# Patient Record
Sex: MALE | Race: WHITE | NOT HISPANIC OR LATINO | Employment: OTHER | URBAN - METROPOLITAN AREA
[De-identification: names, ages, dates, MRNs, and addresses within clinical notes are randomized per-mention and may not be internally consistent; named-entity substitution may affect disease eponyms.]

---

## 2019-09-12 ENCOUNTER — TRANSCRIBE ORDERS (OUTPATIENT)
Dept: ADMINISTRATIVE | Facility: HOSPITAL | Age: 49
End: 2019-09-12

## 2019-09-12 DIAGNOSIS — K57.20 PERFORATED DIVERTICULUM OF LARGE INTESTINE: Primary | ICD-10-CM

## 2019-09-17 ENCOUNTER — LAB REQUISITION (OUTPATIENT)
Dept: LAB | Facility: HOSPITAL | Age: 49
End: 2019-09-17

## 2019-09-17 DIAGNOSIS — Z00.00 ROUTINE GENERAL MEDICAL EXAMINATION AT A HEALTH CARE FACILITY: ICD-10-CM

## 2019-09-17 LAB
ALBUMIN SERPL-MCNC: 3.6 G/DL (ref 3.5–5.2)
ALBUMIN/GLOB SERPL: 0.9 G/DL
ALP SERPL-CCNC: 94 U/L (ref 39–117)
ALT SERPL W P-5'-P-CCNC: 33 U/L (ref 1–41)
ANION GAP SERPL CALCULATED.3IONS-SCNC: 14 MMOL/L (ref 5–15)
AST SERPL-CCNC: 29 U/L (ref 1–40)
BASOPHILS # BLD AUTO: 0.07 10*3/MM3 (ref 0–0.2)
BASOPHILS NFR BLD AUTO: 0.7 % (ref 0–1.5)
BILIRUB SERPL-MCNC: 0.2 MG/DL (ref 0.2–1.2)
BUN BLD-MCNC: 11 MG/DL (ref 6–20)
BUN/CREAT SERPL: 12.6 (ref 7–25)
CALCIUM SPEC-SCNC: 8.8 MG/DL (ref 8.6–10.5)
CHLORIDE SERPL-SCNC: 103 MMOL/L (ref 98–107)
CO2 SERPL-SCNC: 23 MMOL/L (ref 22–29)
CREAT BLD-MCNC: 0.87 MG/DL (ref 0.76–1.27)
CRP SERPL-MCNC: 1.32 MG/DL (ref 0–0.5)
DEPRECATED RDW RBC AUTO: 48.4 FL (ref 37–54)
EOSINOPHIL # BLD AUTO: 0.26 10*3/MM3 (ref 0–0.4)
EOSINOPHIL NFR BLD AUTO: 2.5 % (ref 0.3–6.2)
ERYTHROCYTE [DISTWIDTH] IN BLOOD BY AUTOMATED COUNT: 17.6 % (ref 12.3–15.4)
ERYTHROCYTE [SEDIMENTATION RATE] IN BLOOD: 95 MM/HR (ref 0–20)
GFR SERPL CREATININE-BSD FRML MDRD: 113 ML/MIN/1.73
GFR SERPL CREATININE-BSD FRML MDRD: 93 ML/MIN/1.73
GLOBULIN UR ELPH-MCNC: 3.9 GM/DL
GLUCOSE BLD-MCNC: 89 MG/DL (ref 65–99)
HCT VFR BLD AUTO: 38.9 % (ref 37.5–51)
HGB BLD-MCNC: 11.9 G/DL (ref 13–17.7)
IMM GRANULOCYTES # BLD AUTO: 0.07 10*3/MM3 (ref 0–0.05)
IMM GRANULOCYTES NFR BLD AUTO: 0.7 % (ref 0–0.5)
LYMPHOCYTES # BLD AUTO: 2.04 10*3/MM3 (ref 0.7–3.1)
LYMPHOCYTES NFR BLD AUTO: 19.7 % (ref 19.6–45.3)
MCH RBC QN AUTO: 25.3 PG (ref 26.6–33)
MCHC RBC AUTO-ENTMCNC: 30.6 G/DL (ref 31.5–35.7)
MCV RBC AUTO: 82.6 FL (ref 79–97)
MONOCYTES # BLD AUTO: 0.77 10*3/MM3 (ref 0.1–0.9)
MONOCYTES NFR BLD AUTO: 7.4 % (ref 5–12)
NEUTROPHILS # BLD AUTO: 7.13 10*3/MM3 (ref 1.7–7)
NEUTROPHILS NFR BLD AUTO: 69 % (ref 42.7–76)
NRBC BLD AUTO-RTO: 0 /100 WBC (ref 0–0.2)
PLATELET # BLD AUTO: 424 10*3/MM3 (ref 140–450)
PMV BLD AUTO: 10.8 FL (ref 6–12)
POTASSIUM BLD-SCNC: 4.6 MMOL/L (ref 3.5–5.2)
PROT SERPL-MCNC: 7.5 G/DL (ref 6–8.5)
RBC # BLD AUTO: 4.71 10*6/MM3 (ref 4.14–5.8)
SODIUM BLD-SCNC: 140 MMOL/L (ref 136–145)
WBC NRBC COR # BLD: 10.34 10*3/MM3 (ref 3.4–10.8)

## 2019-09-17 PROCEDURE — 85652 RBC SED RATE AUTOMATED: CPT

## 2019-09-17 PROCEDURE — 86140 C-REACTIVE PROTEIN: CPT

## 2019-09-17 PROCEDURE — 85025 COMPLETE CBC W/AUTO DIFF WBC: CPT

## 2019-09-17 PROCEDURE — 80053 COMPREHEN METABOLIC PANEL: CPT

## 2019-09-18 ENCOUNTER — APPOINTMENT (OUTPATIENT)
Dept: CT IMAGING | Facility: HOSPITAL | Age: 49
End: 2019-09-18

## 2019-09-23 ENCOUNTER — HOSPITAL ENCOUNTER (OUTPATIENT)
Dept: CT IMAGING | Facility: HOSPITAL | Age: 49
Discharge: HOME | End: 2019-09-23
Admitting: INTERNAL MEDICINE

## 2019-09-23 DIAGNOSIS — K57.20 PERFORATED DIVERTICULUM OF LARGE INTESTINE: ICD-10-CM

## 2019-09-23 PROCEDURE — 74177 CT ABD & PELVIS W/CONTRAST: CPT

## 2019-09-23 PROCEDURE — 25010000002 IOPAMIDOL 61 % SOLUTION: Performed by: INTERNAL MEDICINE

## 2019-09-23 RX ADMIN — IOPAMIDOL 95 ML: 612 INJECTION, SOLUTION INTRAVENOUS at 15:05

## 2019-09-24 ENCOUNTER — LAB REQUISITION (OUTPATIENT)
Dept: LAB | Facility: HOSPITAL | Age: 49
End: 2019-09-24

## 2019-09-24 DIAGNOSIS — Z00.00 ROUTINE GENERAL MEDICAL EXAMINATION AT A HEALTH CARE FACILITY: ICD-10-CM

## 2019-09-24 LAB
ALBUMIN SERPL-MCNC: 3.9 G/DL (ref 3.5–5.2)
ALBUMIN/GLOB SERPL: 1.2 G/DL
ALP SERPL-CCNC: 74 U/L (ref 39–117)
ALT SERPL W P-5'-P-CCNC: 14 U/L (ref 1–41)
ANION GAP SERPL CALCULATED.3IONS-SCNC: 12 MMOL/L (ref 5–15)
AST SERPL-CCNC: 17 U/L (ref 1–40)
BASOPHILS # BLD AUTO: 0.08 10*3/MM3 (ref 0–0.2)
BASOPHILS NFR BLD AUTO: 1 % (ref 0–1.5)
BILIRUB SERPL-MCNC: 0.2 MG/DL (ref 0.2–1.2)
BUN BLD-MCNC: 11 MG/DL (ref 6–20)
BUN/CREAT SERPL: 14.5 (ref 7–25)
CALCIUM SPEC-SCNC: 9.2 MG/DL (ref 8.6–10.5)
CHLORIDE SERPL-SCNC: 102 MMOL/L (ref 98–107)
CO2 SERPL-SCNC: 25 MMOL/L (ref 22–29)
CREAT BLD-MCNC: 0.76 MG/DL (ref 0.76–1.27)
CRP SERPL-MCNC: 0.25 MG/DL (ref 0–0.5)
DEPRECATED RDW RBC AUTO: 59.8 FL (ref 37–54)
EOSINOPHIL # BLD AUTO: 0.21 10*3/MM3 (ref 0–0.4)
EOSINOPHIL NFR BLD AUTO: 2.5 % (ref 0.3–6.2)
ERYTHROCYTE [DISTWIDTH] IN BLOOD BY AUTOMATED COUNT: 20 % (ref 12.3–15.4)
ERYTHROCYTE [SEDIMENTATION RATE] IN BLOOD: 91 MM/HR (ref 0–20)
GFR SERPL CREATININE-BSD FRML MDRD: 109 ML/MIN/1.73
GLOBULIN UR ELPH-MCNC: 3.3 GM/DL
GLUCOSE BLD-MCNC: 85 MG/DL (ref 65–99)
HCT VFR BLD AUTO: 41.3 % (ref 37.5–51)
HGB BLD-MCNC: 12.7 G/DL (ref 13–17.7)
IMM GRANULOCYTES # BLD AUTO: 0.03 10*3/MM3 (ref 0–0.05)
IMM GRANULOCYTES NFR BLD AUTO: 0.4 % (ref 0–0.5)
LYMPHOCYTES # BLD AUTO: 1.89 10*3/MM3 (ref 0.7–3.1)
LYMPHOCYTES NFR BLD AUTO: 22.7 % (ref 19.6–45.3)
MCH RBC QN AUTO: 26 PG (ref 26.6–33)
MCHC RBC AUTO-ENTMCNC: 30.8 G/DL (ref 31.5–35.7)
MCV RBC AUTO: 84.6 FL (ref 79–97)
MONOCYTES # BLD AUTO: 0.62 10*3/MM3 (ref 0.1–0.9)
MONOCYTES NFR BLD AUTO: 7.4 % (ref 5–12)
NEUTROPHILS # BLD AUTO: 5.51 10*3/MM3 (ref 1.7–7)
NEUTROPHILS NFR BLD AUTO: 66 % (ref 42.7–76)
NRBC BLD AUTO-RTO: 0 /100 WBC (ref 0–0.2)
PLATELET # BLD AUTO: 246 10*3/MM3 (ref 140–450)
PMV BLD AUTO: 11.5 FL (ref 6–12)
POTASSIUM BLD-SCNC: 4.1 MMOL/L (ref 3.5–5.2)
PROT SERPL-MCNC: 7.2 G/DL (ref 6–8.5)
RBC # BLD AUTO: 4.88 10*6/MM3 (ref 4.14–5.8)
SODIUM BLD-SCNC: 139 MMOL/L (ref 136–145)
WBC NRBC COR # BLD: 8.34 10*3/MM3 (ref 3.4–10.8)

## 2019-09-24 PROCEDURE — 85025 COMPLETE CBC W/AUTO DIFF WBC: CPT

## 2019-09-24 PROCEDURE — 86140 C-REACTIVE PROTEIN: CPT

## 2019-09-24 PROCEDURE — 80053 COMPREHEN METABOLIC PANEL: CPT

## 2019-09-24 PROCEDURE — 85652 RBC SED RATE AUTOMATED: CPT

## 2019-09-25 ENCOUNTER — TRANSCRIBE ORDERS (OUTPATIENT)
Dept: ADMINISTRATIVE | Facility: HOSPITAL | Age: 49
End: 2019-09-25

## 2019-09-25 DIAGNOSIS — K75.0 PYOGENIC HEPATIC ABSCESS: Primary | ICD-10-CM

## 2019-10-07 ENCOUNTER — HOSPITAL ENCOUNTER (OUTPATIENT)
Dept: CT IMAGING | Facility: HOSPITAL | Age: 49
Discharge: HOME | End: 2019-10-07
Admitting: INTERNAL MEDICINE

## 2019-10-07 DIAGNOSIS — K75.0 PYOGENIC HEPATIC ABSCESS: ICD-10-CM

## 2019-10-07 PROCEDURE — 25010000002 IOPAMIDOL 61 % SOLUTION: Performed by: INTERNAL MEDICINE

## 2019-10-07 PROCEDURE — 74177 CT ABD & PELVIS W/CONTRAST: CPT

## 2019-10-07 RX ADMIN — IOPAMIDOL 95 ML: 612 INJECTION, SOLUTION INTRAVENOUS at 13:28

## 2023-11-25 ENCOUNTER — HOSPITAL ENCOUNTER (EMERGENCY)
Facility: HOSPITAL | Age: 53
Discharge: HOME | End: 2023-11-25
Attending: STUDENT IN AN ORGANIZED HEALTH CARE EDUCATION/TRAINING PROGRAM
Payer: COMMERCIAL

## 2023-11-25 ENCOUNTER — APPOINTMENT (OUTPATIENT)
Dept: CT IMAGING | Facility: HOSPITAL | Age: 53
End: 2023-11-25
Payer: COMMERCIAL

## 2023-11-25 VITALS
BODY MASS INDEX: 32.51 KG/M2 | WEIGHT: 240 LBS | HEIGHT: 72 IN | HEART RATE: 80 BPM | DIASTOLIC BLOOD PRESSURE: 92 MMHG | TEMPERATURE: 97.1 F | OXYGEN SATURATION: 97 % | RESPIRATION RATE: 16 BRPM | SYSTOLIC BLOOD PRESSURE: 136 MMHG

## 2023-11-25 DIAGNOSIS — A04.5 CAMPYLOBACTER INTESTINAL INFECTION: Primary | ICD-10-CM

## 2023-11-25 LAB
ADV 40+41 DNA STL QL NAA+NON-PROBE: NOT DETECTED
ALBUMIN SERPL-MCNC: 3.9 G/DL (ref 3.5–5.2)
ALBUMIN/GLOB SERPL: 1.1 G/DL
ALP SERPL-CCNC: 115 U/L (ref 39–117)
ALT SERPL W P-5'-P-CCNC: 8 U/L (ref 1–41)
ANION GAP SERPL CALCULATED.3IONS-SCNC: 10.8 MMOL/L (ref 5–15)
AST SERPL-CCNC: 14 U/L (ref 1–40)
ASTRO TYP 1-8 RNA STL QL NAA+NON-PROBE: NOT DETECTED
BACTERIA UR QL AUTO: ABNORMAL /HPF
BASOPHILS # BLD AUTO: 0.04 10*3/MM3 (ref 0–0.2)
BASOPHILS NFR BLD AUTO: 0.4 % (ref 0–1.5)
BILIRUB SERPL-MCNC: 0.2 MG/DL (ref 0–1.2)
BILIRUB UR QL STRIP: NEGATIVE
BUN SERPL-MCNC: 16 MG/DL (ref 6–20)
BUN/CREAT SERPL: 14 (ref 7–25)
C CAYETANENSIS DNA STL QL NAA+NON-PROBE: NOT DETECTED
C COLI+JEJ+UPSA DNA STL QL NAA+NON-PROBE: DETECTED
C DIFF GDH + TOXINS A+B STL QL IA.RAPID: NEGATIVE
C DIFF GDH + TOXINS A+B STL QL IA.RAPID: NEGATIVE
CALCIUM SPEC-SCNC: 9.4 MG/DL (ref 8.6–10.5)
CHLORIDE SERPL-SCNC: 102 MMOL/L (ref 98–107)
CLARITY UR: CLEAR
CO2 SERPL-SCNC: 24.2 MMOL/L (ref 22–29)
COLOR UR: YELLOW
CREAT SERPL-MCNC: 1.14 MG/DL (ref 0.76–1.27)
CRYPTOSP DNA STL QL NAA+NON-PROBE: NOT DETECTED
D-LACTATE SERPL-SCNC: 1.1 MMOL/L (ref 0.5–2)
DEPRECATED RDW RBC AUTO: 46.6 FL (ref 37–54)
E HISTOLYT DNA STL QL NAA+NON-PROBE: NOT DETECTED
EAEC PAA PLAS AGGR+AATA ST NAA+NON-PRB: NOT DETECTED
EC STX1+STX2 GENES STL QL NAA+NON-PROBE: NOT DETECTED
EGFRCR SERPLBLD CKD-EPI 2021: 76.4 ML/MIN/1.73
EOSINOPHIL # BLD AUTO: 0.2 10*3/MM3 (ref 0–0.4)
EOSINOPHIL NFR BLD AUTO: 2 % (ref 0.3–6.2)
EPEC EAE GENE STL QL NAA+NON-PROBE: NOT DETECTED
ERYTHROCYTE [DISTWIDTH] IN BLOOD BY AUTOMATED COUNT: 17.6 % (ref 12.3–15.4)
ETEC LTA+ST1A+ST1B TOX ST NAA+NON-PROBE: NOT DETECTED
G LAMBLIA DNA STL QL NAA+NON-PROBE: NOT DETECTED
GLOBULIN UR ELPH-MCNC: 3.7 GM/DL
GLUCOSE SERPL-MCNC: 95 MG/DL (ref 65–99)
GLUCOSE UR STRIP-MCNC: NEGATIVE MG/DL
HCT VFR BLD AUTO: 37.7 % (ref 37.5–51)
HGB BLD-MCNC: 11.6 G/DL (ref 13–17.7)
HGB UR QL STRIP.AUTO: NEGATIVE
HOLD SPECIMEN: NORMAL
HOLD SPECIMEN: NORMAL
HYALINE CASTS UR QL AUTO: ABNORMAL /LPF
IMM GRANULOCYTES # BLD AUTO: 0.03 10*3/MM3 (ref 0–0.05)
IMM GRANULOCYTES NFR BLD AUTO: 0.3 % (ref 0–0.5)
KETONES UR QL STRIP: ABNORMAL
LEUKOCYTE ESTERASE UR QL STRIP.AUTO: NEGATIVE
LIPASE SERPL-CCNC: 63 U/L (ref 13–60)
LYMPHOCYTES # BLD AUTO: 2.02 10*3/MM3 (ref 0.7–3.1)
LYMPHOCYTES NFR BLD AUTO: 20.5 % (ref 19.6–45.3)
MCH RBC QN AUTO: 22.7 PG (ref 26.6–33)
MCHC RBC AUTO-ENTMCNC: 30.8 G/DL (ref 31.5–35.7)
MCV RBC AUTO: 73.6 FL (ref 79–97)
MICROCYTES BLD QL: NORMAL
MONOCYTES # BLD AUTO: 0.79 10*3/MM3 (ref 0.1–0.9)
MONOCYTES NFR BLD AUTO: 8 % (ref 5–12)
MUCOUS THREADS URNS QL MICRO: ABNORMAL /HPF
NEUTROPHILS NFR BLD AUTO: 6.75 10*3/MM3 (ref 1.7–7)
NEUTROPHILS NFR BLD AUTO: 68.8 % (ref 42.7–76)
NITRITE UR QL STRIP: NEGATIVE
NOROVIRUS GI+II RNA STL QL NAA+NON-PROBE: NOT DETECTED
NRBC BLD AUTO-RTO: 0 /100 WBC (ref 0–0.2)
P SHIGELLOIDES DNA STL QL NAA+NON-PROBE: NOT DETECTED
PH UR STRIP.AUTO: 5.5 [PH] (ref 5–8)
PLATELET # BLD AUTO: 286 10*3/MM3 (ref 140–450)
PMV BLD AUTO: 10 FL (ref 6–12)
POTASSIUM SERPL-SCNC: 4 MMOL/L (ref 3.5–5.2)
PROT SERPL-MCNC: 7.6 G/DL (ref 6–8.5)
PROT UR QL STRIP: ABNORMAL
RBC # BLD AUTO: 5.12 10*6/MM3 (ref 4.14–5.8)
RBC # UR STRIP: ABNORMAL /HPF
REF LAB TEST METHOD: ABNORMAL
RVA RNA STL QL NAA+NON-PROBE: NOT DETECTED
S ENT+BONG DNA STL QL NAA+NON-PROBE: NOT DETECTED
SAPO I+II+IV+V RNA STL QL NAA+NON-PROBE: NOT DETECTED
SHIGELLA SP+EIEC IPAH ST NAA+NON-PROBE: NOT DETECTED
SMALL PLATELETS BLD QL SMEAR: ADEQUATE
SODIUM SERPL-SCNC: 137 MMOL/L (ref 136–145)
SP GR UR STRIP: 1.02 (ref 1–1.03)
SQUAMOUS #/AREA URNS HPF: ABNORMAL /HPF
UROBILINOGEN UR QL STRIP: ABNORMAL
V CHOL+PARA+VUL DNA STL QL NAA+NON-PROBE: NOT DETECTED
V CHOLERAE DNA STL QL NAA+NON-PROBE: NOT DETECTED
WBC # UR STRIP: ABNORMAL /HPF
WBC MORPH BLD: NORMAL
WBC NRBC COR # BLD AUTO: 9.83 10*3/MM3 (ref 3.4–10.8)
WHOLE BLOOD HOLD COAG: NORMAL
WHOLE BLOOD HOLD SPECIMEN: NORMAL
Y ENTEROCOL DNA STL QL NAA+NON-PROBE: NOT DETECTED

## 2023-11-25 PROCEDURE — 87324 CLOSTRIDIUM AG IA: CPT | Performed by: PHYSICIAN ASSISTANT

## 2023-11-25 PROCEDURE — 99285 EMERGENCY DEPT VISIT HI MDM: CPT

## 2023-11-25 PROCEDURE — 80053 COMPREHEN METABOLIC PANEL: CPT

## 2023-11-25 PROCEDURE — 83690 ASSAY OF LIPASE: CPT

## 2023-11-25 PROCEDURE — 85007 BL SMEAR W/DIFF WBC COUNT: CPT

## 2023-11-25 PROCEDURE — 87449 NOS EACH ORGANISM AG IA: CPT | Performed by: PHYSICIAN ASSISTANT

## 2023-11-25 PROCEDURE — 85025 COMPLETE CBC W/AUTO DIFF WBC: CPT

## 2023-11-25 PROCEDURE — 74177 CT ABD & PELVIS W/CONTRAST: CPT

## 2023-11-25 PROCEDURE — 81001 URINALYSIS AUTO W/SCOPE: CPT

## 2023-11-25 PROCEDURE — 25510000001 IOPAMIDOL 61 % SOLUTION: Performed by: STUDENT IN AN ORGANIZED HEALTH CARE EDUCATION/TRAINING PROGRAM

## 2023-11-25 PROCEDURE — 83605 ASSAY OF LACTIC ACID: CPT

## 2023-11-25 PROCEDURE — 87507 IADNA-DNA/RNA PROBE TQ 12-25: CPT | Performed by: PHYSICIAN ASSISTANT

## 2023-11-25 RX ORDER — AZITHROMYCIN 250 MG/1
500 TABLET, FILM COATED ORAL ONCE
Status: COMPLETED | OUTPATIENT
Start: 2023-11-25 | End: 2023-11-25

## 2023-11-25 RX ORDER — SODIUM CHLORIDE 0.9 % (FLUSH) 0.9 %
10 SYRINGE (ML) INJECTION AS NEEDED
Status: DISCONTINUED | OUTPATIENT
Start: 2023-11-25 | End: 2023-11-25 | Stop reason: HOSPADM

## 2023-11-25 RX ORDER — AZITHROMYCIN 250 MG/1
500 TABLET, FILM COATED ORAL DAILY
Qty: 4 TABLET | Refills: 0 | Status: SHIPPED | OUTPATIENT
Start: 2023-11-25 | End: 2023-11-27

## 2023-11-25 RX ADMIN — AZITHROMYCIN MONOHYDRATE 500 MG: 250 TABLET ORAL at 21:25

## 2023-11-25 RX ADMIN — IOPAMIDOL 100 ML: 612 INJECTION, SOLUTION INTRAVENOUS at 19:10

## 2023-11-25 NOTE — ED PROVIDER NOTES
"Subjective  History of Present Illness:    Chief Complaint:   Chief Complaint   Patient presents with    Abdominal Pain    Diarrhea      History of Present Illness: Edward Powell is a 53 y.o. male who presents to the emergency department complaining of gastric bypass 2011, perforated gastric ulcer 2019 had repair and cholecystectomy has had abnormal stools since, had covid one year ago some diarrhea since. For one month daily frequent wattery stools/diarrhea. States dark and light intermittent stools. Today started with lower central abd pain prompting visit here. Passing bernardo okay. No blood in stool.  Onset: one month ago.  Duration: intermittently  Exacerbating / Alleviating factors: none  Associated symptoms: no n/v/fever      Nurses Notes reviewed and agree, including vitals, allergies, social history and prior medical history.     Review of Systems   Constitutional: Negative.    HENT: Negative.     Eyes: Negative.    Respiratory: Negative.     Cardiovascular: Negative.    Gastrointestinal:  Positive for abdominal pain and diarrhea.   Genitourinary: Negative.    Musculoskeletal: Negative.    Skin: Negative.    Allergic/Immunologic: Negative.    Neurological: Negative.    Psychiatric/Behavioral: Negative.     All other systems reviewed and are negative.      History reviewed. No pertinent past medical history.    Allergies:    Bactrim [sulfamethoxazole-trimethoprim]      Past Surgical History:   Procedure Laterality Date    BLADDER TUMOR/ULCER BLEEDER CAUTERIZATION      JENNIFER-EN-Y           Social History     Socioeconomic History    Marital status:    Tobacco Use    Smoking status: Every Day     Packs/day: 1.5     Types: Cigarettes    Smokeless tobacco: Never   Substance and Sexual Activity    Alcohol use: Never         History reviewed. No pertinent family history.    Objective  Physical Exam:  /95   Pulse 81   Temp 97.1 °F (36.2 °C)   Resp 18   Ht 182.9 cm (72\")   Wt 109 kg (240 lb)   " SpO2 99%   BMI 32.55 kg/m²      Physical Exam  Vitals and nursing note reviewed.   Constitutional:       General: He is not in acute distress.     Appearance: He is well-developed. He is not ill-appearing, toxic-appearing or diaphoretic.   HENT:      Head: Normocephalic and atraumatic.   Eyes:      Extraocular Movements: Extraocular movements intact.   Cardiovascular:      Rate and Rhythm: Normal rate and regular rhythm.   Pulmonary:      Effort: Pulmonary effort is normal.   Abdominal:      General: Abdomen is flat. Bowel sounds are normal.      Palpations: Abdomen is soft.      Tenderness:  in the periumbilical area and suprapubic area There is no guarding or rebound.   Skin:     General: Skin is warm and dry.   Neurological:      General: No focal deficit present.      Mental Status: He is alert.   Psychiatric:         Mood and Affect: Mood normal.         Behavior: Behavior normal.           Procedures    ED Course:    ED Course as of 11/25/23 2116   Sat Nov 25, 2023   2017 Campylobacter(!): Detected [TM]      ED Course User Index  [TM] Abrahan Stinson PA-C       Lab Results (last 24 hours)       Procedure Component Value Units Date/Time    CBC & Differential [[id number]]  (Abnormal) Collected: 11/25/23 1821    Specimen: Blood Updated: 11/25/23 1854    Narrative:      The following orders were created for panel order CBC & Differential.  Procedure                               Abnormality         Status                     ---------                               -----------         ------                     CBC Auto Differential[[id number]]        Abnormal            Final result               Scan Slide[[id number]]                                       Final result                 Please view results for these tests on the individual orders.    Comprehensive Metabolic Panel [[id number]] Collected: 11/25/23 1821    Specimen: Blood Updated: 11/25/23 1848     Glucose 95 mg/dL      BUN 16 mg/dL       Creatinine 1.14 mg/dL      Sodium 137 mmol/L      Potassium 4.0 mmol/L      Chloride 102 mmol/L      CO2 24.2 mmol/L      Calcium 9.4 mg/dL      Total Protein 7.6 g/dL      Albumin 3.9 g/dL      ALT (SGPT) 8 U/L      AST (SGOT) 14 U/L      Alkaline Phosphatase 115 U/L      Total Bilirubin 0.2 mg/dL      Globulin 3.7 gm/dL      A/G Ratio 1.1 g/dL      BUN/Creatinine Ratio 14.0     Anion Gap 10.8 mmol/L      eGFR 76.4 mL/min/1.73     Narrative:      GFR Normal >60  Chronic Kidney Disease <60  Kidney Failure <15      Lipase [[id number]]  (Abnormal) Collected: 11/25/23 1821    Specimen: Blood Updated: 11/25/23 1848     Lipase 63 U/L     Lactic Acid, Plasma [[id number]]  (Normal) Collected: 11/25/23 1821    Specimen: Blood Updated: 11/25/23 1846     Lactate 1.1 mmol/L     CBC Auto Differential [[id number]]  (Abnormal) Collected: 11/25/23 1821    Specimen: Blood Updated: 11/25/23 1839     WBC 9.83 10*3/mm3      RBC 5.12 10*6/mm3      Hemoglobin 11.6 g/dL      Hematocrit 37.7 %      MCV 73.6 fL      MCH 22.7 pg      MCHC 30.8 g/dL      RDW 17.6 %      RDW-SD 46.6 fl      MPV 10.0 fL      Platelets 286 10*3/mm3      Neutrophil % 68.8 %      Lymphocyte % 20.5 %      Monocyte % 8.0 %      Eosinophil % 2.0 %      Basophil % 0.4 %      Immature Grans % 0.3 %      Neutrophils, Absolute 6.75 10*3/mm3      Lymphocytes, Absolute 2.02 10*3/mm3      Monocytes, Absolute 0.79 10*3/mm3      Eosinophils, Absolute 0.20 10*3/mm3      Basophils, Absolute 0.04 10*3/mm3      Immature Grans, Absolute 0.03 10*3/mm3      nRBC 0.0 /100 WBC     Scan Slide [[id number]] Collected: 11/25/23 1821    Specimen: Blood Updated: 11/25/23 1854     Microcytes Slight/1+     WBC Morphology Normal     Platelet Estimate Adequate    Urinalysis With Microscopic If Indicated (No Culture) - Urine, Clean Catch [[id number]]  (Abnormal) Collected: 11/25/23 1836    Specimen: Urine, Clean Catch Updated: 11/25/23 1850     Color, UA Yellow     Appearance, UA Clear     pH, UA  5.5     Specific Gravity, UA 1.023     Glucose, UA Negative     Ketones, UA Trace     Bilirubin, UA Negative     Blood, UA Negative     Protein, UA 30 mg/dL (1+)     Leuk Esterase, UA Negative     Nitrite, UA Negative     Urobilinogen, UA 0.2 E.U./dL    Gastrointestinal Panel, PCR - Stool, Per Rectum [[id number]]  (Abnormal) Collected: 11/25/23 1836    Specimen: Stool from Per Rectum Updated: 11/25/23 1959     Campylobacter Detected     Plesiomonas shigelloides Not Detected     Salmonella Not Detected     Vibrio Not Detected     Vibrio cholerae Not Detected     Yersinia enterocolitica Not Detected     Enteroaggregative E. coli (EAEC) Not Detected     Enteropathogenic E. coli (EPEC) Not Detected     Enterotoxigenic E. coli (ETEC) lt/st Not Detected     Shiga-like toxin-producing E. coli (STEC) stx1/stx2 Not Detected     Shigella/Enteroinvasive E. coli (EIEC) Not Detected     Cryptosporidium Not Detected     Cyclospora cayetanensis Not Detected     Entamoeba histolytica Not Detected     Giardia lamblia Not Detected     Adenovirus F40/41 Not Detected     Astrovirus Not Detected     Norovirus GI/GII Not Detected     Rotavirus A Not Detected     Sapovirus (I, II, IV or V) Not Detected    Clostridioides difficile Toxin - Stool, Per Rectum [[id number]]  (Normal) Collected: 11/25/23 1836    Specimen: Stool from Per Rectum Updated: 11/25/23 1924    Narrative:      The following orders were created for panel order Clostridioides difficile Toxin - Stool, Per Rectum.  Procedure                               Abnormality         Status                     ---------                               -----------         ------                     Clostridioides difficile...[[id number]]  Normal              Final result                 Please view results for these tests on the individual orders.    Clostridioides difficile EIA - Stool, Per Rectum [[id number]]  (Normal) Collected: 11/25/23 1836    Specimen: Stool from Per Rectum Updated:  11/25/23 1924     C Diff GDH Ag Negative     C.diff Toxin Ag Negative    Narrative:      The result indicates the absence of toxigenic C.difficile from stool specimen.    Urinalysis, Microscopic Only - Urine, Clean Catch [[id number]]  (Abnormal) Collected: 11/25/23 1836    Specimen: Urine, Clean Catch Updated: 11/25/23 1858     RBC, UA None Seen /HPF      WBC, UA None Seen /HPF      Bacteria, UA Trace /HPF      Squamous Epithelial Cells, UA None Seen /HPF      Hyaline Casts, UA None Seen /LPF      Mucus, UA Small/1+ /HPF      Methodology Manual Light Microscopy             CT Abdomen Pelvis With Contrast    Result Date: 11/25/2023  FINAL REPORT TECHNIQUE: Axial CT images were performed from the lung bases through the symphysis pubis after the administration of intravenous contrast.  This study was performed with techniques to keep radiation doses as low as reasonably achievable (ALARA). Individualized dose reduction techniques using automated exposure control or adjustment of mA and/or kV according to the patient's size were employed. CLINICAL HISTORY: diarrhea, lower abd pain FINDINGS: LOWER CHEST: The heart is normal size.  There is a partially imaged masslike consolidation in the left lower lobe.  The imaged portion measures at least 4.3 cm x 2.9 cm. ABDOMEN/PELVIS:  Liver, gallbladder and bile ducts: The liver enhances homogeneously without suspicious focal hepatic lesion. There has been a prior cholecystectomy.  There is no definite biliary duct dilatation.  Adrenal glands: The adrenal glands are morphologically unremarkable without suspicious lesion. Kidneys, ureter and urinary bladder: No suspicious renal lesion. No hydronephrosis.  Urinary bladder is unremarkable.  Spleen: The spleen is normal size.  Pancreas: The pancreas is grossly unremarkable.  GI systems and mesentery: There has been prior gastric bypass.  No evidence of bowel obstruction.  The appendix is not visualized but there are no secondary signs  of appendicitis.  There is irregular circumferential wall thickening involving the rectum.    Lymph nodes:There are several mesorectal fat lymph nodes.  Vessels: The aorta and abdominal arteries are grossly patent.  The IVC and portal vein are patent and grossly unremarkable.  Peritoneum: No free intraperitoneal fluid or pneumoperitoneum.  Pelvic viscera: No acute findings.  Body wall: No body wall contusion. No significant body wall hernias.  Bones: No acute fracture.     Impression: Irregular circumferential wall thickening involving the rectum is concerning for an underlying rectal malignancy.  Colonoscopy is recommended.    Mesorectal fat lymph nodes could be reactive or represent metastatic disease.  Nonspecific masslike consolidation in the left lower lobe.  Further characterize with contrast-enhanced CT chest, malignancy is possible. Authenticated and Electronically Signed by Juve Cancino MD on 11/25/2023 08:41:18 PM        Medical Decision Making  Amount and/or Complexity of Data Reviewed  Labs:  Decision-making details documented in ED Course.  Radiology: ordered.    Risk  Prescription drug management.        Edward Powell is a 53 y.o. male who presents to the emergency department for evaluation of abdominal pain, diarrhea    Differential diagnosis includes colitis, diverticulitis among other etiologies.    CBC, CMP, GI panel, C. difficile, CT scan abdomen pelvis ordered for further evaluation of the patient's presentation.    Chart review if available included outside testing, previous visits, prior labs, prior imaging, available notes from prior evaluations or visits with specialists, medication list, allergies, past medical history, past surgical history when applicable.    Patient was treated with azithromycin    Extensive discussion with patient regarding CT findings concerning for rectal malignancy with possible lymph node involvement and lung metastasis.  Will have him follow-up  outpatient.    Plan for disposition is discharged home.  Patient/family comfortable with and understanding of the plan.      Final diagnoses:   Campylobacter intestinal infection          Abrahan Stinson PA-C  11/25/23 2110

## 2023-11-27 ENCOUNTER — TELEPHONE (OUTPATIENT)
Dept: GASTROENTEROLOGY | Facility: CLINIC | Age: 53
End: 2023-11-27
Payer: COMMERCIAL

## 2023-11-27 NOTE — TELEPHONE ENCOUNTER
Patient returned call; appointment offered for 11/27/23.  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  An appointment has been scheduled for 11/30/23.        Called patient to schedule an appointment; LVM.  If patient returns call, HUB to transfer call to office for scheduling.

## 2023-11-30 ENCOUNTER — OFFICE VISIT (OUTPATIENT)
Dept: GASTROENTEROLOGY | Facility: CLINIC | Age: 53
End: 2023-11-30
Payer: COMMERCIAL

## 2023-11-30 VITALS
WEIGHT: 247 LBS | SYSTOLIC BLOOD PRESSURE: 128 MMHG | DIASTOLIC BLOOD PRESSURE: 70 MMHG | BODY MASS INDEX: 33.5 KG/M2 | OXYGEN SATURATION: 98 % | HEART RATE: 67 BPM

## 2023-11-30 DIAGNOSIS — Z87.11 HISTORY OF PEPTIC ULCER DISEASE: ICD-10-CM

## 2023-11-30 DIAGNOSIS — K52.9 CHRONIC DIARRHEA: ICD-10-CM

## 2023-11-30 DIAGNOSIS — Z98.84 HISTORY OF GASTRIC BYPASS: ICD-10-CM

## 2023-11-30 DIAGNOSIS — R91.8 LUNG MASS: ICD-10-CM

## 2023-11-30 DIAGNOSIS — R93.5 ABNORMAL CT OF THE ABDOMEN: Primary | ICD-10-CM

## 2023-11-30 DIAGNOSIS — R19.4 CHANGE IN BOWEL HABITS: ICD-10-CM

## 2023-11-30 DIAGNOSIS — Z72.0 TOBACCO ABUSE: ICD-10-CM

## 2023-11-30 DIAGNOSIS — Z12.11 ENCOUNTER FOR SCREENING FOR MALIGNANT NEOPLASM OF COLON: ICD-10-CM

## 2023-11-30 DIAGNOSIS — D64.9 NORMOCYTIC ANEMIA: ICD-10-CM

## 2023-11-30 DIAGNOSIS — R10.30 LOWER ABDOMINAL PAIN: ICD-10-CM

## 2023-11-30 DIAGNOSIS — A04.5 CAMPYLOBACTER GASTROENTERITIS: ICD-10-CM

## 2023-11-30 RX ORDER — BISACODYL 5 MG/1
20 TABLET, DELAYED RELEASE ORAL ONCE
Qty: 4 TABLET | Refills: 0 | Status: SHIPPED | OUTPATIENT
Start: 2023-11-30 | End: 2023-11-30

## 2023-11-30 RX ORDER — DICYCLOMINE HCL 20 MG
20 TABLET ORAL 3 TIMES DAILY PRN
Qty: 60 TABLET | Refills: 0 | Status: SHIPPED | OUTPATIENT
Start: 2023-11-30

## 2023-11-30 RX ORDER — HYDROCORTISONE 25 MG/G
CREAM TOPICAL 2 TIMES DAILY
Qty: 30 G | Refills: 1 | Status: SHIPPED | OUTPATIENT
Start: 2023-11-30

## 2023-11-30 RX ORDER — SODIUM CHLORIDE 9 MG/ML
70 INJECTION, SOLUTION INTRAVENOUS CONTINUOUS PRN
OUTPATIENT
Start: 2023-11-30

## 2023-11-30 NOTE — PROGRESS NOTES
New Patient Consult      Date: 2023   Patient Name: Edward Powell  MRN: [id number]  : 1970     Referring Physician: Maribell Monroy,*    Chief Complaint   Patient presents with    Abdominal Pain    Diarrhea    Abnormal Imaging       History of Present Illness: Edward Powell is a 53 y.o. male who is here today to establish care with Gastroenterology for further evaluation of his ongoing diarrhea and abnormal CT abdomen.    Patient states that he had a bariatric surgery done in  and he lost over 20 pounds.  He had a peptic ulcer disease in 2019 which was perforated and had to undergo surgery.  During surgery his gallbladder was removed as well.  Since then he has been having loose stools which she was managing without any issues until recently.  For the last 2 months or so his diarrhea got worse with the loose stools sometimes watery stools 6-10 times per day without any blood.  5 days ago on 2023 he developed severe abdominal pain with the diarrhea and had to come to emergency room.     He had a CT abdomen pelvis done with contrast which revealed a irregular circumferential wall thickening involving the rectum concerning for underlying rectal malignancy.  Colonoscopy recommended.  Also noted mesorectal lodged lymph nodes could be reactive or represent metastatic disease.  Nonspecific masslike consolidation in the left lower lobe recommended further dedicated CT chest.  His stool studies came out positive for Campylobacter and was treated with the Cipro.  He never had any colonoscopy in the past.  No family history of colon cancer.  His lab work done on 2023 revealed a normal CMP.  CBC revealed anemia with a hemoglobin of 11.6 MCV 73 and platelet count of 286,000.    He still has ongoing diarrhea with the loose stools at least 5-6 times per day.  Intermittent lower abdominal cramps and has significant fecal urgency.  He has a significant abdominal bloating.  He also  has significant perianal discomfort now and he thinks it is hemorrhoidal flare.     He does get intermittent reflux symptoms for which he takes Tums.  Denies any nausea vomiting.  No dysphagia.  No famished of any colon cancer.  His weight is stable.  He is a chronic smoker nonalcoholic.    Subjective      Past Medical History:   Past Medical History:   Diagnosis Date    Cholelithiasis 2019    Removed    Coronary artery disease 2009    Stent    Hernia 2003    Lower hernia       Past Surgical History:   Past Surgical History:   Procedure Laterality Date    APPENDECTOMY  1983    Removed    BARIATRIC SURGERY  2011    Gastric bypass    BLADDER TUMOR/ULCER BLEEDER CAUTERIZATION      CHOLECYSTECTOMY  2019    Removed    JENNIFER-EN-Y         Family History: History reviewed. No pertinent family history.    Social History:   Social History     Socioeconomic History    Marital status:    Tobacco Use    Smoking status: Every Day     Packs/day: 1.50     Years: 15.00     Additional pack years: 0.00     Total pack years: 22.50     Types: Cigarettes    Smokeless tobacco: Never    Tobacco comments:     35 years   Vaping Use    Vaping Use: Never used   Substance and Sexual Activity    Alcohol use: Never    Drug use: Never    Sexual activity: Not Currently     Partners: Female     Birth control/protection: None         Current Outpatient Medications:     bisacodyl (DULCOLAX) 5 MG EC tablet, Take 4 tablets by mouth 1 (One) Time for 1 dose. Please see prep instructions from office., Disp: 4 tablet, Rfl: 0    dicyclomine (BENTYL) 20 MG tablet, Take 1 tablet by mouth 3 (Three) Times a Day As Needed for Abdominal Cramping., Disp: 60 tablet, Rfl: 0    Hydrocortisone, Perianal, (ANUSOL-HC) 2.5 % rectal cream, Insert  into the rectum 2 (Two) Times a Day. Indications: Inflamed Hemorrhoids, Disp: 30 g, Rfl: 1    polyethylene glycol (GoLYTELY) 236 g solution, Take 4,000 mL by mouth 1 (One) Time for 1 dose. Please see prep instructions from  office., Disp: 4000 mL, Rfl: 0    Allergies   Allergen Reactions    Bactrim [Sulfamethoxazole-Trimethoprim] Hives       Review of Systems:   Review of Systems   Constitutional:  Negative for fever.   HENT:  Negative for sore throat and trouble swallowing.    Eyes:  Negative for visual disturbance.   Respiratory:  Negative for cough, chest tightness and shortness of breath.    Cardiovascular:  Negative for chest pain, palpitations and leg swelling.   Gastrointestinal:  Positive for abdominal pain, anal bleeding and diarrhea. Negative for blood in stool, constipation, nausea, vomiting and indigestion.   Endocrine: Negative for polyphagia.   Genitourinary:  Negative for dysuria and hematuria.   Musculoskeletal:  Negative for back pain, joint swelling and neck pain.   Skin:  Negative for rash, skin lesions and wound.   Neurological:  Negative for dizziness, seizures, speech difficulty, weakness, numbness and confusion.   Hematological:  Negative for adenopathy. Does not bruise/bleed easily.   Psychiatric/Behavioral:  Negative for hallucinations and depressed mood.        The following portions of the patient's history were reviewed and updated as appropriate: allergies, current medications, past family history, past medical history, past social history, past surgical history and problem list.    Objective     Physical Exam:  Vital Signs:   Vitals:    11/30/23 1612   BP: 128/70   Pulse: 67   SpO2: 98%   Weight: 112 kg (247 lb)       Physical Exam  Constitutional:       Appearance: He is obese.   HENT:      Head: Normocephalic and atraumatic.   Eyes:      Conjunctiva/sclera: Conjunctivae normal.   Abdominal:      General: Abdomen is flat. There is no distension.      Palpations: There is no mass.      Tenderness: There is no abdominal tenderness. There is no guarding or rebound.      Hernia: No hernia is present.      Comments: Small thrombosed internal hemorrhoids  Unable to do a full rectal examination due to  significant pain   Musculoskeletal:      Cervical back: Normal range of motion and neck supple.   Neurological:      Mental Status: He is alert.           Results Review:   I have reviewed the patient's new clinical and imaging results and agree with the interpretation.     Admission on 11/25/2023, Discharged on 11/25/2023   Component Date Value Ref Range Status    Glucose 11/25/2023 95  65 - 99 mg/dL Final    BUN 11/25/2023 16  6 - 20 mg/dL Final    Creatinine 11/25/2023 1.14  0.76 - 1.27 mg/dL Final    Sodium 11/25/2023 137  136 - 145 mmol/L Final    Potassium 11/25/2023 4.0  3.5 - 5.2 mmol/L Final    Chloride 11/25/2023 102  98 - 107 mmol/L Final    CO2 11/25/2023 24.2  22.0 - 29.0 mmol/L Final    Calcium 11/25/2023 9.4  8.6 - 10.5 mg/dL Final    Total Protein 11/25/2023 7.6  6.0 - 8.5 g/dL Final    Albumin 11/25/2023 3.9  3.5 - 5.2 g/dL Final    ALT (SGPT) 11/25/2023 8  1 - 41 U/L Final    AST (SGOT) 11/25/2023 14  1 - 40 U/L Final    Alkaline Phosphatase 11/25/2023 115  39 - 117 U/L Final    Total Bilirubin 11/25/2023 0.2  0.0 - 1.2 mg/dL Final    Globulin 11/25/2023 3.7  gm/dL Final    A/G Ratio 11/25/2023 1.1  g/dL Final    BUN/Creatinine Ratio 11/25/2023 14.0  7.0 - 25.0 Final    Anion Gap 11/25/2023 10.8  5.0 - 15.0 mmol/L Final    eGFR 11/25/2023 76.4  >60.0 mL/min/1.73 Final    Lipase 11/25/2023 63 (H)  13 - 60 U/L Final    Color, UA 11/25/2023 Yellow  Yellow, Straw Final    Appearance, UA 11/25/2023 Clear  Clear Final    pH, UA 11/25/2023 5.5  5.0 - 8.0 Final    Specific [place], UA 11/25/2023 1.023  1.005 - 1.030 Final    Glucose, UA 11/25/2023 Negative  Negative Final    Ketones, UA 11/25/2023 Trace (A)  Negative Final    Bilirubin, UA 11/25/2023 Negative  Negative Final    Blood, UA 11/25/2023 Negative  Negative Final    Protein, UA 11/25/2023 30 mg/dL (1+) (A)  Negative Final    Leuk Esterase, UA 11/25/2023 Negative  Negative Final    Nitrite, UA 11/25/2023 Negative  Negative Final    Urobilinogen,  UA 11/25/2023 0.2 E.U./dL  0.2 - 1.0 E.U./dL Final    Lactate 11/25/2023 1.1  0.5 - 2.0 mmol/L Final    Extra Tube 11/25/2023 Hold for add-ons.   Final    Auto resulted.    Extra Tube 11/25/2023 hold for add-on   Final    Auto resulted    Extra Tube 11/25/2023 Hold for add-ons.   Final    Auto resulted.    Extra Tube 11/25/2023 Hold for add-ons.   Final    Auto resulted    WBC 11/25/2023 9.83  3.40 - 10.80 10*3/mm3 Final    RBC 11/25/2023 5.12  4.14 - 5.80 10*6/mm3 Final    Hemoglobin 11/25/2023 11.6 (L)  13.0 - 17.7 g/dL Final    Hematocrit 11/25/2023 37.7  37.5 - 51.0 % Final    MCV 11/25/2023 73.6 (L)  79.0 - 97.0 fL Final    MCH 11/25/2023 22.7 (L)  26.6 - 33.0 pg Final    MCHC 11/25/2023 30.8 (L)  31.5 - 35.7 g/dL Final    RDW 11/25/2023 17.6 (H)  12.3 - 15.4 % Final    RDW-SD 11/25/2023 46.6  37.0 - 54.0 fl Final    MPV 11/25/2023 10.0  6.0 - 12.0 fL Final    Platelets 11/25/2023 286  140 - 450 10*3/mm3 Final    Neutrophil % 11/25/2023 68.8  42.7 - 76.0 % Final    Lymphocyte % 11/25/2023 20.5  19.6 - 45.3 % Final    Monocyte % 11/25/2023 8.0  5.0 - 12.0 % Final    Eosinophil % 11/25/2023 2.0  0.3 - 6.2 % Final    Basophil % 11/25/2023 0.4  0.0 - 1.5 % Final    Immature Grans % 11/25/2023 0.3  0.0 - 0.5 % Final    Neutrophils, Absolute 11/25/2023 6.75  1.70 - 7.00 10*3/mm3 Final    Lymphocytes, Absolute 11/25/2023 2.02  0.70 - 3.10 10*3/mm3 Final    Monocytes, Absolute 11/25/2023 0.79  0.10 - 0.90 10*3/mm3 Final    Eosinophils, Absolute 11/25/2023 0.20  0.00 - 0.40 10*3/mm3 Final    Basophils, Absolute 11/25/2023 0.04  0.00 - 0.20 10*3/mm3 Final    Immature Grans, Absolute 11/25/2023 0.03  0.00 - 0.05 10*3/mm3 Final    nRBC 11/25/2023 0.0  0.0 - 0.2 /100 WBC Final    Campylobacter 11/25/2023 Detected (A)  Not Detected Final    Plesiomonas shigelloides 11/25/2023 Not Detected  Not Detected Final    Salmonella 11/25/2023 Not Detected  Not Detected Final    Vibrio 11/25/2023 Not Detected  Not Detected Final     Vibrio cholerae 11/25/2023 Not Detected  Not Detected Final    Yersinia enterocolitica 11/25/2023 Not Detected  Not Detected Final    Enteroaggregative E. coli (EAEC) 11/25/2023 Not Detected  Not Detected Final    Enteropathogenic E. coli (EPEC) 11/25/2023 Not Detected  Not Detected Final    Enterotoxigenic E. coli (ETEC) lt/* 11/25/2023 Not Detected  Not Detected Final    Shiga-like toxin-producing E. coli* 11/25/2023 Not Detected  Not Detected Final    Shigella/Enteroinvasive E. coli (E* 11/25/2023 Not Detected  Not Detected Final    Cryptosporidium 11/25/2023 Not Detected  Not Detected Final    Cyclospora cayetanensis 11/25/2023 Not Detected  Not Detected Final    Entamoeba histolytica 11/25/2023 Not Detected  Not Detected Final    Giardia lamblia 11/25/2023 Not Detected  Not Detected Final    Adenovirus F40/41 11/25/2023 Not Detected  Not Detected Final    Astrovirus 11/25/2023 Not Detected  Not Detected Final    Norovirus GI/GII 11/25/2023 Not Detected  Not Detected Final    Rotavirus A 11/25/2023 Not Detected  Not Detected Final    Sapovirus (I, II, IV or V) 11/25/2023 Not Detected  Not Detected Final    C Diff GDH Ag 11/25/2023 Negative  Negative Final    C.diff Toxin Ag 11/25/2023 Negative  Negative Final    Microcytes 11/25/2023 Slight/1+  None Seen Final    WBC Morphology 11/25/2023 Normal  Normal Final    Platelet Estimate 11/25/2023 Adequate  Normal Final    RBC, UA 11/25/2023 None Seen  None Seen, 0-2 /HPF Final    WBC, UA 11/25/2023 None Seen  None Seen, 0-2 /HPF Final    Bacteria, UA 11/25/2023 Trace (A)  None Seen /HPF Final    Squamous Epithelial Cells, UA 11/25/2023 None Seen  None Seen, 0-2 /HPF Final    Hyaline Casts, UA 11/25/2023 None Seen  None Seen /LPF Final    Mucus, UA 11/25/2023 Small/1+ (A)  None Seen, Trace /HPF Final    Methodology 11/25/2023 Manual Light Microscopy   Final      CT Abdomen Pelvis With Contrast    Result Date: 11/25/2023  Irregular circumferential wall thickening  involving the rectum is concerning for an underlying rectal malignancy.  Colonoscopy is recommended.    Mesorectal fat lymph nodes could be reactive or represent metastatic disease.  Nonspecific masslike consolidation in the left lower lobe.  Further characterize with contrast-enhanced CT chest, malignancy is possible. Authenticated and Electronically Signed by Juve Cancino MD on 11/25/2023 08:41:18 PM     Assessment / Plan      Assessment & Plan:  1. Abnormal CT of the abdomen  2. Change in bowel habits  3. Chronic diarrhea  4. Normocytic anemia  5. Lower abdominal pain  6. Campylobacter gastroenteritis  7.  Thrombosed internal hemorrhoids  His history, prior records, prior history and recent lab work and imaging studies reviewed.  Patient at baseline appears to have a underlying functional GI disorder complicated recently by Campylobacter gastroenteritis.  Patient is already being treated with the Cipro for 3 days.    History CT findings concerning for rectal neoplasm.  However other etiologies including infective and inflammatory proctitis are also considerations although unlikely.  CT abdomen pelvis done also revealed nonspecific masslike consolidated area in the left lower lobe which needs a further evaluation.  He does have a borderline anemia which is also concerning.  No prior colonoscopy.    History and rectal examination also suggestive of acute inflamed and thrombosed internal hemorrhoids.     Anusol cream twice daily for 1 week  Lidocaine or Xylocaine 5% cream to apply topical every 4-6 hours as needed for perianal pain  Sitz bath twice daily  Imodium 1 to 2 tablets p.o. twice daily to keep bowel movement less than 3/day  Dicyclomine 20 mg p.o. 3 times daily as needed (advised to avoid driving at least for now)  Needs an urgent colonoscopy and has been scheduled for next week.    Patient may also need further stool workup for chronic diarrhea if colonoscopy negative for rectal mass.    The indications,  technique, alternatives and potential risk and complications were discussed with the patient including but not limited to bleeding, bowel perforations, missing lesions and anesthetic complications. The patient understands and wishes to proceed with the procedure and has given their verbal consent. Written patient education information was given to the patient.   The patient will call if they have further questions before procedure.      - Case Request; Standing  - Implement Anesthesia Orders Day of Procedure; Standing  - Obtain Informed Consent; Standing  - Verify NPO; Standing  - Verify Bowel Prep Was Successful; Standing  - Oxygen Therapy- Nasal Cannula; 2 LPM; Standing  - sodium chloride 0.9 % infusion  - Case Request    - dicyclomine (BENTYL) 20 MG tablet; Take 1 tablet by mouth 3 (Three) Times a Day As Needed for Abdominal Cramping.  Dispense: 60 tablet; Refill: 0    - Hydrocortisone, Perianal, (ANUSOL-HC) 2.5 % rectal cream; Insert  into the rectum 2 (Two) Times a Day. Indications: Inflamed Hemorrhoids  Dispense: 30 g; Refill: 1  - bisacodyl (DULCOLAX) 5 MG EC tablet; Take 4 tablets by mouth 1 (One) Time for 1 dose. Please see prep instructions from office.  Dispense: 4 tablet; Refill: 0  - polyethylene glycol (GoLYTELY) 236 g solution; Take 4,000 mL by mouth 1 (One) Time for 1 dose. Please see prep instructions from office.  Dispense: 4000 mL; Refill: 0    8. Encounter for screening for malignant neoplasm of colon  No prior colonoscopy.  No family history of colon cancer.  Suspicion of rectal mass.  Has been scheduled for urgent colonoscopy    9. Tobacco abuse  10. Lung mass  CT abdomen pelvis with contrast done on 11/24/2023 revealed irregular circumferential wall thickening in the rectum and nonspecific masslike consolidation in the left lower lobe.   Given his chronic tobacco abuse findings concerning for neoplastic process either metastatic or primary    Will get urgent CT chest with contrast for further  evaluation  - CT Chest With Contrast; Future    11. History of gastric bypass  12. History of peptic ulcer disease  He had a gastric bypass in 2011 and lost over 200 pounds.  He also what appears to be a anastomotic ulcer perforation in 2019 and had to undergo surgery along long with a cholecystectomy.        Follow Up:   Return for Follow Up after procedure.    Anusol   Lidocaine    colon  Imodium prn     CT     Stool work up later          Noman Gautam MD  [facility]  11/30/2023  17:25 EST    Please note that portions of this note may have been completed with a voice recognition program.

## 2023-11-30 NOTE — H&P (VIEW-ONLY)
New Patient Consult      Date: 2023   Patient Name: Edward Powell  MRN: [id number]  : 1970     Referring Physician: Maribell Monroy,*    Chief Complaint   Patient presents with    Abdominal Pain    Diarrhea    Abnormal Imaging       History of Present Illness: Edward Powell is a 53 y.o. male who is here today to establish care with Gastroenterology for further evaluation of his ongoing diarrhea and abnormal CT abdomen.    Patient states that he had a bariatric surgery done in  and he lost over 20 pounds.  He had a peptic ulcer disease in 2019 which was perforated and had to undergo surgery.  During surgery his gallbladder was removed as well.  Since then he has been having loose stools which she was managing without any issues until recently.  For the last 2 months or so his diarrhea got worse with the loose stools sometimes watery stools 6-10 times per day without any blood.  5 days ago on 2023 he developed severe abdominal pain with the diarrhea and had to come to emergency room.     He had a CT abdomen pelvis done with contrast which revealed a irregular circumferential wall thickening involving the rectum concerning for underlying rectal malignancy.  Colonoscopy recommended.  Also noted mesorectal lodged lymph nodes could be reactive or represent metastatic disease.  Nonspecific masslike consolidation in the left lower lobe recommended further dedicated CT chest.  His stool studies came out positive for Campylobacter and was treated with the Cipro.  He never had any colonoscopy in the past.  No family history of colon cancer.  His lab work done on 2023 revealed a normal CMP.  CBC revealed anemia with a hemoglobin of 11.6 MCV 73 and platelet count of 286,000.    He still has ongoing diarrhea with the loose stools at least 5-6 times per day.  Intermittent lower abdominal cramps and has significant fecal urgency.  He has a significant abdominal bloating.  He also  has significant perianal discomfort now and he thinks it is hemorrhoidal flare.     He does get intermittent reflux symptoms for which he takes Tums.  Denies any nausea vomiting.  No dysphagia.  No famished of any colon cancer.  His weight is stable.  He is a chronic smoker nonalcoholic.    Subjective      Past Medical History:   Past Medical History:   Diagnosis Date    Cholelithiasis 2019    Removed    Coronary artery disease 2009    Stent    Hernia 2003    Lower hernia       Past Surgical History:   Past Surgical History:   Procedure Laterality Date    APPENDECTOMY  1983    Removed    BARIATRIC SURGERY  2011    Gastric bypass    BLADDER TUMOR/ULCER BLEEDER CAUTERIZATION      CHOLECYSTECTOMY  2019    Removed    JENNIFER-EN-Y         Family History: History reviewed. No pertinent family history.    Social History:   Social History     Socioeconomic History    Marital status:    Tobacco Use    Smoking status: Every Day     Packs/day: 1.50     Years: 15.00     Additional pack years: 0.00     Total pack years: 22.50     Types: Cigarettes    Smokeless tobacco: Never    Tobacco comments:     35 years   Vaping Use    Vaping Use: Never used   Substance and Sexual Activity    Alcohol use: Never    Drug use: Never    Sexual activity: Not Currently     Partners: Female     Birth control/protection: None         Current Outpatient Medications:     bisacodyl (DULCOLAX) 5 MG EC tablet, Take 4 tablets by mouth 1 (One) Time for 1 dose. Please see prep instructions from office., Disp: 4 tablet, Rfl: 0    dicyclomine (BENTYL) 20 MG tablet, Take 1 tablet by mouth 3 (Three) Times a Day As Needed for Abdominal Cramping., Disp: 60 tablet, Rfl: 0    Hydrocortisone, Perianal, (ANUSOL-HC) 2.5 % rectal cream, Insert  into the rectum 2 (Two) Times a Day. Indications: Inflamed Hemorrhoids, Disp: 30 g, Rfl: 1    polyethylene glycol (GoLYTELY) 236 g solution, Take 4,000 mL by mouth 1 (One) Time for 1 dose. Please see prep instructions from  office., Disp: 4000 mL, Rfl: 0    Allergies   Allergen Reactions    Bactrim [Sulfamethoxazole-Trimethoprim] Hives       Review of Systems:   Review of Systems   Constitutional:  Negative for fever.   HENT:  Negative for sore throat and trouble swallowing.    Eyes:  Negative for visual disturbance.   Respiratory:  Negative for cough, chest tightness and shortness of breath.    Cardiovascular:  Negative for chest pain, palpitations and leg swelling.   Gastrointestinal:  Positive for abdominal pain, anal bleeding and diarrhea. Negative for blood in stool, constipation, nausea, vomiting and indigestion.   Endocrine: Negative for polyphagia.   Genitourinary:  Negative for dysuria and hematuria.   Musculoskeletal:  Negative for back pain, joint swelling and neck pain.   Skin:  Negative for rash, skin lesions and wound.   Neurological:  Negative for dizziness, seizures, speech difficulty, weakness, numbness and confusion.   Hematological:  Negative for adenopathy. Does not bruise/bleed easily.   Psychiatric/Behavioral:  Negative for hallucinations and depressed mood.        The following portions of the patient's history were reviewed and updated as appropriate: allergies, current medications, past family history, past medical history, past social history, past surgical history and problem list.    Objective     Physical Exam:  Vital Signs:   Vitals:    11/30/23 1612   BP: 128/70   Pulse: 67   SpO2: 98%   Weight: 112 kg (247 lb)       Physical Exam  Constitutional:       Appearance: He is obese.   HENT:      Head: Normocephalic and atraumatic.   Eyes:      Conjunctiva/sclera: Conjunctivae normal.   Abdominal:      General: Abdomen is flat. There is no distension.      Palpations: There is no mass.      Tenderness: There is no abdominal tenderness. There is no guarding or rebound.      Hernia: No hernia is present.      Comments: Small thrombosed internal hemorrhoids  Unable to do a full rectal examination due to  significant pain   Musculoskeletal:      Cervical back: Normal range of motion and neck supple.   Neurological:      Mental Status: He is alert.           Results Review:   I have reviewed the patient's new clinical and imaging results and agree with the interpretation.     Admission on 11/25/2023, Discharged on 11/25/2023   Component Date Value Ref Range Status    Glucose 11/25/2023 95  65 - 99 mg/dL Final    BUN 11/25/2023 16  6 - 20 mg/dL Final    Creatinine 11/25/2023 1.14  0.76 - 1.27 mg/dL Final    Sodium 11/25/2023 137  136 - 145 mmol/L Final    Potassium 11/25/2023 4.0  3.5 - 5.2 mmol/L Final    Chloride 11/25/2023 102  98 - 107 mmol/L Final    CO2 11/25/2023 24.2  22.0 - 29.0 mmol/L Final    Calcium 11/25/2023 9.4  8.6 - 10.5 mg/dL Final    Total Protein 11/25/2023 7.6  6.0 - 8.5 g/dL Final    Albumin 11/25/2023 3.9  3.5 - 5.2 g/dL Final    ALT (SGPT) 11/25/2023 8  1 - 41 U/L Final    AST (SGOT) 11/25/2023 14  1 - 40 U/L Final    Alkaline Phosphatase 11/25/2023 115  39 - 117 U/L Final    Total Bilirubin 11/25/2023 0.2  0.0 - 1.2 mg/dL Final    Globulin 11/25/2023 3.7  gm/dL Final    A/G Ratio 11/25/2023 1.1  g/dL Final    BUN/Creatinine Ratio 11/25/2023 14.0  7.0 - 25.0 Final    Anion Gap 11/25/2023 10.8  5.0 - 15.0 mmol/L Final    eGFR 11/25/2023 76.4  >60.0 mL/min/1.73 Final    Lipase 11/25/2023 63 (H)  13 - 60 U/L Final    Color, UA 11/25/2023 Yellow  Yellow, Straw Final    Appearance, UA 11/25/2023 Clear  Clear Final    pH, UA 11/25/2023 5.5  5.0 - 8.0 Final    Specific [place], UA 11/25/2023 1.023  1.005 - 1.030 Final    Glucose, UA 11/25/2023 Negative  Negative Final    Ketones, UA 11/25/2023 Trace (A)  Negative Final    Bilirubin, UA 11/25/2023 Negative  Negative Final    Blood, UA 11/25/2023 Negative  Negative Final    Protein, UA 11/25/2023 30 mg/dL (1+) (A)  Negative Final    Leuk Esterase, UA 11/25/2023 Negative  Negative Final    Nitrite, UA 11/25/2023 Negative  Negative Final    Urobilinogen,  UA 11/25/2023 0.2 E.U./dL  0.2 - 1.0 E.U./dL Final    Lactate 11/25/2023 1.1  0.5 - 2.0 mmol/L Final    Extra Tube 11/25/2023 Hold for add-ons.   Final    Auto resulted.    Extra Tube 11/25/2023 hold for add-on   Final    Auto resulted    Extra Tube 11/25/2023 Hold for add-ons.   Final    Auto resulted.    Extra Tube 11/25/2023 Hold for add-ons.   Final    Auto resulted    WBC 11/25/2023 9.83  3.40 - 10.80 10*3/mm3 Final    RBC 11/25/2023 5.12  4.14 - 5.80 10*6/mm3 Final    Hemoglobin 11/25/2023 11.6 (L)  13.0 - 17.7 g/dL Final    Hematocrit 11/25/2023 37.7  37.5 - 51.0 % Final    MCV 11/25/2023 73.6 (L)  79.0 - 97.0 fL Final    MCH 11/25/2023 22.7 (L)  26.6 - 33.0 pg Final    MCHC 11/25/2023 30.8 (L)  31.5 - 35.7 g/dL Final    RDW 11/25/2023 17.6 (H)  12.3 - 15.4 % Final    RDW-SD 11/25/2023 46.6  37.0 - 54.0 fl Final    MPV 11/25/2023 10.0  6.0 - 12.0 fL Final    Platelets 11/25/2023 286  140 - 450 10*3/mm3 Final    Neutrophil % 11/25/2023 68.8  42.7 - 76.0 % Final    Lymphocyte % 11/25/2023 20.5  19.6 - 45.3 % Final    Monocyte % 11/25/2023 8.0  5.0 - 12.0 % Final    Eosinophil % 11/25/2023 2.0  0.3 - 6.2 % Final    Basophil % 11/25/2023 0.4  0.0 - 1.5 % Final    Immature Grans % 11/25/2023 0.3  0.0 - 0.5 % Final    Neutrophils, Absolute 11/25/2023 6.75  1.70 - 7.00 10*3/mm3 Final    Lymphocytes, Absolute 11/25/2023 2.02  0.70 - 3.10 10*3/mm3 Final    Monocytes, Absolute 11/25/2023 0.79  0.10 - 0.90 10*3/mm3 Final    Eosinophils, Absolute 11/25/2023 0.20  0.00 - 0.40 10*3/mm3 Final    Basophils, Absolute 11/25/2023 0.04  0.00 - 0.20 10*3/mm3 Final    Immature Grans, Absolute 11/25/2023 0.03  0.00 - 0.05 10*3/mm3 Final    nRBC 11/25/2023 0.0  0.0 - 0.2 /100 WBC Final    Campylobacter 11/25/2023 Detected (A)  Not Detected Final    Plesiomonas shigelloides 11/25/2023 Not Detected  Not Detected Final    Salmonella 11/25/2023 Not Detected  Not Detected Final    Vibrio 11/25/2023 Not Detected  Not Detected Final     Vibrio cholerae 11/25/2023 Not Detected  Not Detected Final    Yersinia enterocolitica 11/25/2023 Not Detected  Not Detected Final    Enteroaggregative E. coli (EAEC) 11/25/2023 Not Detected  Not Detected Final    Enteropathogenic E. coli (EPEC) 11/25/2023 Not Detected  Not Detected Final    Enterotoxigenic E. coli (ETEC) lt/* 11/25/2023 Not Detected  Not Detected Final    Shiga-like toxin-producing E. coli* 11/25/2023 Not Detected  Not Detected Final    Shigella/Enteroinvasive E. coli (E* 11/25/2023 Not Detected  Not Detected Final    Cryptosporidium 11/25/2023 Not Detected  Not Detected Final    Cyclospora cayetanensis 11/25/2023 Not Detected  Not Detected Final    Entamoeba histolytica 11/25/2023 Not Detected  Not Detected Final    Giardia lamblia 11/25/2023 Not Detected  Not Detected Final    Adenovirus F40/41 11/25/2023 Not Detected  Not Detected Final    Astrovirus 11/25/2023 Not Detected  Not Detected Final    Norovirus GI/GII 11/25/2023 Not Detected  Not Detected Final    Rotavirus A 11/25/2023 Not Detected  Not Detected Final    Sapovirus (I, II, IV or V) 11/25/2023 Not Detected  Not Detected Final    C Diff GDH Ag 11/25/2023 Negative  Negative Final    C.diff Toxin Ag 11/25/2023 Negative  Negative Final    Microcytes 11/25/2023 Slight/1+  None Seen Final    WBC Morphology 11/25/2023 Normal  Normal Final    Platelet Estimate 11/25/2023 Adequate  Normal Final    RBC, UA 11/25/2023 None Seen  None Seen, 0-2 /HPF Final    WBC, UA 11/25/2023 None Seen  None Seen, 0-2 /HPF Final    Bacteria, UA 11/25/2023 Trace (A)  None Seen /HPF Final    Squamous Epithelial Cells, UA 11/25/2023 None Seen  None Seen, 0-2 /HPF Final    Hyaline Casts, UA 11/25/2023 None Seen  None Seen /LPF Final    Mucus, UA 11/25/2023 Small/1+ (A)  None Seen, Trace /HPF Final    Methodology 11/25/2023 Manual Light Microscopy   Final      CT Abdomen Pelvis With Contrast    Result Date: 11/25/2023  Irregular circumferential wall thickening  involving the rectum is concerning for an underlying rectal malignancy.  Colonoscopy is recommended.    Mesorectal fat lymph nodes could be reactive or represent metastatic disease.  Nonspecific masslike consolidation in the left lower lobe.  Further characterize with contrast-enhanced CT chest, malignancy is possible. Authenticated and Electronically Signed by Juve Cancino MD on 11/25/2023 08:41:18 PM     Assessment / Plan      Assessment & Plan:  1. Abnormal CT of the abdomen  2. Change in bowel habits  3. Chronic diarrhea  4. Normocytic anemia  5. Lower abdominal pain  6. Campylobacter gastroenteritis  7.  Thrombosed internal hemorrhoids  His history, prior records, prior history and recent lab work and imaging studies reviewed.  Patient at baseline appears to have a underlying functional GI disorder complicated recently by Campylobacter gastroenteritis.  Patient is already being treated with the Cipro for 3 days.    History CT findings concerning for rectal neoplasm.  However other etiologies including infective and inflammatory proctitis are also considerations although unlikely.  CT abdomen pelvis done also revealed nonspecific masslike consolidated area in the left lower lobe which needs a further evaluation.  He does have a borderline anemia which is also concerning.  No prior colonoscopy.    History and rectal examination also suggestive of acute inflamed and thrombosed internal hemorrhoids.     Anusol cream twice daily for 1 week  Lidocaine or Xylocaine 5% cream to apply topical every 4-6 hours as needed for perianal pain  Sitz bath twice daily  Imodium 1 to 2 tablets p.o. twice daily to keep bowel movement less than 3/day  Dicyclomine 20 mg p.o. 3 times daily as needed (advised to avoid driving at least for now)  Needs an urgent colonoscopy and has been scheduled for next week.    Patient may also need further stool workup for chronic diarrhea if colonoscopy negative for rectal mass.    The indications,  technique, alternatives and potential risk and complications were discussed with the patient including but not limited to bleeding, bowel perforations, missing lesions and anesthetic complications. The patient understands and wishes to proceed with the procedure and has given their verbal consent. Written patient education information was given to the patient.   The patient will call if they have further questions before procedure.      - Case Request; Standing  - Implement Anesthesia Orders Day of Procedure; Standing  - Obtain Informed Consent; Standing  - Verify NPO; Standing  - Verify Bowel Prep Was Successful; Standing  - Oxygen Therapy- Nasal Cannula; 2 LPM; Standing  - sodium chloride 0.9 % infusion  - Case Request    - dicyclomine (BENTYL) 20 MG tablet; Take 1 tablet by mouth 3 (Three) Times a Day As Needed for Abdominal Cramping.  Dispense: 60 tablet; Refill: 0    - Hydrocortisone, Perianal, (ANUSOL-HC) 2.5 % rectal cream; Insert  into the rectum 2 (Two) Times a Day. Indications: Inflamed Hemorrhoids  Dispense: 30 g; Refill: 1  - bisacodyl (DULCOLAX) 5 MG EC tablet; Take 4 tablets by mouth 1 (One) Time for 1 dose. Please see prep instructions from office.  Dispense: 4 tablet; Refill: 0  - polyethylene glycol (GoLYTELY) 236 g solution; Take 4,000 mL by mouth 1 (One) Time for 1 dose. Please see prep instructions from office.  Dispense: 4000 mL; Refill: 0    8. Encounter for screening for malignant neoplasm of colon  No prior colonoscopy.  No family history of colon cancer.  Suspicion of rectal mass.  Has been scheduled for urgent colonoscopy    9. Tobacco abuse  10. Lung mass  CT abdomen pelvis with contrast done on 11/24/2023 revealed irregular circumferential wall thickening in the rectum and nonspecific masslike consolidation in the left lower lobe.   Given his chronic tobacco abuse findings concerning for neoplastic process either metastatic or primary    Will get urgent CT chest with contrast for further  evaluation  - CT Chest With Contrast; Future    11. History of gastric bypass  12. History of peptic ulcer disease  He had a gastric bypass in 2011 and lost over 200 pounds.  He also what appears to be a anastomotic ulcer perforation in 2019 and had to undergo surgery along long with a cholecystectomy.        Follow Up:   Return for Follow Up after procedure.    Anusol   Lidocaine    colon  Imodium prn     CT     Stool work up later          Noman Gautam MD  [facility]  11/30/2023  17:25 EST    Please note that portions of this note may have been completed with a voice recognition program.

## 2023-12-01 ENCOUNTER — HOSPITAL ENCOUNTER (OUTPATIENT)
Dept: CT IMAGING | Facility: HOSPITAL | Age: 53
Discharge: HOME | End: 2023-12-01
Admitting: INTERNAL MEDICINE
Payer: COMMERCIAL

## 2023-12-01 DIAGNOSIS — R91.8 LUNG MASS: ICD-10-CM

## 2023-12-01 DIAGNOSIS — R93.5 ABNORMAL CT OF THE ABDOMEN: ICD-10-CM

## 2023-12-01 PROBLEM — K52.9 CHRONIC DIARRHEA: Status: ACTIVE | Noted: 2023-11-30

## 2023-12-01 PROBLEM — R19.4 CHANGE IN BOWEL HABITS: Status: ACTIVE | Noted: 2023-11-30

## 2023-12-01 PROBLEM — D64.9 NORMOCYTIC ANEMIA: Status: ACTIVE | Noted: 2023-11-30

## 2023-12-01 PROCEDURE — 25510000001 IOPAMIDOL 61 % SOLUTION: Performed by: INTERNAL MEDICINE

## 2023-12-01 PROCEDURE — 71260 CT THORAX DX C+: CPT

## 2023-12-01 RX ADMIN — IOPAMIDOL 100 ML: 612 INJECTION, SOLUTION INTRAVENOUS at 18:07

## 2023-12-04 ENCOUNTER — TELEPHONE (OUTPATIENT)
Dept: GASTROENTEROLOGY | Facility: CLINIC | Age: 53
End: 2023-12-04
Payer: COMMERCIAL

## 2023-12-04 DIAGNOSIS — R91.8 LUNG MASS: Primary | ICD-10-CM

## 2023-12-04 NOTE — TELEPHONE ENCOUNTER
I called patient regarding his CT chest findings.  Findings briefly discussed with the patient.  Urgent referral made for pulmonary

## 2023-12-06 NOTE — PRE-PROCEDURE INSTRUCTIONS
PAT phone history completed with pt for upcoming procedure on  12/7/23, with Dr. Gautam.    PAT PASS GIVEN/REVIEWED WITH PT.  VERBALIZED UNDERSTANDING OF THE FOLLOWING:  DO NOT EAT, DRINK, SMOKE, USE SMOKELESS TOBACCO OR CHEW GUM AFTER MIDNIGHT THE NIGHT BEFORE SURGERY.  THIS ALSO INCLUDES HARD CANDIES AND MINTS.    DO NOT SHAVE THE AREA TO BE OPERATED ON AT LEAST 48 HOURS PRIOR TO THE PROCEDURE.  DO NOT WEAR MAKE UP OR NAIL POLISH.  DO NOT LEAVE IN ANY PIERCING OR WEAR JEWELRY THE DAY OF SURGERY.      DO NOT USE ADHESIVES IF YOU WEAR DENTURES.    DO NOT WEAR EYE CONTACTS; BRING IN YOUR GLASSES.    ONLY TAKE MEDICATION THE MORNING OF YOUR PROCEDURE IF INSTRUCTED BY YOUR SURGEON WITH ENOUGH WATER TO SWALLOW THE MEDICATION.  IF YOUR SURGEON DID NOT SPECIFY WHICH MEDICATIONS TO TAKE, YOU WILL NEED TO CALL THEIR OFFICE FOR FURTHER INSTRUCTIONS AND DO AS THEY INSTRUCT.    LEAVE ANYTHING YOU CONSIDER VALUABLE AT HOME.    YOU WILL NEED TO ARRANGE FOR SOMEONE TO DRIVE YOU HOME AFTER SURGERY.  IT IS RECOMMENDED THAT YOU DO NOT DRIVE, WORK, DRINK ALCOHOL OR MAKE MAJOR DECISIONS FOR AT LEAST 24 HOURS AFTER YOUR PROCEDURE IS COMPLETE.      THE DAY OF YOUR PROCEDURE, BRING IN THE FOLLOWING IF APPLICABLE:   PICTURE ID AND INSURANCE/MEDICARE OR MEDICAID CARDS/ANY CO-PAY THAT MAY BE DUE   COPY OF ADVANCED DIRECTIVE/LIVING WILL/POWER OR    CPAP/BIPAP/INHALERS   SKIN PREP SHEET   YOUR PREADMISSION TESTING PASS (IF NOT A PHONE HISTORY)    Medication instructions given to pt by RN per anesthesia protocol.  Pt referred back to surgeon for further instructions if he/she is on any blood thinners.

## 2023-12-07 ENCOUNTER — HOSPITAL ENCOUNTER (OUTPATIENT)
Facility: HOSPITAL | Age: 53
Setting detail: HOSPITAL OUTPATIENT SURGERY
Discharge: HOME | End: 2023-12-07
Attending: INTERNAL MEDICINE | Admitting: INTERNAL MEDICINE
Payer: COMMERCIAL

## 2023-12-07 ENCOUNTER — ANESTHESIA (OUTPATIENT)
Dept: GASTROENTEROLOGY | Facility: HOSPITAL | Age: 53
End: 2023-12-07
Payer: COMMERCIAL

## 2023-12-07 ENCOUNTER — ANESTHESIA EVENT (OUTPATIENT)
Dept: GASTROENTEROLOGY | Facility: HOSPITAL | Age: 53
End: 2023-12-07
Payer: COMMERCIAL

## 2023-12-07 VITALS
SYSTOLIC BLOOD PRESSURE: 127 MMHG | RESPIRATION RATE: 17 BRPM | HEIGHT: 72 IN | WEIGHT: 247 LBS | TEMPERATURE: 98.4 F | DIASTOLIC BLOOD PRESSURE: 84 MMHG | OXYGEN SATURATION: 98 % | HEART RATE: 79 BPM | BODY MASS INDEX: 33.46 KG/M2

## 2023-12-07 DIAGNOSIS — R19.4 CHANGE IN BOWEL HABITS: ICD-10-CM

## 2023-12-07 DIAGNOSIS — C20 RECTAL CANCER: Primary | ICD-10-CM

## 2023-12-07 DIAGNOSIS — R91.8 LUNG MASS: ICD-10-CM

## 2023-12-07 DIAGNOSIS — D49.0 RECTAL TUMOR: Primary | ICD-10-CM

## 2023-12-07 DIAGNOSIS — R93.5 ABNORMAL CT OF THE ABDOMEN: ICD-10-CM

## 2023-12-07 DIAGNOSIS — D64.9 NORMOCYTIC ANEMIA: ICD-10-CM

## 2023-12-07 DIAGNOSIS — K52.9 CHRONIC DIARRHEA: ICD-10-CM

## 2023-12-07 PROCEDURE — 25810000003 SODIUM CHLORIDE 0.9 % SOLUTION: Performed by: INTERNAL MEDICINE

## 2023-12-07 PROCEDURE — 45380 COLONOSCOPY AND BIOPSY: CPT | Performed by: INTERNAL MEDICINE

## 2023-12-07 PROCEDURE — 45381 COLONOSCOPY SUBMUCOUS NJX: CPT | Performed by: INTERNAL MEDICINE

## 2023-12-07 PROCEDURE — 45385 COLONOSCOPY W/LESION REMOVAL: CPT | Performed by: INTERNAL MEDICINE

## 2023-12-07 PROCEDURE — 25010000002 PROPOFOL 200 MG/20ML EMULSION: Performed by: NURSE ANESTHETIST, CERTIFIED REGISTERED

## 2023-12-07 RX ORDER — OXYCODONE HYDROCHLORIDE AND ACETAMINOPHEN 5; 325 MG/1; MG/1
1 TABLET ORAL EVERY 6 HOURS PRN
Qty: 30 TABLET | Refills: 0 | Status: SHIPPED | OUTPATIENT
Start: 2023-12-07

## 2023-12-07 RX ORDER — SIMETHICONE 20 MG/.3ML
EMULSION ORAL AS NEEDED
Status: DISCONTINUED | OUTPATIENT
Start: 2023-12-07 | End: 2023-12-07 | Stop reason: HOSPADM

## 2023-12-07 RX ORDER — PROPOFOL 10 MG/ML
INJECTION, EMULSION INTRAVENOUS AS NEEDED
Status: DISCONTINUED | OUTPATIENT
Start: 2023-12-07 | End: 2023-12-07 | Stop reason: SURG

## 2023-12-07 RX ORDER — LIDOCAINE HCL/PF 100 MG/5ML
SYRINGE (ML) INJECTION AS NEEDED
Status: DISCONTINUED | OUTPATIENT
Start: 2023-12-07 | End: 2023-12-07 | Stop reason: SURG

## 2023-12-07 RX ORDER — SODIUM CHLORIDE 9 MG/ML
70 INJECTION, SOLUTION INTRAVENOUS CONTINUOUS PRN
Status: DISCONTINUED | OUTPATIENT
Start: 2023-12-07 | End: 2023-12-07 | Stop reason: HOSPADM

## 2023-12-07 RX ADMIN — PROPOFOL 100 MG: 10 INJECTION, EMULSION INTRAVENOUS at 10:42

## 2023-12-07 RX ADMIN — PROPOFOL 100 MG: 10 INJECTION, EMULSION INTRAVENOUS at 10:59

## 2023-12-07 RX ADMIN — Medication 40 MG: at 10:42

## 2023-12-07 RX ADMIN — SODIUM CHLORIDE 70 ML/HR: 9 INJECTION, SOLUTION INTRAVENOUS at 09:46

## 2023-12-07 RX ADMIN — PROPOFOL 100 MG: 10 INJECTION, EMULSION INTRAVENOUS at 10:53

## 2023-12-07 RX ADMIN — PROPOFOL 100 MG: 10 INJECTION, EMULSION INTRAVENOUS at 11:03

## 2023-12-07 RX ADMIN — PROPOFOL 100 MG: 10 INJECTION, EMULSION INTRAVENOUS at 10:48

## 2023-12-07 NOTE — DISCHARGE INSTRUCTIONS
No pushing, pulling, tugging,  heavy lifting, or strenuous activity.  No major decision making, driving, or drinking alcoholic beverages for 24 hours. ( due to the medications you have  received)  Always use good hand hygiene/washing techniques.  NO driving while taking pain medications.    * if you have an incision:  Check your incision area every day for signs of infection.   Check for:  * more redness, swelling, or pain  *more fluid or blood  *warmth  *pus or bad smell.    To assist you in voiding:  Drink plenty of fluids  Listen to running water while attempting to void.    If you are unable to urinate and you have an uncomfortable urge to void or it has been   6 hours since you were discharged, return to the Emergency Room.    - Discharge patient to home (ambulatory).   - Resume previous diet.   - Urgent oncology referral   - Await pathology results.   - Return to my office in 4 weeks

## 2023-12-07 NOTE — ANESTHESIA POSTPROCEDURE EVALUATION
Patient: Edward Powell    Procedure Summary       Date: 12/07/23 Room / Location: [facility] ENDOSCOPY 2 / [facility] ENDOSCOPY    Anesthesia Start: 1040 Anesthesia Stop: 1124    Procedure: COLONOSCOPY WITH HOT SNARE POLYPECTOMY AND TATTOO (Anus) Diagnosis:       Abnormal CT of the abdomen      Normocytic anemia      Change in bowel habits      Chronic diarrhea      (Abnormal CT of the abdomen [R93.5])      (Normocytic anemia [D64.9])      (Change in bowel habits [R19.4])      (Chronic diarrhea [K52.9])    Surgeons: Noman Gautam MD Provider: German Chen CRNA    Anesthesia Type: MAC ASA Status: 3            Anesthesia Type: MAC    Vitals  No vitals data found for the desired time range.          Post Anesthesia Care and Evaluation    Patient location during evaluation: bedside  Patient participation: complete - patient participated  Level of consciousness: awake and alert  Pain score: 0  Pain management: adequate    Airway patency: patent  Anesthetic complications: No anesthetic complications  PONV Status: none  Cardiovascular status: acceptable  Respiratory status: acceptable  Hydration status: acceptable

## 2023-12-07 NOTE — ANESTHESIA PREPROCEDURE EVALUATION
Anesthesia Evaluation     Patient summary reviewed and Nursing notes reviewed   NPO Solid Status: > 8 hours  NPO Liquid Status: > 8 hours           Airway   Mallampati: II  TM distance: >3 FB  Neck ROM: full  Possible difficult intubation  Dental      Pulmonary    Cardiovascular     (+) CAD, hyperlipidemia      Neuro/Psych  GI/Hepatic/Renal/Endo    (+) obesity    Musculoskeletal     Abdominal    Substance History      OB/GYN          Other   arthritis,                 Anesthesia Plan    ASA 3     MAC     intravenous induction     Anesthetic plan, risks, benefits, and alternatives have been provided, discussed and informed consent has been obtained with: patient.  Pre-procedure education provided  Plan discussed with CRNA.    CODE STATUS:

## 2023-12-10 RX ORDER — PANTOPRAZOLE SODIUM 40 MG/1
40 TABLET, DELAYED RELEASE ORAL DAILY
Qty: 90 TABLET | Refills: 1 | Status: SHIPPED | OUTPATIENT
Start: 2023-12-10

## 2023-12-11 ENCOUNTER — TELEPHONE (OUTPATIENT)
Dept: GASTROENTEROLOGY | Facility: CLINIC | Age: 53
End: 2023-12-11
Payer: COMMERCIAL

## 2023-12-11 NOTE — TELEPHONE ENCOUNTER
----- Message from Noman Gautam MD sent at 12/10/2023 11:22 AM EST -----  Regarding: RE: RX REQUEST    Prescription sent for Protonix 40 mg p.o. daily    ----- Message -----  From: Yenny Lees  Sent: 12/8/2023   8:32 AM EST  To: Noman Gautam MD  Subject: RX REQUEST                                       Patient is requesting a prescription for omeprazole.    He states last night stomach was cramping - he took Bentyl - didn't help - he took one of his wife medication (omeprazole 40 mg) and got relief.    Please advise.    Thank you

## 2023-12-12 ENCOUNTER — OFFICE VISIT (OUTPATIENT)
Dept: PULMONOLOGY | Facility: CLINIC | Age: 53
End: 2023-12-12
Payer: COMMERCIAL

## 2023-12-12 ENCOUNTER — TELEPHONE (OUTPATIENT)
Dept: GASTROENTEROLOGY | Facility: CLINIC | Age: 53
End: 2023-12-12
Payer: COMMERCIAL

## 2023-12-12 VITALS
BODY MASS INDEX: 32.91 KG/M2 | HEART RATE: 71 BPM | RESPIRATION RATE: 18 BRPM | DIASTOLIC BLOOD PRESSURE: 60 MMHG | SYSTOLIC BLOOD PRESSURE: 134 MMHG | OXYGEN SATURATION: 96 % | WEIGHT: 243 LBS | HEIGHT: 72 IN

## 2023-12-12 DIAGNOSIS — C20 RECTAL ADENOCARCINOMA: ICD-10-CM

## 2023-12-12 DIAGNOSIS — J43.9 PULMONARY EMPHYSEMA, UNSPECIFIED EMPHYSEMA TYPE: ICD-10-CM

## 2023-12-12 DIAGNOSIS — R91.8 LUNG MASS: ICD-10-CM

## 2023-12-12 DIAGNOSIS — R93.89 ABNORMAL CT OF THE CHEST: Primary | ICD-10-CM

## 2023-12-12 DIAGNOSIS — F17.210 NICOTINE DEPENDENCE, CIGARETTES, UNCOMPLICATED: ICD-10-CM

## 2023-12-12 LAB — REF LAB TEST METHOD: NORMAL

## 2023-12-12 NOTE — TELEPHONE ENCOUNTER
Called the patient and discussed the pathology report.  Patient already was seen by pulmonary.  He has a PET scan arranged soon.  He also has oncology appointment soon.  Will see him in once he is seen by oncology

## 2023-12-12 NOTE — PROGRESS NOTES
CONSULT NOTE    Requested by:   No ref. provider found   Provider, No Known      Chief Complaint   Patient presents with    Consult    Breathing Problem       Subjective:  Edward Powell is a 53 y.o. male.   Patient comes in today for consultation because of abnormal CT of the chest.    The patient says that he actually started having symptoms of diarrhea and significant abdominal pain that started Saturday after Thanksgiving.    The patient came to the ER and had a CT of the abdomen pelvis and was told that he likely had rectal cancer.  He subsequently followed with Dr. Gautam and underwent colonoscopy.    During review of CT images by Dr. Gautam, it was noticed that he also had a left lung lesion for which a full CT of the chest was performed.    The patient's CT of the chest confirmed left lung mass and pulmonary consultation was requested.    The patient smokes at least a pack a day or so has been doing so for more than 30 years.    The patient underwent Viji-en-Y surgery in 2011 and although he denies any significant weight loss over the past 2-3 months, he has noticed increasing difficulty with bowel movements    The patient denies any significant shortness of breath although does mention some fatigue on occasion.  He is extremely concerned about the biopsy results.      The following portions of the patient's history were reviewed and updated as appropriate: allergies, current medications, past family history, past medical history, past social history, and past surgical history.    Review of Systems   Constitutional:  Positive for fatigue. Negative for chills and fever.   HENT:  Negative for sinus pressure, sneezing and sore throat.    Respiratory:  Negative for cough, chest tightness, shortness of breath and wheezing.    Cardiovascular:  Negative for palpitations and leg swelling.   All other systems reviewed and are negative.      Past Medical History:   Diagnosis Date    Arthritis      "Cholelithiasis 2019    Removed    Coronary artery disease 2009    Stent    Elevated cholesterol     Hernia 2003    Lower hernia       Social History     Tobacco Use    Smoking status: Every Day     Packs/day: 1.50     Years: 15.00     Additional pack years: 0.00     Total pack years: 22.50     Types: Cigarettes    Smokeless tobacco: Never    Tobacco comments:     35 years   Substance Use Topics    Alcohol use: Never         Objective:  Visit Vitals  /60   Pulse 71   Resp 18   Ht 182.9 cm (72\") Comment: pt reported   Wt 110 kg (243 lb)   SpO2 96%   BMI 32.96 kg/m²       Physical Exam  Vitals reviewed.   Constitutional:       Appearance: He is well-developed.   HENT:      Head:      Comments: No acute lesions noted.     Mouth/Throat:      Mouth: Mucous membranes are moist.   Neck:      Vascular: No JVD.   Cardiovascular:      Rate and Rhythm: Normal rate and regular rhythm.   Pulmonary:      Effort: Pulmonary effort is normal.      Breath sounds: No wheezing or rales.      Comments: Somewhat hyperresonant to percussion.  Somewhat decreased air entry.  Mild scattered wheezing noted.   Musculoskeletal:      Cervical back: Neck supple.      Comments: Gait was normal.   Skin:     General: Skin is warm and dry.   Neurological:      Mental Status: He is alert and oriented to person, place, and time.   Psychiatric:         Mood and Affect: Mood normal.         Behavior: Behavior normal.           Assessment/Plan:  Diagnoses and all orders for this visit:    1. Abnormal CT of the chest (Primary)    2. Lung mass    3. Rectal adenocarcinoma    4. Nicotine dependence, cigarettes, uncomplicated    5. Pulmonary emphysema, unspecified emphysema type    Other orders  -     tiotropium bromide-olodaterol (STIOLTO RESPIMAT) 2.5-2.5 MCG/ACT aerosol solution inhaler; Inhale 2 puffs Daily.  Dispense: 1 each; Refill: 5        Return if symptoms worsen or fail to improve.    DISCUSSION(if any):  Last CT scan results was reviewed in " great detail with the patient.  Results for orders placed during the hospital encounter of 12/01/23    CT Chest With Contrast Diagnostic    Narrative  CT CHEST WITH CONTRAST DIAGNOSTIC    HISTORY: Lung nodule. suspected lung mets; R93.5-Abnormal findings on  diagnostic imaging of other abdominal regions, including  retroperitoneum; R91.8-Other nonspecific abnormal finding of lung field.      PROCEDURE:  Thin section axial images were obtained from the lung apices  to below the diaphragm following IV contrast administration. Coronal and  sagittal reconstruction images were obtained from the axial data.    COMPARISON: None.    FINDINGS: There is no axillary lymphadenopathy. Small mediastinal lymph  nodes are present. No hilar lymphadenopathy. There is no pleural or  pericardial effusion.    There are changes of mild emphysema. There is a cavitary mass in the  left lower lobe measuring 4.8 cm. An adjacent cavitary nodule measures  2.0 cm. There is a 5 mm nodule along the right minor fissure which could  represent an intrafissural lymph node.    Limited evaluation of the upper abdomen reveals changes from  cholecystectomy and gastric bypass. A hypodense right renal lesion is  likely a cyst.    No acute osseous abnormality.    Impression  1. Cavitary right lower lobe mass concerning for malignancy.  Differential considerations include primary bronchogenic carcinoma or  metastatic disease. Infectious process felt to be less likely.  2. Left lower lobe cavitary nodule, adjacent to the primary mass.                  CTDI: 5.68 mGy  DLP:244.44 mGy.cm        This study was performed with techniques to keep radiation doses as low as reasonably achievable (ALARA). Individualized dose reduction techniques using automated exposure control or adjustment of mA and/or kV according to the patient size were employed.      This report was signed and finalized on 12/2/2023 11:35 AM by Vesta Souza MD.      At the request of the  patient, I was able to review the pathology results from the recent colonoscopy.  I told the patient and his family member that the results were consistent with adenocarcinoma.    ===========================  ===========================    PFTs maybe considered upon follow up visit.     Laboratory workup also showed   Lab Results   Component Value Date    HGB 11.6 (L) 11/25/2023    HGB 12.7 (L) 09/24/2019    HGB 11.9 (L) 09/17/2019   ,   Lab Results   Component Value Date    HCT 37.7 11/25/2023    HCT 41.3 09/24/2019    HCT 38.9 09/17/2019       Lab Results   Component Value Date    EOSABS 0.20 11/25/2023    EOSABS 0.21 09/24/2019    EOSABS 0.26 09/17/2019    & Laboratory workup also showed   Lab Results   Component Value Date    CO2 24.2 11/25/2023     ===========================  ===========================    I told the patient that since her PET scan is already planned, and the fact colonoscopy has yielded a diagnosis of adenocarcinoma, left lung lesion is more than likely consistent with metastatic involvement.    Based on the PET scan results, further intervention may be required.    If the patient does not need a biopsy of the left lung lesion, the best option would be CT-guided needle biopsy given the approximation of the lung mass to the parietal pleura and posterior location of the lesion.    In all likelihood however, this lesion is metastatic from adenocarcinoma of the colon/rectum.    The patient already has an appointment with oncology few days after the PET scan.    Given the findings of emphysema on the CT scan, I provided him with a prescription for Stiolto and give him samples.    Patient was given education and demonstration on how to use the medicine.     Side effects, of prescribed medicines, discussed.    Patient was also instructed on compliance and adherence with instructions.     I have discussed the need to quit smoking as soon as possible.    Patient was offered modalities such as  Chantix/nicotine patches/Wellbutrin to aid in smoking cessation.    The patient will get back to us regarding the choice, once a decision has been taken.     Patient was given reading material, as appropriate.     Patient was asked to call with any concerns.     Patient will be followed clinically to assess for response to treatment and further recommendations will be made, based on response.    I told the patient that either his oncologist or his family members can contact this office, if further workup of the left lung mass is indicated for management purposes.          Dictated utilizing Dragon dictation.    This document was electronically signed by Ana Irizarry MD on 12/12/23 at 10:01 EST

## 2023-12-19 ENCOUNTER — PATIENT ROUNDING (BHMG ONLY) (OUTPATIENT)
Dept: PULMONOLOGY | Facility: CLINIC | Age: 53
End: 2023-12-19
Payer: COMMERCIAL

## 2023-12-22 ENCOUNTER — HOSPITAL ENCOUNTER (OUTPATIENT)
Dept: PET IMAGING | Facility: HOSPITAL | Age: 53
Discharge: HOME | End: 2023-12-22
Payer: COMMERCIAL

## 2023-12-22 DIAGNOSIS — C20 RECTAL CANCER: ICD-10-CM

## 2023-12-22 DIAGNOSIS — R91.8 LUNG MASS: ICD-10-CM

## 2023-12-22 LAB — GLUCOSE BLDC GLUCOMTR-MCNC: 101 MG/DL (ref 70–130)

## 2023-12-22 PROCEDURE — 78815 PET IMAGE W/CT SKULL-THIGH: CPT

## 2023-12-22 PROCEDURE — A9552 F18 FDG: HCPCS | Performed by: INTERNAL MEDICINE

## 2023-12-22 PROCEDURE — 0 FLUDEOXYGLUCOSE F18 SOLUTION: Performed by: INTERNAL MEDICINE

## 2023-12-22 PROCEDURE — 82948 REAGENT STRIP/BLOOD GLUCOSE: CPT

## 2023-12-22 RX ADMIN — FLUDEOXYGLUCOSE F 18 1 DOSE: 200 INJECTION, SOLUTION INTRAVENOUS at 08:24

## 2023-12-28 ENCOUNTER — CONSULT (OUTPATIENT)
Dept: ONCOLOGY | Facility: CLINIC | Age: 53
End: 2023-12-28
Payer: COMMERCIAL

## 2023-12-28 ENCOUNTER — PREP FOR SURGERY (OUTPATIENT)
Dept: OTHER | Facility: HOSPITAL | Age: 53
End: 2023-12-28
Payer: COMMERCIAL

## 2023-12-28 VITALS
DIASTOLIC BLOOD PRESSURE: 74 MMHG | BODY MASS INDEX: 33.32 KG/M2 | RESPIRATION RATE: 18 BRPM | HEART RATE: 71 BPM | SYSTOLIC BLOOD PRESSURE: 146 MMHG | OXYGEN SATURATION: 98 % | WEIGHT: 246 LBS | TEMPERATURE: 97.4 F | HEIGHT: 72 IN

## 2023-12-28 DIAGNOSIS — R91.8 RIGHT LOWER LOBE LUNG MASS: Primary | ICD-10-CM

## 2023-12-28 DIAGNOSIS — C20 RECTAL CANCER METASTASIZED TO LIVER: Primary | ICD-10-CM

## 2023-12-28 DIAGNOSIS — C78.7 RECTAL CANCER METASTASIZED TO LIVER: Primary | ICD-10-CM

## 2023-12-28 DIAGNOSIS — C20 RECTAL CANCER: ICD-10-CM

## 2023-12-28 RX ORDER — OXYCODONE HYDROCHLORIDE 5 MG/1
5 TABLET ORAL EVERY 6 HOURS PRN
Qty: 120 TABLET | Refills: 0 | Status: SHIPPED | OUTPATIENT
Start: 2023-12-28 | End: 2023-12-28 | Stop reason: SDUPTHER

## 2023-12-28 RX ORDER — NALOXONE HYDROCHLORIDE 4 MG/.1ML
1 SPRAY NASAL AS NEEDED
Qty: 1 EACH | Refills: 0 | Status: SHIPPED | OUTPATIENT
Start: 2023-12-28 | End: 2023-12-29 | Stop reason: SDUPTHER

## 2023-12-28 RX ORDER — OXYCODONE HYDROCHLORIDE 5 MG/1
5 TABLET ORAL EVERY 6 HOURS PRN
Qty: 120 TABLET | Refills: 0 | Status: SHIPPED | OUTPATIENT
Start: 2023-12-28 | End: 2023-12-29 | Stop reason: SDUPTHER

## 2023-12-28 NOTE — LETTER
2023       No Recipients    Patient: Edward Powell   YOB: 1970   Date of Visit: 2023     Dear Noman Gautam MD:       Thank you for referring Edward Powell to me for evaluation. Below are the relevant portions of my assessment and plan of care.    If you have questions, please do not hesitate to call me. I look forward to following  along with you.         Sincerely,        Brenda Cronin MD        CC:   No Recipients    Brenda Cronin MD  23 1449  Sign when Signing Visit    [facility]ology [facility] [place]  Office number [phone]    Fax number [phone]     New Patient Office Visit      Date: 2023     Patient Name: Edward Powell  MRN: [id number]  : 1970    Referring Physician: Dr. Gautam    Chief Complaint: Rectal cancer, lung mass, liver lesions    Cancer Staging: Presumptive stage IV    History of Present Illness: Edward Powell is a pleasant 53 y.o. male who presents today for evaluation of rectal cancer in the company of his supportive significant other.    Patient has a longstanding history of intermittent diarrhea since his cholecystectomy in 2019.  However he developed progressive diarrhea with associated loss of bowel control and urgency as well as weight loss and fatigue prompting additional workup.    CT of the abdomen pelvis 2023 showed an irregular circumferential wall thickening involving the rectum concerning for mass lesion.  There was associated mesorectal lymphadenopathy.  Masslike consolidation of left lower lobe.  CT of the chest showed a cavitary lesion in the left lower lobe up to 4.8 cm with an adjacent cavitary nodule up to 2 cm and a 5 mm nodule on the right minor fissure.      He underwent colonoscopy 2023 with findings of an infiltrative and ulcerated partially obstructing mass in the proximal rectum spanning 10 cm.  Rectal polyps.  Biopsy of the rectal mass showed invasive  moderately differentiated adenocarcinoma.  Additional biopsies showed tubular adenomas.  MSI testing was intact/low probability of MSI high.    PET/CT 12/22/2023 showed hypermetabolic rectal wall thickening compatible with known rectal malignancy.  Multiple small ill-defined hypoechoic hyper metabolic liver lesions compatible with metastatic disease.  Mildly enlarged and mildly hypermetabolic pelvic sidewall and internal iliac lymph node chain adenopathy.  Hypermetabolic lung mass with adjacent nodule.     The patient has been experiencing substantial rectal pain.  He is taking oxycodone every 6 hours with partial relief.  He reports ongoing bowel movements.  No abdominal pain.  No vomiting.  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     Past Medical History:   Past Medical History:   Diagnosis Date   • Arthritis    • Cholelithiasis 2019    Removed   • Coronary artery disease 2009    Stent   • Elevated cholesterol    • Hernia 2003    Lower hernia       Past Surgical History:   Past Surgical History:   Procedure Laterality Date   • APPENDECTOMY  1983    Removed   • BARIATRIC SURGERY  2011    Gastric bypass   • BLADDER TUMOR/ULCER BLEEDER CAUTERIZATION     • CHOLECYSTECTOMY  2019    Removed   • COLONOSCOPY N/A 12/7/2023    Procedure: COLONOSCOPY WITH HOT SNARE POLYPECTOMY AND TATTOO;  Surgeon: Noman Gautam MD;  Location: [facility] ENDOSCOPY;  Service: Gastroenterology;  Laterality: N/A;   • JENNIFER-EN-Y         Family History: No family history on file.    Social History:   Social History     Socioeconomic History   • Marital status:    Tobacco Use   • Smoking status: Every Day     Packs/day: 1.50     Years: 15.00     Additional pack years: 0.00     Total pack years: 22.50     Types: Cigarettes   • Smokeless tobacco: Never   • Tobacco comments:     35 years   Vaping Use   • Vaping Use: Never used  "  Substance and Sexual Activity   • Alcohol use: Never   • Drug use: Never   • Sexual activity: Defer       Medications:     Current Outpatient Medications:   •  dicyclomine (BENTYL) 20 MG tablet, Take 1 tablet by mouth 3 (Three) Times a Day As Needed for Abdominal Cramping., Disp: 60 tablet, Rfl: 0  •  Ferrous Gluconate-C-Folic Acid (IRON-C PO), Take  by mouth., Disp: , Rfl:   •  Hydrocortisone, Perianal, (ANUSOL-HC) 2.5 % rectal cream, Insert  into the rectum 2 (Two) Times a Day. Indications: Inflamed Hemorrhoids, Disp: 30 g, Rfl: 1  •  pantoprazole (Protonix) 40 MG EC tablet, Take 1 tablet by mouth Daily. Indications: Gastroesophageal Reflux Disease, Disp: 90 tablet, Rfl: 1  •  tiotropium bromide-olodaterol (STIOLTO RESPIMAT) 2.5-2.5 MCG/ACT aerosol solution inhaler, Inhale 2 puffs Daily., Disp: 1 each, Rfl: 5    Allergies:   Allergies   Allergen Reactions   • Bactrim [Sulfamethoxazole-Trimethoprim] Hives       Objective     Vital Signs:   Vitals:    12/28/23 0926   BP: 146/74   Pulse: 71   Resp: 18   Temp: 97.4 °F (36.3 °C)   TempSrc: Temporal   SpO2: 98%   Weight: 112 kg (246 lb)   Height: 182.9 cm (72.01\")   PainSc:   3    Body mass index is 33.36 kg/m².   Pain Score    12/28/23 0926   PainSc:   3       ECOG Performance Status: 1 - Symptomatic but completely ambulatory    Physical Exam:   General: No acute distress. Well appearing   HEENT: Normocephalic, atraumatic. Sclera anicteric.   Neck: supple, no adenopathy.   Cardiovascular: regular rate and rhythm. No murmurs.   Respiratory: Normal rate. Clear to auscultation bilaterally  Abdomen: Soft, nontender, non distended with normoactive bowel sounds  Lymph: no cervical, supraclavicular or axillary adenopathy  Neuro: Alert and oriented x 3. No focal deficits.   Ext: Symmetric, no swelling.   Psych: Euthymic      Laboratory/Imaging Reviewed:   Hospital Outpatient Visit on 12/22/2023   Component Date Value Ref Range Status   • Glucose 12/22/2023 101  " 70 - 130 mg/dL Final       NM PET/CT Skull Base to Mid Thigh    Result Date: 12/22/2023  Narrative: NM PET/CT SKULL BASE TO MID THIGH Date of Exam: 12/22/2023 8:36 AM EST Indication: Rectal cancer for staging . lung mass for evaluation. Comparison: CT chest 12/1/2023, CT abdomen and pelvis 11/25/2023 Technique: 11.5 mCi of F-18 FDG was administered intravenously. PET imaging was obtained from skull base to mid-thigh approximately 60 minutes after radiotracer injection. A low dose non contrast CT was obtained for attenuation correction and anatomic localization. Fused PET-CT and 3D MIP reconstructions were utilized for image interpretation.  Fasting blood glucose level: 101 mg/dl. Reference uptake values: Mediastinum: 2.3 SUVmax Liver: 2.8 SUVmax Normalization method: Body Weight Findings: Head and neck: Normal symmetric physiologic FDG uptake in the brain. There is small linear hypermetabolism along the right mandible adjacent to dental implant which could reflect periodontal disease. There is physiologic uptake in the oropharynx and muscles of phonation. No neck mass or hypermetabolic cervical lymph node. Chest: Redemonstration of a mostly solid mass in the posterior left lower lobe with some central cavitation or internal bronchiectatic change; this is hypermetabolic with SUV max 9.3. Redemonstration of an adjacent smaller subpleural nodule in the posterior left lower lobe which appears partially solid with internal cavitary/bronchiectatic change; this is also hypermetabolic with SUV max 4.3. No additional hypermetabolic pulmonary parenchymal process or discrete nodule noting limited assessment owing to nonbreath-hold technique and low-dose CT technique. There is physiologic uptake in the left ventricular myocardium. No enlarged or hypermetabolic thoracic lymph nodes. There are mild scattered coronary artery calcifications. Abdomen and pelvis: There are multiple (at least 7) small vague hypodense liver lesions  which appear hypermetabolic compatible with liver metastases, for example a 1.8 cm hypodense lesion in the inferior right hepatic lobe (segment 6) with SUV max 7.2. There is circumferential and eccentric wall thickening of the rectum with corresponding hypermetabolism, compatible with known rectal malignancy; SUV max 16.7. The gallbladder surgically absent. There are surgical changes of the stomach. There are a couple prominent bilateral inguinal lymph nodes measuring under 1.5 cm in short axis, demonstrating mild FDG avidity with SUV max 3.5. There are couple mildly enlarged mildly hypermetabolic bilateral pelvic sidewall/internal iliac chain lymph  nodes, for example a right internal iliac node measuring 1.0 cm in short axis with SUV max 4.0. There are couple mildly prominent perirectal lymph nodes with low-level near background FDG uptake. Bones and body wall soft tissues: No focal/suspicious uptake within the osseous structures. No discrete bony lesion on the CT images. There are multiple sites of dermal-based soft tissue thickening in the superficial subcutaneous tissues of the bilateral gluteal regions with some areas of hypermetabolism, for example in the skin of the inferior left gluteal area measuring SUV max 8.4 (fusion image 313).     Impression: Impression: Hypermetabolic rectal wall thickening compatible with known rectal malignancy. Multiple small ill-defined hypodense hypermetabolic liver lesions compatible with liver metastases. Mildly enlarged and mildly hypermetabolic pelvic sidewall and internal iliac chain lymph nodes likely reflecting long metastases, with some less specific though nonetheless suspicious perirectal lymph nodes. Hypermetabolic lung mass and adjacent nodule in the left lower lobe appearing solid with internal cavitary or bronchiectatic change; this could reflect pulmonary metastatic disease although the possibility of separate primary lung malignancy cannot be excluded  particularly in the setting of background emphysema. Prominent mildly hypermetabolic inguinal lymph nodes. These are somewhat nonspecific possibly metastatic or reactive. Scattered small dermal-based soft tissue nodularity in the superficial subcutaneous tissues of the posterior gluteal regions with some corresponding hypermetabolism. This is nonspecific and may reflect cutaneous/dermal-based process. Metastatic body wall  deposits are considered less likely. Electronically Signed: Kameron Christian MD  12/22/2023 11:17 AM EST  Workstation ID: [id number]    CT Chest With Contrast Diagnostic    Result Date: 12/2/2023  Narrative: CT CHEST WITH CONTRAST DIAGNOSTIC  HISTORY: Lung nodule. suspected lung mets; R93.5-Abnormal findings on diagnostic imaging of other abdominal regions, including retroperitoneum; R91.8-Other nonspecific abnormal finding of lung field.   PROCEDURE:  Thin section axial images were obtained from the lung apices to below the diaphragm following IV contrast administration. Coronal and sagittal reconstruction images were obtained from the axial data.  COMPARISON: None.  FINDINGS: There is no axillary lymphadenopathy. Small mediastinal lymph nodes are present. No hilar lymphadenopathy. There is no pleural or pericardial effusion.  There are changes of mild emphysema. There is a cavitary mass in the left lower lobe measuring 4.8 cm. An adjacent cavitary nodule measures 2.0 cm. There is a 5 mm nodule along the right minor fissure which could represent an intrafissural lymph node.  Limited evaluation of the upper abdomen reveals changes from cholecystectomy and gastric bypass. A hypodense right renal lesion is likely a cyst.  No acute osseous abnormality.      Impression: 1. Cavitary right lower lobe mass concerning for malignancy. Differential considerations include primary bronchogenic carcinoma or metastatic disease. Infectious process felt to be less likely. 2. Left lower lobe cavitary nodule, adjacent  to the primary mass.         CTDI: 5.68 mGy DLP:244.44 mGy.cm     This study was performed with techniques to keep radiation doses as low as reasonably achievable (ALARA). Individualized dose reduction techniques using automated exposure control or adjustment of mA and/or kV according to the patient size were employed.   This report was signed and finalized on 12/2/2023 11:35 AM by Vesta Souza MD.       Procedures    Assessment / Plan      Assessment/Plan:     1. Rectal cancer   2.  Multiple hypermetabolic small liver lesions  3.  Hypermetabolic cavitary lung lesion  -I reviewed the patient's imaging reports, biopsy results, and endoscopy findings.  -We discussed the diagnosis, prognosis, and treatment of rectal cancer.  -The patient presents with a partially obstructing rectal cancer with adenopathy.  There are concerning hypermetabolic liver lesions which are suspicious for metastatic disease.  The patient does have a cavitary lung lesion as well as pulmonary nodules which is suspicious for metastatic disease or a second primary malignancy in this patient who is an active smoker.  -I recommended that we proceed with a lung biopsy to exclude a second primary malignancy as this would impact our treatment regimen.  If this confirms metastatic disease, he may not require liver biopsy, but if a second primary malignancy were identified, then he would require a biopsy to determine whether the lung malignancy or rectal malignancy is metastatic to the liver.  In the interest of avoiding further treatment delays, the patient prefers to proceed with biopsies of both the lung and the liver.  -We are going to proceed with initial systemic therapy with FOLFOX pending NGS with consideration of possible addition of Avastin or other targeted agents pending those results.  -Referral to surgery for port placement  -Chemotherapy education  -Liver and lung biopsies ordered given that these appointments are booked out several  weeks, and his current symptoms, we will initiate his first cycle and plan biopsies between cycle 1 and 2    -     CBC & Differential; Future  -     Comprehensive Metabolic Panel; Future  -     CEA; Future    4.  Cancer related pain  -I refilled his oxycodone and have referred him to palliative care  -     naloxone (NARCAN) 4 MG/0.1ML nasal spray; 1 spray into the nostril(s) as directed by provider As Needed for Opioid Reversal.  Dispense: 1 each; Refill: 0    5.  Financial concerns  -We will refer him to social work.     Follow Up:   With treatment start     Brenda Cronin MD  Hematology and Oncology     Time spent on the day of service was 60 minutes inclusive of time before, during, and after office visit on record review, medically appropriate history and physical, counseling patient, ordering tests, documenting in the medical record, and communicating with referring provider.

## 2023-12-28 NOTE — PROGRESS NOTES
Hematology and Oncology [place]  Office number [phone]    Fax number [phone]     New Patient Office Visit      Date: 2023     Patient Name: Edward Powell  MRN: [id number]  : 1970    Referring Physician: Dr. Gautam    Chief Complaint: Rectal cancer, lung mass, liver lesions    Cancer Staging: Presumptive stage IV    History of Present Illness: Edward Powell is a pleasant 53 y.o. male who presents today for evaluation of rectal cancer in the company of his supportive significant other.    Patient has a longstanding history of intermittent diarrhea since his cholecystectomy in 2019.  However he developed progressive diarrhea with associated loss of bowel control and urgency as well as weight loss and fatigue prompting additional workup.    CT of the abdomen pelvis 2023 showed an irregular circumferential wall thickening involving the rectum concerning for mass lesion.  There was associated mesorectal lymphadenopathy.  Masslike consolidation of left lower lobe.  CT of the chest showed a cavitary lesion in the left lower lobe up to 4.8 cm with an adjacent cavitary nodule up to 2 cm and a 5 mm nodule on the right minor fissure.      He underwent colonoscopy 2023 with findings of an infiltrative and ulcerated partially obstructing mass in the proximal rectum spanning 10 cm.  Rectal polyps.  Biopsy of the rectal mass showed invasive moderately differentiated adenocarcinoma.  Additional biopsies showed tubular adenomas.  MSI testing was intact/low probability of MSI high.    PET/CT 2023 showed hypermetabolic rectal wall thickening compatible with known rectal malignancy.  Multiple small ill-defined hypoechoic hyper metabolic liver lesions compatible with metastatic disease.  Mildly enlarged and mildly hypermetabolic pelvic sidewall and internal iliac lymph node chain adenopathy.  Hypermetabolic lung mass with adjacent nodule.     The patient has been experiencing  substantial rectal pain.  He is taking oxycodone every 6 hours with partial relief.  He reports ongoing bowel movements.  No abdominal pain.  No vomiting.  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     Past Medical History:   Past Medical History:   Diagnosis Date    Arthritis     Cholelithiasis 2019    Removed    Coronary artery disease 2009    Stent    Elevated cholesterol     Hernia 2003    Lower hernia       Past Surgical History:   Past Surgical History:   Procedure Laterality Date    APPENDECTOMY  1983    Removed    BARIATRIC SURGERY  2011    Gastric bypass    BLADDER TUMOR/ULCER BLEEDER CAUTERIZATION      CHOLECYSTECTOMY  2019    Removed    COLONOSCOPY N/A 12/7/2023    Procedure: COLONOSCOPY WITH HOT SNARE POLYPECTOMY AND TATTOO;  Surgeon: Noman Gautam MD;  Location[facility] ENDOSCOPY;  Service: Gastroenterology;  Laterality: N/A;    JENNIFER-EN-Y         Family History: No family history on file.    Social History:   Social History     Socioeconomic History    Marital status:    Tobacco Use    Smoking status: Every Day     Packs/day: 1.50     Years: 15.00     Additional pack years: 0.00     Total pack years: 22.50     Types: Cigarettes    Smokeless tobacco: Never    Tobacco comments:     35 years   Vaping Use    Vaping Use: Never used   Substance and Sexual Activity    Alcohol use: Never    Drug use: Never    Sexual activity: Defer       Medications:     Current Outpatient Medications:     dicyclomine (BENTYL) 20 MG tablet, Take 1 tablet by mouth 3 (Three) Times a Day As Needed for Abdominal Cramping., Disp: 60 tablet, Rfl: 0    Ferrous Gluconate-C-Folic Acid (IRON-C PO), Take  by mouth., Disp: , Rfl:     Hydrocortisone, Perianal, (ANUSOL-HC) 2.5 % rectal cream, Insert  into the rectum 2 (Two) Times a Day. Indications: Inflamed Hemorrhoids, Disp: 30 g, Rfl: 1    pantoprazole  "(Protonix) 40 MG EC tablet, Take 1 tablet by mouth Daily. Indications: Gastroesophageal Reflux Disease, Disp: 90 tablet, Rfl: 1    tiotropium bromide-olodaterol (STIOLTO RESPIMAT) 2.5-2.5 MCG/ACT aerosol solution inhaler, Inhale 2 puffs Daily., Disp: 1 each, Rfl: 5    Allergies:   Allergies   Allergen Reactions    Bactrim [Sulfamethoxazole-Trimethoprim] Hives       Objective     Vital Signs:   Vitals:    12/28/23 0926   BP: 146/74   Pulse: 71   Resp: 18   Temp: 97.4 °F (36.3 °C)   TempSrc: Temporal   SpO2: 98%   Weight: 112 kg (246 lb)   Height: 182.9 cm (72.01\")   PainSc:   3    Body mass index is 33.36 kg/m².   Pain Score    12/28/23 0926   PainSc:   3       ECOG Performance Status: 1 - Symptomatic but completely ambulatory    Physical Exam:   General: No acute distress. Well appearing   HEENT: Normocephalic, atraumatic. Sclera anicteric.   Neck: supple, no adenopathy.   Cardiovascular: regular rate and rhythm. No murmurs.   Respiratory: Normal rate. Clear to auscultation bilaterally  Abdomen: Soft, nontender, non distended with normoactive bowel sounds  Lymph: no cervical, supraclavicular or axillary adenopathy  Neuro: Alert and oriented x 3. No focal deficits.   Ext: Symmetric, no swelling.   Psych: Euthymic      Laboratory/Imaging Reviewed:   Hospital Outpatient Visit on 12/22/2023   Component Date Value Ref Range Status    Glucose 12/22/2023 101  70 - 130 mg/dL Final       NM PET/CT Skull Base to Mid Thigh    Result Date: 12/22/2023  Narrative: NM PET/CT SKULL BASE TO MID THIGH Date of Exam: 12/22/2023 8:36 AM EST Indication: Rectal cancer for staging . lung mass for evaluation. Comparison: CT chest 12/1/2023, CT abdomen and pelvis 11/25/2023 Technique: 11.5 mCi of F-18 FDG was administered intravenously. PET imaging was obtained from skull base to mid-thigh approximately 60 minutes after radiotracer injection. A low dose non contrast CT was obtained for attenuation correction and anatomic localization. Fused " PET-CT and 3D MIP reconstructions were utilized for image interpretation.  Fasting blood glucose level: 101 mg/dl. Reference uptake values: Mediastinum: 2.3 SUVmax Liver: 2.8 SUVmax Normalization method: Body Weight Findings: Head and neck: Normal symmetric physiologic FDG uptake in the brain. There is small linear hypermetabolism along the right mandible adjacent to dental implant which could reflect periodontal disease. There is physiologic uptake in the oropharynx and muscles of phonation. No neck mass or hypermetabolic cervical lymph node. Chest: Redemonstration of a mostly solid mass in the posterior left lower lobe with some central cavitation or internal bronchiectatic change; this is hypermetabolic with SUV max 9.3. Redemonstration of an adjacent smaller subpleural nodule in the posterior left lower lobe which appears partially solid with internal cavitary/bronchiectatic change; this is also hypermetabolic with SUV max 4.3. No additional hypermetabolic pulmonary parenchymal process or discrete nodule noting limited assessment owing to nonbreath-hold technique and low-dose CT technique. There is physiologic uptake in the left ventricular myocardium. No enlarged or hypermetabolic thoracic lymph nodes. There are mild scattered coronary artery calcifications. Abdomen and pelvis: There are multiple (at least 7) small vague hypodense liver lesions which appear hypermetabolic compatible with liver metastases, for example a 1.8 cm hypodense lesion in the inferior right hepatic lobe (segment 6) with SUV max 7.2. There is circumferential and eccentric wall thickening of the rectum with corresponding hypermetabolism, compatible with known rectal malignancy; SUV max 16.7. The gallbladder surgically absent. There are surgical changes of the stomach. There are a couple prominent bilateral inguinal lymph nodes measuring under 1.5 cm in short axis, demonstrating mild FDG avidity with SUV max 3.5. There are couple mildly  enlarged mildly hypermetabolic bilateral pelvic sidewall/internal iliac chain lymph  nodes, for example a right internal iliac node measuring 1.0 cm in short axis with SUV max 4.0. There are couple mildly prominent perirectal lymph nodes with low-level near background FDG uptake. Bones and body wall soft tissues: No focal/suspicious uptake within the osseous structures. No discrete bony lesion on the CT images. There are multiple sites of dermal-based soft tissue thickening in the superficial subcutaneous tissues of the bilateral gluteal regions with some areas of hypermetabolism, for example in the skin of the inferior left gluteal area measuring SUV max 8.4 (fusion image 313).     Impression: Impression: Hypermetabolic rectal wall thickening compatible with known rectal malignancy. Multiple small ill-defined hypodense hypermetabolic liver lesions compatible with liver metastases. Mildly enlarged and mildly hypermetabolic pelvic sidewall and internal iliac chain lymph nodes likely reflecting long metastases, with some less specific though nonetheless suspicious perirectal lymph nodes. Hypermetabolic lung mass and adjacent nodule in the left lower lobe appearing solid with internal cavitary or bronchiectatic change; this could reflect pulmonary metastatic disease although the possibility of separate primary lung malignancy cannot be excluded particularly in the setting of background emphysema. Prominent mildly hypermetabolic inguinal lymph nodes. These are somewhat nonspecific possibly metastatic or reactive. Scattered small dermal-based soft tissue nodularity in the superficial subcutaneous tissues of the posterior gluteal regions with some corresponding hypermetabolism. This is nonspecific and may reflect cutaneous/dermal-based process. Metastatic body wall  deposits are considered less likely. Electronically Signed: Kameron Christian MD  12/22/2023 11:17 AM EST  Workstation ID: [id number]    CT Chest With Contrast  Diagnostic    Result Date: 12/2/2023  Narrative: CT CHEST WITH CONTRAST DIAGNOSTIC  HISTORY: Lung nodule. suspected lung mets; R93.5-Abnormal findings on diagnostic imaging of other abdominal regions, including retroperitoneum; R91.8-Other nonspecific abnormal finding of lung field.   PROCEDURE:  Thin section axial images were obtained from the lung apices to below the diaphragm following IV contrast administration. Coronal and sagittal reconstruction images were obtained from the axial data.  COMPARISON: None.  FINDINGS: There is no axillary lymphadenopathy. Small mediastinal lymph nodes are present. No hilar lymphadenopathy. There is no pleural or pericardial effusion.  There are changes of mild emphysema. There is a cavitary mass in the left lower lobe measuring 4.8 cm. An adjacent cavitary nodule measures 2.0 cm. There is a 5 mm nodule along the right minor fissure which could represent an intrafissural lymph node.  Limited evaluation of the upper abdomen reveals changes from cholecystectomy and gastric bypass. A hypodense right renal lesion is likely a cyst.  No acute osseous abnormality.      Impression: 1. Cavitary right lower lobe mass concerning for malignancy. Differential considerations include primary bronchogenic carcinoma or metastatic disease. Infectious process felt to be less likely. 2. Left lower lobe cavitary nodule, adjacent to the primary mass.         CTDI: 5.68 mGy DLP:244.44 mGy.cm     This study was performed with techniques to keep radiation doses as low as reasonably achievable (ALARA). Individualized dose reduction techniques using automated exposure control or adjustment of mA and/or kV according to the patient size were employed.   This report was signed and finalized on 12/2/2023 11:35 AM by Vesta Souza MD.       Procedures    Assessment / Plan      Assessment/Plan:     1. Rectal cancer   2.  Multiple hypermetabolic small liver lesions  3.  Hypermetabolic cavitary lung lesion  -I  reviewed the patient's imaging reports, biopsy results, and endoscopy findings.  -We discussed the diagnosis, prognosis, and treatment of rectal cancer.  -The patient presents with a partially obstructing rectal cancer with adenopathy.  There are concerning hypermetabolic liver lesions which are suspicious for metastatic disease.  The patient does have a cavitary lung lesion as well as pulmonary nodules which is suspicious for metastatic disease or a second primary malignancy in this patient who is an active smoker.  -I recommended that we proceed with a lung biopsy to exclude a second primary malignancy as this would impact our treatment regimen.  If this confirms metastatic disease, he may not require liver biopsy, but if a second primary malignancy were identified, then he would require a biopsy to determine whether the lung malignancy or rectal malignancy is metastatic to the liver.  In the interest of avoiding further treatment delays, the patient prefers to proceed with biopsies of both the lung and the liver.  -We are going to proceed with initial systemic therapy with FOLFOX pending NGS with consideration of possible addition of Avastin or other targeted agents pending those results.  -Referral to surgery for port placement  -Chemotherapy education  -Liver and lung biopsies ordered given that these appointments are booked out several weeks, and his current symptoms, we will initiate his first cycle and plan biopsies between cycle 1 and 2    -     CBC & Differential; Future  -     Comprehensive Metabolic Panel; Future  -     CEA; Future    4.  Cancer related pain  -I refilled his oxycodone and have referred him to palliative care  -     naloxone (NARCAN) 4 MG/0.1ML nasal spray; 1 spray into the nostril(s) as directed by provider As Needed for Opioid Reversal.  Dispense: 1 each; Refill: 0    5.  Financial concerns  -We will refer him to social work.     Follow Up:   With treatment start     Brenda Cronin,  MD  Hematology and Oncology     Time spent on the day of service was 60 minutes inclusive of time before, during, and after office visit on record review, medically appropriate history and physical, counseling patient, ordering tests, documenting in the medical record, and communicating with referring provider.

## 2023-12-29 DIAGNOSIS — C78.7 RECTAL CANCER METASTASIZED TO LIVER: Primary | ICD-10-CM

## 2023-12-29 DIAGNOSIS — C20 RECTAL CANCER METASTASIZED TO LIVER: ICD-10-CM

## 2023-12-29 DIAGNOSIS — C78.7 RECTAL CANCER METASTASIZED TO LIVER: ICD-10-CM

## 2023-12-29 DIAGNOSIS — C20 RECTAL CANCER METASTASIZED TO LIVER: Primary | ICD-10-CM

## 2023-12-29 RX ORDER — OXYCODONE HYDROCHLORIDE 5 MG/1
5-10 TABLET ORAL EVERY 6 HOURS PRN
Qty: 120 TABLET | Refills: 0 | Status: SHIPPED | OUTPATIENT
Start: 2023-12-29

## 2023-12-29 RX ORDER — NALOXONE HYDROCHLORIDE 4 MG/.1ML
1 SPRAY NASAL AS NEEDED
Qty: 1 EACH | Refills: 0 | Status: SHIPPED | OUTPATIENT
Start: 2023-12-29

## 2023-12-29 NOTE — TELEPHONE ENCOUNTER
Caller: Edward Powell    Relationship: Self    Best call back number: [phone]    Requested Prescriptions:   Requested Prescriptions     Pending Prescriptions Disp Refills    oxyCODONE (ROXICODONE) 5 MG immediate release tablet 120 tablet 0     Sig: Take 1 tablet by mouth Every 6 (Six) Hours As Needed for Moderate Pain.    naloxone (NARCAN) 4 MG/0.1ML nasal spray 1 each 0     Si spray into the nostril(s) as directed by provider As Needed for Opioid Reversal.        Pharmacy where request should be sent:  [facility] [address] [place] [id number], PHONE NUMBER [phone]    Last office visit with prescribing clinician: Visit date not found   Last telemedicine visit with prescribing clinician: Visit date not found   Next office visit with prescribing clinician: Visit date not found     Additional details provided by patient: PLEASE CANCEL SCRIPTS SENT TO [facility], AND RESEND TO [facility]     Does the patient have less than a 3 day supply:  [x] Yes  [] No    Would you like a call back once the refill request has been completed: [x] Yes [] No    If the office needs to give you a call back, can they leave a voicemail: [x] Yes [] No

## 2024-01-02 ENCOUNTER — TELEPHONE (OUTPATIENT)
Dept: GASTROENTEROLOGY | Facility: CLINIC | Age: 54
End: 2024-01-02

## 2024-01-02 ENCOUNTER — DOCUMENTATION (OUTPATIENT)
Dept: ONCOLOGY | Facility: CLINIC | Age: 54
End: 2024-01-02
Payer: COMMERCIAL

## 2024-01-02 NOTE — PROGRESS NOTES
SW contacted pt to provide support and assist with psychosocial needs. SW spoke with pt for 35 minutes in regards to current financial situation. Pt has been in the  industry, but because of prognosis and treatment, he has not been able to work. Pt's wife works for [employer] in [place], and with her income, they do not qualify for medicaid. Pt further reported he applied for SSDI, but because he stopped paying into it in 2014, he was told he does not qualify.   Pt expressed frustration with Social security system and had questions in regards to eligibility that SW could not answer. SW encouraged pt to still apply and explained he can appeal a denial. Pt asked if SW could assist with application, to which SW confirmed but stated a meeting will need to be set up as SW will not log in into any personal profiles. Pt expressed understanding.  Pt talked to SW about how hard it is for him to ask for help as he has worked to provide for his family his whole life, and does not like the idea of not having to work. 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 SW provided support and normalized his emotions.   SW provided education on resources available, and explained with pt getting tx in [place], they would need to ask for SW consult at needs assessment as they would be the ones to help with transportation assistance via gas cards. RENAY explained to pt there are grants available that SW can apply to on behalf of pt, and further reported they are one time assistance so once used you can not apply again. Pt verbalized understanding. Pt reported his wife addresses finances and asked if he can provide phone number to her to discuss needs further. SW was agreeable and provided.   SW and pt will talk about plan later this week and grants that are available to help alleviate their financial distress.  Pt expressed appreciation for the support and assistance.

## 2024-01-02 NOTE — TELEPHONE ENCOUNTER
"Provider: MICHEL VILLA    Caller: MIKA ROWLAND    Relationship to Patient: SELF    Phone Number: [phone]    Reason for Call: PATIENT CALLED IN AND STATED THAT HE WOULD LIKE TO CANCEL HIS APPOINTMENT. PATIENT STATED THAT IF THE APPOINTMENT IS JUST FOR A FOLLOW UP, HE WOULD LIKE IT TO BE NOTED THAT HE IS \"DOING FINE WITH NO PAIN AT THE MOMENT\". 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 PATIENT STATED OKAY TO CALL ANYTIME IF NEED TO.    "

## 2024-01-03 ENCOUNTER — TELEPHONE (OUTPATIENT)
Dept: ONCOLOGY | Facility: CLINIC | Age: 54
End: 2024-01-03

## 2024-01-03 NOTE — TELEPHONE ENCOUNTER
Caller: Edward Powell    Relationship: Self    Best call back number: [phone]     What is the best time to reach you: ASAP    Who are you requesting to speak with (clinical staff, provider,  specific staff member): EDMUND SCHEDULING    What was the call regarding: PT NEEDS TO FIND OUT WHEN HIS PORT PLACEMENT WILL BE SCHEDULED.  FIRST CHEMO APPT IS 1/15, HE HAS NOT HEARD ABOUT PORT SCHEDULING YET.

## 2024-01-04 ENCOUNTER — TELEPHONE (OUTPATIENT)
Dept: ONCOLOGY | Facility: CLINIC | Age: 54
End: 2024-01-04

## 2024-01-04 ENCOUNTER — PREP FOR SURGERY (OUTPATIENT)
Dept: OTHER | Facility: HOSPITAL | Age: 54
End: 2024-01-04
Payer: COMMERCIAL

## 2024-01-04 ENCOUNTER — TELEPHONE (OUTPATIENT)
Dept: ONCOLOGY | Facility: CLINIC | Age: 54
End: 2024-01-04
Payer: COMMERCIAL

## 2024-01-04 ENCOUNTER — PATIENT OUTREACH (OUTPATIENT)
Dept: ONCOLOGY | Facility: HOSPITAL | Age: 54
End: 2024-01-04
Payer: COMMERCIAL

## 2024-01-04 ENCOUNTER — OFFICE VISIT (OUTPATIENT)
Dept: ONCOLOGY | Facility: CLINIC | Age: 54
End: 2024-01-04
Payer: COMMERCIAL

## 2024-01-04 VITALS
SYSTOLIC BLOOD PRESSURE: 154 MMHG | TEMPERATURE: 98 F | RESPIRATION RATE: 16 BRPM | WEIGHT: 248 LBS | BODY MASS INDEX: 33.59 KG/M2 | HEIGHT: 72 IN | OXYGEN SATURATION: 99 % | DIASTOLIC BLOOD PRESSURE: 74 MMHG | HEART RATE: 82 BPM

## 2024-01-04 DIAGNOSIS — C20 RECTAL CANCER METASTASIZED TO LIVER: Primary | ICD-10-CM

## 2024-01-04 DIAGNOSIS — C20 RECTAL ADENOCARCINOMA: ICD-10-CM

## 2024-01-04 DIAGNOSIS — I87.8 VENOFIBROSIS: ICD-10-CM

## 2024-01-04 DIAGNOSIS — C78.7 RECTAL CANCER METASTASIZED TO LIVER: Primary | ICD-10-CM

## 2024-01-04 RX ORDER — CEFAZOLIN SODIUM 2 G/50ML
2000 SOLUTION INTRAVENOUS ONCE
Status: CANCELLED | OUTPATIENT
Start: 2024-01-04 | End: 2024-01-04

## 2024-01-04 RX ORDER — ACETAMINOPHEN 325 MG/1
650 TABLET ORAL EVERY 6 HOURS PRN
COMMUNITY

## 2024-01-04 RX ORDER — LIDOCAINE AND PRILOCAINE 25; 25 MG/G; MG/G
1 CREAM TOPICAL AS NEEDED
Qty: 30 G | Refills: 3 | Status: SHIPPED | OUTPATIENT
Start: 2024-01-04

## 2024-01-04 RX ORDER — DOCUSATE SODIUM 100 MG/1
100 CAPSULE, LIQUID FILLED ORAL 2 TIMES DAILY
COMMUNITY

## 2024-01-04 RX ORDER — HEPARIN SODIUM 5000 [USP'U]/ML
5000 INJECTION, SOLUTION INTRAVENOUS; SUBCUTANEOUS ONCE
Status: CANCELLED | OUTPATIENT
Start: 2024-01-04 | End: 2024-01-04

## 2024-01-04 RX ORDER — ONDANSETRON HYDROCHLORIDE 8 MG/1
8 TABLET, FILM COATED ORAL 3 TIMES DAILY PRN
Qty: 30 TABLET | Refills: 5 | Status: SHIPPED | OUTPATIENT
Start: 2024-01-04

## 2024-01-04 RX ORDER — AMOXICILLIN 500 MG/1
TABLET, FILM COATED ORAL
COMMUNITY
Start: 2024-01-02

## 2024-01-04 RX ORDER — SODIUM CHLORIDE, SODIUM LACTATE, POTASSIUM CHLORIDE, CALCIUM CHLORIDE 600; 310; 30; 20 MG/100ML; MG/100ML; MG/100ML; MG/100ML
50 INJECTION, SOLUTION INTRAVENOUS CONTINUOUS
Status: CANCELLED | OUTPATIENT
Start: 2024-01-04

## 2024-01-04 NOTE — SIGNIFICANT NOTE
Spoke to patient at the request of CORNEL HO. Patient is a  and unable to work at this time. He shared he had spoke with  in [place] to assist with financial concerns. I provided patient with gas cards for appointment. Patient was provided my card and instructed to call with any questions. Patient's wife was present at visit.

## 2024-01-04 NOTE — TELEPHONE ENCOUNTER
Spoke with patient regarding scheduling.  Patient stated he is fine with receiving his first treatment in [place] (due to scheduling) with the rest in [facility].  Patient advised that Dr. Black will see him in the office when he see's Jacy Razo today and once this office gets port placement date, the rest of the chemotherapy start dates/appointments can be adjusted.  Patient verbalized understanding.

## 2024-01-04 NOTE — PRE-PROCEDURE INSTRUCTIONS
PAT phone history completed with patient for upcoming procedure on 1/5/23.    PAT PASS reviewed with patient and he/she verbalized understanding of the following:     Do not eat or drink anything after midnight the night before procedure unless otherwise instructed by physician/surgeon's office, this includes no gum, candy, mints, tobacco products or e-cigarettes.  Do not shave the area to be operated on at least 48 hours prior to procedure.  Do not wear makeup, lotion, hair products, or nail polish.  Do not wear any jewelry and remove all piercings.  Do not wear any adhesive if you wear dentures.  Do not wear contacts; bring in glasses if needed.  Only take medications on the morning of procedure as instructed by PAT nurse per anesthesia guidelines or as instructed by physician's office.   If you are on any blood thinners reach out to the physician/surgeon's office for instructions on when/if they will need to be stopped prior to procedure.   Bring in picture ID and insurance card, advanced directive copies if applicable, CPAP/BIPAP/Inhalers if indicated morning of procedure, leave any other valuables at home.  Ensure you have arranged for someone to drive you home the day of your procedure and someone to care for you at home afterwards. It is recommended that you do not drive, drink alcohol, or make any major legal decisions for at least 24 hours after your procedure is complete.    Instructions given on hospital entrance and registration location.

## 2024-01-04 NOTE — PROGRESS NOTES
"CHEMOTHERAPY PREPARATION    Edward Powell  [id number]  1970    Subjective   Chief Complaint: Treatment Preparation and Needs Assessment    History of present illness:  Edward Powell is a 53 y.o. year old male who is here today for chemotherapy preparation and needs assessment. The patient has been diagnosed with rectal cancer presumptive stage IV and is scheduled to begin  IV treatment with FOLFOX.     Oncology History:    Oncology/Hematology History   Rectal cancer metastasized to liver   12/28/2023 Initial Diagnosis    Rectal cancer metastasized to liver     12/28/2023 -  Chemotherapy    OP COLON [id number] OXALIplatin / Leucovorin / Fluorouracil         The current medication list and allergy list were reviewed and reconciled.     Past Medical History, Past Surgical History, Social History, Family History have been reviewed and are without significant changes except as mentioned.      Review of Systems   Constitutional:  Positive for fatigue.   Eyes: Negative.    Respiratory: Negative.     Cardiovascular: Negative.    Gastrointestinal:  Positive for abdominal pain and nausea.   Genitourinary: Negative.    Musculoskeletal:  Positive for arthralgias.   Allergic/Immunologic: Negative.    Neurological:  Positive for weakness.   Psychiatric/Behavioral:  The patient is nervous/anxious.        Objective   Physical Exam  Vital Signs: /74   Pulse 82   Temp 98 °F (36.7 °C)   Resp 16   Ht 182.9 cm (72\")   Wt 112 kg (248 lb)   SpO2 99%   BMI 33.63 kg/m²    General Appearance:  alert, cooperative, no apparent distress and appears stated age   Neurologic/Psychiatric: A&O x 3, gait steady, appropriate affect   HEENT:  Normocephalic, without obvious abnormality, mucous membranes moist   Lungs:   Clear to auscultation bilaterally; respirations regular, even, and unlabored bilaterally   Heart:  Regular rate and rhythm, no murmurs appreciated   Extremities: Normal, atraumatic; no clubbing, cyanosis, or " edema    Skin: No rashes, lesions, or abnormal coloration noted     ECOG Performance Status: 1 - Symptomatic but completely ambulatory            NEEDS ASSESSMENTS    Genetics  The patient's new diagnosis and family history have been reviewed for genetic counseling needs. A genetic referral is not recommended.     Psychosocial  The patient has completed a PHQ-9 Depression Screening and the Distress Thermometer (DT) today.   PHQ-9 results show 5-9 (Mild Depression). The patient scored their distress today as 8 on a scale of 0-10 with 0 being no distress and 10 being extreme distress.   Problems marked as being an issue for him within the last week include practical problems, emotional problems, and physical problems.   Results were reviewed along with psychosocial resources offered by our cancer center. Our oncology social worker will be flagged for a DT score of 4 or above, and a same day call will be made for a score of 9 or 10. A mental health referral is recommended at this time. The patient is not accepting of a referral to [facility].   Copies of patient's questionnaires will be scanned into EMR for details and further reference.    Barriers to care  A barriers form was also completed by the patient today. We discussed services offered by our facility to help him have adequate access to care. The patient was given the name and card for our Oncology Social Worker. Based upon barriers assessment today, the patient will not require a follow-up call from the  to further discuss needs.   A copy of the barriers form will also be scanned into EMR for details and further reference.     VAD Assessment  The patient and I discussed planned intervenous chemotherapy as well as other IV treatments that are often needed throughout the course of treatment. These may include, but are not limited to blood transfusions, antibiotics, and IV hydration. The patient's vasculature does not appear to be adequate for  "multiple peripheral IVs throughout their treatment course. Discussed risks and benefits of VADs. The patient would like to pursue Port-A-Cath insertion prior to initiation of treatment.       Advance Care Planning   The patient and I discussed advanced care planning, \"Conversations that Matter\".   This service was offered, free of charge, for development of advance directives with a certified ACP facilitator.  The patient does not have an up-to-date advanced directive. This document is not on file with our office. The patient is not interested in an appointment with one of our facilitators to create or update their advanced directives.         Palliative Care  The patient and I discussed palliative care services. Palliative care is not the same as Hospice care. This is specialized medical care for people living with serious illness with the goal of improving quality of life for the patient and their family. [facility] offers our patients outpatient palliative care early along with their treatment to assist in coordination of care, symptom management, pain management, and medical decision making.  Oncology criteria for palliative care referral is met at this time. The patient is interested in a palliative care consultation.     Additional Referral needs  Nutrition      IV CHEMOTHERAPY EDUCATION    Booklets Given: Chemotherapy and You [x]  Eating Hints [x]    Sexuality/Fertility Books []      Chemotherapy/Biotherapy Education Sheets: (list all that apply)  nausea management, acid reflux management, diarrhea management, Cancer resourse contacts information, skin and mouth care, and vaccination information                                                                                                                                                                 Chemotherapy Regimen:   Treatment Plans       Name Type Plan Dates Plan Provider         Active    OP COLON [id number] OXALIplatin / Leucovorin / Fluorouracil " ONCOLOGY TREATMENT  12/27/2023 - Present Brenda Cronin MD                  Chemotherapy education comprehension reviewed. Questions answered and additional information discussed on topics including:  Anemia, Thrombocytopenia, Neutropenia, Nutrition and appetite changes, Constipation, Diarrhea, Nausea & vomiting, Mouth sores, Alopecia, Infertility & sexuality, Nervous system changes, Pain, Skin & nail changes, Organ toxicities, Survivorship, Home care, and Vaccinations        TOPICS EDUCATION PROVIDED   ANEMIA:  role of RBC, cause, s/s, ways to manage, role of transfusion [x]   THROMBOCYTOPENIA:  role of platelet, cause, s/s, ways to prevent bleeding, things to avoid, when to seek help [x]   NEUTROPENIA:  role of WBC, cause, infection precautions, s/s of infection, when to call MD [x]   NUTRITION & APPETITE CHANGES:  importance of maintaining healthy diet & weight, ways to manage to improve intake, dietary consult, exercise regimen [x]   DIARRHEA:  causes, s/s of dehydration, ways to manage, dietary changes, when to call MD [x]   CONSTIPATION:  causes, ways to manage, dietary changes, when to call MD [x]   NAUSEA & VOMITING:  cause, use of antiemetics, dietary changes, when to call MD [x]   MOUTH SORES:  causes, oral care, ways to manage [x]   ALOPECIA:  cause, ways to manage, resources [x]   INFERTILITY & SEXUALITY:  causes, fertility preservation options, sexuality changes, ways to manage, importance of birth control [x]   NERVOUS SYSTEM CHANGES:  causes, s/s, neuropathies, cognitive changes, ways to manage [x]   PAIN:  causes, ways to manage [x]   SKIN & NAIL CHANGES:  cause, s/s, ways to manage [x]   ORGAN TOXICITIES:  cause, s/s, need for diagnostic tests, labs, when to notify MD [x]   SURVIVORSHIP:  distress, distress assessment, secondary malignancies, early/late effects, follow-up, social issues, social support [x]   HOME CARE:  use of spill kits, storing of PO chemo, how to manage bodily fluids [x]    MISCELLANEOUS:  drug interactions, administration, vesicant, et [x]         Assessment and Plan:    Diagnoses and all orders for this visit:    1. Rectal cancer metastasized to liver (Primary)  -     Provider Communication  -     lidocaine-prilocaine (EMLA) 2.5-2.5 % cream; Apply 1 application  topically to the appropriate area as directed As Needed (45-60 minutes prior to port access.  Cover with saran/plastic wrap.).  Dispense: 30 g; Refill: 3  -     ondansetron (ZOFRAN) 8 MG tablet; Take 1 tablet by mouth 3 (Three) Times a Day As Needed for Nausea or Vomiting.  Dispense: 30 tablet; Refill: 5  -     Ferritin; Future  -     Iron Profile; Future        This was a  90  minute face-to-face visit spent in  counseling and coordination of care as documented above.   The patient and I have reviewed their new cancer diagnosis and scheduled treatment plan. Needs assessment was completed including genetics, psychosocial needs, barriers to care, VAD evaluation, advanced care planning, and palliative care services. Referrals have been ordered as appropriate based upon our evaluation and patient desires.     Nutrition will see patient on first day of treatment.  He is currently working with the social work in [place].  He will continue to work with her for questions and concerns about SSI disability.  I will refer him to financial counseling today due to to his inability to work related to his cancer he has no income coming in and is concerned about his bills.    I did consult with the nurse navigator who provided the patient with gas cards.    I sent in Zofran to his pharmacy.  We discussed he will take 1 tablet by mouth 3 times daily as needed for nausea and vomiting.  Also discussed we will send in Emla cream.  We discussed how to use.    Patient had a gastric bypass a few years ago and has not been able to afford his vitamin packs for the past 2 months.  In those vitamin packs they do include an iron pill.  He does feel  like his iron levels are low.  I will add those on to his labs to be drawn on Monday.    Dr. Black will see patient tomorrow in preop with anticipation of port to follow.  Office currently pending.  We will tentatively plan for him to follow-up Monday in [place] for treatment.    He has scheduled palliative care appointment on 1/12/2013.  He will continue with Roxicodone 1-2 every 6 hours as needed.  I did advise the patient that if this is not helping to notify our office and we will adjust as needed.    IV chemotherapy teaching was also completed today as documented above. Adequate time was given to answer all questions to his satisfaction. Patient and family are aware of their care team members and contact information if they have questions or problems throughout the treatment course. Needs assessments and education has been completed. The patient is adequately prepared to begin treatment as scheduled.     Electronically signed by GEORGIA Grimes on 01/04/24 at 13:45 EST.

## 2024-01-05 ENCOUNTER — APPOINTMENT (OUTPATIENT)
Dept: GENERAL RADIOLOGY | Facility: HOSPITAL | Age: 54
End: 2024-01-05
Payer: COMMERCIAL

## 2024-01-05 ENCOUNTER — ANESTHESIA (OUTPATIENT)
Dept: PERIOP | Facility: HOSPITAL | Age: 54
End: 2024-01-05
Payer: COMMERCIAL

## 2024-01-05 ENCOUNTER — TELEPHONE (OUTPATIENT)
Dept: ONCOLOGY | Facility: CLINIC | Age: 54
End: 2024-01-05
Payer: COMMERCIAL

## 2024-01-05 ENCOUNTER — HOSPITAL ENCOUNTER (OUTPATIENT)
Facility: HOSPITAL | Age: 54
Setting detail: HOSPITAL OUTPATIENT SURGERY
Discharge: HOME | End: 2024-01-05
Attending: SURGERY | Admitting: SURGERY
Payer: COMMERCIAL

## 2024-01-05 ENCOUNTER — ANESTHESIA EVENT (OUTPATIENT)
Dept: PERIOP | Facility: HOSPITAL | Age: 54
End: 2024-01-05
Payer: COMMERCIAL

## 2024-01-05 VITALS
SYSTOLIC BLOOD PRESSURE: 134 MMHG | HEART RATE: 60 BPM | TEMPERATURE: 97.8 F | DIASTOLIC BLOOD PRESSURE: 79 MMHG | RESPIRATION RATE: 18 BRPM | OXYGEN SATURATION: 94 %

## 2024-01-05 DIAGNOSIS — C20 RECTAL CANCER METASTASIZED TO LIVER: ICD-10-CM

## 2024-01-05 DIAGNOSIS — I87.8 VENOFIBROSIS: ICD-10-CM

## 2024-01-05 DIAGNOSIS — C78.7 RECTAL CANCER METASTASIZED TO LIVER: ICD-10-CM

## 2024-01-05 DIAGNOSIS — C20 RECTAL ADENOCARCINOMA: ICD-10-CM

## 2024-01-05 PROCEDURE — 25810000003 LACTATED RINGERS PER 1000 ML: Performed by: SURGERY

## 2024-01-05 PROCEDURE — C1788 PORT, INDWELLING, IMP: HCPCS | Performed by: SURGERY

## 2024-01-05 PROCEDURE — 25010000002 CEFAZOLIN SODIUM-DEXTROSE 2-3 GM-%(50ML) RECONSTITUTED SOLUTION: Performed by: SURGERY

## 2024-01-05 PROCEDURE — 76000 FLUOROSCOPY <1 HR PHYS/QHP: CPT

## 2024-01-05 PROCEDURE — 25010000002 FENTANYL CITRATE PF 50 MCG/ML SOLUTION PREFILLED SYRINGE: Performed by: NURSE ANESTHETIST, CERTIFIED REGISTERED

## 2024-01-05 PROCEDURE — 25010000002 PROPOFOL 10 MG/ML EMULSION: Performed by: NURSE ANESTHETIST, CERTIFIED REGISTERED

## 2024-01-05 PROCEDURE — 77001 FLUOROGUIDE FOR VEIN DEVICE: CPT | Performed by: SURGERY

## 2024-01-05 PROCEDURE — 25010000002 MIDAZOLAM PER 1MG: Performed by: NURSE ANESTHETIST, CERTIFIED REGISTERED

## 2024-01-05 PROCEDURE — S0260 H&P FOR SURGERY: HCPCS | Performed by: SURGERY

## 2024-01-05 PROCEDURE — 36561 INSERT TUNNELED CV CATH: CPT | Performed by: SURGERY

## 2024-01-05 PROCEDURE — 25010000002 HEPARIN (PORCINE) PER 1000 UNITS: Performed by: SURGERY

## 2024-01-05 PROCEDURE — 25010000002 LIDOCAINE 1 % SOLUTION: Performed by: SURGERY

## 2024-01-05 PROCEDURE — 25010000002 PROPOFOL 200 MG/20ML EMULSION: Performed by: NURSE ANESTHETIST, CERTIFIED REGISTERED

## 2024-01-05 PROCEDURE — 71045 X-RAY EXAM CHEST 1 VIEW: CPT

## 2024-01-05 DEVICE — PRT INTRO VASC/INTERV VORTEX FILL/HL DETACH/POLYURET/CATH 8F: Type: IMPLANTABLE DEVICE | Site: INTERNAL JUGULAR | Status: FUNCTIONAL

## 2024-01-05 RX ORDER — HEPARIN SODIUM 5000 [USP'U]/ML
5000 INJECTION, SOLUTION INTRAVENOUS; SUBCUTANEOUS ONCE
Status: COMPLETED | OUTPATIENT
Start: 2024-01-05 | End: 2024-01-05

## 2024-01-05 RX ORDER — MAGNESIUM HYDROXIDE 1200 MG/15ML
LIQUID ORAL AS NEEDED
Status: DISCONTINUED | OUTPATIENT
Start: 2024-01-05 | End: 2024-01-05 | Stop reason: HOSPADM

## 2024-01-05 RX ORDER — KETAMINE HCL IN NACL, ISO-OSM 100MG/10ML
SYRINGE (ML) INJECTION AS NEEDED
Status: DISCONTINUED | OUTPATIENT
Start: 2024-01-05 | End: 2024-01-05 | Stop reason: SURG

## 2024-01-05 RX ORDER — CEFAZOLIN SODIUM 2 G/50ML
2000 SOLUTION INTRAVENOUS ONCE
Status: COMPLETED | OUTPATIENT
Start: 2024-01-05 | End: 2024-01-05

## 2024-01-05 RX ORDER — HYDROCODONE BITARTRATE AND ACETAMINOPHEN 7.5; 325 MG/1; MG/1
1 TABLET ORAL ONCE AS NEEDED
Status: DISCONTINUED | OUTPATIENT
Start: 2024-01-05 | End: 2024-01-05 | Stop reason: HOSPADM

## 2024-01-05 RX ORDER — FENTANYL CITRATE 50 UG/ML
INJECTION, SOLUTION INTRAMUSCULAR; INTRAVENOUS AS NEEDED
Status: DISCONTINUED | OUTPATIENT
Start: 2024-01-05 | End: 2024-01-05 | Stop reason: SURG

## 2024-01-05 RX ORDER — SODIUM CHLORIDE, SODIUM LACTATE, POTASSIUM CHLORIDE, CALCIUM CHLORIDE 600; 310; 30; 20 MG/100ML; MG/100ML; MG/100ML; MG/100ML
50 INJECTION, SOLUTION INTRAVENOUS CONTINUOUS
Status: DISCONTINUED | OUTPATIENT
Start: 2024-01-05 | End: 2024-01-05 | Stop reason: HOSPADM

## 2024-01-05 RX ORDER — HEPARIN SODIUM 1000 [USP'U]/ML
INJECTION, SOLUTION INTRAVENOUS; SUBCUTANEOUS AS NEEDED
Status: DISCONTINUED | OUTPATIENT
Start: 2024-01-05 | End: 2024-01-05 | Stop reason: HOSPADM

## 2024-01-05 RX ORDER — MIDAZOLAM HYDROCHLORIDE 2 MG/2ML
INJECTION, SOLUTION INTRAMUSCULAR; INTRAVENOUS AS NEEDED
Status: DISCONTINUED | OUTPATIENT
Start: 2024-01-05 | End: 2024-01-05 | Stop reason: SURG

## 2024-01-05 RX ORDER — ONDANSETRON 2 MG/ML
4 INJECTION INTRAMUSCULAR; INTRAVENOUS ONCE AS NEEDED
Status: DISCONTINUED | OUTPATIENT
Start: 2024-01-05 | End: 2024-01-05 | Stop reason: HOSPADM

## 2024-01-05 RX ORDER — PROPOFOL 10 MG/ML
INJECTION, EMULSION INTRAVENOUS AS NEEDED
Status: DISCONTINUED | OUTPATIENT
Start: 2024-01-05 | End: 2024-01-05 | Stop reason: SURG

## 2024-01-05 RX ORDER — LIDOCAINE HYDROCHLORIDE 10 MG/ML
INJECTION, SOLUTION INFILTRATION; PERINEURAL AS NEEDED
Status: DISCONTINUED | OUTPATIENT
Start: 2024-01-05 | End: 2024-01-05 | Stop reason: HOSPADM

## 2024-01-05 RX ADMIN — MIDAZOLAM HYDROCHLORIDE 2 MG: 1 INJECTION, SOLUTION INTRAMUSCULAR; INTRAVENOUS at 09:51

## 2024-01-05 RX ADMIN — CEFAZOLIN SODIUM 2000 MG: 2 SOLUTION INTRAVENOUS at 09:51

## 2024-01-05 RX ADMIN — HEPARIN SODIUM 5000 UNITS: 5000 INJECTION, SOLUTION INTRAVENOUS; SUBCUTANEOUS at 09:46

## 2024-01-05 RX ADMIN — PROPOFOL 50 MG: 10 INJECTION, EMULSION INTRAVENOUS at 10:01

## 2024-01-05 RX ADMIN — PROPOFOL 140 MCG/KG/MIN: 10 INJECTION, EMULSION INTRAVENOUS at 10:01

## 2024-01-05 RX ADMIN — SODIUM CHLORIDE, POTASSIUM CHLORIDE, SODIUM LACTATE AND CALCIUM CHLORIDE 50 ML/HR: 600; 310; 30; 20 INJECTION, SOLUTION INTRAVENOUS at 08:15

## 2024-01-05 RX ADMIN — Medication 20 MG: at 10:01

## 2024-01-05 RX ADMIN — FENTANYL CITRATE 50 MCG: 50 INJECTION, SOLUTION INTRAMUSCULAR; INTRAVENOUS at 09:51

## 2024-01-05 NOTE — H&P
General Surgery H&P    Name:Edward Powell  Age: 53 y.o.  Gender: male  : 1970  MRN: [id number]  Visit Number: [id number]  Admit Date: 2024  Date of Service: 24    Patient Care Team:  Provider, No Known as PCP - General      Chief complaint : Presumptive stage IV rectal cancer, need for long-term IV access for chemotherapy      History of Present Illness:     Edward Powell is a 53 y.o. male patient who presents for urgent placement of a Port-A-Cath for initiation of chemotherapy early next week.  Mr. Wan has presumptive stage IV rectal cancer with recent imaging demonstrating an irregular, circumferential, wall thickening involving the rectum concerning for a mass with associated mesorectal lymphadenopathy as well as masslike consolidation of the left lower lobe identified on CT scan of the abdomen pelvis.  CT of the chest showed a cavitary lesion in the left lower lobe measuring up to 4.8 cm with an adjacent cavitary nodule measuring up to 2 cm, and a 5 mm nodule along the right minor fissure.  A colonoscopy was performed on 2023 with findings of an infiltrative and ulcerated partially obstructing mass in the proximal rectum spanning 10 cm as well as rectal polyps.  Biopsies of the mass showed invasive, moderately differentiated adenocarcinoma with additional biopsy showing tubular adenomas.  MSI testing was intact.  PET/CT demonstrated hypermetabolic rectal wall thickening compatible with known rectal malignancy.  Multiple small ill-defined hypoechoic hyper metabolic liver lesions compatible with metastatic disease.  Mildly enlarged and mildly hypermetabolic pelvic sidewall and internal iliac lymph node chain adenopathy.  Hypermetabolic lung mass with adjacent nodule.  The patient has been under the care of Dr. Brenda Cronin from the oncology service, and there is a plan to initiate systemic FOLFOX with possible added Avastin.  For this reason, a Port-A-Cath has been  requested.  He has no history of previous central venous access.  He reports having a PICC line in the right upper arm a number of years ago.    Patient Active Problem List   Diagnosis    Abnormal CT of the abdomen    Normocytic anemia    Change in bowel habits    Chronic diarrhea    Rectal cancer metastasized to liver    Rectal adenocarcinoma    Venofibrosis           Past Medical History:   Diagnosis Date    Arthritis     Cancer     rectal cancer - diagnosed 2023    Cholelithiasis 2019    Removed    COPD (chronic obstructive pulmonary disease)     Coronary artery disease 2009    Stent - no cardiologist currently    Elevated cholesterol     GERD (gastroesophageal reflux disease)     Hernia 2003    Lower hernia    Perforated ulcer 2019    Sleep apnea     history of; when weighed over 400lbs - no issues following bariatric surgery       Past Surgical History:   Procedure Laterality Date    APPENDECTOMY  1983    Removed    BARIATRIC SURGERY  2011    Gastric bypass    BLADDER TUMOR/ULCER BLEEDER CAUTERIZATION      CARDIAC CATHETERIZATION  2009    stent placed    CHOLECYSTECTOMY  2019    Removed    COLONOSCOPY N/A 12/07/2023    Procedure: COLONOSCOPY WITH HOT SNARE POLYPECTOMY AND TATTOO;  Surgeon: Noman Gautam MD;  Location: [facility];  Service: Gastroenterology;  Laterality: N/A;    JENNIFER-EN-Y         History reviewed. No pertinent family history.    Social History     Socioeconomic History    Marital status:    Tobacco Use    Smoking status: Every Day     Packs/day: 1.50     Years: 15.00     Additional pack years: 0.00     Total pack years: 22.50     Types: Cigarettes    Smokeless tobacco: Never    Tobacco comments:     35 years      pt reports closer to 2 packs per day since cancer diagnosis   Vaping Use    Vaping Use: Never used   Substance and Sexual Activity    Alcohol use: Never    Drug use: Never    Sexual activity: Defer         Current Facility-Administered Medications:     ceFAZolin  Sodium-Dextrose (ANCEF) IVPB (duplex) 2,000 mg, 2,000 mg, Intravenous, Once, Ida Black MD    lactated ringers infusion, 50 mL/hr, Intravenous, Continuous, Ida Black MD, Last Rate: 50 mL/hr at 01/05/24 0815, 50 mL/hr at 01/05/24 0815    Medications Prior to Admission   Medication Sig Dispense Refill Last Dose    acetaminophen (TYLENOL) 325 MG tablet Take 2 tablets by mouth Every 6 (Six) Hours As Needed for Mild Pain.   Past Week    amoxicillin (AMOXIL) 500 MG tablet TAKE 1 TABLET BY MOUTH THREE TIMES DAILY UNTIL GONE   1/5/2024 at 0615    dicyclomine (BENTYL) 20 MG tablet Take 1 tablet by mouth 3 (Three) Times a Day As Needed for Abdominal Cramping. 60 tablet 0 1/4/2024 at 2000    docusate sodium (COLACE) 100 MG capsule Take 1 capsule by mouth 2 (Two) Times a Day.   1/4/2024 at 2000    Hydrocortisone, Perianal, (ANUSOL-HC) 2.5 % rectal cream Insert  into the rectum 2 (Two) Times a Day. Indications: Inflamed Hemorrhoids 30 g 1 Past Week at 2000    oxyCODONE (ROXICODONE) 5 MG immediate release tablet Take 1-2 tablets by mouth Every 6 (Six) Hours As Needed for Moderate Pain (pain). 120 tablet 0 1/4/2024 at 2000    pantoprazole (Protonix) 40 MG EC tablet Take 1 tablet by mouth Daily. Indications: Gastroesophageal Reflux Disease 90 tablet 1 1/4/2024 at 0800    tiotropium bromide-olodaterol (STIOLTO RESPIMAT) 2.5-2.5 MCG/ACT aerosol solution inhaler Inhale 2 puffs Daily. 1 each 5 1/4/2024 at 0800    Ferrous Gluconate-C-Folic Acid (IRON-C PO) Take  by mouth. (Patient not taking: Reported on 1/4/2024)   Not Taking    lidocaine-prilocaine (EMLA) 2.5-2.5 % cream Apply 1 application  topically to the appropriate area as directed As Needed (45-60 minutes prior to port access.  Cover with saran/plastic wrap.). (Patient not taking: Reported on 1/4/2024) 30 g 3 Not Taking    naloxone (NARCAN) 4 MG/0.1ML nasal spray 1 spray into the nostril(s) as directed by provider As Needed for Opioid Reversal. 1 each 0 Unknown     ondansetron (ZOFRAN) 8 MG tablet Take 1 tablet by mouth 3 (Three) Times a Day As Needed for Nausea or Vomiting. (Patient not taking: Reported on 1/4/2024) 30 tablet 5 Not Taking       Allergies   Allergen Reactions    Bactrim [Sulfamethoxazole-Trimethoprim] Hives     and blisters    Sulfa Antibiotics Hives     and blisters       Review of Systems   Constitutional: Negative.    HENT: Negative.     Eyes: Negative.    Respiratory: Negative.     Cardiovascular: Negative.    Gastrointestinal: Negative.    Endocrine: Negative.    Genitourinary: Negative.    Musculoskeletal: Negative.    Skin: Negative.    Allergic/Immunologic: Negative.    Neurological: Negative.    Hematological: Negative.    Psychiatric/Behavioral: Negative.         OBJECTIVE:     Vital Signs  Temp:  [97.4 °F (36.3 °C)-98 °F (36.7 °C)] 97.4 °F (36.3 °C)  Heart Rate:  [67-82] 67  Resp:  [16-18] 18  BP: (138-154)/(74-79) 138/79    No intake/output data recorded.  No intake/output data recorded.      Physical Exam:      General Appearance:    Alert, cooperative, in no acute distress   Head:    Normocephalic, without obvious abnormality, atraumatic   Eyes:            Lids and lashes normal, conjunctivae and sclerae normal, no icterus   Ears:    Ears appear intact with no abnormalities noted   Lungs:     Respirations regular, even and unlabored    Heart:    Regular rhythm and normal rate   Abdomen:     Soft, non-tender, non-distended, no guarding, no rebound   tenderness   Genitalia:    Deferred   Extremities:   Moves all extremities well, no edema, no cyanosis, no  redness   Pulses:   Pulses palpable and equal bilaterally   Skin:   No bleeding, bruising or rash   Neurologic:   AAOx3, no gross deficits         Results Review:   I have reviewed the entirety of the patient's clinical lab results.  I have also personally reviewed the patient's imaging      Lab Results (last 72 hours)       ** No results found for the last 72 hours. **                             ASSESSMENT/PLAN:      Rectal cancer metastasized to liver    Rectal adenocarcinoma    Venofibrosis    I had a detailed discussion with Mr. Powell at the bedside today regarding the placement of a Port-A-Cath with both ultrasound, and fluoroscopic guidance.  We discussed the Port-A-Cath procedure in detail along with the risks, benefits, and alternatives.  We specifically discussed the risk of bleeding, infection, Port-A-Cath malfunction, catheter fracture, VTE/DVT, hemothorax, pneumothorax, and the risks related to anesthesia.  He understood these, and was willing to proceed.  A Port-A-Cath will be placed today with ultrasound and fluoroscopic guidance, with a plan that it may be used for chemotherapy next week.  At the conclusion of our discussion, the patient felt that his questions have been answered to his satisfaction, and he provided informed consent.    Ida Black MD  01/05/24  10:00 EST

## 2024-01-05 NOTE — OP NOTE
PROCEDURE DATE: 1/5/2023     SURGEON: Ida Black MD, FACS     PREOPERATIVE DIAGNOSIS:  Rectal cancer metastasized to liver [C20, C78.7]; Rectal adenocarcinoma [C20]; Venofibrosis [I87.8]     POSTOPERATIVE DIAGNOSIS: Same     PROCEDURE: INSERTION OF RIGHT INTERNAL JUGULAR VEIN PORTACATH WITH ULTRASOUND AND FLUOROSCOPIC GUIDANCE     ANESTHESIA: MAC     EBL: 5mL     IMPLANTS:      Implant Name Type Inv. Item Serial No.  Lot No. LRB No. Used Action   PRT INTRO VASC/INTERV VORTEX FILL/HL DETACH/POLYURET/CATH 8F - [id number] Implant PRT INTRO VASC/INTERV VORTEX FILL/HL DETACH/POLYURET/CATH 8F  [employer] [id number] N/A 1 Implanted        INDICATIONS FOR THE PROCEDURE:   Mr. Powell is a 53-year-old gentleman with presumptive stage IV adenocarcinoma of the rectum, with scheduled chemotherapy initiation early next week.  For this reason he requires long-term venous access via Port-A-Cath.  Urgent placement was requested by the oncology service.  We discussed the Port-A-Cath procedure in detail along with the risk, benefits, and alternatives.  He understood these, and agreed to proceed as discussed.  He is now being brought to the operating room for the same.     DESCRIPTION OF THE PROCEDURE: The patient was seen and examined in the preoperative holding area on the day of the surgical procedure.  His history and physical examination was updated as appropriate.  He received preoperative antibiotics, and subcutaneous heparin for DVT prophylaxis.  He was then taken to the operating room and placed supine on the operating table, where a timeout was performed using the WHO checklist.  Following the satisfactory induction of anesthesia, the patient's neck and chest were prepped and draped in the usual sterile fashion.  The patient was placed into a slight Trendelenburg position, and the right and left internal jugular vein were assessed with ultrasound for adequacy for Port-A-Cath placement.  I elected to  proceed with a right internal jugular approach.  The skin overlying the right internal jugular vein was anesthetized with lidocaine, and the jugular vein was then punctured under live ultrasound visualization without difficulty.  There was return of dark red, nonpulsatile venous blood.  A guidewire was threaded into the vein, and its position was checked with ultrasound, and further confirmed with fluoroscopy.  Once this was done, attention was turned to the right chest wall where a site was selected for creation of a subcutaneous chest wall pocket.  The skin was again anesthetized with lidocaine, and a transverse incision was made with a 15 blade knife.  A combination of Bovie electrocautery, and blunt dissection was used to create a subcutaneous chest wall pocket to accommodate the port.  The tunneling device was then passed from the chest wall pocket up to the jugular vein puncture site and the catheter was threaded through the tunnel.  Under live fluoroscopy, attention was returned to the left internal jugular vein, and the dilator and peel-away sheath were passed over the guidewire into the vein.  The guidewire and inner dilator were then removed leaving the peel-away sheath in place.  The catheter was threaded through the peel-away sheath which was then removed.  The catheter was then manipulated under fluoroscopy and positioned such that the tip was left in the expected location of the distal SVC/atriocaval junction.  The catheter tubing was accessed and flushed with heparinized saline.  The catheter was noted to flushed easily, and excellent blood return was noted.  On the chest wall side, the catheter was cut to the appropriate length and connected to the port in the usual manner.  The port was placed into the subcutaneous chest wall pocket, and secured with interrupted 3-0 Prolene sutures.  The port was then accessed with a 20-gauge straight Brady needle and again flushed with heparinized saline.  Once  again the port flushed easily and had excellent blood return.  A final flush of full-strength heparin was instilled into the port which was then deaccessed.  The subcutaneous tissue was closed over the port with an interrupted 3-0 Vicryl suture.  The skin was closed with a running, subcuticular 5-0 PDS suture.  Puncture site in the left neck was closed with a subcuticular 4-0 Vicryl as well.  Mastisol and Steri-Strips were applied to the wounds followed by dry sterile dressings.  The patient was awakened from anesthesia, taken the recovery room in good condition.  There were no apparent complications and the procedure was well-tolerated by the patient.  At the conclusion of the procedure, all sponge, needle, and instrument counts were correct.  A postprocedural chest x-ray demonstrated the Port-A-Cath to be in appropriate position without evidence of pneumothorax.  The Port-A-Cath was deemed appropriate for immediate use.     RECOMMENDATIONS: Port-A-Cath may be used for blood draws and infusions beginning immediately.           Ida Black MD

## 2024-01-05 NOTE — NURSING NOTE
1205-Chest xray dictated by Dr. Serjio Wright MD, not signed at this time, but transcribed by Krysta ORDONEZ. S [facility] Manager states ok to D/C patient home based on PA transcription.

## 2024-01-05 NOTE — TELEPHONE ENCOUNTER
"Goal Outcome Evaluation:     Bed mobility - Min-A supine to sit.  Pt sat at edge of the bed for some lower extremity exercises  Transfers - Min-A and with rolling walker  noted some posterior lean with initial standing.   Ambulation - 40 feet Min-A and with rolling walker    Therapeutic Exercise - 10 Reps B LE AROM supported sitting / EOB    If medically appropriate, Moderate Intensity Therapy recommended post-acute care. This is recommended as therapy feels the patient would require 3-4 days per week and wouldn't tolerate \"3 hour daily\" rehab intensity. SNF would be the preferred choice.  Pt requires no DME at discharge.     Pt desires Skilled Rehab placement at discharge. Pt cooperative; agreeable to therapeutic recommendations and plan of care.                    " Patients wife dropped off FMLA forms for herself.  Forms completed and awaiting signature from Dr. Cronin on Monday 1/9/24.

## 2024-01-05 NOTE — ANESTHESIA PREPROCEDURE EVALUATION
Anesthesia Evaluation     Patient summary reviewed and Nursing notes reviewed   no history of anesthetic complications:   NPO Solid Status: > 8 hours  NPO Liquid Status: > 8 hours           Airway   Mallampati: II  TM distance: >3 FB  Neck ROM: full  Possible difficult intubation  Dental    (+) lower dentures and upper dentures    Pulmonary    (+) a smoker Current, cigarettes, COPD,sleep apnea  Cardiovascular - normal exam  Exercise tolerance: good (4-7 METS)    (+) CAD, hyperlipidemia      Neuro/Psych- negative ROS  GI/Hepatic/Renal/Endo    (+) obesity, GERD, PUD, liver disease (liver metastasis from rectal cancer)    Musculoskeletal     Abdominal    Substance History - negative use     OB/GYN negative ob/gyn ROS         Other   arthritis,   history of cancer (rectal cancer metastisized to liver)    ROS/Med Hx Other: Pt has glued dentures this AM and reports they cannot be removed for several hours. Risks of wearing dentures during surgery explained to patient.               Anesthesia Plan    ASA 3     MAC     (Risks and benefits discussed including risk of aspiration, recall and dental damage. All patient questions answered.    Will continue with plan of care.)  intravenous induction     Anesthetic plan, risks, benefits, and alternatives have been provided, discussed and informed consent has been obtained with: patient.  Pre-procedure education provided    CODE STATUS:

## 2024-01-05 NOTE — ANESTHESIA POSTPROCEDURE EVALUATION
Patient: Edward Powell    Procedure Summary       Date: 01/05/24 Room / Location: [facility] OR  /  [facility]    Anesthesia Start: 0951 Anesthesia Stop: 1042    Procedure: INSERTION OF PORTACATH WITH ULTRSOUND AND FLUOROSCOPIC GUIDANCE Diagnosis:       Rectal cancer metastasized to liver      Rectal adenocarcinoma      Venofibrosis      (Rectal cancer metastasized to liver [C20, C78.7])      (Rectal adenocarcinoma [C20])      (Venofibrosis [I87.8])    Surgeons: Ida Black MD Provider: Hossein Medrano CRNA    Anesthesia Type: MAC ASA Status: 3            Anesthesia Type: MAC    Vitals  HR 73  Sat 96  /57  Resp 12  Temp 98        Post Anesthesia Care and Evaluation    Patient location during evaluation: bedside  Patient participation: complete - patient participated  Level of consciousness: awake and alert  Pain score: 0  Pain management: adequate    Airway patency: patent  Anesthetic complications: No anesthetic complications  PONV Status: none  Cardiovascular status: acceptable  Respiratory status: acceptable  Hydration status: acceptable

## 2024-01-08 ENCOUNTER — TELEPHONE (OUTPATIENT)
Dept: ONCOLOGY | Facility: CLINIC | Age: 54
End: 2024-01-08
Payer: COMMERCIAL

## 2024-01-08 NOTE — TELEPHONE ENCOUNTER
Caller: Edward Powell    Relationship: Self    Best call back number: [phone]    What is the best time to reach you: ANY    Who are you requesting to speak with (clinical staff, provider,  specific staff member): DR ODOM'S NURSE        What was the call regarding: PATIENT CALLED TO DISCUSS INFUSION APPT TOMORROW AND HAD QUESTIONS REGARDING INFUSION.    Is it okay if the provider responds through MyChart: NO

## 2024-01-09 ENCOUNTER — HOSPITAL ENCOUNTER (OUTPATIENT)
Dept: ONCOLOGY | Facility: HOSPITAL | Age: 54
Discharge: HOME | End: 2024-01-09
Admitting: INTERNAL MEDICINE
Payer: COMMERCIAL

## 2024-01-09 ENCOUNTER — OFFICE VISIT (OUTPATIENT)
Dept: ONCOLOGY | Facility: CLINIC | Age: 54
End: 2024-01-09
Payer: COMMERCIAL

## 2024-01-09 ENCOUNTER — DOCUMENTATION (OUTPATIENT)
Dept: ONCOLOGY | Facility: CLINIC | Age: 54
End: 2024-01-09
Payer: COMMERCIAL

## 2024-01-09 ENCOUNTER — DOCUMENTATION (OUTPATIENT)
Dept: NUTRITION | Facility: HOSPITAL | Age: 54
End: 2024-01-09
Payer: COMMERCIAL

## 2024-01-09 VITALS
TEMPERATURE: 97.3 F | OXYGEN SATURATION: 98 % | RESPIRATION RATE: 18 BRPM | DIASTOLIC BLOOD PRESSURE: 86 MMHG | WEIGHT: 239 LBS | HEART RATE: 68 BPM | BODY MASS INDEX: 32.37 KG/M2 | HEIGHT: 72 IN | SYSTOLIC BLOOD PRESSURE: 129 MMHG

## 2024-01-09 DIAGNOSIS — C78.7 RECTAL CANCER METASTASIZED TO LIVER: Primary | ICD-10-CM

## 2024-01-09 DIAGNOSIS — C20 RECTAL CANCER METASTASIZED TO LIVER: Primary | ICD-10-CM

## 2024-01-09 LAB
ALBUMIN SERPL-MCNC: 3.7 G/DL (ref 3.5–5.2)
ALBUMIN/GLOB SERPL: 1 G/DL
ALP SERPL-CCNC: 98 U/L (ref 39–117)
ALT SERPL W P-5'-P-CCNC: 5 U/L (ref 1–41)
ANION GAP SERPL CALCULATED.3IONS-SCNC: 12 MMOL/L (ref 5–15)
AST SERPL-CCNC: 15 U/L (ref 1–40)
BASOPHILS # BLD AUTO: 0.02 10*3/MM3 (ref 0–0.2)
BASOPHILS NFR BLD AUTO: 0.2 % (ref 0–1.5)
BILIRUB SERPL-MCNC: 0.2 MG/DL (ref 0–1.2)
BUN SERPL-MCNC: 12 MG/DL (ref 6–20)
BUN/CREAT SERPL: 11.8 (ref 7–25)
CALCIUM SPEC-SCNC: 8.6 MG/DL (ref 8.6–10.5)
CEA SERPL-MCNC: 34 NG/ML
CHLORIDE SERPL-SCNC: 103 MMOL/L (ref 98–107)
CO2 SERPL-SCNC: 24 MMOL/L (ref 22–29)
CREAT SERPL-MCNC: 1.02 MG/DL (ref 0.76–1.27)
DEPRECATED RDW RBC AUTO: 48.4 FL (ref 37–54)
EGFRCR SERPLBLD CKD-EPI 2021: 87.3 ML/MIN/1.73
EOSINOPHIL # BLD AUTO: 0.18 10*3/MM3 (ref 0–0.4)
EOSINOPHIL NFR BLD AUTO: 1.9 % (ref 0.3–6.2)
ERYTHROCYTE [DISTWIDTH] IN BLOOD BY AUTOMATED COUNT: 17.3 % (ref 12.3–15.4)
FERRITIN SERPL-MCNC: 8.69 NG/ML (ref 30–400)
GLOBULIN UR ELPH-MCNC: 3.6 GM/DL
GLUCOSE SERPL-MCNC: 132 MG/DL (ref 65–99)
HCT VFR BLD AUTO: 37.3 % (ref 37.5–51)
HGB BLD-MCNC: 11.5 G/DL (ref 13–17.7)
IMM GRANULOCYTES # BLD AUTO: 0 10*3/MM3 (ref 0–0.05)
IMM GRANULOCYTES NFR BLD AUTO: 0 % (ref 0–0.5)
IRON 24H UR-MRATE: 29 MCG/DL (ref 59–158)
IRON SATN MFR SERPL: 8 % (ref 20–50)
LYMPHOCYTES # BLD AUTO: 1.88 10*3/MM3 (ref 0.7–3.1)
LYMPHOCYTES NFR BLD AUTO: 19.5 % (ref 19.6–45.3)
MCH RBC QN AUTO: 23.6 PG (ref 26.6–33)
MCHC RBC AUTO-ENTMCNC: 30.8 G/DL (ref 31.5–35.7)
MCV RBC AUTO: 76.4 FL (ref 79–97)
MONOCYTES # BLD AUTO: 0.74 10*3/MM3 (ref 0.1–0.9)
MONOCYTES NFR BLD AUTO: 7.7 % (ref 5–12)
NEUTROPHILS NFR BLD AUTO: 6.8 10*3/MM3 (ref 1.7–7)
NEUTROPHILS NFR BLD AUTO: 70.7 % (ref 42.7–76)
PLATELET # BLD AUTO: 234 10*3/MM3 (ref 140–450)
PMV BLD AUTO: 10 FL (ref 6–12)
POTASSIUM SERPL-SCNC: 3.6 MMOL/L (ref 3.5–5.2)
PROT SERPL-MCNC: 7.3 G/DL (ref 6–8.5)
RBC # BLD AUTO: 4.88 10*6/MM3 (ref 4.14–5.8)
SODIUM SERPL-SCNC: 139 MMOL/L (ref 136–145)
TIBC SERPL-MCNC: 361 MCG/DL (ref 298–536)
TRANSFERRIN SERPL-MCNC: 242 MG/DL (ref 200–360)
WBC NRBC COR # BLD AUTO: 9.62 10*3/MM3 (ref 3.4–10.8)

## 2024-01-09 PROCEDURE — 83540 ASSAY OF IRON: CPT | Performed by: NURSE PRACTITIONER

## 2024-01-09 PROCEDURE — 25810000003 SODIUM CHLORIDE 0.9 % SOLUTION 250 ML FLEX CONT: Performed by: INTERNAL MEDICINE

## 2024-01-09 PROCEDURE — 96416 CHEMO PROLONG INFUSE W/PUMP: CPT

## 2024-01-09 PROCEDURE — 96413 CHEMO IV INFUSION 1 HR: CPT

## 2024-01-09 PROCEDURE — 82728 ASSAY OF FERRITIN: CPT | Performed by: NURSE PRACTITIONER

## 2024-01-09 PROCEDURE — 80053 COMPREHEN METABOLIC PANEL: CPT | Performed by: INTERNAL MEDICINE

## 2024-01-09 PROCEDURE — 96375 TX/PRO/DX INJ NEW DRUG ADDON: CPT

## 2024-01-09 PROCEDURE — 0 DEXTROSE 5 % SOLUTION: Performed by: INTERNAL MEDICINE

## 2024-01-09 PROCEDURE — 25010000002 FLUOROURACIL PER 500 MG: Performed by: INTERNAL MEDICINE

## 2024-01-09 PROCEDURE — 82378 CARCINOEMBRYONIC ANTIGEN: CPT | Performed by: INTERNAL MEDICINE

## 2024-01-09 PROCEDURE — 25010000002 DEXAMETHASONE SODIUM PHOSPHATE 100 MG/10ML SOLUTION: Performed by: INTERNAL MEDICINE

## 2024-01-09 PROCEDURE — 25010000002 LEUCOVORIN 200 MG RECONSTITUTED SOLUTION 200 MG VIAL: Performed by: INTERNAL MEDICINE

## 2024-01-09 PROCEDURE — 96376 TX/PRO/DX INJ SAME DRUG ADON: CPT

## 2024-01-09 PROCEDURE — 84466 ASSAY OF TRANSFERRIN: CPT | Performed by: NURSE PRACTITIONER

## 2024-01-09 PROCEDURE — 96368 THER/DIAG CONCURRENT INF: CPT

## 2024-01-09 PROCEDURE — 85025 COMPLETE CBC W/AUTO DIFF WBC: CPT | Performed by: INTERNAL MEDICINE

## 2024-01-09 PROCEDURE — 25010000002 LEUCOVORIN 500 MG RECONSTITUTED SOLUTION 1 EACH VIAL: Performed by: INTERNAL MEDICINE

## 2024-01-09 PROCEDURE — 0 DEXTROSE 5 % SOLUTION 250 ML FLEX CONT: Performed by: INTERNAL MEDICINE

## 2024-01-09 PROCEDURE — 25010000002 PALONOSETRON PER 25 MCG: Performed by: INTERNAL MEDICINE

## 2024-01-09 PROCEDURE — 96415 CHEMO IV INFUSION ADDL HR: CPT

## 2024-01-09 PROCEDURE — 25010000002 OXALIPLATIN PER 0.5 MG: Performed by: INTERNAL MEDICINE

## 2024-01-09 RX ORDER — PALONOSETRON 0.05 MG/ML
0.25 INJECTION, SOLUTION INTRAVENOUS ONCE
Status: COMPLETED | OUTPATIENT
Start: 2024-01-09 | End: 2024-01-09

## 2024-01-09 RX ORDER — FAMOTIDINE 10 MG/ML
20 INJECTION, SOLUTION INTRAVENOUS AS NEEDED
Status: CANCELLED | OUTPATIENT
Start: 2024-01-09

## 2024-01-09 RX ORDER — PALONOSETRON 0.05 MG/ML
0.25 INJECTION, SOLUTION INTRAVENOUS ONCE
Status: CANCELLED | OUTPATIENT
Start: 2024-01-09

## 2024-01-09 RX ORDER — OXYCODONE HYDROCHLORIDE AND ACETAMINOPHEN 5; 325 MG/1; MG/1
2 TABLET ORAL ONCE AS NEEDED
Status: COMPLETED | OUTPATIENT
Start: 2024-01-09 | End: 2024-01-09

## 2024-01-09 RX ORDER — DEXTROSE MONOHYDRATE 50 MG/ML
20 INJECTION, SOLUTION INTRAVENOUS ONCE
Status: CANCELLED | OUTPATIENT
Start: 2024-01-09

## 2024-01-09 RX ORDER — DIPHENHYDRAMINE HYDROCHLORIDE 50 MG/ML
50 INJECTION INTRAMUSCULAR; INTRAVENOUS AS NEEDED
Status: CANCELLED | OUTPATIENT
Start: 2024-01-09

## 2024-01-09 RX ORDER — DEXTROSE MONOHYDRATE 50 MG/ML
20 INJECTION, SOLUTION INTRAVENOUS ONCE
Status: COMPLETED | OUTPATIENT
Start: 2024-01-09 | End: 2024-01-09

## 2024-01-09 RX ORDER — FLUOROURACIL 50 MG/ML
400 INJECTION, SOLUTION INTRAVENOUS ONCE
Status: CANCELLED | OUTPATIENT
Start: 2024-01-09

## 2024-01-09 RX ADMIN — FLUOROURACIL 5500 MG: 50 INJECTION, SOLUTION INTRAVENOUS at 13:45

## 2024-01-09 RX ADMIN — OXYCODONE HYDROCHLORIDE AND ACETAMINOPHEN 2 TABLET: 5; 325 TABLET ORAL at 12:11

## 2024-01-09 RX ADMIN — LEUCOVORIN CALCIUM 900 MG: 500 INJECTION, POWDER, LYOPHILIZED, FOR SOLUTION INTRAMUSCULAR; INTRAVENOUS at 11:16

## 2024-01-09 RX ADMIN — OXALIPLATIN 200 MG: 5 INJECTION, SOLUTION, CONCENTRATE INTRAVENOUS at 11:16

## 2024-01-09 RX ADMIN — DEXAMETHASONE SODIUM PHOSPHATE 12 MG: 10 INJECTION, SOLUTION INTRAMUSCULAR; INTRAVENOUS at 10:30

## 2024-01-09 RX ADMIN — DEXTROSE MONOHYDRATE 20 ML/HR: 50 INJECTION, SOLUTION INTRAVENOUS at 10:23

## 2024-01-09 RX ADMIN — PALONOSETRON HYDROCHLORIDE 0.25 MG: 0.25 INJECTION, SOLUTION INTRAVENOUS at 10:27

## 2024-01-09 NOTE — PROGRESS NOTES
"Outpatient Oncology Nutrition     Reason for Visit:  Oncology Nutrition Screening and Patient Education / Met with patient and his wife during his initial chemotherapy infusion appointment.    Patient Name:  Edward Powell    :  1970    MRN:  [id number]    Date of Encounter: 2024    Nutrition Assessment     Diagnosis: Presumptive stage IV Rectal cancer (Multiple hypermetabolic small liver lesions & Hypermetabolic cavitary lung lesion - biopsies pending)    Chemotherapy: [id number] OXALIplatin / Leucovorin / Fluorouracil - every 14 days     Patient Active Problem List:    Patient Active Problem List   Diagnosis    Abnormal CT of the abdomen    Normocytic anemia    Change in bowel habits    Chronic diarrhea    Rectal cancer metastasized to liver    Rectal adenocarcinoma    Venofibrosis       Food / Nutrition Related History   Discussed his history of gastric bypass () and he denies nutritional complications.    Patient reports having small caliber stools for the past few months.  He also reports being constipated after his port placement but that his bowels are now moving.  He states he is taking docusate/senna and will take Miralax as needed.    Hydration Status   Discussed the importance of hydration, reviewed hydrating fluids, and recommended he increase his intake.     Goal: ~96 ounces     How many 8 ounces glasses of water do you consume per day? Patient reports drinking mostly coffee, sweet tea, and occasional Pepsi.    Enteral Feeding       Anthropometric Measurements     Height:    Ht Readings from Last 1 Encounters:   24 182.9 cm (72.01\")       Weight:    Wt Readings from Last 1 Encounters:   24 108 kg (239 lb)       BMI: 32.4 - Obese  Usual Body Weight: ~245#   Weight Change:  ~9# (3.6%) weight loss x 5 days per chart review    Review of Lab Data (Time Frame - 1 month / 2 month)   Labs reviewed - 24     Medication Review   MAR reviewed     Nutrition Focused Physical " "Findings       Nutrition Impact Symptoms   Small caliber stools     Physical Activity   Not my normal self, but able to be up and about with fairly normal activities    Current Nutritional Intake     Oral diet:  regular     Intake: oral intake has been normal     Malnutrition Risk Assessment     Recent weight loss over the past 6 months:  Yes    How much weight loss:  1 = 2-13 lbs    Eating poorly because of a decreased appetite:  0 = No    Malnutrition Screening Score:     MST = 0 or 1 Patient not at risk for malnutrition    Nutrition Diagnosis     Problem    Etiology    Signs / Symptoms      Nutrition Intervention   Discussed the importance of good nutrition during his treatment course focusing on adequate calorie, protein, nutrient and fluid intake.  Advised him to be consuming smaller more frequent meals/snacks throughout the day to aid with potential nausea and / or diarrhea symptom management.  Emphasized the importance of protein and its role in the diet; reviewed high protein foods; and recommended he have a protein source at each meal/snack.  Offered several high protein snack ideas he may find more appealing at this time.  Discussed different types of ONS and their roles in the diet.  Provided samples of Ensure Complete and Ensure Max as well as coupons.  Offered tips to aid with tolerance and flavor / nutrient enhancement of ONS.  Advised him to continue with his bowel regimen to aid with ease of bowel movements.  Provided and reviewed written diet material \"Bowel Obstruction and Ileus Prevention\" and discussed diet guidelines for a low fiber / residue diet.  Also discussed tips to avoid cold sensitivities associated with Oxaliplatin and provided written diet material.      Goal   To achieve adequate nutritional and hydration intake to aid with weight maintenance.  To aid with nutrition impact symptom management as needed.     Monitoring / Evaluation   Answered their questions and both voiced " understanding of information discussed.  RD's contact information provided and encouraged to call with questions.  Will follow up as indicated.     Kalee Lainez MS, RD, LD

## 2024-01-09 NOTE — PATIENT INSTRUCTIONS
Constipation prevention:   -Continue docusate/senna 2 twice daily.   -OK to add miralax 1-2 x daily

## 2024-01-09 NOTE — SIGNIFICANT NOTE
RENAY met with pt and his wife in infusion to provide support and assist with psychosocial needs per last conversation and request. SW spent over an hour with pt and his wife providing care. 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 Pt got tearful and expressed that paula is what patients life has been and he is coping with the diagnosis and change of events. SW provided support and normalized pt's emotions.   SW spoke with pt and his spouse about financial assistance available. SW has applied to the Colon Cancer prevention project, but explained because it is a national linnette he may be denied a few times before he receives linnette. SW asked that he inform SW once receives notice to be able to apply again if needed. SW then educated on Colon Cancer Prevention project fighters fund, and will apply on behalf of pt for first quarter (application due April 1). PT expressed appreciation.   SW provided education on grants/ assistance to help with daily living expenses, and asked that pt and his wife sit down and discuss needs. Pt and his wife were agreeable. SW offered to make local food pantry referral to assist with food insecurity, and provided [employer] card. Pt and his wife confirmed they would like local referral. SW will make referral on this date and contact with information. Lastly, SW provided $20 in [facility] gas cards. SW reiterated this is to assist with transportation costs to [facility], and gas cards cannot be mailed. PT and his wife confirmed understanding. SW provided contact information and encouraged them to reach out for ongoing support and assistance. PT and his wife thanked RENAY for the visit and assistance and were agreeable. SW will be available ongoing.        01/09/24 1200   Oncology Interventions   Practical Needs  Food  (local food pantry referral, [employer] card)   Financial Needs non-medical grants  ([facility] [place], Colon Cancer Prevention Project Fighters fund)   Transportation Needs gas cards  (provided $20 in [facility] gas cards)   Emotional Needs emotional suppport/coping strategies

## 2024-01-09 NOTE — PROGRESS NOTES
[facility] [place]  Office number [phone]    Fax number [phone]     New Patient Office Visit      Date: 2023     Patient Name: Edward Powell  MRN: [id number]  : 1970    Referring Physician: Dr. Gautam    Chief Complaint: Rectal cancer, lung mass, liver lesions    Cancer Staging: Presumptive stage IV    History of Present Illness: Edward Powell is a pleasant 53 y.o. male who presents today for evaluation of rectal cancer in the company of his supportive significant other.    Patient has a longstanding history of intermittent diarrhea since his cholecystectomy in 2019.  However he developed progressive diarrhea with associated loss of bowel control and urgency as well as weight loss and fatigue prompting additional workup.    CT of the abdomen pelvis 2023 showed an irregular circumferential wall thickening involving the rectum concerning for mass lesion.  There was associated mesorectal lymphadenopathy.  Masslike consolidation of left lower lobe.  CT of the chest showed a cavitary lesion in the left lower lobe up to 4.8 cm with an adjacent cavitary nodule up to 2 cm and a 5 mm nodule on the right minor fissure.      He underwent colonoscopy 2023 with findings of an infiltrative and ulcerated partially obstructing mass in the proximal rectum spanning 10 cm.  Rectal polyps.  Biopsy of the rectal mass showed invasive moderately differentiated adenocarcinoma.  Additional biopsies showed tubular adenomas.  MSI testing was intact/low probability of MSI high.    PET/CT 2023 showed hypermetabolic rectal wall thickening compatible with known rectal malignancy.  Multiple small ill-defined hypoechoic hyper metabolic liver lesions compatible with metastatic disease.  Mildly enlarged and mildly hypermetabolic pelvic sidewall and internal iliac lymph node chain adenopathy.  Hypermetabolic lung mass with adjacent nodule.     The patient has been experiencing  substantial rectal pain.  He is taking oxycodone every 6 hours with partial relief.  He reports ongoing bowel movements.  No abdominal pain.  No vomiting.  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 Rectal pain yesterday after prolonged sitting riding his truck to [facility]. Increased constipation following anesthesia for port. Prior to port 4-5 small caliber stools daily with BID stool softener and laxative. Added dulcolax and this improved. After port placement got constipated despite stool softener. Took bisacodyl Sunday and now passing BM, large BM yesterday.   Hemorrhoid improving with lidocaine cream.   He has questions regarding his upcoming biopsy procedure, staging, prognosis, treatment and side effect management.  Past Medical History:   Past Medical History:   Diagnosis Date    Arthritis     Cancer     rectal cancer - diagnosed 2023    Cholelithiasis 2019    Removed    COPD (chronic obstructive pulmonary disease)     Coronary artery disease 2009    Stent - no cardiologist currently    Elevated cholesterol     GERD (gastroesophageal reflux disease)     Hernia 2003    Lower hernia    Perforated ulcer 2019    Sleep apnea     history of; when weighed over 400lbs - no issues following bariatric surgery       Past Surgical History:   Past Surgical History:   Procedure Laterality Date    APPENDECTOMY  1983    Removed    BARIATRIC SURGERY  2011    Gastric bypass    BLADDER TUMOR/ULCER BLEEDER CAUTERIZATION      CARDIAC CATHETERIZATION  2009    stent placed    CHOLECYSTECTOMY  2019    Removed    COLONOSCOPY N/A 12/07/2023    Procedure: COLONOSCOPY WITH HOT SNARE POLYPECTOMY AND TATTOO;  Surgeon: Noman Gautam MD;  Location: [facility] ENDOSCOPY;  Service: Gastroenterology;   Laterality: N/A;    PORTACATH PLACEMENT N/A 1/5/2024    Procedure: INSERTION OF PORTACATH WITH ULTRSOUND AND FLUOROSCOPIC GUIDANCE;  Surgeon: Ida Black MD;  Location: [facility];  Service: General;  Laterality: N/A;    JENNIFER-EN-Y         Family History: No family history on file.    Social History:   Social History     Socioeconomic History    Marital status:    Tobacco Use    Smoking status: Every Day     Packs/day: 1.50     Years: 15.00     Additional pack years: 0.00     Total pack years: 22.50     Types: Cigarettes    Smokeless tobacco: Never    Tobacco comments:     35 years      pt reports closer to 2 packs per day since cancer diagnosis   Vaping Use    Vaping Use: Never used   Substance and Sexual Activity    Alcohol use: Never    Drug use: Never    Sexual activity: Defer       Medications:     Current Outpatient Medications:     acetaminophen (TYLENOL) 325 MG tablet, Take 2 tablets by mouth Every 6 (Six) Hours As Needed for Mild Pain., Disp: , Rfl:     amoxicillin (AMOXIL) 500 MG tablet, TAKE 1 TABLET BY MOUTH THREE TIMES DAILY UNTIL GONE, Disp: , Rfl:     dicyclomine (BENTYL) 20 MG tablet, Take 1 tablet by mouth 3 (Three) Times a Day As Needed for Abdominal Cramping., Disp: 60 tablet, Rfl: 0    docusate sodium (COLACE) 100 MG capsule, Take 1 capsule by mouth 2 (Two) Times a Day., Disp: , Rfl:     Ferrous Gluconate-C-Folic Acid (IRON-C PO), Take  by mouth. (Patient not taking: Reported on 1/4/2024), Disp: , Rfl:     Hydrocortisone, Perianal, (ANUSOL-HC) 2.5 % rectal cream, Insert  into the rectum 2 (Two) Times a Day. Indications: Inflamed Hemorrhoids, Disp: 30 g, Rfl: 1    lidocaine-prilocaine (EMLA) 2.5-2.5 % cream, Apply 1 application  topically to the appropriate area as directed As Needed (45-60 minutes prior to port access.  Cover with saran/plastic wrap.)., Disp: 30 g, Rfl: 3    naloxone (NARCAN) 4 MG/0.1ML nasal spray, 1 spray into the nostril(s) as directed by provider As  "Needed for Opioid Reversal., Disp: 1 each, Rfl: 0    ondansetron (ZOFRAN) 8 MG tablet, Take 1 tablet by mouth 3 (Three) Times a Day As Needed for Nausea or Vomiting., Disp: 30 tablet, Rfl: 5    oxyCODONE (ROXICODONE) 5 MG immediate release tablet, Take 1-2 tablets by mouth Every 6 (Six) Hours As Needed for Moderate Pain (pain)., Disp: 120 tablet, Rfl: 0    pantoprazole (Protonix) 40 MG EC tablet, Take 1 tablet by mouth Daily. Indications: Gastroesophageal Reflux Disease, Disp: 90 tablet, Rfl: 1    tiotropium bromide-olodaterol (STIOLTO RESPIMAT) 2.5-2.5 MCG/ACT aerosol solution inhaler, Inhale 2 puffs Daily., Disp: 1 each, Rfl: 5    Allergies:   Allergies   Allergen Reactions    Bactrim [Sulfamethoxazole-Trimethoprim] Hives     and blisters    Sulfa Antibiotics Hives     and blisters       Objective     Vital Signs:   Vitals:    01/09/24 0845   BP: 129/86   Pulse: 68   Resp: 18   Temp: 97.3 °F (36.3 °C)   TempSrc: Temporal   SpO2: 98%   Weight: 108 kg (239 lb)   Height: 182.9 cm (72.01\")   PainSc: 0-No pain    Body mass index is 32.41 kg/m².   Pain Score    01/09/24 0845   PainSc: 0-No pain       ECOG Performance Status: 1 - Symptomatic but completely ambulatory    Physical Exam:   General: No acute distress. Well appearing   HEENT: Normocephalic, atraumatic. Sclera anicteric.   Neck: supple, no adenopathy.   Cardiovascular: regular rate and rhythm. No murmurs.   Respiratory: Normal rate. Clear to auscultation bilaterally  Abdomen: Soft, nontender, non distended with normoactive bowel sounds  Lymph: no cervical, supraclavicular or axillary adenopathy  Neuro: Alert and oriented x 3. No focal deficits.   Ext: Symmetric, no swelling.   Psych: Euthymic      Laboratory/Imaging Reviewed:   Hospital Outpatient Visit on 01/09/2024   Component Date Value Ref Range Status    CEA 01/09/2024 34.00  ng/mL Final    Glucose 01/09/2024 132 (H)  65 - 99 mg/dL Final    BUN 01/09/2024 12  6 - 20 mg/dL Final    Creatinine 01/09/2024 " 1.02  0.76 - 1.27 mg/dL Final    Sodium 01/09/2024 139  136 - 145 mmol/L Final    Potassium 01/09/2024 3.6  3.5 - 5.2 mmol/L Final    Chloride 01/09/2024 103  98 - 107 mmol/L Final    CO2 01/09/2024 24.0  22.0 - 29.0 mmol/L Final    Calcium 01/09/2024 8.6  8.6 - 10.5 mg/dL Final    Total Protein 01/09/2024 7.3  6.0 - 8.5 g/dL Final    Albumin 01/09/2024 3.7  3.5 - 5.2 g/dL Final    ALT (SGPT) 01/09/2024 5  1 - 41 U/L Final    AST (SGOT) 01/09/2024 15  1 - 40 U/L Final    Alkaline Phosphatase 01/09/2024 98  39 - 117 U/L Final    Total Bilirubin 01/09/2024 0.2  0.0 - 1.2 mg/dL Final    Globulin 01/09/2024 3.6  gm/dL Final    Calculated Result    A/G Ratio 01/09/2024 1.0  g/dL Final    BUN/Creatinine Ratio 01/09/2024 11.8  7.0 - 25.0 Final    Anion Gap 01/09/2024 12.0  5.0 - 15.0 mmol/L Final    eGFR 01/09/2024 87.3  >60.0 mL/min/1.73 Final    Ferritin 01/09/2024 8.69 (L)  30.00 - 400.00 ng/mL Final    Iron 01/09/2024 29 (L)  59 - 158 mcg/dL Final    Iron Saturation (TSAT) 01/09/2024 8 (L)  20 - 50 % Final    Transferrin 01/09/2024 242  200 - 360 mg/dL Final    TIBC 01/09/2024 361  298 - 536 mcg/dL Final    WBC 01/09/2024 9.62  3.40 - 10.80 10*3/mm3 Final    RBC 01/09/2024 4.88  4.14 - 5.80 10*6/mm3 Final    Hemoglobin 01/09/2024 11.5 (L)  13.0 - 17.7 g/dL Final    Hematocrit 01/09/2024 37.3 (L)  37.5 - 51.0 % Final    MCV 01/09/2024 76.4 (L)  79.0 - 97.0 fL Final    MCH 01/09/2024 23.6 (L)  26.6 - 33.0 pg Final    MCHC 01/09/2024 30.8 (L)  31.5 - 35.7 g/dL Final    RDW 01/09/2024 17.3 (H)  12.3 - 15.4 % Final    RDW-SD 01/09/2024 48.4  37.0 - 54.0 fl Final    MPV 01/09/2024 10.0  6.0 - 12.0 fL Final    Platelets 01/09/2024 234  140 - 450 10*3/mm3 Final    Neutrophil % 01/09/2024 70.7  42.7 - 76.0 % Final    Lymphocyte % 01/09/2024 19.5 (L)  19.6 - 45.3 % Final    Monocyte % 01/09/2024 7.7  5.0 - 12.0 % Final    Eosinophil % 01/09/2024 1.9  0.3 - 6.2 % Final    Basophil % 01/09/2024 0.2  0.0 - 1.5 % Final    Immature  Grans % 01/09/2024 0.0  0.0 - 0.5 % Final    Neutrophils, Absolute 01/09/2024 6.80  1.70 - 7.00 10*3/mm3 Final    Lymphocytes, Absolute 01/09/2024 1.88  0.70 - 3.10 10*3/mm3 Final    Monocytes, Absolute 01/09/2024 0.74  0.10 - 0.90 10*3/mm3 Final    Eosinophils, Absolute 01/09/2024 0.18  0.00 - 0.40 10*3/mm3 Final    Basophils, Absolute 01/09/2024 0.02  0.00 - 0.20 10*3/mm3 Final    Immature Grans, Absolute 01/09/2024 0.00  0.00 - 0.05 10*3/mm3 Final       XR Chest 1 View    Result Date: 1/5/2024  Narrative: PROCEDURE: XR CHEST 1 VW-    HISTORY: port placement; G25-Licruiyuc neoplasm of rectum; C78.7-Secondary malignant neoplasm of liver and intrahepatic bile duct; Z77-Kyvqffmzl neoplasm of rectum; I87.8-Other specified disorders of veins  COMPARISON: None.  FINDINGS: The heart is normal in size. The mediastinum is unremarkable. The lungs are clear. There is no pneumothorax. There are no acute osseous abnormalities. A right jugular Port-A-Cath tip terminates in the SVC.      Impression: Right port in good position without pneumothorax.        Images were reviewed, interpreted, and dictated by Dr. Serjio Wright MD Transcribed by Krysta Aldana PA-C.  This report was signed and finalized on 1/5/2024 12:39 PM by Serjio Wright MD.      FL C Arm During Surgery    Result Date: 1/5/2024  Narrative: This procedure was auto-finalized with no dictation required.    NM PET/CT Skull Base to Mid Thigh    Result Date: 12/22/2023  Narrative: NM PET/CT SKULL BASE TO MID THIGH Date of Exam: 12/22/2023 8:36 AM EST Indication: Rectal cancer for staging . lung mass for evaluation. Comparison: CT chest 12/1/2023, CT abdomen and pelvis 11/25/2023 Technique: 11.5 mCi of F-18 FDG was administered intravenously. PET imaging was obtained from skull base to mid-thigh approximately 60 minutes after radiotracer injection. A low dose non contrast CT was obtained for attenuation correction and anatomic localization. Fused PET-CT and 3D MIP  reconstructions were utilized for image interpretation.  Fasting blood glucose level: 101 mg/dl. Reference uptake values: Mediastinum: 2.3 SUVmax Liver: 2.8 SUVmax Normalization method: Body Weight Findings: Head and neck: Normal symmetric physiologic FDG uptake in the brain. There is small linear hypermetabolism along the right mandible adjacent to dental implant which could reflect periodontal disease. There is physiologic uptake in the oropharynx and muscles of phonation. No neck mass or hypermetabolic cervical lymph node. Chest: Redemonstration of a mostly solid mass in the posterior left lower lobe with some central cavitation or internal bronchiectatic change; this is hypermetabolic with SUV max 9.3. Redemonstration of an adjacent smaller subpleural nodule in the posterior left lower lobe which appears partially solid with internal cavitary/bronchiectatic change; this is also hypermetabolic with SUV max 4.3. No additional hypermetabolic pulmonary parenchymal process or discrete nodule noting limited assessment owing to nonbreath-hold technique and low-dose CT technique. There is physiologic uptake in the left ventricular myocardium. No enlarged or hypermetabolic thoracic lymph nodes. There are mild scattered coronary artery calcifications. Abdomen and pelvis: There are multiple (at least 7) small vague hypodense liver lesions which appear hypermetabolic compatible with liver metastases, for example a 1.8 cm hypodense lesion in the inferior right hepatic lobe (segment 6) with SUV max 7.2. There is circumferential and eccentric wall thickening of the rectum with corresponding hypermetabolism, compatible with known rectal malignancy; SUV max 16.7. The gallbladder surgically absent. There are surgical changes of the stomach. There are a couple prominent bilateral inguinal lymph nodes measuring under 1.5 cm in short axis, demonstrating mild FDG avidity with SUV max 3.5. There are couple mildly enlarged mildly  hypermetabolic bilateral pelvic sidewall/internal iliac chain lymph  nodes, for example a right internal iliac node measuring 1.0 cm in short axis with SUV max 4.0. There are couple mildly prominent perirectal lymph nodes with low-level near background FDG uptake. Bones and body wall soft tissues: No focal/suspicious uptake within the osseous structures. No discrete bony lesion on the CT images. There are multiple sites of dermal-based soft tissue thickening in the superficial subcutaneous tissues of the bilateral gluteal regions with some areas of hypermetabolism, for example in the skin of the inferior left gluteal area measuring SUV max 8.4 (fusion image 313).     Impression: Impression: Hypermetabolic rectal wall thickening compatible with known rectal malignancy. Multiple small ill-defined hypodense hypermetabolic liver lesions compatible with liver metastases. Mildly enlarged and mildly hypermetabolic pelvic sidewall and internal iliac chain lymph nodes likely reflecting long metastases, with some less specific though nonetheless suspicious perirectal lymph nodes. Hypermetabolic lung mass and adjacent nodule in the left lower lobe appearing solid with internal cavitary or bronchiectatic change; this could reflect pulmonary metastatic disease although the possibility of separate primary lung malignancy cannot be excluded particularly in the setting of background emphysema. Prominent mildly hypermetabolic inguinal lymph nodes. These are somewhat nonspecific possibly metastatic or reactive. Scattered small dermal-based soft tissue nodularity in the superficial subcutaneous tissues of the posterior gluteal regions with some corresponding hypermetabolism. This is nonspecific and may reflect cutaneous/dermal-based process. Metastatic body wall  deposits are considered less likely. Electronically Signed: Kameron Christian MD  12/22/2023 11:17 AM EST  Workstation ID: [id number]     Procedures    Assessment / Plan       Assessment/Plan:     1.  Rectal cancer   2.  Multiple hypermetabolic small liver lesions  3.  Hypermetabolic cavitary lung lesion  -I reviewed the patient's imaging reports, biopsy results, and endoscopy findings.  -We discussed the diagnosis, prognosis, and treatment of rectal cancer.  -The patient presents with a partially obstructing rectal cancer with adenopathy.  There are concerning hypermetabolic liver lesions which are suspicious for metastatic disease.  The patient does have a cavitary lung lesion as well as pulmonary nodules which is suspicious for metastatic disease or a second primary malignancy in this patient who is an active smoker.  -I recommended that we proceed with a lung biopsy to exclude a second primary malignancy as this would impact our treatment regimen.  If this confirms metastatic disease, he may not require liver biopsy, but if a second primary malignancy were identified, then he would require a biopsy to determine whether the lung malignancy or rectal malignancy is metastatic to the liver.  In the interest of avoiding further treatment delays, the patient prefers to proceed with biopsies of both the lung and the liver.  -He is having progressive obstructive symptoms and rectal pain. Because of scheduling constraints, we were not able to coordinate biopsies until mid Jan, but I do not want to delay treatment in light of his progressive symptoms. We are going to proceed with initial systemic therapy with FOLFOX and he has biopsies scheduled between cycle 1 and 2. Pending biopsy results and NGS with consideration of possible addition of Avastin or other targeted agents pending those results. Rectal MRI pending  -CBC/CMP/CEA reviewed. Labs acceptable for treatment  We discussed the goals of chemotherapy being control--pending further staging assessment.  We reviewed the chemotherapy schedule and its side effects including but not limited to alopecia, myelosuppression, infection, fevers,  nausea, vomiting, diarrhea, mucositis, dehydration, fatigue, heart disease, neuropathy, and late cancers.  Informed consent was obtained, and the patient elected to proceed.    4.  Cancer related pain  -PRN oxycodone.   -Referral to palliative care    5. Access   -Port    5.  Financial concerns  -We will refer him to social work.     Follow Up:   2 weeks     Brenda Cronin MD  Hematology and Oncology     I have spent a total of 45 min on reviewing test results/preparing to see patient, counseling patient, performing medically appropriate exam and documenting clinical information in the electronic or other health record

## 2024-01-10 ENCOUNTER — DOCUMENTATION (OUTPATIENT)
Dept: SOCIAL WORK | Facility: HOSPITAL | Age: 54
End: 2024-01-10
Payer: COMMERCIAL

## 2024-01-10 ENCOUNTER — PATIENT OUTREACH (OUTPATIENT)
Dept: ONCOLOGY | Facility: CLINIC | Age: 54
End: 2024-01-10
Payer: COMMERCIAL

## 2024-01-10 NOTE — PROGRESS NOTES
Case Management/Social Work    Patient Name:  Edward Powell  YOB: 1970  MRN: [id number]  Admit Date:  (Not on file)        SW received oncology consult regarding distress level. Per chart review, pt and family met with [facility] on 1/9/24 regarding resources and support. [facility] EVENS helped assist with arranging services for pt. This SW will close out consult at this time unless further needed.       Electronically signed by:  EVENS Han  01/10/24 11:51 EST

## 2024-01-11 ENCOUNTER — HOSPITAL ENCOUNTER (OUTPATIENT)
Dept: ONCOLOGY | Facility: HOSPITAL | Age: 54
Discharge: HOME | End: 2024-01-11
Admitting: INTERNAL MEDICINE
Payer: COMMERCIAL

## 2024-01-11 VITALS
HEIGHT: 72 IN | SYSTOLIC BLOOD PRESSURE: 138 MMHG | TEMPERATURE: 97.6 F | RESPIRATION RATE: 18 BRPM | HEART RATE: 69 BPM | BODY MASS INDEX: 33.32 KG/M2 | WEIGHT: 246 LBS | DIASTOLIC BLOOD PRESSURE: 83 MMHG

## 2024-01-11 DIAGNOSIS — C20 RECTAL ADENOCARCINOMA: Primary | ICD-10-CM

## 2024-01-11 DIAGNOSIS — C20 RECTAL CANCER METASTASIZED TO LIVER: ICD-10-CM

## 2024-01-11 DIAGNOSIS — C78.7 RECTAL CANCER METASTASIZED TO LIVER: ICD-10-CM

## 2024-01-11 PROCEDURE — 25010000002 HEPARIN LOCK FLUSH PER 10 UNITS: Performed by: INTERNAL MEDICINE

## 2024-01-11 PROCEDURE — 96523 IRRIG DRUG DELIVERY DEVICE: CPT

## 2024-01-11 RX ORDER — HEPARIN SODIUM (PORCINE) LOCK FLUSH IV SOLN 100 UNIT/ML 100 UNIT/ML
500 SOLUTION INTRAVENOUS AS NEEDED
OUTPATIENT
Start: 2024-01-11

## 2024-01-11 RX ORDER — HEPARIN SODIUM (PORCINE) LOCK FLUSH IV SOLN 100 UNIT/ML 100 UNIT/ML
500 SOLUTION INTRAVENOUS AS NEEDED
Status: DISCONTINUED | OUTPATIENT
Start: 2024-01-11 | End: 2024-01-12 | Stop reason: HOSPADM

## 2024-01-11 RX ORDER — SODIUM CHLORIDE 0.9 % (FLUSH) 0.9 %
10 SYRINGE (ML) INJECTION AS NEEDED
Status: DISCONTINUED | OUTPATIENT
Start: 2024-01-11 | End: 2024-01-12 | Stop reason: HOSPADM

## 2024-01-11 RX ORDER — SODIUM CHLORIDE 0.9 % (FLUSH) 0.9 %
10 SYRINGE (ML) INJECTION AS NEEDED
OUTPATIENT
Start: 2024-01-11

## 2024-01-11 RX ADMIN — HEPARIN 500 UNITS: 100 SYRINGE at 11:52

## 2024-01-11 RX ADMIN — Medication 10 ML: at 11:52

## 2024-01-11 NOTE — PROGRESS NOTES
"     Palliative Clinic Note      Name: Edward Powell  Age: 53 y.o.  Sex: male  : 1970  MRN: [id number]  Date of Service: 2024   Medical Oncologist: Dr. Cronin    Subjective:    Chief Complaint: Rectal pain, constipation, sore throat    History of Present Illness: Edward Powell is a 53 y.o. male with past medical history significant for metastatic rectal cancer who presents to the palliative clinic today to establish care.     Treatment summary: Patient presented with complaints of worsening diarrhea with associated loss of bowel control, weight loss and fatigue. Imaging in 2023 revealed an irregular circumferential wall thickening involving the rectum, mesorectal lymphadenopathy, mass-like consolidation in the left lower lobe and nodules in the liver concerning for malignancy. Biopsy from colonoscopy on 23 consistent with adenocarcinoma. Plan to proceed with biopsies of both the lung and the liver. Patient started systemic therapy with FOLFOX on 23 and will obtain biopsies between cycle 1 and 2.    Pain: Patient complains of rectal pain that is worse with riding in the car and bowel movements.  The pain lasts anywhere from minutes to hours.  Patient prescribed oxycodone 5 mg tablets q6h PRN per oncologist.  Patient reports taking 2 tablets at a time as needed for moderate to severe rectal pain.  Patient admits to an episode of severe pain where he tried taking 3 tablets and states he experienced lightheadedness and \"head spinning\". Patient uses acetaminophen and ibuprofen for other pains.     Other symptoms: The patient complains of constipation and obstructive symptoms.  Patient is currently taking a stool softener, 2 tablets twice daily along with a laxative.  Patient took 3 doses of the laxative yesterday and was able to pass 2 small, soft bowel movements this morning.  Patient reports at times only being able to pass gas or cloudy liquid.  He also reports associated " abdominal cramping.  Patient shares that his oncologist discussed potential for ostomy in the future if obstruction worsens.  Patient complains of a sore throat x 1 day.  He denies fever or chills.  No cough, runny nose, lymphadenopathy or sinus pressure.  No sick contacts.  Patient admits to sneezing.  Patient believes he may be developing a sinus infection and requests oral antibiotics.    Pyschosocial: The patient presents to the clinic with his significant other, Alysia.  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  No personal history of mental illness.  He is not on any mood stabilizers currently.    Goals: Maximize comfort, optimize function & psychosocial wellbeing, and promote advanced care planning.    The following portions of the patient's history were reviewed and updated as appropriate: allergies, current medications, past family history, past medical history, past social history, past surgical history and problem list.    Decisional capacity:Full  ORT-R: Low risk  PHQ-9: 5-9 (Mild Depression)  ENMANUEL: 13  ECOG: (1) Restricted in physically strenuous activity, ambulatory and able to do work of light nature   Palliative Performance Scale Score: 70%     Objective:    /44   Pulse 62   Temp 98 °F (36.7 °C) (Temporal)   Resp 18   Wt 109 kg (240 lb)   SpO2 99%   BMI 32.55 kg/m²     Constitutional: Awake, alert, normal gait, sitting up in exam chair, in no acute distress  Eyes: PERRLA, EOMS intact  HENT: NCAT, face symmetric  Neck: Supple, trachea midline  Respiratory: Nonlabored respirations  Cardiovascular: RRR, no edema observed  Gastrointestinal: Soft, no guarding  Musculoskeletal: Moves all extremities   Psychiatric: Appropriate affect, cooperative  Neurologic: Oriented x 3, Cranial Nerves grossly intact to confrontation, speech clear  Skin: Cool dry, no rashes or wounds appreciated     Medication Counts: Instructed to bring controlled medications to all  appointments.   I have reviewed the patient's [place] PDMP. RACIEL Req # [id number].   UDS: Collected today. Results pending.    Assessment & Plan:    1. Rectal adenocarcinoma  - We explained what palliative care is and what it can offer the patient. Reinforced that palliative care is provided in collaboration with primary care provider and any specialty care providers.  Encouraged patient to continue to seek emergency medical treatment as needed for acute illness or injury. We discussed short-term goals which include improving quality of life and daily functioning.     2. Cancer related pain  - We discussed goals for pain relief, risks associated with opioid therapy, proper use, safe storage and disposal of opioids. We recommended the patient be supervised for the first few days when starting a new medication or dose and always start the new medication or dose in the morning. We reviewed our universal surveillance strategies including urine drug screens, RACIEL reports, pill counts and risk assessment screenings. The Consent for Treatment with Controlled Substances and Controlled Substance Prescribing Agreement were both reviewed, signed, and scanned into the chart. The patient was given a copy of the Controlled Substance Education handout. Provided the patient signs to identify an opioid overdose. Educated the patient on the steps to respond to an overdose and administration of naloxone. Prescription for naloxone nasal spray sent by oncologist.     - Plan to increase oxycodone to 10 mg tablets every 6 hours as needed for rectal pain. Encouraged the patient to utilize OTC acetaminophen and/or ibuprofen in addition to the opioid therapy.     3. Therapeutic drug monitoring  - Urine Drug Screen collected today. Results are pending.     4. Constipation due to outlet obstruction  - Long discussion about the importance of keeping his BMs soft. Continue scheduled stool softeners and oral laxatives as needed. Patient reports  having two, soft bowel movements today. Patient understands that there is potential for an ostomy in the future if obstruction worsens. Patient requests prescription for bowel prep with GoLYTELY, however due to risk of obstruction and dehydration, recommend trying lactulose or Miralax.    5. Sore throat  - Discussed with medical oncology staff. Will prescribe course of antibiotics pending oncologist's recommendations.     Code status: FULL   Advanced directives: No.    Return in about 3 months (around 4/12/2024) for Video visit.    I spent 60 minutes caring for Edward Powell on this date of service. This time includes time spent by me in the following activities: preparing for the visit, reviewing tests, obtaining and/or reviewing a separately obtained history, performing a medically appropriate examination and/or evaluation , counseling and educating the patient/family/caregiver, ordering medications, tests, or procedures, documenting information in the medical record, independently interpreting results and communicating that information with the patient/family/caregiver, and care coordination    Linda Leslie PA-C  01/12/2024    Medication Date Filled # Filled Count Used # Days  MANUEL   Tramadol 50  1/2/24 8 -- -- -- --   Oxycodone 5 (Mehrdad) 12/29/23 120 -- -- -- --   Percocet 5/325  12/7/23 30 -- -- -- --

## 2024-01-11 NOTE — PATIENT INSTRUCTIONS
Check-out instructions:  Increase oxycodone to 10 mg every 6 hours as needed.   Scheduled to follow up in April.    Medication Policy: We ask that you bring all of the medications prescribed by this clinic to every appointment. For telemedicine appointments, be prepared to give medication counts. This will assist us with managing your refill needs.      Refill Policy: You must notify us at least 3-5 business days in advance for routine refill requests. Call [phone] or send [employer] message to the Palliative Pool. Some prescriptions will need to be signed by the physician and will take longer to be sent to the pharmacy. Please also be aware of additional insurance prior authorization processing time required for many medications. Try to communicate with your pharmacy first to look for scripts signed in advance.     Communication: The [facility] days are Monday-Friday 8:30-4:30 PM. Call [phone] or send [employer] message to the Palliative Pool. You will not be routed to speak directly to the palliative provider during clinic hours, so that we may provide the best care and attention to our patients in the office. If you require immediate communication, please also consider contacting your primary care office or appropriate specialist office.

## 2024-01-12 ENCOUNTER — OFFICE VISIT (OUTPATIENT)
Dept: PALLIATIVE CARE | Facility: CLINIC | Age: 54
End: 2024-01-12
Payer: COMMERCIAL

## 2024-01-12 ENCOUNTER — LAB (OUTPATIENT)
Dept: LAB | Facility: HOSPITAL | Age: 54
End: 2024-01-12
Payer: COMMERCIAL

## 2024-01-12 ENCOUNTER — TELEPHONE (OUTPATIENT)
Dept: ONCOLOGY | Facility: CLINIC | Age: 54
End: 2024-01-12
Payer: COMMERCIAL

## 2024-01-12 VITALS
BODY MASS INDEX: 32.55 KG/M2 | RESPIRATION RATE: 18 BRPM | OXYGEN SATURATION: 99 % | DIASTOLIC BLOOD PRESSURE: 44 MMHG | SYSTOLIC BLOOD PRESSURE: 136 MMHG | TEMPERATURE: 98 F | WEIGHT: 240 LBS | HEART RATE: 62 BPM

## 2024-01-12 DIAGNOSIS — C20 RECTAL ADENOCARCINOMA: ICD-10-CM

## 2024-01-12 DIAGNOSIS — C20 RECTAL CANCER METASTASIZED TO LIVER: ICD-10-CM

## 2024-01-12 DIAGNOSIS — K59.02 CONSTIPATION DUE TO OUTLET OBSTRUCTION: ICD-10-CM

## 2024-01-12 DIAGNOSIS — C78.7 RECTAL CANCER METASTASIZED TO LIVER: ICD-10-CM

## 2024-01-12 DIAGNOSIS — G89.3 CANCER RELATED PAIN: Primary | ICD-10-CM

## 2024-01-12 DIAGNOSIS — Z51.81 THERAPEUTIC DRUG MONITORING: Primary | ICD-10-CM

## 2024-01-12 DIAGNOSIS — G89.3 CANCER RELATED PAIN: ICD-10-CM

## 2024-01-12 DIAGNOSIS — J02.9 SORE THROAT: ICD-10-CM

## 2024-01-12 LAB
ALBUMIN SERPL-MCNC: 3.9 G/DL (ref 3.5–5.2)
ALBUMIN/GLOB SERPL: 1.1 G/DL
ALP SERPL-CCNC: 94 U/L (ref 39–117)
ALT SERPL W P-5'-P-CCNC: 8 U/L (ref 1–41)
ANION GAP SERPL CALCULATED.3IONS-SCNC: 12.1 MMOL/L (ref 5–15)
AST SERPL-CCNC: 14 U/L (ref 1–40)
BASOPHILS # BLD AUTO: 0.05 10*3/MM3 (ref 0–0.2)
BASOPHILS NFR BLD AUTO: 0.6 % (ref 0–1.5)
BILIRUB SERPL-MCNC: 0.3 MG/DL (ref 0–1.2)
BUN SERPL-MCNC: 15 MG/DL (ref 6–20)
BUN/CREAT SERPL: 16.1 (ref 7–25)
CALCIUM SPEC-SCNC: 8.7 MG/DL (ref 8.6–10.5)
CEA SERPL-MCNC: 39.2 NG/ML
CHLORIDE SERPL-SCNC: 100 MMOL/L (ref 98–107)
CO2 SERPL-SCNC: 25.9 MMOL/L (ref 22–29)
CREAT SERPL-MCNC: 0.93 MG/DL (ref 0.76–1.27)
DEPRECATED RDW RBC AUTO: 48.6 FL (ref 37–54)
EGFRCR SERPLBLD CKD-EPI 2021: 97.6 ML/MIN/1.73
EOSINOPHIL # BLD AUTO: 0.1 10*3/MM3 (ref 0–0.4)
EOSINOPHIL NFR BLD AUTO: 1.1 % (ref 0.3–6.2)
ERYTHROCYTE [DISTWIDTH] IN BLOOD BY AUTOMATED COUNT: 17.5 % (ref 12.3–15.4)
GLOBULIN UR ELPH-MCNC: 3.7 GM/DL
GLUCOSE SERPL-MCNC: 93 MG/DL (ref 65–99)
HCT VFR BLD AUTO: 37.3 % (ref 37.5–51)
HGB BLD-MCNC: 11.4 G/DL (ref 13–17.7)
IMM GRANULOCYTES # BLD AUTO: 0.02 10*3/MM3 (ref 0–0.05)
IMM GRANULOCYTES NFR BLD AUTO: 0.2 % (ref 0–0.5)
LYMPHOCYTES # BLD AUTO: 1.46 10*3/MM3 (ref 0.7–3.1)
LYMPHOCYTES NFR BLD AUTO: 16.6 % (ref 19.6–45.3)
MCH RBC QN AUTO: 23.3 PG (ref 26.6–33)
MCHC RBC AUTO-ENTMCNC: 30.6 G/DL (ref 31.5–35.7)
MCV RBC AUTO: 76.3 FL (ref 79–97)
MONOCYTES # BLD AUTO: 0.24 10*3/MM3 (ref 0.1–0.9)
MONOCYTES NFR BLD AUTO: 2.7 % (ref 5–12)
NEUTROPHILS NFR BLD AUTO: 6.93 10*3/MM3 (ref 1.7–7)
NEUTROPHILS NFR BLD AUTO: 78.8 % (ref 42.7–76)
NRBC BLD AUTO-RTO: 0 /100 WBC (ref 0–0.2)
PLATELET # BLD AUTO: 227 10*3/MM3 (ref 140–450)
PMV BLD AUTO: 11.1 FL (ref 6–12)
POTASSIUM SERPL-SCNC: 4.4 MMOL/L (ref 3.5–5.2)
PROT SERPL-MCNC: 7.6 G/DL (ref 6–8.5)
RBC # BLD AUTO: 4.89 10*6/MM3 (ref 4.14–5.8)
REF LAB TEST METHOD: NORMAL
SODIUM SERPL-SCNC: 138 MMOL/L (ref 136–145)
WBC NRBC COR # BLD AUTO: 8.8 10*3/MM3 (ref 3.4–10.8)

## 2024-01-12 PROCEDURE — 82378 CARCINOEMBRYONIC ANTIGEN: CPT

## 2024-01-12 PROCEDURE — 36415 COLL VENOUS BLD VENIPUNCTURE: CPT

## 2024-01-12 PROCEDURE — 80053 COMPREHEN METABOLIC PANEL: CPT

## 2024-01-12 PROCEDURE — 85025 COMPLETE CBC W/AUTO DIFF WBC: CPT

## 2024-01-12 RX ORDER — OXYCODONE HYDROCHLORIDE 10 MG/1
10 TABLET ORAL EVERY 6 HOURS PRN
Qty: 120 TABLET | Refills: 0 | Status: SHIPPED | OUTPATIENT
Start: 2024-01-12

## 2024-01-12 NOTE — TELEPHONE ENCOUNTER
"----- Message from Brenda Cronin MD sent at 1/12/2024  2:52 PM EST -----  Regarding: FW: Sore throat   Tb please call him.  If His symptoms are new   [facility] testing for COVID and strep etc.  Please review need to be seen if he develops a fever.  ----- Message -----  From: Linda Leslie PA-C  Sent: 1/12/2024  12:29 PM EST  To: Brenda Cronin MD; Maribell Moy RN  Subject: Sore throat                                      Hey Dr. Cronin and Maribell,    I just got done seeing  in the [place] office. He was complaining of a sore throat x 1 day. I did not see any lesions or pus in his mouth or throat. No tender lymphadenopathy. No fever. The only other associated complaint he had was sneezing.     The patient is requesting an oral antibiotic for a \"sinus infection.\" I told him I would discuss with his oncology team first to see what you recommend.     Thanks!    Linda"

## 2024-01-12 NOTE — TELEPHONE ENCOUNTER
I have reviewed patient's RACIEL report prior to prescribing Schedule II, III, and IV medications. Request # [id number]. Next refill for oxycodone 10 mg tablets every 6 hours as needed #120 was sent to the pharmacy. The patient is scheduled to follow-up in 3-4 months.

## 2024-01-12 NOTE — PROGRESS NOTES
SW contacted pt and provided education on [facility] in [place]. SW provided  instructions and documentation needed for . Pt thanked RENAY for the assistance.

## 2024-01-12 NOTE — TELEPHONE ENCOUNTER
Clarified message from Dr. Cronin and called patient.  Advised him that if these symptoms are new, he needs to go to [facility] to be tested for COVID, strep, etc.  Advised him per MD to go to the nearest ER if he develops a fever as he needs to be seen.  Patient verbalized understanding.

## 2024-01-15 ENCOUNTER — OFFICE VISIT (OUTPATIENT)
Dept: ONCOLOGY | Facility: CLINIC | Age: 54
End: 2024-01-15
Payer: COMMERCIAL

## 2024-01-15 ENCOUNTER — HOSPITAL ENCOUNTER (OUTPATIENT)
Dept: MRI IMAGING | Facility: HOSPITAL | Age: 54
Discharge: HOME | End: 2024-01-15
Admitting: INTERNAL MEDICINE
Payer: COMMERCIAL

## 2024-01-15 ENCOUNTER — TELEPHONE (OUTPATIENT)
Dept: ONCOLOGY | Facility: CLINIC | Age: 54
End: 2024-01-15

## 2024-01-15 ENCOUNTER — PATIENT OUTREACH (OUTPATIENT)
Dept: ONCOLOGY | Facility: CLINIC | Age: 54
End: 2024-01-15
Payer: COMMERCIAL

## 2024-01-15 VITALS
HEIGHT: 72 IN | HEART RATE: 70 BPM | RESPIRATION RATE: 16 BRPM | BODY MASS INDEX: 31.69 KG/M2 | SYSTOLIC BLOOD PRESSURE: 134 MMHG | OXYGEN SATURATION: 99 % | TEMPERATURE: 97.1 F | DIASTOLIC BLOOD PRESSURE: 68 MMHG | WEIGHT: 234 LBS

## 2024-01-15 DIAGNOSIS — C78.7 RECTAL CANCER METASTASIZED TO LIVER: ICD-10-CM

## 2024-01-15 DIAGNOSIS — C20 RECTAL CANCER METASTASIZED TO LIVER: ICD-10-CM

## 2024-01-15 DIAGNOSIS — C20 RECTAL ADENOCARCINOMA: Primary | ICD-10-CM

## 2024-01-15 PROCEDURE — A9577 INJ MULTIHANCE: HCPCS | Performed by: INTERNAL MEDICINE

## 2024-01-15 PROCEDURE — 72197 MRI PELVIS W/O & W/DYE: CPT

## 2024-01-15 PROCEDURE — 0 GADOBENATE DIMEGLUMINE 529 MG/ML SOLUTION: Performed by: INTERNAL MEDICINE

## 2024-01-15 RX ADMIN — GADOBENATE DIMEGLUMINE 20 ML: 529 INJECTION, SOLUTION INTRAVENOUS at 14:13

## 2024-01-15 NOTE — PROGRESS NOTES
Hematology and Oncology [place]  Office number [phone]    Fax number [phone]     New Patient Office Visit      Date: 2023     Patient Name: Edward Powell  MRN: [id number]  : 1970    Referring Physician: Dr. Gautam    Chief Complaint: Rectal cancer, lung mass, liver lesions    Cancer Staging: Presumptive stage IV    History of Present Illness: Edward Powell is a pleasant 53 y.o. male who presents today for evaluation of rectal cancer in the company of his supportive significant other.    Patient has a longstanding history of intermittent diarrhea since his cholecystectomy in 2019.  However he developed progressive diarrhea with associated loss of bowel control and urgency as well as weight loss and fatigue prompting additional workup.    CT of the abdomen pelvis 2023 showed an irregular circumferential wall thickening involving the rectum concerning for mass lesion.  There was associated mesorectal lymphadenopathy.  Masslike consolidation of left lower lobe.  CT of the chest showed a cavitary lesion in the left lower lobe up to 4.8 cm with an adjacent cavitary nodule up to 2 cm and a 5 mm nodule on the right minor fissure.      He underwent colonoscopy 2023 with findings of an infiltrative and ulcerated partially obstructing mass in the proximal rectum spanning 10 cm.  Rectal polyps.  Biopsy of the rectal mass showed invasive moderately differentiated adenocarcinoma.  Additional biopsies showed tubular adenomas.  MSI testing was intact/low probability of MSI high.    PET/CT 2023 showed hypermetabolic rectal wall thickening compatible with known rectal malignancy.  Multiple small ill-defined hypoechoic hyper metabolic liver lesions compatible with metastatic disease.  Mildly enlarged and mildly hypermetabolic pelvic sidewall and internal iliac lymph node chain adenopathy.  Hypermetabolic lung mass with adjacent nodule.     The patient has been experiencing  substantial rectal pain.  He is taking oxycodone every 6 hours with partial relief.  He reports ongoing bowel movements.  No abdominal pain.  No vomiting.  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  He is taking oxycodone 10 mg every 6 hours to control rectal pain.  He notes that it is worse with prolonged sitting.  Constipation is stable.  He is using stool softener and laxatives.  He has MRI scheduled for today with biopsy on Thursday.   Overall, he tolerated cycle 1 fairly well.  He had 1 day of nausea but was controlled with Zofran.  He also had 1 day of significant fatigue that also improved.    Did not note any cold sensitivities.  Feeling fairly well today.      Past Medical History:   Past Medical History:   Diagnosis Date    Arthritis     Cancer     rectal cancer - diagnosed 2023    Cholelithiasis 2019    Removed    COPD (chronic obstructive pulmonary disease)     Coronary artery disease 2009    Stent - no cardiologist currently    Elevated cholesterol     GERD (gastroesophageal reflux disease)     Hernia 2003    Lower hernia    Perforated ulcer 2019    Sleep apnea     history of; when weighed over 400lbs - no issues following bariatric surgery       Past Surgical History:   Past Surgical History:   Procedure Laterality Date    APPENDECTOMY  1983    Removed    BARIATRIC SURGERY  2011    Gastric bypass    BLADDER TUMOR/ULCER BLEEDER CAUTERIZATION      CARDIAC CATHETERIZATION  2009    stent placed    CHOLECYSTECTOMY  2019    Removed    COLONOSCOPY N/A 12/07/2023    Procedure: COLONOSCOPY WITH HOT SNARE POLYPECTOMY AND TATTOO;  Surgeon: Noman Gautam MD;  Location: [facility] ENDOSCOPY;  Service: Gastroenterology;  Laterality: N/A;    PORTACATH PLACEMENT N/A 1/5/2024    Procedure: INSERTION OF PORTACATH WITH  ULTRSOUND AND FLUOROSCOPIC GUIDANCE;  Surgeon: Ida Black MD;  Location: [facility];  Service: General;  Laterality: N/A;    JENNIFER-EN-Y         Family History: No family history on file.    Social History:   Social History     Socioeconomic History    Marital status:    Tobacco Use    Smoking status: Every Day     Packs/day: 1.50     Years: 15.00     Additional pack years: 0.00     Total pack years: 22.50     Types: Cigarettes    Smokeless tobacco: Never    Tobacco comments:     35 years      pt reports closer to 2 packs per day since cancer diagnosis   Vaping Use    Vaping Use: Never used   Substance and Sexual Activity    Alcohol use: Never    Drug use: Never    Sexual activity: Defer       Medications:     Current Outpatient Medications:     acetaminophen (TYLENOL) 325 MG tablet, Take 2 tablets by mouth Every 6 (Six) Hours As Needed for Mild Pain., Disp: , Rfl:     dicyclomine (BENTYL) 20 MG tablet, Take 1 tablet by mouth 3 (Three) Times a Day As Needed for Abdominal Cramping., Disp: 60 tablet, Rfl: 0    docusate sodium (COLACE) 100 MG capsule, Take 1 capsule by mouth 2 (Two) Times a Day., Disp: , Rfl:     Hydrocortisone, Perianal, (ANUSOL-HC) 2.5 % rectal cream, Insert  into the rectum 2 (Two) Times a Day. Indications: Inflamed Hemorrhoids, Disp: 30 g, Rfl: 1    lidocaine-prilocaine (EMLA) 2.5-2.5 % cream, Apply 1 application  topically to the appropriate area as directed As Needed (45-60 minutes prior to port access.  Cover with saran/plastic wrap.)., Disp: 30 g, Rfl: 3    naloxone (NARCAN) 4 MG/0.1ML nasal spray, 1 spray into the nostril(s) as directed by provider As Needed for Opioid Reversal., Disp: 1 each, Rfl: 0    ondansetron (ZOFRAN) 8 MG tablet, Take 1 tablet by mouth 3 (Three) Times a Day As Needed for Nausea or Vomiting., Disp: 30 tablet, Rfl: 5    oxyCODONE (ROXICODONE) 10 MG tablet, Take 1 tablet by mouth Every 6 (Six) Hours As Needed for Severe Pain or Moderate Pain., Disp:  "120 tablet, Rfl: 0    pantoprazole (Protonix) 40 MG EC tablet, Take 1 tablet by mouth Daily. Indications: Gastroesophageal Reflux Disease, Disp: 90 tablet, Rfl: 1    tiotropium bromide-olodaterol (STIOLTO RESPIMAT) 2.5-2.5 MCG/ACT aerosol solution inhaler, Inhale 2 puffs Daily., Disp: 1 each, Rfl: 5    Allergies:   Allergies   Allergen Reactions    Bactrim [Sulfamethoxazole-Trimethoprim] Hives     and blisters    Sulfa Antibiotics Hives     and blisters       Objective     Vital Signs:   Vitals:    01/15/24 1040   BP: 134/68   Pulse: 70   Resp: 16   Temp: 97.1 °F (36.2 °C)   SpO2: 99%   Weight: 106 kg (234 lb)   Height: 182.9 cm (72\")   PainSc: 0-No pain    Body mass index is 31.74 kg/m².   Pain Score    01/15/24 1040   PainSc: 0-No pain       ECOG Performance Status: 1 - Symptomatic but completely ambulatory    Physical Exam:   General: No acute distress. Well appearing   HEENT: Normocephalic, atraumatic. Sclera anicteric.   Neck: supple, no adenopathy.   Cardiovascular: regular rate and rhythm. No murmurs.   Respiratory: Normal rate. Clear to auscultation bilaterally  Abdomen: Soft, nontender, non distended with normoactive bowel sounds  Lymph: no cervical, supraclavicular or axillary adenopathy  Neuro: Alert and oriented x 3. No focal deficits.   Ext: Symmetric, no swelling.   Psych: Euthymic      Laboratory/Imaging Reviewed:   Lab on 01/12/2024   Component Date Value Ref Range Status    Glucose 01/12/2024 93  65 - 99 mg/dL Final    BUN 01/12/2024 15  6 - 20 mg/dL Final    Creatinine 01/12/2024 0.93  0.76 - 1.27 mg/dL Final    Sodium 01/12/2024 138  136 - 145 mmol/L Final    Potassium 01/12/2024 4.4  3.5 - 5.2 mmol/L Final    Chloride 01/12/2024 100  98 - 107 mmol/L Final    CO2 01/12/2024 25.9  22.0 - 29.0 mmol/L Final    Calcium 01/12/2024 8.7  8.6 - 10.5 mg/dL Final    Total Protein 01/12/2024 7.6  6.0 - 8.5 g/dL Final    Albumin 01/12/2024 3.9  3.5 - 5.2 g/dL Final    ALT (SGPT) 01/12/2024 8  1 - 41 U/L Final "    AST (SGOT) 01/12/2024 14  1 - 40 U/L Final    Alkaline Phosphatase 01/12/2024 94  39 - 117 U/L Final    Total Bilirubin 01/12/2024 0.3  0.0 - 1.2 mg/dL Final    Globulin 01/12/2024 3.7  gm/dL Final    A/G Ratio 01/12/2024 1.1  g/dL Final    BUN/Creatinine Ratio 01/12/2024 16.1  7.0 - 25.0 Final    Anion Gap 01/12/2024 12.1  5.0 - 15.0 mmol/L Final    eGFR 01/12/2024 97.6  >60.0 mL/min/1.73 Final    CEA 01/12/2024 39.20  ng/mL Final    WBC 01/12/2024 8.80  3.40 - 10.80 10*3/mm3 Final    RBC 01/12/2024 4.89  4.14 - 5.80 10*6/mm3 Final    Hemoglobin 01/12/2024 11.4 (L)  13.0 - 17.7 g/dL Final    Hematocrit 01/12/2024 37.3 (L)  37.5 - 51.0 % Final    MCV 01/12/2024 76.3 (L)  79.0 - 97.0 fL Final    MCH 01/12/2024 23.3 (L)  26.6 - 33.0 pg Final    MCHC 01/12/2024 30.6 (L)  31.5 - 35.7 g/dL Final    RDW 01/12/2024 17.5 (H)  12.3 - 15.4 % Final    RDW-SD 01/12/2024 48.6  37.0 - 54.0 fl Final    MPV 01/12/2024 11.1  6.0 - 12.0 fL Final    Platelets 01/12/2024 227  140 - 450 10*3/mm3 Final    Neutrophil % 01/12/2024 78.8 (H)  42.7 - 76.0 % Final    Lymphocyte % 01/12/2024 16.6 (L)  19.6 - 45.3 % Final    Monocyte % 01/12/2024 2.7 (L)  5.0 - 12.0 % Final    Eosinophil % 01/12/2024 1.1  0.3 - 6.2 % Final    Basophil % 01/12/2024 0.6  0.0 - 1.5 % Final    Immature Grans % 01/12/2024 0.2  0.0 - 0.5 % Final    Neutrophils, Absolute 01/12/2024 6.93  1.70 - 7.00 10*3/mm3 Final    Lymphocytes, Absolute 01/12/2024 1.46  0.70 - 3.10 10*3/mm3 Final    Monocytes, Absolute 01/12/2024 0.24  0.10 - 0.90 10*3/mm3 Final    Eosinophils, Absolute 01/12/2024 0.10  0.00 - 0.40 10*3/mm3 Final    Basophils, Absolute 01/12/2024 0.05  0.00 - 0.20 10*3/mm3 Final    Immature Grans, Absolute 01/12/2024 0.02  0.00 - 0.05 10*3/mm3 Final    nRBC 01/12/2024 0.0  0.0 - 0.2 /100 WBC Final   Hospital Outpatient Visit on 01/09/2024   Component Date Value Ref Range Status    CEA 01/09/2024 34.00  ng/mL Final    Glucose 01/09/2024 132 (H)  65 - 99 mg/dL Final     BUN 01/09/2024 12  6 - 20 mg/dL Final    Creatinine 01/09/2024 1.02  0.76 - 1.27 mg/dL Final    Sodium 01/09/2024 139  136 - 145 mmol/L Final    Potassium 01/09/2024 3.6  3.5 - 5.2 mmol/L Final    Chloride 01/09/2024 103  98 - 107 mmol/L Final    CO2 01/09/2024 24.0  22.0 - 29.0 mmol/L Final    Calcium 01/09/2024 8.6  8.6 - 10.5 mg/dL Final    Total Protein 01/09/2024 7.3  6.0 - 8.5 g/dL Final    Albumin 01/09/2024 3.7  3.5 - 5.2 g/dL Final    ALT (SGPT) 01/09/2024 5  1 - 41 U/L Final    AST (SGOT) 01/09/2024 15  1 - 40 U/L Final    Alkaline Phosphatase 01/09/2024 98  39 - 117 U/L Final    Total Bilirubin 01/09/2024 0.2  0.0 - 1.2 mg/dL Final    Globulin 01/09/2024 3.6  gm/dL Final    Calculated Result    A/G Ratio 01/09/2024 1.0  g/dL Final    BUN/Creatinine Ratio 01/09/2024 11.8  7.0 - 25.0 Final    Anion Gap 01/09/2024 12.0  5.0 - 15.0 mmol/L Final    eGFR 01/09/2024 87.3  >60.0 mL/min/1.73 Final    Ferritin 01/09/2024 8.69 (L)  30.00 - 400.00 ng/mL Final    Iron 01/09/2024 29 (L)  59 - 158 mcg/dL Final    Iron Saturation (TSAT) 01/09/2024 8 (L)  20 - 50 % Final    Transferrin 01/09/2024 242  200 - 360 mg/dL Final    TIBC 01/09/2024 361  298 - 536 mcg/dL Final    WBC 01/09/2024 9.62  3.40 - 10.80 10*3/mm3 Final    RBC 01/09/2024 4.88  4.14 - 5.80 10*6/mm3 Final    Hemoglobin 01/09/2024 11.5 (L)  13.0 - 17.7 g/dL Final    Hematocrit 01/09/2024 37.3 (L)  37.5 - 51.0 % Final    MCV 01/09/2024 76.4 (L)  79.0 - 97.0 fL Final    MCH 01/09/2024 23.6 (L)  26.6 - 33.0 pg Final    MCHC 01/09/2024 30.8 (L)  31.5 - 35.7 g/dL Final    RDW 01/09/2024 17.3 (H)  12.3 - 15.4 % Final    RDW-SD 01/09/2024 48.4  37.0 - 54.0 fl Final    MPV 01/09/2024 10.0  6.0 - 12.0 fL Final    Platelets 01/09/2024 234  140 - 450 10*3/mm3 Final    Neutrophil % 01/09/2024 70.7  42.7 - 76.0 % Final    Lymphocyte % 01/09/2024 19.5 (L)  19.6 - 45.3 % Final    Monocyte % 01/09/2024 7.7  5.0 - 12.0 % Final    Eosinophil % 01/09/2024 1.9  0.3 - 6.2 %  Final    Basophil % 01/09/2024 0.2  0.0 - 1.5 % Final    Immature Grans % 01/09/2024 0.0  0.0 - 0.5 % Final    Neutrophils, Absolute 01/09/2024 6.80  1.70 - 7.00 10*3/mm3 Final    Lymphocytes, Absolute 01/09/2024 1.88  0.70 - 3.10 10*3/mm3 Final    Monocytes, Absolute 01/09/2024 0.74  0.10 - 0.90 10*3/mm3 Final    Eosinophils, Absolute 01/09/2024 0.18  0.00 - 0.40 10*3/mm3 Final    Basophils, Absolute 01/09/2024 0.02  0.00 - 0.20 10*3/mm3 Final    Immature Grans, Absolute 01/09/2024 0.00  0.00 - 0.05 10*3/mm3 Final       XR Chest 1 View    Result Date: 1/5/2024  Narrative: PROCEDURE: XR CHEST 1 VW-    HISTORY: port placement; D38-Lffjbuykh neoplasm of rectum; C78.7-Secondary malignant neoplasm of liver and intrahepatic bile duct; U39-Mkrrltwgm neoplasm of rectum; I87.8-Other specified disorders of veins  COMPARISON: None.  FINDINGS: The heart is normal in size. The mediastinum is unremarkable. The lungs are clear. There is no pneumothorax. There are no acute osseous abnormalities. A right jugular Port-A-Cath tip terminates in the SVC.      Impression: Right port in good position without pneumothorax.        Images were reviewed, interpreted, and dictated by Dr. Serjio rWight MD Transcribed by Krysta Aldana PA-C.  This report was signed and finalized on 1/5/2024 12:39 PM by Serjio Wright MD.      FL C Arm During Surgery    Result Date: 1/5/2024  Narrative: This procedure was auto-finalized with no dictation required.    NM PET/CT Skull Base to Mid Thigh    Result Date: 12/22/2023  Narrative: NM PET/CT SKULL BASE TO MID THIGH Date of Exam: 12/22/2023 8:36 AM EST Indication: Rectal cancer for staging . lung mass for evaluation. Comparison: CT chest 12/1/2023, CT abdomen and pelvis 11/25/2023 Technique: 11.5 mCi of F-18 FDG was administered intravenously. PET imaging was obtained from skull base to mid-thigh approximately 60 minutes after radiotracer injection. A low dose non contrast CT was obtained for attenuation  correction and anatomic localization. Fused PET-CT and 3D MIP reconstructions were utilized for image interpretation.  Fasting blood glucose level: 101 mg/dl. Reference uptake values: Mediastinum: 2.3 SUVmax Liver: 2.8 SUVmax Normalization method: Body Weight Findings: Head and neck: Normal symmetric physiologic FDG uptake in the brain. There is small linear hypermetabolism along the right mandible adjacent to dental implant which could reflect periodontal disease. There is physiologic uptake in the oropharynx and muscles of phonation. No neck mass or hypermetabolic cervical lymph node. Chest: Redemonstration of a mostly solid mass in the posterior left lower lobe with some central cavitation or internal bronchiectatic change; this is hypermetabolic with SUV max 9.3. Redemonstration of an adjacent smaller subpleural nodule in the posterior left lower lobe which appears partially solid with internal cavitary/bronchiectatic change; this is also hypermetabolic with SUV max 4.3. No additional hypermetabolic pulmonary parenchymal process or discrete nodule noting limited assessment owing to nonbreath-hold technique and low-dose CT technique. There is physiologic uptake in the left ventricular myocardium. No enlarged or hypermetabolic thoracic lymph nodes. There are mild scattered coronary artery calcifications. Abdomen and pelvis: There are multiple (at least 7) small vague hypodense liver lesions which appear hypermetabolic compatible with liver metastases, for example a 1.8 cm hypodense lesion in the inferior right hepatic lobe (segment 6) with SUV max 7.2. There is circumferential and eccentric wall thickening of the rectum with corresponding hypermetabolism, compatible with known rectal malignancy; SUV max 16.7. The gallbladder surgically absent. There are surgical changes of the stomach. There are a couple prominent bilateral inguinal lymph nodes measuring under 1.5 cm in short axis, demonstrating mild FDG avidity  with SUV max 3.5. There are couple mildly enlarged mildly hypermetabolic bilateral pelvic sidewall/internal iliac chain lymph  nodes, for example a right internal iliac node measuring 1.0 cm in short axis with SUV max 4.0. There are couple mildly prominent perirectal lymph nodes with low-level near background FDG uptake. Bones and body wall soft tissues: No focal/suspicious uptake within the osseous structures. No discrete bony lesion on the CT images. There are multiple sites of dermal-based soft tissue thickening in the superficial subcutaneous tissues of the bilateral gluteal regions with some areas of hypermetabolism, for example in the skin of the inferior left gluteal area measuring SUV max 8.4 (fusion image 313).     Impression: Impression: Hypermetabolic rectal wall thickening compatible with known rectal malignancy. Multiple small ill-defined hypodense hypermetabolic liver lesions compatible with liver metastases. Mildly enlarged and mildly hypermetabolic pelvic sidewall and internal iliac chain lymph nodes likely reflecting long metastases, with some less specific though nonetheless suspicious perirectal lymph nodes. Hypermetabolic lung mass and adjacent nodule in the left lower lobe appearing solid with internal cavitary or bronchiectatic change; this could reflect pulmonary metastatic disease although the possibility of separate primary lung malignancy cannot be excluded particularly in the setting of background emphysema. Prominent mildly hypermetabolic inguinal lymph nodes. These are somewhat nonspecific possibly metastatic or reactive. Scattered small dermal-based soft tissue nodularity in the superficial subcutaneous tissues of the posterior gluteal regions with some corresponding hypermetabolism. This is nonspecific and may reflect cutaneous/dermal-based process. Metastatic body wall  deposits are considered less likely. Electronically Signed: Kameron Christian MD  12/22/2023 11:17 AM EST   Workstation ID: [id number]     Procedures    Assessment / Plan      Assessment/Plan:     1.  Rectal cancer   2.  Multiple hypermetabolic small liver lesions  3.  Hypermetabolic cavitary lung lesion  -I reviewed the patient's imaging reports, biopsy results, and endoscopy findings.  -We discussed the diagnosis, prognosis, and treatment of rectal cancer.  -The patient presents with a partially obstructing rectal cancer with adenopathy.  There are concerning hypermetabolic liver lesions which are suspicious for metastatic disease.  The patient does have a cavitary lung lesion as well as pulmonary nodules which is suspicious for metastatic disease or a second primary malignancy in this patient who is an active smoker.  -I recommended that we proceed with a lung biopsy to exclude a second primary malignancy as this would impact our treatment regimen.  This is scheduled for Thursday. If this confirms metastatic disease, he may not require liver biopsy, but if a second primary malignancy were identified, then he would require a biopsy to determine whether the lung malignancy or rectal malignancy is metastatic to the liver.  In the interest of avoiding further treatment delays, the patient prefers to proceed with biopsies of both the lung and the liver.  -He is having progressive obstructive symptoms and rectal pain. Because of scheduling constraints, we were not able to coordinate biopsies until mid Jan, but we did not want to delay treatment in light of his progressive symptoms.   -He completed initial systemic therapy with FOLFOX last week.  Overall tolerated fairly well.   -He has biopsies scheduled between cycle 1 and 2. Pending biopsy results and NGS with consideration of possible addition of Avastin or other targeted agents pending those results.   -Rectal MRI scheduled for today.  -We will repeat labs prior to next treatment.  CBC/CMP/CEA reviewed.   -We reviewed the goals of chemotherapy being control--pending  further staging assessment.  We reviewed the chemotherapy schedule and its side effects including but not limited to alopecia, myelosuppression, infection, fevers, nausea, vomiting, diarrhea, mucositis, dehydration, fatigue, heart disease, neuropathy, and late cancers.      4.  Cancer related pain  -PRN oxycodone.   -Continue to follow-up with palliative care    5. Access   -Port    5.  Financial concerns  -Continue to work with social work.     Follow Up:   1 weeks     GEORGIA Grimes    Hematology and Oncology     Total time of patient care including time prior to, face to face with patient, and following visit spent in reviewing records, lab results, imaging studies, discussion with patient, and documentation/charting was > 42 minutes

## 2024-01-15 NOTE — SIGNIFICANT NOTE
Patient notified of his MRI at 1 PM today at [facility]. Patient knows to not eat/drink between now and then. Pt will call for navigational needs.

## 2024-01-16 ENCOUNTER — TELEPHONE (OUTPATIENT)
Dept: INFUSION THERAPY | Facility: HOSPITAL | Age: 54
End: 2024-01-16
Payer: COMMERCIAL

## 2024-01-16 RX ORDER — BROMPHENIRAMINE MALEATE, PSEUDOEPHEDRINE HYDROCHLORIDE, AND DEXTROMETHORPHAN HYDROBROMIDE 2; 30; 10 MG/5ML; MG/5ML; MG/5ML
10 SYRUP ORAL EVERY 6 HOURS PRN
COMMUNITY

## 2024-01-16 NOTE — TELEPHONE ENCOUNTER
Pt contacted as pre-procedure phone call prior to planned biopsy of lung and liver lesion for 1/18/24. Reviewed with patient arrival time, location, nothing to eat or drink by mouth, okay to take medications morning of procedure with a small sip of water,  needed, reviewed procedure instructions and allowed time for questions, and reviewed home medications, allergies, and medical history. Mr. Powell reports that he had notified Dr. Cronin's office last week that he had a sore throat, tongue soreness, and some sinus drainage. He states that the office requested that he go to a [facility] to be tested for COVID/FLU/Strep, which he reports all came back negative.

## 2024-01-18 ENCOUNTER — HOSPITAL ENCOUNTER (OUTPATIENT)
Dept: GENERAL RADIOLOGY | Facility: HOSPITAL | Age: 54
Discharge: HOME | End: 2024-01-18
Payer: COMMERCIAL

## 2024-01-18 ENCOUNTER — HOSPITAL ENCOUNTER (OUTPATIENT)
Dept: CT IMAGING | Facility: HOSPITAL | Age: 54
Discharge: HOME | End: 2024-01-18
Payer: COMMERCIAL

## 2024-01-18 VITALS
RESPIRATION RATE: 16 BRPM | SYSTOLIC BLOOD PRESSURE: 122 MMHG | OXYGEN SATURATION: 95 % | TEMPERATURE: 97.6 F | DIASTOLIC BLOOD PRESSURE: 64 MMHG | WEIGHT: 245 LBS | HEART RATE: 57 BPM | BODY MASS INDEX: 33.18 KG/M2 | HEIGHT: 72 IN

## 2024-01-18 VITALS — OXYGEN SATURATION: 96 % | HEART RATE: 50 BPM | DIASTOLIC BLOOD PRESSURE: 54 MMHG | SYSTOLIC BLOOD PRESSURE: 117 MMHG

## 2024-01-18 DIAGNOSIS — C20 RECTAL CANCER: ICD-10-CM

## 2024-01-18 DIAGNOSIS — R91.8 RIGHT LOWER LOBE LUNG MASS: ICD-10-CM

## 2024-01-18 DIAGNOSIS — R91.8 MASS OF LOWER LOBE OF LEFT LUNG: ICD-10-CM

## 2024-01-18 LAB
BASOPHILS # BLD AUTO: 0.03 10*3/MM3 (ref 0–0.2)
BASOPHILS NFR BLD AUTO: 0.4 % (ref 0–1.5)
DEPRECATED RDW RBC AUTO: 49.1 FL (ref 37–54)
EOSINOPHIL # BLD AUTO: 0.14 10*3/MM3 (ref 0–0.4)
EOSINOPHIL NFR BLD AUTO: 1.7 % (ref 0.3–6.2)
ERYTHROCYTE [DISTWIDTH] IN BLOOD BY AUTOMATED COUNT: 18.1 % (ref 12.3–15.4)
HCT VFR BLD AUTO: 35.9 % (ref 37.5–51)
HGB BLD-MCNC: 11.1 G/DL (ref 13–17.7)
IMM GRANULOCYTES # BLD AUTO: 0.03 10*3/MM3 (ref 0–0.05)
IMM GRANULOCYTES NFR BLD AUTO: 0.4 % (ref 0–0.5)
INR PPP: 1.08 (ref 0.89–1.12)
LYMPHOCYTES # BLD AUTO: 1.57 10*3/MM3 (ref 0.7–3.1)
LYMPHOCYTES NFR BLD AUTO: 18.6 % (ref 19.6–45.3)
MCH RBC QN AUTO: 23.9 PG (ref 26.6–33)
MCHC RBC AUTO-ENTMCNC: 30.9 G/DL (ref 31.5–35.7)
MCV RBC AUTO: 77.4 FL (ref 79–97)
MONOCYTES # BLD AUTO: 0.78 10*3/MM3 (ref 0.1–0.9)
MONOCYTES NFR BLD AUTO: 9.2 % (ref 5–12)
NEUTROPHILS NFR BLD AUTO: 5.89 10*3/MM3 (ref 1.7–7)
NEUTROPHILS NFR BLD AUTO: 69.7 % (ref 42.7–76)
NRBC BLD AUTO-RTO: 0 /100 WBC (ref 0–0.2)
PLATELET # BLD AUTO: 212 10*3/MM3 (ref 140–450)
PMV BLD AUTO: 11.5 FL (ref 6–12)
PROTHROMBIN TIME: 14.1 SECONDS (ref 12.2–14.5)
RBC # BLD AUTO: 4.64 10*6/MM3 (ref 4.14–5.8)
WBC NRBC COR # BLD AUTO: 8.44 10*3/MM3 (ref 3.4–10.8)

## 2024-01-18 PROCEDURE — 25010000002 FENTANYL CITRATE (PF) 50 MCG/ML SOLUTION: Performed by: RADIOLOGY

## 2024-01-18 PROCEDURE — 88307 TISSUE EXAM BY PATHOLOGIST: CPT | Performed by: INTERNAL MEDICINE

## 2024-01-18 PROCEDURE — 88341 IMHCHEM/IMCYTCHM EA ADD ANTB: CPT | Performed by: INTERNAL MEDICINE

## 2024-01-18 PROCEDURE — 25010000002 MIDAZOLAM PER 1 MG: Performed by: RADIOLOGY

## 2024-01-18 PROCEDURE — 99152 MOD SED SAME PHYS/QHP 5/>YRS: CPT

## 2024-01-18 PROCEDURE — 85610 PROTHROMBIN TIME: CPT | Performed by: RADIOLOGY

## 2024-01-18 PROCEDURE — 99153 MOD SED SAME PHYS/QHP EA: CPT

## 2024-01-18 PROCEDURE — 71045 X-RAY EXAM CHEST 1 VIEW: CPT

## 2024-01-18 PROCEDURE — 85025 COMPLETE CBC W/AUTO DIFF WBC: CPT | Performed by: RADIOLOGY

## 2024-01-18 PROCEDURE — 25010000002 LIDOCAINE 1 % SOLUTION: Performed by: RADIOLOGY

## 2024-01-18 PROCEDURE — 77012 CT SCAN FOR NEEDLE BIOPSY: CPT

## 2024-01-18 PROCEDURE — 25510000001 IOPAMIDOL 61 % SOLUTION: Performed by: INTERNAL MEDICINE

## 2024-01-18 PROCEDURE — 88342 IMHCHEM/IMCYTCHM 1ST ANTB: CPT | Performed by: INTERNAL MEDICINE

## 2024-01-18 RX ORDER — MIDAZOLAM HYDROCHLORIDE 1 MG/ML
INJECTION INTRAMUSCULAR; INTRAVENOUS
Status: DISPENSED
Start: 2024-01-18 | End: 2024-01-18

## 2024-01-18 RX ORDER — FENTANYL CITRATE 50 UG/ML
INJECTION, SOLUTION INTRAMUSCULAR; INTRAVENOUS AS NEEDED
Status: COMPLETED | OUTPATIENT
Start: 2024-01-18 | End: 2024-01-18

## 2024-01-18 RX ORDER — LIDOCAINE HYDROCHLORIDE 10 MG/ML
20 INJECTION, SOLUTION INFILTRATION; PERINEURAL ONCE
Status: COMPLETED | OUTPATIENT
Start: 2024-01-18 | End: 2024-01-18

## 2024-01-18 RX ORDER — SODIUM CHLORIDE 9 MG/ML
40 INJECTION, SOLUTION INTRAVENOUS AS NEEDED
Status: DISCONTINUED | OUTPATIENT
Start: 2024-01-18 | End: 2024-01-19 | Stop reason: HOSPADM

## 2024-01-18 RX ORDER — SODIUM CHLORIDE 0.9 % (FLUSH) 0.9 %
10 SYRINGE (ML) INJECTION EVERY 12 HOURS SCHEDULED
Status: DISCONTINUED | OUTPATIENT
Start: 2024-01-18 | End: 2024-01-19 | Stop reason: HOSPADM

## 2024-01-18 RX ORDER — HYDROCODONE BITARTRATE AND ACETAMINOPHEN 7.5; 325 MG/1; MG/1
1 TABLET ORAL EVERY 4 HOURS PRN
Status: DISCONTINUED | OUTPATIENT
Start: 2024-01-18 | End: 2024-01-19 | Stop reason: HOSPADM

## 2024-01-18 RX ORDER — HYDROCODONE BITARTRATE AND ACETAMINOPHEN 7.5; 325 MG/1; MG/1
1 TABLET ORAL EVERY 4 HOURS PRN
Status: CANCELLED | OUTPATIENT
Start: 2024-01-18 | End: 2024-01-23

## 2024-01-18 RX ORDER — HEPARIN SODIUM (PORCINE) LOCK FLUSH IV SOLN 100 UNIT/ML 100 UNIT/ML
500 SOLUTION INTRAVENOUS ONCE
Status: DISCONTINUED | OUTPATIENT
Start: 2024-01-18 | End: 2024-01-18 | Stop reason: SDUPTHER

## 2024-01-18 RX ORDER — HEPARIN SODIUM (PORCINE) LOCK FLUSH IV SOLN 100 UNIT/ML 100 UNIT/ML
500 SOLUTION INTRAVENOUS ONCE
Status: DISCONTINUED | OUTPATIENT
Start: 2024-01-18 | End: 2024-01-19 | Stop reason: HOSPADM

## 2024-01-18 RX ORDER — FENTANYL CITRATE 50 UG/ML
INJECTION, SOLUTION INTRAMUSCULAR; INTRAVENOUS
Status: DISPENSED
Start: 2024-01-18 | End: 2024-01-18

## 2024-01-18 RX ORDER — MIDAZOLAM HYDROCHLORIDE 1 MG/ML
INJECTION INTRAMUSCULAR; INTRAVENOUS AS NEEDED
Status: COMPLETED | OUTPATIENT
Start: 2024-01-18 | End: 2024-01-18

## 2024-01-18 RX ORDER — SODIUM CHLORIDE 0.9 % (FLUSH) 0.9 %
10 SYRINGE (ML) INJECTION AS NEEDED
Status: DISCONTINUED | OUTPATIENT
Start: 2024-01-18 | End: 2024-01-19 | Stop reason: HOSPADM

## 2024-01-18 RX ADMIN — IOPAMIDOL 75 ML: 612 INJECTION, SOLUTION INTRAVENOUS at 10:35

## 2024-01-18 RX ADMIN — MIDAZOLAM HYDROCHLORIDE 1 MG: 1 INJECTION, SOLUTION INTRAMUSCULAR; INTRAVENOUS at 10:58

## 2024-01-18 RX ADMIN — FENTANYL CITRATE 25 MCG: 50 INJECTION, SOLUTION INTRAMUSCULAR; INTRAVENOUS at 11:00

## 2024-01-18 RX ADMIN — MIDAZOLAM HYDROCHLORIDE 0.5 MG: 1 INJECTION, SOLUTION INTRAMUSCULAR; INTRAVENOUS at 11:23

## 2024-01-18 RX ADMIN — FENTANYL CITRATE 50 MCG: 50 INJECTION, SOLUTION INTRAMUSCULAR; INTRAVENOUS at 11:29

## 2024-01-18 RX ADMIN — LIDOCAINE HYDROCHLORIDE 10 ML: 10 INJECTION, SOLUTION INFILTRATION; PERINEURAL at 11:21

## 2024-01-18 RX ADMIN — FENTANYL CITRATE 25 MCG: 50 INJECTION, SOLUTION INTRAMUSCULAR; INTRAVENOUS at 11:23

## 2024-01-18 RX ADMIN — HYDROCODONE BITARTRATE AND ACETAMINOPHEN 1 TABLET: 7.5; 325 TABLET ORAL at 12:56

## 2024-01-18 RX ADMIN — FENTANYL CITRATE 50 MCG: 50 INJECTION, SOLUTION INTRAMUSCULAR; INTRAVENOUS at 10:58

## 2024-01-18 RX ADMIN — MIDAZOLAM HYDROCHLORIDE 1 MG: 1 INJECTION, SOLUTION INTRAMUSCULAR; INTRAVENOUS at 11:29

## 2024-01-18 RX ADMIN — LIDOCAINE HYDROCHLORIDE 10 ML: 10 INJECTION, SOLUTION INFILTRATION; PERINEURAL at 10:42

## 2024-01-18 NOTE — NURSING NOTE
Image guided liver biopsy and left lung biopsy performed per Dr Thomson. Patient tolerated procedure well. VSS. 2.5mg of versed and 150mcg of fentanyl given for sedation time of 35min. Dressings applied to left upper back and rlq. Report given to anum rn

## 2024-01-19 ENCOUNTER — TELEPHONE (OUTPATIENT)
Dept: INFUSION THERAPY | Facility: HOSPITAL | Age: 54
End: 2024-01-19
Payer: COMMERCIAL

## 2024-01-22 ENCOUNTER — NURSE NAVIGATOR (OUTPATIENT)
Dept: ONCOLOGY | Facility: CLINIC | Age: 54
End: 2024-01-22
Payer: COMMERCIAL

## 2024-01-22 ENCOUNTER — OFFICE VISIT (OUTPATIENT)
Dept: ONCOLOGY | Facility: CLINIC | Age: 54
End: 2024-01-22
Payer: COMMERCIAL

## 2024-01-22 ENCOUNTER — PATIENT OUTREACH (OUTPATIENT)
Dept: ONCOLOGY | Facility: HOSPITAL | Age: 54
End: 2024-01-22
Payer: COMMERCIAL

## 2024-01-22 ENCOUNTER — INFUSION (OUTPATIENT)
Dept: ONCOLOGY | Facility: HOSPITAL | Age: 54
End: 2024-01-22
Payer: COMMERCIAL

## 2024-01-22 VITALS
WEIGHT: 235 LBS | DIASTOLIC BLOOD PRESSURE: 70 MMHG | HEIGHT: 72 IN | HEART RATE: 86 BPM | TEMPERATURE: 97.3 F | OXYGEN SATURATION: 99 % | BODY MASS INDEX: 31.83 KG/M2 | RESPIRATION RATE: 16 BRPM | SYSTOLIC BLOOD PRESSURE: 139 MMHG

## 2024-01-22 VITALS
WEIGHT: 233.9 LBS | BODY MASS INDEX: 31.72 KG/M2 | TEMPERATURE: 98.4 F | HEART RATE: 92 BPM | SYSTOLIC BLOOD PRESSURE: 125 MMHG | DIASTOLIC BLOOD PRESSURE: 71 MMHG

## 2024-01-22 DIAGNOSIS — C20 RECTAL CANCER METASTASIZED TO LIVER: Primary | ICD-10-CM

## 2024-01-22 DIAGNOSIS — C78.7 RECTAL CANCER METASTASIZED TO LIVER: Primary | ICD-10-CM

## 2024-01-22 LAB
ALBUMIN SERPL-MCNC: 4 G/DL (ref 3.5–5.2)
ALBUMIN/GLOB SERPL: 1.1 G/DL
ALP SERPL-CCNC: 111 U/L (ref 39–117)
ALT SERPL W P-5'-P-CCNC: 8 U/L (ref 1–41)
ANION GAP SERPL CALCULATED.3IONS-SCNC: 10.9 MMOL/L (ref 5–15)
ANISOCYTOSIS BLD QL: NORMAL
AST SERPL-CCNC: 13 U/L (ref 1–40)
BASOPHILS # BLD AUTO: 0.02 10*3/MM3 (ref 0–0.2)
BASOPHILS NFR BLD AUTO: 0.2 % (ref 0–1.5)
BILIRUB SERPL-MCNC: 0.3 MG/DL (ref 0–1.2)
BUN SERPL-MCNC: 11 MG/DL (ref 6–20)
BUN/CREAT SERPL: 11.3 (ref 7–25)
CALCIUM SPEC-SCNC: 8.9 MG/DL (ref 8.6–10.5)
CHLORIDE SERPL-SCNC: 101 MMOL/L (ref 98–107)
CO2 SERPL-SCNC: 23.1 MMOL/L (ref 22–29)
CREAT SERPL-MCNC: 0.97 MG/DL (ref 0.76–1.27)
DEPRECATED RDW RBC AUTO: 45.9 FL (ref 37–54)
EGFRCR SERPLBLD CKD-EPI 2021: 92.8 ML/MIN/1.73
EOSINOPHIL # BLD AUTO: 0.12 10*3/MM3 (ref 0–0.4)
EOSINOPHIL NFR BLD AUTO: 1.3 % (ref 0.3–6.2)
ERYTHROCYTE [DISTWIDTH] IN BLOOD BY AUTOMATED COUNT: 17.4 % (ref 12.3–15.4)
GLOBULIN UR ELPH-MCNC: 3.7 GM/DL
GLUCOSE SERPL-MCNC: 110 MG/DL (ref 65–99)
HCT VFR BLD AUTO: 37.2 % (ref 37.5–51)
HGB BLD-MCNC: 11.6 G/DL (ref 13–17.7)
IMM GRANULOCYTES # BLD AUTO: 0.03 10*3/MM3 (ref 0–0.05)
IMM GRANULOCYTES NFR BLD AUTO: 0.3 % (ref 0–0.5)
LYMPHOCYTES # BLD AUTO: 1.32 10*3/MM3 (ref 0.7–3.1)
LYMPHOCYTES NFR BLD AUTO: 14.7 % (ref 19.6–45.3)
MCH RBC QN AUTO: 23.1 PG (ref 26.6–33)
MCHC RBC AUTO-ENTMCNC: 31.2 G/DL (ref 31.5–35.7)
MCV RBC AUTO: 74.1 FL (ref 79–97)
MICROCYTES BLD QL: NORMAL
MONOCYTES # BLD AUTO: 0.58 10*3/MM3 (ref 0.1–0.9)
MONOCYTES NFR BLD AUTO: 6.4 % (ref 5–12)
NEUTROPHILS NFR BLD AUTO: 6.94 10*3/MM3 (ref 1.7–7)
NEUTROPHILS NFR BLD AUTO: 77.1 % (ref 42.7–76)
NRBC BLD AUTO-RTO: 0 /100 WBC (ref 0–0.2)
PLATELET # BLD AUTO: 211 10*3/MM3 (ref 140–450)
PMV BLD AUTO: 10.3 FL (ref 6–12)
POTASSIUM SERPL-SCNC: 3.8 MMOL/L (ref 3.5–5.2)
PROT SERPL-MCNC: 7.7 G/DL (ref 6–8.5)
RBC # BLD AUTO: 5.02 10*6/MM3 (ref 4.14–5.8)
SMALL PLATELETS BLD QL SMEAR: ADEQUATE
SODIUM SERPL-SCNC: 135 MMOL/L (ref 136–145)
WBC MORPH BLD: NORMAL
WBC NRBC COR # BLD AUTO: 9.01 10*3/MM3 (ref 3.4–10.8)

## 2024-01-22 PROCEDURE — 96416 CHEMO PROLONG INFUSE W/PUMP: CPT

## 2024-01-22 PROCEDURE — 96413 CHEMO IV INFUSION 1 HR: CPT

## 2024-01-22 PROCEDURE — 0 DEXTROSE 5 % SOLUTION 250 ML FLEX CONT: Performed by: INTERNAL MEDICINE

## 2024-01-22 PROCEDURE — 85007 BL SMEAR W/DIFF WBC COUNT: CPT | Performed by: INTERNAL MEDICINE

## 2024-01-22 PROCEDURE — 25010000002 LEUCOVORIN CALCIUM PER 50MG: Performed by: INTERNAL MEDICINE

## 2024-01-22 PROCEDURE — 0 DEXTROSE 5 % SOLUTION: Performed by: INTERNAL MEDICINE

## 2024-01-22 PROCEDURE — 25810000003 SODIUM CHLORIDE 0.9 % SOLUTION 250 ML FLEX CONT: Performed by: INTERNAL MEDICINE

## 2024-01-22 PROCEDURE — 85025 COMPLETE CBC W/AUTO DIFF WBC: CPT | Performed by: INTERNAL MEDICINE

## 2024-01-22 PROCEDURE — 96366 THER/PROPH/DIAG IV INF ADDON: CPT

## 2024-01-22 PROCEDURE — G0498 CHEMO EXTEND IV INFUS W/PUMP: HCPCS

## 2024-01-22 PROCEDURE — 80053 COMPREHEN METABOLIC PANEL: CPT | Performed by: INTERNAL MEDICINE

## 2024-01-22 PROCEDURE — 25010000002 OXALIPLATIN PER 0.5 MG: Performed by: INTERNAL MEDICINE

## 2024-01-22 PROCEDURE — 25010000002 DEXAMETHASONE SODIUM PHOSPHATE 20 MG/5ML SOLUTION: Performed by: INTERNAL MEDICINE

## 2024-01-22 PROCEDURE — 96375 TX/PRO/DX INJ NEW DRUG ADDON: CPT

## 2024-01-22 PROCEDURE — 96368 THER/DIAG CONCURRENT INF: CPT

## 2024-01-22 PROCEDURE — 96415 CHEMO IV INFUSION ADDL HR: CPT

## 2024-01-22 PROCEDURE — 25010000002 FLUOROURACIL PER 500 MG: Performed by: INTERNAL MEDICINE

## 2024-01-22 PROCEDURE — 25010000002 PALONOSETRON 0.25 MG/5ML SOLUTION PREFILLED SYRINGE: Performed by: INTERNAL MEDICINE

## 2024-01-22 RX ORDER — PALONOSETRON 0.05 MG/ML
0.25 INJECTION, SOLUTION INTRAVENOUS ONCE
Status: COMPLETED | OUTPATIENT
Start: 2024-01-22 | End: 2024-01-22

## 2024-01-22 RX ORDER — DEXTROSE MONOHYDRATE 50 MG/ML
20 INJECTION, SOLUTION INTRAVENOUS ONCE
Status: CANCELLED | OUTPATIENT
Start: 2024-01-22

## 2024-01-22 RX ORDER — DEXTROSE MONOHYDRATE 50 MG/ML
20 INJECTION, SOLUTION INTRAVENOUS ONCE
Status: COMPLETED | OUTPATIENT
Start: 2024-01-22 | End: 2024-01-22

## 2024-01-22 RX ORDER — FAMOTIDINE 10 MG/ML
20 INJECTION, SOLUTION INTRAVENOUS AS NEEDED
Status: DISCONTINUED | OUTPATIENT
Start: 2024-01-22 | End: 2024-01-22 | Stop reason: HOSPADM

## 2024-01-22 RX ORDER — OXYCODONE HYDROCHLORIDE 5 MG/1
10 TABLET ORAL ONCE
Status: COMPLETED | OUTPATIENT
Start: 2024-01-22 | End: 2024-01-22

## 2024-01-22 RX ORDER — PALONOSETRON 0.05 MG/ML
0.25 INJECTION, SOLUTION INTRAVENOUS ONCE
Status: CANCELLED | OUTPATIENT
Start: 2024-01-22

## 2024-01-22 RX ORDER — FAMOTIDINE 10 MG/ML
20 INJECTION, SOLUTION INTRAVENOUS AS NEEDED
Status: CANCELLED | OUTPATIENT
Start: 2024-01-22

## 2024-01-22 RX ORDER — CETIRIZINE HYDROCHLORIDE 10 MG/1
10 TABLET ORAL DAILY
Qty: 30 TABLET | Refills: 2 | Status: SHIPPED | OUTPATIENT
Start: 2024-01-22

## 2024-01-22 RX ORDER — OXYCODONE HYDROCHLORIDE 5 MG/1
10 TABLET ORAL ONCE
Status: CANCELLED
Start: 2024-01-22 | End: 2024-01-22

## 2024-01-22 RX ORDER — DIPHENHYDRAMINE HYDROCHLORIDE 50 MG/ML
50 INJECTION INTRAMUSCULAR; INTRAVENOUS AS NEEDED
Status: CANCELLED | OUTPATIENT
Start: 2024-01-22

## 2024-01-22 RX ORDER — DIPHENHYDRAMINE HYDROCHLORIDE 50 MG/ML
50 INJECTION INTRAMUSCULAR; INTRAVENOUS AS NEEDED
Status: DISCONTINUED | OUTPATIENT
Start: 2024-01-22 | End: 2024-01-22 | Stop reason: HOSPADM

## 2024-01-22 RX ADMIN — DEXAMETHASONE SODIUM PHOSPHATE 12 MG: 4 INJECTION, SOLUTION INTRAMUSCULAR; INTRAVENOUS at 10:30

## 2024-01-22 RX ADMIN — OXYCODONE HYDROCHLORIDE 10 MG: 5 TABLET ORAL at 11:18

## 2024-01-22 RX ADMIN — OXALIPLATIN 200 MG: 5 INJECTION, SOLUTION INTRAVENOUS at 10:56

## 2024-01-22 RX ADMIN — LEUCOVORIN CALCIUM 930 MG: 10 INJECTION INTRAMUSCULAR; INTRAVENOUS at 10:56

## 2024-01-22 RX ADMIN — FLUOROURACIL 5590 MG: 50 INJECTION, SOLUTION INTRAVENOUS at 13:04

## 2024-01-22 RX ADMIN — PALONOSETRON 0.25 MG: 0.25 INJECTION, SOLUTION INTRAVENOUS at 10:28

## 2024-01-22 RX ADMIN — DEXTROSE MONOHYDRATE 20 ML/HR: 50 INJECTION, SOLUTION INTRAVENOUS at 10:56

## 2024-01-22 NOTE — PROGRESS NOTES
Started with nausea 24 hours into infusion. Developed low energy at the same time for remainder of the day, unsure if side effect of antinausea medication or timing. Slept most of day. Energy was fine the next day. Got nauseated again on Saturday and took another zofran with resolution of nausea but profound fatigue again. No emesis. Diarrhea after preemptively adding laxative around the time of biopsy. Stools have been more regular length, still narrow caliber on twice daily stool softener.   No mouth sores.   Was seen at urgent care for sore throat, sinus drainage. COVID/FLU/STREP negative and started on cough syrup for sinusitis.   Tongue is mildly sore.   Pain is controlled on oxycodone 10 mg-15 mg after he saw palliative care, pain was increased yesterday,

## 2024-01-22 NOTE — PROGRESS NOTES
[facility] Multidisciplinary Conference: Initial Treatment Plan       Treating Physician: Dr. Brenda Cronin with Dr. Keny Aguilar Presenting  Pathologist at Tumor Board: Dr. Toni Bagley  Radiologist at Tumor Board: Dr. Roman Barillas  Radiologist who Read MRI Pelvis with Rectal Protocol: Dr. Min Nguyen  Initial Presentation at Tumor Board: 24  Encounter Provider: Rosaline Campos RN  Place of Service: [facility]  Patient Care Team:  Provider, No Known as PCP - General    Patient Name: Edward Powell  Age/Sex: 53 y.o. male  : 1970  MRN: [id number]  Address: [address] [place] [address]  Home Phone: [phone]     ENDOSCOPIC / IMAGING EVALUATION     Procedure Date: 2023         Performing Facility: [facility]    Tumor Distance from anal verge (cm): 10.6 CM    Tumor Description:        Obstruction (Partial)       Infiltrative and ulcerated partially obstructing large mass in the proximal rectum and in the mid rectum at about 10 CM. Mass was circumferential and measured 10 CM in length.    Tumor Location:   At 10.6 CM from tumor to Anal Verge    Indication of Sphincter involvement: No    CEA Level:   CEA   Date Value Ref Range Status   2024 39.20 ng/mL Final   2024 34.00 ng/mL Final       Pretreatment circumferential resection margin status:  Shortest distance of the definitive tumor border to the MRF: 1 MM. At 12 o'clock, just below the APR.    IMAGING   CT (CAP) date: CT A/P-23; and CT Chest-23   PET/CT date: 2023 MRI Pelvis w/ Rectal Protocol date: 1/15/2024       BIOPSY RESULTS   Histologic Type: Adenocarcinoma, Invasive  Differentiation: Moderately Differentiated  MSI: Intact  Specimen Site and Character: 10.6 CM from tumor to Anal Verge  Lymphovascular Invasion: No  Lymph Nodes: Mesorectal lymph nodes suspicious, yes, around 8 of measurable size. There is a 0.8 x 0.6 CM lymph node within the posterior mesorectum at 12 o'clock position that abuts  the MRF. Extramesorectal lymph nodes, yes. Internal iliac, left greater than right, external iliac, right greater than left, obturator is left greater than right an inguinal is borderline enlarged bilaterally.    CLINICAL STAGING        Stage IV: T 3C, N 2, and M 1    GUIDELINE CONCORDANCE   Recommended treatment is in accordance with National Comprehensive Cancer Network (NCCN): Yes    REFERRAL SUPPORT   Referral Support options reviewed and outcomes:       Medical Oncology: Initiated  Sees Dr. Brenda Cronin       Palliative Care Consultation: PRN          Research: No       Pharmacy/Chemo Teach with APRN: Yes       Nutrition: Yes      Rosaline Campos, RN - 01/22/24, 3:48 PM EST

## 2024-01-22 NOTE — PROGRESS NOTES
Patient's Treatment Discussion summary from [facility] on 1/22/24 was sent to the presenting physicians, Dr. Brenda Cronin and Dr. Aguilar, who presented pt at TB for Dr. Cronin (per secured email) on 1/22/24.

## 2024-01-22 NOTE — SIGNIFICANT NOTE
Met with patient today to follow up from his first treatment received in [place]. Gas card provided. Patient is working with  in [place] for resources.

## 2024-01-22 NOTE — PROGRESS NOTES
Hematology and Oncology [place]  Office number [phone]    Fax number [phone]     Follow up     Date: 24      Patient Name: Edward Powell  MRN: [id number]  : 1970    Referring Physician: Dr. Gautam    Chief Complaint: Rectal cancer, lung mass, liver lesions    Cancer Staging: Presumptive stage IV    History of Present Illness: Edward Powell is a pleasant 53 y.o. male who presents today for evaluation of rectal cancer in the company of his supportive significant other.    Patient has a longstanding history of intermittent diarrhea since his cholecystectomy in 2019.  However he developed progressive diarrhea with associated loss of bowel control and urgency as well as weight loss and fatigue prompting additional workup.    CT of the abdomen pelvis 2023 showed an irregular circumferential wall thickening involving the rectum concerning for mass lesion.  There was associated mesorectal lymphadenopathy.  Masslike consolidation of left lower lobe.  CT of the chest showed a cavitary lesion in the left lower lobe up to 4.8 cm with an adjacent cavitary nodule up to 2 cm and a 5 mm nodule on the right minor fissure.      He underwent colonoscopy 2023 with findings of an infiltrative and ulcerated partially obstructing mass in the proximal rectum spanning 10 cm.  Rectal polyps.  Biopsy of the rectal mass showed invasive moderately differentiated adenocarcinoma.  Additional biopsies showed tubular adenomas.  MSI testing was intact/low probability of MSI high.    PET/CT 2023 showed hypermetabolic rectal wall thickening compatible with known rectal malignancy.  Multiple small ill-defined hypoechoic hyper metabolic liver lesions compatible with metastatic disease.  Mildly enlarged and mildly hypermetabolic pelvic sidewall and internal iliac lymph node chain adenopathy.  Hypermetabolic lung mass with adjacent nodule.     The patient has been experiencing substantial rectal  pain.  He is taking oxycodone every 6 hours with partial relief.  He reports ongoing bowel movements.  No abdominal pain.  No vomiting.  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  He underwent liver biopsy and lung biopsy January 2024.  Results of both confirmed metastatic colorectal cancer.    Started with nausea 24 hours into cycle 1 of infusion. Developed low energy at the same time for remainder of the day, unsure if side effect of antinausea medication or timing. Slept most of day. Energy was fine the next day. Got nauseated again on Saturday and took another zofran with resolution of nausea but profound fatigue again. No emesis.  He developed rebound diarrhea after preemptively adding laxative around the time of biopsy. Stools have been more regular length, still narrow caliber on twice daily stool softener.   No mouth sores.   Was seen at urgent care for sore throat, sinus drainage. COVID/FLU/STREP negative and started on cough syrup for sinusitis.  Symptoms are improved.  Tongue is mildly sore.   Pain is controlled on oxycodone 10 mg-15 mg every 6 hours as needed after he saw palliative care.  He has questions regarding prognosis, resectability and treatment goals.    Past Medical History:   Past Medical History:   Diagnosis Date    Arthritis     Cancer     rectal cancer - diagnosed 2023    Cholelithiasis 2019    Removed    COPD (chronic obstructive pulmonary disease)     Coronary artery disease 2009    Stent - no cardiologist currently    Elevated cholesterol     GERD (gastroesophageal reflux disease)     Hernia 2003    Lower hernia    Perforated ulcer 2019    Sleep apnea     history of; when weighed over 400lbs - no issues following bariatric surgery       Past Surgical History:   Past Surgical History:   Procedure Laterality  Date    APPENDECTOMY  1983    Removed    BARIATRIC SURGERY  2011    Gastric bypass    BLADDER TUMOR/ULCER BLEEDER CAUTERIZATION      CARDIAC CATHETERIZATION  2009    stent placed    CHOLECYSTECTOMY  2019    Removed    COLONOSCOPY N/A 12/07/2023    Procedure: COLONOSCOPY WITH HOT SNARE POLYPECTOMY AND TATTOO;  Surgeon: Noman Gautam MD;  Location: [facility] ENDOSCOPY;  Service: Gastroenterology;  Laterality: N/A;    PORTACATH PLACEMENT N/A 1/5/2024    Procedure: INSERTION OF PORTACATH WITH ULTRSOUND AND FLUOROSCOPIC GUIDANCE;  Surgeon: Ida Black MD;  Location: [facility] OR;  Service: General;  Laterality: N/A;    JENNIFER-EN-Y         Family History: No family history on file.    Social History:   Social History     Socioeconomic History    Marital status:    Tobacco Use    Smoking status: Every Day     Packs/day: 1.50     Years: 15.00     Additional pack years: 0.00     Total pack years: 22.50     Types: Cigarettes    Smokeless tobacco: Never    Tobacco comments:     35 years      pt reports closer to 2 packs per day since cancer diagnosis   Vaping Use    Vaping Use: Never used   Substance and Sexual Activity    Alcohol use: Never    Drug use: Never    Sexual activity: Defer       Medications:     Current Outpatient Medications:     LORazepam (ATIVAN) 0.5 MG tablet, Take 1 tablet by mouth Take As Directed. 1 tabelt by mouth 30 minutes prior to MRI, take a second tablet at the time of the MRI if needed., Disp: 2 tablet, Rfl: 0    acetaminophen (TYLENOL) 325 MG tablet, Take 2 tablets by mouth Every 6 (Six) Hours As Needed for Mild Pain., Disp: , Rfl:     brompheniramine-pseudoephedrine-DM 30-2-10 MG/5ML syrup, Take 10 mL by mouth Every 6 (Six) Hours As Needed for Congestion or Cough., Disp: , Rfl:     cetirizine (zyrTEC) 10 MG tablet, Take 1 tablet by mouth Daily., Disp: 30 tablet, Rfl: 2    dicyclomine (BENTYL) 20 MG tablet, Take 1 tablet by mouth 3 (Three) Times a Day As Needed for  "Abdominal Cramping., Disp: 60 tablet, Rfl: 0    docusate sodium (COLACE) 100 MG capsule, Take 1 capsule by mouth 2 (Two) Times a Day., Disp: , Rfl:     Hydrocortisone, Perianal, (ANUSOL-HC) 2.5 % rectal cream, Insert  into the rectum 2 (Two) Times a Day. Indications: Inflamed Hemorrhoids, Disp: 30 g, Rfl: 1    lidocaine-prilocaine (EMLA) 2.5-2.5 % cream, Apply 1 application  topically to the appropriate area as directed As Needed (45-60 minutes prior to port access.  Cover with saran/plastic wrap.)., Disp: 30 g, Rfl: 3    naloxone (NARCAN) 4 MG/0.1ML nasal spray, 1 spray into the nostril(s) as directed by provider As Needed for Opioid Reversal., Disp: 1 each, Rfl: 0    ondansetron (ZOFRAN) 8 MG tablet, Take 1 tablet by mouth 3 (Three) Times a Day As Needed for Nausea or Vomiting., Disp: 30 tablet, Rfl: 5    oxyCODONE (ROXICODONE) 10 MG tablet, Take 1 tablet by mouth Every 6 (Six) Hours As Needed for Severe Pain or Moderate Pain., Disp: 120 tablet, Rfl: 0    pantoprazole (Protonix) 40 MG EC tablet, Take 1 tablet by mouth Daily. Indications: Gastroesophageal Reflux Disease, Disp: 90 tablet, Rfl: 1    tiotropium bromide-olodaterol (STIOLTO RESPIMAT) 2.5-2.5 MCG/ACT aerosol solution inhaler, Inhale 2 puffs Daily., Disp: 1 each, Rfl: 5  No current facility-administered medications for this visit.    Allergies:   Allergies   Allergen Reactions    Bactrim [Sulfamethoxazole-Trimethoprim] Hives     and blisters    Sulfa Antibiotics Hives     and blisters       Objective     Vital Signs:   Vitals:    01/22/24 0905   BP: 139/70   Pulse: 86   Resp: 16   Temp: 97.3 °F (36.3 °C)   SpO2: 99%   Weight: 107 kg (235 lb)   Height: 182.9 cm (72\")   PainSc: 0-No pain      Body mass index is 31.87 kg/m².   Pain Score    01/22/24 0905   PainSc: 0-No pain         ECOG Performance Status: 1 - Symptomatic but completely ambulatory    Physical Exam:   General: No acute distress. Well appearing   HEENT: Normocephalic, atraumatic. Sclera " anicteric.  OP clear  Neck: supple, no adenopathy.   Cardiovascular: regular rate and rhythm. No murmurs.   Respiratory: Normal rate. Clear to auscultation bilaterally  Abdomen: Soft, nontender, non distended with normoactive bowel sounds  Lymph: no cervical, supraclavicular or axillary adenopathy  Neuro: Alert and oriented x 3. No focal deficits.   Ext: Symmetric, no swelling.   Psych: Euthymic      Laboratory/Imaging Reviewed:   Infusion on 01/22/2024   Component Date Value Ref Range Status    Glucose 01/22/2024 110 (H)  65 - 99 mg/dL Final    BUN 01/22/2024 11  6 - 20 mg/dL Final    Creatinine 01/22/2024 0.97  0.76 - 1.27 mg/dL Final    Sodium 01/22/2024 135 (L)  136 - 145 mmol/L Final    Potassium 01/22/2024 3.8  3.5 - 5.2 mmol/L Final    Chloride 01/22/2024 101  98 - 107 mmol/L Final    CO2 01/22/2024 23.1  22.0 - 29.0 mmol/L Final    Calcium 01/22/2024 8.9  8.6 - 10.5 mg/dL Final    Total Protein 01/22/2024 7.7  6.0 - 8.5 g/dL Final    Albumin 01/22/2024 4.0  3.5 - 5.2 g/dL Final    ALT (SGPT) 01/22/2024 8  1 - 41 U/L Final    AST (SGOT) 01/22/2024 13  1 - 40 U/L Final    Alkaline Phosphatase 01/22/2024 111  39 - 117 U/L Final    Total Bilirubin 01/22/2024 0.3  0.0 - 1.2 mg/dL Final    Globulin 01/22/2024 3.7  gm/dL Final    A/G Ratio 01/22/2024 1.1  g/dL Final    BUN/Creatinine Ratio 01/22/2024 11.3  7.0 - 25.0 Final    Anion Gap 01/22/2024 10.9  5.0 - 15.0 mmol/L Final    eGFR 01/22/2024 92.8  >60.0 mL/min/1.73 Final    WBC 01/22/2024 9.01  3.40 - 10.80 10*3/mm3 Final    RBC 01/22/2024 5.02  4.14 - 5.80 10*6/mm3 Final    Hemoglobin 01/22/2024 11.6 (L)  13.0 - 17.7 g/dL Final    Hematocrit 01/22/2024 37.2 (L)  37.5 - 51.0 % Final    MCV 01/22/2024 74.1 (L)  79.0 - 97.0 fL Final    MCH 01/22/2024 23.1 (L)  26.6 - 33.0 pg Final    MCHC 01/22/2024 31.2 (L)  31.5 - 35.7 g/dL Final    RDW 01/22/2024 17.4 (H)  12.3 - 15.4 % Final    RDW-SD 01/22/2024 45.9  37.0 - 54.0 fl Final    MPV 01/22/2024 10.3  6.0 - 12.0 fL  Final    Platelets 01/22/2024 211  140 - 450 10*3/mm3 Final    Neutrophil % 01/22/2024 77.1 (H)  42.7 - 76.0 % Final    Lymphocyte % 01/22/2024 14.7 (L)  19.6 - 45.3 % Final    Monocyte % 01/22/2024 6.4  5.0 - 12.0 % Final    Eosinophil % 01/22/2024 1.3  0.3 - 6.2 % Final    Basophil % 01/22/2024 0.2  0.0 - 1.5 % Final    Immature Grans % 01/22/2024 0.3  0.0 - 0.5 % Final    Neutrophils, Absolute 01/22/2024 6.94  1.70 - 7.00 10*3/mm3 Final    Lymphocytes, Absolute 01/22/2024 1.32  0.70 - 3.10 10*3/mm3 Final    Monocytes, Absolute 01/22/2024 0.58  0.10 - 0.90 10*3/mm3 Final    Eosinophils, Absolute 01/22/2024 0.12  0.00 - 0.40 10*3/mm3 Final    Basophils, Absolute 01/22/2024 0.02  0.00 - 0.20 10*3/mm3 Final    Immature Grans, Absolute 01/22/2024 0.03  0.00 - 0.05 10*3/mm3 Final    nRBC 01/22/2024 0.0  0.0 - 0.2 /100 WBC Final    Anisocytosis 01/22/2024 Slight/1+  None Seen Final    Microcytes 01/22/2024 Slight/1+  None Seen Final    WBC Morphology 01/22/2024 Normal  Normal Final    Platelet Estimate 01/22/2024 Adequate  Normal Final   Hospital Outpatient Visit on 01/18/2024   Component Date Value Ref Range Status    Addendum 01/18/2024    Incomplete                    Value:This result contains rich text formatting which cannot be displayed here.    Case Report 01/18/2024    Incomplete                    Value:Surgical Pathology Report                         Case: [id number]                                  Authorizing Provider:  Brenda Cronin MD        Collected:           01/18/2024 11:30 AM          Ordering Location:     [facility]   Received:            01/18/2024 12:17 PM                                 CT                                                                           Pathologist:           Felix Parkinson MD                                                          Specimens:   1) - Lung, Left Lower Lobe, LEFT LUNG MASS                                                          2) -  Liver, Liver Mass                                                                     Clinical Information 01/18/2024    Incomplete                    Value:This result contains rich text formatting which cannot be displayed here.    Final Diagnosis 01/18/2024    Incomplete                    Value:This result contains rich text formatting which cannot be displayed here.    Comment 01/18/2024    Incomplete                    Value:This result contains rich text formatting which cannot be displayed here.    Gross Description 01/18/2024    Incomplete                    Value:This result contains rich text formatting which cannot be displayed here.    Special Stains 01/18/2024    Incomplete                    Value:This result contains rich text formatting which cannot be displayed here.    Microscopic Description 01/18/2024    Incomplete                    Value:This result contains rich text formatting which cannot be displayed here.   Hospital Outpatient Visit on 01/18/2024   Component Date Value Ref Range Status    Protime 01/18/2024 14.1  12.2 - 14.5 Seconds Final    INR 01/18/2024 1.08  0.89 - 1.12 Final    WBC 01/18/2024 8.44  3.40 - 10.80 10*3/mm3 Final    RBC 01/18/2024 4.64  4.14 - 5.80 10*6/mm3 Final    Hemoglobin 01/18/2024 11.1 (L)  13.0 - 17.7 g/dL Final    Hematocrit 01/18/2024 35.9 (L)  37.5 - 51.0 % Final    MCV 01/18/2024 77.4 (L)  79.0 - 97.0 fL Final    MCH 01/18/2024 23.9 (L)  26.6 - 33.0 pg Final    MCHC 01/18/2024 30.9 (L)  31.5 - 35.7 g/dL Final    RDW 01/18/2024 18.1 (H)  12.3 - 15.4 % Final    RDW-SD 01/18/2024 49.1  37.0 - 54.0 fl Final    MPV 01/18/2024 11.5  6.0 - 12.0 fL Final    Platelets 01/18/2024 212  140 - 450 10*3/mm3 Final    Neutrophil % 01/18/2024 69.7  42.7 - 76.0 % Final    Lymphocyte % 01/18/2024 18.6 (L)  19.6 - 45.3 % Final    Monocyte % 01/18/2024 9.2  5.0 - 12.0 % Final    Eosinophil % 01/18/2024 1.7  0.3 - 6.2 % Final    Basophil % 01/18/2024 0.4  0.0 - 1.5 % Final     Immature Grans % 01/18/2024 0.4  0.0 - 0.5 % Final    Neutrophils, Absolute 01/18/2024 5.89  1.70 - 7.00 10*3/mm3 Final    Lymphocytes, Absolute 01/18/2024 1.57  0.70 - 3.10 10*3/mm3 Final    Monocytes, Absolute 01/18/2024 0.78  0.10 - 0.90 10*3/mm3 Final    Eosinophils, Absolute 01/18/2024 0.14  0.00 - 0.40 10*3/mm3 Final    Basophils, Absolute 01/18/2024 0.03  0.00 - 0.20 10*3/mm3 Final    Immature Grans, Absolute 01/18/2024 0.03  0.00 - 0.05 10*3/mm3 Final    nRBC 01/18/2024 0.0  0.0 - 0.2 /100 WBC Final   Lab on 01/12/2024   Component Date Value Ref Range Status    Glucose 01/12/2024 93  65 - 99 mg/dL Final    BUN 01/12/2024 15  6 - 20 mg/dL Final    Creatinine 01/12/2024 0.93  0.76 - 1.27 mg/dL Final    Sodium 01/12/2024 138  136 - 145 mmol/L Final    Potassium 01/12/2024 4.4  3.5 - 5.2 mmol/L Final    Chloride 01/12/2024 100  98 - 107 mmol/L Final    CO2 01/12/2024 25.9  22.0 - 29.0 mmol/L Final    Calcium 01/12/2024 8.7  8.6 - 10.5 mg/dL Final    Total Protein 01/12/2024 7.6  6.0 - 8.5 g/dL Final    Albumin 01/12/2024 3.9  3.5 - 5.2 g/dL Final    ALT (SGPT) 01/12/2024 8  1 - 41 U/L Final    AST (SGOT) 01/12/2024 14  1 - 40 U/L Final    Alkaline Phosphatase 01/12/2024 94  39 - 117 U/L Final    Total Bilirubin 01/12/2024 0.3  0.0 - 1.2 mg/dL Final    Globulin 01/12/2024 3.7  gm/dL Final    A/G Ratio 01/12/2024 1.1  g/dL Final    BUN/Creatinine Ratio 01/12/2024 16.1  7.0 - 25.0 Final    Anion Gap 01/12/2024 12.1  5.0 - 15.0 mmol/L Final    eGFR 01/12/2024 97.6  >60.0 mL/min/1.73 Final    CEA 01/12/2024 39.20  ng/mL Final    WBC 01/12/2024 8.80  3.40 - 10.80 10*3/mm3 Final    RBC 01/12/2024 4.89  4.14 - 5.80 10*6/mm3 Final    Hemoglobin 01/12/2024 11.4 (L)  13.0 - 17.7 g/dL Final    Hematocrit 01/12/2024 37.3 (L)  37.5 - 51.0 % Final    MCV 01/12/2024 76.3 (L)  79.0 - 97.0 fL Final    MCH 01/12/2024 23.3 (L)  26.6 - 33.0 pg Final    MCHC 01/12/2024 30.6 (L)  31.5 - 35.7 g/dL Final    RDW 01/12/2024 17.5 (H)  12.3  - 15.4 % Final    RDW-SD 01/12/2024 48.6  37.0 - 54.0 fl Final    MPV 01/12/2024 11.1  6.0 - 12.0 fL Final    Platelets 01/12/2024 227  140 - 450 10*3/mm3 Final    Neutrophil % 01/12/2024 78.8 (H)  42.7 - 76.0 % Final    Lymphocyte % 01/12/2024 16.6 (L)  19.6 - 45.3 % Final    Monocyte % 01/12/2024 2.7 (L)  5.0 - 12.0 % Final    Eosinophil % 01/12/2024 1.1  0.3 - 6.2 % Final    Basophil % 01/12/2024 0.6  0.0 - 1.5 % Final    Immature Grans % 01/12/2024 0.2  0.0 - 0.5 % Final    Neutrophils, Absolute 01/12/2024 6.93  1.70 - 7.00 10*3/mm3 Final    Lymphocytes, Absolute 01/12/2024 1.46  0.70 - 3.10 10*3/mm3 Final    Monocytes, Absolute 01/12/2024 0.24  0.10 - 0.90 10*3/mm3 Final    Eosinophils, Absolute 01/12/2024 0.10  0.00 - 0.40 10*3/mm3 Final    Basophils, Absolute 01/12/2024 0.05  0.00 - 0.20 10*3/mm3 Final    Immature Grans, Absolute 01/12/2024 0.02  0.00 - 0.05 10*3/mm3 Final    nRBC 01/12/2024 0.0  0.0 - 0.2 /100 WBC Final   Hospital Outpatient Visit on 01/09/2024   Component Date Value Ref Range Status    CEA 01/09/2024 34.00  ng/mL Final    Glucose 01/09/2024 132 (H)  65 - 99 mg/dL Final    BUN 01/09/2024 12  6 - 20 mg/dL Final    Creatinine 01/09/2024 1.02  0.76 - 1.27 mg/dL Final    Sodium 01/09/2024 139  136 - 145 mmol/L Final    Potassium 01/09/2024 3.6  3.5 - 5.2 mmol/L Final    Chloride 01/09/2024 103  98 - 107 mmol/L Final    CO2 01/09/2024 24.0  22.0 - 29.0 mmol/L Final    Calcium 01/09/2024 8.6  8.6 - 10.5 mg/dL Final    Total Protein 01/09/2024 7.3  6.0 - 8.5 g/dL Final    Albumin 01/09/2024 3.7  3.5 - 5.2 g/dL Final    ALT (SGPT) 01/09/2024 5  1 - 41 U/L Final    AST (SGOT) 01/09/2024 15  1 - 40 U/L Final    Alkaline Phosphatase 01/09/2024 98  39 - 117 U/L Final    Total Bilirubin 01/09/2024 0.2  0.0 - 1.2 mg/dL Final    Globulin 01/09/2024 3.6  gm/dL Final    Calculated Result    A/G Ratio 01/09/2024 1.0  g/dL Final    BUN/Creatinine Ratio 01/09/2024 11.8  7.0 - 25.0 Final    Anion Gap 01/09/2024  12.0  5.0 - 15.0 mmol/L Final    eGFR 01/09/2024 87.3  >60.0 mL/min/1.73 Final    Ferritin 01/09/2024 8.69 (L)  30.00 - 400.00 ng/mL Final    Iron 01/09/2024 29 (L)  59 - 158 mcg/dL Final    Iron Saturation (TSAT) 01/09/2024 8 (L)  20 - 50 % Final    Transferrin 01/09/2024 242  200 - 360 mg/dL Final    TIBC 01/09/2024 361  298 - 536 mcg/dL Final    WBC 01/09/2024 9.62  3.40 - 10.80 10*3/mm3 Final    RBC 01/09/2024 4.88  4.14 - 5.80 10*6/mm3 Final    Hemoglobin 01/09/2024 11.5 (L)  13.0 - 17.7 g/dL Final    Hematocrit 01/09/2024 37.3 (L)  37.5 - 51.0 % Final    MCV 01/09/2024 76.4 (L)  79.0 - 97.0 fL Final    MCH 01/09/2024 23.6 (L)  26.6 - 33.0 pg Final    MCHC 01/09/2024 30.8 (L)  31.5 - 35.7 g/dL Final    RDW 01/09/2024 17.3 (H)  12.3 - 15.4 % Final    RDW-SD 01/09/2024 48.4  37.0 - 54.0 fl Final    MPV 01/09/2024 10.0  6.0 - 12.0 fL Final    Platelets 01/09/2024 234  140 - 450 10*3/mm3 Final    Neutrophil % 01/09/2024 70.7  42.7 - 76.0 % Final    Lymphocyte % 01/09/2024 19.5 (L)  19.6 - 45.3 % Final    Monocyte % 01/09/2024 7.7  5.0 - 12.0 % Final    Eosinophil % 01/09/2024 1.9  0.3 - 6.2 % Final    Basophil % 01/09/2024 0.2  0.0 - 1.5 % Final    Immature Grans % 01/09/2024 0.0  0.0 - 0.5 % Final    Neutrophils, Absolute 01/09/2024 6.80  1.70 - 7.00 10*3/mm3 Final    Lymphocytes, Absolute 01/09/2024 1.88  0.70 - 3.10 10*3/mm3 Final    Monocytes, Absolute 01/09/2024 0.74  0.10 - 0.90 10*3/mm3 Final    Eosinophils, Absolute 01/09/2024 0.18  0.00 - 0.40 10*3/mm3 Final    Basophils, Absolute 01/09/2024 0.02  0.00 - 0.20 10*3/mm3 Final    Immature Grans, Absolute 01/09/2024 0.00  0.00 - 0.05 10*3/mm3 Final       XR Chest 1 View    Result Date: 1/18/2024  Narrative: XR CHEST 1 VW Date of Exam: 1/18/2024 1:56 PM EST Indication: LLL lung biopsy Post lung biopsy Comparison: None available. Findings: There is no identifiable pneumothorax after left lung biopsy. Right chest port is unchanged in position. Patchy left lower  lobe density is again noted.     Impression: Impression: No pneumothorax identified. Electronically Signed: Tiara Lopez MD  1/18/2024 3:28 PM EST  Workstation ID: [id number]    CT Needle Biopsy Lung    Result Date: 1/18/2024  Narrative: CT NEEDLE BIOPSY LIVER, CT NEEDLE BIOPSY LUNG Date of Exam: 1/18/2024 10:28 AM EST Indication: rectal cancer/lung cancer eval for mets. Left lower lobe lung masses and multiple focal liver lesions identified on recent PET/CT Comparison: 12/22/2023 Technique: The procedure, risks and options were discussed with the patient at length. The patient's PET/CT was reviewed prior to the biopsy. The lesions within the liver although visible on the PET images were not clearly identified on the CT images. This was discussed with the patient. The liver lesion was chosen for biopsy first. The patient was placed in the supine position in the CT scanner. Preliminary images were obtained. The liver lesions were not clearly visible. The patient was then administered IV contrast media and the images were repeated. The largest lesion in the inferomedial aspect of the right lobe is not clearly identified on the CT. It was elected to proceed with biopsies based on the landmarks on the patient's PET/CT. A site was chosen and the skin was marked prepped and draped. Using maximal sterile barrier  technique and under local anesthesia a 17-gauge guide needle was advanced incrementally to the area of highest PET positivity in the inferomedial aspect of the right lobe. Multiple 18-gauge core specimens were obtained and placed in formalin. Gelfoam pledgets were injected through the guide needle and the needle removed. A sterile dressing was applied. Attention was then turned to the patient's left lower lobe lung masses. The patient was placed in the left lateral decubitus position. Preliminary images were obtained and a site was chosen. The skin was marked prepped and draped. Using maximal sterile barrier  technique and under local anesthesia a 17-gauge guide needle was advanced incrementally to the posterior and medial aspect of the lesion in the area of viable tissue. Multiple 18-gauge core specimens were obtained and subsequently placed in formalin. The needle was removed and hemostasis was achieved. Post procedure CT imaging shows no pneumothorax. The patient was administered conscious sedation during the procedure and was monitored by the IR nurse during the entire procedure. Total physician monitored sedation time was 35 minutes. Fluoroscopic Time: CT fluoroscopy time 2.31 seconds     Impression: Impression: 1. Successful biopsy of the right lobe of the liver. Please note that the lesions identified on PET/CT are not well seen on the patient's CT of the abdomen with or without contrast media. I would recommend MRI for further characterization of the liver lesions. 2. Successful left lower lobe lung biopsy. Electronically Signed: Scotty Thomson MD  1/18/2024 2:00 PM EST  Workstation ID: [id number]    CT Needle Biopsy Liver    Result Date: 1/18/2024  Narrative: CT NEEDLE BIOPSY LIVER, CT NEEDLE BIOPSY LUNG Date of Exam: 1/18/2024 10:28 AM EST Indication: rectal cancer/lung cancer eval for mets. Left lower lobe lung masses and multiple focal liver lesions identified on recent PET/CT Comparison: 12/22/2023 Technique: The procedure, risks and options were discussed with the patient at length. The patient's PET/CT was reviewed prior to the biopsy. The lesions within the liver although visible on the PET images were not clearly identified on the CT images. This was discussed with the patient. The liver lesion was chosen for biopsy first. The patient was placed in the supine position in the CT scanner. Preliminary images were obtained. The liver lesions were not clearly visible. The patient was then administered IV contrast media and the images were repeated. The largest lesion in the inferomedial aspect of the right lobe  is not clearly identified on the CT. It was elected to proceed with biopsies based on the landmarks on the patient's PET/CT. A site was chosen and the skin was marked prepped and draped. Using maximal sterile barrier  technique and under local anesthesia a 17-gauge guide needle was advanced incrementally to the area of highest PET positivity in the inferomedial aspect of the right lobe. Multiple 18-gauge core specimens were obtained and placed in formalin. Gelfoam pledgets were injected through the guide needle and the needle removed. A sterile dressing was applied. Attention was then turned to the patient's left lower lobe lung masses. The patient was placed in the left lateral decubitus position. Preliminary images were obtained and a site was chosen. The skin was marked prepped and draped. Using maximal sterile barrier technique and under local anesthesia a 17-gauge guide needle was advanced incrementally to the posterior and medial aspect of the lesion in the area of viable tissue. Multiple 18-gauge core specimens were obtained and subsequently placed in formalin. The needle was removed and hemostasis was achieved. Post procedure CT imaging shows no pneumothorax. The patient was administered conscious sedation during the procedure and was monitored by the IR nurse during the entire procedure. Total physician monitored sedation time was 35 minutes. Fluoroscopic Time: CT fluoroscopy time 2.31 seconds     Impression: Impression: 1. Successful biopsy of the right lobe of the liver. Please note that the lesions identified on PET/CT are not well seen on the patient's CT of the abdomen with or without contrast media. I would recommend MRI for further characterization of the liver lesions. 2. Successful left lower lobe lung biopsy. Electronically Signed: Scotty Thomson MD  1/18/2024 2:00 PM EST  Workstation ID: [id number]    XR Chest 1 View    Result Date: 1/18/2024  Narrative: XR CHEST 1 VW Date of Exam: 1/18/2024 11:34  AM EST Indication: s/p left lung biospy Comparison CT of earlier today. Findings: There is no evidence of a pneumothorax, status post left lung biopsy. Patchy density in the left lower lobe is again noted as identified on the CT exam. The right lung is clear. The heart size is normal. There is a right chest port in place with its tip in the lower SVC.     Impression: Impression: No pneumothorax identified after left lung biopsy. Electronically Signed: Tiara Lopez MD  1/18/2024 11:56 AM EST  Workstation ID: [id number]    MRI Pelvis With & Without Contrast    Result Date: 1/17/2024  Narrative: MRI PELVIS W WO CONTRAST Date of Exam: 1/15/2024 1:21 PM EST Indication: rectal protocol.  Comparison: PET/CT 12/22/2023. Technique:  Routine multiplanar/multisequence images of the pelvis were obtained before and after the uneventful intravenous administration of 20 mL Multihance.  CLINICAL INFORMATION: Baseline rectal MRI for newly diagnosed adenocarcinoma. IMAGING PROCEDURE DESCRIPTION:     Image Quality: Adequate     Magnet: 1.5T     Sequences: T2-weighted large field-of-view axial images. T2-weighted small field of view axial, sagittal, and coronal images. DWI and ADC images. Axial T1-weighted precontrast images. Axial and coronal T1-weighted postcontrast images. FINDINGS: 1.) TUMOR LOCATION AND CHARACTERISTICS -Craniocaudal length of tumor: 9.3 cm -Distance from inferior margin of tumor to anal verge: 10.6 cm -Distance from inferior margin of tumor to top of sphincter complex/anorectal junction: 4.9 cm -Tumor at or below the puborectalis sling: No -Relationship to the anterior peritoneal reflection: Straddles (the tumor abuts but does not frankly invade the APR with tumor present at this level, though without high-grade EMD at this location (series 8 image 21-28) -Morphology: Annular If not fully circumferential, clock face of tumor is: N/A (circumferential) -Mucinous: No 2.) EXTRAMURAL DEPTH OF INVASION AND MR  T-CATEGORY Extramural depth of invasion: 11 mm, for example at the 9 o'clock position (series 8 image 26). There are also desmoplastic spiculations present more generally around the tumor. T category: T3c (5-15 mm extension beyond muscularis propria) FOR LOW RECTAL TUMORS (maximum tumor depth at or below the puborectalis sling): Invasion of anal sphincter complex: N/A (not a low rectal tumor) 3. RELATIONSHIP OF THE TUMOR TO MESORECTAL FASCIA (MRF) Shortest distance of the definitive tumor border to the MRF: 1 mm. At 12 o'clock, just below the APR (series 8 image 29) Are there any tumor spiculations closer to the MRF? No 4. EXTREMURAL VENOUS INVASION  Extermural Venous Invasion (EMVI): Absent   5. MESORECTAL LYMPH NODES AND TUMOUR DEPOSITS  Any suspicious mesorectal lymph nodes/tumor deposits: Yes, around 8 of measurable size     *If yes, the most suspicious node/tumor deposit is: There is a 0.8 x 0.6 cm lymph node within the posterior mesorectum at the 12 o'clock position that abuts the MRF (series 8 image 23) 6. EXTRAMESORECTAL LYMPH NODESr   Any suspicious extra-mesorectal lymph nodes: Yes     *If yes, location and laterality of suspicious nodes:           Common iliac: Not included within the field-of-view           Internal iliac: Left greater than right (series 7 image 28)           External iliac: Right greater than left (series 6 image 10)           Obturator: Left greater than right (series 7 image 13)           Inguinal: Borderline enlarged bilaterally (series 6 image 17-20)  Is the MATTHIAS node station in the field of view: Yes       *If Yes, are these nodes suspicious: Yes (series 7 image 26) 7. OTHER FINDINGS (COMPLICATIONS, METASTASES, LIMITATIONS) Urinary bladder within normal limits. Pelvic reproductive organs within normal limits on this nontailored examination. Similar skin thickening within the gluteal regions bilaterally, as on PET/CT. Trace bilateral hydroceles.     Impression: MRI rectal cancer T  category is: T3c Maximum EMD of invasion is: 11 mm Minimum tumor to MRF distance is: 1 mm Low rectal tumor component: No Mesorectal nodes/tumor deposits: Yes EMVI: No Extramesorectal nodes: Yes Description: 9.3 cm high rectal tumor has 11 mm of EMD at a nonperitonealized location, consistent with T3c status, and nearly abuts the MRF at the 12 o'clock position. Of note, the tumor straddles but does not frankly invade the APR. There is bilateral regional and nonregional lymphadenopathy. Electronically Signed: Min Nguyen MD  1/17/2024 11:45 AM EST  Workstation ID: [id number]    XR Chest 1 View    Result Date: 1/5/2024  Narrative: PROCEDURE: XR CHEST 1 VW-    HISTORY: port placement; S27-Mpjhpgypk neoplasm of rectum; C78.7-Secondary malignant neoplasm of liver and intrahepatic bile duct; T36-Mkiwlmtql neoplasm of rectum; I87.8-Other specified disorders of veins  COMPARISON: None.  FINDINGS: The heart is normal in size. The mediastinum is unremarkable. The lungs are clear. There is no pneumothorax. There are no acute osseous abnormalities. A right jugular Port-A-Cath tip terminates in the SVC.      Impression: Right port in good position without pneumothorax.        Images were reviewed, interpreted, and dictated by Dr. Serjio Wright MD Transcribed by Krysta Aldana PA-C.  This report was signed and finalized on 1/5/2024 12:39 PM by Serjio Wright MD.      FL C Arm During Surgery    Result Date: 1/5/2024  Narrative: This procedure was auto-finalized with no dictation required.     Procedures    Assessment / Plan      Assessment/Plan:     1.  Rectal cancer   2.  Liver metastases  3.  Lung metastasis  -The patient presents with a partially obstructing rectal cancer with adenopathy.  -He was found to have biopsy-proven metastasis in the liver and lung.  We reviewed these pathology reports today.  I also reviewed his MRI findings.  His case was presented at multidisciplinary tumor board.  -We will proceed with a liver MRI  to better evaluate the extent of his hepatic metastasis as was recommended by radiology.  However, because of metastatic disease to both the lung and liver I do not think he will be downstage to resectable disease.  -Reviewed tumor board recommendation is to proceed with palliative chemotherapy as is currently underway.  Labs are adequate to proceed with cycle #2.  We are awaiting genomic testing to guide further therapy directions.  -Reviewed goals of care being cancer control and not cure   -Reviewed potential role for colorectal surgery consultation regarding diversion particularly if his obstructive symptoms do not improve, but they are currently stable to improved.    4.  Cancer related pain  -PRN oxycodone.   -Reviewed palliative care recommendations.    5. Access   -Port    6.  Chemotherapy-induced nausea  -He will try de-escalating to Zofran 4 mg PRN and we can try Compazine if he does not tolerate this    7.  Seasonal allergies  -Add daily Zyrtec     Follow Up:   2 weeks     Brenda Cronin MD  Hematology and Oncology     I have spent a total of 40 min on reviewing test results/preparing to see patient, counseling patient, performing medically appropriate exam, placing orders, coordinating care and documenting clinical information in the electronic or other health record

## 2024-01-23 LAB
CYTO UR: NORMAL
LAB AP CASE REPORT: NORMAL
LAB AP CLINICAL INFORMATION: NORMAL
LAB AP DIAGNOSIS COMMENT: NORMAL
LAB AP SPECIAL STAINS: NORMAL
Lab: NORMAL
PATH REPORT.ADDENDUM SPEC: NORMAL
PATH REPORT.FINAL DX SPEC: NORMAL
PATH REPORT.GROSS SPEC: NORMAL

## 2024-01-23 RX ORDER — LORAZEPAM 0.5 MG/1
0.5 TABLET ORAL TAKE AS DIRECTED
Qty: 2 TABLET | Refills: 0 | Status: SHIPPED | OUTPATIENT
Start: 2024-01-23

## 2024-01-23 RX ORDER — LORAZEPAM 0.5 MG/1
0.5 TABLET ORAL TAKE AS DIRECTED
Qty: 2 TABLET | Refills: 0 | Status: SHIPPED | OUTPATIENT
Start: 2024-01-23 | End: 2024-01-23 | Stop reason: SDUPTHER

## 2024-01-24 ENCOUNTER — INFUSION (OUTPATIENT)
Dept: ONCOLOGY | Facility: HOSPITAL | Age: 54
End: 2024-01-24
Payer: COMMERCIAL

## 2024-01-24 VITALS
DIASTOLIC BLOOD PRESSURE: 69 MMHG | SYSTOLIC BLOOD PRESSURE: 129 MMHG | RESPIRATION RATE: 18 BRPM | BODY MASS INDEX: 31.87 KG/M2 | OXYGEN SATURATION: 99 % | HEART RATE: 67 BPM | WEIGHT: 235 LBS | TEMPERATURE: 98 F

## 2024-01-24 DIAGNOSIS — C20 RECTAL ADENOCARCINOMA: ICD-10-CM

## 2024-01-24 DIAGNOSIS — C20 RECTAL CANCER METASTASIZED TO LIVER: Primary | ICD-10-CM

## 2024-01-24 DIAGNOSIS — C78.7 RECTAL CANCER METASTASIZED TO LIVER: Primary | ICD-10-CM

## 2024-01-24 PROCEDURE — 25010000002 HEPARIN LOCK FLUSH PER 10 UNITS

## 2024-01-24 PROCEDURE — 96523 IRRIG DRUG DELIVERY DEVICE: CPT

## 2024-01-24 RX ORDER — SODIUM CHLORIDE 0.9 % (FLUSH) 0.9 %
10 SYRINGE (ML) INJECTION AS NEEDED
Status: DISCONTINUED | OUTPATIENT
Start: 2024-01-24 | End: 2024-01-24 | Stop reason: HOSPADM

## 2024-01-24 RX ORDER — HEPARIN SODIUM (PORCINE) LOCK FLUSH IV SOLN 100 UNIT/ML 100 UNIT/ML
500 SOLUTION INTRAVENOUS AS NEEDED
Status: DISCONTINUED | OUTPATIENT
Start: 2024-01-24 | End: 2024-01-24 | Stop reason: HOSPADM

## 2024-01-24 RX ORDER — HEPARIN SODIUM (PORCINE) LOCK FLUSH IV SOLN 100 UNIT/ML 100 UNIT/ML
500 SOLUTION INTRAVENOUS AS NEEDED
OUTPATIENT
Start: 2024-01-24

## 2024-01-24 RX ORDER — SODIUM CHLORIDE 0.9 % (FLUSH) 0.9 %
10 SYRINGE (ML) INJECTION AS NEEDED
OUTPATIENT
Start: 2024-01-24

## 2024-01-24 RX ORDER — HEPARIN SODIUM (PORCINE) LOCK FLUSH IV SOLN 100 UNIT/ML 100 UNIT/ML
SOLUTION INTRAVENOUS
Status: COMPLETED
Start: 2024-01-24 | End: 2024-01-24

## 2024-01-24 RX ADMIN — HEPARIN SODIUM (PORCINE) LOCK FLUSH IV SOLN 100 UNIT/ML 500 UNITS: 100 SOLUTION at 14:18

## 2024-01-24 RX ADMIN — HEPARIN 500 UNITS: 100 SYRINGE at 14:18

## 2024-01-24 RX ADMIN — Medication 10 ML: at 14:18

## 2024-02-05 ENCOUNTER — INFUSION (OUTPATIENT)
Dept: ONCOLOGY | Facility: HOSPITAL | Age: 54
End: 2024-02-05
Payer: COMMERCIAL

## 2024-02-05 ENCOUNTER — OFFICE VISIT (OUTPATIENT)
Dept: ONCOLOGY | Facility: CLINIC | Age: 54
End: 2024-02-05
Payer: COMMERCIAL

## 2024-02-05 VITALS
HEART RATE: 100 BPM | HEIGHT: 72 IN | WEIGHT: 232 LBS | RESPIRATION RATE: 16 BRPM | SYSTOLIC BLOOD PRESSURE: 129 MMHG | DIASTOLIC BLOOD PRESSURE: 69 MMHG | OXYGEN SATURATION: 98 % | TEMPERATURE: 98 F | BODY MASS INDEX: 31.42 KG/M2

## 2024-02-05 DIAGNOSIS — C20 RECTAL CANCER METASTASIZED TO LIVER: Primary | ICD-10-CM

## 2024-02-05 DIAGNOSIS — Z51.11 CHEMOTHERAPY MANAGEMENT, ENCOUNTER FOR: ICD-10-CM

## 2024-02-05 DIAGNOSIS — C78.7 RECTAL CANCER METASTASIZED TO LIVER: Primary | ICD-10-CM

## 2024-02-05 LAB
ALBUMIN SERPL-MCNC: 4 G/DL (ref 3.5–5.2)
ALBUMIN/GLOB SERPL: 1.3 G/DL
ALP SERPL-CCNC: 122 U/L (ref 39–117)
ALT SERPL W P-5'-P-CCNC: 12 U/L (ref 1–41)
ANION GAP SERPL CALCULATED.3IONS-SCNC: 10.4 MMOL/L (ref 5–15)
AST SERPL-CCNC: 17 U/L (ref 1–40)
BASOPHILS # BLD AUTO: 0.05 10*3/MM3 (ref 0–0.2)
BASOPHILS NFR BLD AUTO: 0.5 % (ref 0–1.5)
BILIRUB SERPL-MCNC: 0.2 MG/DL (ref 0–1.2)
BUN SERPL-MCNC: 18 MG/DL (ref 6–20)
BUN/CREAT SERPL: 17.6 (ref 7–25)
CALCIUM SPEC-SCNC: 8.6 MG/DL (ref 8.6–10.5)
CHLORIDE SERPL-SCNC: 101 MMOL/L (ref 98–107)
CO2 SERPL-SCNC: 22.6 MMOL/L (ref 22–29)
CREAT SERPL-MCNC: 1.02 MG/DL (ref 0.76–1.27)
DEPRECATED RDW RBC AUTO: 47 FL (ref 37–54)
EGFRCR SERPLBLD CKD-EPI 2021: 87.3 ML/MIN/1.73
EOSINOPHIL # BLD AUTO: 0.11 10*3/MM3 (ref 0–0.4)
EOSINOPHIL NFR BLD AUTO: 1.1 % (ref 0.3–6.2)
ERYTHROCYTE [DISTWIDTH] IN BLOOD BY AUTOMATED COUNT: 18.2 % (ref 12.3–15.4)
GLOBULIN UR ELPH-MCNC: 3.2 GM/DL
GLUCOSE SERPL-MCNC: 88 MG/DL (ref 65–99)
HCT VFR BLD AUTO: 39 % (ref 37.5–51)
HGB BLD-MCNC: 12.2 G/DL (ref 13–17.7)
IMM GRANULOCYTES # BLD AUTO: 0.05 10*3/MM3 (ref 0–0.05)
IMM GRANULOCYTES NFR BLD AUTO: 0.5 % (ref 0–0.5)
LYMPHOCYTES # BLD AUTO: 1.3 10*3/MM3 (ref 0.7–3.1)
LYMPHOCYTES NFR BLD AUTO: 12.9 % (ref 19.6–45.3)
MCH RBC QN AUTO: 23.6 PG (ref 26.6–33)
MCHC RBC AUTO-ENTMCNC: 31.3 G/DL (ref 31.5–35.7)
MCV RBC AUTO: 75.6 FL (ref 79–97)
MONOCYTES # BLD AUTO: 1.15 10*3/MM3 (ref 0.1–0.9)
MONOCYTES NFR BLD AUTO: 11.4 % (ref 5–12)
NEUTROPHILS NFR BLD AUTO: 7.44 10*3/MM3 (ref 1.7–7)
NEUTROPHILS NFR BLD AUTO: 73.6 % (ref 42.7–76)
NRBC BLD AUTO-RTO: 0 /100 WBC (ref 0–0.2)
PLATELET # BLD AUTO: 186 10*3/MM3 (ref 140–450)
PMV BLD AUTO: 10.7 FL (ref 6–12)
POTASSIUM SERPL-SCNC: 4.1 MMOL/L (ref 3.5–5.2)
PROT SERPL-MCNC: 7.2 G/DL (ref 6–8.5)
RBC # BLD AUTO: 5.16 10*6/MM3 (ref 4.14–5.8)
SODIUM SERPL-SCNC: 134 MMOL/L (ref 136–145)
WBC NRBC COR # BLD AUTO: 10.1 10*3/MM3 (ref 3.4–10.8)

## 2024-02-05 PROCEDURE — 96375 TX/PRO/DX INJ NEW DRUG ADDON: CPT

## 2024-02-05 PROCEDURE — 25010000002 FLUOROURACIL PER 500 MG: Performed by: INTERNAL MEDICINE

## 2024-02-05 PROCEDURE — 96366 THER/PROPH/DIAG IV INF ADDON: CPT

## 2024-02-05 PROCEDURE — 25810000003 SODIUM CHLORIDE 0.9 % SOLUTION 250 ML FLEX CONT: Performed by: INTERNAL MEDICINE

## 2024-02-05 PROCEDURE — 25010000002 PALONOSETRON 0.25 MG/5ML SOLUTION PREFILLED SYRINGE: Performed by: INTERNAL MEDICINE

## 2024-02-05 PROCEDURE — 25010000002 DEXAMETHASONE SODIUM PHOSPHATE 20 MG/5ML SOLUTION: Performed by: INTERNAL MEDICINE

## 2024-02-05 PROCEDURE — 0 DEXTROSE 5 % SOLUTION 250 ML FLEX CONT: Performed by: INTERNAL MEDICINE

## 2024-02-05 PROCEDURE — 96415 CHEMO IV INFUSION ADDL HR: CPT

## 2024-02-05 PROCEDURE — 80053 COMPREHEN METABOLIC PANEL: CPT

## 2024-02-05 PROCEDURE — 96413 CHEMO IV INFUSION 1 HR: CPT

## 2024-02-05 PROCEDURE — 25010000002 OXALIPLATIN PER 0.5 MG: Performed by: INTERNAL MEDICINE

## 2024-02-05 PROCEDURE — 96368 THER/DIAG CONCURRENT INF: CPT

## 2024-02-05 PROCEDURE — 85025 COMPLETE CBC W/AUTO DIFF WBC: CPT

## 2024-02-05 PROCEDURE — 25010000002 LEUCOVORIN 500 MG RECONSTITUTED SOLUTION 1 EACH VIAL: Performed by: INTERNAL MEDICINE

## 2024-02-05 PROCEDURE — G0498 CHEMO EXTEND IV INFUS W/PUMP: HCPCS

## 2024-02-05 PROCEDURE — 96416 CHEMO PROLONG INFUSE W/PUMP: CPT

## 2024-02-05 RX ORDER — DIPHENHYDRAMINE HYDROCHLORIDE 50 MG/ML
50 INJECTION INTRAMUSCULAR; INTRAVENOUS AS NEEDED
Status: CANCELLED | OUTPATIENT
Start: 2024-02-05

## 2024-02-05 RX ORDER — DEXTROSE MONOHYDRATE 50 MG/ML
20 INJECTION, SOLUTION INTRAVENOUS ONCE
Status: DISCONTINUED | OUTPATIENT
Start: 2024-02-05 | End: 2024-02-05 | Stop reason: HOSPADM

## 2024-02-05 RX ORDER — PALONOSETRON 0.05 MG/ML
0.25 INJECTION, SOLUTION INTRAVENOUS ONCE
Status: CANCELLED | OUTPATIENT
Start: 2024-02-05

## 2024-02-05 RX ORDER — PALONOSETRON 0.05 MG/ML
0.25 INJECTION, SOLUTION INTRAVENOUS ONCE
Status: COMPLETED | OUTPATIENT
Start: 2024-02-05 | End: 2024-02-05

## 2024-02-05 RX ORDER — TRAZODONE HYDROCHLORIDE 100 MG/1
100 TABLET ORAL NIGHTLY
Qty: 30 TABLET | Refills: 1 | Status: SHIPPED | OUTPATIENT
Start: 2024-02-05

## 2024-02-05 RX ORDER — FAMOTIDINE 10 MG/ML
20 INJECTION, SOLUTION INTRAVENOUS AS NEEDED
Status: CANCELLED | OUTPATIENT
Start: 2024-02-05

## 2024-02-05 RX ORDER — DEXTROSE MONOHYDRATE 50 MG/ML
20 INJECTION, SOLUTION INTRAVENOUS ONCE
Status: CANCELLED | OUTPATIENT
Start: 2024-02-05

## 2024-02-05 RX ORDER — SERTRALINE HYDROCHLORIDE 25 MG/1
25 TABLET, FILM COATED ORAL NIGHTLY
Qty: 30 TABLET | Refills: 5 | Status: SHIPPED | OUTPATIENT
Start: 2024-02-05

## 2024-02-05 RX ORDER — DIPHENHYDRAMINE HYDROCHLORIDE 50 MG/ML
50 INJECTION INTRAMUSCULAR; INTRAVENOUS AS NEEDED
Status: DISCONTINUED | OUTPATIENT
Start: 2024-02-05 | End: 2024-02-05 | Stop reason: HOSPADM

## 2024-02-05 RX ORDER — FAMOTIDINE 10 MG/ML
20 INJECTION, SOLUTION INTRAVENOUS AS NEEDED
Status: DISCONTINUED | OUTPATIENT
Start: 2024-02-05 | End: 2024-02-05 | Stop reason: HOSPADM

## 2024-02-05 RX ADMIN — PALONOSETRON 0.25 MG: 0.25 INJECTION, SOLUTION INTRAVENOUS at 10:37

## 2024-02-05 RX ADMIN — FLUOROURACIL 5590 MG: 50 INJECTION, SOLUTION INTRAVENOUS at 12:57

## 2024-02-05 RX ADMIN — DEXAMETHASONE SODIUM PHOSPHATE 12 MG: 4 INJECTION, SOLUTION INTRAMUSCULAR; INTRAVENOUS at 10:37

## 2024-02-05 RX ADMIN — LEUCOVORIN CALCIUM 930 MG: 500 INJECTION, POWDER, LYOPHILIZED, FOR SOLUTION INTRAMUSCULAR; INTRAVENOUS at 10:54

## 2024-02-05 RX ADMIN — OXALIPLATIN 200 MG: 5 INJECTION, SOLUTION INTRAVENOUS at 10:54

## 2024-02-07 ENCOUNTER — INFUSION (OUTPATIENT)
Dept: ONCOLOGY | Facility: HOSPITAL | Age: 54
End: 2024-02-07
Payer: COMMERCIAL

## 2024-02-07 VITALS
BODY MASS INDEX: 31.95 KG/M2 | SYSTOLIC BLOOD PRESSURE: 119 MMHG | DIASTOLIC BLOOD PRESSURE: 67 MMHG | HEART RATE: 79 BPM | TEMPERATURE: 98.7 F | WEIGHT: 235.6 LBS

## 2024-02-07 DIAGNOSIS — C78.7 RECTAL CANCER METASTASIZED TO LIVER: ICD-10-CM

## 2024-02-07 DIAGNOSIS — C20 RECTAL CANCER METASTASIZED TO LIVER: ICD-10-CM

## 2024-02-07 DIAGNOSIS — C20 RECTAL ADENOCARCINOMA: Primary | ICD-10-CM

## 2024-02-07 PROCEDURE — 96523 IRRIG DRUG DELIVERY DEVICE: CPT

## 2024-02-07 PROCEDURE — 25010000002 HEPARIN LOCK FLUSH PER 10 UNITS: Performed by: INTERNAL MEDICINE

## 2024-02-07 RX ORDER — HEPARIN SODIUM (PORCINE) LOCK FLUSH IV SOLN 100 UNIT/ML 100 UNIT/ML
500 SOLUTION INTRAVENOUS AS NEEDED
Status: DISCONTINUED | OUTPATIENT
Start: 2024-02-07 | End: 2024-02-07 | Stop reason: HOSPADM

## 2024-02-07 RX ORDER — SODIUM CHLORIDE 0.9 % (FLUSH) 0.9 %
10 SYRINGE (ML) INJECTION AS NEEDED
Status: DISCONTINUED | OUTPATIENT
Start: 2024-02-07 | End: 2024-02-07 | Stop reason: HOSPADM

## 2024-02-07 RX ORDER — HEPARIN SODIUM (PORCINE) LOCK FLUSH IV SOLN 100 UNIT/ML 100 UNIT/ML
500 SOLUTION INTRAVENOUS AS NEEDED
OUTPATIENT
Start: 2024-02-07

## 2024-02-07 RX ORDER — SODIUM CHLORIDE 0.9 % (FLUSH) 0.9 %
10 SYRINGE (ML) INJECTION AS NEEDED
OUTPATIENT
Start: 2024-02-07

## 2024-02-07 RX ADMIN — HEPARIN 500 UNITS: 100 SYRINGE at 13:14

## 2024-02-07 RX ADMIN — Medication 10 ML: at 13:14

## 2024-02-19 ENCOUNTER — INFUSION (OUTPATIENT)
Dept: ONCOLOGY | Facility: HOSPITAL | Age: 54
End: 2024-02-19
Payer: COMMERCIAL

## 2024-02-19 ENCOUNTER — OFFICE VISIT (OUTPATIENT)
Dept: ONCOLOGY | Facility: CLINIC | Age: 54
End: 2024-02-19
Payer: COMMERCIAL

## 2024-02-19 VITALS
WEIGHT: 231 LBS | HEIGHT: 72 IN | SYSTOLIC BLOOD PRESSURE: 112 MMHG | BODY MASS INDEX: 31.29 KG/M2 | TEMPERATURE: 98.2 F | OXYGEN SATURATION: 98 % | HEART RATE: 78 BPM | DIASTOLIC BLOOD PRESSURE: 62 MMHG | RESPIRATION RATE: 16 BRPM

## 2024-02-19 VITALS — WEIGHT: 231.1 LBS | BODY MASS INDEX: 31.34 KG/M2

## 2024-02-19 DIAGNOSIS — C20 RECTAL CANCER METASTASIZED TO LIVER: Primary | ICD-10-CM

## 2024-02-19 DIAGNOSIS — C78.7 RECTAL CANCER METASTASIZED TO LIVER: Primary | ICD-10-CM

## 2024-02-19 DIAGNOSIS — C20 RECTAL ADENOCARCINOMA: ICD-10-CM

## 2024-02-19 LAB
ALBUMIN SERPL-MCNC: 3.9 G/DL (ref 3.5–5.2)
ALBUMIN/GLOB SERPL: 1.1 G/DL
ALP SERPL-CCNC: 113 U/L (ref 39–117)
ALT SERPL W P-5'-P-CCNC: 10 U/L (ref 1–41)
ANION GAP SERPL CALCULATED.3IONS-SCNC: 11.7 MMOL/L (ref 5–15)
ANISOCYTOSIS BLD QL: NORMAL
AST SERPL-CCNC: 16 U/L (ref 1–40)
BASOPHILS # BLD AUTO: 0.04 10*3/MM3 (ref 0–0.2)
BASOPHILS NFR BLD AUTO: 0.5 % (ref 0–1.5)
BILIRUB SERPL-MCNC: 0.3 MG/DL (ref 0–1.2)
BUN SERPL-MCNC: 14 MG/DL (ref 6–20)
BUN/CREAT SERPL: 12.3 (ref 7–25)
CALCIUM SPEC-SCNC: 9.1 MG/DL (ref 8.6–10.5)
CEA SERPL-MCNC: 50 NG/ML
CHLORIDE SERPL-SCNC: 100 MMOL/L (ref 98–107)
CO2 SERPL-SCNC: 23.3 MMOL/L (ref 22–29)
CREAT SERPL-MCNC: 1.14 MG/DL (ref 0.76–1.27)
DEPRECATED RDW RBC AUTO: 46.9 FL (ref 37–54)
EGFRCR SERPLBLD CKD-EPI 2021: 76.4 ML/MIN/1.73
EOSINOPHIL # BLD AUTO: 0.11 10*3/MM3 (ref 0–0.4)
EOSINOPHIL NFR BLD AUTO: 1.3 % (ref 0.3–6.2)
ERYTHROCYTE [DISTWIDTH] IN BLOOD BY AUTOMATED COUNT: 18.6 % (ref 12.3–15.4)
GLOBULIN UR ELPH-MCNC: 3.5 GM/DL
GLUCOSE SERPL-MCNC: 98 MG/DL (ref 65–99)
HCT VFR BLD AUTO: 38 % (ref 37.5–51)
HGB BLD-MCNC: 12 G/DL (ref 13–17.7)
IMM GRANULOCYTES # BLD AUTO: 0.02 10*3/MM3 (ref 0–0.05)
IMM GRANULOCYTES NFR BLD AUTO: 0.2 % (ref 0–0.5)
LYMPHOCYTES # BLD AUTO: 1.6 10*3/MM3 (ref 0.7–3.1)
LYMPHOCYTES NFR BLD AUTO: 19.5 % (ref 19.6–45.3)
MCH RBC QN AUTO: 23.7 PG (ref 26.6–33)
MCHC RBC AUTO-ENTMCNC: 31.6 G/DL (ref 31.5–35.7)
MCV RBC AUTO: 75.1 FL (ref 79–97)
MONOCYTES # BLD AUTO: 0.8 10*3/MM3 (ref 0.1–0.9)
MONOCYTES NFR BLD AUTO: 9.8 % (ref 5–12)
NEUTROPHILS NFR BLD AUTO: 5.63 10*3/MM3 (ref 1.7–7)
NEUTROPHILS NFR BLD AUTO: 68.7 % (ref 42.7–76)
NRBC BLD AUTO-RTO: 0 /100 WBC (ref 0–0.2)
PLATELET # BLD AUTO: 128 10*3/MM3 (ref 140–450)
PMV BLD AUTO: 11 FL (ref 6–12)
POTASSIUM SERPL-SCNC: 3.8 MMOL/L (ref 3.5–5.2)
PROT SERPL-MCNC: 7.4 G/DL (ref 6–8.5)
RBC # BLD AUTO: 5.06 10*6/MM3 (ref 4.14–5.8)
SMALL PLATELETS BLD QL SMEAR: ADEQUATE
SODIUM SERPL-SCNC: 135 MMOL/L (ref 136–145)
WBC MORPH BLD: NORMAL
WBC NRBC COR # BLD AUTO: 8.2 10*3/MM3 (ref 3.4–10.8)

## 2024-02-19 PROCEDURE — 25010000002 OXALIPLATIN PER 0.5 MG: Performed by: INTERNAL MEDICINE

## 2024-02-19 PROCEDURE — 25010000002 DEXAMETHASONE SODIUM PHOSPHATE 20 MG/5ML SOLUTION: Performed by: INTERNAL MEDICINE

## 2024-02-19 PROCEDURE — 80053 COMPREHEN METABOLIC PANEL: CPT

## 2024-02-19 PROCEDURE — 96413 CHEMO IV INFUSION 1 HR: CPT

## 2024-02-19 PROCEDURE — 96368 THER/DIAG CONCURRENT INF: CPT

## 2024-02-19 PROCEDURE — 96366 THER/PROPH/DIAG IV INF ADDON: CPT

## 2024-02-19 PROCEDURE — 0 DEXTROSE 5 % SOLUTION 250 ML FLEX CONT: Performed by: INTERNAL MEDICINE

## 2024-02-19 PROCEDURE — 82378 CARCINOEMBRYONIC ANTIGEN: CPT

## 2024-02-19 PROCEDURE — 99215 OFFICE O/P EST HI 40 MIN: CPT | Performed by: INTERNAL MEDICINE

## 2024-02-19 PROCEDURE — 85025 COMPLETE CBC W/AUTO DIFF WBC: CPT

## 2024-02-19 PROCEDURE — 96375 TX/PRO/DX INJ NEW DRUG ADDON: CPT

## 2024-02-19 PROCEDURE — G0498 CHEMO EXTEND IV INFUS W/PUMP: HCPCS

## 2024-02-19 PROCEDURE — 96415 CHEMO IV INFUSION ADDL HR: CPT

## 2024-02-19 PROCEDURE — 25810000003 SODIUM CHLORIDE 0.9 % SOLUTION 250 ML FLEX CONT: Performed by: INTERNAL MEDICINE

## 2024-02-19 PROCEDURE — 25010000002 LEUCOVORIN 500 MG RECONSTITUTED SOLUTION 1 EACH VIAL: Performed by: INTERNAL MEDICINE

## 2024-02-19 PROCEDURE — 25010000002 FLUOROURACIL PER 500 MG: Performed by: INTERNAL MEDICINE

## 2024-02-19 PROCEDURE — 85007 BL SMEAR W/DIFF WBC COUNT: CPT

## 2024-02-19 PROCEDURE — 96416 CHEMO PROLONG INFUSE W/PUMP: CPT

## 2024-02-19 PROCEDURE — 25010000002 PALONOSETRON 0.25 MG/5ML SOLUTION PREFILLED SYRINGE: Performed by: INTERNAL MEDICINE

## 2024-02-19 RX ORDER — SODIUM CHLORIDE 0.9 % (FLUSH) 0.9 %
10 SYRINGE (ML) INJECTION AS NEEDED
Status: CANCELLED | OUTPATIENT
Start: 2024-02-19

## 2024-02-19 RX ORDER — DEXTROSE MONOHYDRATE 50 MG/ML
20 INJECTION, SOLUTION INTRAVENOUS ONCE
Status: CANCELLED | OUTPATIENT
Start: 2024-02-19

## 2024-02-19 RX ORDER — SODIUM CHLORIDE 0.9 % (FLUSH) 0.9 %
10 SYRINGE (ML) INJECTION AS NEEDED
Status: DISCONTINUED | OUTPATIENT
Start: 2024-02-19 | End: 2024-02-19 | Stop reason: HOSPADM

## 2024-02-19 RX ORDER — HEPARIN SODIUM (PORCINE) LOCK FLUSH IV SOLN 100 UNIT/ML 100 UNIT/ML
500 SOLUTION INTRAVENOUS AS NEEDED
Status: CANCELLED | OUTPATIENT
Start: 2024-02-19

## 2024-02-19 RX ORDER — HEPARIN SODIUM (PORCINE) LOCK FLUSH IV SOLN 100 UNIT/ML 100 UNIT/ML
500 SOLUTION INTRAVENOUS AS NEEDED
Status: DISCONTINUED | OUTPATIENT
Start: 2024-02-19 | End: 2024-02-19 | Stop reason: HOSPADM

## 2024-02-19 RX ORDER — BUPROPION HYDROCHLORIDE 150 MG/1
150 TABLET ORAL DAILY
Qty: 30 TABLET | Refills: 2 | Status: SHIPPED | OUTPATIENT
Start: 2024-02-19

## 2024-02-19 RX ORDER — DIPHENHYDRAMINE HYDROCHLORIDE 50 MG/ML
50 INJECTION INTRAMUSCULAR; INTRAVENOUS AS NEEDED
Status: CANCELLED | OUTPATIENT
Start: 2024-02-19

## 2024-02-19 RX ORDER — FAMOTIDINE 10 MG/ML
20 INJECTION, SOLUTION INTRAVENOUS AS NEEDED
Status: DISCONTINUED | OUTPATIENT
Start: 2024-02-19 | End: 2024-02-19 | Stop reason: HOSPADM

## 2024-02-19 RX ORDER — DEXTROSE MONOHYDRATE 50 MG/ML
20 INJECTION, SOLUTION INTRAVENOUS ONCE
Status: DISCONTINUED | OUTPATIENT
Start: 2024-02-19 | End: 2024-02-19 | Stop reason: HOSPADM

## 2024-02-19 RX ORDER — DIPHENHYDRAMINE HYDROCHLORIDE 50 MG/ML
50 INJECTION INTRAMUSCULAR; INTRAVENOUS AS NEEDED
Status: DISCONTINUED | OUTPATIENT
Start: 2024-02-19 | End: 2024-02-19 | Stop reason: HOSPADM

## 2024-02-19 RX ORDER — PALONOSETRON 0.05 MG/ML
0.25 INJECTION, SOLUTION INTRAVENOUS ONCE
Status: COMPLETED | OUTPATIENT
Start: 2024-02-19 | End: 2024-02-19

## 2024-02-19 RX ORDER — FAMOTIDINE 10 MG/ML
20 INJECTION, SOLUTION INTRAVENOUS AS NEEDED
Status: CANCELLED | OUTPATIENT
Start: 2024-02-19

## 2024-02-19 RX ORDER — PALONOSETRON 0.05 MG/ML
0.25 INJECTION, SOLUTION INTRAVENOUS ONCE
Status: CANCELLED | OUTPATIENT
Start: 2024-02-19

## 2024-02-19 RX ADMIN — DEXAMETHASONE SODIUM PHOSPHATE 12 MG: 4 INJECTION, SOLUTION INTRAMUSCULAR; INTRAVENOUS at 09:58

## 2024-02-19 RX ADMIN — FLUOROURACIL 5590 MG: 50 INJECTION, SOLUTION INTRAVENOUS at 12:30

## 2024-02-19 RX ADMIN — LEUCOVORIN CALCIUM 930 MG: 500 INJECTION, POWDER, LYOPHILIZED, FOR SOLUTION INTRAMUSCULAR; INTRAVENOUS at 10:23

## 2024-02-19 RX ADMIN — PALONOSETRON 0.25 MG: 0.25 INJECTION, SOLUTION INTRAVENOUS at 09:56

## 2024-02-19 RX ADMIN — OXALIPLATIN 200 MG: 5 INJECTION, SOLUTION INTRAVENOUS at 10:24

## 2024-02-19 NOTE — PROGRESS NOTES
Hematology and Oncology [facility]  Office number [phone]    Fax number [phone]     Follow up     Date: 24    Patient Name: Edawrd Powell  MRN: [id number]  : 1970    Referring Physician: Dr. Gautam    Chief Complaint: Rectal cancer, lung mass, liver lesions    Cancer Staging: Presumptive stage IV    History of Present Illness: Edward Powell is a pleasant 53 y.o. male who presents today for evaluation of rectal cancer in the company of his supportive significant other.    Patient has a longstanding history of intermittent diarrhea since his cholecystectomy in 2019.  However he developed progressive diarrhea with associated loss of bowel control and urgency as well as weight loss and fatigue prompting additional workup.    CT of the abdomen pelvis 2023 showed an irregular circumferential wall thickening involving the rectum concerning for mass lesion.  There was associated mesorectal lymphadenopathy.  Masslike consolidation of left lower lobe.  CT of the chest showed a cavitary lesion in the left lower lobe up to 4.8 cm with an adjacent cavitary nodule up to 2 cm and a 5 mm nodule on the right minor fissure.      He underwent colonoscopy 2023 with findings of an infiltrative and ulcerated partially obstructing mass in the proximal rectum spanning 10 cm.  Rectal polyps.  Biopsy of the rectal mass showed invasive moderately differentiated adenocarcinoma.  Additional biopsies showed tubular adenomas.  MSI testing was intact/low probability of MSI high.    PDL1 negative    PET/CT 2023 showed hypermetabolic rectal wall thickening compatible with known rectal malignancy.  Multiple small ill-defined hypoechoic hyper metabolic liver lesions compatible with metastatic disease.  Mildly enlarged and mildly hypermetabolic pelvic sidewall and internal iliac lymph node chain adenopathy.  Hypermetabolic lung mass with adjacent nodule.     He underwent liver biopsy and lung  biopsy January 2024.  Results of both confirmed metastatic colorectal cancer.    The patient has been experiencing substantial rectal pain.  He is taking oxycodone every 6 hours with partial relief.  He reports ongoing bowel movements.  No abdominal pain.  No vomiting.  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  Takes oxycodone twice daily on most days with overall good control of pain. Still with breakthrough pain with defecation but no longer with prolonged sitting in car. Did have pain when attended a wrestling match at [place] and sat in stadium seats for 3-4 hours.   Still taking stool softeners, but scaled back to 1 tab BID  with less cramping than when taking 4 per day.   Insomnia controlled with 100 mg trazodone at night. So far no noticeable change with adding zoloft.   Next month needs to have oral surgery to complete implants, willing to put on hold. Eating and drinking well.   Had h/o recurrent boils under skin  Interested in smoking cessation.    Past Medical History:   Past Medical History:   Diagnosis Date    Arthritis     Cancer     rectal cancer - diagnosed 2023    Cholelithiasis 2019    Removed    COPD (chronic obstructive pulmonary disease)     Coronary artery disease 2009    Stent - no cardiologist currently    Elevated cholesterol     GERD (gastroesophageal reflux disease)     Hernia 2003    Lower hernia    Perforated ulcer 2019    Sleep apnea     history of; when weighed over 400lbs - no issues following bariatric surgery       Past Surgical History:   Past Surgical History:   Procedure Laterality Date    APPENDECTOMY  1983    Removed    BARIATRIC SURGERY  2011    Gastric bypass    BLADDER TUMOR/ULCER BLEEDER CAUTERIZATION      CARDIAC CATHETERIZATION  2009    stent placed    CHOLECYSTECTOMY  2019    Removed     COLONOSCOPY N/A 12/07/2023    Procedure: COLONOSCOPY WITH HOT SNARE POLYPECTOMY AND TATTOO;  Surgeon: Noman Gautam MD;  Location: [facility] ENDOSCOPY;  Service: Gastroenterology;  Laterality: N/A;    PORTACATH PLACEMENT N/A 1/5/2024    Procedure: INSERTION OF PORTACATH WITH ULTRSOUND AND FLUOROSCOPIC GUIDANCE;  Surgeon: Ida Black MD;  Location: [facility] OR;  Service: General;  Laterality: N/A;    JENNIFER-EN-Y         Family History: No family history on file.  Uncle had colon cancer    Social History:   Social History     Socioeconomic History    Marital status:    Tobacco Use    Smoking status: Every Day     Packs/day: 1.50     Years: 15.00     Additional pack years: 0.00     Total pack years: 22.50     Types: Cigarettes    Smokeless tobacco: Never    Tobacco comments:     35 years      pt reports closer to 2 packs per day since cancer diagnosis   Vaping Use    Vaping Use: Never used   Substance and Sexual Activity    Alcohol use: Never    Drug use: Never    Sexual activity: Defer       Medications:     Current Outpatient Medications:     brompheniramine-pseudoephedrine-DM 30-2-10 MG/5ML syrup, Take 10 mL by mouth Every 6 (Six) Hours As Needed for Congestion or Cough., Disp: , Rfl:     cetirizine (zyrTEC) 10 MG tablet, Take 1 tablet by mouth Daily., Disp: 30 tablet, Rfl: 2    dicyclomine (BENTYL) 20 MG tablet, Take 1 tablet by mouth 3 (Three) Times a Day As Needed for Abdominal Cramping., Disp: 60 tablet, Rfl: 0    docusate sodium (COLACE) 100 MG capsule, Take 1 capsule by mouth 2 (Two) Times a Day., Disp: , Rfl:     Hydrocortisone, Perianal, (ANUSOL-HC) 2.5 % rectal cream, Insert  into the rectum 2 (Two) Times a Day. Indications: Inflamed Hemorrhoids, Disp: 30 g, Rfl: 1    lidocaine-prilocaine (EMLA) 2.5-2.5 % cream, Apply 1 application  topically to the appropriate area as directed As Needed (45-60 minutes prior to port access.  Cover with saran/plastic wrap.)., Disp: 30 g, Rfl: 3     "LORazepam (ATIVAN) 0.5 MG tablet, Take 1 tablet by mouth Take As Directed. 1 tabelt by mouth 30 minutes prior to MRI, take a second tablet at the time of the MRI if needed., Disp: 2 tablet, Rfl: 0    naloxone (NARCAN) 4 MG/0.1ML nasal spray, 1 spray into the nostril(s) as directed by provider As Needed for Opioid Reversal., Disp: 1 each, Rfl: 0    ondansetron (ZOFRAN) 8 MG tablet, Take 1 tablet by mouth 3 (Three) Times a Day As Needed for Nausea or Vomiting., Disp: 30 tablet, Rfl: 5    oxyCODONE (ROXICODONE) 10 MG tablet, Take 1 tablet by mouth Every 6 (Six) Hours As Needed for Severe Pain or Moderate Pain., Disp: 120 tablet, Rfl: 0    pantoprazole (Protonix) 40 MG EC tablet, Take 1 tablet by mouth Daily. Indications: Gastroesophageal Reflux Disease, Disp: 90 tablet, Rfl: 1    sertraline (ZOLOFT) 25 MG tablet, Take 1 tablet by mouth Every Night., Disp: 30 tablet, Rfl: 5    tiotropium bromide-olodaterol (STIOLTO RESPIMAT) 2.5-2.5 MCG/ACT aerosol solution inhaler, Inhale 2 puffs Daily., Disp: 1 each, Rfl: 5    traZODone (DESYREL) 100 MG tablet, Take 1 tablet by mouth Every Night. Take one tablet one hour prior to bedtime, Disp: 30 tablet, Rfl: 1    Allergies:   Allergies   Allergen Reactions    Bactrim [Sulfamethoxazole-Trimethoprim] Hives     and blisters    Sulfa Antibiotics Hives     and blisters       Objective     Vital Signs:   Vitals:    02/19/24 0821   BP: 112/62   Pulse: 78   Resp: 16   Temp: 98.2 °F (36.8 °C)   SpO2: 98%   Weight: 105 kg (231 lb)   Height: 182.9 cm (72\")   PainSc: 0-No pain      Body mass index is 31.33 kg/m².   Pain Score    02/19/24 0821   PainSc: 0-No pain         ECOG Performance Status: 1 - Symptomatic but completely ambulatory    Physical Exam:   General: No acute distress. Well appearing   HEENT: Normocephalic, atraumatic. Sclera anicteric.   Neck: supple, no adenopathy.   Cardiovascular: regular rate and rhythm. No murmurs.   Respiratory: Normal rate. Clear to auscultation " bilaterally  Abdomen: Soft, nontender, non distended with normoactive bowel sounds  Lymph: no cervical, supraclavicular or axillary adenopathy  Neuro: Alert and oriented x 3. No focal deficits.   Ext: Symmetric, no swelling.   Psych: Euthymic      Laboratory/Imaging Reviewed:   Infusion on 02/19/2024   Component Date Value Ref Range Status    Glucose 02/19/2024 98  65 - 99 mg/dL Final    BUN 02/19/2024 14  6 - 20 mg/dL Final    Creatinine 02/19/2024 1.14  0.76 - 1.27 mg/dL Final    Sodium 02/19/2024 135 (L)  136 - 145 mmol/L Final    Potassium 02/19/2024 3.8  3.5 - 5.2 mmol/L Final    Chloride 02/19/2024 100  98 - 107 mmol/L Final    CO2 02/19/2024 23.3  22.0 - 29.0 mmol/L Final    Calcium 02/19/2024 9.1  8.6 - 10.5 mg/dL Final    Total Protein 02/19/2024 7.4  6.0 - 8.5 g/dL Final    Albumin 02/19/2024 3.9  3.5 - 5.2 g/dL Final    ALT (SGPT) 02/19/2024 10  1 - 41 U/L Final    AST (SGOT) 02/19/2024 16  1 - 40 U/L Final    Alkaline Phosphatase 02/19/2024 113  39 - 117 U/L Final    Total Bilirubin 02/19/2024 0.3  0.0 - 1.2 mg/dL Final    Globulin 02/19/2024 3.5  gm/dL Final    A/G Ratio 02/19/2024 1.1  g/dL Final    BUN/Creatinine Ratio 02/19/2024 12.3  7.0 - 25.0 Final    Anion Gap 02/19/2024 11.7  5.0 - 15.0 mmol/L Final    eGFR 02/19/2024 76.4  >60.0 mL/min/1.73 Final    WBC 02/19/2024 8.20  3.40 - 10.80 10*3/mm3 Final    RBC 02/19/2024 5.06  4.14 - 5.80 10*6/mm3 Final    Hemoglobin 02/19/2024 12.0 (L)  13.0 - 17.7 g/dL Final    Hematocrit 02/19/2024 38.0  37.5 - 51.0 % Final    MCV 02/19/2024 75.1 (L)  79.0 - 97.0 fL Final    MCH 02/19/2024 23.7 (L)  26.6 - 33.0 pg Final    MCHC 02/19/2024 31.6  31.5 - 35.7 g/dL Final    RDW 02/19/2024 18.6 (H)  12.3 - 15.4 % Final    RDW-SD 02/19/2024 46.9  37.0 - 54.0 fl Final    MPV 02/19/2024 11.0  6.0 - 12.0 fL Final    Platelets 02/19/2024 128 (L)  140 - 450 10*3/mm3 Final    Neutrophil % 02/19/2024 68.7  42.7 - 76.0 % Final    Lymphocyte % 02/19/2024 19.5 (L)  19.6 - 45.3 %  Final    Monocyte % 02/19/2024 9.8  5.0 - 12.0 % Final    Eosinophil % 02/19/2024 1.3  0.3 - 6.2 % Final    Basophil % 02/19/2024 0.5  0.0 - 1.5 % Final    Immature Grans % 02/19/2024 0.2  0.0 - 0.5 % Final    Neutrophils, Absolute 02/19/2024 5.63  1.70 - 7.00 10*3/mm3 Final    Lymphocytes, Absolute 02/19/2024 1.60  0.70 - 3.10 10*3/mm3 Final    Monocytes, Absolute 02/19/2024 0.80  0.10 - 0.90 10*3/mm3 Final    Eosinophils, Absolute 02/19/2024 0.11  0.00 - 0.40 10*3/mm3 Final    Basophils, Absolute 02/19/2024 0.04  0.00 - 0.20 10*3/mm3 Final    Immature Grans, Absolute 02/19/2024 0.02  0.00 - 0.05 10*3/mm3 Final    nRBC 02/19/2024 0.0  0.0 - 0.2 /100 WBC Final    Anisocytosis 02/19/2024 Slight/1+  None Seen Final    WBC Morphology 02/19/2024 Normal  Normal Final    Platelet Estimate 02/19/2024 Adequate  Normal Final     [facility]us xt: APC gene TP53; Negative ADRIANNA, BRAF, NRAS, TMB stable. PDL1 negative  No results found.    Procedures    Assessment / Plan      Assessment/Plan:     1.  Rectal cancer   2.  Liver metastases  3.  Lung metastasis  4.  Chemo monitoring  -The patient presents with a partially obstructing rectal cancer with adenopathy.  -He was found to have biopsy-proven metastasis in the liver and lung.  His case was presented at multidisciplinary tumor board.  -We will proceed with a liver MRI to better evaluate the extent of his hepatic metastasis as was recommended by radiology.  However, because of metastatic disease to both the lung and liver I do not think he will be downstage to resectable disease.  -Labs are adequate to proceed with cycle #4.  I reviewed his [facility] results which are notable for an APC mutation, T p53 mutation, negative for KRAS, BRAF, NRAS, tumor mutation burden stable.  Notch 1 VUS.  -Reviewed potential role for colorectal surgery consultation regarding diversion particularly if his obstructive symptoms do not improve, but they are currently improving  -Based on results of his NGS  testing I am going to add panitumumab to his regimen on follow-up.  New authorization sent.  Also in light of APC mutation tissue would recommend normal manage control blood sample to assess for germline mutation.    4.  Cancer related pain  -PRN oxycodone.   -Well controlled    5. Access   -Port    6.  Chemotherapy-induced nausea  -He will try de-escalating to Zofran 4 mg PRN and we can try Compazine if he does not tolerate this    7.  Seasonal allergies  -Add daily Zyrtec    8. Anxiety  9. Tobacco dependence  -Change zoloft to Welbutrin to help with smoking cessation  -Trazodone PRN for sleep  -Cancer psychiatry referral      Follow Up:   2 weeks     Brenda Cronin MD  Hematology and Oncology     I have spent a total of 40 min on reviewing test results/preparing to see patient, counseling patient, performing medically appropriate exam, placing orders, coordinating care and documenting clinical information in the electronic or other health record

## 2024-02-21 ENCOUNTER — INFUSION (OUTPATIENT)
Dept: ONCOLOGY | Facility: HOSPITAL | Age: 54
End: 2024-02-21
Payer: COMMERCIAL

## 2024-02-21 VITALS — DIASTOLIC BLOOD PRESSURE: 64 MMHG | HEART RATE: 64 BPM | SYSTOLIC BLOOD PRESSURE: 131 MMHG | TEMPERATURE: 98.7 F

## 2024-02-21 DIAGNOSIS — C20 RECTAL ADENOCARCINOMA: Primary | ICD-10-CM

## 2024-02-21 PROCEDURE — 96523 IRRIG DRUG DELIVERY DEVICE: CPT

## 2024-02-21 PROCEDURE — 25010000002 HEPARIN LOCK FLUSH PER 10 UNITS: Performed by: INTERNAL MEDICINE

## 2024-02-21 RX ORDER — SODIUM CHLORIDE 0.9 % (FLUSH) 0.9 %
10 SYRINGE (ML) INJECTION AS NEEDED
Status: DISCONTINUED | OUTPATIENT
Start: 2024-02-21 | End: 2024-02-21 | Stop reason: HOSPADM

## 2024-02-21 RX ORDER — SODIUM CHLORIDE 0.9 % (FLUSH) 0.9 %
10 SYRINGE (ML) INJECTION AS NEEDED
OUTPATIENT
Start: 2024-02-21

## 2024-02-21 RX ORDER — HEPARIN SODIUM (PORCINE) LOCK FLUSH IV SOLN 100 UNIT/ML 100 UNIT/ML
500 SOLUTION INTRAVENOUS AS NEEDED
Status: DISCONTINUED | OUTPATIENT
Start: 2024-02-21 | End: 2024-02-21 | Stop reason: HOSPADM

## 2024-02-21 RX ORDER — HEPARIN SODIUM (PORCINE) LOCK FLUSH IV SOLN 100 UNIT/ML 100 UNIT/ML
500 SOLUTION INTRAVENOUS AS NEEDED
OUTPATIENT
Start: 2024-02-21

## 2024-02-21 RX ADMIN — HEPARIN 500 UNITS: 100 SYRINGE at 14:32

## 2024-02-21 RX ADMIN — Medication 10 ML: at 14:32

## 2024-02-23 ENCOUNTER — HOSPITAL ENCOUNTER (OUTPATIENT)
Dept: MRI IMAGING | Facility: HOSPITAL | Age: 54
Discharge: HOME | End: 2024-02-23
Admitting: INTERNAL MEDICINE
Payer: COMMERCIAL

## 2024-02-23 DIAGNOSIS — C20 RECTAL CANCER METASTASIZED TO LIVER: ICD-10-CM

## 2024-02-23 DIAGNOSIS — C78.7 RECTAL CANCER METASTASIZED TO LIVER: ICD-10-CM

## 2024-02-23 PROCEDURE — 0 GADOBENATE DIMEGLUMINE 529 MG/ML SOLUTION: Performed by: INTERNAL MEDICINE

## 2024-02-23 PROCEDURE — A9577 INJ MULTIHANCE: HCPCS | Performed by: INTERNAL MEDICINE

## 2024-02-23 PROCEDURE — 74183 MRI ABD W/O CNTR FLWD CNTR: CPT

## 2024-02-23 RX ADMIN — GADOBENATE DIMEGLUMINE 20 ML: 529 INJECTION, SOLUTION INTRAVENOUS at 08:27

## 2024-02-26 ENCOUNTER — OFFICE VISIT (OUTPATIENT)
Dept: ONCOLOGY | Facility: CLINIC | Age: 54
End: 2024-02-26
Payer: COMMERCIAL

## 2024-02-26 VITALS
TEMPERATURE: 98 F | WEIGHT: 231 LBS | RESPIRATION RATE: 16 BRPM | HEART RATE: 73 BPM | HEIGHT: 72 IN | OXYGEN SATURATION: 99 % | BODY MASS INDEX: 31.29 KG/M2 | DIASTOLIC BLOOD PRESSURE: 71 MMHG | SYSTOLIC BLOOD PRESSURE: 131 MMHG

## 2024-02-26 DIAGNOSIS — G89.3 CANCER RELATED PAIN: ICD-10-CM

## 2024-02-26 DIAGNOSIS — C20 RECTAL CANCER METASTASIZED TO LIVER: Primary | ICD-10-CM

## 2024-02-26 DIAGNOSIS — C78.7 RECTAL CANCER METASTASIZED TO LIVER: Primary | ICD-10-CM

## 2024-02-26 DIAGNOSIS — C20 RECTAL ADENOCARCINOMA: ICD-10-CM

## 2024-02-26 RX ORDER — OXYCODONE HYDROCHLORIDE 10 MG/1
10 TABLET ORAL EVERY 6 HOURS PRN
Qty: 120 TABLET | Refills: 0 | Status: SHIPPED | OUTPATIENT
Start: 2024-02-26 | End: 2024-02-27 | Stop reason: SDUPTHER

## 2024-02-26 NOTE — PROGRESS NOTES
[facility] [place]  Office number [phone]    Fax number [phone]     Follow up     Date: 24    Patient Name: Edward Powell  MRN: [id number]  : 1970    Referring Physician: Dr. Gautam    Chief Complaint: Rectal cancer, lung mass, liver lesions    Cancer Staging: Presumptive stage IV    History of Present Illness: Edward Powell is a pleasant 53 y.o. male who presents today for evaluation of rectal cancer in the company of his supportive significant other.    Patient has a longstanding history of intermittent diarrhea since his cholecystectomy in 2019.  However he developed progressive diarrhea with associated loss of bowel control and urgency as well as weight loss and fatigue prompting additional workup.    CT of the abdomen pelvis 2023 showed an irregular circumferential wall thickening involving the rectum concerning for mass lesion.  There was associated mesorectal lymphadenopathy.  Masslike consolidation of left lower lobe.  CT of the chest showed a cavitary lesion in the left lower lobe up to 4.8 cm with an adjacent cavitary nodule up to 2 cm and a 5 mm nodule on the right minor fissure.      He underwent colonoscopy 2023 with findings of an infiltrative and ulcerated partially obstructing mass in the proximal rectum spanning 10 cm.  Rectal polyps.  Biopsy of the rectal mass showed invasive moderately differentiated adenocarcinoma.  Additional biopsies showed tubular adenomas.  MSI testing was intact/low probability of MSI high.    PDL1 negative    PET/CT 2023 showed hypermetabolic rectal wall thickening compatible with known rectal malignancy.  Multiple small ill-defined hypoechoic hyper metabolic liver lesions compatible with metastatic disease.  Mildly enlarged and mildly hypermetabolic pelvic sidewall and internal iliac lymph node chain adenopathy.  Hypermetabolic lung mass with adjacent nodule.     He underwent liver biopsy and lung  biopsy January 2024.  Results of both confirmed metastatic colorectal cancer.    The patient has been experiencing substantial rectal pain.  He is taking oxycodone every 6 hours with partial relief.  He reports ongoing bowel movements.  No abdominal pain.  No vomiting.  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    He continues to take oxycodone twice daily on most days with overall good pain control.  Continues to have some pain with defecation.  He is requesting refill today.  Continues on stool softeners.  He takes 1 tablet twice daily.  Insomnia controlled.  So far no noticeable change with his smoking with adding Wellbutrin.  He continues to smoke 2 to 3 packs/day.  He continues to be interested in smoking cessation.      Past Medical History:   Past Medical History:   Diagnosis Date    Arthritis     Cancer     rectal cancer - diagnosed 2023    Cholelithiasis 2019    Removed    COPD (chronic obstructive pulmonary disease)     Coronary artery disease 2009    Stent - no cardiologist currently    Elevated cholesterol     GERD (gastroesophageal reflux disease)     Hernia 2003    Lower hernia    Perforated ulcer 2019    Sleep apnea     history of; when weighed over 400lbs - no issues following bariatric surgery       Past Surgical History:   Past Surgical History:   Procedure Laterality Date    APPENDECTOMY  1983    Removed    BARIATRIC SURGERY  2011    Gastric bypass    BLADDER TUMOR/ULCER BLEEDER CAUTERIZATION      CARDIAC CATHETERIZATION  2009    stent placed    CHOLECYSTECTOMY  2019    Removed    COLONOSCOPY N/A 12/07/2023    Procedure: COLONOSCOPY WITH HOT SNARE POLYPECTOMY AND TATTOO;  Surgeon: Noman Gautam MD;  Location: [facility] ENDOSCOPY;  Service: Gastroenterology;  Laterality: N/A;    PORTACATH PLACEMENT N/A 1/5/2024    Procedure: INSERTION  OF PORTACATH WITH ULTRSOUND AND FLUOROSCOPIC GUIDANCE;  Surgeon: Ida Black MD;  Location: [facility];  Service: General;  Laterality: N/A;    JENNIFER-EN-Y         Family History: No family history on file.  Uncle had colon cancer    Social History:   Social History     Socioeconomic History    Marital status:    Tobacco Use    Smoking status: Every Day     Packs/day: 1.50     Years: 15.00     Additional pack years: 0.00     Total pack years: 22.50     Types: Cigarettes    Smokeless tobacco: Never    Tobacco comments:     35 years      pt reports closer to 2 packs per day since cancer diagnosis   Vaping Use    Vaping Use: Never used   Substance and Sexual Activity    Alcohol use: Never    Drug use: Never    Sexual activity: Defer       Medications:     Current Outpatient Medications:     buPROPion XL (Wellbutrin XL) 150 MG 24 hr tablet, Take 1 tablet by mouth Daily. Take in place of zoloft, Disp: 30 tablet, Rfl: 2    cetirizine (zyrTEC) 10 MG tablet, Take 1 tablet by mouth Daily., Disp: 30 tablet, Rfl: 2    dicyclomine (BENTYL) 20 MG tablet, Take 1 tablet by mouth 3 (Three) Times a Day As Needed for Abdominal Cramping., Disp: 60 tablet, Rfl: 0    docusate sodium (COLACE) 100 MG capsule, Take 1 capsule by mouth 2 (Two) Times a Day., Disp: , Rfl:     Hydrocortisone, Perianal, (ANUSOL-HC) 2.5 % rectal cream, Insert  into the rectum 2 (Two) Times a Day. Indications: Inflamed Hemorrhoids, Disp: 30 g, Rfl: 1    lidocaine-prilocaine (EMLA) 2.5-2.5 % cream, Apply 1 application  topically to the appropriate area as directed As Needed (45-60 minutes prior to port access.  Cover with saran/plastic wrap.)., Disp: 30 g, Rfl: 3    LORazepam (ATIVAN) 0.5 MG tablet, Take 1 tablet by mouth Take As Directed. 1 tabelt by mouth 30 minutes prior to MRI, take a second tablet at the time of the MRI if needed., Disp: 2 tablet, Rfl: 0    naloxone (NARCAN) 4 MG/0.1ML nasal spray, 1 spray into the nostril(s) as directed  "by provider As Needed for Opioid Reversal., Disp: 1 each, Rfl: 0    ondansetron (ZOFRAN) 8 MG tablet, Take 1 tablet by mouth 3 (Three) Times a Day As Needed for Nausea or Vomiting., Disp: 30 tablet, Rfl: 5    oxyCODONE (ROXICODONE) 10 MG tablet, Take 1 tablet by mouth Every 6 (Six) Hours As Needed for Severe Pain or Moderate Pain., Disp: 120 tablet, Rfl: 0    pantoprazole (Protonix) 40 MG EC tablet, Take 1 tablet by mouth Daily. Indications: Gastroesophageal Reflux Disease, Disp: 90 tablet, Rfl: 1    tiotropium bromide-olodaterol (STIOLTO RESPIMAT) 2.5-2.5 MCG/ACT aerosol solution inhaler, Inhale 2 puffs Daily., Disp: 1 each, Rfl: 5    traZODone (DESYREL) 100 MG tablet, Take 1 tablet by mouth Every Night. Take one tablet one hour prior to bedtime, Disp: 30 tablet, Rfl: 1    Allergies:   Allergies   Allergen Reactions    Bactrim [Sulfamethoxazole-Trimethoprim] Hives     and blisters    Sulfa Antibiotics Hives     and blisters       Objective     Vital Signs:   Vitals:    02/26/24 1313   BP: 131/71   Pulse: 73   Resp: 16   Temp: 98 °F (36.7 °C)   SpO2: 99%   Weight: 105 kg (231 lb)   Height: 182.9 cm (72\")   PainSc: 0-No pain      Body mass index is 31.33 kg/m².   Pain Score    02/26/24 1313   PainSc: 0-No pain         ECOG Performance Status: 1 - Symptomatic but completely ambulatory    Physical Exam:   General: No acute distress. Well appearing   HEENT: Normocephalic, atraumatic. Sclera anicteric.   Neck: supple, no adenopathy.   Cardiovascular: regular rate and rhythm. No murmurs.   Respiratory: Normal rate. Clear to auscultation bilaterally  Abdomen: Soft, nontender, non distended with normoactive bowel sounds  Lymph: no cervical, supraclavicular or axillary adenopathy  Neuro: Alert and oriented x 3. No focal deficits.   Ext: Symmetric, no swelling.   Psych: Euthymic      Laboratory/Imaging Reviewed:   Infusion on 02/19/2024   Component Date Value Ref Range Status    CEA 02/19/2024 50.00  ng/mL Final    Glucose " 02/19/2024 98  65 - 99 mg/dL Final    BUN 02/19/2024 14  6 - 20 mg/dL Final    Creatinine 02/19/2024 1.14  0.76 - 1.27 mg/dL Final    Sodium 02/19/2024 135 (L)  136 - 145 mmol/L Final    Potassium 02/19/2024 3.8  3.5 - 5.2 mmol/L Final    Chloride 02/19/2024 100  98 - 107 mmol/L Final    CO2 02/19/2024 23.3  22.0 - 29.0 mmol/L Final    Calcium 02/19/2024 9.1  8.6 - 10.5 mg/dL Final    Total Protein 02/19/2024 7.4  6.0 - 8.5 g/dL Final    Albumin 02/19/2024 3.9  3.5 - 5.2 g/dL Final    ALT (SGPT) 02/19/2024 10  1 - 41 U/L Final    AST (SGOT) 02/19/2024 16  1 - 40 U/L Final    Alkaline Phosphatase 02/19/2024 113  39 - 117 U/L Final    Total Bilirubin 02/19/2024 0.3  0.0 - 1.2 mg/dL Final    Globulin 02/19/2024 3.5  gm/dL Final    A/G Ratio 02/19/2024 1.1  g/dL Final    BUN/Creatinine Ratio 02/19/2024 12.3  7.0 - 25.0 Final    Anion Gap 02/19/2024 11.7  5.0 - 15.0 mmol/L Final    eGFR 02/19/2024 76.4  >60.0 mL/min/1.73 Final    WBC 02/19/2024 8.20  3.40 - 10.80 10*3/mm3 Final    RBC 02/19/2024 5.06  4.14 - 5.80 10*6/mm3 Final    Hemoglobin 02/19/2024 12.0 (L)  13.0 - 17.7 g/dL Final    Hematocrit 02/19/2024 38.0  37.5 - 51.0 % Final    MCV 02/19/2024 75.1 (L)  79.0 - 97.0 fL Final    MCH 02/19/2024 23.7 (L)  26.6 - 33.0 pg Final    MCHC 02/19/2024 31.6  31.5 - 35.7 g/dL Final    RDW 02/19/2024 18.6 (H)  12.3 - 15.4 % Final    RDW-SD 02/19/2024 46.9  37.0 - 54.0 fl Final    MPV 02/19/2024 11.0  6.0 - 12.0 fL Final    Platelets 02/19/2024 128 (L)  140 - 450 10*3/mm3 Final    Neutrophil % 02/19/2024 68.7  42.7 - 76.0 % Final    Lymphocyte % 02/19/2024 19.5 (L)  19.6 - 45.3 % Final    Monocyte % 02/19/2024 9.8  5.0 - 12.0 % Final    Eosinophil % 02/19/2024 1.3  0.3 - 6.2 % Final    Basophil % 02/19/2024 0.5  0.0 - 1.5 % Final    Immature Grans % 02/19/2024 0.2  0.0 - 0.5 % Final    Neutrophils, Absolute 02/19/2024 5.63  1.70 - 7.00 10*3/mm3 Final    Lymphocytes, Absolute 02/19/2024 1.60  0.70 - 3.10 10*3/mm3 Final     Monocytes, Absolute 02/19/2024 0.80  0.10 - 0.90 10*3/mm3 Final    Eosinophils, Absolute 02/19/2024 0.11  0.00 - 0.40 10*3/mm3 Final    Basophils, Absolute 02/19/2024 0.04  0.00 - 0.20 10*3/mm3 Final    Immature Grans, Absolute 02/19/2024 0.02  0.00 - 0.05 10*3/mm3 Final    nRBC 02/19/2024 0.0  0.0 - 0.2 /100 WBC Final    Anisocytosis 02/19/2024 Slight/1+  None Seen Final    WBC Morphology 02/19/2024 Normal  Normal Final    Platelet Estimate 02/19/2024 Adequate  Normal Final     Tempus xt: APC gene TP53; Negative ADRIANNA, BRAF, NRAS, TMB stable. PDL1 negative  MRI Abdomen With & Without Contrast    Result Date: 2/26/2024  Narrative: MRI ABDOMEN W WO CONTRAST Date of Exam: 2/23/2024 7:01 AM EST Indication: rectal cancer, liver metastasis.  Comparison: PET/CT 12/22/2023, CT abdomen pelvis 11/25/2023, CT guided liver biopsy images 1/18/2024 Technique:  Routine multiplanar/multisequence images of the abdomen were obtained before and after the uneventful administration of 20 mL Multihance. Findings: Lower Thorax: Known left lower lobe pulmonary mass/metastasis is partially visualized on some sequences. Liver: Decreased size and number of hepatic metastases compared to prior exams. No evidence of new liver lesions. No evidence of hepatic steatosis. No morphologic changes of chronic liver disease. For reference: - The biopsy-proven metastasis in the inferior right hepatic lobe measures 0.9 cm, previously 1.9 cm on CT 11/25/2023 (series 16 image 58). - The metastasis in the superior right hepatic lobe measures 0.4 cm, previously 0.8 cm on CT 11/25/2023 (series 16 image 13). - The additional small metastases seen on prior CT and PET/CT are no longer discretely measurable (there were probably around 7 measurable metastases on prior CT and PET/CT, many which were less than 1 cm). Gallbladder and bile ducts: Cholecystectomy. No biliary ductal dilatation or filling defect. Pancreas: No pancreatic duct dilation. No surrounding  inflammation. Spleen: Splenomegaly measuring 16 cm, unchanged. Calcified granulomata. Adrenal glands: No adrenal nodule. Kidneys: No hydronephrosis. No suspicious renal lesions. Benign right renal cyst. Stomach and bowel: Postsurgical changes of the stomach. No evidence of bowel obstruction. Lymph nodes: No pathologically enlarged lymph nodes. Vasculature: No aneurysmal dilation of the abdominal aorta. Major vasculature is patent. Peritoneum and retroperitoneum: No free air or free fluid. Soft tissues: Unremarkable. Osseous structures: No focal suspicious osseous lesions.     Impression: Impression: Decreased size and number of hepatic metastases. No evidence of new or progressive metastatic disease in the abdomen. Electronically Signed: Min Nguyen MD  2/26/2024 9:38 AM EST  Workstation ID: [id number]     Procedures        TOPICS EDUCATION PROVIDED   ANEMIA:  role of RBC, cause, s/s, ways to manage, role of transfusion [x]   THROMBOCYTOPENIA:  role of platelet, cause, s/s, ways to prevent bleeding, things to avoid, when to seek help [x]   NEUTROPENIA:  role of WBC, cause, infection precautions, s/s of infection, when to call MD [x]   NUTRITION & APPETITE CHANGES:  importance of maintaining healthy diet & weight, ways to manage to improve intake, dietary consult, exercise regimen [x]   DIARRHEA:  causes, s/s of dehydration, ways to manage, dietary changes, when to call MD [x]   CONSTIPATION:  causes, ways to manage, dietary changes, when to call MD [x]   NAUSEA & VOMITING:  cause, use of antiemetics, dietary changes, when to call MD [x]   MOUTH SORES:  causes, oral care, ways to manage [x]   ALOPECIA:  cause, ways to manage, resources [x]   INFERTILITY & SEXUALITY:  causes, fertility preservation options, sexuality changes, ways to manage, importance of birth control [x]   NERVOUS SYSTEM CHANGES:  causes, s/s, neuropathies, cognitive changes, ways to manage [x]   PAIN:  causes, ways to manage [x]   SKIN & NAIL  CHANGES:  cause, s/s, ways to manage [x]   ORGAN TOXICITIES:  cause, s/s, need for diagnostic tests, labs, when to notify MD [x]   SURVIVORSHIP:  distress, distress assessment, secondary malignancies, early/late effects, follow-up, social issues, social support [x]   HOME CARE:  use of spill kits, storing of PO chemo, how to manage bodily fluids [x]   MISCELLANEOUS:  drug interactions, administration, vesicant, et [x]         Assessment / Plan      Assessment/Plan:     1.  Rectal cancer   2.  Liver metastases  3.  Lung metastasis  4.  Chemo monitoring  -The patient presents with a partially obstructing rectal cancer with adenopathy.  -He was found to have biopsy-proven metastasis in the liver and lung.  His case was presented at multidisciplinary tumor board.  -We will proceed with a liver MRI to better evaluate the extent of his hepatic metastasis as was recommended by radiology.  However, because of metastatic disease to both the lung and liver I do not think he will be downstage to resectable disease.  -We will tentatively plan to proceed with cycle #5 on Monday.  -Tempus results which are notable for an APC mutation, T p53 mutation, negative for KRAS, BRAF, NRAS, tumor mutation burden stable.  Notch 1 VUS.  -Reviewed potential role for colorectal surgery consultation regarding diversion particularly if his obstructive symptoms do not improve, but they are currently improving  -Based on results of his NGS testing will plan to proceed with panitumumab on Monday.  APC mutation tissue would recommend normal manage control blood sample to assess for germline mutation.  -As above, we discussed potential side effects including fatigue, skin rash, paronychia, hypomagnesemia, nausea, diarrhea, and fever.  -The patient and I have reviewed their cancer diagnosis and scheduled treatment plan. Chemotherapy teaching was also completed today as documented above. Adequate time was given to answer all questions to her  satisfaction. Patient and family are aware of their care team members and contact information if they have questions or problems throughout the treatment course  -Handicap placard paperwork was provided to the patient today.      4.  Cancer related pain  -PRN oxycodone.   -Well controlled    5. Access   -Port    6.  Chemotherapy-induced nausea  -He will try de-escalating to Zofran 4 mg PRN and we can try Compazine if he does not tolerate this    7.  Seasonal allergies  -Continue daily Zyrtec    8. Anxiety  9. Tobacco dependence  -Continue Welbutrin to help with smoking cessation  -Trazodone PRN for sleep  -Cancer psychiatry referral      Follow Up:   One week     GEORGIA Grimes    Hematology and Oncology     Total time of patient care including time prior to, face to face with patient, and following visit spent in reviewing records, lab results, imaging studies, discussion with patient, and documentation/charting was > 50 minutes

## 2024-02-27 RX ORDER — OXYCODONE HYDROCHLORIDE 10 MG/1
10 TABLET ORAL EVERY 6 HOURS PRN
Qty: 120 TABLET | Refills: 0 | Status: SHIPPED | OUTPATIENT
Start: 2024-02-27 | End: 2024-03-28

## 2024-02-27 NOTE — TELEPHONE ENCOUNTER
RACIEL #: [id number]    Medication requested: oxyCODONE (ROXICODONE) 10 MG tablet     Last fill date: 1/12/24    Last appointment: 1/12/24    Next appointment: 4/26/24

## 2024-03-04 ENCOUNTER — OFFICE VISIT (OUTPATIENT)
Dept: ONCOLOGY | Facility: CLINIC | Age: 54
End: 2024-03-04
Payer: COMMERCIAL

## 2024-03-04 ENCOUNTER — TELEPHONE (OUTPATIENT)
Dept: ONCOLOGY | Facility: CLINIC | Age: 54
End: 2024-03-04

## 2024-03-04 ENCOUNTER — INFUSION (OUTPATIENT)
Dept: ONCOLOGY | Facility: HOSPITAL | Age: 54
End: 2024-03-04
Payer: COMMERCIAL

## 2024-03-04 ENCOUNTER — PATIENT OUTREACH (OUTPATIENT)
Dept: ONCOLOGY | Facility: HOSPITAL | Age: 54
End: 2024-03-04
Payer: COMMERCIAL

## 2024-03-04 VITALS
BODY MASS INDEX: 31.69 KG/M2 | WEIGHT: 234 LBS | HEART RATE: 71 BPM | HEIGHT: 72 IN | RESPIRATION RATE: 16 BRPM | DIASTOLIC BLOOD PRESSURE: 75 MMHG | TEMPERATURE: 97.3 F | SYSTOLIC BLOOD PRESSURE: 131 MMHG | OXYGEN SATURATION: 99 %

## 2024-03-04 DIAGNOSIS — R10.30 LOWER ABDOMINAL PAIN: ICD-10-CM

## 2024-03-04 DIAGNOSIS — C20 RECTAL CANCER METASTASIZED TO LIVER: Primary | ICD-10-CM

## 2024-03-04 DIAGNOSIS — C78.7 RECTAL CANCER METASTASIZED TO LIVER: Primary | ICD-10-CM

## 2024-03-04 LAB
ALBUMIN SERPL-MCNC: 4 G/DL (ref 3.5–5.2)
ALBUMIN/GLOB SERPL: 1.2 G/DL
ALP SERPL-CCNC: 112 U/L (ref 39–117)
ALT SERPL W P-5'-P-CCNC: 11 U/L (ref 1–41)
ANION GAP SERPL CALCULATED.3IONS-SCNC: 7.8 MMOL/L (ref 5–15)
AST SERPL-CCNC: 17 U/L (ref 1–40)
BASOPHILS # BLD AUTO: 0.03 10*3/MM3 (ref 0–0.2)
BASOPHILS NFR BLD AUTO: 0.4 % (ref 0–1.5)
BILIRUB SERPL-MCNC: 0.3 MG/DL (ref 0–1.2)
BUN SERPL-MCNC: 14 MG/DL (ref 6–20)
BUN/CREAT SERPL: 12.2 (ref 7–25)
CALCIUM SPEC-SCNC: 8.8 MG/DL (ref 8.6–10.5)
CHLORIDE SERPL-SCNC: 103 MMOL/L (ref 98–107)
CO2 SERPL-SCNC: 25.2 MMOL/L (ref 22–29)
CREAT SERPL-MCNC: 1.15 MG/DL (ref 0.76–1.27)
DEPRECATED RDW RBC AUTO: 48.7 FL (ref 37–54)
EGFRCR SERPLBLD CKD-EPI 2021: 75.6 ML/MIN/1.73
EOSINOPHIL # BLD AUTO: 0.09 10*3/MM3 (ref 0–0.4)
EOSINOPHIL NFR BLD AUTO: 1.2 % (ref 0.3–6.2)
ERYTHROCYTE [DISTWIDTH] IN BLOOD BY AUTOMATED COUNT: 19 % (ref 12.3–15.4)
GLOBULIN UR ELPH-MCNC: 3.4 GM/DL
GLUCOSE SERPL-MCNC: 88 MG/DL (ref 65–99)
HCT VFR BLD AUTO: 35.1 % (ref 37.5–51)
HGB BLD-MCNC: 11.1 G/DL (ref 13–17.7)
IMM GRANULOCYTES # BLD AUTO: 0.03 10*3/MM3 (ref 0–0.05)
IMM GRANULOCYTES NFR BLD AUTO: 0.4 % (ref 0–0.5)
LYMPHOCYTES # BLD AUTO: 1.45 10*3/MM3 (ref 0.7–3.1)
LYMPHOCYTES NFR BLD AUTO: 19 % (ref 19.6–45.3)
MAGNESIUM SERPL-MCNC: 2.2 MG/DL (ref 1.6–2.6)
MCH RBC QN AUTO: 23.7 PG (ref 26.6–33)
MCHC RBC AUTO-ENTMCNC: 31.6 G/DL (ref 31.5–35.7)
MCV RBC AUTO: 75 FL (ref 79–97)
MONOCYTES # BLD AUTO: 0.83 10*3/MM3 (ref 0.1–0.9)
MONOCYTES NFR BLD AUTO: 10.8 % (ref 5–12)
NEUTROPHILS NFR BLD AUTO: 5.22 10*3/MM3 (ref 1.7–7)
NEUTROPHILS NFR BLD AUTO: 68.2 % (ref 42.7–76)
NRBC BLD AUTO-RTO: 0 /100 WBC (ref 0–0.2)
PLATELET # BLD AUTO: 138 10*3/MM3 (ref 140–450)
PMV BLD AUTO: 10.4 FL (ref 6–12)
POTASSIUM SERPL-SCNC: 4 MMOL/L (ref 3.5–5.2)
PROT SERPL-MCNC: 7.4 G/DL (ref 6–8.5)
RBC # BLD AUTO: 4.68 10*6/MM3 (ref 4.14–5.8)
SODIUM SERPL-SCNC: 136 MMOL/L (ref 136–145)
WBC NRBC COR # BLD AUTO: 7.65 10*3/MM3 (ref 3.4–10.8)

## 2024-03-04 PROCEDURE — 25810000003 SODIUM CHLORIDE 0.9 % SOLUTION 250 ML FLEX CONT: Performed by: INTERNAL MEDICINE

## 2024-03-04 PROCEDURE — 99215 OFFICE O/P EST HI 40 MIN: CPT | Performed by: INTERNAL MEDICINE

## 2024-03-04 PROCEDURE — 25010000002 DEXAMETHASONE SODIUM PHOSPHATE 20 MG/5ML SOLUTION: Performed by: INTERNAL MEDICINE

## 2024-03-04 PROCEDURE — 83735 ASSAY OF MAGNESIUM: CPT

## 2024-03-04 PROCEDURE — 25010000002 PANITUMUMAB 400 MG/20ML SOLUTION 20 ML VIAL: Performed by: INTERNAL MEDICINE

## 2024-03-04 PROCEDURE — 25010000002 OXALIPLATIN PER 0.5 MG: Performed by: INTERNAL MEDICINE

## 2024-03-04 PROCEDURE — 96413 CHEMO IV INFUSION 1 HR: CPT

## 2024-03-04 PROCEDURE — 96416 CHEMO PROLONG INFUSE W/PUMP: CPT

## 2024-03-04 PROCEDURE — 0 DEXTROSE 5 % SOLUTION 250 ML FLEX CONT: Performed by: INTERNAL MEDICINE

## 2024-03-04 PROCEDURE — G0498 CHEMO EXTEND IV INFUS W/PUMP: HCPCS

## 2024-03-04 PROCEDURE — 96375 TX/PRO/DX INJ NEW DRUG ADDON: CPT

## 2024-03-04 PROCEDURE — 96417 CHEMO IV INFUS EACH ADDL SEQ: CPT

## 2024-03-04 PROCEDURE — 25010000002 LEUCOVORIN 500 MG RECONSTITUTED SOLUTION 1 EACH VIAL: Performed by: INTERNAL MEDICINE

## 2024-03-04 PROCEDURE — 96415 CHEMO IV INFUSION ADDL HR: CPT

## 2024-03-04 PROCEDURE — 25010000002 PALONOSETRON 0.25 MG/5ML SOLUTION PREFILLED SYRINGE: Performed by: INTERNAL MEDICINE

## 2024-03-04 PROCEDURE — 25010000002 FLUOROURACIL PER 500 MG: Performed by: INTERNAL MEDICINE

## 2024-03-04 PROCEDURE — 85025 COMPLETE CBC W/AUTO DIFF WBC: CPT

## 2024-03-04 PROCEDURE — 96368 THER/DIAG CONCURRENT INF: CPT

## 2024-03-04 PROCEDURE — 80053 COMPREHEN METABOLIC PANEL: CPT

## 2024-03-04 RX ORDER — FAMOTIDINE 10 MG/ML
20 INJECTION, SOLUTION INTRAVENOUS AS NEEDED
Status: CANCELLED | OUTPATIENT
Start: 2024-03-04

## 2024-03-04 RX ORDER — PALONOSETRON 0.05 MG/ML
0.25 INJECTION, SOLUTION INTRAVENOUS ONCE
Status: CANCELLED | OUTPATIENT
Start: 2024-03-04

## 2024-03-04 RX ORDER — DEXTROSE MONOHYDRATE 50 MG/ML
20 INJECTION, SOLUTION INTRAVENOUS ONCE
Status: CANCELLED | OUTPATIENT
Start: 2024-03-04

## 2024-03-04 RX ORDER — PANTOPRAZOLE SODIUM 40 MG/1
40 TABLET, DELAYED RELEASE ORAL DAILY
Qty: 90 TABLET | Refills: 2 | Status: SHIPPED | OUTPATIENT
Start: 2024-03-04

## 2024-03-04 RX ORDER — DICYCLOMINE HCL 20 MG
20 TABLET ORAL 3 TIMES DAILY PRN
Qty: 30 TABLET | Refills: 3 | Status: SHIPPED | OUTPATIENT
Start: 2024-03-04

## 2024-03-04 RX ORDER — DEXTROSE MONOHYDRATE 50 MG/ML
20 INJECTION, SOLUTION INTRAVENOUS ONCE
Status: DISCONTINUED | OUTPATIENT
Start: 2024-03-04 | End: 2024-03-04 | Stop reason: HOSPADM

## 2024-03-04 RX ORDER — DIPHENHYDRAMINE HYDROCHLORIDE 50 MG/ML
50 INJECTION INTRAMUSCULAR; INTRAVENOUS AS NEEDED
Status: CANCELLED | OUTPATIENT
Start: 2024-03-04

## 2024-03-04 RX ORDER — SODIUM CHLORIDE 9 MG/ML
20 INJECTION, SOLUTION INTRAVENOUS ONCE
Status: DISCONTINUED | OUTPATIENT
Start: 2024-03-04 | End: 2024-03-04 | Stop reason: HOSPADM

## 2024-03-04 RX ORDER — SODIUM CHLORIDE 9 MG/ML
20 INJECTION, SOLUTION INTRAVENOUS ONCE
Status: CANCELLED | OUTPATIENT
Start: 2024-03-04

## 2024-03-04 RX ORDER — PALONOSETRON 0.05 MG/ML
0.25 INJECTION, SOLUTION INTRAVENOUS ONCE
Status: COMPLETED | OUTPATIENT
Start: 2024-03-04 | End: 2024-03-04

## 2024-03-04 RX ORDER — ONDANSETRON HYDROCHLORIDE 8 MG/1
8 TABLET, FILM COATED ORAL 3 TIMES DAILY PRN
Qty: 30 TABLET | Refills: 5 | Status: SHIPPED | OUTPATIENT
Start: 2024-03-04

## 2024-03-04 RX ADMIN — FLUOROURACIL 5450 MG: 50 INJECTION, SOLUTION INTRAVENOUS at 14:40

## 2024-03-04 RX ADMIN — PANITUMUMAB 630 MG: 400 SOLUTION INTRAVENOUS at 10:41

## 2024-03-04 RX ADMIN — DEXAMETHASONE SODIUM PHOSPHATE 12 MG: 4 INJECTION INTRA-ARTICULAR; INTRALESIONAL; INTRAMUSCULAR; INTRAVENOUS; SOFT TISSUE at 11:53

## 2024-03-04 RX ADMIN — PALONOSETRON 0.25 MG: 0.25 INJECTION, SOLUTION INTRAVENOUS at 11:53

## 2024-03-04 RX ADMIN — LEUCOVORIN CALCIUM 910 MG: 500 INJECTION, POWDER, LYOPHILIZED, FOR SOLUTION INTRAMUSCULAR; INTRAVENOUS at 12:26

## 2024-03-04 RX ADMIN — OXALIPLATIN 195 MG: 5 INJECTION, SOLUTION INTRAVENOUS at 12:26

## 2024-03-04 NOTE — TELEPHONE ENCOUNTER
Faxed med records per patient request to:  [facility] ([facility])   Phone: [phone]  Fax: [phone]  Copy given to patient.

## 2024-03-04 NOTE — TELEPHONE ENCOUNTER
----- Message from Arnie Marcelo sent at 3/1/2024  2:28 PM EST -----  Regarding: RECORDS TO BE FAXED

## 2024-03-04 NOTE — PROGRESS NOTES
Hematology and Oncology [facility]  Office number [phone]    Fax number [phone]     Follow up     Date: 24    Patient Name: Edward Powell  MRN: [id number]  : 1970    Referring Physician: Dr. Gautam    Chief Complaint: Rectal cancer, lung mass, liver lesions follow up on treatment    Cancer Staging: Presumptive stage IV    History of Present Illness: Edward Powell is a pleasant 53 y.o. male who presents today for evaluation of rectal cancer in the company of his supportive significant other.    Patient has a longstanding history of intermittent diarrhea since his cholecystectomy in 2019.  However he developed progressive diarrhea with associated loss of bowel control and urgency as well as weight loss and fatigue prompting additional workup.    CT of the abdomen pelvis 2023 showed an irregular circumferential wall thickening involving the rectum concerning for mass lesion.  There was associated mesorectal lymphadenopathy.  Masslike consolidation of left lower lobe.  CT of the chest showed a cavitary lesion in the left lower lobe up to 4.8 cm with an adjacent cavitary nodule up to 2 cm and a 5 mm nodule on the right minor fissure.      He underwent colonoscopy 2023 with findings of an infiltrative and ulcerated partially obstructing mass in the proximal rectum spanning 10 cm.  Rectal polyps.  Biopsy of the rectal mass showed invasive moderately differentiated adenocarcinoma.  Additional biopsies showed tubular adenomas.  MSI testing was intact/low probability of MSI high.    PDL1 negative    PET/CT 2023 showed hypermetabolic rectal wall thickening compatible with known rectal malignancy.  Multiple small ill-defined hypoechoic hyper metabolic liver lesions compatible with metastatic disease.  Mildly enlarged and mildly hypermetabolic pelvic sidewall and internal iliac lymph node chain adenopathy.  Hypermetabolic lung mass with adjacent nodule.     He underwent  liver biopsy and lung biopsy January 2024.  Results of both confirmed metastatic colorectal cancer.    The patient has been experiencing substantial rectal pain.  He is taking oxycodone every 6 hours with partial relief.  He reports ongoing bowel movements.  No abdominal pain.  No vomiting.  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  Taking stool softener 1-2 tabs BID and adds laxative only occasionally. Has been increasing fiber in his diet. Uses laxative only occasionally. Too loose and crampy with daily use. Taste is off. Appetite is still low.   Concerned because despite stool bulk increasing he is still having pain with bowel movements. Will have pain with prolonged sitting but much improved, initially not able to drive without pain, now short trips locally has no pain, unsure about long trips as he has not attempted in recent weeks. Planning a camping trip in April.   Taking oxycodone twice daily with overall good control of pain. Takes tylenol and ibuprofen in between with good control.  Still with breakthrough pain with defecation occasionally requiring a third dose.   Daily prilosec and PRN bentyl help, running low on both   Insomnia controlled with 100 mg trazodone at night. So far no noticeable change with adding zoloft.   Weight is stable.  He is having trouble paying for groceries and travel. He has not been in contact with SW in several weeks.     Past Medical History:   Past Medical History:   Diagnosis Date    Arthritis     Cancer     rectal cancer - diagnosed 2023    Cholelithiasis 2019    Removed    COPD (chronic obstructive pulmonary disease)     Coronary artery disease 2009    Stent - no cardiologist currently    Elevated cholesterol      GERD (gastroesophageal reflux disease)     Hernia 2003    Lower hernia    Perforated ulcer 2019    Sleep apnea     history of; when weighed over 400lbs - no issues following bariatric surgery       Past Surgical History:   Past Surgical History:   Procedure Laterality Date    APPENDECTOMY  1983    Removed    BARIATRIC SURGERY  2011    Gastric bypass    BLADDER TUMOR/ULCER BLEEDER CAUTERIZATION      CARDIAC CATHETERIZATION  2009    stent placed    CHOLECYSTECTOMY  2019    Removed    COLONOSCOPY N/A 12/07/2023    Procedure: COLONOSCOPY WITH HOT SNARE POLYPECTOMY AND TATTOO;  Surgeon: Noman Gautam MD;  Location: [facility] ENDOSCOPY;  Service: Gastroenterology;  Laterality: N/A;    PORTACATH PLACEMENT N/A 1/5/2024    Procedure: INSERTION OF PORTACATH WITH ULTRSOUND AND FLUOROSCOPIC GUIDANCE;  Surgeon: Ida Black MD;  Location: [facility] OR;  Service: General;  Laterality: N/A;    JENNIFER-EN-Y         Family History: No family history on file.  Uncle had colon cancer    Social History:   Social History     Socioeconomic History    Marital status:    Tobacco Use    Smoking status: Every Day     Current packs/day: 1.50     Average packs/day: 1.5 packs/day for 15.0 years (22.5 ttl pk-yrs)     Types: Cigarettes    Smokeless tobacco: Never    Tobacco comments:     35 years      pt reports closer to 2 packs per day since cancer diagnosis   Vaping Use    Vaping status: Never Used   Substance and Sexual Activity    Alcohol use: Never    Drug use: Never    Sexual activity: Defer       Medications:     Current Outpatient Medications:     buPROPion XL (Wellbutrin XL) 150 MG 24 hr tablet, Take 1 tablet by mouth Daily. Take in place of zoloft, Disp: 30 tablet, Rfl: 2    cetirizine (zyrTEC) 10 MG tablet, Take 1 tablet by mouth Daily., Disp: 30 tablet, Rfl: 2    dicyclomine (BENTYL) 20 MG tablet, Take 1 tablet by mouth 3 (Three) Times a Day As Needed for Abdominal Cramping., Disp: 60 tablet, Rfl: 0     "docusate sodium (COLACE) 100 MG capsule, Take 1 capsule by mouth 2 (Two) Times a Day., Disp: , Rfl:     Hydrocortisone, Perianal, (ANUSOL-HC) 2.5 % rectal cream, Insert  into the rectum 2 (Two) Times a Day. Indications: Inflamed Hemorrhoids, Disp: 30 g, Rfl: 1    lidocaine-prilocaine (EMLA) 2.5-2.5 % cream, Apply 1 application  topically to the appropriate area as directed As Needed (45-60 minutes prior to port access.  Cover with saran/plastic wrap.)., Disp: 30 g, Rfl: 3    LORazepam (ATIVAN) 0.5 MG tablet, Take 1 tablet by mouth Take As Directed. 1 tabelt by mouth 30 minutes prior to MRI, take a second tablet at the time of the MRI if needed., Disp: 2 tablet, Rfl: 0    naloxone (NARCAN) 4 MG/0.1ML nasal spray, 1 spray into the nostril(s) as directed by provider As Needed for Opioid Reversal., Disp: 1 each, Rfl: 0    ondansetron (ZOFRAN) 8 MG tablet, Take 1 tablet by mouth 3 (Three) Times a Day As Needed for Nausea or Vomiting., Disp: 30 tablet, Rfl: 5    oxyCODONE (ROXICODONE) 10 MG tablet, Take 1 tablet by mouth Every 6 (Six) Hours As Needed for Severe Pain or Moderate Pain for up to 30 days., Disp: 120 tablet, Rfl: 0    pantoprazole (Protonix) 40 MG EC tablet, Take 1 tablet by mouth Daily. Indications: Gastroesophageal Reflux Disease, Disp: 90 tablet, Rfl: 1    tiotropium bromide-olodaterol (STIOLTO RESPIMAT) 2.5-2.5 MCG/ACT aerosol solution inhaler, Inhale 2 puffs Daily., Disp: 1 each, Rfl: 5    traZODone (DESYREL) 100 MG tablet, Take 1 tablet by mouth Every Night. Take one tablet one hour prior to bedtime, Disp: 30 tablet, Rfl: 1    Allergies:   Allergies   Allergen Reactions    Bactrim [Sulfamethoxazole-Trimethoprim] Hives     and blisters    Sulfa Antibiotics Hives     and blisters       Objective     Vital Signs:   Vitals:    03/04/24 0811   BP: 131/75   Pulse: 71   Resp: 16   Temp: 97.3 °F (36.3 °C)   SpO2: 99%   Weight: 106 kg (234 lb)   Height: 182.9 cm (72\")   PainSc: 0-No pain      Body mass index is " 31.74 kg/m².   Pain Score    03/04/24 0811   PainSc: 0-No pain         ECOG Performance Status: 1 - Symptomatic but completely ambulatory    Physical Exam:   General: No acute distress. Well appearing   HEENT: Normocephalic, atraumatic. Sclera anicteric.   Neck: supple, no adenopathy.   Cardiovascular: regular rate and rhythm. No murmurs.   Respiratory: Normal rate. Clear to auscultation bilaterally  Abdomen: Soft, nontender, non distended with normoactive bowel sounds  Lymph: no cervical, supraclavicular or axillary adenopathy  Neuro: Alert and oriented x 3. No focal deficits.   Ext: Symmetric, no swelling.     Laboratory/Imaging Reviewed:   Infusion on 03/04/2024   Component Date Value Ref Range Status    Magnesium 03/04/2024 2.2  1.6 - 2.6 mg/dL Final    Glucose 03/04/2024 88  65 - 99 mg/dL Final    BUN 03/04/2024 14  6 - 20 mg/dL Final    Creatinine 03/04/2024 1.15  0.76 - 1.27 mg/dL Final    Sodium 03/04/2024 136  136 - 145 mmol/L Final    Potassium 03/04/2024 4.0  3.5 - 5.2 mmol/L Final    Chloride 03/04/2024 103  98 - 107 mmol/L Final    CO2 03/04/2024 25.2  22.0 - 29.0 mmol/L Final    Calcium 03/04/2024 8.8  8.6 - 10.5 mg/dL Final    Total Protein 03/04/2024 7.4  6.0 - 8.5 g/dL Final    Albumin 03/04/2024 4.0  3.5 - 5.2 g/dL Final    ALT (SGPT) 03/04/2024 11  1 - 41 U/L Final    AST (SGOT) 03/04/2024 17  1 - 40 U/L Final    Alkaline Phosphatase 03/04/2024 112  39 - 117 U/L Final    Total Bilirubin 03/04/2024 0.3  0.0 - 1.2 mg/dL Final    Globulin 03/04/2024 3.4  gm/dL Final    A/G Ratio 03/04/2024 1.2  g/dL Final    BUN/Creatinine Ratio 03/04/2024 12.2  7.0 - 25.0 Final    Anion Gap 03/04/2024 7.8  5.0 - 15.0 mmol/L Final    eGFR 03/04/2024 75.6  >60.0 mL/min/1.73 Final    WBC 03/04/2024 7.65  3.40 - 10.80 10*3/mm3 Final    RBC 03/04/2024 4.68  4.14 - 5.80 10*6/mm3 Final    Hemoglobin 03/04/2024 11.1 (L)  13.0 - 17.7 g/dL Final    Hematocrit 03/04/2024 35.1 (L)  37.5 - 51.0 % Final    MCV 03/04/2024 75.0 (L)   79.0 - 97.0 fL Final    MCH 03/04/2024 23.7 (L)  26.6 - 33.0 pg Final    MCHC 03/04/2024 31.6  31.5 - 35.7 g/dL Final    RDW 03/04/2024 19.0 (H)  12.3 - 15.4 % Final    RDW-SD 03/04/2024 48.7  37.0 - 54.0 fl Final    MPV 03/04/2024 10.4  6.0 - 12.0 fL Final    Platelets 03/04/2024 138 (L)  140 - 450 10*3/mm3 Final    Neutrophil % 03/04/2024 68.2  42.7 - 76.0 % Final    Lymphocyte % 03/04/2024 19.0 (L)  19.6 - 45.3 % Final    Monocyte % 03/04/2024 10.8  5.0 - 12.0 % Final    Eosinophil % 03/04/2024 1.2  0.3 - 6.2 % Final    Basophil % 03/04/2024 0.4  0.0 - 1.5 % Final    Immature Grans % 03/04/2024 0.4  0.0 - 0.5 % Final    Neutrophils, Absolute 03/04/2024 5.22  1.70 - 7.00 10*3/mm3 Final    Lymphocytes, Absolute 03/04/2024 1.45  0.70 - 3.10 10*3/mm3 Final    Monocytes, Absolute 03/04/2024 0.83  0.10 - 0.90 10*3/mm3 Final    Eosinophils, Absolute 03/04/2024 0.09  0.00 - 0.40 10*3/mm3 Final    Basophils, Absolute 03/04/2024 0.03  0.00 - 0.20 10*3/mm3 Final    Immature Grans, Absolute 03/04/2024 0.03  0.00 - 0.05 10*3/mm3 Final    nRBC 03/04/2024 0.0  0.0 - 0.2 /100 WBC Final     Tempus xt: APC gene TP53; Negative ADRIANNA, BRAF, NRAS, TMB stable. PDL1 negative  MRI Abdomen With & Without Contrast    Result Date: 2/26/2024  Narrative: MRI ABDOMEN W WO CONTRAST Date of Exam: 2/23/2024 7:01 AM EST Indication: rectal cancer, liver metastasis.  Comparison: PET/CT 12/22/2023, CT abdomen pelvis 11/25/2023, CT guided liver biopsy images 1/18/2024 Technique:  Routine multiplanar/multisequence images of the abdomen were obtained before and after the uneventful administration of 20 mL Multihance. Findings: Lower Thorax: Known left lower lobe pulmonary mass/metastasis is partially visualized on some sequences. Liver: Decreased size and number of hepatic metastases compared to prior exams. No evidence of new liver lesions. No evidence of hepatic steatosis. No morphologic changes of chronic liver disease. For reference: - The  biopsy-proven metastasis in the inferior right hepatic lobe measures 0.9 cm, previously 1.9 cm on CT 11/25/2023 (series 16 image 58). - The metastasis in the superior right hepatic lobe measures 0.4 cm, previously 0.8 cm on CT 11/25/2023 (series 16 image 13). - The additional small metastases seen on prior CT and PET/CT are no longer discretely measurable (there were probably around 7 measurable metastases on prior CT and PET/CT, many which were less than 1 cm). Gallbladder and bile ducts: Cholecystectomy. No biliary ductal dilatation or filling defect. Pancreas: No pancreatic duct dilation. No surrounding inflammation. Spleen: Splenomegaly measuring 16 cm, unchanged. Calcified granulomata. Adrenal glands: No adrenal nodule. Kidneys: No hydronephrosis. No suspicious renal lesions. Benign right renal cyst. Stomach and bowel: Postsurgical changes of the stomach. No evidence of bowel obstruction. Lymph nodes: No pathologically enlarged lymph nodes. Vasculature: No aneurysmal dilation of the abdominal aorta. Major vasculature is patent. Peritoneum and retroperitoneum: No free air or free fluid. Soft tissues: Unremarkable. Osseous structures: No focal suspicious osseous lesions.     Impression: Impression: Decreased size and number of hepatic metastases. No evidence of new or progressive metastatic disease in the abdomen. Electronically Signed: Min Nguyen MD  2/26/2024 9:38 AM EST  Workstation ID: [id number]     Procedures    Assessment / Plan      Assessment/Plan:     1.  Rectal cancer   2.  Liver metastases  3.  Lung metastasis  4.  Chemo monitoring  -The patient presents with a partially obstructing rectal cancer with adenopathy.  -He was found to have biopsy-proven metastasis in the liver and lung.  His case was presented at multidisciplinary tumor board.  -We will proceed with a liver MRI to better evaluate the extent of his hepatic metastasis as was recommended by radiology.  However, because of metastatic disease  to both the lung and liver I do not think he will be downstage to resectable disease.  -Labs are adequate to proceed with cycle #5.  I reviewed his [facility] results which are notable for an APC mutation, T p53 mutation, negative for KRAS, BRAF, NRAS, tumor mutation burden stable.  Notch 1 VUS.  -Reviewed potential role for colorectal surgery consultation regarding diversion particularly if his obstructive symptoms do not improve, but they are currently improving.   -Planning addition of panitumumab to his regimen.   -CBC/CMP reviewed  -Ordered restaging scans    4.  Cancer related pain  -PRN oxycodone.   -Well controlled.     5. Access   -Port    6.  Chemotherapy-induced nausea  -Continue zofran and compazine.    7.  Seasonal allergies  -Add daily Zyrtec    8. GERD  PPI    9. Social  -SW referral     Follow Up:   2 weeks     Brenda Cronin MD  Hematology and Oncology     I have spent a total of 45 min on reviewing test results/preparing to see patient, counseling patient, performing medically appropriate exam and documenting clinical information in the electronic or other health record

## 2024-03-05 ENCOUNTER — DOCUMENTATION (OUTPATIENT)
Dept: SOCIAL WORK | Facility: HOSPITAL | Age: 54
End: 2024-03-05
Payer: COMMERCIAL

## 2024-03-05 NOTE — PROGRESS NOTES
Case Management/Social Work    Patient Name:  Edward Powell  YOB: 1970  MRN: [id number]  Admit Date:  (Not on file)          SW received consult from oncology regarding finances, transportation and food. SW spoke with pt via telephone. He expressed financial concerns. SW offered resource book but pt reports already receiving one from RN. Pt reports in the process of getting disability, currently pending. Pt lives in [place] and has received gas cards from RN navigator but reports he would like more if able. SW offered food bag, pt agreeable. Pt has appointment tomorrow and SW plans to meet with pt tomorrow at 2pm. Pt appreciative of assistance.       Electronically signed by:  EVENS Han  03/05/24 15:33 EST

## 2024-03-05 NOTE — PROGRESS NOTES
Outpatient Oncology Nutrition Assessment      Patient Name:  Edward Powell  YOB: 1970  MRN: [id number]      Assessment Date:  3/5/2024    Comments:     Met w/ patient for initial nutrition assessment regarding oncology treatment. Pt reports he has recently lost 13-18lbs d/t appetite and foods having altered taste. Reviewed patient's chart review and pt w/ 4% (9lb) wt loss since December of 2023. Pt does not like Boost or Ensure supplements and reports he has limited income and would not be able to regularly afford nutrition supplements. Provided nutrition handouts on ways to increase calories to meals. Encouraged patient to read over handouts and increase caloric value of foods he is already consuming on a daily basis. Pt agreeable and understandable. Explained that further intentional weight loss is not recommended during this time. Pt reports history of bariatric surgery. RD to follow-up with patient in one month and available PRN.     Electronically signed by:  Ninfa Baker RD  03/05/24 08:46 EST

## 2024-03-06 ENCOUNTER — INFUSION (OUTPATIENT)
Dept: ONCOLOGY | Facility: HOSPITAL | Age: 54
End: 2024-03-06
Payer: COMMERCIAL

## 2024-03-06 DIAGNOSIS — C20 RECTAL CANCER METASTASIZED TO LIVER: Primary | ICD-10-CM

## 2024-03-06 DIAGNOSIS — C20 RECTAL ADENOCARCINOMA: ICD-10-CM

## 2024-03-06 DIAGNOSIS — C78.7 RECTAL CANCER METASTASIZED TO LIVER: Primary | ICD-10-CM

## 2024-03-06 PROCEDURE — 96523 IRRIG DRUG DELIVERY DEVICE: CPT

## 2024-03-06 PROCEDURE — 25010000002 HEPARIN LOCK FLUSH PER 10 UNITS: Performed by: INTERNAL MEDICINE

## 2024-03-06 RX ORDER — SODIUM CHLORIDE 0.9 % (FLUSH) 0.9 %
10 SYRINGE (ML) INJECTION AS NEEDED
Status: DISCONTINUED | OUTPATIENT
Start: 2024-03-06 | End: 2024-03-06 | Stop reason: HOSPADM

## 2024-03-06 RX ORDER — HEPARIN SODIUM (PORCINE) LOCK FLUSH IV SOLN 100 UNIT/ML 100 UNIT/ML
500 SOLUTION INTRAVENOUS AS NEEDED
Status: DISCONTINUED | OUTPATIENT
Start: 2024-03-06 | End: 2024-03-06 | Stop reason: HOSPADM

## 2024-03-06 RX ORDER — HEPARIN SODIUM (PORCINE) LOCK FLUSH IV SOLN 100 UNIT/ML 100 UNIT/ML
500 SOLUTION INTRAVENOUS AS NEEDED
OUTPATIENT
Start: 2024-03-06

## 2024-03-06 RX ORDER — SODIUM CHLORIDE 0.9 % (FLUSH) 0.9 %
10 SYRINGE (ML) INJECTION AS NEEDED
OUTPATIENT
Start: 2024-03-06

## 2024-03-06 RX ADMIN — Medication 10 ML: at 14:14

## 2024-03-06 RX ADMIN — HEPARIN 500 UNITS: 100 SYRINGE at 14:14

## 2024-03-08 LAB — REF LAB TEST METHOD: NORMAL

## 2024-03-15 DIAGNOSIS — C20 RECTAL CANCER METASTASIZED TO LIVER: Primary | ICD-10-CM

## 2024-03-15 DIAGNOSIS — C78.7 RECTAL CANCER METASTASIZED TO LIVER: Primary | ICD-10-CM

## 2024-03-18 ENCOUNTER — DOCUMENTATION (OUTPATIENT)
Dept: SOCIAL WORK | Facility: HOSPITAL | Age: 54
End: 2024-03-18
Payer: COMMERCIAL

## 2024-03-18 ENCOUNTER — INFUSION (OUTPATIENT)
Dept: ONCOLOGY | Facility: HOSPITAL | Age: 54
End: 2024-03-18
Payer: COMMERCIAL

## 2024-03-18 ENCOUNTER — PATIENT OUTREACH (OUTPATIENT)
Dept: ONCOLOGY | Facility: HOSPITAL | Age: 54
End: 2024-03-18
Payer: COMMERCIAL

## 2024-03-18 ENCOUNTER — OFFICE VISIT (OUTPATIENT)
Dept: ONCOLOGY | Facility: CLINIC | Age: 54
End: 2024-03-18

## 2024-03-18 VITALS
SYSTOLIC BLOOD PRESSURE: 127 MMHG | HEIGHT: 72 IN | OXYGEN SATURATION: 100 % | DIASTOLIC BLOOD PRESSURE: 57 MMHG | WEIGHT: 229 LBS | TEMPERATURE: 97.8 F | RESPIRATION RATE: 12 BRPM | HEART RATE: 66 BPM | BODY MASS INDEX: 31.02 KG/M2

## 2024-03-18 DIAGNOSIS — C78.7 RECTAL CANCER METASTASIZED TO LIVER: Primary | ICD-10-CM

## 2024-03-18 DIAGNOSIS — C20 RECTAL CANCER METASTASIZED TO LIVER: Primary | ICD-10-CM

## 2024-03-18 LAB
ALBUMIN SERPL-MCNC: 3.6 G/DL (ref 3.5–5.2)
ALBUMIN/GLOB SERPL: 1.1 G/DL
ALP SERPL-CCNC: 122 U/L (ref 39–117)
ALT SERPL W P-5'-P-CCNC: 11 U/L (ref 1–41)
ANION GAP SERPL CALCULATED.3IONS-SCNC: 7.1 MMOL/L (ref 5–15)
AST SERPL-CCNC: 17 U/L (ref 1–40)
BASOPHILS # BLD AUTO: 0.02 10*3/MM3 (ref 0–0.2)
BASOPHILS NFR BLD AUTO: 0.3 % (ref 0–1.5)
BILIRUB SERPL-MCNC: 0.3 MG/DL (ref 0–1.2)
BUN SERPL-MCNC: 11 MG/DL (ref 6–20)
BUN/CREAT SERPL: 12.5 (ref 7–25)
CALCIUM SPEC-SCNC: 8.6 MG/DL (ref 8.6–10.5)
CHLORIDE SERPL-SCNC: 106 MMOL/L (ref 98–107)
CO2 SERPL-SCNC: 23.9 MMOL/L (ref 22–29)
CREAT SERPL-MCNC: 0.88 MG/DL (ref 0.76–1.27)
DEPRECATED RDW RBC AUTO: 51.3 FL (ref 37–54)
EGFRCR SERPLBLD CKD-EPI 2021: 102.2 ML/MIN/1.73
EOSINOPHIL # BLD AUTO: 0.1 10*3/MM3 (ref 0–0.4)
EOSINOPHIL NFR BLD AUTO: 1.5 % (ref 0.3–6.2)
ERYTHROCYTE [DISTWIDTH] IN BLOOD BY AUTOMATED COUNT: 20 % (ref 12.3–15.4)
GLOBULIN UR ELPH-MCNC: 3.3 GM/DL
GLUCOSE SERPL-MCNC: 89 MG/DL (ref 65–99)
HCT VFR BLD AUTO: 35.6 % (ref 37.5–51)
HGB BLD-MCNC: 11 G/DL (ref 13–17.7)
IMM GRANULOCYTES # BLD AUTO: 0.01 10*3/MM3 (ref 0–0.05)
IMM GRANULOCYTES NFR BLD AUTO: 0.2 % (ref 0–0.5)
LYMPHOCYTES # BLD AUTO: 1.43 10*3/MM3 (ref 0.7–3.1)
LYMPHOCYTES NFR BLD AUTO: 21.9 % (ref 19.6–45.3)
MAGNESIUM SERPL-MCNC: 2.1 MG/DL (ref 1.6–2.6)
MCH RBC QN AUTO: 23.5 PG (ref 26.6–33)
MCHC RBC AUTO-ENTMCNC: 30.9 G/DL (ref 31.5–35.7)
MCV RBC AUTO: 75.9 FL (ref 79–97)
MONOCYTES # BLD AUTO: 0.86 10*3/MM3 (ref 0.1–0.9)
MONOCYTES NFR BLD AUTO: 13.1 % (ref 5–12)
NEUTROPHILS NFR BLD AUTO: 4.12 10*3/MM3 (ref 1.7–7)
NEUTROPHILS NFR BLD AUTO: 63 % (ref 42.7–76)
NRBC BLD AUTO-RTO: 0 /100 WBC (ref 0–0.2)
PLATELET # BLD AUTO: 108 10*3/MM3 (ref 140–450)
PMV BLD AUTO: 10.9 FL (ref 6–12)
POTASSIUM SERPL-SCNC: 3.4 MMOL/L (ref 3.5–5.2)
PROT SERPL-MCNC: 6.9 G/DL (ref 6–8.5)
RBC # BLD AUTO: 4.69 10*6/MM3 (ref 4.14–5.8)
SODIUM SERPL-SCNC: 137 MMOL/L (ref 136–145)
WBC NRBC COR # BLD AUTO: 6.54 10*3/MM3 (ref 3.4–10.8)

## 2024-03-18 PROCEDURE — 85025 COMPLETE CBC W/AUTO DIFF WBC: CPT

## 2024-03-18 PROCEDURE — 25010000002 PANITUMUMAB PER 10 MG: Performed by: INTERNAL MEDICINE

## 2024-03-18 PROCEDURE — 96416 CHEMO PROLONG INFUSE W/PUMP: CPT

## 2024-03-18 PROCEDURE — 25010000002 OXALIPLATIN PER 0.5 MG: Performed by: INTERNAL MEDICINE

## 2024-03-18 PROCEDURE — 96375 TX/PRO/DX INJ NEW DRUG ADDON: CPT

## 2024-03-18 PROCEDURE — 80053 COMPREHEN METABOLIC PANEL: CPT

## 2024-03-18 PROCEDURE — 0 DEXTROSE 5 % SOLUTION 250 ML FLEX CONT: Performed by: INTERNAL MEDICINE

## 2024-03-18 PROCEDURE — 25810000003 SODIUM CHLORIDE 0.9 % SOLUTION 250 ML FLEX CONT: Performed by: INTERNAL MEDICINE

## 2024-03-18 PROCEDURE — 83735 ASSAY OF MAGNESIUM: CPT

## 2024-03-18 PROCEDURE — 96417 CHEMO IV INFUS EACH ADDL SEQ: CPT

## 2024-03-18 PROCEDURE — 96368 THER/DIAG CONCURRENT INF: CPT

## 2024-03-18 PROCEDURE — 25010000002 PANITUMUMAB 400 MG/20ML SOLUTION 20 ML VIAL: Performed by: INTERNAL MEDICINE

## 2024-03-18 PROCEDURE — 25010000002 FLUOROURACIL PER 500 MG: Performed by: INTERNAL MEDICINE

## 2024-03-18 PROCEDURE — G0498 CHEMO EXTEND IV INFUS W/PUMP: HCPCS

## 2024-03-18 PROCEDURE — 25010000002 PALONOSETRON 0.25 MG/5ML SOLUTION PREFILLED SYRINGE: Performed by: INTERNAL MEDICINE

## 2024-03-18 PROCEDURE — 25010000002 DEXAMETHASONE SODIUM PHOSPHATE 20 MG/5ML SOLUTION: Performed by: INTERNAL MEDICINE

## 2024-03-18 PROCEDURE — 96413 CHEMO IV INFUSION 1 HR: CPT

## 2024-03-18 PROCEDURE — 96415 CHEMO IV INFUSION ADDL HR: CPT

## 2024-03-18 PROCEDURE — 25010000002 LEUCOVORIN CALCIUM PER 50MG: Performed by: INTERNAL MEDICINE

## 2024-03-18 PROCEDURE — 96549 UNLISTED CHEMOTHERAPY PX: CPT

## 2024-03-18 RX ORDER — MUPIROCIN CALCIUM 20 MG/G
1 CREAM TOPICAL 3 TIMES DAILY
Qty: 30 G | Refills: 4 | Status: SHIPPED | OUTPATIENT
Start: 2024-03-18 | End: 2024-03-18

## 2024-03-18 RX ORDER — SODIUM CHLORIDE 9 MG/ML
20 INJECTION, SOLUTION INTRAVENOUS ONCE
Status: CANCELLED | OUTPATIENT
Start: 2024-03-18

## 2024-03-18 RX ORDER — FAMOTIDINE 10 MG/ML
20 INJECTION, SOLUTION INTRAVENOUS AS NEEDED
Status: CANCELLED | OUTPATIENT
Start: 2024-03-18

## 2024-03-18 RX ORDER — TRIAMCINOLONE ACETONIDE 0.25 MG/G
1 OINTMENT TOPICAL 2 TIMES DAILY
Qty: 80 G | Refills: 0 | Status: SHIPPED | OUTPATIENT
Start: 2024-03-18

## 2024-03-18 RX ORDER — DIPHENHYDRAMINE HYDROCHLORIDE 50 MG/ML
50 INJECTION INTRAMUSCULAR; INTRAVENOUS AS NEEDED
Status: CANCELLED | OUTPATIENT
Start: 2024-03-18

## 2024-03-18 RX ORDER — PALONOSETRON 0.05 MG/ML
0.25 INJECTION, SOLUTION INTRAVENOUS ONCE
Status: COMPLETED | OUTPATIENT
Start: 2024-03-18 | End: 2024-03-18

## 2024-03-18 RX ORDER — CLINDAMYCIN PHOSPHATE 10 MG/G
1 GEL TOPICAL 2 TIMES DAILY
Qty: 30 G | Refills: 1 | Status: SHIPPED | OUTPATIENT
Start: 2024-03-18

## 2024-03-18 RX ORDER — SODIUM CHLORIDE 9 MG/ML
20 INJECTION, SOLUTION INTRAVENOUS ONCE
Status: DISCONTINUED | OUTPATIENT
Start: 2024-03-18 | End: 2024-03-18 | Stop reason: HOSPADM

## 2024-03-18 RX ORDER — DEXTROSE MONOHYDRATE 50 MG/ML
20 INJECTION, SOLUTION INTRAVENOUS ONCE
Status: CANCELLED | OUTPATIENT
Start: 2024-03-18

## 2024-03-18 RX ORDER — CARBOXYMETHYLCELLULOSE SODIUM AND GLYCERIN 5; 9 MG/ML; MG/ML
1 SOLUTION/ DROPS OPHTHALMIC
Qty: 15 ML | Refills: 3 | Status: SHIPPED | OUTPATIENT
Start: 2024-03-18

## 2024-03-18 RX ORDER — DEXTROSE MONOHYDRATE 50 MG/ML
20 INJECTION, SOLUTION INTRAVENOUS ONCE
Status: DISCONTINUED | OUTPATIENT
Start: 2024-03-18 | End: 2024-03-18 | Stop reason: HOSPADM

## 2024-03-18 RX ORDER — PALONOSETRON 0.05 MG/ML
0.25 INJECTION, SOLUTION INTRAVENOUS ONCE
Status: CANCELLED | OUTPATIENT
Start: 2024-03-18

## 2024-03-18 RX ADMIN — DEXAMETHASONE SODIUM PHOSPHATE 12 MG: 4 INJECTION, SOLUTION INTRAMUSCULAR; INTRAVENOUS at 10:19

## 2024-03-18 RX ADMIN — LEUCOVORIN CALCIUM 910 MG: 10 INJECTION INTRAMUSCULAR; INTRAVENOUS at 10:56

## 2024-03-18 RX ADMIN — PALONOSETRON 0.25 MG: 0.25 INJECTION, SOLUTION INTRAVENOUS at 10:20

## 2024-03-18 RX ADMIN — OXALIPLATIN 195 MG: 5 INJECTION, SOLUTION INTRAVENOUS at 10:56

## 2024-03-18 RX ADMIN — FLUOROURACIL 5450 MG: 50 INJECTION, SOLUTION INTRAVENOUS at 13:09

## 2024-03-18 RX ADMIN — PANITUMUMAB 630 MG: 400 SOLUTION INTRAVENOUS at 09:43

## 2024-03-18 NOTE — PROGRESS NOTES
Hematology and Oncology [place]  Office number [phone]    Fax number [phone]     Follow up     Date: 24    Patient Name: Edward Powell  MRN: [id number]  : 1970    Referring Physician: Dr. Gautam    Chief Complaint: Rectal cancer, lung mass, liver lesions follow up on treatment    Cancer Staging:  IV    History of Present Illness: Edward Powell is a pleasant 53 y.o. male who presents today for evaluation of rectal cancer in the company of his supportive significant other.    Patient has a longstanding history of intermittent diarrhea since his cholecystectomy in 2019.  However he developed progressive diarrhea with associated loss of bowel control and urgency as well as weight loss and fatigue prompting additional workup.    CT of the abdomen pelvis 2023 showed an irregular circumferential wall thickening involving the rectum concerning for mass lesion.  There was associated mesorectal lymphadenopathy.  Masslike consolidation of left lower lobe.  CT of the chest showed a cavitary lesion in the left lower lobe up to 4.8 cm with an adjacent cavitary nodule up to 2 cm and a 5 mm nodule on the right minor fissure.      He underwent colonoscopy 2023 with findings of an infiltrative and ulcerated partially obstructing mass in the proximal rectum spanning 10 cm.  Rectal polyps.  Biopsy of the rectal mass showed invasive moderately differentiated adenocarcinoma.  Additional biopsies showed tubular adenomas.  MSI testing was intact/low probability of MSI high.    PDL1 negative    PET/CT 2023 showed hypermetabolic rectal wall thickening compatible with known rectal malignancy.  Multiple small ill-defined hypoechoic hyper metabolic liver lesions compatible with metastatic disease.  Mildly enlarged and mildly hypermetabolic pelvic sidewall and internal iliac lymph node chain adenopathy.  Hypermetabolic lung mass with adjacent nodule.     He underwent liver biopsy and lung  biopsy January 2024.  Results of both confirmed metastatic colorectal cancer.    The patient has been experiencing substantial rectal pain.  He is taking oxycodone every 6 hours with partial relief.  He reports ongoing bowel movements.  No abdominal pain.  No vomiting.  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  After gastric bypass in 2011 lost a a lot of weight.   Has lost 11 lbs since cancer diagnosis. Body mass index is 31.06 kg/m². Has spoken with dietitan bbut can't afford supplements.   Right eye irritated, watery.   Rash on anterior chest, itching. Using free and clear body wash.  BM continue to be more regular.   Pain well controlled.     Past Medical History:   Past Medical History:   Diagnosis Date    Arthritis     Cancer     rectal cancer - diagnosed 2023    Cholelithiasis 2019    Removed    COPD (chronic obstructive pulmonary disease)     Coronary artery disease 2009    Stent - no cardiologist currently    Elevated cholesterol     GERD (gastroesophageal reflux disease)     Hernia 2003    Lower hernia    Perforated ulcer 2019    Sleep apnea     history of; when weighed over 400lbs - no issues following bariatric surgery       Past Surgical History:   Past Surgical History:   Procedure Laterality Date    APPENDECTOMY  1983    Removed    BARIATRIC SURGERY  2011    Gastric bypass    BLADDER TUMOR/ULCER BLEEDER CAUTERIZATION      CARDIAC CATHETERIZATION  2009    stent placed    CHOLECYSTECTOMY  2019    Removed    COLONOSCOPY N/A 12/07/2023    Procedure: COLONOSCOPY WITH HOT SNARE POLYPECTOMY AND TATTOO;  Surgeon: Noman Gautam MD;  Location: [facility] ENDOSCOPY;  Service: Gastroenterology;  Laterality: N/A;    PORTACATH PLACEMENT N/A 1/5/2024     Procedure: INSERTION OF PORTACATH WITH ULTRSOUND AND FLUOROSCOPIC GUIDANCE;  Surgeon: Ida Black MD;  Location: [facility];  Service: General;  Laterality: N/A;    JENNIFER-EN-Y         Family History: No family history on file.  Uncle had colon cancer    Social History:   Social History     Socioeconomic History    Marital status:    Tobacco Use    Smoking status: Every Day     Current packs/day: 1.50     Average packs/day: 1.5 packs/day for 15.0 years (22.5 ttl pk-yrs)     Types: Cigarettes    Smokeless tobacco: Never    Tobacco comments:     35 years      pt reports closer to 2 packs per day since cancer diagnosis   Vaping Use    Vaping status: Never Used   Substance and Sexual Activity    Alcohol use: Never    Drug use: Never    Sexual activity: Defer       Medications:     Current Outpatient Medications:     buPROPion XL (Wellbutrin XL) 150 MG 24 hr tablet, Take 1 tablet by mouth Daily. Take in place of zoloft, Disp: 30 tablet, Rfl: 2    cetirizine (zyrTEC) 10 MG tablet, Take 1 tablet by mouth Daily., Disp: 30 tablet, Rfl: 2    dicyclomine (BENTYL) 20 MG tablet, Take 1 tablet by mouth 3 (Three) Times a Day As Needed for Abdominal Cramping., Disp: 30 tablet, Rfl: 3    docusate sodium (COLACE) 100 MG capsule, Take 1 capsule by mouth 2 (Two) Times a Day., Disp: , Rfl:     Hydrocortisone, Perianal, (ANUSOL-HC) 2.5 % rectal cream, Insert  into the rectum 2 (Two) Times a Day. Indications: Inflamed Hemorrhoids, Disp: 30 g, Rfl: 1    lidocaine-prilocaine (EMLA) 2.5-2.5 % cream, Apply 1 application  topically to the appropriate area as directed As Needed (45-60 minutes prior to port access.  Cover with saran/plastic wrap.)., Disp: 30 g, Rfl: 3    LORazepam (ATIVAN) 0.5 MG tablet, Take 1 tablet by mouth Take As Directed. 1 tabelt by mouth 30 minutes prior to MRI, take a second tablet at the time of the MRI if needed., Disp: 2 tablet, Rfl: 0    naloxone (NARCAN) 4 MG/0.1ML nasal spray, 1 spray into  "the nostril(s) as directed by provider As Needed for Opioid Reversal., Disp: 1 each, Rfl: 0    ondansetron (ZOFRAN) 8 MG tablet, Take 1 tablet by mouth 3 (Three) Times a Day As Needed for Nausea or Vomiting., Disp: 30 tablet, Rfl: 5    oxyCODONE (ROXICODONE) 10 MG tablet, Take 1 tablet by mouth Every 6 (Six) Hours As Needed for Severe Pain or Moderate Pain for up to 30 days., Disp: 120 tablet, Rfl: 0    pantoprazole (Protonix) 40 MG EC tablet, Take 1 tablet by mouth Daily. Indications: Gastroesophageal Reflux Disease, Disp: 90 tablet, Rfl: 2    tiotropium bromide-olodaterol (STIOLTO RESPIMAT) 2.5-2.5 MCG/ACT aerosol solution inhaler, Inhale 2 puffs Daily., Disp: 1 each, Rfl: 5    traZODone (DESYREL) 100 MG tablet, Take 1 tablet by mouth Every Night. Take one tablet one hour prior to bedtime, Disp: 30 tablet, Rfl: 1    Allergies:   Allergies   Allergen Reactions    Bactrim [Sulfamethoxazole-Trimethoprim] Hives     and blisters    Sulfa Antibiotics Hives     and blisters       Objective     Vital Signs:   Vitals:    03/18/24 0818   BP: 127/57   Pulse: 66   Resp: 12   Temp: 97.8 °F (36.6 °C)   SpO2: 100%   Weight: 104 kg (229 lb)   Height: 182.9 cm (72\")   PainSc: 0-No pain      Body mass index is 31.06 kg/m².   Pain Score    03/18/24 0818   PainSc: 0-No pain         ECOG Performance Status: 1 - Symptomatic but completely ambulatory    Physical Exam:   General: No acute distress. Well appearing   HEENT: Normocephalic, atraumatic. Sclera anicteric.   Neck: supple, no adenopathy.   Cardiovascular: regular rate and rhythm. No murmurs.   Respiratory: Normal rate. Clear to auscultation bilaterally  Abdomen: Soft, nontender, non distended with normoactive bowel sounds  Lymph: no cervical, supraclavicular or axillary adenopathy  Neuro: Alert and oriented x 3. No focal deficits.   Ext: Symmetric, no swelling.   Accurate as of 3/18/24    Laboratory/Imaging Reviewed:   Infusion on 03/18/2024   Component Date Value Ref Range " Status    Glucose 03/18/2024 89  65 - 99 mg/dL Final    BUN 03/18/2024 11  6 - 20 mg/dL Final    Creatinine 03/18/2024 0.88  0.76 - 1.27 mg/dL Final    Sodium 03/18/2024 137  136 - 145 mmol/L Final    Potassium 03/18/2024 3.4 (L)  3.5 - 5.2 mmol/L Final    Chloride 03/18/2024 106  98 - 107 mmol/L Final    CO2 03/18/2024 23.9  22.0 - 29.0 mmol/L Final    Calcium 03/18/2024 8.6  8.6 - 10.5 mg/dL Final    Total Protein 03/18/2024 6.9  6.0 - 8.5 g/dL Final    Albumin 03/18/2024 3.6  3.5 - 5.2 g/dL Final    ALT (SGPT) 03/18/2024 11  1 - 41 U/L Final    AST (SGOT) 03/18/2024 17  1 - 40 U/L Final    Alkaline Phosphatase 03/18/2024 122 (H)  39 - 117 U/L Final    Total Bilirubin 03/18/2024 0.3  0.0 - 1.2 mg/dL Final    Globulin 03/18/2024 3.3  gm/dL Final    A/G Ratio 03/18/2024 1.1  g/dL Final    BUN/Creatinine Ratio 03/18/2024 12.5  7.0 - 25.0 Final    Anion Gap 03/18/2024 7.1  5.0 - 15.0 mmol/L Final    eGFR 03/18/2024 102.2  >60.0 mL/min/1.73 Final    Magnesium 03/18/2024 2.1  1.6 - 2.6 mg/dL Final    WBC 03/18/2024 6.54  3.40 - 10.80 10*3/mm3 Final    RBC 03/18/2024 4.69  4.14 - 5.80 10*6/mm3 Final    Hemoglobin 03/18/2024 11.0 (L)  13.0 - 17.7 g/dL Final    Hematocrit 03/18/2024 35.6 (L)  37.5 - 51.0 % Final    MCV 03/18/2024 75.9 (L)  79.0 - 97.0 fL Final    MCH 03/18/2024 23.5 (L)  26.6 - 33.0 pg Final    MCHC 03/18/2024 30.9 (L)  31.5 - 35.7 g/dL Final    RDW 03/18/2024 20.0 (H)  12.3 - 15.4 % Final    RDW-SD 03/18/2024 51.3  37.0 - 54.0 fl Final    MPV 03/18/2024 10.9  6.0 - 12.0 fL Final    Platelets 03/18/2024 108 (L)  140 - 450 10*3/mm3 Final    Neutrophil % 03/18/2024 63.0  42.7 - 76.0 % Final    Lymphocyte % 03/18/2024 21.9  19.6 - 45.3 % Final    Monocyte % 03/18/2024 13.1 (H)  5.0 - 12.0 % Final    Eosinophil % 03/18/2024 1.5  0.3 - 6.2 % Final    Basophil % 03/18/2024 0.3  0.0 - 1.5 % Final    Immature Grans % 03/18/2024 0.2  0.0 - 0.5 % Final    Neutrophils, Absolute 03/18/2024 4.12  1.70 - 7.00 10*3/mm3  Final    Lymphocytes, Absolute 03/18/2024 1.43  0.70 - 3.10 10*3/mm3 Final    Monocytes, Absolute 03/18/2024 0.86  0.10 - 0.90 10*3/mm3 Final    Eosinophils, Absolute 03/18/2024 0.10  0.00 - 0.40 10*3/mm3 Final    Basophils, Absolute 03/18/2024 0.02  0.00 - 0.20 10*3/mm3 Final    Immature Grans, Absolute 03/18/2024 0.01  0.00 - 0.05 10*3/mm3 Final    nRBC 03/18/2024 0.0  0.0 - 0.2 /100 WBC Final     Tempus xt: APC gene TP53; Negative ADRIANNA, BRAF, NRAS, TMB stable. PDL1 negative  MRI Abdomen With & Without Contrast    Result Date: 2/26/2024  Narrative: MRI ABDOMEN W WO CONTRAST Date of Exam: 2/23/2024 7:01 AM EST Indication: rectal cancer, liver metastasis.  Comparison: PET/CT 12/22/2023, CT abdomen pelvis 11/25/2023, CT guided liver biopsy images 1/18/2024 Technique:  Routine multiplanar/multisequence images of the abdomen were obtained before and after the uneventful administration of 20 mL Multihance. Findings: Lower Thorax: Known left lower lobe pulmonary mass/metastasis is partially visualized on some sequences. Liver: Decreased size and number of hepatic metastases compared to prior exams. No evidence of new liver lesions. No evidence of hepatic steatosis. No morphologic changes of chronic liver disease. For reference: - The biopsy-proven metastasis in the inferior right hepatic lobe measures 0.9 cm, previously 1.9 cm on CT 11/25/2023 (series 16 image 58). - The metastasis in the superior right hepatic lobe measures 0.4 cm, previously 0.8 cm on CT 11/25/2023 (series 16 image 13). - The additional small metastases seen on prior CT and PET/CT are no longer discretely measurable (there were probably around 7 measurable metastases on prior CT and PET/CT, many which were less than 1 cm). Gallbladder and bile ducts: Cholecystectomy. No biliary ductal dilatation or filling defect. Pancreas: No pancreatic duct dilation. No surrounding inflammation. Spleen: Splenomegaly measuring 16 cm, unchanged. Calcified granulomata.  Adrenal glands: No adrenal nodule. Kidneys: No hydronephrosis. No suspicious renal lesions. Benign right renal cyst. Stomach and bowel: Postsurgical changes of the stomach. No evidence of bowel obstruction. Lymph nodes: No pathologically enlarged lymph nodes. Vasculature: No aneurysmal dilation of the abdominal aorta. Major vasculature is patent. Peritoneum and retroperitoneum: No free air or free fluid. Soft tissues: Unremarkable. Osseous structures: No focal suspicious osseous lesions.     Impression: Impression: Decreased size and number of hepatic metastases. No evidence of new or progressive metastatic disease in the abdomen. Electronically Signed: Min Nguyen MD  2/26/2024 9:38 AM EST  Workstation ID: [id number]     Procedures    Assessment / Plan      Assessment/Plan:     1.  Rectal cancer   2.  Liver metastases  3.  Lung metastasis  4.  Chemo monitoring  -The patient presents with a partially obstructing rectal cancer with adenopathy.  -He was found to have biopsy-proven metastasis in the liver and lung.  His case was presented at multidisciplinary tumor board.  -We will proceed with a liver MRI to better evaluate the extent of his hepatic metastasis as was recommended by radiology.  However, because of metastatic disease to both the lung and liver I do not think he will be downstage to resectable disease.  -Labs are adequate to proceed with cycle #5.  I reviewed his Tempus results which are notable for an APC mutation, T p53 mutation, negative for KRAS, BRAF, NRAS, tumor mutation burden stable.  Notch 1 VUS.  -Reviewed potential role for colorectal surgery consultation regarding diversion particularly if his obstructive symptoms do not improve, but they are currently improving.   -CBC/CMP reviewed and adequate  -Chemo orders signed  -Ordered restaging scans    4.  Cancer related pain  -PRN oxycodone.   -Well controlled.     5. Access   -Port    6.  Chemotherapy-induced nausea  -Continue zofran and  compazine.    7.  Seasonal allergies  -Add daily Zyrtec    8. GERD  PPI    9. Social  -SW referral reinforced follow up with them     10. Weight loss  -Dietitian  -samples for ensure     Follow Up:   2 weeks     Brenda Cronin MD  Hematology and Oncology     I have spent a total of 40 min on reviewing test results/preparing to see patient, counseling patient, performing medically appropriate exam and documenting clinical information in the electronic or other health record

## 2024-03-18 NOTE — PROGRESS NOTES
Case Management/Social Work    Patient Name:  Edward Powell  YOB: 1970  MRN: [id number]  Admit Date:  (Not on file)        RENAY met with pt in oncology infusion today and provided pt with gas cards and two food bags. SW also provided support and listened to patient when discussing his concerns on finances. RENAY collaborated with Oncology RN navigator and Foundation team to purchase Ensures for pt as he has recently lost weight. Pt appreciative of resources. Pt denied any other needs at this time. He will contact this SW if any other needs in future.       Electronically signed by:  EVENS Han  03/18/24 12:55 EDT

## 2024-03-19 ENCOUNTER — TELEPHONE (OUTPATIENT)
Dept: ONCOLOGY | Facility: CLINIC | Age: 54
End: 2024-03-19
Payer: COMMERCIAL

## 2024-03-19 NOTE — TELEPHONE ENCOUNTER
Patient stated he has had a fever twice today.  First one was 99.8, second time was 99.1. He stated he took 1000 mg Tylenol each time and temperature decreased.  Patient denied N/V/D.  Patient stated he has been congested but is taking his zyrtec.  Patient stated he is just tired.  Dr. Cronin notified and per MD, patient advised that he should stop taking Tylenol and if his temperature increases to > 100.5 for more than one hour, he should go to [facility] or ER for evaluation.  Patient verbalized understanding.

## 2024-03-19 NOTE — TELEPHONE ENCOUNTER
Patient called he is running a temperature it was 99.8 earlier took some tylenol and laid down, now it's back up to 99.2 please call.

## 2024-03-20 ENCOUNTER — INFUSION (OUTPATIENT)
Dept: ONCOLOGY | Facility: HOSPITAL | Age: 54
End: 2024-03-20
Payer: COMMERCIAL

## 2024-03-20 VITALS
OXYGEN SATURATION: 97 % | DIASTOLIC BLOOD PRESSURE: 67 MMHG | SYSTOLIC BLOOD PRESSURE: 119 MMHG | TEMPERATURE: 97.4 F | HEART RATE: 84 BPM | RESPIRATION RATE: 18 BRPM

## 2024-03-20 DIAGNOSIS — C20 RECTAL ADENOCARCINOMA: ICD-10-CM

## 2024-03-20 DIAGNOSIS — C20 RECTAL CANCER METASTASIZED TO LIVER: Primary | ICD-10-CM

## 2024-03-20 DIAGNOSIS — C78.7 RECTAL CANCER METASTASIZED TO LIVER: Primary | ICD-10-CM

## 2024-03-20 PROCEDURE — 25010000002 HEPARIN LOCK FLUSH PER 10 UNITS: Performed by: INTERNAL MEDICINE

## 2024-03-20 PROCEDURE — 96523 IRRIG DRUG DELIVERY DEVICE: CPT

## 2024-03-20 RX ORDER — SODIUM CHLORIDE 0.9 % (FLUSH) 0.9 %
10 SYRINGE (ML) INJECTION AS NEEDED
Status: DISCONTINUED | OUTPATIENT
Start: 2024-03-20 | End: 2024-03-20 | Stop reason: HOSPADM

## 2024-03-20 RX ORDER — SODIUM CHLORIDE 0.9 % (FLUSH) 0.9 %
10 SYRINGE (ML) INJECTION AS NEEDED
OUTPATIENT
Start: 2024-03-20

## 2024-03-20 RX ORDER — HEPARIN SODIUM (PORCINE) LOCK FLUSH IV SOLN 100 UNIT/ML 100 UNIT/ML
500 SOLUTION INTRAVENOUS AS NEEDED
OUTPATIENT
Start: 2024-03-20

## 2024-03-20 RX ORDER — HEPARIN SODIUM (PORCINE) LOCK FLUSH IV SOLN 100 UNIT/ML 100 UNIT/ML
500 SOLUTION INTRAVENOUS AS NEEDED
Status: DISCONTINUED | OUTPATIENT
Start: 2024-03-20 | End: 2024-03-20 | Stop reason: HOSPADM

## 2024-03-20 RX ADMIN — HEPARIN 500 UNITS: 100 SYRINGE at 13:11

## 2024-03-20 RX ADMIN — Medication 10 ML: at 13:11

## 2024-03-26 ENCOUNTER — HOSPITAL ENCOUNTER (EMERGENCY)
Facility: HOSPITAL | Age: 54
Discharge: HOME | End: 2024-03-26
Attending: STUDENT IN AN ORGANIZED HEALTH CARE EDUCATION/TRAINING PROGRAM | Admitting: STUDENT IN AN ORGANIZED HEALTH CARE EDUCATION/TRAINING PROGRAM
Payer: COMMERCIAL

## 2024-03-26 VITALS
DIASTOLIC BLOOD PRESSURE: 76 MMHG | RESPIRATION RATE: 18 BRPM | HEART RATE: 69 BPM | SYSTOLIC BLOOD PRESSURE: 125 MMHG | HEIGHT: 72 IN | BODY MASS INDEX: 31.26 KG/M2 | OXYGEN SATURATION: 97 % | TEMPERATURE: 98.9 F | WEIGHT: 230.8 LBS

## 2024-03-26 DIAGNOSIS — K13.79 MOUTH SORES: Primary | ICD-10-CM

## 2024-03-26 PROCEDURE — 99283 EMERGENCY DEPT VISIT LOW MDM: CPT

## 2024-03-26 RX ORDER — DIPHENHYDRAMINE HYDROCHLORIDE AND LIDOCAINE HYDROCHLORIDE AND ALUMINUM HYDROXIDE AND MAGNESIUM HYDRO
10 KIT EVERY 6 HOURS
Status: DISCONTINUED | OUTPATIENT
Start: 2024-03-26 | End: 2024-03-26 | Stop reason: HOSPADM

## 2024-03-26 RX ORDER — DIPHENHYDRAMINE HYDROCHLORIDE AND LIDOCAINE HYDROCHLORIDE AND ALUMINUM HYDROXIDE AND MAGNESIUM HYDRO
10 KIT EVERY 6 HOURS
Qty: 280 ML | Refills: 0 | Status: SHIPPED | OUTPATIENT
Start: 2024-03-26 | End: 2024-04-01

## 2024-03-26 RX ADMIN — DIPHENHYDRAMINE HYDROCHLORIDE AND LIDOCAINE HYDROCHLORIDE AND ALUMINUM HYDROXIDE AND MAGNESIUM HYDRO 10 ML: KIT at 20:01

## 2024-03-26 NOTE — ED PROVIDER NOTES
Subjective:  History of Present Illness:    Patient is a 53-year-old male with history of colon cancer.  Reports that he is currently on his 6-week of chemo.  Reports that they recently added a new medication to his chemo regimen which was started last week.  He is on day 7 since previous chemo treatment started noticing sores in his mouth.  He is concerned because he did not have this finding prior to addition of recent add-on of medication.  He denies fever.  Denies OTC medication home remedy.  Denies alleviating or exacerbating factors.    Nurses Notes reviewed and agree, including vitals, allergies, social history and prior medical history.     REVIEW OF SYSTEMS: All systems reviewed and not pertinent unless noted.  Review of Systems   HENT:  Positive for mouth sores.    All other systems reviewed and are negative.      Past Medical History:   Diagnosis Date    Arthritis     Cancer     rectal cancer - diagnosed 2023    Cholelithiasis 2019    Removed    COPD (chronic obstructive pulmonary disease)     Coronary artery disease 2009    Stent - no cardiologist currently    Elevated cholesterol     GERD (gastroesophageal reflux disease)     Hernia 2003    Lower hernia    Perforated ulcer 2019    Sleep apnea     history of; when weighed over 400lbs - no issues following bariatric surgery       Allergies:    Bactrim [sulfamethoxazole-trimethoprim] and Sulfa antibiotics      Past Surgical History:   Procedure Laterality Date    APPENDECTOMY  1983    Removed    BARIATRIC SURGERY  2011    Gastric bypass    BLADDER TUMOR/ULCER BLEEDER CAUTERIZATION      CARDIAC CATHETERIZATION  2009    stent placed    CHOLECYSTECTOMY  2019    Removed    COLONOSCOPY N/A 12/07/2023    Procedure: COLONOSCOPY WITH HOT SNARE POLYPECTOMY AND TATTOO;  Surgeon: Noman Gautam MD;  Location: [facility] ENDOSCOPY;  Service: Gastroenterology;  Laterality: N/A;    PORTACATH PLACEMENT N/A 1/5/2024    Procedure: INSERTION OF PORTACATH WITH ULTRSOUND  "AND FLUOROSCOPIC GUIDANCE;  Surgeon: Ida Black MD;  Location: [facility];  Service: General;  Laterality: N/A;    JENNIFER-EN-Y           Social History     Socioeconomic History    Marital status:    Tobacco Use    Smoking status: Every Day     Current packs/day: 1.50     Average packs/day: 1.5 packs/day for 15.0 years (22.5 ttl pk-yrs)     Types: Cigarettes    Smokeless tobacco: Never    Tobacco comments:     35 years      pt reports closer to 2 packs per day since cancer diagnosis   Vaping Use    Vaping status: Never Used   Substance and Sexual Activity    Alcohol use: Never    Drug use: Never    Sexual activity: Defer         History reviewed. No pertinent family history.    Objective  Physical Exam:  /76 (BP Location: Right arm, Patient Position: Sitting)   Pulse 69   Temp 98.9 °F (37.2 °C) (Oral)   Resp 18   Ht 182.9 cm (72\")   Wt 105 kg (230 lb 12.8 oz)   SpO2 97%   BMI 31.30 kg/m²      Physical Exam  Vitals and nursing note reviewed.   Constitutional:       Appearance: Normal appearance. He is normal weight.   HENT:      Head: Normocephalic and atraumatic.      Nose: Nose normal.      Mouth/Throat:      Mouth: Mucous membranes are moist.      Pharynx: Oropharynx is clear.      Comments: There are multiple sores noted to lips mouth and posterior oropharynx.  Airway is patent.  Eyes:      Extraocular Movements: Extraocular movements intact.      Conjunctiva/sclera: Conjunctivae normal.      Pupils: Pupils are equal, round, and reactive to light.   Cardiovascular:      Rate and Rhythm: Normal rate.   Pulmonary:      Effort: Pulmonary effort is normal.   Abdominal:      General: Abdomen is flat.   Musculoskeletal:         General: Normal range of motion.   Skin:     General: Skin is warm and dry.      Capillary Refill: Capillary refill takes less than 2 seconds.   Neurological:      General: No focal deficit present.      Mental Status: He is alert and oriented to person, place, and time. " Mental status is at baseline.   Psychiatric:         Mood and Affect: Mood normal.         Behavior: Behavior normal.         Thought Content: Thought content normal.         Judgment: Judgment normal.         Procedures    ED Course:         Lab Results (last 24 hours)       ** No results found for the last 24 hours. **             No radiology results from the last 24 hrs       MDM      Initial impression of presenting illness: Patient is a 53-year-old male with history of colon cancer.  Reports that he is currently on his 6-week of chemo.  Reports that they recently added a new medication to his chemo regimen which was started last week.  He is on day 7 since previous chemo treatment started noticing sores in his mouth.  He is concerned because he did not have this finding prior to addition of recent add-on of medication.  He denies fever.  Denies OTC medication home remedy.  Denies alleviating or exacerbating factors.    DDX: includes but is not limited to: Chemo side effect, skin infection, strep throat or other    Patient arrives stable with vitals interpreted by myself.     Pertinent features from physical exam: There are multiple skin lesions noted to anterior lips and roof of oropharynx.  Airway is patent..    Initial diagnostic plan: N/A    Results from initial plan were reviewed and interpreted by me revealing N/A    Diagnostic information from other sources: Chart review    Interventions / Re-evaluation: Vital signs stable throughout encounter.  Patient received first dose of Magic mouthwash here in the ER    Results/clinical rationale were discussed with patient    Consultations/Discussion of results with other physicians: N/A    Disposition plan: She is hemodynamically stable nontoxic-appearing appropriate discharge.  Patient to follow-up with oncologist tomorrow.  Follow-up ER for new or worsening symptoms.  -----        Final diagnoses:   Mouth sores          Abrhaan Mcdaniels, APRN  03/26/24 2012

## 2024-03-27 ENCOUNTER — TELEPHONE (OUTPATIENT)
Dept: ONCOLOGY | Facility: CLINIC | Age: 54
End: 2024-03-27
Payer: COMMERCIAL

## 2024-03-27 NOTE — TELEPHONE ENCOUNTER
Caller: Edward Powell    Relationship: Self    Best call back number: [phone]    What is the best time to reach you: ANYTIME    Who are you requesting to speak with (clinical staff, provider, specific staff member): CLINICAL    What was the call regarding: PATIENT STATED THE INSIDE OF HIS MOUTH WAS SLIGHTLY SWOLLEN LAST NIGHT AND HAD SOME BLISTERS IN IT. HE WAS SEEN IN THE ER. THEY GAVE HIM SOME MAGIC MOUTHWASH AND TOLD HIM TO CALL DR ODOM THIS MORNING TO LET HER KNOW WHAT WAS GOING ON.     PLEASE CALL TO ADVISE.

## 2024-03-27 NOTE — TELEPHONE ENCOUNTER
Patient stated his tongue, jaw and lips began getting sore with blisters yesterday so he went to the ED.  He stated that the inside of this mouth was edematous and that he had a hard time placing his dentures in his mouth.  He stated the ER gave him magic mouthwash and it has gotten better. He stated he still has some swelling and sore areas in his mouth. He stated he has not picked up the prescription yet.  Patient denied any SOA.  Patient stated he has had a h/o boils on his skin intermittently since before chemo began but it has lately gotten worse.  He stated the rash on his chest has cleared.  Dr. Cronin notified of the above and per MD, patient advised to escalate using the medications and directions Dr. Cronin gave him during his last appointment and after he does this, if it is not helping or getting worse, he will need to be seen for possible oral antibiotics.  Advised patient per Dr. Cronin that he should  the prescription for MMW and use it and if it is not improving within the next 1-2 days, he will need to be seen by a provider and should call.  Patient advised to contact this office if he cannot afford the MMW.  Patient verbalized understanding of all instructions given.  [facility]  [facility] and prescription being sent from [facility] to  [facility] per request of patient and because [facility] does not compound.

## 2024-03-27 NOTE — DISCHARGE INSTRUCTIONS
Please follow-up with your oncologist within the next couple days.  Follow-up ER for new or worse symptoms.

## 2024-04-01 ENCOUNTER — OFFICE VISIT (OUTPATIENT)
Dept: ONCOLOGY | Facility: CLINIC | Age: 54
End: 2024-04-01
Payer: COMMERCIAL

## 2024-04-01 ENCOUNTER — INFUSION (OUTPATIENT)
Dept: ONCOLOGY | Facility: HOSPITAL | Age: 54
End: 2024-04-01
Payer: COMMERCIAL

## 2024-04-01 VITALS
DIASTOLIC BLOOD PRESSURE: 58 MMHG | RESPIRATION RATE: 16 BRPM | OXYGEN SATURATION: 98 % | BODY MASS INDEX: 30.61 KG/M2 | SYSTOLIC BLOOD PRESSURE: 118 MMHG | HEART RATE: 72 BPM | HEIGHT: 72 IN | TEMPERATURE: 98.4 F | WEIGHT: 226 LBS

## 2024-04-01 DIAGNOSIS — C20 RECTAL CANCER METASTASIZED TO LIVER: Primary | ICD-10-CM

## 2024-04-01 DIAGNOSIS — G89.3 CANCER RELATED PAIN: Primary | ICD-10-CM

## 2024-04-01 DIAGNOSIS — C78.7 RECTAL CANCER METASTASIZED TO LIVER: Primary | ICD-10-CM

## 2024-04-01 LAB
ALBUMIN SERPL-MCNC: 3.5 G/DL (ref 3.5–5.2)
ALBUMIN/GLOB SERPL: 0.9 G/DL
ALP SERPL-CCNC: 114 U/L (ref 39–117)
ALT SERPL W P-5'-P-CCNC: 10 U/L (ref 1–41)
ANION GAP SERPL CALCULATED.3IONS-SCNC: 14.7 MMOL/L (ref 5–15)
ANISOCYTOSIS BLD QL: NORMAL
AST SERPL-CCNC: 19 U/L (ref 1–40)
BASOPHILS # BLD AUTO: 0.03 10*3/MM3 (ref 0–0.2)
BASOPHILS NFR BLD AUTO: 0.5 % (ref 0–1.5)
BILIRUB SERPL-MCNC: 0.3 MG/DL (ref 0–1.2)
BUN SERPL-MCNC: 9 MG/DL (ref 6–20)
BUN/CREAT SERPL: 10.7 (ref 7–25)
CALCIUM SPEC-SCNC: 8.4 MG/DL (ref 8.6–10.5)
CHLORIDE SERPL-SCNC: 104 MMOL/L (ref 98–107)
CO2 SERPL-SCNC: 20.3 MMOL/L (ref 22–29)
CREAT SERPL-MCNC: 0.84 MG/DL (ref 0.76–1.27)
DEPRECATED RDW RBC AUTO: 53.3 FL (ref 37–54)
EGFRCR SERPLBLD CKD-EPI 2021: 103.6 ML/MIN/1.73
EOSINOPHIL # BLD AUTO: 0.08 10*3/MM3 (ref 0–0.4)
EOSINOPHIL NFR BLD AUTO: 1.3 % (ref 0.3–6.2)
ERYTHROCYTE [DISTWIDTH] IN BLOOD BY AUTOMATED COUNT: 20.8 % (ref 12.3–15.4)
FERRITIN SERPL-MCNC: 29.16 NG/ML (ref 30–400)
GLOBULIN UR ELPH-MCNC: 3.7 GM/DL
GLUCOSE SERPL-MCNC: 109 MG/DL (ref 65–99)
HCT VFR BLD AUTO: 36.3 % (ref 37.5–51)
HGB BLD-MCNC: 11.3 G/DL (ref 13–17.7)
IMM GRANULOCYTES # BLD AUTO: 0.03 10*3/MM3 (ref 0–0.05)
IMM GRANULOCYTES NFR BLD AUTO: 0.5 % (ref 0–0.5)
IRON 24H UR-MRATE: 32 MCG/DL (ref 59–158)
IRON SATN MFR SERPL: 10 % (ref 20–50)
LYMPHOCYTES # BLD AUTO: 1.28 10*3/MM3 (ref 0.7–3.1)
LYMPHOCYTES NFR BLD AUTO: 20.3 % (ref 19.6–45.3)
MAGNESIUM SERPL-MCNC: 2 MG/DL (ref 1.6–2.6)
MCH RBC QN AUTO: 23.8 PG (ref 26.6–33)
MCHC RBC AUTO-ENTMCNC: 31.1 G/DL (ref 31.5–35.7)
MCV RBC AUTO: 76.4 FL (ref 79–97)
MONOCYTES # BLD AUTO: 0.77 10*3/MM3 (ref 0.1–0.9)
MONOCYTES NFR BLD AUTO: 12.2 % (ref 5–12)
NEUTROPHILS NFR BLD AUTO: 4.1 10*3/MM3 (ref 1.7–7)
NEUTROPHILS NFR BLD AUTO: 65.2 % (ref 42.7–76)
NRBC BLD AUTO-RTO: 0 /100 WBC (ref 0–0.2)
PLAT MORPH BLD: NORMAL
PLATELET # BLD AUTO: 139 10*3/MM3 (ref 140–450)
PMV BLD AUTO: 9.9 FL (ref 6–12)
POTASSIUM SERPL-SCNC: 3.7 MMOL/L (ref 3.5–5.2)
PROT SERPL-MCNC: 7.2 G/DL (ref 6–8.5)
RBC # BLD AUTO: 4.75 10*6/MM3 (ref 4.14–5.8)
SODIUM SERPL-SCNC: 139 MMOL/L (ref 136–145)
TIBC SERPL-MCNC: 322 MCG/DL (ref 298–536)
TRANSFERRIN SERPL-MCNC: 216 MG/DL (ref 200–360)
WBC MORPH BLD: NORMAL
WBC NRBC COR # BLD AUTO: 6.29 10*3/MM3 (ref 3.4–10.8)

## 2024-04-01 PROCEDURE — 25010000002 FLUOROURACIL PER 500 MG: Performed by: NURSE PRACTITIONER

## 2024-04-01 PROCEDURE — 85025 COMPLETE CBC W/AUTO DIFF WBC: CPT

## 2024-04-01 PROCEDURE — 96416 CHEMO PROLONG INFUSE W/PUMP: CPT

## 2024-04-01 PROCEDURE — 84466 ASSAY OF TRANSFERRIN: CPT | Performed by: NURSE PRACTITIONER

## 2024-04-01 PROCEDURE — 96417 CHEMO IV INFUS EACH ADDL SEQ: CPT

## 2024-04-01 PROCEDURE — 25010000002 PALONOSETRON 0.25 MG/5ML SOLUTION PREFILLED SYRINGE: Performed by: NURSE PRACTITIONER

## 2024-04-01 PROCEDURE — 36593 DECLOT VASCULAR DEVICE: CPT

## 2024-04-01 PROCEDURE — 82728 ASSAY OF FERRITIN: CPT | Performed by: NURSE PRACTITIONER

## 2024-04-01 PROCEDURE — 96413 CHEMO IV INFUSION 1 HR: CPT

## 2024-04-01 PROCEDURE — 80053 COMPREHEN METABOLIC PANEL: CPT

## 2024-04-01 PROCEDURE — 25010000002 ALTEPLASE 2 MG RECONSTITUTED SOLUTION: Performed by: NURSE PRACTITIONER

## 2024-04-01 PROCEDURE — 25010000002 LEUCOVORIN 500 MG RECONSTITUTED SOLUTION 1 EACH VIAL: Performed by: NURSE PRACTITIONER

## 2024-04-01 PROCEDURE — 99215 OFFICE O/P EST HI 40 MIN: CPT | Performed by: NURSE PRACTITIONER

## 2024-04-01 PROCEDURE — 25010000002 PANITUMUMAB PER 10 MG: Performed by: NURSE PRACTITIONER

## 2024-04-01 PROCEDURE — 83735 ASSAY OF MAGNESIUM: CPT

## 2024-04-01 PROCEDURE — 83540 ASSAY OF IRON: CPT | Performed by: NURSE PRACTITIONER

## 2024-04-01 PROCEDURE — 25810000003 SODIUM CHLORIDE 0.9 % SOLUTION 250 ML FLEX CONT: Performed by: NURSE PRACTITIONER

## 2024-04-01 PROCEDURE — 0 DEXTROSE 5 % SOLUTION 250 ML FLEX CONT: Performed by: NURSE PRACTITIONER

## 2024-04-01 PROCEDURE — 96360 HYDRATION IV INFUSION INIT: CPT

## 2024-04-01 PROCEDURE — 96415 CHEMO IV INFUSION ADDL HR: CPT

## 2024-04-01 PROCEDURE — 25810000003 SODIUM CHLORIDE 0.9 % SOLUTION: Performed by: NURSE PRACTITIONER

## 2024-04-01 PROCEDURE — 25010000002 PANITUMUMAB 400 MG/20ML SOLUTION 20 ML VIAL: Performed by: NURSE PRACTITIONER

## 2024-04-01 PROCEDURE — 25010000002 DEXAMETHASONE SODIUM PHOSPHATE 20 MG/5ML SOLUTION: Performed by: NURSE PRACTITIONER

## 2024-04-01 PROCEDURE — 25010000002 OXALIPLATIN PER 0.5 MG: Performed by: NURSE PRACTITIONER

## 2024-04-01 PROCEDURE — 85007 BL SMEAR W/DIFF WBC COUNT: CPT

## 2024-04-01 PROCEDURE — 96375 TX/PRO/DX INJ NEW DRUG ADDON: CPT

## 2024-04-01 PROCEDURE — 96368 THER/DIAG CONCURRENT INF: CPT

## 2024-04-01 PROCEDURE — G0498 CHEMO EXTEND IV INFUS W/PUMP: HCPCS

## 2024-04-01 RX ORDER — FERROUS SULFATE 325(65) MG
325 TABLET ORAL
Qty: 30 TABLET | Refills: 3 | Status: SHIPPED | OUTPATIENT
Start: 2024-04-01

## 2024-04-01 RX ORDER — PALONOSETRON 0.05 MG/ML
0.25 INJECTION, SOLUTION INTRAVENOUS ONCE
Status: CANCELLED | OUTPATIENT
Start: 2024-04-01

## 2024-04-01 RX ORDER — DEXTROSE MONOHYDRATE 50 MG/ML
20 INJECTION, SOLUTION INTRAVENOUS ONCE
Status: CANCELLED | OUTPATIENT
Start: 2024-04-01

## 2024-04-01 RX ORDER — DEXTROSE MONOHYDRATE 50 MG/ML
20 INJECTION, SOLUTION INTRAVENOUS ONCE
Status: DISCONTINUED | OUTPATIENT
Start: 2024-04-01 | End: 2024-04-01 | Stop reason: HOSPADM

## 2024-04-01 RX ORDER — TRAZODONE HYDROCHLORIDE 100 MG/1
TABLET ORAL
Qty: 30 TABLET | Refills: 0 | Status: SHIPPED | OUTPATIENT
Start: 2024-04-01

## 2024-04-01 RX ORDER — DIPHENHYDRAMINE HYDROCHLORIDE 50 MG/ML
50 INJECTION INTRAMUSCULAR; INTRAVENOUS AS NEEDED
Status: CANCELLED | OUTPATIENT
Start: 2024-04-01

## 2024-04-01 RX ORDER — SODIUM CHLORIDE 9 MG/ML
500 INJECTION, SOLUTION INTRAVENOUS CONTINUOUS
Status: DISCONTINUED | OUTPATIENT
Start: 2024-04-01 | End: 2024-04-01 | Stop reason: HOSPADM

## 2024-04-01 RX ORDER — PALONOSETRON 0.05 MG/ML
0.25 INJECTION, SOLUTION INTRAVENOUS ONCE
Status: COMPLETED | OUTPATIENT
Start: 2024-04-01 | End: 2024-04-01

## 2024-04-01 RX ORDER — SODIUM CHLORIDE 9 MG/ML
1000 INJECTION, SOLUTION INTRAVENOUS CONTINUOUS
Status: CANCELLED
Start: 2024-04-03

## 2024-04-01 RX ORDER — SODIUM CHLORIDE 9 MG/ML
20 INJECTION, SOLUTION INTRAVENOUS ONCE
Status: CANCELLED | OUTPATIENT
Start: 2024-04-01

## 2024-04-01 RX ORDER — SODIUM CHLORIDE 9 MG/ML
500 INJECTION, SOLUTION INTRAVENOUS CONTINUOUS
Status: CANCELLED
Start: 2024-04-01

## 2024-04-01 RX ORDER — MUPIROCIN CALCIUM 20 MG/G
CREAM TOPICAL
COMMUNITY
Start: 2024-03-18

## 2024-04-01 RX ORDER — FAMOTIDINE 10 MG/ML
20 INJECTION, SOLUTION INTRAVENOUS AS NEEDED
Status: CANCELLED | OUTPATIENT
Start: 2024-04-01

## 2024-04-01 RX ORDER — WATER 10 ML/10ML
INJECTION INTRAMUSCULAR; INTRAVENOUS; SUBCUTANEOUS
Status: COMPLETED
Start: 2024-04-01 | End: 2024-04-01

## 2024-04-01 RX ORDER — SODIUM CHLORIDE 9 MG/ML
20 INJECTION, SOLUTION INTRAVENOUS ONCE
Status: DISCONTINUED | OUTPATIENT
Start: 2024-04-01 | End: 2024-04-01 | Stop reason: HOSPADM

## 2024-04-01 RX ADMIN — SODIUM CHLORIDE 500 ML: 9 INJECTION, SOLUTION INTRAVENOUS at 09:34

## 2024-04-01 RX ADMIN — PALONOSETRON 0.25 MG: 0.25 INJECTION, SOLUTION INTRAVENOUS at 10:53

## 2024-04-01 RX ADMIN — PANITUMUMAB 630 MG: 400 SOLUTION INTRAVENOUS at 10:03

## 2024-04-01 RX ADMIN — DEXAMETHASONE SODIUM PHOSPHATE 12 MG: 4 INJECTION, SOLUTION INTRAMUSCULAR; INTRAVENOUS at 10:39

## 2024-04-01 RX ADMIN — ALTEPLASE: 2.2 INJECTION, POWDER, LYOPHILIZED, FOR SOLUTION INTRAVENOUS at 08:16

## 2024-04-01 RX ADMIN — LEUCOVORIN CALCIUM 910 MG: 500 INJECTION, POWDER, LYOPHILIZED, FOR SOLUTION INTRAMUSCULAR; INTRAVENOUS at 11:00

## 2024-04-01 RX ADMIN — OXALIPLATIN 195 MG: 5 INJECTION, SOLUTION INTRAVENOUS at 10:59

## 2024-04-01 RX ADMIN — WATER 10 ML: 1 INJECTION INTRAMUSCULAR; INTRAVENOUS; SUBCUTANEOUS at 08:17

## 2024-04-01 RX ADMIN — FLUOROURACIL 5450 MG: 50 INJECTION, SOLUTION INTRAVENOUS at 12:59

## 2024-04-01 NOTE — TELEPHONE ENCOUNTER
RACIEL #: [id number]    Medication requested: Oxycodone (ROXICODONE) 10 MG    Last fill date: 2/26/24    Last appointment: 1/12/24    Next appointment: 4/26/24

## 2024-04-01 NOTE — PROGRESS NOTES
[facility]ology [facility] On[facility]  Office number [phone]    Fax number [phone]     Follow up     Date: 24    Patient Name: Edward Powell  MRN: [id number]  : 1970    Referring Physician: Dr. Gautam    Chief Complaint: Rectal cancer, lung mass, liver lesions follow up on treatment    Cancer Staging:  IV    History of Present Illness: Edward Powell is a pleasant 53 y.o. male who presents today for evaluation of rectal cancer in the company of his supportive significant other.    Patient has a longstanding history of intermittent diarrhea since his cholecystectomy in 2019.  However he developed progressive diarrhea with associated loss of bowel control and urgency as well as weight loss and fatigue prompting additional workup.    CT of the abdomen pelvis 2023 showed an irregular circumferential wall thickening involving the rectum concerning for mass lesion.  There was associated mesorectal lymphadenopathy.  Masslike consolidation of left lower lobe.  CT of the chest showed a cavitary lesion in the left lower lobe up to 4.8 cm with an adjacent cavitary nodule up to 2 cm and a 5 mm nodule on the right minor fissure.      He underwent colonoscopy 2023 with findings of an infiltrative and ulcerated partially obstructing mass in the proximal rectum spanning 10 cm.  Rectal polyps.  Biopsy of the rectal mass showed invasive moderately differentiated adenocarcinoma.  Additional biopsies showed tubular adenomas.  MSI testing was intact/low probability of MSI high.    PDL1 negative    PET/CT 2023 showed hypermetabolic rectal wall thickening compatible with known rectal malignancy.  Multiple small ill-defined hypoechoic hyper metabolic liver lesions compatible with metastatic disease.  Mildly enlarged and mildly hypermetabolic pelvic sidewall and internal iliac lymph node chain adenopathy.  Hypermetabolic lung mass with adjacent nodule.     He underwent liver biopsy and lung  biopsy January 2024.  Results of both confirmed metastatic colorectal cancer.    The patient has been experiencing substantial rectal pain.  He is taking oxycodone every 6 hours with partial relief.  He reports ongoing bowel movements.  No abdominal pain.  No vomiting.  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    After gastric bypass in 2011 lost a a lot of weight.   Has lost 20 lbs since cancer diagnosis. Body mass index is 30.65 kg/m².   He has previously spoken with the dietitian but unfortunately cannot afford supplements.  He is currently working with our nurse navigator to help with supplements.  He is getting them for free right now.  Eye irritation has resolved  Rash on anterior chest is itchy but has improved.  Using Free and clear body wash.  Bowel movements continue to be more regular.  Pain is well-controlled.  Requesting refill today.  He did note on last cycle that he had mouth sores which she used Magic mouthwash and that helped.  He is requesting refill.    Past Medical History:   Past Medical History:   Diagnosis Date    Arthritis     Cancer     rectal cancer - diagnosed 2023    Cholelithiasis 2019    Removed    COPD (chronic obstructive pulmonary disease)     Coronary artery disease 2009    Stent - no cardiologist currently    Elevated cholesterol     GERD (gastroesophageal reflux disease)     Hernia 2003    Lower hernia    Perforated ulcer 2019    Sleep apnea     history of; when weighed over 400lbs - no issues following bariatric surgery       Past Surgical History:   Past Surgical History:   Procedure Laterality Date    APPENDECTOMY  1983    Removed    BARIATRIC SURGERY  2011    Gastric bypass    BLADDER TUMOR/ULCER BLEEDER CAUTERIZATION       CARDIAC CATHETERIZATION  2009    stent placed    CHOLECYSTECTOMY  2019    Removed    COLONOSCOPY N/A 12/07/2023    Procedure: COLONOSCOPY WITH HOT SNARE POLYPECTOMY AND TATTOO;  Surgeon: Noman Gautam MD;  Location: [facility] ENDOSCOPY;  Service: Gastroenterology;  Laterality: N/A;    PORTACATH PLACEMENT N/A 1/5/2024    Procedure: INSERTION OF PORTACATH WITH ULTRSOUND AND FLUOROSCOPIC GUIDANCE;  Surgeon: Ida Black MD;  Location: [facility] OR;  Service: General;  Laterality: N/A;    JENNIFER-EN-Y         Family History: No family history on file.  Uncle had colon cancer    Social History:   Social History     Socioeconomic History    Marital status:    Tobacco Use    Smoking status: Every Day     Current packs/day: 1.50     Average packs/day: 1.5 packs/day for 15.0 years (22.5 ttl pk-yrs)     Types: Cigarettes    Smokeless tobacco: Never    Tobacco comments:     35 years      pt reports closer to 2 packs per day since cancer diagnosis   Vaping Use    Vaping status: Never Used   Substance and Sexual Activity    Alcohol use: Never    Drug use: Never    Sexual activity: Defer       Medications:     Current Outpatient Medications:     mupirocin (BACTROBAN) 2 % cream, , Disp: , Rfl:     buPROPion XL (Wellbutrin XL) 150 MG 24 hr tablet, Take 1 tablet by mouth Daily. Take in place of zoloft, Disp: 30 tablet, Rfl: 2    Carboxymethylcellul-Glycerin (Refresh Optive) 0.5-0.9 % solution, Apply 1 drop to eye(s) as directed by provider Every 2 (Two) Hours As Needed (dry eyes)., Disp: 15 mL, Rfl: 3    cetirizine (zyrTEC) 10 MG tablet, Take 1 tablet by mouth Daily., Disp: 30 tablet, Rfl: 2    clindamycin (Clindagel) 1 % gel, Apply 1 Application topically to the appropriate area as directed 2 (Two) Times a Day. Use first, Disp: 30 g, Rfl: 1    dicyclomine (BENTYL) 20 MG tablet, Take 1 tablet by mouth 3 (Three) Times a Day As Needed for Abdominal Cramping., Disp: 30 tablet, Rfl: 3    docusate sodium  (COLACE) 100 MG capsule, Take 1 capsule by mouth 2 (Two) Times a Day., Disp: , Rfl:     DPH-Lido-AlHydr-MgHydr-Simeth (First Mouthwash, Magic Mouthwash,) suspension, Swish and spit 10 mL Every 6 (Six) Hours for 7 days., Disp: 280 mL, Rfl: 0    Hydrocortisone, Perianal, (ANUSOL-HC) 2.5 % rectal cream, Insert  into the rectum 2 (Two) Times a Day. Indications: Inflamed Hemorrhoids, Disp: 30 g, Rfl: 1    lidocaine-prilocaine (EMLA) 2.5-2.5 % cream, Apply 1 application  topically to the appropriate area as directed As Needed (45-60 minutes prior to port access.  Cover with saran/plastic wrap.)., Disp: 30 g, Rfl: 3    LORazepam (ATIVAN) 0.5 MG tablet, Take 1 tablet by mouth Take As Directed. 1 tabelt by mouth 30 minutes prior to MRI, take a second tablet at the time of the MRI if needed., Disp: 2 tablet, Rfl: 0    Magic Mouthwash Oral Suspension (diphenhydrAMINE HCl - aluminum & magnesium hydroxide-simethicone - lidocaine - nystatin), Swish and Spit 10 mL by mouth every 6 (Six) Hours For 7 Days., Disp: 300 mL, Rfl: 0    naloxone (NARCAN) 4 MG/0.1ML nasal spray, 1 spray into the nostril(s) as directed by provider As Needed for Opioid Reversal., Disp: 1 each, Rfl: 0    ondansetron (ZOFRAN) 8 MG tablet, Take 1 tablet by mouth 3 (Three) Times a Day As Needed for Nausea or Vomiting., Disp: 30 tablet, Rfl: 5    pantoprazole (Protonix) 40 MG EC tablet, Take 1 tablet by mouth Daily. Indications: Gastroesophageal Reflux Disease, Disp: 90 tablet, Rfl: 2    tiotropium bromide-olodaterol (STIOLTO RESPIMAT) 2.5-2.5 MCG/ACT aerosol solution inhaler, Inhale 2 puffs Daily., Disp: 1 each, Rfl: 5    traZODone (DESYREL) 100 MG tablet, Take 1 tablet by mouth Every Night. Take one tablet one hour prior to bedtime, Disp: 30 tablet, Rfl: 1    triamcinolone (KENALOG) 0.025 % ointment, Apply 1 Application topically to the appropriate area as directed 2 (Two) Times a Day. Use second if topical treatment #1 ineffective, Disp: 80 g, Rfl: 0  No  "current facility-administered medications for this visit.    Allergies:   Allergies   Allergen Reactions    Bactrim [Sulfamethoxazole-Trimethoprim] Hives     and blisters    Sulfa Antibiotics Hives     and blisters       Objective     Vital Signs:   Vitals:    04/01/24 0842   BP: 118/58   Pulse: 72   Resp: 16   Temp: 98.4 °F (36.9 °C)   SpO2: 98%   Weight: 103 kg (226 lb)   Height: 182.9 cm (72\")   PainSc: 0-No pain      Body mass index is 30.65 kg/m².   Pain Score    04/01/24 0842   PainSc: 0-No pain         ECOG Performance Status: 1 - Symptomatic but completely ambulatory    Physical Exam:   General: No acute distress. Well appearing   HEENT: Normocephalic, atraumatic. Sclera anicteric.   Neck: supple, no adenopathy.   Cardiovascular: regular rate and rhythm. No murmurs.   Respiratory: Normal rate. Clear to auscultation bilaterally  Abdomen: Soft, nontender, non distended with normoactive bowel sounds  Lymph: no cervical, supraclavicular or axillary adenopathy  Neuro: Alert and oriented x 3. No focal deficits.   Ext: Symmetric, no swelling.   Accurate as of 3/18/24    Laboratory/Imaging Reviewed:   Infusion on 04/01/2024   Component Date Value Ref Range Status    Glucose 04/01/2024 109 (H)  65 - 99 mg/dL Final    BUN 04/01/2024 9  6 - 20 mg/dL Final    Creatinine 04/01/2024 0.84  0.76 - 1.27 mg/dL Final    Sodium 04/01/2024 139  136 - 145 mmol/L Final    Potassium 04/01/2024 3.7  3.5 - 5.2 mmol/L Final    Chloride 04/01/2024 104  98 - 107 mmol/L Final    CO2 04/01/2024 20.3 (L)  22.0 - 29.0 mmol/L Final    Calcium 04/01/2024 8.4 (L)  8.6 - 10.5 mg/dL Final    Total Protein 04/01/2024 7.2  6.0 - 8.5 g/dL Final    Albumin 04/01/2024 3.5  3.5 - 5.2 g/dL Final    ALT (SGPT) 04/01/2024 10  1 - 41 U/L Final    AST (SGOT) 04/01/2024 19  1 - 40 U/L Final    Alkaline Phosphatase 04/01/2024 114  39 - 117 U/L Final    Total Bilirubin 04/01/2024 0.3  0.0 - 1.2 mg/dL Final    Globulin 04/01/2024 3.7  gm/dL Final    A/G Ratio " 04/01/2024 0.9  g/dL Final    BUN/Creatinine Ratio 04/01/2024 10.7  7.0 - 25.0 Final    Anion Gap 04/01/2024 14.7  5.0 - 15.0 mmol/L Final    eGFR 04/01/2024 103.6  >60.0 mL/min/1.73 Final    Magnesium 04/01/2024 2.0  1.6 - 2.6 mg/dL Final    WBC 04/01/2024 6.29  3.40 - 10.80 10*3/mm3 Final    RBC 04/01/2024 4.75  4.14 - 5.80 10*6/mm3 Final    Hemoglobin 04/01/2024 11.3 (L)  13.0 - 17.7 g/dL Final    Hematocrit 04/01/2024 36.3 (L)  37.5 - 51.0 % Final    MCV 04/01/2024 76.4 (L)  79.0 - 97.0 fL Final    MCH 04/01/2024 23.8 (L)  26.6 - 33.0 pg Final    MCHC 04/01/2024 31.1 (L)  31.5 - 35.7 g/dL Final    RDW 04/01/2024 20.8 (H)  12.3 - 15.4 % Final    RDW-SD 04/01/2024 53.3  37.0 - 54.0 fl Final    MPV 04/01/2024 9.9  6.0 - 12.0 fL Final    Platelets 04/01/2024 139 (L)  140 - 450 10*3/mm3 Final    Neutrophil % 04/01/2024 65.2  42.7 - 76.0 % Final    Lymphocyte % 04/01/2024 20.3  19.6 - 45.3 % Final    Monocyte % 04/01/2024 12.2 (H)  5.0 - 12.0 % Final    Eosinophil % 04/01/2024 1.3  0.3 - 6.2 % Final    Basophil % 04/01/2024 0.5  0.0 - 1.5 % Final    Immature Grans % 04/01/2024 0.5  0.0 - 0.5 % Final    Neutrophils, Absolute 04/01/2024 4.10  1.70 - 7.00 10*3/mm3 Final    Lymphocytes, Absolute 04/01/2024 1.28  0.70 - 3.10 10*3/mm3 Final    Monocytes, Absolute 04/01/2024 0.77  0.10 - 0.90 10*3/mm3 Final    Eosinophils, Absolute 04/01/2024 0.08  0.00 - 0.40 10*3/mm3 Final    Basophils, Absolute 04/01/2024 0.03  0.00 - 0.20 10*3/mm3 Final    Immature Grans, Absolute 04/01/2024 0.03  0.00 - 0.05 10*3/mm3 Final    nRBC 04/01/2024 0.0  0.0 - 0.2 /100 WBC Final     Tempus xt: APC gene TP53; Negative ADRIANNA, BRAF, NRAS, TMB stable. PDL1 negative  No results found.    Procedures    Assessment / Plan      Assessment/Plan:     1.  Rectal cancer   2.  Liver metastases  3.  Lung metastasis  4.  Chemo monitoring  -The patient presents with a partially obstructing rectal cancer with adenopathy.  -He was found to have biopsy-proven  metastasis in the liver and lung.  His case was presented at multidisciplinary tumor board.  -We will proceed with a liver MRI to better evaluate the extent of his hepatic metastasis as was recommended by radiology.  However, because of metastatic disease to both the lung and liver I do not think he will be downstage to resectable disease.  -Labs are adequate to proceed with cycle #5.  I reviewed his [facility] results which are notable for an APC mutation, T p53 mutation, negative for KRAS, BRAF, NRAS, tumor mutation burden stable.  Notch 1 VUS.  -Reviewed potential role for colorectal surgery consultation regarding diversion particularly if his obstructive symptoms do not improve, but they continue to improve.    -CBC/CMP pending.  I will follow-up to make sure they are adequate.  -Chemo orders signed  -Restaging scans scheduled.    4.  Cancer related pain  -PRN oxycodone.  We will refill today.  -Well controlled.     5. Access   -Port    6.  Chemotherapy-induced nausea  -Continue zofran and compazine.    7.  Seasonal allergies  -Continue daily Zyrtec    8. GERD  PPI    9. Social  -SW referral reinforced follow up with them     10. Weight loss  -Dietitian  -He has been working with our nurse navigator who has provided free Ensure/boost.    11.  Mucositis  -Continue Magic mouthwash as needed.  It has improved.  - I discussed with the patient when he is having mouth sores to stop wearing his dentures for a few days as this can improve results by using the Magic mouthwash 4 times daily as prescribed.     Follow Up:   2 weeks     GEORGIA Grimes    Hematology and Oncology     Total time of patient care including time prior to, face to face with patient, and following visit spent in reviewing records, lab results, imaging studies, discussion with patient, and documentation/charting was > 45 minutes

## 2024-04-02 RX ORDER — OXYCODONE HYDROCHLORIDE 10 MG/1
10 TABLET ORAL EVERY 6 HOURS PRN
Qty: 120 TABLET | Refills: 0 | Status: SHIPPED | OUTPATIENT
Start: 2024-04-02 | End: 2024-05-02

## 2024-04-03 ENCOUNTER — INFUSION (OUTPATIENT)
Dept: ONCOLOGY | Facility: HOSPITAL | Age: 54
End: 2024-04-03
Payer: COMMERCIAL

## 2024-04-03 VITALS — TEMPERATURE: 98.2 F | SYSTOLIC BLOOD PRESSURE: 109 MMHG | HEART RATE: 65 BPM | DIASTOLIC BLOOD PRESSURE: 55 MMHG

## 2024-04-03 DIAGNOSIS — C20 RECTAL ADENOCARCINOMA: Primary | ICD-10-CM

## 2024-04-03 DIAGNOSIS — C20 RECTAL CANCER METASTASIZED TO LIVER: ICD-10-CM

## 2024-04-03 DIAGNOSIS — C78.7 RECTAL CANCER METASTASIZED TO LIVER: ICD-10-CM

## 2024-04-03 PROCEDURE — 96360 HYDRATION IV INFUSION INIT: CPT

## 2024-04-03 PROCEDURE — 25010000002 HEPARIN LOCK FLUSH PER 10 UNITS: Performed by: INTERNAL MEDICINE

## 2024-04-03 PROCEDURE — 25810000003 SODIUM CHLORIDE 0.9 % SOLUTION: Performed by: NURSE PRACTITIONER

## 2024-04-03 RX ORDER — HEPARIN SODIUM (PORCINE) LOCK FLUSH IV SOLN 100 UNIT/ML 100 UNIT/ML
500 SOLUTION INTRAVENOUS AS NEEDED
Status: DISCONTINUED | OUTPATIENT
Start: 2024-04-03 | End: 2024-04-03 | Stop reason: HOSPADM

## 2024-04-03 RX ORDER — SODIUM CHLORIDE 0.9 % (FLUSH) 0.9 %
10 SYRINGE (ML) INJECTION AS NEEDED
OUTPATIENT
Start: 2024-04-03

## 2024-04-03 RX ORDER — HEPARIN SODIUM (PORCINE) LOCK FLUSH IV SOLN 100 UNIT/ML 100 UNIT/ML
500 SOLUTION INTRAVENOUS AS NEEDED
OUTPATIENT
Start: 2024-04-03

## 2024-04-03 RX ORDER — SODIUM CHLORIDE 9 MG/ML
1000 INJECTION, SOLUTION INTRAVENOUS CONTINUOUS
Status: DISCONTINUED | OUTPATIENT
Start: 2024-04-03 | End: 2024-04-03 | Stop reason: HOSPADM

## 2024-04-03 RX ORDER — SODIUM CHLORIDE 0.9 % (FLUSH) 0.9 %
10 SYRINGE (ML) INJECTION AS NEEDED
Status: DISCONTINUED | OUTPATIENT
Start: 2024-04-03 | End: 2024-04-03 | Stop reason: HOSPADM

## 2024-04-03 RX ADMIN — Medication 10 ML: at 14:36

## 2024-04-03 RX ADMIN — HEPARIN 500 UNITS: 100 SYRINGE at 14:37

## 2024-04-03 RX ADMIN — SODIUM CHLORIDE 1000 ML: 9 INJECTION, SOLUTION INTRAVENOUS at 13:37

## 2024-04-11 ENCOUNTER — TELEPHONE (OUTPATIENT)
Dept: NUTRITION | Facility: HOSPITAL | Age: 54
End: 2024-04-11
Payer: COMMERCIAL

## 2024-04-11 NOTE — PROGRESS NOTES
Adult Outpatient Nutrition  Assessment    Patient Name:  Edward Powell  YOB: 1970  MRN: [id number]    Assessment Date:  4/11/2024    Comments:  Called pt in regards to nutrition oncology f/up. Pt reports early satiety and feeling bloated. He is unable to tolerate eating beans d/t texture. He is having normal bowel movements with no issues with constipation. Discussed limiting gas foods. Encouraged pt to discuss with MD in case medication is needed to help with gas/early satiety. He is using magic wash for thrush. Pt had no other questions/concerns at this time. Mailed food resources and recipes for wt gain to pt.  RD to f/up in 1 month.    Electronically signed by:  Arabella Hong RD  04/11/24 13:40 EDT

## 2024-04-12 ENCOUNTER — HOSPITAL ENCOUNTER (OUTPATIENT)
Dept: CT IMAGING | Facility: HOSPITAL | Age: 54
Discharge: HOME | End: 2024-04-12
Payer: COMMERCIAL

## 2024-04-12 DIAGNOSIS — R10.30 LOWER ABDOMINAL PAIN: ICD-10-CM

## 2024-04-12 DIAGNOSIS — C78.7 RECTAL CANCER METASTASIZED TO LIVER: ICD-10-CM

## 2024-04-12 DIAGNOSIS — C20 RECTAL CANCER METASTASIZED TO LIVER: ICD-10-CM

## 2024-04-12 PROCEDURE — 25510000001 IOPAMIDOL 61 % SOLUTION: Performed by: INTERNAL MEDICINE

## 2024-04-12 PROCEDURE — 71260 CT THORAX DX C+: CPT

## 2024-04-12 PROCEDURE — 74177 CT ABD & PELVIS W/CONTRAST: CPT

## 2024-04-12 RX ADMIN — IOPAMIDOL 100 ML: 612 INJECTION, SOLUTION INTRAVENOUS at 12:56

## 2024-04-15 ENCOUNTER — OFFICE VISIT (OUTPATIENT)
Dept: ONCOLOGY | Facility: CLINIC | Age: 54
End: 2024-04-15
Payer: COMMERCIAL

## 2024-04-15 ENCOUNTER — INFUSION (OUTPATIENT)
Dept: ONCOLOGY | Facility: HOSPITAL | Age: 54
End: 2024-04-15
Payer: COMMERCIAL

## 2024-04-15 VITALS
DIASTOLIC BLOOD PRESSURE: 61 MMHG | OXYGEN SATURATION: 98 % | HEIGHT: 72 IN | SYSTOLIC BLOOD PRESSURE: 107 MMHG | HEART RATE: 72 BPM | WEIGHT: 224 LBS | BODY MASS INDEX: 30.34 KG/M2 | RESPIRATION RATE: 16 BRPM | TEMPERATURE: 97.7 F

## 2024-04-15 DIAGNOSIS — C78.7 RECTAL CANCER METASTASIZED TO LIVER: Primary | ICD-10-CM

## 2024-04-15 DIAGNOSIS — C20 RECTAL CANCER METASTASIZED TO LIVER: Primary | ICD-10-CM

## 2024-04-15 DIAGNOSIS — C20 RECTAL ADENOCARCINOMA: ICD-10-CM

## 2024-04-15 LAB
ALBUMIN SERPL-MCNC: 3.4 G/DL (ref 3.5–5.2)
ALBUMIN/GLOB SERPL: 0.9 G/DL
ALP SERPL-CCNC: 114 U/L (ref 39–117)
ALT SERPL W P-5'-P-CCNC: 12 U/L (ref 1–41)
ANION GAP SERPL CALCULATED.3IONS-SCNC: 8.3 MMOL/L (ref 5–15)
ANISOCYTOSIS BLD QL: NORMAL
AST SERPL-CCNC: 20 U/L (ref 1–40)
BASOPHILS # BLD AUTO: 0.02 10*3/MM3 (ref 0–0.2)
BASOPHILS NFR BLD AUTO: 0.3 % (ref 0–1.5)
BILIRUB SERPL-MCNC: 0.4 MG/DL (ref 0–1.2)
BUN SERPL-MCNC: 7 MG/DL (ref 6–20)
BUN/CREAT SERPL: 9.1 (ref 7–25)
CALCIUM SPEC-SCNC: 9.3 MG/DL (ref 8.6–10.5)
CHLORIDE SERPL-SCNC: 103 MMOL/L (ref 98–107)
CO2 SERPL-SCNC: 23.7 MMOL/L (ref 22–29)
CREAT SERPL-MCNC: 0.77 MG/DL (ref 0.76–1.27)
DEPRECATED RDW RBC AUTO: 56.5 FL (ref 37–54)
EGFRCR SERPLBLD CKD-EPI 2021: 106.4 ML/MIN/1.73
EOSINOPHIL # BLD AUTO: 0.07 10*3/MM3 (ref 0–0.4)
EOSINOPHIL NFR BLD AUTO: 1 % (ref 0.3–6.2)
ERYTHROCYTE [DISTWIDTH] IN BLOOD BY AUTOMATED COUNT: 21.6 % (ref 12.3–15.4)
GLOBULIN UR ELPH-MCNC: 3.9 GM/DL
GLUCOSE SERPL-MCNC: 98 MG/DL (ref 65–99)
HCT VFR BLD AUTO: 35.1 % (ref 37.5–51)
HGB BLD-MCNC: 11 G/DL (ref 13–17.7)
HYPOCHROMIA BLD QL: NORMAL
IMM GRANULOCYTES # BLD AUTO: 0.01 10*3/MM3 (ref 0–0.05)
IMM GRANULOCYTES NFR BLD AUTO: 0.1 % (ref 0–0.5)
LYMPHOCYTES # BLD AUTO: 1.21 10*3/MM3 (ref 0.7–3.1)
LYMPHOCYTES NFR BLD AUTO: 17.8 % (ref 19.6–45.3)
MAGNESIUM SERPL-MCNC: 1.9 MG/DL (ref 1.6–2.6)
MCH RBC QN AUTO: 24.2 PG (ref 26.6–33)
MCHC RBC AUTO-ENTMCNC: 31.3 G/DL (ref 31.5–35.7)
MCV RBC AUTO: 77.3 FL (ref 79–97)
MONOCYTES # BLD AUTO: 0.92 10*3/MM3 (ref 0.1–0.9)
MONOCYTES NFR BLD AUTO: 13.5 % (ref 5–12)
NEUTROPHILS NFR BLD AUTO: 4.57 10*3/MM3 (ref 1.7–7)
NEUTROPHILS NFR BLD AUTO: 67.3 % (ref 42.7–76)
NRBC BLD AUTO-RTO: 0 /100 WBC (ref 0–0.2)
PLAT MORPH BLD: NORMAL
PLATELET # BLD AUTO: 133 10*3/MM3 (ref 140–450)
PMV BLD AUTO: 11.2 FL (ref 6–12)
POTASSIUM SERPL-SCNC: 3.8 MMOL/L (ref 3.5–5.2)
PROT SERPL-MCNC: 7.3 G/DL (ref 6–8.5)
RBC # BLD AUTO: 4.54 10*6/MM3 (ref 4.14–5.8)
SODIUM SERPL-SCNC: 135 MMOL/L (ref 136–145)
WBC MORPH BLD: NORMAL
WBC NRBC COR # BLD AUTO: 6.8 10*3/MM3 (ref 3.4–10.8)

## 2024-04-15 PROCEDURE — 96415 CHEMO IV INFUSION ADDL HR: CPT

## 2024-04-15 PROCEDURE — 25010000002 FLUOROURACIL PER 500 MG: Performed by: INTERNAL MEDICINE

## 2024-04-15 PROCEDURE — 25810000003 SODIUM CHLORIDE 0.9 % SOLUTION 250 ML FLEX CONT: Performed by: INTERNAL MEDICINE

## 2024-04-15 PROCEDURE — 96368 THER/DIAG CONCURRENT INF: CPT

## 2024-04-15 PROCEDURE — 96367 TX/PROPH/DG ADDL SEQ IV INF: CPT

## 2024-04-15 PROCEDURE — 96413 CHEMO IV INFUSION 1 HR: CPT

## 2024-04-15 PROCEDURE — 80053 COMPREHEN METABOLIC PANEL: CPT

## 2024-04-15 PROCEDURE — G0498 CHEMO EXTEND IV INFUS W/PUMP: HCPCS

## 2024-04-15 PROCEDURE — 96375 TX/PRO/DX INJ NEW DRUG ADDON: CPT

## 2024-04-15 PROCEDURE — 96416 CHEMO PROLONG INFUSE W/PUMP: CPT

## 2024-04-15 PROCEDURE — 96417 CHEMO IV INFUS EACH ADDL SEQ: CPT

## 2024-04-15 PROCEDURE — 0 DEXTROSE 5 % SOLUTION 250 ML FLEX CONT: Performed by: INTERNAL MEDICINE

## 2024-04-15 PROCEDURE — 85007 BL SMEAR W/DIFF WBC COUNT: CPT

## 2024-04-15 PROCEDURE — 85025 COMPLETE CBC W/AUTO DIFF WBC: CPT

## 2024-04-15 PROCEDURE — 83735 ASSAY OF MAGNESIUM: CPT

## 2024-04-15 PROCEDURE — 25810000003 SODIUM CHLORIDE 0.9 % SOLUTION: Performed by: INTERNAL MEDICINE

## 2024-04-15 PROCEDURE — 25010000002 OXALIPLATIN PER 0.5 MG: Performed by: INTERNAL MEDICINE

## 2024-04-15 PROCEDURE — 25010000002 LEUCOVORIN CALCIUM PER 50MG: Performed by: INTERNAL MEDICINE

## 2024-04-15 PROCEDURE — 96366 THER/PROPH/DIAG IV INF ADDON: CPT

## 2024-04-15 PROCEDURE — 25010000002 PANITUMUMAB 400 MG/20ML SOLUTION 20 ML VIAL: Performed by: INTERNAL MEDICINE

## 2024-04-15 PROCEDURE — 25010000002 PANITUMUMAB PER 10 MG: Performed by: INTERNAL MEDICINE

## 2024-04-15 PROCEDURE — 25010000002 PALONOSETRON 0.25 MG/5ML SOLUTION PREFILLED SYRINGE: Performed by: INTERNAL MEDICINE

## 2024-04-15 PROCEDURE — 25010000002 DEXAMETHASONE SODIUM PHOSPHATE 20 MG/5ML SOLUTION: Performed by: INTERNAL MEDICINE

## 2024-04-15 RX ORDER — SODIUM CHLORIDE 9 MG/ML
20 INJECTION, SOLUTION INTRAVENOUS ONCE
Status: CANCELLED | OUTPATIENT
Start: 2024-04-15

## 2024-04-15 RX ORDER — PALONOSETRON 0.05 MG/ML
0.25 INJECTION, SOLUTION INTRAVENOUS ONCE
Status: CANCELLED | OUTPATIENT
Start: 2024-04-15

## 2024-04-15 RX ORDER — SODIUM CHLORIDE 0.9 % (FLUSH) 0.9 %
10 SYRINGE (ML) INJECTION AS NEEDED
Status: DISCONTINUED | OUTPATIENT
Start: 2024-04-15 | End: 2024-04-15 | Stop reason: HOSPADM

## 2024-04-15 RX ORDER — HEPARIN SODIUM (PORCINE) LOCK FLUSH IV SOLN 100 UNIT/ML 100 UNIT/ML
500 SOLUTION INTRAVENOUS AS NEEDED
Status: CANCELLED | OUTPATIENT
Start: 2024-04-15

## 2024-04-15 RX ORDER — SODIUM CHLORIDE 0.9 % (FLUSH) 0.9 %
10 SYRINGE (ML) INJECTION AS NEEDED
Status: CANCELLED | OUTPATIENT
Start: 2024-04-15

## 2024-04-15 RX ORDER — DEXTROSE MONOHYDRATE 50 MG/ML
20 INJECTION, SOLUTION INTRAVENOUS ONCE
Status: CANCELLED | OUTPATIENT
Start: 2024-04-15

## 2024-04-15 RX ORDER — DEXTROSE MONOHYDRATE 50 MG/ML
20 INJECTION, SOLUTION INTRAVENOUS ONCE
Status: DISCONTINUED | OUTPATIENT
Start: 2024-04-15 | End: 2024-04-15 | Stop reason: HOSPADM

## 2024-04-15 RX ORDER — FAMOTIDINE 10 MG/ML
20 INJECTION, SOLUTION INTRAVENOUS AS NEEDED
Status: CANCELLED | OUTPATIENT
Start: 2024-04-15

## 2024-04-15 RX ORDER — DOXYCYCLINE HYCLATE 100 MG
TABLET ORAL
Qty: 37 TABLET | Refills: 3 | Status: SHIPPED | OUTPATIENT
Start: 2024-04-15

## 2024-04-15 RX ORDER — HEPARIN SODIUM (PORCINE) LOCK FLUSH IV SOLN 100 UNIT/ML 100 UNIT/ML
500 SOLUTION INTRAVENOUS AS NEEDED
Status: DISCONTINUED | OUTPATIENT
Start: 2024-04-15 | End: 2024-04-15 | Stop reason: HOSPADM

## 2024-04-15 RX ORDER — PALONOSETRON 0.05 MG/ML
0.25 INJECTION, SOLUTION INTRAVENOUS ONCE
Status: COMPLETED | OUTPATIENT
Start: 2024-04-15 | End: 2024-04-15

## 2024-04-15 RX ORDER — SODIUM CHLORIDE 9 MG/ML
20 INJECTION, SOLUTION INTRAVENOUS ONCE
Status: COMPLETED | OUTPATIENT
Start: 2024-04-15 | End: 2024-04-15

## 2024-04-15 RX ORDER — DIPHENHYDRAMINE HYDROCHLORIDE 50 MG/ML
50 INJECTION INTRAMUSCULAR; INTRAVENOUS AS NEEDED
Status: CANCELLED | OUTPATIENT
Start: 2024-04-15

## 2024-04-15 RX ADMIN — PALONOSETRON 0.25 MG: 0.25 INJECTION, SOLUTION INTRAVENOUS at 10:29

## 2024-04-15 RX ADMIN — SODIUM CHLORIDE 20 ML/HR: 9 INJECTION, SOLUTION INTRAVENOUS at 10:28

## 2024-04-15 RX ADMIN — DEXAMETHASONE SODIUM PHOSPHATE 12 MG: 4 INJECTION, SOLUTION INTRA-ARTICULAR; INTRALESIONAL; INTRAMUSCULAR; INTRAVENOUS; SOFT TISSUE at 10:28

## 2024-04-15 RX ADMIN — FLUOROURACIL 5450 MG: 50 INJECTION, SOLUTION INTRAVENOUS at 13:56

## 2024-04-15 RX ADMIN — OXALIPLATIN 195 MG: 5 INJECTION, SOLUTION INTRAVENOUS at 11:01

## 2024-04-15 RX ADMIN — PANITUMUMAB 630 MG: 400 SOLUTION INTRAVENOUS at 09:50

## 2024-04-15 RX ADMIN — LEUCOVORIN CALCIUM 910 MG: 10 INJECTION INTRAMUSCULAR; INTRAVENOUS at 11:01

## 2024-04-15 NOTE — PROGRESS NOTES
Hematology and Oncology [place]  Office number [phone]    Fax number [phone]     Follow up     Date: 04/15/24    Patient Name: Edward Powell  MRN: [id number]  : 1970    Referring Physician: Dr. Gautam    Chief Complaint: Rectal cancer, lung mass, liver lesions follow up on treatment    Cancer Staging:  IV    History of Present Illness: Edward Powell is a pleasant 53 y.o. male who presents today for evaluation of rectal cancer in the company of his supportive significant other.    Patient has a longstanding history of intermittent diarrhea since his cholecystectomy in 2019.  However he developed progressive diarrhea with associated loss of bowel control and urgency as well as weight loss and fatigue prompting additional workup.    CT of the abdomen pelvis 2023 showed an irregular circumferential wall thickening involving the rectum concerning for mass lesion.  There was associated mesorectal lymphadenopathy.  Masslike consolidation of left lower lobe.  CT of the chest showed a cavitary lesion in the left lower lobe up to 4.8 cm with an adjacent cavitary nodule up to 2 cm and a 5 mm nodule on the right minor fissure.      He underwent colonoscopy 2023 with findings of an infiltrative and ulcerated partially obstructing mass in the proximal rectum spanning 10 cm.  Rectal polyps.  Biopsy of the rectal mass showed invasive moderately differentiated adenocarcinoma.  Additional biopsies showed tubular adenomas.  MSI testing was intact/low probability of MSI high.    PDL1 negative    PET/CT 2023 showed hypermetabolic rectal wall thickening compatible with known rectal malignancy.  Multiple small ill-defined hypoechoic hyper metabolic liver lesions compatible with metastatic disease.  Mildly enlarged and mildly hypermetabolic pelvic sidewall and internal iliac lymph node chain adenopathy.  Hypermetabolic lung mass with adjacent nodule.     He underwent liver biopsy and lung  biopsy January 2024.  Results of both confirmed metastatic colorectal cancer.    The patient has been experiencing substantial rectal pain.  He is taking oxycodone every 6 hours with partial relief.  He reports ongoing bowel movements.  No abdominal pain.  No vomiting.  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 He reports tongue pain with certain types of food, controlled with MMW.   Stopped oral iron for constipation despite stool softeners  Rash with furuncles on back x 3 and folliculitis on buttocks. Has had problem with this in past but worse since resuming panitumumab  Chest rash conrolled with clinda gel  Cough productive of thick greenish yellow sputum x 1 week. Allergies flaring +sneezing+nasal congestion. Continues claritin  Has questions regarding scan results.       Past Medical History:   Past Medical History:   Diagnosis Date    Arthritis     Cancer     rectal cancer - diagnosed 2023    Cholelithiasis 2019    Removed    COPD (chronic obstructive pulmonary disease)     Coronary artery disease 2009    Stent - no cardiologist currently    Elevated cholesterol     GERD (gastroesophageal reflux disease)     Hernia 2003    Lower hernia    Perforated ulcer 2019    Sleep apnea     history of; when weighed over 400lbs - no issues following bariatric surgery       Past Surgical History:   Past Surgical History:   Procedure Laterality Date    APPENDECTOMY  1983    Removed    BARIATRIC SURGERY  2011    Gastric bypass    BLADDER TUMOR/ULCER BLEEDER CAUTERIZATION      CARDIAC CATHETERIZATION  2009    stent placed    CHOLECYSTECTOMY  2019    Removed    COLONOSCOPY N/A 12/07/2023    Procedure: COLONOSCOPY WITH HOT SNARE POLYPECTOMY AND TATTOO;  Surgeon: Noman Gautam MD;   Location: [facility] ENDOSCOPY;  Service: Gastroenterology;  Laterality: N/A;    PORTACATH PLACEMENT N/A 1/5/2024    Procedure: INSERTION OF PORTACATH WITH ULTRSOUND AND FLUOROSCOPIC GUIDANCE;  Surgeon: Ida Black MD;  Location: [facility] OR;  Service: General;  Laterality: N/A;    JENNIFER-EN-Y         Family History: No family history on file.  Uncle had colon cancer    Social History:   Social History     Socioeconomic History    Marital status:    Tobacco Use    Smoking status: Every Day     Current packs/day: 1.50     Average packs/day: 1.5 packs/day for 15.0 years (22.5 ttl pk-yrs)     Types: Cigarettes    Smokeless tobacco: Never    Tobacco comments:     35 years      pt reports closer to 2 packs per day since cancer diagnosis   Vaping Use    Vaping status: Never Used   Substance and Sexual Activity    Alcohol use: Never    Drug use: Never    Sexual activity: Defer       Medications:     Current Outpatient Medications:     buPROPion XL (Wellbutrin XL) 150 MG 24 hr tablet, Take 1 tablet by mouth Daily. Take in place of zoloft, Disp: 30 tablet, Rfl: 2    Carboxymethylcellul-Glycerin (Refresh Optive) 0.5-0.9 % solution, Apply 1 drop to eye(s) as directed by provider Every 2 (Two) Hours As Needed (dry eyes)., Disp: 15 mL, Rfl: 3    cetirizine (zyrTEC) 10 MG tablet, Take 1 tablet by mouth Daily., Disp: 30 tablet, Rfl: 2    clindamycin (Clindagel) 1 % gel, Apply 1 Application topically to the appropriate area as directed 2 (Two) Times a Day. Use first, Disp: 30 g, Rfl: 1    dicyclomine (BENTYL) 20 MG tablet, Take 1 tablet by mouth 3 (Three) Times a Day As Needed for Abdominal Cramping., Disp: 30 tablet, Rfl: 3    docusate sodium (COLACE) 100 MG capsule, Take 1 capsule by mouth 2 (Two) Times a Day., Disp: , Rfl:     ferrous sulfate 325 (65 FE) MG tablet, Take 1 tablet by mouth Daily With Breakfast., Disp: 30 tablet, Rfl: 3    Hydrocortisone, Perianal, (ANUSOL-HC) 2.5 % rectal cream, Insert  into the  rectum 2 (Two) Times a Day. Indications: Inflamed Hemorrhoids, Disp: 30 g, Rfl: 1    lidocaine-prilocaine (EMLA) 2.5-2.5 % cream, Apply 1 application  topically to the appropriate area as directed As Needed (45-60 minutes prior to port access.  Cover with saran/plastic wrap.)., Disp: 30 g, Rfl: 3    LORazepam (ATIVAN) 0.5 MG tablet, Take 1 tablet by mouth Take As Directed. 1 tabelt by mouth 30 minutes prior to MRI, take a second tablet at the time of the MRI if needed., Disp: 2 tablet, Rfl: 0    Magic Mouthwash Oral Suspension (diphenhydrAMINE HCl - aluminum & magnesium hydroxide-simethicone - lidocaine - nystatin), Swish and Spit 10 mL by mouth every 6 (Six) Hours For 7 Days., Disp: 300 mL, Rfl: 3    mupirocin (BACTROBAN) 2 % cream, , Disp: , Rfl:     naloxone (NARCAN) 4 MG/0.1ML nasal spray, 1 spray into the nostril(s) as directed by provider As Needed for Opioid Reversal., Disp: 1 each, Rfl: 0    ondansetron (ZOFRAN) 8 MG tablet, Take 1 tablet by mouth 3 (Three) Times a Day As Needed for Nausea or Vomiting., Disp: 30 tablet, Rfl: 5    oxyCODONE (ROXICODONE) 10 MG tablet, Take 1 tablet by mouth Every 6 (Six) Hours As Needed for Moderate Pain or Severe Pain for up to 30 days., Disp: 120 tablet, Rfl: 0    pantoprazole (Protonix) 40 MG EC tablet, Take 1 tablet by mouth Daily. Indications: Gastroesophageal Reflux Disease, Disp: 90 tablet, Rfl: 2    tiotropium bromide-olodaterol (STIOLTO RESPIMAT) 2.5-2.5 MCG/ACT aerosol solution inhaler, Inhale 2 puffs Daily., Disp: 1 each, Rfl: 5    traZODone (DESYREL) 100 MG tablet, TAKE ONE TABLET BY MOUTH EVERY EVENING TAKE 1 HOUR PRIOR TO BEDTIME, Disp: 30 tablet, Rfl: 0    triamcinolone (KENALOG) 0.025 % ointment, Apply 1 Application topically to the appropriate area as directed 2 (Two) Times a Day. Use second if topical treatment #1 ineffective, Disp: 80 g, Rfl: 0    Allergies:   Allergies   Allergen Reactions    Bactrim [Sulfamethoxazole-Trimethoprim] Hives     and blisters     "Sulfa Antibiotics Hives     and blisters       Objective     Vital Signs:   Vitals:    04/15/24 0815   BP: 107/61   Pulse: 72   Resp: 16   Temp: 97.7 °F (36.5 °C)   SpO2: 98%   Weight: 102 kg (224 lb)   Height: 182.9 cm (72\")   PainSc: 0-No pain      Body mass index is 30.38 kg/m².   Pain Score    04/15/24 0815   PainSc: 0-No pain         ECOG Performance Status: 1 - Symptomatic but completely ambulatory    Physical Exam:   General: No acute distress. Well appearing   HEENT: Normocephalic, atraumatic. Sclera anicteric.   Neck: supple, no adenopathy.   Cardiovascular: regular rate and rhythm. No murmurs.   Respiratory: Normal rate. Clear to auscultation bilaterally  Abdomen: Soft, nontender, non distended with normoactive bowel sounds  Lymph: no cervical, supraclavicular or axillary adenopathy  Neuro: Alert and oriented x 3. No focal deficits.   Ext: Symmetric, no swelling.   Accurate as of 4/15/24    Laboratory/Imaging Reviewed:   Infusion on 04/15/2024   Component Date Value Ref Range Status    Glucose 04/15/2024 98  65 - 99 mg/dL Final    BUN 04/15/2024 7  6 - 20 mg/dL Final    Creatinine 04/15/2024 0.77  0.76 - 1.27 mg/dL Final    Sodium 04/15/2024 135 (L)  136 - 145 mmol/L Final    Potassium 04/15/2024 3.8  3.5 - 5.2 mmol/L Final    Chloride 04/15/2024 103  98 - 107 mmol/L Final    CO2 04/15/2024 23.7  22.0 - 29.0 mmol/L Final    Calcium 04/15/2024 9.3  8.6 - 10.5 mg/dL Final    Total Protein 04/15/2024 7.3  6.0 - 8.5 g/dL Final    Albumin 04/15/2024 3.4 (L)  3.5 - 5.2 g/dL Final    ALT (SGPT) 04/15/2024 12  1 - 41 U/L Final    AST (SGOT) 04/15/2024 20  1 - 40 U/L Final    Alkaline Phosphatase 04/15/2024 114  39 - 117 U/L Final    Total Bilirubin 04/15/2024 0.4  0.0 - 1.2 mg/dL Final    Globulin 04/15/2024 3.9  gm/dL Final    A/G Ratio 04/15/2024 0.9  g/dL Final    BUN/Creatinine Ratio 04/15/2024 9.1  7.0 - 25.0 Final    Anion Gap 04/15/2024 8.3  5.0 - 15.0 mmol/L Final    eGFR 04/15/2024 106.4  >60.0 " mL/min/1.73 Final    Magnesium 04/15/2024 1.9  1.6 - 2.6 mg/dL Final    WBC 04/15/2024 6.80  3.40 - 10.80 10*3/mm3 Final    RBC 04/15/2024 4.54  4.14 - 5.80 10*6/mm3 Final    Hemoglobin 04/15/2024 11.0 (L)  13.0 - 17.7 g/dL Final    Hematocrit 04/15/2024 35.1 (L)  37.5 - 51.0 % Final    MCV 04/15/2024 77.3 (L)  79.0 - 97.0 fL Final    MCH 04/15/2024 24.2 (L)  26.6 - 33.0 pg Final    MCHC 04/15/2024 31.3 (L)  31.5 - 35.7 g/dL Final    RDW 04/15/2024 21.6 (H)  12.3 - 15.4 % Final    RDW-SD 04/15/2024 56.5 (H)  37.0 - 54.0 fl Final    MPV 04/15/2024 11.2  6.0 - 12.0 fL Final    Platelets 04/15/2024 133 (L)  140 - 450 10*3/mm3 Final    Neutrophil % 04/15/2024 67.3  42.7 - 76.0 % Final    Lymphocyte % 04/15/2024 17.8 (L)  19.6 - 45.3 % Final    Monocyte % 04/15/2024 13.5 (H)  5.0 - 12.0 % Final    Eosinophil % 04/15/2024 1.0  0.3 - 6.2 % Final    Basophil % 04/15/2024 0.3  0.0 - 1.5 % Final    Immature Grans % 04/15/2024 0.1  0.0 - 0.5 % Final    Neutrophils, Absolute 04/15/2024 4.57  1.70 - 7.00 10*3/mm3 Final    Lymphocytes, Absolute 04/15/2024 1.21  0.70 - 3.10 10*3/mm3 Final    Monocytes, Absolute 04/15/2024 0.92 (H)  0.10 - 0.90 10*3/mm3 Final    Eosinophils, Absolute 04/15/2024 0.07  0.00 - 0.40 10*3/mm3 Final    Basophils, Absolute 04/15/2024 0.02  0.00 - 0.20 10*3/mm3 Final    Immature Grans, Absolute 04/15/2024 0.01  0.00 - 0.05 10*3/mm3 Final    nRBC 04/15/2024 0.0  0.0 - 0.2 /100 WBC Final    Anisocytosis 04/15/2024 Mod/2+  None Seen Final    Hypochromia 04/15/2024 Slight/1+  None Seen Final    WBC Morphology 04/15/2024 Normal  Normal Final    Platelet Morphology 04/15/2024 Normal  Normal Final     [facility] xt: APC gene TP53; Negative ADRIANNA, BRAF, NRAS, TMB stable. PDL1 negative  CT Abdomen Pelvis With Contrast    Result Date: 4/12/2024  Narrative: PROCEDURE: CT ABDOMEN PELVIS W CONTRAST-  HISTORY:  rectal cancer; J62-Leoajtwln neoplasm of rectum; C78.7-Secondary malignant neoplasm of liver and intrahepatic bile  duct; R10.30-Lower abdominal pain, unspecified  COMPARISON: January 18, 2024.  TECHNIQUE: Multiple axial CT images were obtained from the lung bases through the pubic symphysis following the administration of Isovue 300 and oral contrast.  FINDINGS:  ABDOMEN: Motion artifact limits some images. Previously seen masslike consolidation in the left lower lobe is not identified. The heart is normal in size. An indistinct 8 mm lesion in the right lobe of the liver near the diaphragm seen on prior exam and a second medial right lobe hepatic lesion seen on the prior exam are no longer identified. This may be due to posttreatment change. The gallbladder is surgically absent. The spleen is stable in size. No adrenal mass is present.  The pancreas is normal. Hypodense right renal lesions compatible with cysts are stable. The aorta is normal in caliber. There is no periaortic lymphadenopathy. A few mesenteric lymph nodes appear decreased in size compared to the prior exam. There are postsurgical changes of the stomach. A small bowel anastomosis is seen. Oral contrast progresses to the distal ileum.  PELVIS: The appendix is not identified.  The urinary bladder is unremarkable. Asymmetric wall thickening of the rectum appears significantly improved compatible with posttreatment change. A few small perirectal lymph nodes appear similar to the prior exam. No bony destructive lesion is identified.      Impression: 1. Improved rectal wall thickening with a few stable small perirectal lymph nodes. Findings are compatible with posttreatment change. 2. Interval resolution of 2 small right hepatic lobe lesions. 3. Stable right renal cysts.   CTDI: 9.59 mGy DLP: 732.03 mGy.cm   This study was performed with techniques to keep radiation doses as low as reasonably achievable (ALARA). Individualized dose reduction techniques using automated exposure control or adjustment of mA and/or kV according to the patient size were employed.       Images were reviewed, interpreted, and dictated by Dr. Gabriella Rodriguez MD Transcribed by Krysta Aldana PA-C.  This report was signed and finalized on 4/12/2024 3:28 PM by Gabriella Rodriguez MD.      CT Chest With Contrast Diagnostic    Result Date: 4/12/2024  Narrative: PROCEDURE: CT CHEST W CONTRAST DIAGNOSTIC-  HISTORY: lung and liver metastatis rectal cancer; C95-Sioreppcg neoplasm of rectum; C78.7-Secondary malignant neoplasm of liver and intrahepatic bile duct; R10.30-Lower abdominal pain, unspecified  COMPARISON: 12/01/2023.  PROCEDURE: The patient was injected with IV contrast.  Axial images were obtained from the lung apex to the mid abdomen by computed tomography. This study was performed with techniques to keep radiation doses as low as reasonably achievable, (ALARA). Individualized dose reduction techniques using automated exposure control or adjustment of mA and/or kV according to the patient size were employed.  FINDINGS:  CHEST: There is no suspicious axillary adenopathy. There is no suspicious hilar or mediastinal adenopathy.  The heart is proper size. There is no pericardial or pleural effusion. Again noted is a partially cavitary lesion posterior left lower lobe measuring up to 5 cm, stable from prior exam. There is a smaller, adjacent cavitary lesion posteriorly that measured 18 mm previously and now measures 14, consistent with interval treatment change. There is a pleural-based nodule laterally in the right middle lobe and a fissural nodule which are stable from the prior exam. Mild emphysematous change noted. There is a new groundglass opacity anteriorly in the right middle lobe, atypical in appearance for metastasis. This could represent posttreatment change or local pneumonitis, recommend continued followup. There are faint peripheral groundglass opacities laterally in the left upper lobe and posteriorly in the left lower lobe superiorly which are new from prior and may be infectious/inflammatory.  There is a new pleural-based left lower lobe subcentimeter nodule, possible metastasis. No bony destructive lesion is seen. Right internal jugular port is noted. Limited images of the upper abdomen are unremarkable. There is evidence of old calcified granulomatous disease.      Impression: Stable dominant cavitary lesion in the left lower lobe with mild interval decrease in size of adjacent cavitary lesion, consider treatment response.  New groundglass opacities anterior right middle lobe and peripherally in the left upper and lower lobes, possible infectious/inflammatory process. Recommend continued followup.  New subcentimeter left pleural-based lower lobe nodule, possible metastasis. Recommend followup.  CTDI: 9.59 mGy DLP:732.03 mGy.cm  This report was signed and finalized on 4/12/2024 1:59 PM by Gabriella Rodriguez MD.       Procedures    Assessment / Plan      Assessment/Plan:     1.  Rectal cancer   2.  Liver metastases  3.  Lung metastasis  4.  Chemo monitoring  -The patient presents with a partially obstructing rectal cancer with adenopathy.  -He was found to have biopsy-proven metastasis in the liver and lung.  His case was presented at multidisciplinary tumor board.  -We will proceed with a liver MRI to better evaluate the extent of his hepatic metastasis as was recommended by radiology.  However, because of metastatic disease to both the lung and liver I do not think he will be downstage to resectable disease.  -Labs are adequate to proceed with cycle #5.  I reviewed his [facility] results which are notable for an APC mutation, T p53 mutation, negative for KRAS, BRAF, NRAS, tumor mutation burden stable.  Notch 1 VUS.  -Reviewed potential role for colorectal surgery consultation regarding diversion particularly if his obstructive symptoms do not improve, but they are currently improving.   -CBC/CMP reviewed and adequate for treatment today 4/15/24  -Chemotherapy and premedication orders signed  -Restaging scans  reviewed and consistent with treatment response.     5.  Cancer related pain  -PRN oxycodone.   -Well controlled.     6. Access   -Port    7.  Chemotherapy-induced nausea  -Continue zofran and compazine.    7.  Panitumumab induced rash  -Add doxycycline    8. GERD  PPI    Follow Up:   2 weeks     Brenda Cronin MD  Hematology and Oncology

## 2024-04-16 ENCOUNTER — HOSPITAL ENCOUNTER (EMERGENCY)
Facility: HOSPITAL | Age: 54
Discharge: HOME | End: 2024-04-16
Attending: EMERGENCY MEDICINE
Payer: COMMERCIAL

## 2024-04-16 VITALS
WEIGHT: 224 LBS | DIASTOLIC BLOOD PRESSURE: 69 MMHG | BODY MASS INDEX: 30.34 KG/M2 | SYSTOLIC BLOOD PRESSURE: 124 MMHG | HEART RATE: 79 BPM | HEIGHT: 72 IN | RESPIRATION RATE: 18 BRPM | TEMPERATURE: 98.1 F | OXYGEN SATURATION: 99 %

## 2024-04-16 DIAGNOSIS — T80.218A: Primary | ICD-10-CM

## 2024-04-16 PROCEDURE — 99282 EMERGENCY DEPT VISIT SF MDM: CPT

## 2024-04-16 NOTE — ED PROVIDER NOTES
[facility] EMERGENCY DEPARTMENT  Emergency Department Encounter  Emergency Medicine Physician Note     Pt Name:Edward Powell  MRN: [id number]  Birthdate 1970  Date of evaluation: 4/16/2024  PCP:  Provider, No Known  Note Started: 6:12 PM EDT      CHIEF COMPLAINT       Chief Complaint   Patient presents with    Vascular Access Problem     Pt has a power port, chemo started yesterday. Tegaderm had started to come undone and wife re taped it. Pt states pain started about an hour ago.        HISTORY OF PRESENT ILLNESS  (Location/Symptom, Timing/Onset, Context/Setting, Quality, Duration, Modifying Factors, Severity.)      Edward Powell is a 53 y.o. male who presents with port complications.  Patient states that yesterday he started chemotherapy through the port, noted to have moved last night and had the bandage tear off.  He rebandage it with some tape, noted that he had some worsening pain this afternoon around the port site.  Denies any other complications at this moment in time.    PAST MEDICAL / SURGICAL / SOCIAL / FAMILY HISTORY     Past Medical History:   Diagnosis Date    Arthritis     Cancer     rectal cancer - diagnosed 2023    Cholelithiasis 2019    Removed    COPD (chronic obstructive pulmonary disease)     Coronary artery disease 2009    Stent - no cardiologist currently    Elevated cholesterol     GERD (gastroesophageal reflux disease)     Hernia 2003    Lower hernia    Perforated ulcer 2019    Sleep apnea     history of; when weighed over 400lbs - no issues following bariatric surgery     No additional pertinent       Past Surgical History:   Procedure Laterality Date    APPENDECTOMY  1983    Removed    BARIATRIC SURGERY  2011    Gastric bypass    BLADDER TUMOR/ULCER BLEEDER CAUTERIZATION      CARDIAC CATHETERIZATION  2009    stent placed    CHOLECYSTECTOMY  2019    Removed    COLONOSCOPY N/A 12/07/2023    Procedure: COLONOSCOPY WITH HOT SNARE POLYPECTOMY AND TATTOO;   Surgeon: Noman Gautam MD;  Location: [facility] ENDOSCOPY;  Service: Gastroenterology;  Laterality: N/A;    PORTACATH PLACEMENT N/A 1/5/2024    Procedure: INSERTION OF PORTACATH WITH ULTRSOUND AND FLUOROSCOPIC GUIDANCE;  Surgeon: Ida Black MD;  Location: [facility] OR;  Service: General;  Laterality: N/A;    JENNIFER-EN-Y       No additional pertinent       Social History     Socioeconomic History    Marital status:    Tobacco Use    Smoking status: Every Day     Current packs/day: 1.50     Average packs/day: 1.5 packs/day for 15.0 years (22.5 ttl pk-yrs)     Types: Cigarettes    Smokeless tobacco: Never    Tobacco comments:     35 years      pt reports closer to 2 packs per day since cancer diagnosis   Vaping Use    Vaping status: Never Used   Substance and Sexual Activity    Alcohol use: Never    Drug use: Never    Sexual activity: Defer       History reviewed. No pertinent family history.    Allergies:  Bactrim [sulfamethoxazole-trimethoprim] and Sulfa antibiotics    Home Medications:  Prior to Admission medications    Medication Sig Start Date End Date Taking? Authorizing Provider   buPROPion XL (Wellbutrin XL) 150 MG 24 hr tablet Take 1 tablet by mouth Daily. Take in place of zoloft 2/19/24   Brenda Cronin MD   Carboxymethylcellul-Glycerin (Refresh Optive) 0.5-0.9 % solution Apply 1 drop to eye(s) as directed by provider Every 2 (Two) Hours As Needed (dry eyes). 3/18/24   Brenda Cronin MD   cetirizine (zyrTEC) 10 MG tablet Take 1 tablet by mouth Daily. 1/22/24   Brenda Cronin MD   clindamycin (Clindagel) 1 % gel Apply 1 Application topically to the appropriate area as directed 2 (Two) Times a Day. Use first 3/18/24   Brenda Cronin MD   dicyclomine (BENTYL) 20 MG tablet Take 1 tablet by mouth 3 (Three) Times a Day As Needed for Abdominal Cramping. 3/4/24   Brenda Cronin MD   docusate sodium (COLACE) 100 MG capsule Take 1 capsule by mouth 2 (Two) Times a Day.    Provider, Historical,  MD   doxycycline (VIBRAMYICN) 100 MG tablet 100 mg BID x 7 days, then daily indefinitely 4/15/24   Brenda Cronin MD   Hydrocortisone, Perianal, (ANUSOL-HC) 2.5 % rectal cream Insert  into the rectum 2 (Two) Times a Day. Indications: Inflamed Hemorrhoids 11/30/23   Noman Gautam MD   lidocaine-prilocaine (EMLA) 2.5-2.5 % cream Apply 1 application  topically to the appropriate area as directed As Needed (45-60 minutes prior to port access.  Cover with saran/plastic wrap.). 1/4/24   Jacy Razo APRN   LORazepam (ATIVAN) 0.5 MG tablet Take 1 tablet by mouth Take As Directed. 1 tabelt by mouth 30 minutes prior to MRI, take a second tablet at the time of the MRI if needed. 1/23/24   Brenda Cronin MD   Magic Mouthwash Oral Suspension (diphenhydrAMINE HCl - aluminum & magnesium hydroxide-simethicone - lidocaine - nystatin) Swish and Spit 10 mL by mouth every 6 (Six) Hours For 7 Days. 4/1/24   Jacy Razo APRN   mupirocin (BACTROBAN) 2 % cream  3/18/24   Provider, MD Arya   naloxone (NARCAN) 4 MG/0.1ML nasal spray 1 spray into the nostril(s) as directed by provider As Needed for Opioid Reversal. 12/29/23   Brenda Cronin MD   ondansetron (ZOFRAN) 8 MG tablet Take 1 tablet by mouth 3 (Three) Times a Day As Needed for Nausea or Vomiting. 3/4/24   Brenda Cronin MD   oxyCODONE (ROXICODONE) 10 MG tablet Take 1 tablet by mouth Every 6 (Six) Hours As Needed for Moderate Pain or Severe Pain for up to 30 days. 4/2/24 5/2/24  Westley Gonzales DO   pantoprazole (Protonix) 40 MG EC tablet Take 1 tablet by mouth Daily. Indications: Gastroesophageal Reflux Disease 3/4/24   Brenda Cronin MD   tiotropium bromide-olodaterol (STIOLTO RESPIMAT) 2.5-2.5 MCG/ACT aerosol solution inhaler Inhale 2 puffs Daily. 12/12/23   Ana Irizarry MD   traZODone (DESYREL) 100 MG tablet TAKE ONE TABLET BY MOUTH EVERY EVENING TAKE 1 HOUR PRIOR TO BEDTIME 4/1/24   Keny Aguilar MD   triamcinolone (KENALOG) 0.025 %  "ointment Apply 1 Application topically to the appropriate area as directed 2 (Two) Times a Day. Use second if topical treatment #1 ineffective 3/18/24   Brenda Cronin MD         REVIEW OF SYSTEMS       Review of Systems   Cardiovascular:  Positive for chest pain (Superficial chest pain around the port site).   Skin:  Negative for color change and wound.       PHYSICAL EXAM      INITIAL VITALS:   /69 (BP Location: Left arm, Patient Position: Sitting)   Pulse 79   Temp 98.1 °F (36.7 °C) (Oral)   Resp 18   Ht 182.9 cm (72\")   Wt 102 kg (224 lb)   SpO2 99%   BMI 30.38 kg/m²     Physical Exam  Constitutional:       Appearance: Normal appearance.   HENT:      Head: Normocephalic and atraumatic.   Eyes:      Extraocular Movements: Extraocular movements intact.   Cardiovascular:      Rate and Rhythm: Normal rate.   Pulmonary:      Effort: Pulmonary effort is normal.      Breath sounds: Normal breath sounds.   Chest:       Skin:     General: Skin is warm and dry.   Neurological:      General: No focal deficit present.      Mental Status: He is alert and oriented to person, place, and time.   Psychiatric:         Mood and Affect: Mood normal.         Behavior: Behavior normal.           DDX/DIAGNOSTIC RESULTS / EMERGENCY DEPARTMENT COURSE / MDM     Differential Diagnosis included but not limited: Dysfunctional access port    Diagnoses Considered but Do Not Suspect: Pneumohemothorax, infection of the port, active leaking of chemotherapy    Decision Rules/Scores utilized: N/A     Tests considered but not ordered and why:  N/A     MIPS: N/A     Code Status Discussion:  Not Discussed    Additional Patient Education Provided: None     Medical Decision Making    Medical Decision Making  Patient presenting with central venous catheter issue with chemotherapy actively going.  Concern for messing with this, will discuss with infusion center.  It is mildly pulled out with Biopatch has been removed from underneath it.  " Patient has no other complaints at this time, low concern for any other causes of patient's chest pain.    Patient had improvement of chest pain after port was adjusted by nursing staff per recommendations of the director of the infusion center.  Patient appeared clinically well on reevaluation, patient to follow-up with infusion center tomorrow.  Patient agreeable with this plan.  Patient discharged home in stable condition for further follow-up.  Patient struck to return if he has any other symptoms or any worsening chest pain.    Problems Addressed:  Other infection associated with central venous catheter, initial encounter: acute illness or injury        See ED COURSE for additional MDM statements    EKG  None Performed     All EKG's are interpreted by the Emergency Department Physician who either signs or Co-signs this chart in the absence of a cardiologist.    Additional Scores                   EMERGENCY DEPARTMENT COURSE:    ED Course as of 04/17/24 1430   Tue Apr 16, 2024   1846 Nursing staff evaluated port, unable to remove it or mess with that.  Discussed the director of the infusion center who stated that we should not touch it, do not remove it.  It was manage by nursing staff per infusion  recommendations, it was drawn back and flushed without any pain..  No changes to pump function. [CR]   1847 Patient to follow-up with infusion center tomorrow and his oncologist. [CR]      ED Course User Index  [CR] Benjamín Chacko, DO       PROCEDURES:  None Performed   Procedures    DATA FOR LAB AND RADIOLOGY TESTS ORDERED BELOW ARE REVIEWED BY THE ED CLINICIAN:    RADIOLOGY: All x-rays, CT, MRI, and formal ultrasound images (except ED bedside ultrasound) are read by the radiologist, see reports below, unless otherwise noted in MDM or here.  Reports below are reviewed by myself.  No orders to display       LABS: Lab orders shown below, the results are reviewed by myself, and all abnormals  "are listed below.  Labs Reviewed - No data to display    Vitals Reviewed:    Vitals:    04/16/24 1726   BP: 124/69   BP Location: Left arm   Patient Position: Sitting   Pulse: 79   Resp: 18   Temp: 98.1 °F (36.7 °C)   TempSrc: Oral   SpO2: 99%   Weight: 102 kg (224 lb)   Height: 182.9 cm (72\")       MEDICATIONS GIVEN TO PATIENT THIS ENCOUNTER:  Medications - No data to display    CONSULTS:  None    CRITICAL CARE:  There was significant risk of life threatening deterioration of patient's condition requiring my direct management. Critical care time 0 minutes, excluding any documented procedures.    FINAL IMPRESSION      1. Other infection associated with central venous catheter, initial encounter          DISPOSITION / PLAN     ED Disposition       ED Disposition   Discharge    Condition   Stable    Comment   --               PATIENT REFERRED TO:  Brenda Cronin MD  [address] [place] [address]  [phone]      Please touch base with your oncologist      DISCHARGE MEDICATIONS:     Medication List        CONTINUE taking these medications      buPROPion  MG 24 hr tablet  Commonly known as: Wellbutrin XL  Take 1 tablet by mouth Daily. Take in place of zoloft     cetirizine 10 MG tablet  Commonly known as: zyrTEC  Take 1 tablet by mouth Daily.     clindamycin 1 % gel  Commonly known as: Clindamycin 1% external gel  Apply 1 Application topically to the appropriate area as directed 2 (Two) Times a Day. Use first     dicyclomine 20 MG tablet  Commonly known as: BENTYL  Take 1 tablet by mouth 3 (Three) Times a Day As Needed for Abdominal Cramping.     docusate sodium 100 MG capsule  Commonly known as: COLACE     doxycycline 100 MG tablet  Commonly known as: VIBRAMYICN  100 mg BID x 7 days, then daily indefinitely     Hydrocortisone (Perianal) 2.5 % rectal cream  Commonly known as: ANUSOL-HC  Insert  into the rectum 2 (Two) Times a Day. Indications: Inflamed Hemorrhoids     lidocaine-prilocaine " 2.5-2.5 % cream  Commonly known as: EMLA  Apply 1 application  topically to the appropriate area as directed As Needed (45-60 minutes prior to port access.  Cover with saran/plastic wrap.).     LORazepam 0.5 MG tablet  Commonly known as: ATIVAN  Take 1 tablet by mouth Take As Directed. 1 tabelt by mouth 30 minutes prior to MRI, take a second tablet at the time of the MRI if needed.     Magic Mouthwash Oral Suspension (diphenhydrAMINE HCl - aluminum & magnesium hydroxide-simethicone - lidocaine - nystatin)  Swish and Spit 10 mL by mouth every 6 (Six) Hours For 7 Days.     mupirocin 2 % cream  Commonly known as: BACTROBAN     naloxone 4 MG/0.1ML nasal spray  Commonly known as: NARCAN  1 spray into the nostril(s) as directed by provider As Needed for Opioid Reversal.     ondansetron 8 MG tablet  Commonly known as: ZOFRAN  Take 1 tablet by mouth 3 (Three) Times a Day As Needed for Nausea or Vomiting.     oxyCODONE 10 MG tablet  Commonly known as: ROXICODONE  Take 1 tablet by mouth Every 6 (Six) Hours As Needed for Moderate Pain or Severe Pain for up to 30 days.     pantoprazole 40 MG EC tablet  Commonly known as: Protonix  Take 1 tablet by mouth Daily. Indications: Gastroesophageal Reflux Disease     Refresh Optive 0.5-0.9 % solution  Generic drug: Carboxymethylcellul-Glycerin  Apply 1 drop to eye(s) as directed by provider Every 2 (Two) Hours As Needed (dry eyes).     tiotropium bromide-olodaterol 2.5-2.5 MCG/ACT aerosol solution inhaler  Commonly known as: STIOLTO RESPIMAT  Inhale 2 puffs Daily.     traZODone 100 MG tablet  Commonly known as: DESYREL  TAKE ONE TABLET BY MOUTH EVERY EVENING TAKE 1 HOUR PRIOR TO BEDTIME     triamcinolone 0.025 % ointment  Commonly known as: KENALOG  Apply 1 Application topically to the appropriate area as directed 2 (Two) Times a Day. Use second if topical treatment #1 ineffective              Electronically signed by Benjamín Chacko DO, 04/16/24, 6:12 PM EDT.    Emergency  Medicine Physician  [facility]  (Please note that portions of thisnote were completed with a voice recognition program.  Efforts were made to edit the dictations but occasionally words are mis-transcribed.)      Benjamín Chacko,   04/17/24 2288

## 2024-04-16 NOTE — DISCHARGE INSTRUCTIONS
If you notice any concerning symptoms, please return to the ER immediately. These can include but are not limited to: worsening of you condition, fevers, chills, shortness of breath, vomiting, weakness of the extremities, changes in your mental status, numbness, pale extremities, or chest pain.     Take medications as prescribed, your pharmacist may have additional recommendations concerning these medications.    For pain use ibuprofen (Motrin) or acetaminophen (Tylenol), unless prescribed medications that also contain these medications.  You can take over the counter acetaminophen or ibuprofen, please follow the directions as dosages differ. Do not take ibuprofen if you have a history of peptic ulcers, kidney disease, bariatric surgery, or are currently pregnant.  Do not take Tylenol if you have a history of liver disease or alcohol use disorder.        THANK YOU!!! From [facility] Emergency Department    On behalf of the Emergency Department staff at [facility], I would like to thank you for giving us the opportunity to address your health care needs and concerns. We hope that during your visit, our service was delivered in a professional and caring manner. Please keep [facility] in mind as we walk with you down the path to your own personal wellness. Please expect follow-up phone calls concerning additional care and questions about your experience.      You have received additional information specific to your diagnosis in these discharge instructions, please read these fully.  Anytime you have been seen in the emergency department we recommend close follow up with your primary care provider or specialist, please follow these directions as indicated.      Please do not mess with your port, please follow-up with your oncologist, please follow-up with the infusion center tomorrow morning.   Methotrexate Pregnancy And Lactation Text: This medication is Pregnancy Category X and is known to cause fetal harm. This medication is excreted in breast milk.

## 2024-04-16 NOTE — ED NOTES
Pt is receiving Chemo, he states that it started hurting about an hour pta. Pt also adds that the tape started coming off yesterday. Today the bio patch has slide out from under the needle. House was called and is checking with The director of infusion to see if we can touch it. Per Director we can not removed and replace the ramos needle.

## 2024-04-17 ENCOUNTER — INFUSION (OUTPATIENT)
Dept: ONCOLOGY | Facility: HOSPITAL | Age: 54
End: 2024-04-17
Payer: COMMERCIAL

## 2024-04-17 VITALS — HEART RATE: 72 BPM | TEMPERATURE: 98.6 F

## 2024-04-17 DIAGNOSIS — C20 RECTAL ADENOCARCINOMA: ICD-10-CM

## 2024-04-17 DIAGNOSIS — C20 RECTAL CANCER METASTASIZED TO LIVER: Primary | ICD-10-CM

## 2024-04-17 DIAGNOSIS — C78.7 RECTAL CANCER METASTASIZED TO LIVER: Primary | ICD-10-CM

## 2024-04-17 PROCEDURE — 25010000002 HEPARIN LOCK FLUSH PER 10 UNITS: Performed by: INTERNAL MEDICINE

## 2024-04-17 PROCEDURE — 96523 IRRIG DRUG DELIVERY DEVICE: CPT

## 2024-04-17 RX ORDER — SODIUM CHLORIDE 0.9 % (FLUSH) 0.9 %
10 SYRINGE (ML) INJECTION AS NEEDED
Status: DISCONTINUED | OUTPATIENT
Start: 2024-04-17 | End: 2024-04-17 | Stop reason: HOSPADM

## 2024-04-17 RX ORDER — HEPARIN SODIUM (PORCINE) LOCK FLUSH IV SOLN 100 UNIT/ML 100 UNIT/ML
500 SOLUTION INTRAVENOUS AS NEEDED
Status: DISCONTINUED | OUTPATIENT
Start: 2024-04-17 | End: 2024-04-17 | Stop reason: HOSPADM

## 2024-04-17 RX ORDER — HEPARIN SODIUM (PORCINE) LOCK FLUSH IV SOLN 100 UNIT/ML 100 UNIT/ML
500 SOLUTION INTRAVENOUS AS NEEDED
OUTPATIENT
Start: 2024-04-17

## 2024-04-17 RX ORDER — SODIUM CHLORIDE 0.9 % (FLUSH) 0.9 %
10 SYRINGE (ML) INJECTION AS NEEDED
OUTPATIENT
Start: 2024-04-17

## 2024-04-17 RX ADMIN — Medication 10 ML: at 14:44

## 2024-04-17 RX ADMIN — HEPARIN 500 UNITS: 100 SYRINGE at 14:44

## 2024-04-18 ENCOUNTER — TELEPHONE (OUTPATIENT)
Dept: ONCOLOGY | Facility: CLINIC | Age: 54
End: 2024-04-18
Payer: COMMERCIAL

## 2024-04-18 NOTE — TELEPHONE ENCOUNTER
Checked on patient after he went to the ER regarding his port.  He stated that they evaluated it and got blood return.  He stated his port is fine.  He denied any signs/symptoms of infection.  Patient had no c/o at this time.  Dr. Cronin notified.

## 2024-04-26 ENCOUNTER — OFFICE VISIT (OUTPATIENT)
Dept: PALLIATIVE CARE | Facility: CLINIC | Age: 54
End: 2024-04-26
Payer: COMMERCIAL

## 2024-04-26 VITALS
SYSTOLIC BLOOD PRESSURE: 118 MMHG | BODY MASS INDEX: 29.95 KG/M2 | WEIGHT: 220.8 LBS | TEMPERATURE: 97.8 F | OXYGEN SATURATION: 98 % | DIASTOLIC BLOOD PRESSURE: 81 MMHG | RESPIRATION RATE: 18 BRPM | HEART RATE: 79 BPM

## 2024-04-26 DIAGNOSIS — C20 RECTAL ADENOCARCINOMA: Primary | ICD-10-CM

## 2024-04-26 DIAGNOSIS — K59.03 THERAPEUTIC OPIOID INDUCED CONSTIPATION: ICD-10-CM

## 2024-04-26 DIAGNOSIS — G89.3 CANCER RELATED PAIN: ICD-10-CM

## 2024-04-26 DIAGNOSIS — T40.2X5A THERAPEUTIC OPIOID INDUCED CONSTIPATION: ICD-10-CM

## 2024-04-26 RX ORDER — OXYCODONE HYDROCHLORIDE 10 MG/1
10 TABLET ORAL EVERY 6 HOURS PRN
Qty: 120 TABLET | Refills: 0 | Status: SHIPPED | OUTPATIENT
Start: 2024-05-02 | End: 2024-06-01

## 2024-04-26 RX ORDER — LIDOCAINE HYDROCHLORIDE 20 MG/ML
SOLUTION OROPHARYNGEAL
COMMUNITY
Start: 2024-04-15 | End: 2024-04-29

## 2024-04-26 NOTE — PROGRESS NOTES
"     Palliative Clinic Note      Name: Edward Powell  Age: 53 y.o.  Sex: male  : 1970  MRN: [id number]  Date of Service: 2024   Medical Oncologist: Dr. Cronin    Subjective:    Chief Complaint: Constipation, memory impairment    History of Present Illness: Edward Powell is a 53 y.o. male with past medical history significant for metastatic rectal cancer  who presents to the palliative clinic today as a follow up for pain and symptom management.     Treatment summary: Patient presented with complaints of worsening diarrhea with associated loss of bowel control, weight loss and fatigue. Imaging in 2023 revealed an irregular circumferential wall thickening involving the rectum, mesorectal lymphadenopathy, mass-like consolidation in the left lower lobe and nodules in the liver concerning for malignancy. Biopsy from colonoscopy on 23 consistent with adenocarcinoma. Biopsies of the lung and liver were consistent with metastatic colorectal cancer. Patient started systemic therapy with FOLFOX on 23.  Panitumumab add to cycle 5. Most recent imaging was reassuring. ED visit on 24 for pain at the port site.      Pain: Patient complains of rectal pain that is worse with riding in the car and bowel movements. The pain lasts anywhere from minutes to hours.  Patient prescribed oxycodone 5 mg tablets q6h PRN per oncologist.  Patient reports taking 2 tablets at a time as needed for moderate to severe rectal pain.  Patient admits to an episode of severe pain where he tried taking 3 tablets and states he experienced lightheadedness and \"head spinning\". Patient uses acetaminophen and ibuprofen for other pains.      Other symptoms: The patient complains of constipation and obstructive symptoms.  Patient is currently taking a stool softener, 1 tablet twice daily along with a laxative as needed. Patient complains of severe pain with episodes of constipation related to irons supplement. The " patient complains of worsening memory, particularly short-term. He is currently on an antibiotic for boils per oncology.     Pyschosocial: The patient presents to the clinic by himself. He lives with his significant other, Alysia.  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  No personal history of mental illness.  He is not on any mood stabilizers currently.     Goals: Maximize comfort, optimize function & psychosocial wellbeing, and promote advanced care planning.    The following portions of the patient's history were reviewed and updated as appropriate: allergies, current medications, past family history, past medical history, past social history, past surgical history and problem list.    ORT-R: Low risk  Decisional capacity: Full  ECOG: (1) Restricted in physically strenuous activity, ambulatory and able to do work of light nature     Objective:    /81   Pulse 79   Temp 97.8 °F (36.6 °C) (Temporal)   Resp 18   Wt 100 kg (220 lb 12.8 oz)   SpO2 98%   BMI 29.95 kg/m²     Constitutional: Awake, alert, normal gait, sitting up in exam chair, in no acute distress  Eyes: PERRLA, EOMS intact  HENT: NCAT, face symmetric  Neck: Supple, trachea midline  Respiratory: Nonlabored respirations  Cardiovascular: RRR, no edema observed  Gastrointestinal: Soft, no guarding  Musculoskeletal: Moves all extremities   Psychiatric: Appropriate affect, cooperative  Neurologic: Oriented x 3, Cranial Nerves grossly intact to confrontation, speech clear  Skin: Cool dry, no rashes or wounds appreciated     Medication Counts: Reviewed. See bottom of note for details. Brought medication.  No overuse or misuse evident.  I have reviewed the patient's [place] PDMP. RACIEL Req #[id number].   UDS: Obtain at next appointment.    Assessment & Plan:    1. Rectal adenocarcinoma  - Tolerating chemo + immuno tx. Most recent imaging was reassuring.     2. Cancer related pain  - Patient is appropriate for opioid  therapy due to cancer related pain. Daily function and quality of life improved with pain medication. Refill for oxycodone 10 mg q6h PRN #120 was sent to the pharmacy. Side effects of the medication discussed at every visit. Patient was encouraged to continue bowel regimen of daily stool softeners, prn laxatives, and diet modifications.    3. Therapeutic opioid induced constipation  - Recommend increasing stool softener to two tablets twice daily. Continue laxative as needed. Continue adequate hydration and fiber.     Code status: FULL   Advanced directives: No.    Return in about 3 months (around 7/26/2024) for Office Visit.    I spent 30 minutes caring for Edward Powell on this date of service. This time includes time spent by me in the following activities: preparing for the visit, reviewing tests, obtaining and/or reviewing a separately obtained history, performing a medically appropriate examination and/or evaluation , counseling and educating the patient/family/caregiver, ordering medications, tests, or procedures, documenting information in the medical record, independently interpreting results and communicating that information with the patient/family/caregiver, and care coordination    Linda Leslie PA-C  04/26/2024    Medication Date Filled # Filled Count Used # Days  MANUEL   Oxycodone 10 4/2/24 120 -- -- -- 2-3

## 2024-04-26 NOTE — TELEPHONE ENCOUNTER
I have reviewed patient's RACIEL report prior to prescribing Schedule II, III, and IV medications. Request #  [id number]. Next refill for Oxycodone 10 mg q6h PRN #120 was sent to the pharmacy. The patient is scheduled to follow-up in 3 months.

## 2024-04-29 ENCOUNTER — OFFICE VISIT (OUTPATIENT)
Dept: ONCOLOGY | Facility: CLINIC | Age: 54
End: 2024-04-29
Payer: COMMERCIAL

## 2024-04-29 ENCOUNTER — INFUSION (OUTPATIENT)
Dept: ONCOLOGY | Facility: HOSPITAL | Age: 54
End: 2024-04-29
Payer: COMMERCIAL

## 2024-04-29 VITALS
HEART RATE: 73 BPM | SYSTOLIC BLOOD PRESSURE: 112 MMHG | BODY MASS INDEX: 30.2 KG/M2 | RESPIRATION RATE: 16 BRPM | WEIGHT: 223 LBS | TEMPERATURE: 98 F | HEIGHT: 72 IN | OXYGEN SATURATION: 98 % | DIASTOLIC BLOOD PRESSURE: 58 MMHG

## 2024-04-29 VITALS
RESPIRATION RATE: 16 BRPM | OXYGEN SATURATION: 98 % | TEMPERATURE: 98.2 F | HEART RATE: 55 BPM | DIASTOLIC BLOOD PRESSURE: 62 MMHG | SYSTOLIC BLOOD PRESSURE: 117 MMHG

## 2024-04-29 DIAGNOSIS — C20 RECTAL CANCER METASTASIZED TO LIVER: Primary | ICD-10-CM

## 2024-04-29 DIAGNOSIS — C20 RECTAL ADENOCARCINOMA: ICD-10-CM

## 2024-04-29 DIAGNOSIS — C78.7 RECTAL CANCER METASTASIZED TO LIVER: Primary | ICD-10-CM

## 2024-04-29 LAB
ALBUMIN SERPL-MCNC: 3.4 G/DL (ref 3.5–5.2)
ALBUMIN/GLOB SERPL: 0.9 G/DL
ALP SERPL-CCNC: 117 U/L (ref 39–117)
ALT SERPL W P-5'-P-CCNC: 10 U/L (ref 1–41)
ANION GAP SERPL CALCULATED.3IONS-SCNC: 9.7 MMOL/L (ref 5–15)
ANISOCYTOSIS BLD QL: NORMAL
AST SERPL-CCNC: 25 U/L (ref 1–40)
BASOPHILS # BLD AUTO: 0.02 10*3/MM3 (ref 0–0.2)
BASOPHILS NFR BLD AUTO: 0.3 % (ref 0–1.5)
BILIRUB SERPL-MCNC: 0.4 MG/DL (ref 0–1.2)
BUN SERPL-MCNC: 11 MG/DL (ref 6–20)
BUN/CREAT SERPL: 13.6 (ref 7–25)
CALCIUM SPEC-SCNC: 8.9 MG/DL (ref 8.6–10.5)
CHLORIDE SERPL-SCNC: 103 MMOL/L (ref 98–107)
CO2 SERPL-SCNC: 23.3 MMOL/L (ref 22–29)
CREAT SERPL-MCNC: 0.81 MG/DL (ref 0.76–1.27)
DEPRECATED RDW RBC AUTO: 63 FL (ref 37–54)
EGFRCR SERPLBLD CKD-EPI 2021: 104.8 ML/MIN/1.73
EOSINOPHIL # BLD AUTO: 0.08 10*3/MM3 (ref 0–0.4)
EOSINOPHIL NFR BLD AUTO: 1.3 % (ref 0.3–6.2)
ERYTHROCYTE [DISTWIDTH] IN BLOOD BY AUTOMATED COUNT: 22.5 % (ref 12.3–15.4)
GLOBULIN UR ELPH-MCNC: 3.8 GM/DL
GLUCOSE SERPL-MCNC: 93 MG/DL (ref 65–99)
HCT VFR BLD AUTO: 35.6 % (ref 37.5–51)
HGB BLD-MCNC: 11.1 G/DL (ref 13–17.7)
HYPOCHROMIA BLD QL: NORMAL
IMM GRANULOCYTES # BLD AUTO: 0.01 10*3/MM3 (ref 0–0.05)
IMM GRANULOCYTES NFR BLD AUTO: 0.2 % (ref 0–0.5)
LYMPHOCYTES # BLD AUTO: 1.5 10*3/MM3 (ref 0.7–3.1)
LYMPHOCYTES NFR BLD AUTO: 24.7 % (ref 19.6–45.3)
MAGNESIUM SERPL-MCNC: 1.9 MG/DL (ref 1.6–2.6)
MCH RBC QN AUTO: 24.7 PG (ref 26.6–33)
MCHC RBC AUTO-ENTMCNC: 31.2 G/DL (ref 31.5–35.7)
MCV RBC AUTO: 79.3 FL (ref 79–97)
MICROCYTES BLD QL: NORMAL
MONOCYTES # BLD AUTO: 0.83 10*3/MM3 (ref 0.1–0.9)
MONOCYTES NFR BLD AUTO: 13.7 % (ref 5–12)
NEUTROPHILS NFR BLD AUTO: 3.63 10*3/MM3 (ref 1.7–7)
NEUTROPHILS NFR BLD AUTO: 59.8 % (ref 42.7–76)
NRBC BLD AUTO-RTO: 0 /100 WBC (ref 0–0.2)
PLATELET # BLD AUTO: 99 10*3/MM3 (ref 140–450)
PMV BLD AUTO: 11.6 FL (ref 6–12)
POIKILOCYTOSIS BLD QL SMEAR: NORMAL
POTASSIUM SERPL-SCNC: 3.7 MMOL/L (ref 3.5–5.2)
PROT SERPL-MCNC: 7.2 G/DL (ref 6–8.5)
RBC # BLD AUTO: 4.49 10*6/MM3 (ref 4.14–5.8)
SMALL PLATELETS BLD QL SMEAR: NORMAL
SODIUM SERPL-SCNC: 136 MMOL/L (ref 136–145)
WBC MORPH BLD: NORMAL
WBC NRBC COR # BLD AUTO: 6.07 10*3/MM3 (ref 3.4–10.8)

## 2024-04-29 PROCEDURE — 25010000002 PANITUMUMAB PER 10 MG: Performed by: INTERNAL MEDICINE

## 2024-04-29 PROCEDURE — 96413 CHEMO IV INFUSION 1 HR: CPT

## 2024-04-29 PROCEDURE — 85025 COMPLETE CBC W/AUTO DIFF WBC: CPT

## 2024-04-29 PROCEDURE — 25010000002 PALONOSETRON 0.25 MG/5ML SOLUTION PREFILLED SYRINGE: Performed by: INTERNAL MEDICINE

## 2024-04-29 PROCEDURE — 96375 TX/PRO/DX INJ NEW DRUG ADDON: CPT

## 2024-04-29 PROCEDURE — 25010000002 PANITUMUMAB 400 MG/20ML SOLUTION 20 ML VIAL: Performed by: INTERNAL MEDICINE

## 2024-04-29 PROCEDURE — 99215 OFFICE O/P EST HI 40 MIN: CPT | Performed by: INTERNAL MEDICINE

## 2024-04-29 PROCEDURE — G0498 CHEMO EXTEND IV INFUS W/PUMP: HCPCS

## 2024-04-29 PROCEDURE — 25010000002 HYDROCORTISONE SOD SUC (PF) 100 MG RECONSTITUTED SOLUTION: Performed by: INTERNAL MEDICINE

## 2024-04-29 PROCEDURE — 96376 TX/PRO/DX INJ SAME DRUG ADON: CPT

## 2024-04-29 PROCEDURE — 25810000003 SODIUM CHLORIDE 0.9 % SOLUTION 250 ML FLEX CONT: Performed by: INTERNAL MEDICINE

## 2024-04-29 PROCEDURE — 25810000003 SODIUM CHLORIDE 0.9 % SOLUTION: Performed by: INTERNAL MEDICINE

## 2024-04-29 PROCEDURE — 85007 BL SMEAR W/DIFF WBC COUNT: CPT

## 2024-04-29 PROCEDURE — 80053 COMPREHEN METABOLIC PANEL: CPT

## 2024-04-29 PROCEDURE — 25010000002 OXALIPLATIN PER 0.5 MG: Performed by: INTERNAL MEDICINE

## 2024-04-29 PROCEDURE — 0 DEXTROSE 5 % SOLUTION 250 ML FLEX CONT: Performed by: INTERNAL MEDICINE

## 2024-04-29 PROCEDURE — 25010000002 LEUCOVORIN 500 MG RECONSTITUTED SOLUTION 1 EACH VIAL: Performed by: INTERNAL MEDICINE

## 2024-04-29 PROCEDURE — 25010000002 FLUOROURACIL PER 500 MG: Performed by: INTERNAL MEDICINE

## 2024-04-29 PROCEDURE — 25010000002 DEXAMETHASONE SODIUM PHOSPHATE 20 MG/5ML SOLUTION: Performed by: INTERNAL MEDICINE

## 2024-04-29 PROCEDURE — 96368 THER/DIAG CONCURRENT INF: CPT

## 2024-04-29 PROCEDURE — 83735 ASSAY OF MAGNESIUM: CPT

## 2024-04-29 PROCEDURE — 96417 CHEMO IV INFUS EACH ADDL SEQ: CPT

## 2024-04-29 PROCEDURE — 25010000002 DIPHENHYDRAMINE PER 50 MG: Performed by: INTERNAL MEDICINE

## 2024-04-29 RX ORDER — SODIUM CHLORIDE 9 MG/ML
20 INJECTION, SOLUTION INTRAVENOUS ONCE
Status: COMPLETED | OUTPATIENT
Start: 2024-04-29 | End: 2024-04-29

## 2024-04-29 RX ORDER — SODIUM CHLORIDE 9 MG/ML
20 INJECTION, SOLUTION INTRAVENOUS ONCE
Status: CANCELLED | OUTPATIENT
Start: 2024-04-29

## 2024-04-29 RX ORDER — DIPHENHYDRAMINE HYDROCHLORIDE 50 MG/ML
50 INJECTION INTRAMUSCULAR; INTRAVENOUS AS NEEDED
Status: CANCELLED | OUTPATIENT
Start: 2024-04-29

## 2024-04-29 RX ORDER — DEXTROSE MONOHYDRATE 50 MG/ML
20 INJECTION, SOLUTION INTRAVENOUS ONCE
Status: CANCELLED | OUTPATIENT
Start: 2024-04-29

## 2024-04-29 RX ORDER — PALONOSETRON 0.05 MG/ML
0.25 INJECTION, SOLUTION INTRAVENOUS ONCE
Status: CANCELLED | OUTPATIENT
Start: 2024-04-29

## 2024-04-29 RX ORDER — HEPARIN SODIUM (PORCINE) LOCK FLUSH IV SOLN 100 UNIT/ML 100 UNIT/ML
500 SOLUTION INTRAVENOUS AS NEEDED
Status: DISCONTINUED | OUTPATIENT
Start: 2024-04-29 | End: 2024-04-29 | Stop reason: HOSPADM

## 2024-04-29 RX ORDER — HEPARIN SODIUM (PORCINE) LOCK FLUSH IV SOLN 100 UNIT/ML 100 UNIT/ML
500 SOLUTION INTRAVENOUS AS NEEDED
Status: CANCELLED | OUTPATIENT
Start: 2024-04-29

## 2024-04-29 RX ORDER — SODIUM CHLORIDE 0.9 % (FLUSH) 0.9 %
10 SYRINGE (ML) INJECTION AS NEEDED
Status: DISCONTINUED | OUTPATIENT
Start: 2024-04-29 | End: 2024-04-29 | Stop reason: HOSPADM

## 2024-04-29 RX ORDER — PALONOSETRON 0.05 MG/ML
0.25 INJECTION, SOLUTION INTRAVENOUS ONCE
Status: COMPLETED | OUTPATIENT
Start: 2024-04-29 | End: 2024-04-29

## 2024-04-29 RX ORDER — SODIUM CHLORIDE 0.9 % (FLUSH) 0.9 %
10 SYRINGE (ML) INJECTION AS NEEDED
Status: CANCELLED | OUTPATIENT
Start: 2024-04-29

## 2024-04-29 RX ORDER — DEXTROSE MONOHYDRATE 50 MG/ML
20 INJECTION, SOLUTION INTRAVENOUS ONCE
Status: DISCONTINUED | OUTPATIENT
Start: 2024-04-29 | End: 2024-04-29 | Stop reason: HOSPADM

## 2024-04-29 RX ORDER — FAMOTIDINE 10 MG/ML
20 INJECTION, SOLUTION INTRAVENOUS AS NEEDED
Status: COMPLETED | OUTPATIENT
Start: 2024-04-29 | End: 2024-04-29

## 2024-04-29 RX ORDER — FAMOTIDINE 10 MG/ML
20 INJECTION, SOLUTION INTRAVENOUS AS NEEDED
Status: CANCELLED | OUTPATIENT
Start: 2024-04-29

## 2024-04-29 RX ORDER — DIPHENHYDRAMINE HYDROCHLORIDE 50 MG/ML
50 INJECTION INTRAMUSCULAR; INTRAVENOUS AS NEEDED
Status: COMPLETED | OUTPATIENT
Start: 2024-04-29 | End: 2024-04-29

## 2024-04-29 RX ADMIN — PANITUMUMAB 630 MG: 400 SOLUTION INTRAVENOUS at 09:48

## 2024-04-29 RX ADMIN — HYDROCORTISONE SODIUM SUCCINATE 50 MG: 100 INJECTION, POWDER, FOR SOLUTION INTRAMUSCULAR; INTRAVENOUS at 12:25

## 2024-04-29 RX ADMIN — FAMOTIDINE 20 MG: 10 INJECTION, SOLUTION INTRAVENOUS at 12:25

## 2024-04-29 RX ADMIN — PALONOSETRON 0.25 MG: 0.25 INJECTION, SOLUTION INTRAVENOUS at 10:30

## 2024-04-29 RX ADMIN — FLUOROURACIL 5450 MG: 50 INJECTION, SOLUTION INTRAVENOUS at 13:52

## 2024-04-29 RX ADMIN — DEXAMETHASONE SODIUM PHOSPHATE 12 MG: 4 INJECTION, SOLUTION INTRA-ARTICULAR; INTRALESIONAL; INTRAMUSCULAR; INTRAVENOUS; SOFT TISSUE at 10:31

## 2024-04-29 RX ADMIN — DIPHENHYDRAMINE HYDROCHLORIDE 25 MG: 50 INJECTION, SOLUTION INTRAMUSCULAR; INTRAVENOUS at 12:43

## 2024-04-29 RX ADMIN — SODIUM CHLORIDE 20 ML/HR: 9 INJECTION, SOLUTION INTRAVENOUS at 12:20

## 2024-04-29 RX ADMIN — OXALIPLATIN 195 MG: 5 INJECTION, SOLUTION INTRAVENOUS at 10:51

## 2024-04-29 RX ADMIN — LEUCOVORIN CALCIUM 910 MG: 500 INJECTION, POWDER, LYOPHILIZED, FOR SOLUTION INTRAMUSCULAR; INTRAVENOUS at 10:51

## 2024-04-29 RX ADMIN — HYDROCORTISONE SODIUM SUCCINATE 50 MG: 100 INJECTION, POWDER, FOR SOLUTION INTRAMUSCULAR; INTRAVENOUS at 13:20

## 2024-04-29 NOTE — PROGRESS NOTES
Hematology and Oncology [place]  Office number [phone]    Fax number [phone]     Follow up     Date: 24    Patient Name: Edward Powell  MRN: [id number]  : 1970    Referring Physician: Dr. Gautam    Chief Complaint: Rectal cancer, lung mass, liver lesions follow up on treatment    Cancer Staging:  IV    History of Present Illness: Edward Powell is a pleasant 53 y.o. male who presents today for evaluation of rectal cancer in the company of his supportive significant other.    Patient has a longstanding history of intermittent diarrhea since his cholecystectomy in 2019.  However he developed progressive diarrhea with associated loss of bowel control and urgency as well as weight loss and fatigue prompting additional workup.    CT of the abdomen pelvis 2023 showed an irregular circumferential wall thickening involving the rectum concerning for mass lesion.  There was associated mesorectal lymphadenopathy.  Masslike consolidation of left lower lobe.  CT of the chest showed a cavitary lesion in the left lower lobe up to 4.8 cm with an adjacent cavitary nodule up to 2 cm and a 5 mm nodule on the right minor fissure.      He underwent colonoscopy 2023 with findings of an infiltrative and ulcerated partially obstructing mass in the proximal rectum spanning 10 cm.  Rectal polyps.  Biopsy of the rectal mass showed invasive moderately differentiated adenocarcinoma.  Additional biopsies showed tubular adenomas.  MSI testing was intact/low probability of MSI high.    PDL1 negative    PET/CT 2023 showed hypermetabolic rectal wall thickening compatible with known rectal malignancy.  Multiple small ill-defined hypoechoic hyper metabolic liver lesions compatible with metastatic disease.  Mildly enlarged and mildly hypermetabolic pelvic sidewall and internal iliac lymph node chain adenopathy.  Hypermetabolic lung mass with adjacent nodule.     He underwent liver biopsy and lung  biopsy January 2024.  Results of both confirmed metastatic colorectal cancer.    The patient has been experiencing substantial rectal pain.  He is taking oxycodone every 6 hours with partial relief.  He reports ongoing bowel movements.  No abdominal pain.  No vomiting.  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     Treatment history:  FOLFOX cycle 1 -4  FOLFOX panitumumab cycle 5 onward  -Oxaliplatin terminated early for allergic reaction    Interval history:  He is here for follow up on treatment. More fatigued after working on his camper and doing lawn work. Activity level has improved. No exertional chest pain.   Rash is improved, still acneiform but improved.  He reports early suicidal ideation at the time of his cancer diagnosis but reports that he has no current depression or suicidal thoughts, having redelivered his life to Luis that he would never act on any thoughts.  He contracts for safety.  Has been more constipated after running out of stool softeners for several days, and had more rectal pain when he did go. Now back on laxative regimen.   No neuropathy.   Tongue pain intermittently. Doing baking soda rinses and intermittently using MMW. Had thrush, has been rubbing   Wt stable today compared to last visit.   Unable to tolerate by mouth iron due to constipation    Past Medical History:   Past Medical History:   Diagnosis Date    Arthritis     Cancer     rectal cancer - diagnosed 2023    Cholelithiasis 2019    Removed    COPD (chronic obstructive pulmonary disease)     Coronary artery disease 2009    Stent - no cardiologist currently    Elevated cholesterol     GERD (gastroesophageal reflux disease)     Hernia 2003    Lower hernia    Perforated ulcer 2019    Sleep apnea     history of; when weighed over 400lbs - no issues following bariatric surgery       Past Surgical History:   Past Surgical  History:   Procedure Laterality Date    APPENDECTOMY  1983    Removed    BARIATRIC SURGERY  2011    Gastric bypass    BLADDER TUMOR/ULCER BLEEDER CAUTERIZATION      CARDIAC CATHETERIZATION  2009    stent placed    CHOLECYSTECTOMY  2019    Removed    COLONOSCOPY N/A 12/07/2023    Procedure: COLONOSCOPY WITH HOT SNARE POLYPECTOMY AND TATTOO;  Surgeon: Noman Gautam MD;  Location: [facility] ENDOSCOPY;  Service: Gastroenterology;  Laterality: N/A;    PORTACATH PLACEMENT N/A 1/5/2024    Procedure: INSERTION OF PORTACATH WITH ULTRSOUND AND FLUOROSCOPIC GUIDANCE;  Surgeon: Ida Black MD;  Location: [facility] OR;  Service: General;  Laterality: N/A;    JENNIFER-EN-Y         Family History: No family history on file.  Uncle had colon cancer    Social History:   Social History     Socioeconomic History    Marital status:    Tobacco Use    Smoking status: Every Day     Current packs/day: 1.50     Average packs/day: 1.5 packs/day for 15.0 years (22.5 ttl pk-yrs)     Types: Cigarettes    Smokeless tobacco: Never    Tobacco comments:     35 years      pt reports closer to 2 packs per day since cancer diagnosis   Vaping Use    Vaping status: Never Used   Substance and Sexual Activity    Alcohol use: Never    Drug use: Never    Sexual activity: Defer       Medications:     Current Outpatient Medications:     buPROPion XL (Wellbutrin XL) 150 MG 24 hr tablet, Take 1 tablet by mouth Daily. Take in place of zoloft, Disp: 30 tablet, Rfl: 2    Carboxymethylcellul-Glycerin (Refresh Optive) 0.5-0.9 % solution, Apply 1 drop to eye(s) as directed by provider Every 2 (Two) Hours As Needed (dry eyes)., Disp: 15 mL, Rfl: 3    cetirizine (zyrTEC) 10 MG tablet, Take 1 tablet by mouth Daily., Disp: 30 tablet, Rfl: 2    clindamycin (Clindagel) 1 % gel, Apply 1 Application topically to the appropriate area as directed 2 (Two) Times a Day. Use first, Disp: 30 g, Rfl: 1    dicyclomine (BENTYL) 20 MG tablet, Take 1 tablet by  mouth 3 (Three) Times a Day As Needed for Abdominal Cramping., Disp: 30 tablet, Rfl: 3    docusate sodium (COLACE) 100 MG capsule, Take 1 capsule by mouth 2 (Two) Times a Day., Disp: , Rfl:     doxycycline (VIBRAMYICN) 100 MG tablet, 100 mg BID x 7 days, then daily indefinitely, Disp: 37 tablet, Rfl: 3    Hydrocortisone, Perianal, (ANUSOL-HC) 2.5 % rectal cream, Insert  into the rectum 2 (Two) Times a Day. Indications: Inflamed Hemorrhoids, Disp: 30 g, Rfl: 1    Lidocaine Viscous HCl (XYLOCAINE) 2 % solution, , Disp: , Rfl:     lidocaine-prilocaine (EMLA) 2.5-2.5 % cream, Apply 1 application  topically to the appropriate area as directed As Needed (45-60 minutes prior to port access.  Cover with saran/plastic wrap.)., Disp: 30 g, Rfl: 3    LORazepam (ATIVAN) 0.5 MG tablet, Take 1 tablet by mouth Take As Directed. 1 tabelt by mouth 30 minutes prior to MRI, take a second tablet at the time of the MRI if needed., Disp: 2 tablet, Rfl: 0    Magic Mouthwash Oral Suspension (diphenhydrAMINE HCl - aluminum & magnesium hydroxide-simethicone - lidocaine - nystatin), Swish and Spit 10 mL by mouth every 6 (Six) Hours For 7 Days., Disp: 300 mL, Rfl: 3    mupirocin (BACTROBAN) 2 % cream, , Disp: , Rfl:     naloxone (NARCAN) 4 MG/0.1ML nasal spray, 1 spray into the nostril(s) as directed by provider As Needed for Opioid Reversal., Disp: 1 each, Rfl: 0    ondansetron (ZOFRAN) 8 MG tablet, Take 1 tablet by mouth 3 (Three) Times a Day As Needed for Nausea or Vomiting., Disp: 30 tablet, Rfl: 5    [START ON 5/2/2024] oxyCODONE (ROXICODONE) 10 MG tablet, Take 1 tablet by mouth Every 6 (Six) Hours As Needed for Moderate Pain or Severe Pain for up to 30 days., Disp: 120 tablet, Rfl: 0    pantoprazole (Protonix) 40 MG EC tablet, Take 1 tablet by mouth Daily. Indications: Gastroesophageal Reflux Disease, Disp: 90 tablet, Rfl: 2    tiotropium bromide-olodaterol (STIOLTO RESPIMAT) 2.5-2.5 MCG/ACT aerosol solution inhaler, Inhale 2 puffs  "Daily., Disp: 1 each, Rfl: 5    traZODone (DESYREL) 100 MG tablet, TAKE ONE TABLET BY MOUTH EVERY EVENING TAKE 1 HOUR PRIOR TO BEDTIME, Disp: 30 tablet, Rfl: 0    triamcinolone (KENALOG) 0.025 % ointment, Apply 1 Application topically to the appropriate area as directed 2 (Two) Times a Day. Use second if topical treatment #1 ineffective, Disp: 80 g, Rfl: 0    Allergies:   Allergies   Allergen Reactions    Bactrim [Sulfamethoxazole-Trimethoprim] Hives     and blisters    Sulfa Antibiotics Hives     and blisters       Objective     Vital Signs:   Vitals:    04/29/24 0815   BP: 112/58   Pulse: 73   Resp: 16   Temp: 98 °F (36.7 °C)   SpO2: 98%   Weight: 101 kg (223 lb)   Height: 182.9 cm (72\")   PainSc: 0-No pain      Body mass index is 30.24 kg/m².   Pain Score    04/29/24 0815   PainSc: 0-No pain         ECOG Performance Status: 1 - Symptomatic but completely ambulatory    Physical Exam:   General: No acute distress. Well appearing   HEENT: Normocephalic, atraumatic. Sclera anicteric. OP injected, reports a coating on the roof of mouth that he brushed of this AM  Neck: supple, no adenopathy.   Cardiovascular: regular rate and rhythm. No murmurs.   Respiratory: Normal rate. Clear to auscultation bilaterally  Abdomen: Soft, nontender, non distended with normoactive bowel sounds  Lymph: no cervical, supraclavicular or axillary adenopathy  Neuro: Alert and oriented x 3. No focal deficits.   Ext: Symmetric, no swelling.   Accurate as of 4/29/24    Laboratory/Imaging Reviewed:   Infusion on 04/29/2024   Component Date Value Ref Range Status    Glucose 04/29/2024 93  65 - 99 mg/dL Final    BUN 04/29/2024 11  6 - 20 mg/dL Final    Creatinine 04/29/2024 0.81  0.76 - 1.27 mg/dL Final    Sodium 04/29/2024 136  136 - 145 mmol/L Final    Potassium 04/29/2024 3.7  3.5 - 5.2 mmol/L Final    Chloride 04/29/2024 103  98 - 107 mmol/L Final    CO2 04/29/2024 23.3  22.0 - 29.0 mmol/L Final    Calcium 04/29/2024 8.9  8.6 - 10.5 mg/dL " Final    Total Protein 04/29/2024 7.2  6.0 - 8.5 g/dL Final    Albumin 04/29/2024 3.4 (L)  3.5 - 5.2 g/dL Final    ALT (SGPT) 04/29/2024 10  1 - 41 U/L Final    AST (SGOT) 04/29/2024 25  1 - 40 U/L Final    Alkaline Phosphatase 04/29/2024 117  39 - 117 U/L Final    Total Bilirubin 04/29/2024 0.4  0.0 - 1.2 mg/dL Final    Globulin 04/29/2024 3.8  gm/dL Final    A/G Ratio 04/29/2024 0.9  g/dL Final    BUN/Creatinine Ratio 04/29/2024 13.6  7.0 - 25.0 Final    Anion Gap 04/29/2024 9.7  5.0 - 15.0 mmol/L Final    eGFR 04/29/2024 104.8  >60.0 mL/min/1.73 Final    Magnesium 04/29/2024 1.9  1.6 - 2.6 mg/dL Final    WBC 04/29/2024 6.07  3.40 - 10.80 10*3/mm3 Final    RBC 04/29/2024 4.49  4.14 - 5.80 10*6/mm3 Final    Hemoglobin 04/29/2024 11.1 (L)  13.0 - 17.7 g/dL Final    Hematocrit 04/29/2024 35.6 (L)  37.5 - 51.0 % Final    MCV 04/29/2024 79.3  79.0 - 97.0 fL Final    MCH 04/29/2024 24.7 (L)  26.6 - 33.0 pg Final    MCHC 04/29/2024 31.2 (L)  31.5 - 35.7 g/dL Final    RDW 04/29/2024 22.5 (H)  12.3 - 15.4 % Final    RDW-SD 04/29/2024 63.0 (H)  37.0 - 54.0 fl Final    MPV 04/29/2024 11.6  6.0 - 12.0 fL Final    Platelets 04/29/2024 99 (L)  140 - 450 10*3/mm3 Final    Neutrophil % 04/29/2024 59.8  42.7 - 76.0 % Final    Lymphocyte % 04/29/2024 24.7  19.6 - 45.3 % Final    Monocyte % 04/29/2024 13.7 (H)  5.0 - 12.0 % Final    Eosinophil % 04/29/2024 1.3  0.3 - 6.2 % Final    Basophil % 04/29/2024 0.3  0.0 - 1.5 % Final    Immature Grans % 04/29/2024 0.2  0.0 - 0.5 % Final    Neutrophils, Absolute 04/29/2024 3.63  1.70 - 7.00 10*3/mm3 Final    Lymphocytes, Absolute 04/29/2024 1.50  0.70 - 3.10 10*3/mm3 Final    Monocytes, Absolute 04/29/2024 0.83  0.10 - 0.90 10*3/mm3 Final    Eosinophils, Absolute 04/29/2024 0.08  0.00 - 0.40 10*3/mm3 Final    Basophils, Absolute 04/29/2024 0.02  0.00 - 0.20 10*3/mm3 Final    Immature Grans, Absolute 04/29/2024 0.01  0.00 - 0.05 10*3/mm3 Final    nRBC 04/29/2024 0.0  0.0 - 0.2 /100 WBC Final      Tempus xt: APC gene TP53; Negative ADRIANNA, BRAF, NRAS, TMB stable. PDL1 negative  CT Abdomen Pelvis With Contrast    Result Date: 4/12/2024  Narrative: PROCEDURE: CT ABDOMEN PELVIS W CONTRAST-  HISTORY:  rectal cancer; O09-Mmfmlmgjl neoplasm of rectum; C78.7-Secondary malignant neoplasm of liver and intrahepatic bile duct; R10.30-Lower abdominal pain, unspecified  COMPARISON: January 18, 2024.  TECHNIQUE: Multiple axial CT images were obtained from the lung bases through the pubic symphysis following the administration of Isovue 300 and oral contrast.  FINDINGS:  ABDOMEN: Motion artifact limits some images. Previously seen masslike consolidation in the left lower lobe is not identified. The heart is normal in size. An indistinct 8 mm lesion in the right lobe of the liver near the diaphragm seen on prior exam and a second medial right lobe hepatic lesion seen on the prior exam are no longer identified. This may be due to posttreatment change. The gallbladder is surgically absent. The spleen is stable in size. No adrenal mass is present.  The pancreas is normal. Hypodense right renal lesions compatible with cysts are stable. The aorta is normal in caliber. There is no periaortic lymphadenopathy. A few mesenteric lymph nodes appear decreased in size compared to the prior exam. There are postsurgical changes of the stomach. A small bowel anastomosis is seen. Oral contrast progresses to the distal ileum.  PELVIS: The appendix is not identified.  The urinary bladder is unremarkable. Asymmetric wall thickening of the rectum appears significantly improved compatible with posttreatment change. A few small perirectal lymph nodes appear similar to the prior exam. No bony destructive lesion is identified.      Impression: 1. Improved rectal wall thickening with a few stable small perirectal lymph nodes. Findings are compatible with posttreatment change. 2. Interval resolution of 2 small right hepatic lobe lesions. 3. Stable  right renal cysts.   CTDI: 9.59 mGy DLP: 732.03 mGy.cm   This study was performed with techniques to keep radiation doses as low as reasonably achievable (ALARA). Individualized dose reduction techniques using automated exposure control or adjustment of mA and/or kV according to the patient size were employed.      Images were reviewed, interpreted, and dictated by Dr. Gabriella Rodriguez MD Transcribed by Krysta Aldana PA-C.  This report was signed and finalized on 4/12/2024 3:28 PM by Gabriella Rodriguez MD.      CT Chest With Contrast Diagnostic    Result Date: 4/12/2024  Narrative: PROCEDURE: CT CHEST W CONTRAST DIAGNOSTIC-  HISTORY: lung and liver metastatis rectal cancer; A05-Razipltxz neoplasm of rectum; C78.7-Secondary malignant neoplasm of liver and intrahepatic bile duct; R10.30-Lower abdominal pain, unspecified  COMPARISON: 12/01/2023.  PROCEDURE: The patient was injected with IV contrast.  Axial images were obtained from the lung apex to the mid abdomen by computed tomography. This study was performed with techniques to keep radiation doses as low as reasonably achievable, (ALARA). Individualized dose reduction techniques using automated exposure control or adjustment of mA and/or kV according to the patient size were employed.  FINDINGS:  CHEST: There is no suspicious axillary adenopathy. There is no suspicious hilar or mediastinal adenopathy.  The heart is proper size. There is no pericardial or pleural effusion. Again noted is a partially cavitary lesion posterior left lower lobe measuring up to 5 cm, stable from prior exam. There is a smaller, adjacent cavitary lesion posteriorly that measured 18 mm previously and now measures 14, consistent with interval treatment change. There is a pleural-based nodule laterally in the right middle lobe and a fissural nodule which are stable from the prior exam. Mild emphysematous change noted. There is a new groundglass opacity anteriorly in the right middle lobe, atypical  in appearance for metastasis. This could represent posttreatment change or local pneumonitis, recommend continued followup. There are faint peripheral groundglass opacities laterally in the left upper lobe and posteriorly in the left lower lobe superiorly which are new from prior and may be infectious/inflammatory. There is a new pleural-based left lower lobe subcentimeter nodule, possible metastasis. No bony destructive lesion is seen. Right internal jugular port is noted. Limited images of the upper abdomen are unremarkable. There is evidence of old calcified granulomatous disease.      Impression: Stable dominant cavitary lesion in the left lower lobe with mild interval decrease in size of adjacent cavitary lesion, consider treatment response.  New groundglass opacities anterior right middle lobe and peripherally in the left upper and lower lobes, possible infectious/inflammatory process. Recommend continued followup.  New subcentimeter left pleural-based lower lobe nodule, possible metastasis. Recommend followup.  CTDI: 9.59 mGy DLP:732.03 mGy.cm  This report was signed and finalized on 4/12/2024 1:59 PM by Gabriella Rodriguez MD.       Procedures    Assessment / Plan      Assessment/Plan:     1.  Rectal cancer   2.  Liver metastases  3.  Lung metastasis  4.  Chemo monitoring  -The patient presents with a partially obstructing rectal cancer with adenopathy.  -He was found to have biopsy-proven metastasis in the liver and lung.  His case was presented at multidisciplinary tumor board.  -We will proceed with a liver MRI to better evaluate the extent of his hepatic metastasis as was recommended by radiology.  However, because of metastatic disease to both the lung and liver I do not think he will be downstage to resectable disease.  -Labs are adequate to proceed with cycle #5.  I reviewed his [facility] results which are notable for an APC mutation, T p53 mutation, negative for KRAS, BRAF, NRAS, tumor mutation burden stable.   Notch 1 VUS.  -Reviewed potential role for colorectal surgery consultation regarding diversion particularly if his obstructive symptoms do not improve, but they are currently improving.   -CBC adequate and CMP pending for treatment today 4/29/24. Plt 99, monitor for progressive worsening  -Chemotherapy and premedication orders signed today.  -Restaging scans 4/2024 and consistent with treatment response.     5.  Cancer related pain  -PRN oxycodone.   -Well controlled.     6. Access   -Port    7.  Panitumumab induced rash  -Added doxycycline. Well controlled    8. GERD  PPI    9. Mucositis  -Well controlled with baking/soda salt  -Nystatin rinse x 1 week then resume MMW as needed after.    10.  Allergic reaction management  -He developed a pruritic rash during oxaliplatin administration which resolved with additional premedications including 2 rounds of hydrocortisone Benadryl.  Further oxaliplatin terminated. I reassessed him during his emergency medications and symptoms were improving..    Follow Up:   2 weeks     Brenda Cronin MD  Hematology and Oncology     I have spent a total of >40 min on the day of service including time before, during, and after the office visit reviewing test results/preparing to see patient, counseling patient, performing medically appropriate exam and documenting clinical information in the electronic or other health record, reassessing patient during infusion, discussion with nursing, coordinating care

## 2024-05-01 ENCOUNTER — INFUSION (OUTPATIENT)
Dept: ONCOLOGY | Facility: HOSPITAL | Age: 54
End: 2024-05-01
Payer: COMMERCIAL

## 2024-05-01 VITALS
TEMPERATURE: 98 F | HEART RATE: 80 BPM | SYSTOLIC BLOOD PRESSURE: 125 MMHG | BODY MASS INDEX: 30.61 KG/M2 | WEIGHT: 225.7 LBS | DIASTOLIC BLOOD PRESSURE: 62 MMHG

## 2024-05-01 DIAGNOSIS — C78.7 RECTAL CANCER METASTASIZED TO LIVER: Primary | ICD-10-CM

## 2024-05-01 DIAGNOSIS — C20 RECTAL CANCER METASTASIZED TO LIVER: Primary | ICD-10-CM

## 2024-05-01 DIAGNOSIS — C20 RECTAL ADENOCARCINOMA: ICD-10-CM

## 2024-05-01 PROCEDURE — 25010000002 HEPARIN LOCK FLUSH PER 10 UNITS: Performed by: INTERNAL MEDICINE

## 2024-05-01 PROCEDURE — 96523 IRRIG DRUG DELIVERY DEVICE: CPT

## 2024-05-01 RX ORDER — SODIUM CHLORIDE 0.9 % (FLUSH) 0.9 %
10 SYRINGE (ML) INJECTION AS NEEDED
OUTPATIENT
Start: 2024-05-01

## 2024-05-01 RX ORDER — HEPARIN SODIUM (PORCINE) LOCK FLUSH IV SOLN 100 UNIT/ML 100 UNIT/ML
500 SOLUTION INTRAVENOUS AS NEEDED
OUTPATIENT
Start: 2024-05-01

## 2024-05-01 RX ORDER — SODIUM CHLORIDE 0.9 % (FLUSH) 0.9 %
10 SYRINGE (ML) INJECTION AS NEEDED
Status: DISCONTINUED | OUTPATIENT
Start: 2024-05-01 | End: 2024-05-01 | Stop reason: HOSPADM

## 2024-05-01 RX ORDER — HEPARIN SODIUM (PORCINE) LOCK FLUSH IV SOLN 100 UNIT/ML 100 UNIT/ML
500 SOLUTION INTRAVENOUS AS NEEDED
Status: DISCONTINUED | OUTPATIENT
Start: 2024-05-01 | End: 2024-05-01 | Stop reason: HOSPADM

## 2024-05-01 RX ADMIN — Medication 10 ML: at 13:47

## 2024-05-01 RX ADMIN — HEPARIN 500 UNITS: 100 SYRINGE at 13:48

## 2024-05-06 ENCOUNTER — PATIENT MESSAGE (OUTPATIENT)
Dept: ONCOLOGY | Facility: CLINIC | Age: 54
End: 2024-05-06
Payer: COMMERCIAL

## 2024-05-06 DIAGNOSIS — C20 RECTAL CANCER METASTASIZED TO LIVER: ICD-10-CM

## 2024-05-06 DIAGNOSIS — C78.7 RECTAL CANCER METASTASIZED TO LIVER: ICD-10-CM

## 2024-05-06 RX ORDER — TRAZODONE HYDROCHLORIDE 100 MG/1
100 TABLET ORAL NIGHTLY
Qty: 30 TABLET | Refills: 1 | Status: SHIPPED | OUTPATIENT
Start: 2024-05-06

## 2024-05-10 ENCOUNTER — TELEPHONE (OUTPATIENT)
Dept: NUTRITION | Facility: HOSPITAL | Age: 54
End: 2024-05-10
Payer: COMMERCIAL

## 2024-05-10 DIAGNOSIS — C20 RECTAL CANCER METASTASIZED TO LIVER: Primary | ICD-10-CM

## 2024-05-10 DIAGNOSIS — C78.7 RECTAL CANCER METASTASIZED TO LIVER: Primary | ICD-10-CM

## 2024-05-13 ENCOUNTER — INFUSION (OUTPATIENT)
Dept: ONCOLOGY | Facility: HOSPITAL | Age: 54
End: 2024-05-13
Payer: COMMERCIAL

## 2024-05-13 ENCOUNTER — OFFICE VISIT (OUTPATIENT)
Dept: ONCOLOGY | Facility: CLINIC | Age: 54
End: 2024-05-13
Payer: COMMERCIAL

## 2024-05-13 VITALS
TEMPERATURE: 97.8 F | BODY MASS INDEX: 29.93 KG/M2 | SYSTOLIC BLOOD PRESSURE: 129 MMHG | HEART RATE: 67 BPM | WEIGHT: 221 LBS | DIASTOLIC BLOOD PRESSURE: 57 MMHG | OXYGEN SATURATION: 99 % | HEIGHT: 72 IN | RESPIRATION RATE: 16 BRPM

## 2024-05-13 DIAGNOSIS — C20 RECTAL CANCER METASTASIZED TO LIVER: Primary | ICD-10-CM

## 2024-05-13 DIAGNOSIS — C20 RECTAL ADENOCARCINOMA: ICD-10-CM

## 2024-05-13 DIAGNOSIS — C78.7 RECTAL CANCER METASTASIZED TO LIVER: Primary | ICD-10-CM

## 2024-05-13 LAB
ALBUMIN SERPL-MCNC: 3.6 G/DL (ref 3.5–5.2)
ALBUMIN/GLOB SERPL: 1 G/DL
ALP SERPL-CCNC: 117 U/L (ref 39–117)
ALT SERPL W P-5'-P-CCNC: 10 U/L (ref 1–41)
ANION GAP SERPL CALCULATED.3IONS-SCNC: 11 MMOL/L (ref 5–15)
AST SERPL-CCNC: 21 U/L (ref 1–40)
BASOPHILS # BLD AUTO: 0.02 10*3/MM3 (ref 0–0.2)
BASOPHILS NFR BLD AUTO: 0.3 % (ref 0–1.5)
BILIRUB SERPL-MCNC: 0.3 MG/DL (ref 0–1.2)
BUN SERPL-MCNC: 9 MG/DL (ref 6–20)
BUN/CREAT SERPL: 11.4 (ref 7–25)
CALCIUM SPEC-SCNC: 9.1 MG/DL (ref 8.6–10.5)
CEA SERPL-MCNC: 3.64 NG/ML
CHLORIDE SERPL-SCNC: 104 MMOL/L (ref 98–107)
CO2 SERPL-SCNC: 24 MMOL/L (ref 22–29)
CREAT SERPL-MCNC: 0.79 MG/DL (ref 0.76–1.27)
DEPRECATED RDW RBC AUTO: 63.9 FL (ref 37–54)
EGFRCR SERPLBLD CKD-EPI 2021: 105.6 ML/MIN/1.73
EOSINOPHIL # BLD AUTO: 0.11 10*3/MM3 (ref 0–0.4)
EOSINOPHIL NFR BLD AUTO: 1.7 % (ref 0.3–6.2)
ERYTHROCYTE [DISTWIDTH] IN BLOOD BY AUTOMATED COUNT: 22.4 % (ref 12.3–15.4)
GLOBULIN UR ELPH-MCNC: 3.6 GM/DL
GLUCOSE SERPL-MCNC: 71 MG/DL (ref 65–99)
HCT VFR BLD AUTO: 37.3 % (ref 37.5–51)
HGB BLD-MCNC: 11.4 G/DL (ref 13–17.7)
IMM GRANULOCYTES # BLD AUTO: 0.02 10*3/MM3 (ref 0–0.05)
IMM GRANULOCYTES NFR BLD AUTO: 0.3 % (ref 0–0.5)
LYMPHOCYTES # BLD AUTO: 1.42 10*3/MM3 (ref 0.7–3.1)
LYMPHOCYTES NFR BLD AUTO: 22.5 % (ref 19.6–45.3)
MAGNESIUM SERPL-MCNC: 1.8 MG/DL (ref 1.6–2.6)
MCH RBC QN AUTO: 24.8 PG (ref 26.6–33)
MCHC RBC AUTO-ENTMCNC: 30.6 G/DL (ref 31.5–35.7)
MCV RBC AUTO: 81.3 FL (ref 79–97)
MONOCYTES # BLD AUTO: 0.88 10*3/MM3 (ref 0.1–0.9)
MONOCYTES NFR BLD AUTO: 13.9 % (ref 5–12)
NEUTROPHILS NFR BLD AUTO: 3.86 10*3/MM3 (ref 1.7–7)
NEUTROPHILS NFR BLD AUTO: 61.3 % (ref 42.7–76)
NRBC BLD AUTO-RTO: 0 /100 WBC (ref 0–0.2)
PLATELET # BLD AUTO: 123 10*3/MM3 (ref 140–450)
PMV BLD AUTO: 10.3 FL (ref 6–12)
POTASSIUM SERPL-SCNC: 3.6 MMOL/L (ref 3.5–5.2)
PROT SERPL-MCNC: 7.2 G/DL (ref 6–8.5)
RBC # BLD AUTO: 4.59 10*6/MM3 (ref 4.14–5.8)
SODIUM SERPL-SCNC: 139 MMOL/L (ref 136–145)
WBC NRBC COR # BLD AUTO: 6.31 10*3/MM3 (ref 3.4–10.8)

## 2024-05-13 PROCEDURE — 25010000002 FLUOROURACIL PER 500 MG: Performed by: INTERNAL MEDICINE

## 2024-05-13 PROCEDURE — 80053 COMPREHEN METABOLIC PANEL: CPT

## 2024-05-13 PROCEDURE — 25010000002 LEUCOVORIN CALCIUM PER 50MG: Performed by: INTERNAL MEDICINE

## 2024-05-13 PROCEDURE — 82378 CARCINOEMBRYONIC ANTIGEN: CPT

## 2024-05-13 PROCEDURE — 96416 CHEMO PROLONG INFUSE W/PUMP: CPT

## 2024-05-13 PROCEDURE — G0498 CHEMO EXTEND IV INFUS W/PUMP: HCPCS

## 2024-05-13 PROCEDURE — 96415 CHEMO IV INFUSION ADDL HR: CPT

## 2024-05-13 PROCEDURE — 96375 TX/PRO/DX INJ NEW DRUG ADDON: CPT

## 2024-05-13 PROCEDURE — 96413 CHEMO IV INFUSION 1 HR: CPT

## 2024-05-13 PROCEDURE — 25810000003 SODIUM CHLORIDE 0.9 % SOLUTION 250 ML FLEX CONT: Performed by: INTERNAL MEDICINE

## 2024-05-13 PROCEDURE — 83735 ASSAY OF MAGNESIUM: CPT

## 2024-05-13 PROCEDURE — 85025 COMPLETE CBC W/AUTO DIFF WBC: CPT

## 2024-05-13 PROCEDURE — 25010000002 PANITUMUMAB PER 10 MG: Performed by: INTERNAL MEDICINE

## 2024-05-13 PROCEDURE — 25010000002 DEXAMETHASONE SODIUM PHOSPHATE 20 MG/5ML SOLUTION: Performed by: INTERNAL MEDICINE

## 2024-05-13 PROCEDURE — 99214 OFFICE O/P EST MOD 30 MIN: CPT | Performed by: INTERNAL MEDICINE

## 2024-05-13 PROCEDURE — 0 DEXTROSE 5 % SOLUTION 250 ML FLEX CONT: Performed by: INTERNAL MEDICINE

## 2024-05-13 PROCEDURE — 25010000002 PANITUMUMAB 400 MG/20ML SOLUTION 20 ML VIAL: Performed by: INTERNAL MEDICINE

## 2024-05-13 PROCEDURE — 25010000002 PALONOSETRON 0.25 MG/5ML SOLUTION PREFILLED SYRINGE: Performed by: INTERNAL MEDICINE

## 2024-05-13 PROCEDURE — 96367 TX/PROPH/DG ADDL SEQ IV INF: CPT

## 2024-05-13 RX ORDER — DIPHENHYDRAMINE HYDROCHLORIDE 50 MG/ML
50 INJECTION INTRAMUSCULAR; INTRAVENOUS AS NEEDED
Status: CANCELLED | OUTPATIENT
Start: 2024-05-13

## 2024-05-13 RX ORDER — DOXYCYCLINE HYCLATE 100 MG
100 TABLET ORAL DAILY
Qty: 30 TABLET | Refills: 3 | Status: SHIPPED | OUTPATIENT
Start: 2024-05-13

## 2024-05-13 RX ORDER — HEPARIN SODIUM (PORCINE) LOCK FLUSH IV SOLN 100 UNIT/ML 100 UNIT/ML
500 SOLUTION INTRAVENOUS AS NEEDED
Status: CANCELLED | OUTPATIENT
Start: 2024-05-13

## 2024-05-13 RX ORDER — SODIUM CHLORIDE 9 MG/ML
20 INJECTION, SOLUTION INTRAVENOUS ONCE
Status: CANCELLED | OUTPATIENT
Start: 2024-05-13

## 2024-05-13 RX ORDER — SODIUM CHLORIDE 9 MG/ML
20 INJECTION, SOLUTION INTRAVENOUS ONCE
Status: DISCONTINUED | OUTPATIENT
Start: 2024-05-13 | End: 2024-05-13 | Stop reason: HOSPADM

## 2024-05-13 RX ORDER — DEXTROSE MONOHYDRATE 50 MG/ML
20 INJECTION, SOLUTION INTRAVENOUS ONCE
Status: CANCELLED | OUTPATIENT
Start: 2024-05-13

## 2024-05-13 RX ORDER — SODIUM CHLORIDE 0.9 % (FLUSH) 0.9 %
10 SYRINGE (ML) INJECTION AS NEEDED
Status: DISCONTINUED | OUTPATIENT
Start: 2024-05-13 | End: 2024-05-13 | Stop reason: HOSPADM

## 2024-05-13 RX ORDER — PALONOSETRON 0.05 MG/ML
0.25 INJECTION, SOLUTION INTRAVENOUS ONCE
Status: COMPLETED | OUTPATIENT
Start: 2024-05-13 | End: 2024-05-13

## 2024-05-13 RX ORDER — HEPARIN SODIUM (PORCINE) LOCK FLUSH IV SOLN 100 UNIT/ML 100 UNIT/ML
500 SOLUTION INTRAVENOUS AS NEEDED
Status: DISCONTINUED | OUTPATIENT
Start: 2024-05-13 | End: 2024-05-13 | Stop reason: HOSPADM

## 2024-05-13 RX ORDER — FAMOTIDINE 10 MG/ML
20 INJECTION, SOLUTION INTRAVENOUS AS NEEDED
Status: CANCELLED | OUTPATIENT
Start: 2024-05-13

## 2024-05-13 RX ORDER — PALONOSETRON 0.05 MG/ML
0.25 INJECTION, SOLUTION INTRAVENOUS ONCE
Status: CANCELLED | OUTPATIENT
Start: 2024-05-13

## 2024-05-13 RX ORDER — DEXTROSE MONOHYDRATE 50 MG/ML
20 INJECTION, SOLUTION INTRAVENOUS ONCE
Status: DISCONTINUED | OUTPATIENT
Start: 2024-05-13 | End: 2024-05-13 | Stop reason: HOSPADM

## 2024-05-13 RX ORDER — SODIUM CHLORIDE 0.9 % (FLUSH) 0.9 %
10 SYRINGE (ML) INJECTION AS NEEDED
Status: CANCELLED | OUTPATIENT
Start: 2024-05-13

## 2024-05-13 RX ADMIN — DEXAMETHASONE SODIUM PHOSPHATE 12 MG: 4 INJECTION, SOLUTION INTRA-ARTICULAR; INTRALESIONAL; INTRAMUSCULAR; INTRAVENOUS; SOFT TISSUE at 10:58

## 2024-05-13 RX ADMIN — LEUCOVORIN CALCIUM 910 MG: 10 INJECTION INTRAMUSCULAR; INTRAVENOUS at 11:19

## 2024-05-13 RX ADMIN — PANITUMUMAB 630 MG: 400 SOLUTION INTRAVENOUS at 10:10

## 2024-05-13 RX ADMIN — FLUOROURACIL 5450 MG: 50 INJECTION, SOLUTION INTRAVENOUS at 12:21

## 2024-05-13 RX ADMIN — PALONOSETRON 0.25 MG: 0.25 INJECTION, SOLUTION INTRAVENOUS at 10:57

## 2024-05-13 NOTE — PROGRESS NOTES
[facility]  Office number [phone]    Fax number [phone]     Follow up     Date: 24    Patient Name: Edward Powell  MRN: [id number]  : 1970    Referring Physician: Dr. Gautam    Chief Complaint: Rectal cancer, lung mass, liver lesions follow up on treatment    Cancer Staging:  IV    History of Present Illness: Edward Powell is a pleasant 53 y.o. male who presents today for evaluation of rectal cancer in the company of his supportive significant other.    Patient has a longstanding history of intermittent diarrhea since his cholecystectomy in 2019.  However he developed progressive diarrhea with associated loss of bowel control and urgency as well as weight loss and fatigue prompting additional workup.    CT of the abdomen pelvis 2023 showed an irregular circumferential wall thickening involving the rectum concerning for mass lesion.  There was associated mesorectal lymphadenopathy.  Masslike consolidation of left lower lobe.  CT of the chest showed a cavitary lesion in the left lower lobe up to 4.8 cm with an adjacent cavitary nodule up to 2 cm and a 5 mm nodule on the right minor fissure.      He underwent colonoscopy 2023 with findings of an infiltrative and ulcerated partially obstructing mass in the proximal rectum spanning 10 cm.  Rectal polyps.  Biopsy of the rectal mass showed invasive moderately differentiated adenocarcinoma.  Additional biopsies showed tubular adenomas.  MSI testing was intact/low probability of MSI high.    PDL1 negative    PET/CT 2023 showed hypermetabolic rectal wall thickening compatible with known rectal malignancy.  Multiple small ill-defined hypoechoic hyper metabolic liver lesions compatible with metastatic disease.  Mildly enlarged and mildly hypermetabolic pelvic sidewall and internal iliac lymph node chain adenopathy.  Hypermetabolic lung mass with adjacent nodule.     He underwent liver biopsy and lung  biopsy January 2024.  Results of both confirmed metastatic colorectal cancer.    The patient has been experiencing substantial rectal pain.  He is taking oxycodone every 6 hours with partial relief.  He reports ongoing bowel movements.  No abdominal pain.  No vomiting.  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 Wt loss 4 lbs  Appetite is good. At [facility] yesterday.    Mucositis controlled with MMW, nystatin, baking soda. Can ea better with this.   No nausea or GERD  Constipation controlled. Occasional rectal pain but not persistent.   Taking oxycodone BID with good control of pain.   No neuropathy.   He developed a pruritic rash during his oxaliplatin administration which resolved with additional premedications including 2 rounds of hydrocortisone Benadryl.  Further oxaliplatin terminated. I reassessed him during his emergency medications and symptoms were improving. Bottom of feet and hands are sensitive/painful  Skin is doing much better    Past Medical History:   Past Medical History:   Diagnosis Date    Arthritis     Cancer     rectal cancer - diagnosed 2023    Cholelithiasis 2019    Removed    COPD (chronic obstructive pulmonary disease)     Coronary artery disease 2009    Stent - no cardiologist currently    Elevated cholesterol     GERD (gastroesophageal reflux disease)     Hernia 2003    Lower hernia    Perforated ulcer 2019    Sleep apnea     history of; when weighed over 400lbs - no issues following bariatric surgery       Past Surgical History:   Past Surgical History:   Procedure Laterality Date    APPENDECTOMY  1983    Removed    BARIATRIC SURGERY  2011    Gastric bypass    BLADDER TUMOR/ULCER  BLEEDER CAUTERIZATION      CARDIAC CATHETERIZATION  2009    stent placed    CHOLECYSTECTOMY  2019    Removed    COLONOSCOPY N/A 12/07/2023    Procedure: COLONOSCOPY WITH HOT SNARE POLYPECTOMY AND TATTOO;  Surgeon: Noman Gautam MD;  Location: [facility] ENDOSCOPY;  Service: Gastroenterology;  Laterality: N/A;    PORTACATH PLACEMENT N/A 1/5/2024    Procedure: INSERTION OF PORTACATH WITH ULTRSOUND AND FLUOROSCOPIC GUIDANCE;  Surgeon: Ida Black MD;  Location: [facility] OR;  Service: General;  Laterality: N/A;    JENNIFER-EN-Y         Family History: No family history on file.  Uncle had colon cancer    Social History:   Social History     Socioeconomic History    Marital status:    Tobacco Use    Smoking status: Every Day     Current packs/day: 1.50     Average packs/day: 1.5 packs/day for 15.0 years (22.5 ttl pk-yrs)     Types: Cigarettes    Smokeless tobacco: Never    Tobacco comments:     35 years      pt reports closer to 2 packs per day since cancer diagnosis   Vaping Use    Vaping status: Never Used   Substance and Sexual Activity    Alcohol use: Never    Drug use: Never    Sexual activity: Defer       Medications:     Current Outpatient Medications:     buPROPion XL (Wellbutrin XL) 150 MG 24 hr tablet, Take 1 tablet by mouth Daily. Take in place of zoloft, Disp: 30 tablet, Rfl: 2    Carboxymethylcellul-Glycerin (Refresh Optive) 0.5-0.9 % solution, Apply 1 drop to eye(s) as directed by provider Every 2 (Two) Hours As Needed (dry eyes)., Disp: 15 mL, Rfl: 3    cetirizine (zyrTEC) 10 MG tablet, Take 1 tablet by mouth Daily., Disp: 30 tablet, Rfl: 2    clindamycin (Clindagel) 1 % gel, Apply 1 Application topically to the appropriate area as directed 2 (Two) Times a Day. Use first, Disp: 30 g, Rfl: 1    dicyclomine (BENTYL) 20 MG tablet, Take 1 tablet by mouth 3 (Three) Times a Day As Needed for Abdominal Cramping., Disp: 30 tablet, Rfl: 3    docusate sodium (COLACE) 100 MG capsule, Take 1  capsule by mouth 2 (Two) Times a Day., Disp: , Rfl:     doxycycline (VIBRAMYICN) 100 MG tablet, 100 mg BID x 7 days, then daily indefinitely, Disp: 37 tablet, Rfl: 3    Hydrocortisone, Perianal, (ANUSOL-HC) 2.5 % rectal cream, Insert  into the rectum 2 (Two) Times a Day. Indications: Inflamed Hemorrhoids, Disp: 30 g, Rfl: 1    lidocaine-prilocaine (EMLA) 2.5-2.5 % cream, Apply 1 application  topically to the appropriate area as directed As Needed (45-60 minutes prior to port access.  Cover with saran/plastic wrap.)., Disp: 30 g, Rfl: 3    LORazepam (ATIVAN) 0.5 MG tablet, Take 1 tablet by mouth Take As Directed. 1 tabelt by mouth 30 minutes prior to MRI, take a second tablet at the time of the MRI if needed., Disp: 2 tablet, Rfl: 0    Magic Mouthwash Oral Suspension (diphenhydrAMINE HCl - aluminum & magnesium hydroxide-simethicone - lidocaine - nystatin), Swish and Spit 10 mL by mouth every 6 (Six) Hours For 7 Days., Disp: 300 mL, Rfl: 3    mupirocin (BACTROBAN) 2 % cream, , Disp: , Rfl:     naloxone (NARCAN) 4 MG/0.1ML nasal spray, 1 spray into the nostril(s) as directed by provider As Needed for Opioid Reversal., Disp: 1 each, Rfl: 0    nystatin (MYCOSTATIN) 100,000 unit/mL suspension, Swish and swallow 5 mL 4 (Four) Times a Day., Disp: 473 mL, Rfl: 0    ondansetron (ZOFRAN) 8 MG tablet, Take 1 tablet by mouth 3 (Three) Times a Day As Needed for Nausea or Vomiting., Disp: 30 tablet, Rfl: 5    oxyCODONE (ROXICODONE) 10 MG tablet, Take 1 tablet by mouth Every 6 (Six) Hours As Needed for Moderate Pain or Severe Pain for up to 30 days., Disp: 120 tablet, Rfl: 0    pantoprazole (Protonix) 40 MG EC tablet, Take 1 tablet by mouth Daily. Indications: Gastroesophageal Reflux Disease, Disp: 90 tablet, Rfl: 2    tiotropium bromide-olodaterol (STIOLTO RESPIMAT) 2.5-2.5 MCG/ACT aerosol solution inhaler, Inhale 2 puffs Daily., Disp: 1 each, Rfl: 5    traZODone (DESYREL) 100 MG tablet, Take 1 tablet by mouth Every Night., Disp:  "30 tablet, Rfl: 1    triamcinolone (KENALOG) 0.025 % ointment, Apply 1 Application topically to the appropriate area as directed 2 (Two) Times a Day. Use second if topical treatment #1 ineffective, Disp: 80 g, Rfl: 0    Allergies:   Allergies   Allergen Reactions    Bactrim [Sulfamethoxazole-Trimethoprim] Hives     and blisters    Sulfa Antibiotics Hives     and blisters       Objective     Vital Signs:   Vitals:    05/13/24 0832   BP: 129/57   Pulse: 67   Resp: 16   Temp: 97.8 °F (36.6 °C)   SpO2: 99%   Weight: 100 kg (221 lb)   Height: 182.9 cm (72\")   PainSc: 0-No pain      Body mass index is 29.97 kg/m².   Pain Score    05/13/24 0832   PainSc: 0-No pain         ECOG Performance Status: 1 - Symptomatic but completely ambulatory    Physical Exam:   General: No acute distress. Well appearing   HEENT: Normocephalic, atraumatic. Sclera anicteric. OP injected, reports a coating on the roof of mouth that he brushed of this AM  Neck: supple, no adenopathy.   Cardiovascular: regular rate and rhythm. No murmurs.   Respiratory: Normal rate. Clear to auscultation bilaterally  Abdomen: Soft, nontender, non distended with normoactive bowel sounds  Lymph: no cervical, supraclavicular or axillary adenopathy  Neuro: Alert and oriented x 3. No focal deficits.   Ext: Symmetric, no swelling.   Accurate as of 5/13/24    Laboratory/Imaging Reviewed:   Infusion on 05/13/2024   Component Date Value Ref Range Status    Glucose 05/13/2024 71  65 - 99 mg/dL Final    BUN 05/13/2024 9  6 - 20 mg/dL Final    Creatinine 05/13/2024 0.79  0.76 - 1.27 mg/dL Final    Sodium 05/13/2024 139  136 - 145 mmol/L Final    Potassium 05/13/2024 3.6  3.5 - 5.2 mmol/L Final    Chloride 05/13/2024 104  98 - 107 mmol/L Final    CO2 05/13/2024 24.0  22.0 - 29.0 mmol/L Final    Calcium 05/13/2024 9.1  8.6 - 10.5 mg/dL Final    Total Protein 05/13/2024 7.2  6.0 - 8.5 g/dL Final    Albumin 05/13/2024 3.6  3.5 - 5.2 g/dL Final    ALT (SGPT) 05/13/2024 10  1 - 41 " U/L Final    AST (SGOT) 05/13/2024 21  1 - 40 U/L Final    Alkaline Phosphatase 05/13/2024 117  39 - 117 U/L Final    Total Bilirubin 05/13/2024 0.3  0.0 - 1.2 mg/dL Final    Globulin 05/13/2024 3.6  gm/dL Final    A/G Ratio 05/13/2024 1.0  g/dL Final    BUN/Creatinine Ratio 05/13/2024 11.4  7.0 - 25.0 Final    Anion Gap 05/13/2024 11.0  5.0 - 15.0 mmol/L Final    eGFR 05/13/2024 105.6  >60.0 mL/min/1.73 Final    Magnesium 05/13/2024 1.8  1.6 - 2.6 mg/dL Final    WBC 05/13/2024 6.31  3.40 - 10.80 10*3/mm3 Final    RBC 05/13/2024 4.59  4.14 - 5.80 10*6/mm3 Final    Hemoglobin 05/13/2024 11.4 (L)  13.0 - 17.7 g/dL Final    Hematocrit 05/13/2024 37.3 (L)  37.5 - 51.0 % Final    MCV 05/13/2024 81.3  79.0 - 97.0 fL Final    MCH 05/13/2024 24.8 (L)  26.6 - 33.0 pg Final    MCHC 05/13/2024 30.6 (L)  31.5 - 35.7 g/dL Final    RDW 05/13/2024 22.4 (H)  12.3 - 15.4 % Final    RDW-SD 05/13/2024 63.9 (H)  37.0 - 54.0 fl Final    MPV 05/13/2024 10.3  6.0 - 12.0 fL Final    Platelets 05/13/2024 123 (L)  140 - 450 10*3/mm3 Final    Neutrophil % 05/13/2024 61.3  42.7 - 76.0 % Final    Lymphocyte % 05/13/2024 22.5  19.6 - 45.3 % Final    Monocyte % 05/13/2024 13.9 (H)  5.0 - 12.0 % Final    Eosinophil % 05/13/2024 1.7  0.3 - 6.2 % Final    Basophil % 05/13/2024 0.3  0.0 - 1.5 % Final    Immature Grans % 05/13/2024 0.3  0.0 - 0.5 % Final    Neutrophils, Absolute 05/13/2024 3.86  1.70 - 7.00 10*3/mm3 Final    Lymphocytes, Absolute 05/13/2024 1.42  0.70 - 3.10 10*3/mm3 Final    Monocytes, Absolute 05/13/2024 0.88  0.10 - 0.90 10*3/mm3 Final    Eosinophils, Absolute 05/13/2024 0.11  0.00 - 0.40 10*3/mm3 Final    Basophils, Absolute 05/13/2024 0.02  0.00 - 0.20 10*3/mm3 Final    Immature Grans, Absolute 05/13/2024 0.02  0.00 - 0.05 10*3/mm3 Final    nRBC 05/13/2024 0.0  0.0 - 0.2 /100 WBC Final     Tempus xt: APC gene TP53; Negative ADRIANNA, BRAF, NRAS, TMB stable. PDL1 negative  No results found.    Procedures    Assessment / Plan       Assessment/Plan:     1.  Rectal cancer   2.  Liver metastases  3.  Lung metastasis  4.  Chemo monitoring  -The patient presents with a partially obstructing rectal cancer with adenopathy.  -He was found to have biopsy-proven metastasis in the liver and lung.  His case was presented at multidisciplinary tumor board.  -We will proceed with a liver MRI to better evaluate the extent of his hepatic metastasis as was recommended by radiology.  However, because of metastatic disease to both the lung and liver I do not think he will be downstage to resectable disease.  -Labs are adequate to proceed with cycle #5.  I reviewed his Tempus results which are notable for an APC mutation, T p53 mutation, negative for KRAS, BRAF, NRAS, tumor mutation burden stable.  Notch 1 VUS.  -Reviewed potential role for colorectal surgery consultation regarding diversion particularly if his obstructive symptoms do not improve, but they are currently improving.   -CBC adequate and CMP pending for treatment today 5/13/24. Omitting further oxaliplatin  -Chemotherapy and premedication orders signed.    5.  Cancer related pain  -PRN oxycodone.   -Well controlled.     6. Access   -Port    7.  Panitumumab induced rash  -Added doxycycline. Well controlled.    8. GERD  PPI    9. Mucositis  -Well controlled with baking/soda salt  -Nystatin rinse x 1 week then resume MMW as needed after.    10.  Allergic reaction management  -He developed a pruritic rash during oxaliplatin administration which resolved with additional premedications including 2 rounds of hydrocortisone Benadryl.  Further oxaliplatin terminated.   Follow Up:   2 weeks     Brenda Cronin MD  Hematology and Oncology

## 2024-05-15 ENCOUNTER — INFUSION (OUTPATIENT)
Dept: ONCOLOGY | Facility: HOSPITAL | Age: 54
End: 2024-05-15
Payer: COMMERCIAL

## 2024-05-15 VITALS
SYSTOLIC BLOOD PRESSURE: 131 MMHG | DIASTOLIC BLOOD PRESSURE: 60 MMHG | WEIGHT: 220.6 LBS | TEMPERATURE: 98.4 F | HEART RATE: 62 BPM | BODY MASS INDEX: 29.92 KG/M2

## 2024-05-15 DIAGNOSIS — C78.7 RECTAL CANCER METASTASIZED TO LIVER: Primary | ICD-10-CM

## 2024-05-15 DIAGNOSIS — C20 RECTAL ADENOCARCINOMA: ICD-10-CM

## 2024-05-15 DIAGNOSIS — C20 RECTAL CANCER METASTASIZED TO LIVER: Primary | ICD-10-CM

## 2024-05-15 PROCEDURE — 96523 IRRIG DRUG DELIVERY DEVICE: CPT

## 2024-05-15 PROCEDURE — 25010000002 HEPARIN LOCK FLUSH PER 10 UNITS: Performed by: INTERNAL MEDICINE

## 2024-05-15 RX ORDER — HEPARIN SODIUM (PORCINE) LOCK FLUSH IV SOLN 100 UNIT/ML 100 UNIT/ML
500 SOLUTION INTRAVENOUS AS NEEDED
Status: DISCONTINUED | OUTPATIENT
Start: 2024-05-15 | End: 2024-05-15 | Stop reason: HOSPADM

## 2024-05-15 RX ORDER — SODIUM CHLORIDE 0.9 % (FLUSH) 0.9 %
10 SYRINGE (ML) INJECTION AS NEEDED
OUTPATIENT
Start: 2024-05-15

## 2024-05-15 RX ORDER — SODIUM CHLORIDE 0.9 % (FLUSH) 0.9 %
10 SYRINGE (ML) INJECTION AS NEEDED
Status: DISCONTINUED | OUTPATIENT
Start: 2024-05-15 | End: 2024-05-15 | Stop reason: HOSPADM

## 2024-05-15 RX ORDER — HEPARIN SODIUM (PORCINE) LOCK FLUSH IV SOLN 100 UNIT/ML 100 UNIT/ML
500 SOLUTION INTRAVENOUS AS NEEDED
OUTPATIENT
Start: 2024-05-15

## 2024-05-15 RX ADMIN — Medication 10 ML: at 14:13

## 2024-05-15 RX ADMIN — HEPARIN 500 UNITS: 100 SYRINGE at 14:13

## 2024-05-17 ENCOUNTER — OFFICE VISIT (OUTPATIENT)
Age: 54
End: 2024-05-17
Payer: COMMERCIAL

## 2024-05-17 VITALS
TEMPERATURE: 97.5 F | WEIGHT: 219 LBS | HEIGHT: 72 IN | OXYGEN SATURATION: 97 % | SYSTOLIC BLOOD PRESSURE: 102 MMHG | HEART RATE: 71 BPM | DIASTOLIC BLOOD PRESSURE: 64 MMHG | BODY MASS INDEX: 29.66 KG/M2

## 2024-05-17 DIAGNOSIS — I25.10 CORONARY ARTERY DISEASE INVOLVING NATIVE HEART WITHOUT ANGINA PECTORIS, UNSPECIFIED VESSEL OR LESION TYPE: Primary | ICD-10-CM

## 2024-05-17 DIAGNOSIS — Z11.59 ENCOUNTER FOR HEPATITIS C SCREENING TEST FOR LOW RISK PATIENT: ICD-10-CM

## 2024-05-17 DIAGNOSIS — Z12.5 PROSTATE CANCER SCREENING: ICD-10-CM

## 2024-05-17 PROBLEM — N20.0 KIDNEY STONES: Status: ACTIVE | Noted: 2024-05-17

## 2024-05-17 PROCEDURE — 99204 OFFICE O/P NEW MOD 45 MIN: CPT | Performed by: FAMILY MEDICINE

## 2024-05-17 NOTE — PROGRESS NOTES
New Patient Office Visit      Date: 2024  Patient Name: Edward Powell  : 1970   MRN: [id number]     Chief Complaint:    Chief Complaint   Patient presents with    Establish Care     Colon cancer        History of Present Illness: Edward Powell is a 53 y.o. male who is here today for establishment of care.  Patient is currently being treated for colon cancer stage 4.  Treatment was started 2024.  Does states that he has lost about 30 lbs since January.      Subjective      Review of Systems:   Review of Systems   Constitutional:  Negative for appetite change and unexpected weight loss.   HENT:  Negative for trouble swallowing.    Eyes:  Negative for blurred vision and double vision.   Respiratory:  Negative for cough and shortness of breath.    Cardiovascular:  Negative for chest pain and leg swelling.   Gastrointestinal:  Negative for blood in stool.   Endocrine: Negative for cold intolerance, heat intolerance and polyuria.   Musculoskeletal:  Negative for joint swelling.   Skin:  Negative for color change and bruise.   Neurological:  Negative for numbness and memory problem.   Hematological:  Does not bruise/bleed easily.   Psychiatric/Behavioral:  Negative for suicidal ideas and depressed mood. The patient is not nervous/anxious.        Past Medical History:   Past Medical History:   Diagnosis Date    Arthritis     Cancer     rectal cancer - diagnosed     Cholelithiasis 2019    Removed    COPD (chronic obstructive pulmonary disease)     Coronary artery disease 2009    Stent - no cardiologist currently    Elevated cholesterol     GERD (gastroesophageal reflux disease)     Hernia 2003    Lower hernia    Perforated ulcer 2019    Sleep apnea     history of; when weighed over 400lbs - no issues following bariatric surgery       Past Surgical History:   Past Surgical History:   Procedure Laterality Date    APPENDECTOMY  1983    Removed    BARIATRIC SURGERY      Gastric  bypass    BLADDER TUMOR/ULCER BLEEDER CAUTERIZATION      CARDIAC CATHETERIZATION  2009    stent placed    CHOLECYSTECTOMY  2019    Removed    COLONOSCOPY N/A 12/07/2023    Procedure: COLONOSCOPY WITH HOT SNARE POLYPECTOMY AND TATTOO;  Surgeon: Noman Gautam MD;  Location: [facility] ENDOSCOPY;  Service: Gastroenterology;  Laterality: N/A;    PORTACATH PLACEMENT N/A 1/5/2024    Procedure: INSERTION OF PORTACATH WITH ULTRSOUND AND FLUOROSCOPIC GUIDANCE;  Surgeon: Ida Black MD;  Location: [facility] OR;  Service: General;  Laterality: N/A;    JENNIFER-EN-Y         Family History: No family history on file.    Social History:   Social History     Socioeconomic History    Marital status:    Tobacco Use    Smoking status: Every Day     Current packs/day: 0.75     Average packs/day: 0.8 packs/day for 30.0 years (22.5 ttl pk-yrs)     Types: Cigarettes    Smokeless tobacco: Never    Tobacco comments:     35 years      pt reports closer to 2 packs per day since cancer diagnosis   Vaping Use    Vaping status: Never Used   Substance and Sexual Activity    Alcohol use: Never    Drug use: Never    Sexual activity: Defer       Medications:     Current Outpatient Medications:     buPROPion XL (Wellbutrin XL) 150 MG 24 hr tablet, Take 1 tablet by mouth Daily. Take in place of zoloft, Disp: 30 tablet, Rfl: 2    cetirizine (zyrTEC) 10 MG tablet, Take 1 tablet by mouth Daily., Disp: 30 tablet, Rfl: 2    clindamycin (Clindagel) 1 % gel, Apply 1 Application topically to the appropriate area as directed 2 (Two) Times a Day. Use first, Disp: 30 g, Rfl: 1    dicyclomine (BENTYL) 20 MG tablet, Take 1 tablet by mouth 3 (Three) Times a Day As Needed for Abdominal Cramping., Disp: 30 tablet, Rfl: 3    docusate sodium (COLACE) 100 MG capsule, Take 1 capsule by mouth 2 (Two) Times a Day., Disp: , Rfl:     doxycycline (VIBRAMYICN) 100 MG tablet, Take 1 tablet by mouth Daily. 100 mg BID x 7 days, then daily indefinitely, Disp: 30  tablet, Rfl: 3    Hydrocortisone, Perianal, (ANUSOL-HC) 2.5 % rectal cream, Insert  into the rectum 2 (Two) Times a Day. Indications: Inflamed Hemorrhoids, Disp: 30 g, Rfl: 1    lidocaine-prilocaine (EMLA) 2.5-2.5 % cream, Apply 1 application  topically to the appropriate area as directed As Needed (45-60 minutes prior to port access.  Cover with saran/plastic wrap.)., Disp: 30 g, Rfl: 3    LORazepam (ATIVAN) 0.5 MG tablet, Take 1 tablet by mouth Take As Directed. 1 tabelt by mouth 30 minutes prior to MRI, take a second tablet at the time of the MRI if needed., Disp: 2 tablet, Rfl: 0    Magic Mouthwash Oral Suspension (diphenhydrAMINE HCl - aluminum & magnesium hydroxide-simethicone - lidocaine - nystatin), Swish and Spit 10 mL by mouth every 6 (Six) Hours For 7 Days., Disp: 300 mL, Rfl: 3    mupirocin (BACTROBAN) 2 % cream, , Disp: , Rfl:     naloxone (NARCAN) 4 MG/0.1ML nasal spray, 1 spray into the nostril(s) as directed by provider As Needed for Opioid Reversal., Disp: 1 each, Rfl: 0    nystatin (MYCOSTATIN) 100,000 unit/mL suspension, Swish and swallow 5 mL 4 (Four) Times a Day., Disp: 473 mL, Rfl: 0    nystatin (MYCOSTATIN) 100,000 unit/mL suspension, Swish and Swallow 5mL by mouth four times a day, Disp: 473 mL, Rfl: 0    ondansetron (ZOFRAN) 8 MG tablet, Take 1 tablet by mouth 3 (Three) Times a Day As Needed for Nausea or Vomiting., Disp: 30 tablet, Rfl: 5    oxyCODONE (ROXICODONE) 10 MG tablet, Take 1 tablet by mouth Every 6 (Six) Hours As Needed for Moderate Pain or Severe Pain for up to 30 days., Disp: 120 tablet, Rfl: 0    pantoprazole (Protonix) 40 MG EC tablet, Take 1 tablet by mouth Daily. Indications: Gastroesophageal Reflux Disease, Disp: 90 tablet, Rfl: 2    tiotropium bromide-olodaterol (STIOLTO RESPIMAT) 2.5-2.5 MCG/ACT aerosol solution inhaler, Inhale 2 puffs Daily., Disp: 1 each, Rfl: 5    traZODone (DESYREL) 100 MG tablet, Take 1 tablet by mouth Every Night., Disp: 30 tablet, Rfl: 1     "triamcinolone (KENALOG) 0.025 % ointment, Apply 1 Application topically to the appropriate area as directed 2 (Two) Times a Day. Use second if topical treatment #1 ineffective, Disp: 80 g, Rfl: 0    Allergies:   Allergies   Allergen Reactions    Bactrim [Sulfamethoxazole-Trimethoprim] Hives     and blisters    Sulfa Antibiotics Hives     and blisters       Objective     Physical Exam:  Vital Signs:   Vitals:    05/17/24 0846 05/17/24 1001   BP: 98/52 102/64   Pulse: 71    Temp: 97.5 °F (36.4 °C)    SpO2: 97%    Weight: 99.3 kg (219 lb)    Height: 182.9 cm (72\")      Body mass index is 29.7 kg/m².      Physical Exam  Vitals and nursing note reviewed.   Constitutional:       Appearance: Normal appearance.   HENT:      Head: Normocephalic and atraumatic.   Eyes:      General: Lids are normal.      Conjunctiva/sclera: Conjunctivae normal.   Cardiovascular:      Rate and Rhythm: Normal rate and regular rhythm.   Pulmonary:      Effort: Pulmonary effort is normal.      Breath sounds: Normal breath sounds and air entry.   Abdominal:      General: Abdomen is flat. Bowel sounds are normal.      Palpations: Abdomen is soft.   Musculoskeletal:      Cervical back: Full passive range of motion without pain and normal range of motion.   Neurological:      General: No focal deficit present.      Mental Status: He is alert and oriented to person, place, and time.   Psychiatric:         Attention and Perception: Attention normal.         Mood and Affect: Mood normal.         Behavior: Behavior normal. Behavior is cooperative.         POCT Results (if applicable):   Results for orders placed or performed in visit on 05/13/24   Comprehensive metabolic panel    Specimen: Blood   Result Value Ref Range    Glucose 71 65 - 99 mg/dL    BUN 9 6 - 20 mg/dL    Creatinine 0.79 0.76 - 1.27 mg/dL    Sodium 139 136 - 145 mmol/L    Potassium 3.6 3.5 - 5.2 mmol/L    Chloride 104 98 - 107 mmol/L    CO2 24.0 22.0 - 29.0 mmol/L    Calcium 9.1 8.6 - " 10.5 mg/dL    Total Protein 7.2 6.0 - 8.5 g/dL    Albumin 3.6 3.5 - 5.2 g/dL    ALT (SGPT) 10 1 - 41 U/L    AST (SGOT) 21 1 - 40 U/L    Alkaline Phosphatase 117 39 - 117 U/L    Total Bilirubin 0.3 0.0 - 1.2 mg/dL    Globulin 3.6 gm/dL    A/G Ratio 1.0 g/dL    BUN/Creatinine Ratio 11.4 7.0 - 25.0    Anion Gap 11.0 5.0 - 15.0 mmol/L    eGFR 105.6 >60.0 mL/min/1.73   Magnesium    Specimen: Blood   Result Value Ref Range    Magnesium 1.8 1.6 - 2.6 mg/dL   CBC Auto Differential    Specimen: Blood   Result Value Ref Range    WBC 6.31 3.40 - 10.80 10*3/mm3    RBC 4.59 4.14 - 5.80 10*6/mm3    Hemoglobin 11.4 (L) 13.0 - 17.7 g/dL    Hematocrit 37.3 (L) 37.5 - 51.0 %    MCV 81.3 79.0 - 97.0 fL    MCH 24.8 (L) 26.6 - 33.0 pg    MCHC 30.6 (L) 31.5 - 35.7 g/dL    RDW 22.4 (H) 12.3 - 15.4 %    RDW-SD 63.9 (H) 37.0 - 54.0 fl    MPV 10.3 6.0 - 12.0 fL    Platelets 123 (L) 140 - 450 10*3/mm3    Neutrophil % 61.3 42.7 - 76.0 %    Lymphocyte % 22.5 19.6 - 45.3 %    Monocyte % 13.9 (H) 5.0 - 12.0 %    Eosinophil % 1.7 0.3 - 6.2 %    Basophil % 0.3 0.0 - 1.5 %    Immature Grans % 0.3 0.0 - 0.5 %    Neutrophils, Absolute 3.86 1.70 - 7.00 10*3/mm3    Lymphocytes, Absolute 1.42 0.70 - 3.10 10*3/mm3    Monocytes, Absolute 0.88 0.10 - 0.90 10*3/mm3    Eosinophils, Absolute 0.11 0.00 - 0.40 10*3/mm3    Basophils, Absolute 0.02 0.00 - 0.20 10*3/mm3    Immature Grans, Absolute 0.02 0.00 - 0.05 10*3/mm3    nRBC 0.0 0.0 - 0.2 /100 WBC   CEA    Specimen: Blood   Result Value Ref Range    CEA 3.64 ng/mL            Assessment / Plan      Assessment/Plan:   Diagnoses and all orders for this visit:    1. Coronary artery disease involving native heart without angina pectoris, unspecified vessel or lesion type (Primary)  -     Lipid panel; Future    2. Encounter for hepatitis C screening test for low risk patient  -     Hepatitis C antibody; Future    3. Prostate cancer screening  -     PSA Screen; Future          Time spent: 45 minutes spent reviewing  chart, previous lab work, as well as taking a history and physical.    Follow Up:   Return in about 3 months (around 8/17/2024) for Annual physical.    Westley Gonzales,    [facility] [employer] [facility]

## 2024-05-20 ENCOUNTER — PATIENT ROUNDING (BHMG ONLY) (OUTPATIENT)
Age: 54
End: 2024-05-20
Payer: COMMERCIAL

## 2024-05-20 NOTE — PROGRESS NOTES
A [employer] message has been sent to the patient for PATIENT ROUNDING with [facility].

## 2024-05-21 RX ORDER — BUPROPION HYDROCHLORIDE 150 MG/1
TABLET ORAL
Qty: 30 TABLET | Refills: 2 | Status: SHIPPED | OUTPATIENT
Start: 2024-05-21

## 2024-05-21 RX ORDER — BUPROPION HYDROCHLORIDE 150 MG/1
TABLET ORAL
Qty: 30 TABLET | Refills: 2 | OUTPATIENT
Start: 2024-05-21

## 2024-05-31 ENCOUNTER — OFFICE VISIT (OUTPATIENT)
Dept: ONCOLOGY | Facility: CLINIC | Age: 54
End: 2024-05-31
Payer: COMMERCIAL

## 2024-05-31 ENCOUNTER — LAB (OUTPATIENT)
Dept: ONCOLOGY | Facility: HOSPITAL | Age: 54
End: 2024-05-31
Payer: COMMERCIAL

## 2024-05-31 VITALS
RESPIRATION RATE: 16 BRPM | BODY MASS INDEX: 29.93 KG/M2 | OXYGEN SATURATION: 98 % | WEIGHT: 221 LBS | TEMPERATURE: 97.8 F | DIASTOLIC BLOOD PRESSURE: 58 MMHG | HEART RATE: 61 BPM | HEIGHT: 72 IN | SYSTOLIC BLOOD PRESSURE: 128 MMHG

## 2024-05-31 DIAGNOSIS — C78.7 RECTAL CANCER METASTASIZED TO LIVER: Primary | ICD-10-CM

## 2024-05-31 DIAGNOSIS — C20 RECTAL CANCER METASTASIZED TO LIVER: Primary | ICD-10-CM

## 2024-05-31 DIAGNOSIS — C20 RECTAL CANCER METASTASIZED TO LIVER: ICD-10-CM

## 2024-05-31 DIAGNOSIS — C78.7 RECTAL CANCER METASTASIZED TO LIVER: ICD-10-CM

## 2024-05-31 LAB
ALBUMIN SERPL-MCNC: 3.5 G/DL (ref 3.5–5.2)
ALBUMIN/GLOB SERPL: 0.9 G/DL
ALP SERPL-CCNC: 121 U/L (ref 39–117)
ALT SERPL W P-5'-P-CCNC: 12 U/L (ref 1–41)
ANION GAP SERPL CALCULATED.3IONS-SCNC: 10.6 MMOL/L (ref 5–15)
ANISOCYTOSIS BLD QL: NORMAL
AST SERPL-CCNC: 25 U/L (ref 1–40)
BASOPHILS # BLD AUTO: 0.02 10*3/MM3 (ref 0–0.2)
BASOPHILS NFR BLD AUTO: 0.3 % (ref 0–1.5)
BILIRUB SERPL-MCNC: 0.4 MG/DL (ref 0–1.2)
BUN SERPL-MCNC: 8 MG/DL (ref 6–20)
BUN/CREAT SERPL: 11.9 (ref 7–25)
CALCIUM SPEC-SCNC: 8.9 MG/DL (ref 8.6–10.5)
CHLORIDE SERPL-SCNC: 103 MMOL/L (ref 98–107)
CO2 SERPL-SCNC: 21.4 MMOL/L (ref 22–29)
CREAT SERPL-MCNC: 0.67 MG/DL (ref 0.76–1.27)
DEPRECATED RDW RBC AUTO: 62.3 FL (ref 37–54)
EGFRCR SERPLBLD CKD-EPI 2021: 111 ML/MIN/1.73
ELLIPTOCYTES BLD QL SMEAR: NORMAL
EOSINOPHIL # BLD AUTO: 0.1 10*3/MM3 (ref 0–0.4)
EOSINOPHIL NFR BLD AUTO: 1.7 % (ref 0.3–6.2)
ERYTHROCYTE [DISTWIDTH] IN BLOOD BY AUTOMATED COUNT: 21.8 % (ref 12.3–15.4)
GLOBULIN UR ELPH-MCNC: 3.9 GM/DL
GLUCOSE SERPL-MCNC: 82 MG/DL (ref 65–99)
HCT VFR BLD AUTO: 36.6 % (ref 37.5–51)
HGB BLD-MCNC: 11.6 G/DL (ref 13–17.7)
HYPOCHROMIA BLD QL: NORMAL
IMM GRANULOCYTES # BLD AUTO: 0.01 10*3/MM3 (ref 0–0.05)
IMM GRANULOCYTES NFR BLD AUTO: 0.2 % (ref 0–0.5)
LYMPHOCYTES # BLD AUTO: 1.46 10*3/MM3 (ref 0.7–3.1)
LYMPHOCYTES NFR BLD AUTO: 24.1 % (ref 19.6–45.3)
MAGNESIUM SERPL-MCNC: 1.6 MG/DL (ref 1.6–2.6)
MCH RBC QN AUTO: 25.4 PG (ref 26.6–33)
MCHC RBC AUTO-ENTMCNC: 31.7 G/DL (ref 31.5–35.7)
MCV RBC AUTO: 80.1 FL (ref 79–97)
MONOCYTES # BLD AUTO: 0.66 10*3/MM3 (ref 0.1–0.9)
MONOCYTES NFR BLD AUTO: 10.9 % (ref 5–12)
NEUTROPHILS NFR BLD AUTO: 3.8 10*3/MM3 (ref 1.7–7)
NEUTROPHILS NFR BLD AUTO: 62.8 % (ref 42.7–76)
NRBC BLD AUTO-RTO: 0 /100 WBC (ref 0–0.2)
PLAT MORPH BLD: NORMAL
PLATELET # BLD AUTO: 184 10*3/MM3 (ref 140–450)
PMV BLD AUTO: 10.6 FL (ref 6–12)
POTASSIUM SERPL-SCNC: 3.5 MMOL/L (ref 3.5–5.2)
PROT SERPL-MCNC: 7.4 G/DL (ref 6–8.5)
RBC # BLD AUTO: 4.57 10*6/MM3 (ref 4.14–5.8)
SODIUM SERPL-SCNC: 135 MMOL/L (ref 136–145)
WBC MORPH BLD: NORMAL
WBC NRBC COR # BLD AUTO: 6.05 10*3/MM3 (ref 3.4–10.8)

## 2024-05-31 PROCEDURE — 83735 ASSAY OF MAGNESIUM: CPT

## 2024-05-31 PROCEDURE — 85007 BL SMEAR W/DIFF WBC COUNT: CPT

## 2024-05-31 PROCEDURE — 36415 COLL VENOUS BLD VENIPUNCTURE: CPT

## 2024-05-31 PROCEDURE — 85025 COMPLETE CBC W/AUTO DIFF WBC: CPT

## 2024-05-31 PROCEDURE — 80053 COMPREHEN METABOLIC PANEL: CPT

## 2024-05-31 RX ORDER — DEXTROSE MONOHYDRATE 50 MG/ML
20 INJECTION, SOLUTION INTRAVENOUS ONCE
Status: CANCELLED | OUTPATIENT
Start: 2024-05-31

## 2024-05-31 RX ORDER — DIPHENHYDRAMINE HYDROCHLORIDE 50 MG/ML
50 INJECTION INTRAMUSCULAR; INTRAVENOUS AS NEEDED
Status: CANCELLED | OUTPATIENT
Start: 2024-05-31

## 2024-05-31 RX ORDER — SODIUM CHLORIDE 9 MG/ML
20 INJECTION, SOLUTION INTRAVENOUS ONCE
Status: CANCELLED | OUTPATIENT
Start: 2024-05-31

## 2024-05-31 RX ORDER — PALONOSETRON 0.05 MG/ML
0.25 INJECTION, SOLUTION INTRAVENOUS ONCE
Status: CANCELLED | OUTPATIENT
Start: 2024-05-31

## 2024-05-31 RX ORDER — FAMOTIDINE 10 MG/ML
20 INJECTION, SOLUTION INTRAVENOUS AS NEEDED
Status: CANCELLED | OUTPATIENT
Start: 2024-05-31

## 2024-05-31 NOTE — PROGRESS NOTES
Hematology and On[facility] [place]  Office number [phone]    Fax number [phone]     Follow up     Date: 24    Patient Name: Edward Powell  MRN: [id number]  : 1970    Referring Physician: Dr. Gautam    Chief Complaint: Rectal cancer, lung mass, liver lesions follow up on treatment    Cancer Staging:  IV    History of Present Illness: Edward Powell is a pleasant 53 y.o. male who presents today for evaluation of rectal cancer in the company of his supportive significant other.    Patient has a longstanding history of intermittent diarrhea since his cholecystectomy in 2019.  However he developed progressive diarrhea with associated loss of bowel control and urgency as well as weight loss and fatigue prompting additional workup.    CT of the abdomen pelvis 2023 showed an irregular circumferential wall thickening involving the rectum concerning for mass lesion.  There was associated mesorectal lymphadenopathy.  Masslike consolidation of left lower lobe.  CT of the chest showed a cavitary lesion in the left lower lobe up to 4.8 cm with an adjacent cavitary nodule up to 2 cm and a 5 mm nodule on the right minor fissure.      He underwent colonoscopy 2023 with findings of an infiltrative and ulcerated partially obstructing mass in the proximal rectum spanning 10 cm.  Rectal polyps.  Biopsy of the rectal mass showed invasive moderately differentiated adenocarcinoma.  Additional biopsies showed tubular adenomas.  MSI testing was intact/low probability of MSI high.    PDL1 negative    PET/CT 2023 showed hypermetabolic rectal wall thickening compatible with known rectal malignancy.  Multiple small ill-defined hypoechoic hyper metabolic liver lesions compatible with metastatic disease.  Mildly enlarged and mildly hypermetabolic pelvic sidewall and internal iliac lymph node chain adenopathy.  Hypermetabolic lung mass with adjacent nodule.     He underwent liver biopsy and lung  biopsy January 2024.  Results of both confirmed metastatic colorectal cancer.    The patient has been experiencing substantial rectal pain.  He is taking oxycodone every 6 hours with partial relief.  He reports ongoing bowel movements.  No abdominal pain.  No vomiting.  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  Weight loss is stable.  Appetite is good.  He has increase his activity.  Mucositis controlled with Magic mouthwash, nystatin, and baking soda.  We will continue with this therapy.  He is here for follow up on treatment. Wt loss 4 lbs  Appetite is good. At [facility] yesterday.    Mucositis controlled with MMW, nystatin, baking soda. Can ea better with this.   No nausea or GERD.  Constipation controlled.  Occasional rectal pain but remains not consistent or persistent.  Continues to take oxycodone twice daily with good pain control.  He is complaining of some neuropathy symptoms bilateral lower extremities on the third through fifth digits of each foot.  Skin is stable.  He is having some small dry areas around his cuticles.    Past Medical History:   Past Medical History:   Diagnosis Date    Arthritis     Cancer     rectal cancer - diagnosed 2023    Cholelithiasis 2019    Removed    COPD (chronic obstructive pulmonary disease)     Coronary artery disease 2009    Stent - no cardiologist currently    Elevated cholesterol     GERD (gastroesophageal reflux disease)     Hernia 2003    Lower hernia    Perforated ulcer 2019    Sleep apnea     history of; when weighed over 400lbs - no issues following bariatric surgery       Past Surgical History:   Past Surgical History:   Procedure Laterality Date    APPENDECTOMY   1983    Removed    BARIATRIC SURGERY  2011    Gastric bypass    BLADDER TUMOR/ULCER BLEEDER CAUTERIZATION      CARDIAC CATHETERIZATION  2009    stent placed    CHOLECYSTECTOMY  2019    Removed    COLONOSCOPY N/A 12/07/2023    Procedure: COLONOSCOPY WITH HOT SNARE POLYPECTOMY AND TATTOO;  Surgeon: Noman Gautam MD;  Location: [facility] ENDOSCOPY;  Service: Gastroenterology;  Laterality: N/A;    PORTACATH PLACEMENT N/A 1/5/2024    Procedure: INSERTION OF PORTACATH WITH ULTRSOUND AND FLUOROSCOPIC GUIDANCE;  Surgeon: Ida Black MD;  Location: [facility] OR;  Service: General;  Laterality: N/A;    JENNIFER-EN-Y         Family History: No family history on file.  Uncle had colon cancer    Social History:   Social History     Socioeconomic History    Marital status:    Tobacco Use    Smoking status: Every Day     Current packs/day: 0.75     Average packs/day: 0.8 packs/day for 30.0 years (22.5 ttl pk-yrs)     Types: Cigarettes    Smokeless tobacco: Never    Tobacco comments:     35 years      pt reports closer to 2 packs per day since cancer diagnosis   Vaping Use    Vaping status: Never Used   Substance and Sexual Activity    Alcohol use: Never    Drug use: Never    Sexual activity: Defer       Medications:     Current Outpatient Medications:     buPROPion XL (WELLBUTRIN XL) 150 MG 24 hr tablet, TAKE 1 TABLET BY MOUTH DAILY IN PLACE OF SERTRALINE, Disp: 30 tablet, Rfl: 2    cetirizine (zyrTEC) 10 MG tablet, Take 1 tablet by mouth Daily., Disp: 30 tablet, Rfl: 2    clindamycin (Clindagel) 1 % gel, Apply 1 Application topically to the appropriate area as directed 2 (Two) Times a Day. Use first, Disp: 30 g, Rfl: 1    dicyclomine (BENTYL) 20 MG tablet, Take 1 tablet by mouth 3 (Three) Times a Day As Needed for Abdominal Cramping., Disp: 30 tablet, Rfl: 3    docusate sodium (COLACE) 100 MG capsule, Take 1 capsule by mouth 2 (Two) Times a Day., Disp: , Rfl:     doxycycline (VIBRAMYICN) 100 MG tablet,  Take 1 tablet by mouth Daily. 100 mg BID x 7 days, then daily indefinitely, Disp: 30 tablet, Rfl: 3    Hydrocortisone, Perianal, (ANUSOL-HC) 2.5 % rectal cream, Insert  into the rectum 2 (Two) Times a Day. Indications: Inflamed Hemorrhoids, Disp: 30 g, Rfl: 1    lidocaine-prilocaine (EMLA) 2.5-2.5 % cream, Apply 1 application  topically to the appropriate area as directed As Needed (45-60 minutes prior to port access.  Cover with saran/plastic wrap.)., Disp: 30 g, Rfl: 3    LORazepam (ATIVAN) 0.5 MG tablet, Take 1 tablet by mouth Take As Directed. 1 tabelt by mouth 30 minutes prior to MRI, take a second tablet at the time of the MRI if needed., Disp: 2 tablet, Rfl: 0    Magic Mouthwash Oral Suspension (diphenhydrAMINE HCl - aluminum & magnesium hydroxide-simethicone - lidocaine - nystatin), Swish and Spit 10 mL by mouth every 6 (Six) Hours For 7 Days., Disp: 300 mL, Rfl: 3    mupirocin (BACTROBAN) 2 % cream, , Disp: , Rfl:     naloxone (NARCAN) 4 MG/0.1ML nasal spray, 1 spray into the nostril(s) as directed by provider As Needed for Opioid Reversal., Disp: 1 each, Rfl: 0    nystatin (MYCOSTATIN) 100,000 unit/mL suspension, Swish and swallow 5 mL 4 (Four) Times a Day., Disp: 473 mL, Rfl: 0    nystatin (MYCOSTATIN) 100,000 unit/mL suspension, Swish and Swallow 5mL by mouth four times a day, Disp: 473 mL, Rfl: 0    ondansetron (ZOFRAN) 8 MG tablet, Take 1 tablet by mouth 3 (Three) Times a Day As Needed for Nausea or Vomiting., Disp: 30 tablet, Rfl: 5    oxyCODONE (ROXICODONE) 10 MG tablet, Take 1 tablet by mouth Every 6 (Six) Hours As Needed for Moderate Pain or Severe Pain for up to 30 days., Disp: 120 tablet, Rfl: 0    pantoprazole (Protonix) 40 MG EC tablet, Take 1 tablet by mouth Daily. Indications: Gastroesophageal Reflux Disease, Disp: 90 tablet, Rfl: 2    tiotropium bromide-olodaterol (STIOLTO RESPIMAT) 2.5-2.5 MCG/ACT aerosol solution inhaler, Inhale 2 puffs Daily., Disp: 1 each, Rfl: 5    traZODone (DESYREL)  "100 MG tablet, Take 1 tablet by mouth Every Night., Disp: 30 tablet, Rfl: 1    triamcinolone (KENALOG) 0.025 % ointment, Apply 1 Application topically to the appropriate area as directed 2 (Two) Times a Day. Use second if topical treatment #1 ineffective, Disp: 80 g, Rfl: 0    Allergies:   Allergies   Allergen Reactions    Bactrim [Sulfamethoxazole-Trimethoprim] Hives     and blisters    Sulfa Antibiotics Hives     and blisters       Objective     Vital Signs:   Vitals:    05/31/24 1054   BP: 128/58   Pulse: 61   Resp: 16   Temp: 97.8 °F (36.6 °C)   SpO2: 98%   Weight: 100 kg (221 lb)   Height: 182.9 cm (72\")   PainSc: 0-No pain        Body mass index is 29.97 kg/m².   Pain Score    05/31/24 1054   PainSc: 0-No pain           ECOG Performance Status: 1 - Symptomatic but completely ambulatory    Physical Exam:   General: No acute distress. Well appearing   HEENT: Normocephalic, atraumatic. Sclera anicteric. OP injected, reports a coating on the roof of mouth that he brushed of this AM  Neck: supple, no adenopathy.   Cardiovascular: regular rate and rhythm. No murmurs.   Respiratory: Normal rate. Clear to auscultation bilaterally  Abdomen: Soft, nontender, non distended with normoactive bowel sounds  Lymph: no cervical, supraclavicular or axillary adenopathy  Neuro: Alert and oriented x 3. No focal deficits.   Ext: Symmetric, no swelling.   Accurate as of 05/31/2024    Laboratory/Imaging Reviewed:   No visits with results within 2 Week(s) from this visit.   Latest known visit with results is:   Infusion on 05/13/2024   Component Date Value Ref Range Status    Glucose 05/13/2024 71  65 - 99 mg/dL Final    BUN 05/13/2024 9  6 - 20 mg/dL Final    Creatinine 05/13/2024 0.79  0.76 - 1.27 mg/dL Final    Sodium 05/13/2024 139  136 - 145 mmol/L Final    Potassium 05/13/2024 3.6  3.5 - 5.2 mmol/L Final    Chloride 05/13/2024 104  98 - 107 mmol/L Final    CO2 05/13/2024 24.0  22.0 - 29.0 mmol/L Final    Calcium 05/13/2024 9.1  " 8.6 - 10.5 mg/dL Final    Total Protein 05/13/2024 7.2  6.0 - 8.5 g/dL Final    Albumin 05/13/2024 3.6  3.5 - 5.2 g/dL Final    ALT (SGPT) 05/13/2024 10  1 - 41 U/L Final    AST (SGOT) 05/13/2024 21  1 - 40 U/L Final    Alkaline Phosphatase 05/13/2024 117  39 - 117 U/L Final    Total Bilirubin 05/13/2024 0.3  0.0 - 1.2 mg/dL Final    Globulin 05/13/2024 3.6  gm/dL Final    A/G Ratio 05/13/2024 1.0  g/dL Final    BUN/Creatinine Ratio 05/13/2024 11.4  7.0 - 25.0 Final    Anion Gap 05/13/2024 11.0  5.0 - 15.0 mmol/L Final    eGFR 05/13/2024 105.6  >60.0 mL/min/1.73 Final    Magnesium 05/13/2024 1.8  1.6 - 2.6 mg/dL Final    WBC 05/13/2024 6.31  3.40 - 10.80 10*3/mm3 Final    RBC 05/13/2024 4.59  4.14 - 5.80 10*6/mm3 Final    Hemoglobin 05/13/2024 11.4 (L)  13.0 - 17.7 g/dL Final    Hematocrit 05/13/2024 37.3 (L)  37.5 - 51.0 % Final    MCV 05/13/2024 81.3  79.0 - 97.0 fL Final    MCH 05/13/2024 24.8 (L)  26.6 - 33.0 pg Final    MCHC 05/13/2024 30.6 (L)  31.5 - 35.7 g/dL Final    RDW 05/13/2024 22.4 (H)  12.3 - 15.4 % Final    RDW-SD 05/13/2024 63.9 (H)  37.0 - 54.0 fl Final    MPV 05/13/2024 10.3  6.0 - 12.0 fL Final    Platelets 05/13/2024 123 (L)  140 - 450 10*3/mm3 Final    Neutrophil % 05/13/2024 61.3  42.7 - 76.0 % Final    Lymphocyte % 05/13/2024 22.5  19.6 - 45.3 % Final    Monocyte % 05/13/2024 13.9 (H)  5.0 - 12.0 % Final    Eosinophil % 05/13/2024 1.7  0.3 - 6.2 % Final    Basophil % 05/13/2024 0.3  0.0 - 1.5 % Final    Immature Grans % 05/13/2024 0.3  0.0 - 0.5 % Final    Neutrophils, Absolute 05/13/2024 3.86  1.70 - 7.00 10*3/mm3 Final    Lymphocytes, Absolute 05/13/2024 1.42  0.70 - 3.10 10*3/mm3 Final    Monocytes, Absolute 05/13/2024 0.88  0.10 - 0.90 10*3/mm3 Final    Eosinophils, Absolute 05/13/2024 0.11  0.00 - 0.40 10*3/mm3 Final    Basophils, Absolute 05/13/2024 0.02  0.00 - 0.20 10*3/mm3 Final    Immature Grans, Absolute 05/13/2024 0.02  0.00 - 0.05 10*3/mm3 Final    nRBC 05/13/2024 0.0  0.0 - 0.2  /100 WBC Final    CEA 05/13/2024 3.64  ng/mL Final     Tempus xt: APC gene TP53; Negative ADRIANNA, BRAF, NRAS, TMB stable. PDL1 negative  No results found.    Procedures    Assessment / Plan      Assessment/Plan:     1.  Rectal cancer   2.  Liver metastases  3.  Lung metastasis  4.  Chemo monitoring  -The patient presents with a partially obstructing rectal cancer with adenopathy.  -He was found to have biopsy-proven metastasis in the liver and lung.  His case was presented at multidisciplinary tumor board.  -We will proceed with a liver MRI to better evaluate the extent of his hepatic metastasis as was recommended by radiology.  However, because of metastatic disease to both the lung and liver I do not think he will be downstage to resectable disease.  -Labs are adequate to proceed with cycle #5.  I reviewed his Tempus results which are notable for an APC mutation, T p53 mutation, negative for KRAS, BRAF, NRAS, tumor mutation burden stable.  Notch 1 VUS.  -Reviewed potential role for colorectal surgery consultation regarding diversion particularly if his obstructive symptoms do not improve, but they are currently improving.   -CBC adequate and CMP pending for treatment today 05/31/2024. Omitting further oxaliplatin  -Chemotherapy and premedication orders signed.  We will plan to proceed with treatment on Kimberlyn 3, 2024.  -He requested an extra week off for vacation.  Will plan to push out his next treatment until 6/24/2024.    5.  Cancer related pain  -PRN oxycodone.   -Well controlled.     6. Access   -Port    7.  Panitumumab induced rash  -Added doxycycline. Well controlled.    8. GERD  PPI    9. Mucositis  -Well controlled with baking/soda salt  -Nystatin rinse x 1 week then resume MMW as needed after.    10.  Allergic reaction management  -He developed a pruritic rash during oxaliplatin administration which resolved with additional premedications including 2 rounds of hydrocortisone Benadryl.  Further oxaliplatin  terminated.  -Resolved    11.  Skin dryness and cracks on his bilateral hands   - He has tried multiple lotions without success.  We discussed he can try Vicks on his cracks on his fingers.  Followed by either Aquaphor, dead sea salt lotion, or other cream.  Patient is to have reported improvement with skin dryness and cracks.    Follow Up:   3 weeks     GEORGIA Grimes    Total time of patient care including time prior to, face to face with patient, and following visit spent in reviewing records, lab results, imaging studies, discussion with patient, and documentation/charting was > 50 minutes      Hematology and Oncology

## 2024-06-03 ENCOUNTER — INFUSION (OUTPATIENT)
Dept: ONCOLOGY | Facility: HOSPITAL | Age: 54
End: 2024-06-03
Payer: COMMERCIAL

## 2024-06-03 VITALS
DIASTOLIC BLOOD PRESSURE: 68 MMHG | OXYGEN SATURATION: 99 % | SYSTOLIC BLOOD PRESSURE: 121 MMHG | RESPIRATION RATE: 18 BRPM | TEMPERATURE: 97 F | HEART RATE: 67 BPM | WEIGHT: 222 LBS | BODY MASS INDEX: 30.11 KG/M2

## 2024-06-03 DIAGNOSIS — C78.7 RECTAL CANCER METASTASIZED TO LIVER: Primary | ICD-10-CM

## 2024-06-03 DIAGNOSIS — C20 RECTAL CANCER METASTASIZED TO LIVER: ICD-10-CM

## 2024-06-03 DIAGNOSIS — C20 RECTAL CANCER METASTASIZED TO LIVER: Primary | ICD-10-CM

## 2024-06-03 DIAGNOSIS — C78.7 RECTAL CANCER METASTASIZED TO LIVER: ICD-10-CM

## 2024-06-03 PROCEDURE — G0498 CHEMO EXTEND IV INFUS W/PUMP: HCPCS

## 2024-06-03 PROCEDURE — 25010000002 PALONOSETRON 0.25 MG/5ML SOLUTION PREFILLED SYRINGE: Performed by: NURSE PRACTITIONER

## 2024-06-03 PROCEDURE — 0 DEXTROSE 5 % SOLUTION 250 ML FLEX CONT: Performed by: NURSE PRACTITIONER

## 2024-06-03 PROCEDURE — 25010000002 PANITUMUMAB PER 10 MG: Performed by: NURSE PRACTITIONER

## 2024-06-03 PROCEDURE — 25010000002 FLUOROURACIL PER 500 MG: Performed by: NURSE PRACTITIONER

## 2024-06-03 PROCEDURE — 25010000002 PANITUMUMAB 400 MG/20ML SOLUTION 20 ML VIAL: Performed by: NURSE PRACTITIONER

## 2024-06-03 PROCEDURE — 96367 TX/PROPH/DG ADDL SEQ IV INF: CPT

## 2024-06-03 PROCEDURE — 25810000003 SODIUM CHLORIDE 0.9 % SOLUTION 250 ML FLEX CONT: Performed by: NURSE PRACTITIONER

## 2024-06-03 PROCEDURE — 25010000002 LEUCOVORIN CALCIUM PER 50MG: Performed by: NURSE PRACTITIONER

## 2024-06-03 PROCEDURE — 25010000002 DEXAMETHASONE SODIUM PHOSPHATE 20 MG/5ML SOLUTION: Performed by: NURSE PRACTITIONER

## 2024-06-03 PROCEDURE — 96413 CHEMO IV INFUSION 1 HR: CPT

## 2024-06-03 PROCEDURE — 96375 TX/PRO/DX INJ NEW DRUG ADDON: CPT

## 2024-06-03 RX ORDER — LIDOCAINE AND PRILOCAINE 25; 25 MG/G; MG/G
1 CREAM TOPICAL AS NEEDED
Qty: 30 G | Refills: 3 | Status: SHIPPED | OUTPATIENT
Start: 2024-06-03

## 2024-06-03 RX ORDER — PALONOSETRON 0.05 MG/ML
0.25 INJECTION, SOLUTION INTRAVENOUS ONCE
Status: COMPLETED | OUTPATIENT
Start: 2024-06-03 | End: 2024-06-03

## 2024-06-03 RX ORDER — FAMOTIDINE 10 MG/ML
20 INJECTION, SOLUTION INTRAVENOUS AS NEEDED
Status: DISCONTINUED | OUTPATIENT
Start: 2024-06-03 | End: 2024-06-03 | Stop reason: HOSPADM

## 2024-06-03 RX ORDER — DIPHENHYDRAMINE HYDROCHLORIDE 50 MG/ML
50 INJECTION INTRAMUSCULAR; INTRAVENOUS AS NEEDED
Status: DISCONTINUED | OUTPATIENT
Start: 2024-06-03 | End: 2024-06-03 | Stop reason: HOSPADM

## 2024-06-03 RX ORDER — SODIUM CHLORIDE 9 MG/ML
20 INJECTION, SOLUTION INTRAVENOUS ONCE
Status: DISCONTINUED | OUTPATIENT
Start: 2024-06-03 | End: 2024-06-03 | Stop reason: HOSPADM

## 2024-06-03 RX ORDER — DEXTROSE MONOHYDRATE 50 MG/ML
20 INJECTION, SOLUTION INTRAVENOUS ONCE
Status: DISCONTINUED | OUTPATIENT
Start: 2024-06-03 | End: 2024-06-03 | Stop reason: HOSPADM

## 2024-06-03 RX ADMIN — FLUOROURACIL 5450 MG: 50 INJECTION, SOLUTION INTRAVENOUS at 11:14

## 2024-06-03 RX ADMIN — DEXAMETHASONE SODIUM PHOSPHATE 12 MG: 4 INJECTION, SOLUTION INTRA-ARTICULAR; INTRALESIONAL; INTRAMUSCULAR; INTRAVENOUS; SOFT TISSUE at 09:50

## 2024-06-03 RX ADMIN — LEUCOVORIN CALCIUM 910 MG: 10 INJECTION INTRAMUSCULAR; INTRAVENOUS at 10:10

## 2024-06-03 RX ADMIN — PANITUMUMAB 630 MG: 400 SOLUTION INTRAVENOUS at 09:01

## 2024-06-03 RX ADMIN — PALONOSETRON 0.25 MG: 0.25 INJECTION, SOLUTION INTRAVENOUS at 09:50

## 2024-06-05 ENCOUNTER — INFUSION (OUTPATIENT)
Dept: ONCOLOGY | Facility: HOSPITAL | Age: 54
End: 2024-06-05
Payer: COMMERCIAL

## 2024-06-05 DIAGNOSIS — C20 RECTAL CANCER METASTASIZED TO LIVER: ICD-10-CM

## 2024-06-05 DIAGNOSIS — C78.7 RECTAL CANCER METASTASIZED TO LIVER: ICD-10-CM

## 2024-06-05 DIAGNOSIS — C20 RECTAL ADENOCARCINOMA: Primary | ICD-10-CM

## 2024-06-05 PROCEDURE — 25010000002 HEPARIN LOCK FLUSH PER 10 UNITS: Performed by: INTERNAL MEDICINE

## 2024-06-05 PROCEDURE — 96523 IRRIG DRUG DELIVERY DEVICE: CPT

## 2024-06-05 RX ORDER — HEPARIN SODIUM (PORCINE) LOCK FLUSH IV SOLN 100 UNIT/ML 100 UNIT/ML
500 SOLUTION INTRAVENOUS AS NEEDED
Status: DISCONTINUED | OUTPATIENT
Start: 2024-06-05 | End: 2024-06-05 | Stop reason: HOSPADM

## 2024-06-05 RX ORDER — HEPARIN SODIUM (PORCINE) LOCK FLUSH IV SOLN 100 UNIT/ML 100 UNIT/ML
500 SOLUTION INTRAVENOUS AS NEEDED
OUTPATIENT
Start: 2024-06-05

## 2024-06-05 RX ORDER — SODIUM CHLORIDE 0.9 % (FLUSH) 0.9 %
10 SYRINGE (ML) INJECTION AS NEEDED
Status: DISCONTINUED | OUTPATIENT
Start: 2024-06-05 | End: 2024-06-05 | Stop reason: HOSPADM

## 2024-06-05 RX ORDER — SODIUM CHLORIDE 0.9 % (FLUSH) 0.9 %
10 SYRINGE (ML) INJECTION AS NEEDED
OUTPATIENT
Start: 2024-06-05

## 2024-06-05 RX ADMIN — Medication 10 ML: at 13:33

## 2024-06-05 RX ADMIN — HEPARIN 500 UNITS: 100 SYRINGE at 13:33

## 2024-06-07 ENCOUNTER — TELEPHONE (OUTPATIENT)
Dept: PALLIATIVE CARE | Facility: CLINIC | Age: 54
End: 2024-06-07
Payer: COMMERCIAL

## 2024-06-07 DIAGNOSIS — G89.3 CANCER RELATED PAIN: Primary | ICD-10-CM

## 2024-06-07 RX ORDER — OXYCODONE HYDROCHLORIDE 10 MG/1
10 TABLET ORAL EVERY 6 HOURS PRN
Qty: 120 TABLET | Refills: 0 | Status: SHIPPED | OUTPATIENT
Start: 2024-06-07

## 2024-06-07 NOTE — TELEPHONE ENCOUNTER
Hub staff attempted to follow warm transfer process and was unsuccessful     Caller: Edward Powell    Relationship to patient: Self    Best call back number: [phone]     Patient is needing: PT NEEDS OXYCODONE REFILLED, NOT IN CHART FOR HUB. PLEASE ADVISE, HE ONLY HAS 4 LEFT. HE WANTS A CALL BACK ONCE ORDER IS SENT.     [facility] [id number] - [place], [place] - [address] AT [facility] - [phone] [facility] [phone] FX

## 2024-06-07 NOTE — TELEPHONE ENCOUNTER
RACIEL #: [id number]    Medication requested: Oxycodone (Roxicodone) 10 MG    Last fill date: 5/6/24    Last appointment: 4/26/24    Next appointment: 8/9/24

## 2024-06-10 ENCOUNTER — TELEPHONE (OUTPATIENT)
Dept: ONCOLOGY | Facility: CLINIC | Age: 54
End: 2024-06-10
Payer: COMMERCIAL

## 2024-06-10 DIAGNOSIS — G89.3 CANCER RELATED PAIN: Primary | ICD-10-CM

## 2024-06-10 RX ORDER — GABAPENTIN 100 MG/1
100 CAPSULE ORAL 3 TIMES DAILY
Qty: 90 CAPSULE | Refills: 0 | Status: SHIPPED | OUTPATIENT
Start: 2024-06-10 | End: 2024-06-17 | Stop reason: DRUGHIGH

## 2024-06-10 NOTE — TELEPHONE ENCOUNTER
Caller: Edward Powell    Relationship: Self    Best call back number: [phone]    What is the best time to reach you: ANYTIME    Who are you requesting to speak with (clinical staff, provider,  specific staff member): CLINICAL     What was the call regarding: PATIENT SEEN BRIEN COSTA ABOUT A WEEK AGO REGARDING HIS FEET, AND BRIEN TOLD HIM TO WAIT ABOUT A WEEK TO SEE IF IT GETS ANY BETTER. HE SAID HIS FEET ARE NUMB AND HURTING, HIS FEELS LIKE A KNOT MIGHT BE ON BOTH FEET. HAS TAKEN PAIN MEDS AND THEY ARE NOT WORKING.  PAIN IS VERY INTENSE.    CALL TO ADVISE    Is it okay if the provider responds through MyChart: NO

## 2024-06-10 NOTE — TELEPHONE ENCOUNTER
"Spoke with patient at 09:47 via telephone.  He stated the numbness is now in bilateral foot and all digits are affected.  He stated he went fishing on 6/7/24 and it started then.  He stated he feels like there is a knot in the ball of each foot and that it is worse when he is up moving around.  He stated his feet \"feel like ice when they are numb.\"  Patient denied any falls or trips.  Patient stated he had been taking oxycodone BID and taking a dose in the middle of the day as needed but now is taking it every 5.5 to 6 hours and it is not helping.  He stated Tylenol or Motrin is not helping.  Patient stated he is not prescribed Neurontin.  Contacted GEORGIA Carballo and notified of the above and patient's complaints.  Patient contacted back and advised per Jacy that prescription sent to his pharmacy for Neurontin and that he should let this office know if it is not helping after a week.  Patient verbalized understanding.  "

## 2024-06-17 ENCOUNTER — TELEPHONE (OUTPATIENT)
Dept: ONCOLOGY | Facility: CLINIC | Age: 54
End: 2024-06-17
Payer: COMMERCIAL

## 2024-06-17 DIAGNOSIS — G89.3 CANCER RELATED PAIN: Primary | ICD-10-CM

## 2024-06-17 RX ORDER — GABAPENTIN 300 MG/1
300 CAPSULE ORAL 2 TIMES DAILY
Qty: 60 CAPSULE | Refills: 0 | Status: SHIPPED | OUTPATIENT
Start: 2024-06-17 | End: 2024-06-24 | Stop reason: DRUGHIGH

## 2024-06-17 NOTE — TELEPHONE ENCOUNTER
Caller: Edward Powell    Relationship: Self    Best call back number: [phone]    What is the best time to reach you: ANYTIME    Who are you requesting to speak with (clinical staff, provider,  specific staff member): CLINICAL     What was the call regarding:   FYI:  PATIENT CALLING SPOKE WITH PATRICIO AND THEY CANNOT FILL THE GABAPENTIN 300 MG UNTIL 6/26/2024.  PATRICIO TOLD HIM TO TAKE 3 OF  MG CAPSULES TWICE A DAY.       NO NEED TO CALL BACK.

## 2024-06-17 NOTE — TELEPHONE ENCOUNTER
Caller: Edward Powell    Relationship: Self    Best call back number: [phone]    What is the best time to reach you: ANYTIME    Who are you requesting to speak with (clinical staff, provider,  specific staff member): ALETHA VEGA    What was the call regarding: PATIENT WOULD LIKE FOR ALETHA TO CALL HIM BACK REGARDING THE GABAPENTIN MEDICATION. STILL IN PAIN AND DOESN'T SEE ANY DIFFERENCE.      CALL TO ADVISE

## 2024-06-17 NOTE — TELEPHONE ENCOUNTER
"Patient stated the Neurontin he started one week ago has not helped.  He stated the longer he is up on his feet, the worse the pain is.  He stated no change in the kind of pain or location.  He stated Neurontin helped \"a little bit but not much.\" GEORGIA Carballo, notified and per Jacy, patient contacted back and advised to stop taking Neurontin 100 mg PO TID and new prescription for Neurontin 300 mg PO BID will be sent to his pharmacy.  Advised patient per Jacy to call back in one week if he does not have improvement.  Patient verbalized understanding.  "

## 2024-06-17 NOTE — TELEPHONE ENCOUNTER
Patient stated [facility] pharmacy stated they cannot fill new dose due to insurance and he was instructed by the pharmacist to take x3 - 100 mg tablets by mouth twice daily until new prescription can be filled.  Patient verbalized understanding and stated he has enough.

## 2024-06-19 ENCOUNTER — TELEPHONE (OUTPATIENT)
Dept: NUTRITION | Facility: HOSPITAL | Age: 54
End: 2024-06-19
Payer: COMMERCIAL

## 2024-06-19 NOTE — PROGRESS NOTES
Adult Outpatient Nutrition  Assessment    Patient Name:  Edward Powell  YOB: 1970  MRN: [id number]    Assessment Date:  6/19/2024    Comments:  Called pt in regards to nutrition oncology f/up. #. Wt stable x 1 month. Pt is currently camping. He reports he continues to eat small meals and does not feel as hungry 2/2 previous bariatric surgery. He eats a good breakfast and will wait until wife will get home to eat again. He reports if he eats a meal while wife is away he is unable to eat dinner with wife. Discussed trying to eat small snack/peanut butter for lunch d/t small amount has a lot of calories.     Pt reports he has issues with Thrush after chemotherapy. He no longer has Magic Mouth wash but is going to call MD on Monday to get. He is using baking soda mouth wash mix for now. Pt has no other questions/concerns at this time.     RD to f/up in 1 month.    Electronically signed by:  Arabella Hong RD  06/19/24 13:04 EDT

## 2024-06-20 ENCOUNTER — TELEPHONE (OUTPATIENT)
Dept: ONCOLOGY | Facility: CLINIC | Age: 54
End: 2024-06-20
Payer: COMMERCIAL

## 2024-06-20 NOTE — TELEPHONE ENCOUNTER
Asked patient if he can change appointment to be seen by GEORGIA Carballo on 6-24-24 at 8:30.  Patient agreed and asked for refill of nystatin mouthwash.  Refill request sent to MD.  Notified scheduling.

## 2024-06-24 ENCOUNTER — INFUSION (OUTPATIENT)
Dept: ONCOLOGY | Facility: HOSPITAL | Age: 54
End: 2024-06-24
Payer: COMMERCIAL

## 2024-06-24 ENCOUNTER — PATIENT OUTREACH (OUTPATIENT)
Dept: ONCOLOGY | Facility: HOSPITAL | Age: 54
End: 2024-06-24
Payer: COMMERCIAL

## 2024-06-24 ENCOUNTER — OFFICE VISIT (OUTPATIENT)
Dept: ONCOLOGY | Facility: CLINIC | Age: 54
End: 2024-06-24
Payer: COMMERCIAL

## 2024-06-24 ENCOUNTER — TELEPHONE (OUTPATIENT)
Dept: ONCOLOGY | Facility: CLINIC | Age: 54
End: 2024-06-24

## 2024-06-24 VITALS
HEIGHT: 72 IN | RESPIRATION RATE: 18 BRPM | TEMPERATURE: 97.3 F | BODY MASS INDEX: 29.99 KG/M2 | SYSTOLIC BLOOD PRESSURE: 129 MMHG | WEIGHT: 221.4 LBS | OXYGEN SATURATION: 100 % | DIASTOLIC BLOOD PRESSURE: 71 MMHG | HEART RATE: 60 BPM

## 2024-06-24 DIAGNOSIS — C20 RECTAL CANCER METASTASIZED TO LIVER: Primary | ICD-10-CM

## 2024-06-24 DIAGNOSIS — M79.2 NERVE PAIN: Primary | ICD-10-CM

## 2024-06-24 DIAGNOSIS — C20 RECTAL ADENOCARCINOMA: ICD-10-CM

## 2024-06-24 DIAGNOSIS — C78.7 RECTAL CANCER METASTASIZED TO LIVER: Primary | ICD-10-CM

## 2024-06-24 LAB
ALBUMIN SERPL-MCNC: 3.4 G/DL (ref 3.5–5.2)
ALBUMIN/GLOB SERPL: 1.1 G/DL
ALP SERPL-CCNC: 119 U/L (ref 39–117)
ALT SERPL W P-5'-P-CCNC: 8 U/L (ref 1–41)
ANION GAP SERPL CALCULATED.3IONS-SCNC: 9.3 MMOL/L (ref 5–15)
ANISOCYTOSIS BLD QL: NORMAL
AST SERPL-CCNC: 16 U/L (ref 1–40)
BASOPHILS # BLD AUTO: 0.02 10*3/MM3 (ref 0–0.2)
BASOPHILS NFR BLD AUTO: 0.3 % (ref 0–1.5)
BILIRUB SERPL-MCNC: 0.4 MG/DL (ref 0–1.2)
BUN SERPL-MCNC: 9 MG/DL (ref 6–20)
BUN/CREAT SERPL: 10.8 (ref 7–25)
CALCIUM SPEC-SCNC: 8.7 MG/DL (ref 8.6–10.5)
CHLORIDE SERPL-SCNC: 106 MMOL/L (ref 98–107)
CO2 SERPL-SCNC: 23.7 MMOL/L (ref 22–29)
CREAT SERPL-MCNC: 0.83 MG/DL (ref 0.76–1.27)
DEPRECATED RDW RBC AUTO: 62.5 FL (ref 37–54)
EGFRCR SERPLBLD CKD-EPI 2021: 104 ML/MIN/1.73
EOSINOPHIL # BLD AUTO: 0.06 10*3/MM3 (ref 0–0.4)
EOSINOPHIL NFR BLD AUTO: 1 % (ref 0.3–6.2)
ERYTHROCYTE [DISTWIDTH] IN BLOOD BY AUTOMATED COUNT: 20.5 % (ref 12.3–15.4)
GLOBULIN UR ELPH-MCNC: 3.2 GM/DL
GLUCOSE SERPL-MCNC: 114 MG/DL (ref 65–99)
HCT VFR BLD AUTO: 36 % (ref 37.5–51)
HGB BLD-MCNC: 11.3 G/DL (ref 13–17.7)
IMM GRANULOCYTES # BLD AUTO: 0.02 10*3/MM3 (ref 0–0.05)
IMM GRANULOCYTES NFR BLD AUTO: 0.3 % (ref 0–0.5)
LYMPHOCYTES # BLD AUTO: 1.14 10*3/MM3 (ref 0.7–3.1)
LYMPHOCYTES NFR BLD AUTO: 18.6 % (ref 19.6–45.3)
MAGNESIUM SERPL-MCNC: 1.7 MG/DL (ref 1.6–2.6)
MCH RBC QN AUTO: 26.2 PG (ref 26.6–33)
MCHC RBC AUTO-ENTMCNC: 31.4 G/DL (ref 31.5–35.7)
MCV RBC AUTO: 83.5 FL (ref 79–97)
MONOCYTES # BLD AUTO: 0.41 10*3/MM3 (ref 0.1–0.9)
MONOCYTES NFR BLD AUTO: 6.7 % (ref 5–12)
NEUTROPHILS NFR BLD AUTO: 4.49 10*3/MM3 (ref 1.7–7)
NEUTROPHILS NFR BLD AUTO: 73.1 % (ref 42.7–76)
NRBC BLD AUTO-RTO: 0 /100 WBC (ref 0–0.2)
PLAT MORPH BLD: NORMAL
PLATELET # BLD AUTO: 177 10*3/MM3 (ref 140–450)
PMV BLD AUTO: 11.1 FL (ref 6–12)
POTASSIUM SERPL-SCNC: 3.5 MMOL/L (ref 3.5–5.2)
PROT SERPL-MCNC: 6.6 G/DL (ref 6–8.5)
RBC # BLD AUTO: 4.31 10*6/MM3 (ref 4.14–5.8)
SODIUM SERPL-SCNC: 139 MMOL/L (ref 136–145)
WBC MORPH BLD: NORMAL
WBC NRBC COR # BLD AUTO: 6.14 10*3/MM3 (ref 3.4–10.8)

## 2024-06-24 PROCEDURE — 96367 TX/PROPH/DG ADDL SEQ IV INF: CPT

## 2024-06-24 PROCEDURE — 25010000002 FLUOROURACIL PER 500 MG: Performed by: NURSE PRACTITIONER

## 2024-06-24 PROCEDURE — 25010000002 PANITUMUMAB PER 10 MG: Performed by: NURSE PRACTITIONER

## 2024-06-24 PROCEDURE — 25010000002 DEXAMETHASONE SODIUM PHOSPHATE 20 MG/5ML SOLUTION: Performed by: NURSE PRACTITIONER

## 2024-06-24 PROCEDURE — 80053 COMPREHEN METABOLIC PANEL: CPT

## 2024-06-24 PROCEDURE — 83735 ASSAY OF MAGNESIUM: CPT

## 2024-06-24 PROCEDURE — 99215 OFFICE O/P EST HI 40 MIN: CPT | Performed by: NURSE PRACTITIONER

## 2024-06-24 PROCEDURE — 25810000003 SODIUM CHLORIDE 0.9 % SOLUTION 250 ML FLEX CONT: Performed by: NURSE PRACTITIONER

## 2024-06-24 PROCEDURE — 25010000002 LEUCOVORIN CALCIUM PER 50MG: Performed by: NURSE PRACTITIONER

## 2024-06-24 PROCEDURE — 0 DEXTROSE 5 % SOLUTION 250 ML FLEX CONT: Performed by: NURSE PRACTITIONER

## 2024-06-24 PROCEDURE — 85007 BL SMEAR W/DIFF WBC COUNT: CPT

## 2024-06-24 PROCEDURE — 96413 CHEMO IV INFUSION 1 HR: CPT

## 2024-06-24 PROCEDURE — G0498 CHEMO EXTEND IV INFUS W/PUMP: HCPCS

## 2024-06-24 PROCEDURE — 25010000002 PANITUMUMAB 400 MG/20ML SOLUTION 20 ML VIAL: Performed by: NURSE PRACTITIONER

## 2024-06-24 PROCEDURE — 25010000002 PALONOSETRON 0.25 MG/5ML SOLUTION PREFILLED SYRINGE: Performed by: NURSE PRACTITIONER

## 2024-06-24 PROCEDURE — 96375 TX/PRO/DX INJ NEW DRUG ADDON: CPT

## 2024-06-24 PROCEDURE — 85025 COMPLETE CBC W/AUTO DIFF WBC: CPT

## 2024-06-24 RX ORDER — SODIUM CHLORIDE 9 MG/ML
20 INJECTION, SOLUTION INTRAVENOUS ONCE
Status: DISCONTINUED | OUTPATIENT
Start: 2024-06-24 | End: 2024-06-24 | Stop reason: HOSPADM

## 2024-06-24 RX ORDER — FAMOTIDINE 10 MG/ML
20 INJECTION, SOLUTION INTRAVENOUS AS NEEDED
Status: DISCONTINUED | OUTPATIENT
Start: 2024-06-24 | End: 2024-06-24 | Stop reason: HOSPADM

## 2024-06-24 RX ORDER — DEXTROSE MONOHYDRATE 50 MG/ML
20 INJECTION, SOLUTION INTRAVENOUS ONCE
Status: CANCELLED | OUTPATIENT
Start: 2024-06-24

## 2024-06-24 RX ORDER — FAMOTIDINE 10 MG/ML
20 INJECTION, SOLUTION INTRAVENOUS AS NEEDED
Status: CANCELLED | OUTPATIENT
Start: 2024-06-24

## 2024-06-24 RX ORDER — DIPHENHYDRAMINE HYDROCHLORIDE 50 MG/ML
50 INJECTION INTRAMUSCULAR; INTRAVENOUS AS NEEDED
Status: CANCELLED | OUTPATIENT
Start: 2024-06-24

## 2024-06-24 RX ORDER — DEXTROSE MONOHYDRATE 50 MG/ML
20 INJECTION, SOLUTION INTRAVENOUS ONCE
Status: DISCONTINUED | OUTPATIENT
Start: 2024-06-24 | End: 2024-06-24 | Stop reason: HOSPADM

## 2024-06-24 RX ORDER — HEPARIN SODIUM (PORCINE) LOCK FLUSH IV SOLN 100 UNIT/ML 100 UNIT/ML
500 SOLUTION INTRAVENOUS AS NEEDED
Status: DISCONTINUED | OUTPATIENT
Start: 2024-06-24 | End: 2024-06-24 | Stop reason: HOSPADM

## 2024-06-24 RX ORDER — SODIUM CHLORIDE 0.9 % (FLUSH) 0.9 %
10 SYRINGE (ML) INJECTION AS NEEDED
Status: DISCONTINUED | OUTPATIENT
Start: 2024-06-24 | End: 2024-06-24 | Stop reason: HOSPADM

## 2024-06-24 RX ORDER — SODIUM CHLORIDE 0.9 % (FLUSH) 0.9 %
10 SYRINGE (ML) INJECTION AS NEEDED
Status: CANCELLED | OUTPATIENT
Start: 2024-06-24

## 2024-06-24 RX ORDER — SODIUM CHLORIDE 9 MG/ML
20 INJECTION, SOLUTION INTRAVENOUS ONCE
Status: CANCELLED | OUTPATIENT
Start: 2024-06-24

## 2024-06-24 RX ORDER — HEPARIN SODIUM (PORCINE) LOCK FLUSH IV SOLN 100 UNIT/ML 100 UNIT/ML
500 SOLUTION INTRAVENOUS AS NEEDED
Status: CANCELLED | OUTPATIENT
Start: 2024-06-24

## 2024-06-24 RX ORDER — PALONOSETRON 0.05 MG/ML
0.25 INJECTION, SOLUTION INTRAVENOUS ONCE
Status: CANCELLED | OUTPATIENT
Start: 2024-06-24

## 2024-06-24 RX ORDER — PALONOSETRON 0.05 MG/ML
0.25 INJECTION, SOLUTION INTRAVENOUS ONCE
Status: COMPLETED | OUTPATIENT
Start: 2024-06-24 | End: 2024-06-24

## 2024-06-24 RX ORDER — DIPHENHYDRAMINE HYDROCHLORIDE 50 MG/ML
50 INJECTION INTRAMUSCULAR; INTRAVENOUS AS NEEDED
Status: DISCONTINUED | OUTPATIENT
Start: 2024-06-24 | End: 2024-06-24 | Stop reason: HOSPADM

## 2024-06-24 RX ORDER — GABAPENTIN 300 MG/1
300 CAPSULE ORAL TAKE AS DIRECTED
Qty: 120 CAPSULE | Refills: 0 | Status: SHIPPED | OUTPATIENT
Start: 2024-06-24

## 2024-06-24 RX ADMIN — PALONOSETRON 0.25 MG: 0.25 INJECTION, SOLUTION INTRAVENOUS at 10:36

## 2024-06-24 RX ADMIN — PANITUMUMAB 630 MG: 400 SOLUTION INTRAVENOUS at 09:59

## 2024-06-24 RX ADMIN — FLUOROURACIL 5450 MG: 50 INJECTION, SOLUTION INTRAVENOUS at 12:35

## 2024-06-24 RX ADMIN — LEUCOVORIN CALCIUM 910 MG: 10 INJECTION INTRAMUSCULAR; INTRAVENOUS at 11:05

## 2024-06-24 RX ADMIN — DEXAMETHASONE SODIUM PHOSPHATE 12 MG: 4 INJECTION, SOLUTION INTRA-ARTICULAR; INTRALESIONAL; INTRAMUSCULAR; INTRAVENOUS; SOFT TISSUE at 10:38

## 2024-06-24 NOTE — PROGRESS NOTES
Hematology and Oncology [place]  Office number [phone]    Fax number [phone]     Follow up     Date: 24    Patient Name: Edward Powell  MRN: [id number]  : 1970    Referring Physician: Dr. Gautam    Chief Complaint: Rectal cancer, lung mass, liver lesions follow up on treatment    Cancer Staging:  IV    History of Present Illness: Edward Powell is a pleasant 53 y.o. male who presents today for evaluation of rectal cancer in the company of his supportive significant other.    Patient has a longstanding history of intermittent diarrhea since his cholecystectomy in 2019.  However he developed progressive diarrhea with associated loss of bowel control and urgency as well as weight loss and fatigue prompting additional workup.    CT of the abdomen pelvis 2023 showed an irregular circumferential wall thickening involving the rectum concerning for mass lesion.  There was associated mesorectal lymphadenopathy.  Masslike consolidation of left lower lobe.  CT of the chest showed a cavitary lesion in the left lower lobe up to 4.8 cm with an adjacent cavitary nodule up to 2 cm and a 5 mm nodule on the right minor fissure.      He underwent colonoscopy 2023 with findings of an infiltrative and ulcerated partially obstructing mass in the proximal rectum spanning 10 cm.  Rectal polyps.  Biopsy of the rectal mass showed invasive moderately differentiated adenocarcinoma.  Additional biopsies showed tubular adenomas.  MSI testing was intact/low probability of MSI high.    PDL1 negative    PET/CT 2023 showed hypermetabolic rectal wall thickening compatible with known rectal malignancy.  Multiple small ill-defined hypoechoic hyper metabolic liver lesions compatible with metastatic disease.  Mildly enlarged and mildly hypermetabolic pelvic sidewall and internal iliac lymph node chain adenopathy.  Hypermetabolic lung mass with adjacent nodule.     He underwent liver biopsy and lung  biopsy January 2024.  Results of both confirmed metastatic colorectal cancer.    The patient has been experiencing substantial rectal pain.  He is taking oxycodone every 6 hours with partial relief.  He reports ongoing bowel movements.  No abdominal pain.  No vomiting.  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    Weight is stable.  Appetite is good.  Stable activity increase.  He is able to do a little bit more than he used to do he says.  Currently attending [employer].  Mucositis controlled with Magic mouthwash, nystatin, and baking soda.  We will continue this regimen.  No nausea or GERD.  Constipation controlled.  Continues to have rectal pain but remains not consistent or persistent.    He is taking oxycodone twice daily.  However, he has noticed that he is having a little bit more rectal pain.  He he is concerned about getting addicted to the pain medication.  He says he only takes it twice a day due to that reason.  Even though now at night his pain is uncontrolled to the point where he is jittery or shaky until he takes his medicine and it takes a long time to get his pain back under control.  Continues to complain of worsening neuropathy symptoms bilateral lower extremities on the third through fifth digits of each foot.  He feels his right foot may be a little bit better since increasing to Neurontin 300 mg twice daily.  He continues to have neuropathy in his fingers.    Skin is improved.  Continues using lotion.      Past Medical History:   Past Medical History:   Diagnosis Date    Arthritis     Cancer     rectal cancer - diagnosed 2023    Cholelithiasis 2019    Removed    COPD (chronic obstructive  pulmonary disease)     Coronary artery disease 2009    Stent - no cardiologist currently    Elevated cholesterol     GERD (gastroesophageal reflux disease)     Hernia 2003    Lower hernia    Perforated ulcer 2019    Sleep apnea     history of; when weighed over 400lbs - no issues following bariatric surgery       Past Surgical History:   Past Surgical History:   Procedure Laterality Date    APPENDECTOMY  1983    Removed    BARIATRIC SURGERY  2011    Gastric bypass    BLADDER TUMOR/ULCER BLEEDER CAUTERIZATION      CARDIAC CATHETERIZATION  2009    stent placed    CHOLECYSTECTOMY  2019    Removed    COLONOSCOPY N/A 12/07/2023    Procedure: COLONOSCOPY WITH HOT SNARE POLYPECTOMY AND TATTOO;  Surgeon: Noman Gautam MD;  Location: [facility] ENDOSCOPY;  Service: Gastroenterology;  Laterality: N/A;    PORTACATH PLACEMENT N/A 1/5/2024    Procedure: INSERTION OF PORTACATH WITH ULTRSOUND AND FLUOROSCOPIC GUIDANCE;  Surgeon: Ida Black MD;  Location: [facility] OR;  Service: General;  Laterality: N/A;    JENNIFER-EN-Y         Family History: No family history on file.  Uncle had colon cancer    Social History:   Social History     Socioeconomic History    Marital status:    Tobacco Use    Smoking status: Every Day     Current packs/day: 0.75     Average packs/day: 0.8 packs/day for 30.0 years (22.5 ttl pk-yrs)     Types: Cigarettes    Smokeless tobacco: Never    Tobacco comments:     35 years      pt reports closer to 2 packs per day since cancer diagnosis   Vaping Use    Vaping status: Never Used   Substance and Sexual Activity    Alcohol use: Never    Drug use: Never    Sexual activity: Defer       Medications:     Current Outpatient Medications:     buPROPion XL (WELLBUTRIN XL) 150 MG 24 hr tablet, TAKE 1 TABLET BY MOUTH DAILY IN PLACE OF SERTRALINE, Disp: 30 tablet, Rfl: 2    cetirizine (zyrTEC) 10 MG tablet, Take 1 tablet by mouth Daily., Disp: 30 tablet, Rfl: 2    clindamycin (Clindagel) 1 % gel,  Apply 1 Application topically to the appropriate area as directed 2 (Two) Times a Day. Use first, Disp: 30 g, Rfl: 1    dicyclomine (BENTYL) 20 MG tablet, Take 1 tablet by mouth 3 (Three) Times a Day As Needed for Abdominal Cramping., Disp: 30 tablet, Rfl: 3    docusate sodium (COLACE) 100 MG capsule, Take 1 capsule by mouth 2 (Two) Times a Day., Disp: , Rfl:     gabapentin (NEURONTIN) 300 MG capsule, Take 1 capsule by mouth 2 (Two) Times a Day., Disp: 60 capsule, Rfl: 0    Hydrocortisone, Perianal, (ANUSOL-HC) 2.5 % rectal cream, Insert  into the rectum 2 (Two) Times a Day. Indications: Inflamed Hemorrhoids, Disp: 30 g, Rfl: 1    lidocaine-prilocaine (EMLA) 2.5-2.5 % cream, Apply 1 Application topically to the appropriate area as directed As Needed (45-60 minutes prior to port access.  Cover with saran/plastic wrap.)., Disp: 30 g, Rfl: 3    LORazepam (ATIVAN) 0.5 MG tablet, Take 1 tablet by mouth Take As Directed. 1 tabelt by mouth 30 minutes prior to MRI, take a second tablet at the time of the MRI if needed., Disp: 2 tablet, Rfl: 0    Magic Mouthwash Oral Suspension (diphenhydrAMINE HCl - aluminum & magnesium hydroxide-simethicone - lidocaine - nystatin), Swish and Spit 10 mL by mouth every 6 (Six) Hours For 7 Days., Disp: 300 mL, Rfl: 3    mupirocin (BACTROBAN) 2 % cream, , Disp: , Rfl:     naloxone (NARCAN) 4 MG/0.1ML nasal spray, 1 spray into the nostril(s) as directed by provider As Needed for Opioid Reversal., Disp: 1 each, Rfl: 0    nystatin (MYCOSTATIN) 100,000 unit/mL suspension, Swish and Swallow 5mL by mouth four times a day, Disp: 473 mL, Rfl: 0    nystatin (MYCOSTATIN) 100,000 unit/mL suspension, Swish and swallow 5 mL 4 (Four) Times a Day., Disp: 473 mL, Rfl: 0    ondansetron (ZOFRAN) 8 MG tablet, Take 1 tablet by mouth 3 (Three) Times a Day As Needed for Nausea or Vomiting., Disp: 30 tablet, Rfl: 5    oxyCODONE (ROXICODONE) 10 MG tablet, Take 1 tablet by mouth Every 6 (Six) Hours As Needed for  "Moderate Pain or Severe Pain., Disp: 120 tablet, Rfl: 0    pantoprazole (Protonix) 40 MG EC tablet, Take 1 tablet by mouth Daily. Indications: Gastroesophageal Reflux Disease, Disp: 90 tablet, Rfl: 2    tiotropium bromide-olodaterol (STIOLTO RESPIMAT) 2.5-2.5 MCG/ACT aerosol solution inhaler, Inhale 2 puffs Daily., Disp: 1 each, Rfl: 5    traZODone (DESYREL) 100 MG tablet, Take 1 tablet by mouth Every Night., Disp: 30 tablet, Rfl: 1    triamcinolone (KENALOG) 0.025 % ointment, Apply 1 Application topically to the appropriate area as directed 2 (Two) Times a Day. Use second if topical treatment #1 ineffective, Disp: 80 g, Rfl: 0    Allergies:   Allergies   Allergen Reactions    Bactrim [Sulfamethoxazole-Trimethoprim] Hives     and blisters    Sulfa Antibiotics Hives     and blisters       Objective     Vital Signs:   Vitals:    06/24/24 0819   BP: 129/71   Pulse: 60   Resp: 18   Temp: 97.3 °F (36.3 °C)   SpO2: 100%   Weight: 100 kg (221 lb 6.4 oz)   Height: 182.9 cm (72.01\")   PainSc:   8        Body mass index is 30.02 kg/m².   Pain Score    06/24/24 0819   PainSc:   8           ECOG Performance Status: 1 - Symptomatic but completely ambulatory    Physical Exam:   General: No acute distress. Well appearing   HEENT: Normocephalic, atraumatic. Sclera anicteric. OP injected, reports a coating on the roof of mouth that he brushed of this AM  Neck: supple, no adenopathy.   Cardiovascular: regular rate and rhythm. No murmurs.   Respiratory: Normal rate. Clear to auscultation bilaterally  Abdomen: Soft, nontender, non distended with normoactive bowel sounds  Lymph: no cervical, supraclavicular or axillary adenopathy  Neuro: Alert and oriented x 3. No focal deficits.   Ext: Symmetric, no swelling.   Accurate as of 05/31/2024    Laboratory/Imaging Reviewed:   Infusion on 06/24/2024   Component Date Value Ref Range Status    WBC 06/24/2024 6.14  3.40 - 10.80 10*3/mm3 Final    RBC 06/24/2024 4.31  4.14 - 5.80 10*6/mm3 Final    " Hemoglobin 06/24/2024 11.3 (L)  13.0 - 17.7 g/dL Final    Hematocrit 06/24/2024 36.0 (L)  37.5 - 51.0 % Final    MCV 06/24/2024 83.5  79.0 - 97.0 fL Final    MCH 06/24/2024 26.2 (L)  26.6 - 33.0 pg Final    MCHC 06/24/2024 31.4 (L)  31.5 - 35.7 g/dL Final    RDW 06/24/2024 20.5 (H)  12.3 - 15.4 % Final    RDW-SD 06/24/2024 62.5 (H)  37.0 - 54.0 fl Final    MPV 06/24/2024 11.1  6.0 - 12.0 fL Final    Platelets 06/24/2024 177  140 - 450 10*3/mm3 Final    Neutrophil % 06/24/2024 73.1  42.7 - 76.0 % Final    Lymphocyte % 06/24/2024 18.6 (L)  19.6 - 45.3 % Final    Monocyte % 06/24/2024 6.7  5.0 - 12.0 % Final    Eosinophil % 06/24/2024 1.0  0.3 - 6.2 % Final    Basophil % 06/24/2024 0.3  0.0 - 1.5 % Final    Immature Grans % 06/24/2024 0.3  0.0 - 0.5 % Final    Neutrophils, Absolute 06/24/2024 4.49  1.70 - 7.00 10*3/mm3 Final    Lymphocytes, Absolute 06/24/2024 1.14  0.70 - 3.10 10*3/mm3 Final    Monocytes, Absolute 06/24/2024 0.41  0.10 - 0.90 10*3/mm3 Final    Eosinophils, Absolute 06/24/2024 0.06  0.00 - 0.40 10*3/mm3 Final    Basophils, Absolute 06/24/2024 0.02  0.00 - 0.20 10*3/mm3 Final    Immature Grans, Absolute 06/24/2024 0.02  0.00 - 0.05 10*3/mm3 Final    nRBC 06/24/2024 0.0  0.0 - 0.2 /100 WBC Final     Tempus xt: APC gene TP53; Negative ADRIANNA, BRAF, NRAS, TMB stable. PDL1 negative  No results found.    Procedures    Assessment / Plan      Assessment/Plan:     1.  Rectal cancer   2.  Liver metastases  3.  Lung metastasis  4.  Chemo monitoring  -The patient presents with a partially obstructing rectal cancer with adenopathy.  -He was found to have biopsy-proven metastasis in the liver and lung.  His case was presented at multidisciplinary tumor board.  -We will proceed with a liver MRI to better evaluate the extent of his hepatic metastasis as was recommended by radiology.  However, because of metastatic disease to both the lung and liver I do not think he will be downstage to resectable disease.  -Labs are  adequate to proceed with cycle #5.  I reviewed his Tempus results which are notable for an APC mutation, T p53 mutation, negative for KRAS, BRAF, NRAS, tumor mutation burden stable.  Notch 1 VUS.  -Reviewed potential role for colorectal surgery consultation regarding diversion particularly if his obstructive symptoms do not improve, but they are currently improving.   -CBC adequate and CMP pending for treatment today 6/24/2024. Omitting further oxaliplatin  -We will plan to check scans prior to return.  I will order CT chest, abdomen, and pelvis.  -Chemotherapy and premedication orders signed today.  -He had an extra week off for vacation.      5.  Cancer related pain  -PRN oxycodone.   -He is only using oxycodone twice daily.  We discussed adding a midday dose to see if this improves his pain during the day as well as into the night.  He is planning to try this.  We had a long discussion concerning addiction versus tolerance and the fact that he does have metastatic disease that does cause pain.  He feels he understands this process a little bit better now.    6. Access   -Port    7.  Panitumumab induced rash  -Added doxycycline. Well controlled.    8. GERD  PPI    9. Mucositis  -Well controlled with baking/soda salt  -Continue nystatin and Magic mouthwash as needed.      10.  Allergic reaction management  -He developed a pruritic rash during oxaliplatin administration which resolved with additional premedications including 2 rounds of hydrocortisone Benadryl.  Further oxaliplatin terminated.  -Resolved    11.  Skin dryness and cracks on his bilateral hands   -Improved.  Continue Vicks on his cracks on his fingers.  Followed by either Aquaphor, dead sea salt lotion, or other cream.     12. Treatment related Neuropathy  -We will increase his Neurontin to 300 mg in the morning, afternoon, and 600 mg at night.  We discussed side effects to gabapentin and I asked if he has any concerns to please call us.  -We had a  long discussion concerning his neuropathy.  We discussed that this could take up to 2 years to improve.  We also discussed that it may never completely resolve.  Our goal is to make this more tolerable and able to do his normal activities of daily living.    Follow Up:   2 weeks     GEORGIA Grimes    Total time of patient care including time prior to, face to face with patient, and following visit spent in reviewing records, lab results, imaging studies, discussion with patient, and documentation/charting was > 65 minutes        Hematology and Oncology

## 2024-06-24 NOTE — TELEPHONE ENCOUNTER
Cancelled with pharmacy previous prescription per request of GEORGIA Carballo on Neurontin 300 mg BID.  Pharmacy staff verbalized understanding.  New prescription routed to Jacy for her signature per her request.

## 2024-06-26 ENCOUNTER — INFUSION (OUTPATIENT)
Dept: ONCOLOGY | Facility: HOSPITAL | Age: 54
End: 2024-06-26
Payer: COMMERCIAL

## 2024-06-26 DIAGNOSIS — C78.7 RECTAL CANCER METASTASIZED TO LIVER: Primary | ICD-10-CM

## 2024-06-26 DIAGNOSIS — C20 RECTAL CANCER METASTASIZED TO LIVER: Primary | ICD-10-CM

## 2024-06-26 DIAGNOSIS — C20 RECTAL ADENOCARCINOMA: ICD-10-CM

## 2024-06-26 PROCEDURE — 25010000002 HEPARIN LOCK FLUSH PER 10 UNITS: Performed by: INTERNAL MEDICINE

## 2024-06-26 PROCEDURE — 96523 IRRIG DRUG DELIVERY DEVICE: CPT

## 2024-06-26 RX ORDER — SODIUM CHLORIDE 0.9 % (FLUSH) 0.9 %
10 SYRINGE (ML) INJECTION AS NEEDED
OUTPATIENT
Start: 2024-06-26

## 2024-06-26 RX ORDER — SODIUM CHLORIDE 0.9 % (FLUSH) 0.9 %
10 SYRINGE (ML) INJECTION AS NEEDED
Status: DISCONTINUED | OUTPATIENT
Start: 2024-06-26 | End: 2024-06-26 | Stop reason: HOSPADM

## 2024-06-26 RX ORDER — HEPARIN SODIUM (PORCINE) LOCK FLUSH IV SOLN 100 UNIT/ML 100 UNIT/ML
500 SOLUTION INTRAVENOUS AS NEEDED
Status: DISCONTINUED | OUTPATIENT
Start: 2024-06-26 | End: 2024-06-26 | Stop reason: HOSPADM

## 2024-06-26 RX ORDER — HEPARIN SODIUM (PORCINE) LOCK FLUSH IV SOLN 100 UNIT/ML 100 UNIT/ML
500 SOLUTION INTRAVENOUS AS NEEDED
OUTPATIENT
Start: 2024-06-26

## 2024-06-26 RX ADMIN — HEPARIN 500 UNITS: 100 SYRINGE at 15:06

## 2024-06-26 RX ADMIN — Medication 10 ML: at 15:05

## 2024-06-27 ENCOUNTER — DOCUMENTATION (OUTPATIENT)
Dept: OTHER | Facility: HOSPITAL | Age: 54
End: 2024-06-27
Payer: COMMERCIAL

## 2024-06-27 ENCOUNTER — DOCUMENTATION (OUTPATIENT)
Dept: SOCIAL WORK | Facility: HOSPITAL | Age: 54
End: 2024-06-27
Payer: COMMERCIAL

## 2024-06-27 NOTE — PROGRESS NOTES
Case Management/Social Work    Patient Name:  Edward Powell  YOB: 1970  MRN: [id number]  Admit Date:  (Not on file)         team/co worker Juana, met with pt yesterday for resources per request. Juana provided pt with pathways book, gas cards and 2 food bags. She also asked pt to reach out to insurance regarding transportation benefits. Pt had asked for more information on the grants that Martine/ZACH Lorenzo previously helped assist with. RENAY has reached out to Martine to contact pt regarding more information.       Electronically signed by:  EVENS Han  06/27/24 09:53 EDT

## 2024-06-27 NOTE — PROGRESS NOTES
RENAY contacted pt per request of Kumar Sheriff to discuss grants. 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 Pt asked about grants that were previously discussed. SW confirmed [email or web address] linnette was submitted, to which pt reported he did receive funds for that. SW explained that is one time assistance. SW discussed colorectal cancer alliance, but where it is a national fund, RENAY has been unsuccessful in getting any [facility] patient's accepted. SW will apply again on this date.   SW inquired about greatest need, to which he reported any assistance with living expenses. RENAY educated pt on [address] and will talk with RENAY Vaughan in [place] as that is where pt is treatment. SW explained that pt will need to bring any bills he needs assistance with to her to submit directly to them. Pt verbalized understanding and thanked RENAY. SW further reported after [address] is utilized all other resources are very limited. SW explained that unfortunately, there are no funds that provide ongoing assistance. Pt thanked RENAY for the information and provided understanding. SW encouraged pt to reach out ongoing for support to which he was agreeable.      Interventions:  Financial Needs: non-medical grants ([facility])  Transportation Needs: gas cards  Practical Needs: Housing; Utilities ([address] [address] for assistance with living expenses)

## 2024-07-01 ENCOUNTER — DOCUMENTATION (OUTPATIENT)
Dept: SOCIAL WORK | Facility: HOSPITAL | Age: 54
End: 2024-07-01
Payer: COMMERCIAL

## 2024-07-01 ENCOUNTER — LAB (OUTPATIENT)
Dept: LAB | Facility: HOSPITAL | Age: 54
End: 2024-07-01
Payer: COMMERCIAL

## 2024-07-01 DIAGNOSIS — J43.9 PULMONARY EMPHYSEMA, UNSPECIFIED EMPHYSEMA TYPE: Primary | ICD-10-CM

## 2024-07-01 DIAGNOSIS — I25.10 CORONARY ARTERY DISEASE INVOLVING NATIVE HEART WITHOUT ANGINA PECTORIS, UNSPECIFIED VESSEL OR LESION TYPE: ICD-10-CM

## 2024-07-01 DIAGNOSIS — Z11.59 ENCOUNTER FOR HEPATITIS C SCREENING TEST FOR LOW RISK PATIENT: ICD-10-CM

## 2024-07-01 DIAGNOSIS — Z12.5 PROSTATE CANCER SCREENING: ICD-10-CM

## 2024-07-01 LAB
CHOLEST SERPL-MCNC: 135 MG/DL (ref 0–200)
HCV AB SER QL: NORMAL
HDLC SERPL-MCNC: 26 MG/DL (ref 40–60)
LDLC SERPL CALC-MCNC: 86 MG/DL (ref 0–100)
LDLC/HDLC SERPL: 3.24 {RATIO}
PSA SERPL-MCNC: 2.09 NG/ML (ref 0–4)
TRIGL SERPL-MCNC: 124 MG/DL (ref 0–150)
VLDLC SERPL-MCNC: 23 MG/DL (ref 5–40)

## 2024-07-01 PROCEDURE — 86803 HEPATITIS C AB TEST: CPT

## 2024-07-01 PROCEDURE — G0103 PSA SCREENING: HCPCS

## 2024-07-01 PROCEDURE — 36415 COLL VENOUS BLD VENIPUNCTURE: CPT

## 2024-07-01 PROCEDURE — 80061 LIPID PANEL: CPT

## 2024-07-01 RX ORDER — TIOTROPIUM BROMIDE AND OLODATEROL 3.124; 2.736 UG/1; UG/1
SPRAY, METERED RESPIRATORY (INHALATION)
Qty: 4 G | Refills: 5 | Status: SHIPPED | OUTPATIENT
Start: 2024-07-01

## 2024-07-01 NOTE — PROGRESS NOTES
Case Management/Social Work    Patient Name:  Edward Powell  YOB: 1970  MRN: [id number]  Admit Date:  (Not on file)        SW spoke with pt via telephone regarding additional resources and assistance. Pt had previously met/spoke with Martine at [facility]. She has helped pt apply for a few grants. One still pending. She mentioned [address] that helps with assistance. Pt is aware to bring documents at next appointment. Pt states he will be here next Monday. SW will follow up with pt at next appointment. Pt very appreciative and denies any other needs at this time.       Electronically signed by:  EVENS Han  07/01/24 09:09 EDT

## 2024-07-02 DIAGNOSIS — C78.7 RECTAL CANCER METASTASIZED TO LIVER: ICD-10-CM

## 2024-07-02 DIAGNOSIS — C20 RECTAL CANCER METASTASIZED TO LIVER: ICD-10-CM

## 2024-07-02 RX ORDER — TRAZODONE HYDROCHLORIDE 100 MG/1
100 TABLET ORAL NIGHTLY
Qty: 30 TABLET | Refills: 1 | Status: SHIPPED | OUTPATIENT
Start: 2024-07-02

## 2024-07-05 ENCOUNTER — HOSPITAL ENCOUNTER (OUTPATIENT)
Dept: CT IMAGING | Facility: HOSPITAL | Age: 54
Discharge: HOME | End: 2024-07-05
Admitting: NURSE PRACTITIONER
Payer: COMMERCIAL

## 2024-07-05 DIAGNOSIS — C20 RECTAL CANCER METASTASIZED TO LIVER: ICD-10-CM

## 2024-07-05 DIAGNOSIS — C78.7 RECTAL CANCER METASTASIZED TO LIVER: Primary | ICD-10-CM

## 2024-07-05 DIAGNOSIS — C20 RECTAL CANCER METASTASIZED TO LIVER: Primary | ICD-10-CM

## 2024-07-05 DIAGNOSIS — C78.7 RECTAL CANCER METASTASIZED TO LIVER: ICD-10-CM

## 2024-07-05 PROCEDURE — 74177 CT ABD & PELVIS W/CONTRAST: CPT

## 2024-07-05 PROCEDURE — 25010000002 HEPARIN LOCK FLUSH PER 10 UNITS: Performed by: RADIOLOGY

## 2024-07-05 PROCEDURE — 25510000001 IOPAMIDOL 61 % SOLUTION: Performed by: NURSE PRACTITIONER

## 2024-07-05 PROCEDURE — 71260 CT THORAX DX C+: CPT

## 2024-07-05 RX ORDER — HEPARIN SODIUM (PORCINE) LOCK FLUSH IV SOLN 100 UNIT/ML 100 UNIT/ML
5 SOLUTION INTRAVENOUS AS NEEDED
Status: DISCONTINUED | OUTPATIENT
Start: 2024-07-05 | End: 2024-07-06 | Stop reason: HOSPADM

## 2024-07-05 RX ORDER — SODIUM CHLORIDE 0.9 % (FLUSH) 0.9 %
10 SYRINGE (ML) INJECTION EVERY 12 HOURS SCHEDULED
Status: DISCONTINUED | OUTPATIENT
Start: 2024-07-05 | End: 2024-07-06 | Stop reason: HOSPADM

## 2024-07-05 RX ORDER — SODIUM CHLORIDE 0.9 % (FLUSH) 0.9 %
10 SYRINGE (ML) INJECTION AS NEEDED
Status: DISCONTINUED | OUTPATIENT
Start: 2024-07-05 | End: 2024-07-06 | Stop reason: HOSPADM

## 2024-07-05 RX ADMIN — Medication 10 ML: at 18:04

## 2024-07-05 RX ADMIN — Medication 10 ML: at 18:00

## 2024-07-05 RX ADMIN — IOPAMIDOL 100 ML: 612 INJECTION, SOLUTION INTRAVENOUS at 18:05

## 2024-07-05 RX ADMIN — SODIUM CHLORIDE, PRESERVATIVE FREE 500 UNITS: 5 INJECTION INTRAVENOUS at 18:05

## 2024-07-08 ENCOUNTER — TELEPHONE (OUTPATIENT)
Dept: PALLIATIVE CARE | Facility: CLINIC | Age: 54
End: 2024-07-08
Payer: COMMERCIAL

## 2024-07-08 ENCOUNTER — INFUSION (OUTPATIENT)
Dept: ONCOLOGY | Facility: HOSPITAL | Age: 54
End: 2024-07-08
Payer: COMMERCIAL

## 2024-07-08 ENCOUNTER — OFFICE VISIT (OUTPATIENT)
Dept: ONCOLOGY | Facility: CLINIC | Age: 54
End: 2024-07-08
Payer: COMMERCIAL

## 2024-07-08 VITALS
WEIGHT: 214.5 LBS | BODY MASS INDEX: 29.05 KG/M2 | SYSTOLIC BLOOD PRESSURE: 110 MMHG | HEIGHT: 72 IN | RESPIRATION RATE: 18 BRPM | DIASTOLIC BLOOD PRESSURE: 59 MMHG | TEMPERATURE: 97.7 F | HEART RATE: 75 BPM | OXYGEN SATURATION: 99 %

## 2024-07-08 DIAGNOSIS — C20 RECTAL CANCER METASTASIZED TO LIVER: Primary | ICD-10-CM

## 2024-07-08 DIAGNOSIS — C78.7 RECTAL CANCER METASTASIZED TO LIVER: Primary | ICD-10-CM

## 2024-07-08 DIAGNOSIS — G89.3 CANCER RELATED PAIN: Primary | ICD-10-CM

## 2024-07-08 DIAGNOSIS — M79.2 NERVE PAIN: ICD-10-CM

## 2024-07-08 LAB
ALBUMIN SERPL-MCNC: 3.5 G/DL (ref 3.5–5.2)
ALBUMIN/GLOB SERPL: 0.9 G/DL
ALP SERPL-CCNC: 114 U/L (ref 39–117)
ALT SERPL W P-5'-P-CCNC: 10 U/L (ref 1–41)
ANION GAP SERPL CALCULATED.3IONS-SCNC: 8.9 MMOL/L (ref 5–15)
AST SERPL-CCNC: 23 U/L (ref 1–40)
BASOPHILS # BLD AUTO: 0.03 10*3/MM3 (ref 0–0.2)
BASOPHILS NFR BLD AUTO: 0.3 % (ref 0–1.5)
BILIRUB SERPL-MCNC: 0.4 MG/DL (ref 0–1.2)
BUN SERPL-MCNC: 10 MG/DL (ref 6–20)
BUN/CREAT SERPL: 11.6 (ref 7–25)
CALCIUM SPEC-SCNC: 8.8 MG/DL (ref 8.6–10.5)
CEA SERPL-MCNC: 3.93 NG/ML
CHLORIDE SERPL-SCNC: 105 MMOL/L (ref 98–107)
CO2 SERPL-SCNC: 23.1 MMOL/L (ref 22–29)
CREAT SERPL-MCNC: 0.86 MG/DL (ref 0.76–1.27)
DEPRECATED RDW RBC AUTO: 59.7 FL (ref 37–54)
EGFRCR SERPLBLD CKD-EPI 2021: 102.3 ML/MIN/1.73
EOSINOPHIL # BLD AUTO: 0.07 10*3/MM3 (ref 0–0.4)
EOSINOPHIL NFR BLD AUTO: 0.7 % (ref 0.3–6.2)
ERYTHROCYTE [DISTWIDTH] IN BLOOD BY AUTOMATED COUNT: 19.9 % (ref 12.3–15.4)
GLOBULIN UR ELPH-MCNC: 3.7 GM/DL
GLUCOSE SERPL-MCNC: 95 MG/DL (ref 65–99)
HCT VFR BLD AUTO: 38 % (ref 37.5–51)
HGB BLD-MCNC: 11.8 G/DL (ref 13–17.7)
IMM GRANULOCYTES # BLD AUTO: 0.04 10*3/MM3 (ref 0–0.05)
IMM GRANULOCYTES NFR BLD AUTO: 0.4 % (ref 0–0.5)
LYMPHOCYTES # BLD AUTO: 1.25 10*3/MM3 (ref 0.7–3.1)
LYMPHOCYTES NFR BLD AUTO: 13.1 % (ref 19.6–45.3)
MAGNESIUM SERPL-MCNC: 1.9 MG/DL (ref 1.6–2.6)
MCH RBC QN AUTO: 26 PG (ref 26.6–33)
MCHC RBC AUTO-ENTMCNC: 31.1 G/DL (ref 31.5–35.7)
MCV RBC AUTO: 83.9 FL (ref 79–97)
MONOCYTES # BLD AUTO: 0.66 10*3/MM3 (ref 0.1–0.9)
MONOCYTES NFR BLD AUTO: 6.9 % (ref 5–12)
NEUTROPHILS NFR BLD AUTO: 7.5 10*3/MM3 (ref 1.7–7)
NEUTROPHILS NFR BLD AUTO: 78.6 % (ref 42.7–76)
NRBC BLD AUTO-RTO: 0 /100 WBC (ref 0–0.2)
PLATELET # BLD AUTO: 177 10*3/MM3 (ref 140–450)
PMV BLD AUTO: 10.4 FL (ref 6–12)
POTASSIUM SERPL-SCNC: 3.5 MMOL/L (ref 3.5–5.2)
PROT SERPL-MCNC: 7.2 G/DL (ref 6–8.5)
RBC # BLD AUTO: 4.53 10*6/MM3 (ref 4.14–5.8)
SODIUM SERPL-SCNC: 137 MMOL/L (ref 136–145)
WBC NRBC COR # BLD AUTO: 9.55 10*3/MM3 (ref 3.4–10.8)

## 2024-07-08 PROCEDURE — G0498 CHEMO EXTEND IV INFUS W/PUMP: HCPCS

## 2024-07-08 PROCEDURE — 25810000003 SODIUM CHLORIDE 0.9 % SOLUTION 250 ML FLEX CONT: Performed by: INTERNAL MEDICINE

## 2024-07-08 PROCEDURE — 96367 TX/PROPH/DG ADDL SEQ IV INF: CPT

## 2024-07-08 PROCEDURE — 25010000002 FLUOROURACIL PER 500 MG: Performed by: INTERNAL MEDICINE

## 2024-07-08 PROCEDURE — 25010000002 PANITUMUMAB 400 MG/20ML SOLUTION 20 ML VIAL: Performed by: INTERNAL MEDICINE

## 2024-07-08 PROCEDURE — 25010000002 PALONOSETRON 0.25 MG/5ML SOLUTION PREFILLED SYRINGE: Performed by: INTERNAL MEDICINE

## 2024-07-08 PROCEDURE — 83735 ASSAY OF MAGNESIUM: CPT

## 2024-07-08 PROCEDURE — 85025 COMPLETE CBC W/AUTO DIFF WBC: CPT

## 2024-07-08 PROCEDURE — 25010000002 PANITUMUMAB PER 10 MG: Performed by: INTERNAL MEDICINE

## 2024-07-08 PROCEDURE — 82378 CARCINOEMBRYONIC ANTIGEN: CPT

## 2024-07-08 PROCEDURE — 99215 OFFICE O/P EST HI 40 MIN: CPT | Performed by: INTERNAL MEDICINE

## 2024-07-08 PROCEDURE — 0 DEXTROSE 5 % SOLUTION 250 ML FLEX CONT: Performed by: INTERNAL MEDICINE

## 2024-07-08 PROCEDURE — 25010000002 DEXAMETHASONE SODIUM PHOSPHATE 20 MG/5ML SOLUTION: Performed by: INTERNAL MEDICINE

## 2024-07-08 PROCEDURE — 96413 CHEMO IV INFUSION 1 HR: CPT

## 2024-07-08 PROCEDURE — 96375 TX/PRO/DX INJ NEW DRUG ADDON: CPT

## 2024-07-08 PROCEDURE — 80053 COMPREHEN METABOLIC PANEL: CPT

## 2024-07-08 PROCEDURE — 25010000002 LEUCOVORIN CALCIUM PER 50MG: Performed by: INTERNAL MEDICINE

## 2024-07-08 RX ORDER — OXYCODONE HYDROCHLORIDE 15 MG/1
15 TABLET ORAL
Qty: 150 TABLET | Refills: 0 | Status: SHIPPED | OUTPATIENT
Start: 2024-07-08

## 2024-07-08 RX ORDER — DEXTROSE MONOHYDRATE 50 MG/ML
20 INJECTION, SOLUTION INTRAVENOUS ONCE
Status: DISCONTINUED | OUTPATIENT
Start: 2024-07-08 | End: 2024-07-08 | Stop reason: HOSPADM

## 2024-07-08 RX ORDER — DIPHENHYDRAMINE HYDROCHLORIDE 50 MG/ML
50 INJECTION INTRAMUSCULAR; INTRAVENOUS AS NEEDED
Status: CANCELLED | OUTPATIENT
Start: 2024-07-08

## 2024-07-08 RX ORDER — DEXTROSE MONOHYDRATE 50 MG/ML
20 INJECTION, SOLUTION INTRAVENOUS ONCE
Status: CANCELLED | OUTPATIENT
Start: 2024-07-08

## 2024-07-08 RX ORDER — SODIUM CHLORIDE 9 MG/ML
20 INJECTION, SOLUTION INTRAVENOUS ONCE
Status: DISCONTINUED | OUTPATIENT
Start: 2024-07-08 | End: 2024-07-08 | Stop reason: HOSPADM

## 2024-07-08 RX ORDER — PALONOSETRON 0.05 MG/ML
0.25 INJECTION, SOLUTION INTRAVENOUS ONCE
Status: CANCELLED | OUTPATIENT
Start: 2024-07-08

## 2024-07-08 RX ORDER — GABAPENTIN 300 MG/1
600 CAPSULE ORAL 3 TIMES DAILY
Qty: 180 CAPSULE | Refills: 0 | Status: SHIPPED | OUTPATIENT
Start: 2024-07-08 | End: 2024-08-07

## 2024-07-08 RX ORDER — PALONOSETRON 0.05 MG/ML
0.25 INJECTION, SOLUTION INTRAVENOUS ONCE
Status: COMPLETED | OUTPATIENT
Start: 2024-07-08 | End: 2024-07-08

## 2024-07-08 RX ORDER — FAMOTIDINE 10 MG/ML
20 INJECTION, SOLUTION INTRAVENOUS AS NEEDED
Status: CANCELLED | OUTPATIENT
Start: 2024-07-08

## 2024-07-08 RX ORDER — SODIUM CHLORIDE 9 MG/ML
20 INJECTION, SOLUTION INTRAVENOUS ONCE
Status: CANCELLED | OUTPATIENT
Start: 2024-07-08

## 2024-07-08 RX ADMIN — PANITUMUMAB 580 MG: 400 SOLUTION INTRAVENOUS at 10:24

## 2024-07-08 RX ADMIN — LEUCOVORIN CALCIUM 880 MG: 10 INJECTION INTRAMUSCULAR; INTRAVENOUS at 11:21

## 2024-07-08 RX ADMIN — FLUOROURACIL 5260 MG: 50 INJECTION, SOLUTION INTRAVENOUS at 12:26

## 2024-07-08 RX ADMIN — PALONOSETRON 0.25 MG: 0.25 INJECTION, SOLUTION INTRAVENOUS at 11:01

## 2024-07-08 RX ADMIN — DEXAMETHASONE SODIUM PHOSPHATE 12 MG: 4 INJECTION, SOLUTION INTRA-ARTICULAR; INTRALESIONAL; INTRAMUSCULAR; INTRAVENOUS; SOFT TISSUE at 11:03

## 2024-07-08 NOTE — TELEPHONE ENCOUNTER
PATIENT CALLED AND WANTED TO SPEAK WITH RAMESH OR CLINICAL STAFF ABOUT HIS PAIN. PATIENT STATED THAT HE RAN OUT OF HIS PAIN MEDS ON SATURDAY AND THAT HE'S HAD TO INCREASE THE AMOUNT HE'S TAKING. PATIENT NOTED THAT HE'S IN CHEMO IN [place] CURRENTLY AND WOULD LIKE A CALL BACK REGARDING THIS BEFORE HE LEAVES HIS CHEMO TREATMENT TODAY. PLEASE ADVISE.

## 2024-07-08 NOTE — TELEPHONE ENCOUNTER
Spoke with patient over the telephone regarding his pain. The patients states he has been taking 2-3 oxycodone 10 mg tablets every 6 hours. As a result, the patient ran out of pain medication on Friday. Patient states he believes he is building a tolerance to the medication. Discussed that it might also be increased pain due to cancer or therapy. Recommend increasing oxycodone to 15 mg every 5 hours as needed. Advised patient to communicate with palliative care if he is not taking the medication as prescribed to avoid running out of medication. Patient verbalized understanding and is agreeable with this plan. Refer to refill encounter.

## 2024-07-08 NOTE — TELEPHONE ENCOUNTER
Called [facility] pharmacy to inform the pharmacist the directions should read up to 30 days, not doses. Confirmed with pharmacist Kenia

## 2024-07-08 NOTE — PROGRESS NOTES
[facility]  Office number [phone]    Fax number [phone]     Follow up     Date: 24    Patient Name: Edward Powell  MRN: [id number]  : 1970    Referring Physician: Dr. Gautam    Chief Complaint: Rectal cancer, lung mass, liver lesions follow up on treatment    Cancer Staging:  IV    History of Present Illness: Edward Powell is a pleasant 54 y.o. male who presents today for evaluation of rectal cancer in the company of his supportive significant other.    Patient has a longstanding history of intermittent diarrhea since his cholecystectomy in 2019.  However he developed progressive diarrhea with associated loss of bowel control and urgency as well as weight loss and fatigue prompting additional workup.    CT of the abdomen pelvis 2023 showed an irregular circumferential wall thickening involving the rectum concerning for mass lesion.  There was associated mesorectal lymphadenopathy.  Masslike consolidation of left lower lobe.  CT of the chest showed a cavitary lesion in the left lower lobe up to 4.8 cm with an adjacent cavitary nodule up to 2 cm and a 5 mm nodule on the right minor fissure.      He underwent colonoscopy 2023 with findings of an infiltrative and ulcerated partially obstructing mass in the proximal rectum spanning 10 cm.  Rectal polyps.  Biopsy of the rectal mass showed invasive moderately differentiated adenocarcinoma.  Additional biopsies showed tubular adenomas.  MSI testing was intact/low probability of MSI high.    PDL1 negative    PET/CT 2023 showed hypermetabolic rectal wall thickening compatible with known rectal malignancy.  Multiple small ill-defined hypoechoic hyper metabolic liver lesions compatible with metastatic disease.  Mildly enlarged and mildly hypermetabolic pelvic sidewall and internal iliac lymph node chain adenopathy.  Hypermetabolic lung mass with adjacent nodule.     He underwent liver biopsy and lung  biopsy January 2024.  Results of both confirmed metastatic colorectal cancer.    The patient has been experiencing substantial rectal pain.  He is taking oxycodone every 6 hours with partial relief.  He reports ongoing bowel movements.  No abdominal pain.  No vomiting.  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   Painful neuropathy to bottom of feet to heels and bilateral fingers to MCPs. Escalated gabapentin to 300 mg 300 mg 600 mg at night which has not completely control this symptom.  Has been out of oxycodone since Friday. Rectal pain is worse since stopping. Was taking 2-3 per dose to get relief prior to running out. Previously 1 BID had been controlling his symptoms  Stable intermittent constipation, normal caliber stools  No skin rash.    Past Medical History:   Past Medical History:   Diagnosis Date    Arthritis     Cancer     rectal cancer - diagnosed 2023    Cholelithiasis 2019    Removed    COPD (chronic obstructive pulmonary disease)     Coronary artery disease 2009    Stent - no cardiologist currently    Elevated cholesterol     GERD (gastroesophageal reflux disease)     Hernia 2003    Lower hernia    Perforated ulcer 2019    Sleep apnea     history of; when weighed over 400lbs - no issues following bariatric surgery       Past Surgical History:   Past Surgical History:   Procedure Laterality Date    APPENDECTOMY  1983    Removed    BARIATRIC SURGERY  2011    Gastric bypass    BLADDER TUMOR/ULCER BLEEDER CAUTERIZATION      CARDIAC CATHETERIZATION  2009    stent placed    CHOLECYSTECTOMY  2019    Removed    COLONOSCOPY N/A 12/07/2023    Procedure: COLONOSCOPY WITH HOT SNARE POLYPECTOMY AND TATTOO;   Surgeon: Noman Gautam MD;  Location: [facility] ENDOSCOPY;  Service: Gastroenterology;  Laterality: N/A;    PORTACATH PLACEMENT N/A 1/5/2024    Procedure: INSERTION OF PORTACATH WITH ULTRSOUND AND FLUOROSCOPIC GUIDANCE;  Surgeon: Ida Black MD;  Location: [facility] OR;  Service: General;  Laterality: N/A;    JENNIFER-EN-Y         Family History: No family history on file.  Uncle had colon cancer    Social History:   Social History     Socioeconomic History    Marital status:    Tobacco Use    Smoking status: Every Day     Current packs/day: 0.75     Average packs/day: 0.8 packs/day for 30.0 years (22.5 ttl pk-yrs)     Types: Cigarettes    Smokeless tobacco: Never    Tobacco comments:     35 years      pt reports closer to 2 packs per day since cancer diagnosis   Vaping Use    Vaping status: Never Used   Substance and Sexual Activity    Alcohol use: Never    Drug use: Never    Sexual activity: Defer       Medications:     Current Outpatient Medications:     buPROPion XL (WELLBUTRIN XL) 150 MG 24 hr tablet, TAKE 1 TABLET BY MOUTH DAILY IN PLACE OF SERTRALINE, Disp: 30 tablet, Rfl: 2    cetirizine (zyrTEC) 10 MG tablet, Take 1 tablet by mouth Daily., Disp: 30 tablet, Rfl: 2    clindamycin (Clindagel) 1 % gel, Apply 1 Application topically to the appropriate area as directed 2 (Two) Times a Day. Use first, Disp: 30 g, Rfl: 1    dicyclomine (BENTYL) 20 MG tablet, Take 1 tablet by mouth 3 (Three) Times a Day As Needed for Abdominal Cramping., Disp: 30 tablet, Rfl: 3    docusate sodium (COLACE) 100 MG capsule, Take 1 capsule by mouth 2 (Two) Times a Day., Disp: , Rfl:     gabapentin (Neurontin) 300 MG capsule, Take 1 capsule by mouth Take As Directed. Take 300 mg (1 tablet) by mouth every morning and every afternoon. Take 600 mg (2 tablets) by mouth every evening at bedtime., Disp: 120 capsule, Rfl: 0    Hydrocortisone, Perianal, (ANUSOL-HC) 2.5 % rectal cream, Insert  into the rectum 2 (Two) Times  a Day. Indications: Inflamed Hemorrhoids, Disp: 30 g, Rfl: 1    lidocaine-prilocaine (EMLA) 2.5-2.5 % cream, Apply 1 Application topically to the appropriate area as directed As Needed (45-60 minutes prior to port access.  Cover with saran/plastic wrap.)., Disp: 30 g, Rfl: 3    LORazepam (ATIVAN) 0.5 MG tablet, Take 1 tablet by mouth Take As Directed. 1 tabelt by mouth 30 minutes prior to MRI, take a second tablet at the time of the MRI if needed., Disp: 2 tablet, Rfl: 0    Magic Mouthwash Oral Suspension (diphenhydrAMINE HCl - aluminum & magnesium hydroxide-simethicone - lidocaine - nystatin), Swish and Spit 10 mL by mouth every 6 (Six) Hours For 7 Days., Disp: 300 mL, Rfl: 3    mupirocin (BACTROBAN) 2 % cream, , Disp: , Rfl:     naloxone (NARCAN) 4 MG/0.1ML nasal spray, 1 spray into the nostril(s) as directed by provider As Needed for Opioid Reversal., Disp: 1 each, Rfl: 0    nystatin (MYCOSTATIN) 100,000 unit/mL suspension, Swish and Swallow 5mL by mouth four times a day, Disp: 473 mL, Rfl: 0    nystatin (MYCOSTATIN) 100,000 unit/mL suspension, Swish and swallow 5 mL 4 (Four) Times a Day., Disp: 473 mL, Rfl: 0    ondansetron (ZOFRAN) 8 MG tablet, Take 1 tablet by mouth 3 (Three) Times a Day As Needed for Nausea or Vomiting., Disp: 30 tablet, Rfl: 5    oxyCODONE (ROXICODONE) 10 MG tablet, Take 1 tablet by mouth Every 6 (Six) Hours As Needed for Moderate Pain or Severe Pain., Disp: 120 tablet, Rfl: 0    pantoprazole (Protonix) 40 MG EC tablet, Take 1 tablet by mouth Daily. Indications: Gastroesophageal Reflux Disease, Disp: 90 tablet, Rfl: 2    tiotropium bromide-olodaterol (Stiolto Respimat) 2.5-2.5 MCG/ACT aerosol solution inhaler, INHALE 2 INHALATION(S) BY MOUTH DAILY, Disp: 4 g, Rfl: 5    traZODone (DESYREL) 100 MG tablet, TAKE ONE TABLET BY MOUTH ONCE NIGHTLY, Disp: 30 tablet, Rfl: 1    triamcinolone (KENALOG) 0.025 % ointment, Apply 1 Application topically to the appropriate area as directed 2 (Two) Times a  "Day. Use second if topical treatment #1 ineffective, Disp: 80 g, Rfl: 0    Allergies:   Allergies   Allergen Reactions    Bactrim [Sulfamethoxazole-Trimethoprim] Hives     and blisters    Sulfa Antibiotics Hives     and blisters       Objective     Vital Signs:   Vitals:    07/08/24 0812   BP: 110/59   Pulse: 75   Resp: 18   Temp: 97.7 °F (36.5 °C)   SpO2: 99%   Weight: 97.3 kg (214 lb 8 oz)   Height: 182.9 cm (72.01\")   PainSc:   8      Body mass index is 29.08 kg/m².   Pain Score    07/08/24 0812   PainSc:   8         ECOG Performance Status: 1 - Symptomatic but completely ambulatory    Physical Exam:   General: No acute distress. Well appearing   HEENT: Normocephalic, atraumatic. Sclera anicteric.   Neck: supple, no adenopathy.   Cardiovascular: regular rate and rhythm. No murmurs.   Respiratory: Normal rate. Clear to auscultation bilaterally  Abdomen: Soft, nontender, non distended with normoactive bowel sounds  Lymph: no cervical, supraclavicular or axillary adenopathy  Neuro: Alert and oriented x 3. No focal deficits.   Ext: Symmetric, no swelling.   Accurate as of 7/8/24    Laboratory/Imaging Reviewed:   Infusion on 07/08/2024   Component Date Value Ref Range Status    Glucose 07/08/2024 95  65 - 99 mg/dL Final    BUN 07/08/2024 10  6 - 20 mg/dL Final    Creatinine 07/08/2024 0.86  0.76 - 1.27 mg/dL Final    Sodium 07/08/2024 137  136 - 145 mmol/L Final    Potassium 07/08/2024 3.5  3.5 - 5.2 mmol/L Final    Chloride 07/08/2024 105  98 - 107 mmol/L Final    CO2 07/08/2024 23.1  22.0 - 29.0 mmol/L Final    Calcium 07/08/2024 8.8  8.6 - 10.5 mg/dL Final    Total Protein 07/08/2024 7.2  6.0 - 8.5 g/dL Final    Albumin 07/08/2024 3.5  3.5 - 5.2 g/dL Final    ALT (SGPT) 07/08/2024 10  1 - 41 U/L Final    AST (SGOT) 07/08/2024 23  1 - 40 U/L Final    Alkaline Phosphatase 07/08/2024 114  39 - 117 U/L Final    Total Bilirubin 07/08/2024 0.4  0.0 - 1.2 mg/dL Final    Globulin 07/08/2024 3.7  gm/dL Final    A/G Ratio " 07/08/2024 0.9  g/dL Final    BUN/Creatinine Ratio 07/08/2024 11.6  7.0 - 25.0 Final    Anion Gap 07/08/2024 8.9  5.0 - 15.0 mmol/L Final    eGFR 07/08/2024 102.3  >60.0 mL/min/1.73 Final    Magnesium 07/08/2024 1.9  1.6 - 2.6 mg/dL Final    WBC 07/08/2024 9.55  3.40 - 10.80 10*3/mm3 Final    RBC 07/08/2024 4.53  4.14 - 5.80 10*6/mm3 Final    Hemoglobin 07/08/2024 11.8 (L)  13.0 - 17.7 g/dL Final    Hematocrit 07/08/2024 38.0  37.5 - 51.0 % Final    MCV 07/08/2024 83.9  79.0 - 97.0 fL Final    MCH 07/08/2024 26.0 (L)  26.6 - 33.0 pg Final    MCHC 07/08/2024 31.1 (L)  31.5 - 35.7 g/dL Final    RDW 07/08/2024 19.9 (H)  12.3 - 15.4 % Final    RDW-SD 07/08/2024 59.7 (H)  37.0 - 54.0 fl Final    MPV 07/08/2024 10.4  6.0 - 12.0 fL Final    Platelets 07/08/2024 177  140 - 450 10*3/mm3 Final    Neutrophil % 07/08/2024 78.6 (H)  42.7 - 76.0 % Final    Lymphocyte % 07/08/2024 13.1 (L)  19.6 - 45.3 % Final    Monocyte % 07/08/2024 6.9  5.0 - 12.0 % Final    Eosinophil % 07/08/2024 0.7  0.3 - 6.2 % Final    Basophil % 07/08/2024 0.3  0.0 - 1.5 % Final    Immature Grans % 07/08/2024 0.4  0.0 - 0.5 % Final    Neutrophils, Absolute 07/08/2024 7.50 (H)  1.70 - 7.00 10*3/mm3 Final    Lymphocytes, Absolute 07/08/2024 1.25  0.70 - 3.10 10*3/mm3 Final    Monocytes, Absolute 07/08/2024 0.66  0.10 - 0.90 10*3/mm3 Final    Eosinophils, Absolute 07/08/2024 0.07  0.00 - 0.40 10*3/mm3 Final    Basophils, Absolute 07/08/2024 0.03  0.00 - 0.20 10*3/mm3 Final    Immature Grans, Absolute 07/08/2024 0.04  0.00 - 0.05 10*3/mm3 Final    nRBC 07/08/2024 0.0  0.0 - 0.2 /100 WBC Final   Lab on 07/01/2024   Component Date Value Ref Range Status    PSA 07/01/2024 2.090  0.000 - 4.000 ng/mL Final    Total Cholesterol 07/01/2024 135  0 - 200 mg/dL Final    Triglycerides 07/01/2024 124  0 - 150 mg/dL Final    HDL Cholesterol 07/01/2024 26 (L)  40 - 60 mg/dL Final    LDL Cholesterol  07/01/2024 86  0 - 100 mg/dL Final    VLDL Cholesterol 07/01/2024 23  5 -  40 mg/dL Final    LDL/HDL Ratio 07/01/2024 3.24   Final    Hepatitis C Ab 07/01/2024 Non-Reactive  Non-Reactive Final     [facility]us xt: APC gene TP53; Negative ADRIANNA, BRAF, NRAS, TMB stable. PDL1 negative  CT Abdomen Pelvis With Contrast    Result Date: 7/6/2024  Narrative: PROCEDURE: CT ABDOMEN PELVIS W CONTRAST-  HISTORY: Follow up metastatic rectal cancer; P74-Qlsfytimv neoplasm of rectum; C78.7-Secondary malignant neoplasm of liver and intrahepatic bile duct  Comparison: April 12, 2024  FINDINGS: Axial CT images of the abdomen and pelvis were obtained with IV contrast only. Coronal and sagittal reformatted images were also obtained. This study was performed with techniques to keep radiation doses as low as reasonably achievable, (ALARA). Individualized dose reduction techniques using automated exposure control or adjustment of mA and/or kV according to the patient size were employed.  A partially imaged mass is seen in the posterior left lower lobe. No hepatic mass is identified. Postoperative changes are seen from cholecystectomy. Postoperative changes are seen from presumed gastric bypass. There is no evidence of biliary ductal dilatation. The pancreas appears normal. The spleen size is within normal limits. A 31 mm low-attenuation mass is seen in the posterior right kidney consistent with a cyst a second less than 1 cm cyst is seen at the lower pole of the right kidney. No left renal mass or hydronephrosis is identified. There is no evidence of adenopathy. No abnormal fluid collection is seen. No localized inflammatory process is identified. Moderate vascular calcifications are noted.  Images of the pelvis reveal persistent wall thickening of the upper rectum worrisome for residual neoplastic involvement. Several small mesorectal nodes are visually stable. A 14 mm right external iliac node is seen which previously measured 15 mm. There are several left medial external iliac nodes measuring up to 14 mm and were  previously 15 mm. Multiple enlarged bilateral inguinal nodes are seen. There is a 24 mm right inguinal node that previously measured 26 mm. No abnormal fluid collection is seen. The appendix is not well visualized. No localized inflammatory process is seen. No new bony abnormality is seen on the bone window images. Note is made of a left L5 pars defect.      Impression: Slightly improved pelvic adenopathy.  Persistent rectal wall thickening worrisome for residual neoplastic involvement although this could also represent posttreatment change.  No new mass or adenopathy identified.    CTDI: 7.26 mGy DLP:564.13 mGy.cm  This report was signed and finalized on 7/6/2024 7:13 AM by Luke Roberts MD.      CT Chest With Contrast Diagnostic    Result Date: 7/6/2024  Narrative: PROCEDURE: CT CHEST W CONTRAST DIAGNOSTIC-  HISTORY: Follow up metastatic rectal cancer; H40-Ukmzhfjsl neoplasm of rectum; C78.7-Secondary malignant neoplasm of liver and intrahepatic bile duct  COMPARISON: April 12, 2024 CT  FINDINGS: Axial CT images of the chest were obtained with contrast. Coronal and sagittal reformatted images were also obtained. This study was performed with techniques to keep radiation doses as low as reasonably achievable, (ALARA). Individualized dose reduction techniques using automated exposure control or adjustment of mA and/or kV according to the patient size were employed.  A right jugular port is present. There is no evidence of mediastinal or hilar mass or adenopathy. No axillary mass or adenopathy is identified. On the lung window images, a partially cavitary mass is seen in the left lower lobe which measures 41 x 23 mm and was previously 44 x 28 mm. A second smaller cavitary lesion is seen lateral to this which measures 11 mm and was previously 14 mm. Several other less than 1 cm nodules are visually stable. No new mass or pulmonary nodule is identified. Mild emphysema is noted. There is no evidence of pleural  effusion. No chest wall abnormality is identified.       Impression: Partially improved left lower lobe cavitary mass and nodule consistent with improved neoplastic involvement.  Stable other small nonspecific pulmonary nodules.  No new abnormality identified.      CTDI: 7.26 mGy DLP:564.13 mGy.cm  This report was signed and finalized on 7/6/2024 6:47 AM by Luke Roberst MD.       Procedures    Assessment / Plan      Assessment/Plan:     1.  Rectal cancer   2.  Liver metastases  3.  Lung metastasis  4.  Chemo monitoring  -The patient presents with a partially obstructing rectal cancer with adenopathy.  -He was found to have biopsy-proven metastasis in the liver and lung.  His case was presented at multidisciplinary tumor board.  -We will proceed with a liver MRI to better evaluate the extent of his hepatic metastasis as was recommended by radiology.  However, because of metastatic disease to both the lung and liver I do not think he will be downstage to resectable disease.  -Labs are adequate to proceed with cycle #5.  I reviewed his [facility] results which are notable for an APC mutation, T p53 mutation, negative for KRAS, BRAF, NRAS, tumor mutation burden stable.  Notch 1 VUS.  -Reviewed potential role for colorectal surgery consultation regarding diversion particularly if his obstructive symptoms do not improve, but they are currently improving.   -CBC adequate and CMP pending for treatment today with maintenance 5-FU and panitumumab.  -We reviewed his imaging which is consistent with excellent disease control.  He is having worsening rectal pain, which she thinks may be due to riding in his [id number].  There is no obvious progression in the rectal disease on CT imaging.  However he has had increasing oxycodone requirements.  I am going to refer him back to palliative care.  We are going to obtain an MRI of the rectum.  Could consider a course of palliative radiation, particularly if the patient is interested  in pursuing a chemotherapy holiday depending on the amount of residual disease and his symptoms improving with resuming his narcotic regimen.    5.  Cancer related pain  -PRN oxycodone.  I asked him to update palliative care regarding his symptoms for recommendations regarding dose modification.  -I am going to increase his gabapentin to 600 mg 3 times daily    6. Access   -Port    7.  Panitumumab induced rash  -Added doxycycline. Well controlled.    8. GERD  PPI    Follow Up:   2 weeks     Brenda Cronin MD  Hematology and Oncology       I have spent a total of 40 min on reviewing test results/preparing to see patient, counseling patient, performing medically appropriate exam and documenting clinical information in the electronic or other health record

## 2024-07-08 NOTE — TELEPHONE ENCOUNTER
[facility] CALLED TO GET CLARIFICATION ON THE OXYCODONE 15 MG.  THE PHARMACIST STATED THAT THE SCRIPT WAS WRITTEN FOR PATIENT TO TAKE 1 TAB 5X'S FOR UP TO 3 DOSES WITH QUANTITY  TABLETS. PLEASE ADVISE.

## 2024-07-08 NOTE — TELEPHONE ENCOUNTER
I have reviewed patient's RACIEL report prior to prescribing Schedule II, III, and IV medications. Request # [id number]. Next refill for oxycodone 15 mg tab q5h PRN #[id number] was sent to the pharmacy. The patient is scheduled to follow-up in 8/2024. Refer to telephone encounter.

## 2024-07-10 ENCOUNTER — INFUSION (OUTPATIENT)
Dept: ONCOLOGY | Facility: HOSPITAL | Age: 54
End: 2024-07-10
Payer: COMMERCIAL

## 2024-07-10 VITALS
HEART RATE: 81 BPM | SYSTOLIC BLOOD PRESSURE: 107 MMHG | DIASTOLIC BLOOD PRESSURE: 63 MMHG | RESPIRATION RATE: 16 BRPM | TEMPERATURE: 97.5 F

## 2024-07-10 DIAGNOSIS — C78.7 RECTAL CANCER METASTASIZED TO LIVER: ICD-10-CM

## 2024-07-10 DIAGNOSIS — C20 RECTAL CANCER METASTASIZED TO LIVER: ICD-10-CM

## 2024-07-10 DIAGNOSIS — C20 RECTAL ADENOCARCINOMA: Primary | ICD-10-CM

## 2024-07-10 PROCEDURE — 25010000002 HEPARIN LOCK FLUSH PER 10 UNITS: Performed by: INTERNAL MEDICINE

## 2024-07-10 PROCEDURE — 96523 IRRIG DRUG DELIVERY DEVICE: CPT

## 2024-07-10 RX ORDER — SODIUM CHLORIDE 0.9 % (FLUSH) 0.9 %
10 SYRINGE (ML) INJECTION AS NEEDED
Status: DISCONTINUED | OUTPATIENT
Start: 2024-07-10 | End: 2024-07-10 | Stop reason: HOSPADM

## 2024-07-10 RX ORDER — HEPARIN SODIUM (PORCINE) LOCK FLUSH IV SOLN 100 UNIT/ML 100 UNIT/ML
500 SOLUTION INTRAVENOUS AS NEEDED
OUTPATIENT
Start: 2024-07-10

## 2024-07-10 RX ORDER — SODIUM CHLORIDE 0.9 % (FLUSH) 0.9 %
10 SYRINGE (ML) INJECTION AS NEEDED
OUTPATIENT
Start: 2024-07-10

## 2024-07-10 RX ORDER — HEPARIN SODIUM (PORCINE) LOCK FLUSH IV SOLN 100 UNIT/ML 100 UNIT/ML
500 SOLUTION INTRAVENOUS AS NEEDED
Status: DISCONTINUED | OUTPATIENT
Start: 2024-07-10 | End: 2024-07-10 | Stop reason: HOSPADM

## 2024-07-10 RX ADMIN — HEPARIN 500 UNITS: 100 SYRINGE at 15:10

## 2024-07-10 RX ADMIN — Medication 10 ML: at 15:10

## 2024-07-22 ENCOUNTER — OFFICE VISIT (OUTPATIENT)
Dept: ONCOLOGY | Facility: CLINIC | Age: 54
End: 2024-07-22
Payer: COMMERCIAL

## 2024-07-22 ENCOUNTER — DOCUMENTATION (OUTPATIENT)
Dept: SOCIAL WORK | Facility: HOSPITAL | Age: 54
End: 2024-07-22
Payer: COMMERCIAL

## 2024-07-22 ENCOUNTER — INFUSION (OUTPATIENT)
Dept: ONCOLOGY | Facility: HOSPITAL | Age: 54
End: 2024-07-22
Payer: COMMERCIAL

## 2024-07-22 ENCOUNTER — TELEPHONE (OUTPATIENT)
Dept: ONCOLOGY | Facility: CLINIC | Age: 54
End: 2024-07-22

## 2024-07-22 VITALS
SYSTOLIC BLOOD PRESSURE: 108 MMHG | WEIGHT: 214.5 LBS | DIASTOLIC BLOOD PRESSURE: 55 MMHG | BODY MASS INDEX: 29.05 KG/M2 | OXYGEN SATURATION: 99 % | RESPIRATION RATE: 16 BRPM | HEART RATE: 63 BPM | HEIGHT: 72 IN | TEMPERATURE: 97.3 F

## 2024-07-22 DIAGNOSIS — C78.7 RECTAL CANCER METASTASIZED TO LIVER: Primary | ICD-10-CM

## 2024-07-22 DIAGNOSIS — C20 RECTAL CANCER METASTASIZED TO LIVER: Primary | ICD-10-CM

## 2024-07-22 LAB
ALBUMIN SERPL-MCNC: 3.3 G/DL (ref 3.5–5.2)
ALBUMIN/GLOB SERPL: 0.9 G/DL
ALP SERPL-CCNC: 108 U/L (ref 39–117)
ALT SERPL W P-5'-P-CCNC: 9 U/L (ref 1–41)
ANION GAP SERPL CALCULATED.3IONS-SCNC: 12.2 MMOL/L (ref 5–15)
AST SERPL-CCNC: 19 U/L (ref 1–40)
BASOPHILS # BLD AUTO: 0.02 10*3/MM3 (ref 0–0.2)
BASOPHILS NFR BLD AUTO: 0.2 % (ref 0–1.5)
BILIRUB SERPL-MCNC: 0.4 MG/DL (ref 0–1.2)
BUN SERPL-MCNC: 10 MG/DL (ref 6–20)
BUN/CREAT SERPL: 12.2 (ref 7–25)
CALCIUM SPEC-SCNC: 8.5 MG/DL (ref 8.6–10.5)
CHLORIDE SERPL-SCNC: 104 MMOL/L (ref 98–107)
CO2 SERPL-SCNC: 20.8 MMOL/L (ref 22–29)
CREAT SERPL-MCNC: 0.82 MG/DL (ref 0.76–1.27)
DEPRECATED RDW RBC AUTO: 55.9 FL (ref 37–54)
EGFRCR SERPLBLD CKD-EPI 2021: 103.7 ML/MIN/1.73
EOSINOPHIL # BLD AUTO: 0.15 10*3/MM3 (ref 0–0.4)
EOSINOPHIL NFR BLD AUTO: 1.8 % (ref 0.3–6.2)
ERYTHROCYTE [DISTWIDTH] IN BLOOD BY AUTOMATED COUNT: 19 % (ref 12.3–15.4)
GLOBULIN UR ELPH-MCNC: 3.7 GM/DL
GLUCOSE SERPL-MCNC: 102 MG/DL (ref 65–99)
HCT VFR BLD AUTO: 35 % (ref 37.5–51)
HGB BLD-MCNC: 10.9 G/DL (ref 13–17.7)
IMM GRANULOCYTES # BLD AUTO: 0.03 10*3/MM3 (ref 0–0.05)
IMM GRANULOCYTES NFR BLD AUTO: 0.4 % (ref 0–0.5)
LYMPHOCYTES # BLD AUTO: 1.28 10*3/MM3 (ref 0.7–3.1)
LYMPHOCYTES NFR BLD AUTO: 15.1 % (ref 19.6–45.3)
MAGNESIUM SERPL-MCNC: 1.6 MG/DL (ref 1.6–2.6)
MCH RBC QN AUTO: 25.8 PG (ref 26.6–33)
MCHC RBC AUTO-ENTMCNC: 31.1 G/DL (ref 31.5–35.7)
MCV RBC AUTO: 82.7 FL (ref 79–97)
MONOCYTES # BLD AUTO: 0.66 10*3/MM3 (ref 0.1–0.9)
MONOCYTES NFR BLD AUTO: 7.8 % (ref 5–12)
NEUTROPHILS NFR BLD AUTO: 6.32 10*3/MM3 (ref 1.7–7)
NEUTROPHILS NFR BLD AUTO: 74.7 % (ref 42.7–76)
NRBC BLD AUTO-RTO: 0 /100 WBC (ref 0–0.2)
PLATELET # BLD AUTO: 211 10*3/MM3 (ref 140–450)
PMV BLD AUTO: 10.3 FL (ref 6–12)
POTASSIUM SERPL-SCNC: 3.3 MMOL/L (ref 3.5–5.2)
PROT SERPL-MCNC: 7 G/DL (ref 6–8.5)
RBC # BLD AUTO: 4.23 10*6/MM3 (ref 4.14–5.8)
SODIUM SERPL-SCNC: 137 MMOL/L (ref 136–145)
WBC NRBC COR # BLD AUTO: 8.46 10*3/MM3 (ref 3.4–10.8)

## 2024-07-22 PROCEDURE — 25010000002 PALONOSETRON 0.25 MG/5ML SOLUTION PREFILLED SYRINGE: Performed by: NURSE PRACTITIONER

## 2024-07-22 PROCEDURE — 96375 TX/PRO/DX INJ NEW DRUG ADDON: CPT

## 2024-07-22 PROCEDURE — G0498 CHEMO EXTEND IV INFUS W/PUMP: HCPCS

## 2024-07-22 PROCEDURE — 99215 OFFICE O/P EST HI 40 MIN: CPT | Performed by: NURSE PRACTITIONER

## 2024-07-22 PROCEDURE — 83735 ASSAY OF MAGNESIUM: CPT

## 2024-07-22 PROCEDURE — 85025 COMPLETE CBC W/AUTO DIFF WBC: CPT

## 2024-07-22 PROCEDURE — 0 DEXTROSE 5 % SOLUTION: Performed by: NURSE PRACTITIONER

## 2024-07-22 PROCEDURE — 25010000002 DEXAMETHASONE SODIUM PHOSPHATE 20 MG/5ML SOLUTION: Performed by: NURSE PRACTITIONER

## 2024-07-22 PROCEDURE — 80053 COMPREHEN METABOLIC PANEL: CPT

## 2024-07-22 PROCEDURE — 25010000002 PANITUMUMAB PER 10 MG: Performed by: NURSE PRACTITIONER

## 2024-07-22 PROCEDURE — 25010000002 PANITUMUMAB 400 MG/20ML SOLUTION 20 ML VIAL: Performed by: NURSE PRACTITIONER

## 2024-07-22 PROCEDURE — 96366 THER/PROPH/DIAG IV INF ADDON: CPT

## 2024-07-22 PROCEDURE — 25010000002 FLUOROURACIL PER 500 MG: Performed by: NURSE PRACTITIONER

## 2024-07-22 PROCEDURE — 0 DEXTROSE 5 % SOLUTION 250 ML FLEX CONT: Performed by: NURSE PRACTITIONER

## 2024-07-22 PROCEDURE — 96413 CHEMO IV INFUSION 1 HR: CPT

## 2024-07-22 PROCEDURE — 25810000003 SODIUM CHLORIDE 0.9 % SOLUTION 250 ML FLEX CONT: Performed by: NURSE PRACTITIONER

## 2024-07-22 PROCEDURE — 96367 TX/PROPH/DG ADDL SEQ IV INF: CPT

## 2024-07-22 PROCEDURE — 25010000002 LEUCOVORIN CALCIUM PER 50MG: Performed by: NURSE PRACTITIONER

## 2024-07-22 RX ORDER — WATER 10 ML/10ML
INJECTION INTRAMUSCULAR; INTRAVENOUS; SUBCUTANEOUS
Status: DISCONTINUED
Start: 2024-07-22 | End: 2024-07-22 | Stop reason: HOSPADM

## 2024-07-22 RX ORDER — PALONOSETRON 0.05 MG/ML
0.25 INJECTION, SOLUTION INTRAVENOUS ONCE
Status: COMPLETED | OUTPATIENT
Start: 2024-07-22 | End: 2024-07-22

## 2024-07-22 RX ORDER — DEXTROSE MONOHYDRATE 50 MG/ML
20 INJECTION, SOLUTION INTRAVENOUS ONCE
Status: COMPLETED | OUTPATIENT
Start: 2024-07-22 | End: 2024-07-22

## 2024-07-22 RX ORDER — DOXYCYCLINE HYCLATE 100 MG
TABLET ORAL
COMMUNITY
Start: 2024-07-19

## 2024-07-22 RX ORDER — PALONOSETRON 0.05 MG/ML
0.25 INJECTION, SOLUTION INTRAVENOUS ONCE
Status: CANCELLED | OUTPATIENT
Start: 2024-07-22

## 2024-07-22 RX ORDER — DIPHENHYDRAMINE HYDROCHLORIDE 50 MG/ML
50 INJECTION INTRAMUSCULAR; INTRAVENOUS AS NEEDED
Status: DISCONTINUED | OUTPATIENT
Start: 2024-07-22 | End: 2024-07-22 | Stop reason: HOSPADM

## 2024-07-22 RX ORDER — DEXTROSE MONOHYDRATE 50 MG/ML
20 INJECTION, SOLUTION INTRAVENOUS ONCE
Status: CANCELLED | OUTPATIENT
Start: 2024-07-22

## 2024-07-22 RX ORDER — FAMOTIDINE 10 MG/ML
20 INJECTION, SOLUTION INTRAVENOUS AS NEEDED
Status: DISCONTINUED | OUTPATIENT
Start: 2024-07-22 | End: 2024-07-22 | Stop reason: HOSPADM

## 2024-07-22 RX ORDER — DIPHENHYDRAMINE HYDROCHLORIDE 50 MG/ML
50 INJECTION INTRAMUSCULAR; INTRAVENOUS AS NEEDED
Status: CANCELLED | OUTPATIENT
Start: 2024-07-22

## 2024-07-22 RX ORDER — SODIUM CHLORIDE 9 MG/ML
20 INJECTION, SOLUTION INTRAVENOUS ONCE
Status: DISCONTINUED | OUTPATIENT
Start: 2024-07-22 | End: 2024-07-22 | Stop reason: HOSPADM

## 2024-07-22 RX ORDER — SODIUM CHLORIDE 9 MG/ML
20 INJECTION, SOLUTION INTRAVENOUS ONCE
Status: CANCELLED | OUTPATIENT
Start: 2024-07-22

## 2024-07-22 RX ORDER — FAMOTIDINE 10 MG/ML
20 INJECTION, SOLUTION INTRAVENOUS AS NEEDED
Status: CANCELLED | OUTPATIENT
Start: 2024-07-22

## 2024-07-22 RX ADMIN — LEUCOVORIN CALCIUM 880 MG: 10 INJECTION INTRAMUSCULAR; INTRAVENOUS at 10:41

## 2024-07-22 RX ADMIN — PANITUMUMAB 580 MG: 400 SOLUTION INTRAVENOUS at 09:48

## 2024-07-22 RX ADMIN — DEXTROSE MONOHYDRATE 20 ML/HR: 50 INJECTION, SOLUTION INTRAVENOUS at 10:40

## 2024-07-22 RX ADMIN — DEXAMETHASONE SODIUM PHOSPHATE 12 MG: 4 INJECTION, SOLUTION INTRA-ARTICULAR; INTRALESIONAL; INTRAMUSCULAR; INTRAVENOUS; SOFT TISSUE at 10:24

## 2024-07-22 RX ADMIN — FLUOROURACIL 5260 MG: 50 INJECTION, SOLUTION INTRAVENOUS at 12:47

## 2024-07-22 RX ADMIN — PALONOSETRON 0.25 MG: 0.25 INJECTION, SOLUTION INTRAVENOUS at 10:24

## 2024-07-22 NOTE — PROGRESS NOTES
H[facility]ology and On[facility]  Office number [phone]    Fax number [phone]     Follow up     Date: 24    Patient Name: Edward Powell  MRN: [id number]  : 1970    Referring Physician: Dr. Gautam    Chief Complaint: Rectal cancer, lung mass, liver lesions follow up on treatment    Cancer Staging:  IV    History of Present Illness: Edward Powell is a pleasant 54 y.o. male who presents today for evaluation of rectal cancer in the company of his supportive significant other.    Patient has a longstanding history of intermittent diarrhea since his cholecystectomy in 2019.  However he developed progressive diarrhea with associated loss of bowel control and urgency as well as weight loss and fatigue prompting additional workup.    CT of the abdomen pelvis 2023 showed an irregular circumferential wall thickening involving the rectum concerning for mass lesion.  There was associated mesorectal lymphadenopathy.  Masslike consolidation of left lower lobe.  CT of the chest showed a cavitary lesion in the left lower lobe up to 4.8 cm with an adjacent cavitary nodule up to 2 cm and a 5 mm nodule on the right minor fissure.      He underwent colonoscopy 2023 with findings of an infiltrative and ulcerated partially obstructing mass in the proximal rectum spanning 10 cm.  Rectal polyps.  Biopsy of the rectal mass showed invasive moderately differentiated adenocarcinoma.  Additional biopsies showed tubular adenomas.  MSI testing was intact/low probability of MSI high.    PDL1 negative    PET/CT 2023 showed hypermetabolic rectal wall thickening compatible with known rectal malignancy.  Multiple small ill-defined hypoechoic hyper metabolic liver lesions compatible with metastatic disease.  Mildly enlarged and mildly hypermetabolic pelvic sidewall and internal iliac lymph node chain adenopathy.  Hypermetabolic lung mass with adjacent nodule.     He underwent liver biopsy and lung  biopsy January 2024.  Results of both confirmed metastatic colorectal cancer.    The patient has been experiencing substantial rectal pain.  He is taking oxycodone every 6 hours with partial relief.  He reports ongoing bowel movements.  No abdominal pain.  No vomiting.  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  Current gabapentin regimen is 600 mg 3 times daily.  This has not controlled his symptoms.  He is still having numbness and tingling in his hands and feet.  He has not noticed a difference since increasing the dosing.  He would like to try nerve savior.  This is a supplement recommended by a few friends of his.    Continue oxycodone for rectal pain.  Stable intermittent constipation, normal caliber stools  No skin rash.    Past Medical History:   Past Medical History:   Diagnosis Date    Arthritis     Cancer     rectal cancer - diagnosed 2023    Cholelithiasis 2019    Removed    COPD (chronic obstructive pulmonary disease)     Coronary artery disease 2009    Stent - no cardiologist currently    Elevated cholesterol     GERD (gastroesophageal reflux disease)     Hernia 2003    Lower hernia    Perforated ulcer 2019    Sleep apnea     history of; when weighed over 400lbs - no issues following bariatric surgery       Past Surgical History:   Past Surgical History:   Procedure Laterality Date    APPENDECTOMY  1983    Removed    BARIATRIC SURGERY  2011    Gastric bypass    BLADDER TUMOR/ULCER BLEEDER CAUTERIZATION      CARDIAC CATHETERIZATION  2009    stent placed    CHOLECYSTECTOMY  2019    Removed    COLONOSCOPY  N/A 12/07/2023    Procedure: COLONOSCOPY WITH HOT SNARE POLYPECTOMY AND TATTOO;  Surgeon: Noman Gautam MD;  Location: [facility] ENDOSCOPY;  Service: Gastroenterology;  Laterality: N/A;    PORTACATH PLACEMENT N/A 1/5/2024    Procedure: INSERTION OF PORTACATH WITH ULTRSOUND AND FLUOROSCOPIC GUIDANCE;  Surgeon: Ida Black MD;  Location: [facility] OR;  Service: General;  Laterality: N/A;    JENNIFER-EN-Y         Family History: No family history on file.  Uncle had colon cancer    Social History:   Social History     Socioeconomic History    Marital status:    Tobacco Use    Smoking status: Every Day     Current packs/day: 0.75     Average packs/day: 0.8 packs/day for 30.0 years (22.5 ttl pk-yrs)     Types: Cigarettes    Smokeless tobacco: Never    Tobacco comments:     35 years      pt reports closer to 2 packs per day since cancer diagnosis   Vaping Use    Vaping status: Never Used   Substance and Sexual Activity    Alcohol use: Never    Drug use: Never    Sexual activity: Defer       Medications:     Current Outpatient Medications:     buPROPion XL (WELLBUTRIN XL) 150 MG 24 hr tablet, TAKE 1 TABLET BY MOUTH DAILY IN PLACE OF SERTRALINE, Disp: 30 tablet, Rfl: 2    cetirizine (zyrTEC) 10 MG tablet, Take 1 tablet by mouth Daily., Disp: 30 tablet, Rfl: 2    clindamycin (Clindagel) 1 % gel, Apply 1 Application topically to the appropriate area as directed 2 (Two) Times a Day. Use first, Disp: 30 g, Rfl: 1    dicyclomine (BENTYL) 20 MG tablet, Take 1 tablet by mouth 3 (Three) Times a Day As Needed for Abdominal Cramping., Disp: 30 tablet, Rfl: 3    docusate sodium (COLACE) 100 MG capsule, Take 1 capsule by mouth 2 (Two) Times a Day., Disp: , Rfl:     doxycycline (VIBRAMYICN) 100 MG tablet, , Disp: , Rfl:     gabapentin (Neurontin) 300 MG capsule, Take 2 capsules by mouth 3 (Three) Times a Day for 30 days., Disp: 180 capsule, Rfl: 0    Hydrocortisone, Perianal, (ANUSOL-HC) 2.5 % rectal cream,  Insert  into the rectum 2 (Two) Times a Day. Indications: Inflamed Hemorrhoids, Disp: 30 g, Rfl: 1    lidocaine-prilocaine (EMLA) 2.5-2.5 % cream, Apply 1 Application topically to the appropriate area as directed As Needed (45-60 minutes prior to port access.  Cover with saran/plastic wrap.)., Disp: 30 g, Rfl: 3    LORazepam (ATIVAN) 0.5 MG tablet, Take 1 tablet by mouth Take As Directed. 1 tabelt by mouth 30 minutes prior to MRI, take a second tablet at the time of the MRI if needed., Disp: 2 tablet, Rfl: 0    Magic Mouthwash Oral Suspension (diphenhydrAMINE HCl - aluminum & magnesium hydroxide-simethicone - lidocaine - nystatin), Swish and Spit 10 mL by mouth every 6 (Six) Hours For 7 Days., Disp: 300 mL, Rfl: 3    mupirocin (BACTROBAN) 2 % cream, , Disp: , Rfl:     naloxone (NARCAN) 4 MG/0.1ML nasal spray, 1 spray into the nostril(s) as directed by provider As Needed for Opioid Reversal., Disp: 1 each, Rfl: 0    nystatin (MYCOSTATIN) 100,000 unit/mL suspension, Swish and Swallow 5mL by mouth four times a day, Disp: 473 mL, Rfl: 0    nystatin (MYCOSTATIN) 100,000 unit/mL suspension, Swish and swallow 5 mL 4 (Four) Times a Day., Disp: 473 mL, Rfl: 0    ondansetron (ZOFRAN) 8 MG tablet, Take 1 tablet by mouth 3 (Three) Times a Day As Needed for Nausea or Vomiting., Disp: 30 tablet, Rfl: 5    oxyCODONE (ROXICODONE) 15 MG immediate release tablet, Take 1 tablet by mouth 5 (Five) Times a Day As Needed for Moderate Pain or Severe Pain for up to 3 doses., Disp: 150 tablet, Rfl: 0    pantoprazole (Protonix) 40 MG EC tablet, Take 1 tablet by mouth Daily. Indications: Gastroesophageal Reflux Disease, Disp: 90 tablet, Rfl: 2    tiotropium bromide-olodaterol (Stiolto Respimat) 2.5-2.5 MCG/ACT aerosol solution inhaler, INHALE 2 INHALATION(S) BY MOUTH DAILY, Disp: 4 g, Rfl: 5    traZODone (DESYREL) 100 MG tablet, TAKE ONE TABLET BY MOUTH ONCE NIGHTLY, Disp: 30 tablet, Rfl: 1    triamcinolone (KENALOG) 0.025 % ointment, Apply 1  "Application topically to the appropriate area as directed 2 (Two) Times a Day. Use second if topical treatment #1 ineffective, Disp: 80 g, Rfl: 0  No current facility-administered medications for this visit.    Facility-Administered Medications Ordered in Other Visits:     alteplase (CATHFLO/ACTIVASE) injection 2 mg, 2 mg, Intravenous, Once, Brenda Cronin MD    sterile water (preservative free) injection  - ADS Override Pull, , , ,     Allergies:   Allergies   Allergen Reactions    Bactrim [Sulfamethoxazole-Trimethoprim] Hives     and blisters    Sulfa Antibiotics Hives     and blisters       Objective     Vital Signs:   Vitals:    07/22/24 0838   BP: 108/55   Pulse: 63   Resp: 16   Temp: 97.3 °F (36.3 °C)   SpO2: 99%   Weight: 97.3 kg (214 lb 8 oz)   Height: 182.9 cm (72.01\")   PainSc: 0-No pain      Body mass index is 29.08 kg/m².   Pain Score    07/22/24 0838   PainSc: 0-No pain         ECOG Performance Status: 1 - Symptomatic but completely ambulatory    Physical Exam:   General: No acute distress. Well appearing   HEENT: Normocephalic, atraumatic. Sclera anicteric.   Neck: supple, no adenopathy.   Cardiovascular: regular rate and rhythm. No murmurs.   Respiratory: Normal rate. Clear to auscultation bilaterally  Abdomen: Soft, nontender, non distended with normoactive bowel sounds  Lymph: no cervical, supraclavicular or axillary adenopathy  Neuro: Alert and oriented x 3. No focal deficits.   Ext: Symmetric, no swelling.   Accurate as of 7/8/24    Laboratory/Imaging Reviewed:   No visits with results within 2 Week(s) from this visit.   Latest known visit with results is:   Infusion on 07/08/2024   Component Date Value Ref Range Status    Glucose 07/08/2024 95  65 - 99 mg/dL Final    BUN 07/08/2024 10  6 - 20 mg/dL Final    Creatinine 07/08/2024 0.86  0.76 - 1.27 mg/dL Final    Sodium 07/08/2024 137  136 - 145 mmol/L Final    Potassium 07/08/2024 3.5  3.5 - 5.2 mmol/L Final    Chloride 07/08/2024 105  98 - 107 " mmol/L Final    CO2 07/08/2024 23.1  22.0 - 29.0 mmol/L Final    Calcium 07/08/2024 8.8  8.6 - 10.5 mg/dL Final    Total Protein 07/08/2024 7.2  6.0 - 8.5 g/dL Final    Albumin 07/08/2024 3.5  3.5 - 5.2 g/dL Final    ALT (SGPT) 07/08/2024 10  1 - 41 U/L Final    AST (SGOT) 07/08/2024 23  1 - 40 U/L Final    Alkaline Phosphatase 07/08/2024 114  39 - 117 U/L Final    Total Bilirubin 07/08/2024 0.4  0.0 - 1.2 mg/dL Final    Globulin 07/08/2024 3.7  gm/dL Final    A/G Ratio 07/08/2024 0.9  g/dL Final    BUN/Creatinine Ratio 07/08/2024 11.6  7.0 - 25.0 Final    Anion Gap 07/08/2024 8.9  5.0 - 15.0 mmol/L Final    eGFR 07/08/2024 102.3  >60.0 mL/min/1.73 Final    Magnesium 07/08/2024 1.9  1.6 - 2.6 mg/dL Final    WBC 07/08/2024 9.55  3.40 - 10.80 10*3/mm3 Final    RBC 07/08/2024 4.53  4.14 - 5.80 10*6/mm3 Final    Hemoglobin 07/08/2024 11.8 (L)  13.0 - 17.7 g/dL Final    Hematocrit 07/08/2024 38.0  37.5 - 51.0 % Final    MCV 07/08/2024 83.9  79.0 - 97.0 fL Final    MCH 07/08/2024 26.0 (L)  26.6 - 33.0 pg Final    MCHC 07/08/2024 31.1 (L)  31.5 - 35.7 g/dL Final    RDW 07/08/2024 19.9 (H)  12.3 - 15.4 % Final    RDW-SD 07/08/2024 59.7 (H)  37.0 - 54.0 fl Final    MPV 07/08/2024 10.4  6.0 - 12.0 fL Final    Platelets 07/08/2024 177  140 - 450 10*3/mm3 Final    Neutrophil % 07/08/2024 78.6 (H)  42.7 - 76.0 % Final    Lymphocyte % 07/08/2024 13.1 (L)  19.6 - 45.3 % Final    Monocyte % 07/08/2024 6.9  5.0 - 12.0 % Final    Eosinophil % 07/08/2024 0.7  0.3 - 6.2 % Final    Basophil % 07/08/2024 0.3  0.0 - 1.5 % Final    Immature Grans % 07/08/2024 0.4  0.0 - 0.5 % Final    Neutrophils, Absolute 07/08/2024 7.50 (H)  1.70 - 7.00 10*3/mm3 Final    Lymphocytes, Absolute 07/08/2024 1.25  0.70 - 3.10 10*3/mm3 Final    Monocytes, Absolute 07/08/2024 0.66  0.10 - 0.90 10*3/mm3 Final    Eosinophils, Absolute 07/08/2024 0.07  0.00 - 0.40 10*3/mm3 Final    Basophils, Absolute 07/08/2024 0.03  0.00 - 0.20 10*3/mm3 Final    Immature Grans,  Absolute 07/08/2024 0.04  0.00 - 0.05 10*3/mm3 Final    nRBC 07/08/2024 0.0  0.0 - 0.2 /100 WBC Final    CEA 07/08/2024 3.93  ng/mL Final     Tempus xt: APC gene TP53; Negative ADRIANNA, BRAF, NRAS, TMB stable. PDL1 negative  CT Abdomen Pelvis With Contrast    Result Date: 7/6/2024  Narrative: PROCEDURE: CT ABDOMEN PELVIS W CONTRAST-  HISTORY: Follow up metastatic rectal cancer; A40-Gqkhborxw neoplasm of rectum; C78.7-Secondary malignant neoplasm of liver and intrahepatic bile duct  Comparison: April 12, 2024  FINDINGS: Axial CT images of the abdomen and pelvis were obtained with IV contrast only. Coronal and sagittal reformatted images were also obtained. This study was performed with techniques to keep radiation doses as low as reasonably achievable, (ALARA). Individualized dose reduction techniques using automated exposure control or adjustment of mA and/or kV according to the patient size were employed.  A partially imaged mass is seen in the posterior left lower lobe. No hepatic mass is identified. Postoperative changes are seen from cholecystectomy. Postoperative changes are seen from presumed gastric bypass. There is no evidence of biliary ductal dilatation. The pancreas appears normal. The spleen size is within normal limits. A 31 mm low-attenuation mass is seen in the posterior right kidney consistent with a cyst a second less than 1 cm cyst is seen at the lower pole of the right kidney. No left renal mass or hydronephrosis is identified. There is no evidence of adenopathy. No abnormal fluid collection is seen. No localized inflammatory process is identified. Moderate vascular calcifications are noted.  Images of the pelvis reveal persistent wall thickening of the upper rectum worrisome for residual neoplastic involvement. Several small mesorectal nodes are visually stable. A 14 mm right external iliac node is seen which previously measured 15 mm. There are several left medial external iliac nodes measuring up  to 14 mm and were previously 15 mm. Multiple enlarged bilateral inguinal nodes are seen. There is a 24 mm right inguinal node that previously measured 26 mm. No abnormal fluid collection is seen. The appendix is not well visualized. No localized inflammatory process is seen. No new bony abnormality is seen on the bone window images. Note is made of a left L5 pars defect.      Impression: Slightly improved pelvic adenopathy.  Persistent rectal wall thickening worrisome for residual neoplastic involvement although this could also represent posttreatment change.  No new mass or adenopathy identified.    CTDI: 7.26 mGy DLP:564.13 mGy.cm  This report was signed and finalized on 7/6/2024 7:13 AM by Luke Roberts MD.      CT Chest With Contrast Diagnostic    Result Date: 7/6/2024  Narrative: PROCEDURE: CT CHEST W CONTRAST DIAGNOSTIC-  HISTORY: Follow up metastatic rectal cancer; G73-Xjmkvkosa neoplasm of rectum; C78.7-Secondary malignant neoplasm of liver and intrahepatic bile duct  COMPARISON: April 12, 2024 CT  FINDINGS: Axial CT images of the chest were obtained with contrast. Coronal and sagittal reformatted images were also obtained. This study was performed with techniques to keep radiation doses as low as reasonably achievable, (ALARA). Individualized dose reduction techniques using automated exposure control or adjustment of mA and/or kV according to the patient size were employed.  A right jugular port is present. There is no evidence of mediastinal or hilar mass or adenopathy. No axillary mass or adenopathy is identified. On the lung window images, a partially cavitary mass is seen in the left lower lobe which measures 41 x 23 mm and was previously 44 x 28 mm. A second smaller cavitary lesion is seen lateral to this which measures 11 mm and was previously 14 mm. Several other less than 1 cm nodules are visually stable. No new mass or pulmonary nodule is identified. Mild emphysema is noted. There is no evidence  of pleural effusion. No chest wall abnormality is identified.       Impression: Partially improved left lower lobe cavitary mass and nodule consistent with improved neoplastic involvement.  Stable other small nonspecific pulmonary nodules.  No new abnormality identified.      CTDI: 7.26 mGy DLP:564.13 mGy.cm  This report was signed and finalized on 7/6/2024 6:47 AM by Luke Roberts MD.       Procedures    Assessment / Plan      Assessment/Plan:     1.  Rectal cancer   2.  Liver metastases  3.  Lung metastasis  4.  Chemo monitoring  -The patient presents with a partially obstructing rectal cancer with adenopathy.  -He was found to have biopsy-proven metastasis in the liver and lung.  His case was presented at multidisciplinary tumor board.  -We will proceed with a liver MRI to better evaluate the extent of his hepatic metastasis as was recommended by radiology.  However, because of metastatic disease to both the lung and liver I do not think he will be downstage to resectable disease.  -Labs are adequate to proceed with cycle #5.  I reviewed his Tempus results which are notable for an APC mutation, T p53 mutation, negative for KRAS, BRAF, NRAS, tumor mutation burden stable.  Notch 1 VUS.  -Reviewed potential role for colorectal surgery consultation regarding diversion particularly if his obstructive symptoms do not improve, but they are currently improving.   -CBC adequate and CMP pending for treatment today with maintenance 5-FU and panitumumab.  -Imaging from 7/5/2024 which is consistent with excellent disease control.  He is having worsening rectal pain, which he thinks may be due to riding in his clqo-im-nati.  There is no obvious progression in the rectal disease on CT imaging.  However he has had increasing oxycodone requirements.  He has been referred back to palliative care.  They increased his oxycodone dose.  MRI of the rectum scheduled for July 30, 2024.    -Could consider a course of palliative  radiation, particularly if the patient is interested in pursuing a chemotherapy holiday depending on the amount of residual disease and his symptoms improving with resuming his narcotic regimen.    5.  Cancer related pain  -PRN oxycodone.  Palliative care has adjusted his oxycodone to 15 mg 1 tablet up to 5 times a day.  This has helped his pain.  -Continue increased gabapentin 600 mg 3 times daily  -I will check with our oral pharmacist to see if nerve Savior is an option for him.    6. Access   -Port    7.  Panitumumab induced rash  -Added doxycycline. Well controlled.    8. GERD  PPI    Follow Up:   2 weeks     GEORGIA Grimes    Hematology and Oncology       Total time of patient care including time prior to, face to face with patient, and following visit spent in reviewing records, lab results, imaging studies, discussion with patient, and documentation/charting was > 45 minutes

## 2024-07-22 NOTE — TELEPHONE ENCOUNTER
Relayed message per GEORGIA Burciaga per Oncology Pharmacist that Nervenapier should not interfere with Chemotheapy  Patient verbalized understanding of instructions with no further questions

## 2024-07-22 NOTE — PROGRESS NOTES
Case Management/Social Work    Patient Name:  Edward Powell  YOB: 1970  MRN: [id number]  Admit Date:  (Not on file)        RENAY met with pt in infusion today. Pt requesting gas cards and food bag. SW provided to pt today. Pt reports he did not bring any documentation to apply for the other linnette but will the next time he receives infusions in a few weeks. Pt did request ensure and additional gas cards on Wednesday when he comes for an appointment. SW will provide this to pt as requested.     Electronically signed by:  EVENS Han  07/22/24 15:38 EDT

## 2024-07-23 ENCOUNTER — TELEPHONE (OUTPATIENT)
Dept: NUTRITION | Facility: HOSPITAL | Age: 54
End: 2024-07-23
Payer: COMMERCIAL

## 2024-07-23 NOTE — PROGRESS NOTES
Nutrition Services    Patient Name:  Edward Powell  YOB: 1970  MRN: [id number]  Admit Date:  (Not on file)    Spoke with patient for nutrition follow-up regarding oncology treatment. Pt states he has lost 6lb in the last couple weeks and reports he will have a low appetite at times. Pt denies any nutrition-related questions/concerns a this time. RD to follow-up.     Electronically signed by:  Ninfa Baker RD  07/23/24 14:25 EDT

## 2024-07-24 ENCOUNTER — INFUSION (OUTPATIENT)
Dept: ONCOLOGY | Facility: HOSPITAL | Age: 54
End: 2024-07-24
Payer: COMMERCIAL

## 2024-07-24 ENCOUNTER — DOCUMENTATION (OUTPATIENT)
Dept: SOCIAL WORK | Facility: HOSPITAL | Age: 54
End: 2024-07-24
Payer: COMMERCIAL

## 2024-07-24 VITALS
SYSTOLIC BLOOD PRESSURE: 103 MMHG | HEART RATE: 57 BPM | TEMPERATURE: 98.2 F | DIASTOLIC BLOOD PRESSURE: 55 MMHG | RESPIRATION RATE: 18 BRPM

## 2024-07-24 DIAGNOSIS — C20 RECTAL CANCER METASTASIZED TO LIVER: ICD-10-CM

## 2024-07-24 DIAGNOSIS — C78.7 RECTAL CANCER METASTASIZED TO LIVER: ICD-10-CM

## 2024-07-24 DIAGNOSIS — C20 RECTAL ADENOCARCINOMA: Primary | ICD-10-CM

## 2024-07-24 PROCEDURE — 96523 IRRIG DRUG DELIVERY DEVICE: CPT

## 2024-07-24 PROCEDURE — 25010000002 HEPARIN LOCK FLUSH PER 10 UNITS

## 2024-07-24 RX ORDER — SODIUM CHLORIDE 0.9 % (FLUSH) 0.9 %
10 SYRINGE (ML) INJECTION AS NEEDED
Status: DISCONTINUED | OUTPATIENT
Start: 2024-07-24 | End: 2024-07-24 | Stop reason: HOSPADM

## 2024-07-24 RX ORDER — SODIUM CHLORIDE 0.9 % (FLUSH) 0.9 %
10 SYRINGE (ML) INJECTION AS NEEDED
OUTPATIENT
Start: 2024-07-24

## 2024-07-24 RX ORDER — HEPARIN SODIUM (PORCINE) LOCK FLUSH IV SOLN 100 UNIT/ML 100 UNIT/ML
500 SOLUTION INTRAVENOUS AS NEEDED
Status: DISCONTINUED | OUTPATIENT
Start: 2024-07-24 | End: 2024-07-24 | Stop reason: HOSPADM

## 2024-07-24 RX ORDER — HEPARIN SODIUM (PORCINE) LOCK FLUSH IV SOLN 100 UNIT/ML 100 UNIT/ML
SOLUTION INTRAVENOUS
Status: COMPLETED
Start: 2024-07-24 | End: 2024-07-24

## 2024-07-24 RX ORDER — HEPARIN SODIUM (PORCINE) LOCK FLUSH IV SOLN 100 UNIT/ML 100 UNIT/ML
500 SOLUTION INTRAVENOUS AS NEEDED
OUTPATIENT
Start: 2024-07-24

## 2024-07-24 RX ADMIN — HEPARIN 500 UNITS: 100 SYRINGE at 14:51

## 2024-07-24 RX ADMIN — HEPARIN SODIUM (PORCINE) LOCK FLUSH IV SOLN 100 UNIT/ML 500 UNITS: 100 SOLUTION at 14:51

## 2024-07-24 RX ADMIN — Medication 10 ML: at 14:50

## 2024-07-24 NOTE — PROGRESS NOTES
Case Management/Social Work    Patient Name:  Edward Powell  YOB: 1970  MRN: [id number]  Admit Date:  (Not on file)        SW met with pt in oncology to provide with gas cards and a 6 pack chocolate high protein ensure. Pt appreciative.       Electronically signed by:  EVENS Han  07/24/24 15:19 EDT

## 2024-07-30 ENCOUNTER — HOSPITAL ENCOUNTER (OUTPATIENT)
Dept: MRI IMAGING | Facility: HOSPITAL | Age: 54
Discharge: HOME | End: 2024-07-30
Admitting: INTERNAL MEDICINE
Payer: COMMERCIAL

## 2024-07-30 DIAGNOSIS — C78.7 RECTAL CANCER METASTASIZED TO LIVER: ICD-10-CM

## 2024-07-30 DIAGNOSIS — C20 RECTAL CANCER METASTASIZED TO LIVER: ICD-10-CM

## 2024-07-30 PROCEDURE — 72195 MRI PELVIS W/O DYE: CPT

## 2024-07-31 DIAGNOSIS — C20 RECTAL ADENOCARCINOMA: Primary | ICD-10-CM

## 2024-07-31 DIAGNOSIS — C20 RECTAL CANCER METASTASIZED TO LIVER: ICD-10-CM

## 2024-07-31 DIAGNOSIS — C78.7 RECTAL CANCER METASTASIZED TO LIVER: ICD-10-CM

## 2024-08-05 ENCOUNTER — OFFICE VISIT (OUTPATIENT)
Dept: ONCOLOGY | Facility: CLINIC | Age: 54
End: 2024-08-05
Payer: COMMERCIAL

## 2024-08-05 ENCOUNTER — INFUSION (OUTPATIENT)
Dept: ONCOLOGY | Facility: HOSPITAL | Age: 54
End: 2024-08-05
Payer: COMMERCIAL

## 2024-08-05 ENCOUNTER — TELEPHONE (OUTPATIENT)
Dept: ONCOLOGY | Facility: CLINIC | Age: 54
End: 2024-08-05
Payer: COMMERCIAL

## 2024-08-05 VITALS
HEIGHT: 72 IN | SYSTOLIC BLOOD PRESSURE: 121 MMHG | BODY MASS INDEX: 28.15 KG/M2 | WEIGHT: 207.8 LBS | HEART RATE: 63 BPM | RESPIRATION RATE: 16 BRPM | TEMPERATURE: 97.5 F | DIASTOLIC BLOOD PRESSURE: 57 MMHG | OXYGEN SATURATION: 99 %

## 2024-08-05 DIAGNOSIS — C20 RECTAL CANCER METASTASIZED TO LIVER: Primary | ICD-10-CM

## 2024-08-05 DIAGNOSIS — C78.7 RECTAL CANCER METASTASIZED TO LIVER: Primary | ICD-10-CM

## 2024-08-05 DIAGNOSIS — M79.2 NERVE PAIN: ICD-10-CM

## 2024-08-05 LAB
ALBUMIN SERPL-MCNC: 3.5 G/DL (ref 3.5–5.2)
ALBUMIN/GLOB SERPL: 0.9 G/DL
ALP SERPL-CCNC: 116 U/L (ref 39–117)
ALT SERPL W P-5'-P-CCNC: 9 U/L (ref 1–41)
ANION GAP SERPL CALCULATED.3IONS-SCNC: 12 MMOL/L (ref 5–15)
AST SERPL-CCNC: 23 U/L (ref 1–40)
BASOPHILS # BLD AUTO: 0.02 10*3/MM3 (ref 0–0.2)
BASOPHILS NFR BLD AUTO: 0.2 % (ref 0–1.5)
BILIRUB SERPL-MCNC: 0.4 MG/DL (ref 0–1.2)
BUN SERPL-MCNC: 12 MG/DL (ref 6–20)
BUN/CREAT SERPL: 14.5 (ref 7–25)
CALCIUM SPEC-SCNC: 8.8 MG/DL (ref 8.6–10.5)
CEA SERPL-MCNC: 3.88 NG/ML
CHLORIDE SERPL-SCNC: 107 MMOL/L (ref 98–107)
CO2 SERPL-SCNC: 23 MMOL/L (ref 22–29)
CREAT SERPL-MCNC: 0.83 MG/DL (ref 0.76–1.27)
DEPRECATED RDW RBC AUTO: 55.4 FL (ref 37–54)
EGFRCR SERPLBLD CKD-EPI 2021: 103.4 ML/MIN/1.73
EOSINOPHIL # BLD AUTO: 0.06 10*3/MM3 (ref 0–0.4)
EOSINOPHIL NFR BLD AUTO: 0.7 % (ref 0.3–6.2)
ERYTHROCYTE [DISTWIDTH] IN BLOOD BY AUTOMATED COUNT: 18.9 % (ref 12.3–15.4)
GLOBULIN UR ELPH-MCNC: 3.7 GM/DL
GLUCOSE SERPL-MCNC: 90 MG/DL (ref 65–99)
HCT VFR BLD AUTO: 36.8 % (ref 37.5–51)
HGB BLD-MCNC: 11.4 G/DL (ref 13–17.7)
IMM GRANULOCYTES # BLD AUTO: 0.03 10*3/MM3 (ref 0–0.05)
IMM GRANULOCYTES NFR BLD AUTO: 0.3 % (ref 0–0.5)
LYMPHOCYTES # BLD AUTO: 1.07 10*3/MM3 (ref 0.7–3.1)
LYMPHOCYTES NFR BLD AUTO: 12.3 % (ref 19.6–45.3)
MAGNESIUM SERPL-MCNC: 1.8 MG/DL (ref 1.6–2.6)
MCH RBC QN AUTO: 25.7 PG (ref 26.6–33)
MCHC RBC AUTO-ENTMCNC: 31 G/DL (ref 31.5–35.7)
MCV RBC AUTO: 82.9 FL (ref 79–97)
MONOCYTES # BLD AUTO: 0.76 10*3/MM3 (ref 0.1–0.9)
MONOCYTES NFR BLD AUTO: 8.8 % (ref 5–12)
NEUTROPHILS NFR BLD AUTO: 6.73 10*3/MM3 (ref 1.7–7)
NEUTROPHILS NFR BLD AUTO: 77.7 % (ref 42.7–76)
NRBC BLD AUTO-RTO: 0 /100 WBC (ref 0–0.2)
PLATELET # BLD AUTO: 195 10*3/MM3 (ref 140–450)
PMV BLD AUTO: 10.3 FL (ref 6–12)
POTASSIUM SERPL-SCNC: 3.6 MMOL/L (ref 3.5–5.2)
PROT SERPL-MCNC: 7.2 G/DL (ref 6–8.5)
RBC # BLD AUTO: 4.44 10*6/MM3 (ref 4.14–5.8)
SODIUM SERPL-SCNC: 142 MMOL/L (ref 136–145)
WBC NRBC COR # BLD AUTO: 8.67 10*3/MM3 (ref 3.4–10.8)

## 2024-08-05 PROCEDURE — 96413 CHEMO IV INFUSION 1 HR: CPT

## 2024-08-05 PROCEDURE — 25810000003 SODIUM CHLORIDE 0.9 % SOLUTION 250 ML FLEX CONT: Performed by: INTERNAL MEDICINE

## 2024-08-05 PROCEDURE — 25010000002 LEUCOVORIN CALCIUM PER 50MG: Performed by: INTERNAL MEDICINE

## 2024-08-05 PROCEDURE — G0498 CHEMO EXTEND IV INFUS W/PUMP: HCPCS

## 2024-08-05 PROCEDURE — 25010000002 DEXAMETHASONE SODIUM PHOSPHATE 20 MG/5ML SOLUTION: Performed by: INTERNAL MEDICINE

## 2024-08-05 PROCEDURE — 25010000002 PANITUMUMAB 400 MG/20ML SOLUTION 20 ML VIAL: Performed by: INTERNAL MEDICINE

## 2024-08-05 PROCEDURE — 99215 OFFICE O/P EST HI 40 MIN: CPT | Performed by: INTERNAL MEDICINE

## 2024-08-05 PROCEDURE — 83735 ASSAY OF MAGNESIUM: CPT

## 2024-08-05 PROCEDURE — 25010000002 PANITUMUMAB PER 10 MG: Performed by: INTERNAL MEDICINE

## 2024-08-05 PROCEDURE — 25010000002 PALONOSETRON 0.25 MG/5ML SOLUTION PREFILLED SYRINGE: Performed by: INTERNAL MEDICINE

## 2024-08-05 PROCEDURE — 96375 TX/PRO/DX INJ NEW DRUG ADDON: CPT

## 2024-08-05 PROCEDURE — 82378 CARCINOEMBRYONIC ANTIGEN: CPT

## 2024-08-05 PROCEDURE — 85025 COMPLETE CBC W/AUTO DIFF WBC: CPT

## 2024-08-05 PROCEDURE — 0 DEXTROSE 5 % SOLUTION 250 ML FLEX CONT: Performed by: INTERNAL MEDICINE

## 2024-08-05 PROCEDURE — 25010000002 FLUOROURACIL PER 500 MG: Performed by: INTERNAL MEDICINE

## 2024-08-05 PROCEDURE — 80053 COMPREHEN METABOLIC PANEL: CPT

## 2024-08-05 PROCEDURE — 96367 TX/PROPH/DG ADDL SEQ IV INF: CPT

## 2024-08-05 RX ORDER — SODIUM CHLORIDE 9 MG/ML
20 INJECTION, SOLUTION INTRAVENOUS ONCE
Status: DISCONTINUED | OUTPATIENT
Start: 2024-08-05 | End: 2024-08-05 | Stop reason: HOSPADM

## 2024-08-05 RX ORDER — PALONOSETRON 0.05 MG/ML
0.25 INJECTION, SOLUTION INTRAVENOUS ONCE
Status: CANCELLED | OUTPATIENT
Start: 2024-08-05

## 2024-08-05 RX ORDER — FAMOTIDINE 10 MG/ML
20 INJECTION, SOLUTION INTRAVENOUS AS NEEDED
Status: DISCONTINUED | OUTPATIENT
Start: 2024-08-05 | End: 2024-08-05 | Stop reason: HOSPADM

## 2024-08-05 RX ORDER — LIDOCAINE AND PRILOCAINE 25; 25 MG/G; MG/G
1 CREAM TOPICAL AS NEEDED
Qty: 30 G | Refills: 3 | Status: SHIPPED | OUTPATIENT
Start: 2024-08-05

## 2024-08-05 RX ORDER — DEXTROSE MONOHYDRATE 50 MG/ML
20 INJECTION, SOLUTION INTRAVENOUS ONCE
Status: CANCELLED | OUTPATIENT
Start: 2024-08-05

## 2024-08-05 RX ORDER — PALONOSETRON 0.05 MG/ML
0.25 INJECTION, SOLUTION INTRAVENOUS ONCE
Status: COMPLETED | OUTPATIENT
Start: 2024-08-05 | End: 2024-08-05

## 2024-08-05 RX ORDER — BUPROPION HYDROCHLORIDE 150 MG/1
150 TABLET ORAL EVERY MORNING
Qty: 30 TABLET | Refills: 2 | Status: SHIPPED | OUTPATIENT
Start: 2024-08-05

## 2024-08-05 RX ORDER — DEXTROSE MONOHYDRATE 50 MG/ML
20 INJECTION, SOLUTION INTRAVENOUS ONCE
Status: DISCONTINUED | OUTPATIENT
Start: 2024-08-05 | End: 2024-08-05 | Stop reason: HOSPADM

## 2024-08-05 RX ORDER — GABAPENTIN 300 MG/1
900 CAPSULE ORAL 3 TIMES DAILY
Qty: 270 CAPSULE | Refills: 1 | Status: SHIPPED | OUTPATIENT
Start: 2024-08-05 | End: 2024-08-09

## 2024-08-05 RX ORDER — FAMOTIDINE 10 MG/ML
20 INJECTION, SOLUTION INTRAVENOUS AS NEEDED
Status: CANCELLED | OUTPATIENT
Start: 2024-08-05

## 2024-08-05 RX ORDER — SODIUM CHLORIDE 9 MG/ML
20 INJECTION, SOLUTION INTRAVENOUS ONCE
Status: CANCELLED | OUTPATIENT
Start: 2024-08-05

## 2024-08-05 RX ORDER — DIPHENHYDRAMINE HYDROCHLORIDE 50 MG/ML
50 INJECTION INTRAMUSCULAR; INTRAVENOUS AS NEEDED
Status: DISCONTINUED | OUTPATIENT
Start: 2024-08-05 | End: 2024-08-05 | Stop reason: HOSPADM

## 2024-08-05 RX ORDER — DIPHENHYDRAMINE HYDROCHLORIDE 50 MG/ML
50 INJECTION INTRAMUSCULAR; INTRAVENOUS AS NEEDED
Status: CANCELLED | OUTPATIENT
Start: 2024-08-05

## 2024-08-05 RX ADMIN — DEXAMETHASONE SODIUM PHOSPHATE 12 MG: 4 INJECTION, SOLUTION INTRA-ARTICULAR; INTRALESIONAL; INTRAMUSCULAR; INTRAVENOUS; SOFT TISSUE at 10:44

## 2024-08-05 RX ADMIN — PANITUMUMAB 580 MG: 400 SOLUTION INTRAVENOUS at 10:06

## 2024-08-05 RX ADMIN — PALONOSETRON 0.25 MG: 0.25 INJECTION, SOLUTION INTRAVENOUS at 10:44

## 2024-08-05 RX ADMIN — LEUCOVORIN CALCIUM 880 MG: 10 INJECTION INTRAMUSCULAR; INTRAVENOUS at 11:13

## 2024-08-05 RX ADMIN — FLUOROURACIL 5260 MG: 50 INJECTION, SOLUTION INTRAVENOUS at 12:17

## 2024-08-05 NOTE — PROGRESS NOTES
[facility]ology [facility] [place]  Office number [phone]    Fax number [phone]     Follow up     Date: 24    Patient Name: Edward Powell  MRN: [id number]  : 1970    Referring Physician: Dr. Gautam    Chief Complaint: Rectal cancer, lung mass, liver lesions follow up on treatment    Cancer Staging:  IV    History of Present Illness: Edward Powell is a pleasant 54 y.o. male who presents today for evaluation of rectal cancer in the company of his supportive significant other.    Patient has a longstanding history of intermittent diarrhea since his cholecystectomy in 2019.  However he developed progressive diarrhea with associated loss of bowel control and urgency as well as weight loss and fatigue prompting additional workup.    CT of the abdomen pelvis 2023 showed an irregular circumferential wall thickening involving the rectum concerning for mass lesion.  There was associated mesorectal lymphadenopathy.  Masslike consolidation of left lower lobe.  CT of the chest showed a cavitary lesion in the left lower lobe up to 4.8 cm with an adjacent cavitary nodule up to 2 cm and a 5 mm nodule on the right minor fissure.      He underwent colonoscopy 2023 with findings of an infiltrative and ulcerated partially obstructing mass in the proximal rectum spanning 10 cm.  Rectal polyps.  Biopsy of the rectal mass showed invasive moderately differentiated adenocarcinoma.  Additional biopsies showed tubular adenomas.  MSI testing was intact/low probability of MSI high.    PDL1 negative    PET/CT 2023 showed hypermetabolic rectal wall thickening compatible with known rectal malignancy.  Multiple small ill-defined hypoechoic hyper metabolic liver lesions compatible with metastatic disease.  Mildly enlarged and mildly hypermetabolic pelvic sidewall and internal iliac lymph node chain adenopathy.  Hypermetabolic lung mass with adjacent nodule.     He underwent liver biopsy and lung  biopsy January 2024.  Results of both confirmed metastatic colorectal cancer.    The patient has been experiencing substantial rectal pain.  He is taking oxycodone every 6 hours with partial relief.  He reports ongoing bowel movements.  No abdominal pain.  No vomiting.  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   Painful neuropathy to bottom of feet to heels and bilateral fingers to MCPs. Gabapentin 600 TID is not helping.   Intermittent pain especially with defecation when constipated, not nearly as severe as at diagnosis. Taking oxycodone 15 mg 3-5 x daily on average.   He had a rectal MRI showing complex fistula. He has not had any recent severe increase in pain. No drainage. No fever. Has not yet been contacted by [facility].   Hands are dry cracking. No other rash.  Has lost 7 lbs but states he was outside working a lot in the heat. Eating and drinking well. Drinking protein drinks twice daily. Drinking plenty of fluids.     Past Medical History:   Past Medical History:   Diagnosis Date    Arthritis     Cancer     rectal cancer - diagnosed 2023    Cholelithiasis 2019    Removed    COPD (chronic obstructive pulmonary disease)     Coronary artery disease 2009    Stent - no cardiologist currently    Elevated cholesterol     GERD (gastroesophageal reflux disease)     Hernia 2003    Lower hernia    Perforated ulcer 2019    Sleep apnea     history of; when weighed over 400lbs - no issues following bariatric surgery       Past Surgical History:   Past Surgical History:   Procedure Laterality Date    APPENDECTOMY  1983    Removed    BARIATRIC SURGERY  2011    Gastric bypass    BLADDER TUMOR/ULCER BLEEDER CAUTERIZATION       CARDIAC CATHETERIZATION  2009    stent placed    CHOLECYSTECTOMY  2019    Removed    COLONOSCOPY N/A 12/07/2023    Procedure: COLONOSCOPY WITH HOT SNARE POLYPECTOMY AND TATTOO;  Surgeon: Noman Gautam MD;  Location: [facility] ENDOSCOPY;  Service: Gastroenterology;  Laterality: N/A;    PORTACATH PLACEMENT N/A 1/5/2024    Procedure: INSERTION OF PORTACATH WITH ULTRSOUND AND FLUOROSCOPIC GUIDANCE;  Surgeon: Ida Black MD;  Location: [facility] OR;  Service: General;  Laterality: N/A;    JENNIFER-EN-Y         Family History: No family history on file.  Uncle had colon cancer    Social History:   Social History     Socioeconomic History    Marital status:    Tobacco Use    Smoking status: Every Day     Current packs/day: 0.75     Average packs/day: 0.8 packs/day for 30.0 years (22.5 ttl pk-yrs)     Types: Cigarettes    Smokeless tobacco: Never    Tobacco comments:     35 years      pt reports closer to 2 packs per day since cancer diagnosis   Vaping Use    Vaping status: Never Used   Substance and Sexual Activity    Alcohol use: Never    Drug use: Never    Sexual activity: Defer       Medications:     Current Outpatient Medications:     buPROPion XL (WELLBUTRIN XL) 150 MG 24 hr tablet, TAKE 1 TABLET BY MOUTH DAILY IN PLACE OF SERTRALINE, Disp: 30 tablet, Rfl: 2    cetirizine (zyrTEC) 10 MG tablet, Take 1 tablet by mouth Daily., Disp: 30 tablet, Rfl: 2    clindamycin (Clindagel) 1 % gel, Apply 1 Application topically to the appropriate area as directed 2 (Two) Times a Day. Use first, Disp: 30 g, Rfl: 1    dicyclomine (BENTYL) 20 MG tablet, Take 1 tablet by mouth 3 (Three) Times a Day As Needed for Abdominal Cramping., Disp: 30 tablet, Rfl: 3    docusate sodium (COLACE) 100 MG capsule, Take 1 capsule by mouth 2 (Two) Times a Day., Disp: , Rfl:     doxycycline (VIBRAMYICN) 100 MG tablet, , Disp: , Rfl:     gabapentin (Neurontin) 300 MG capsule, Take 2 capsules by mouth 3 (Three) Times a Day for 30  days., Disp: 180 capsule, Rfl: 0    Hydrocortisone, Perianal, (ANUSOL-HC) 2.5 % rectal cream, Insert  into the rectum 2 (Two) Times a Day. Indications: Inflamed Hemorrhoids, Disp: 30 g, Rfl: 1    lidocaine-prilocaine (EMLA) 2.5-2.5 % cream, Apply 1 Application topically to the appropriate area as directed As Needed (45-60 minutes prior to port access.  Cover with saran/plastic wrap.)., Disp: 30 g, Rfl: 3    LORazepam (ATIVAN) 0.5 MG tablet, Take 1 tablet by mouth Take As Directed. 1 tabelt by mouth 30 minutes prior to MRI, take a second tablet at the time of the MRI if needed., Disp: 2 tablet, Rfl: 0    Magic Mouthwash Oral Suspension (diphenhydrAMINE HCl - aluminum & magnesium hydroxide-simethicone - lidocaine - nystatin), Swish and Spit 10 mL by mouth every 6 (Six) Hours For 7 Days., Disp: 300 mL, Rfl: 3    mupirocin (BACTROBAN) 2 % cream, , Disp: , Rfl:     naloxone (NARCAN) 4 MG/0.1ML nasal spray, 1 spray into the nostril(s) as directed by provider As Needed for Opioid Reversal., Disp: 1 each, Rfl: 0    nystatin (MYCOSTATIN) 100,000 unit/mL suspension, Swish and Swallow 5mL by mouth four times a day, Disp: 473 mL, Rfl: 0    nystatin (MYCOSTATIN) 100,000 unit/mL suspension, Swish and swallow 5 mL 4 (Four) Times a Day., Disp: 473 mL, Rfl: 0    ondansetron (ZOFRAN) 8 MG tablet, Take 1 tablet by mouth 3 (Three) Times a Day As Needed for Nausea or Vomiting., Disp: 30 tablet, Rfl: 5    oxyCODONE (ROXICODONE) 15 MG immediate release tablet, Take 1 tablet by mouth 5 (Five) Times a Day As Needed for Moderate Pain or Severe Pain for up to 3 doses., Disp: 150 tablet, Rfl: 0    pantoprazole (Protonix) 40 MG EC tablet, Take 1 tablet by mouth Daily. Indications: Gastroesophageal Reflux Disease, Disp: 90 tablet, Rfl: 2    tiotropium bromide-olodaterol (Stiolto Respimat) 2.5-2.5 MCG/ACT aerosol solution inhaler, INHALE 2 INHALATION(S) BY MOUTH DAILY, Disp: 4 g, Rfl: 5    traZODone (DESYREL) 100 MG tablet, TAKE ONE TABLET BY  "MOUTH ONCE NIGHTLY, Disp: 30 tablet, Rfl: 1    triamcinolone (KENALOG) 0.025 % ointment, Apply 1 Application topically to the appropriate area as directed 2 (Two) Times a Day. Use second if topical treatment #1 ineffective, Disp: 80 g, Rfl: 0    Allergies:   Allergies   Allergen Reactions    Bactrim [Sulfamethoxazole-Trimethoprim] Hives     and blisters    Sulfa Antibiotics Hives     and blisters       Objective     Vital Signs:   Vitals:    08/05/24 0825   BP: 121/57   Pulse: 63   Resp: 16   Temp: 97.5 °F (36.4 °C)   SpO2: 99%   Weight: 94.3 kg (207 lb 12.8 oz)   Height: 182.9 cm (72.01\")   PainSc:   8        Body mass index is 28.18 kg/m².   Pain Score    08/05/24 0825   PainSc:   8           ECOG Performance Status: 1 - Symptomatic but completely ambulatory    Physical Exam:   General: No acute distress. Well appearing   HEENT: Normocephalic, atraumatic. Sclera anicteric.   Neck: supple, no adenopathy.   Cardiovascular: regular rate and rhythm. No murmurs.   Respiratory: Normal rate. Clear to auscultation bilaterally  Abdomen: Soft, nontender, non distended with normoactive bowel sounds  Lymph: no cervical, supraclavicular or axillary adenopathy  Neuro: Alert and oriented x 3. No focal deficits.   Ext: Symmetric, no swelling.   Accurate as of 8/5/24    Laboratory/Imaging Reviewed:   Infusion on 08/05/2024   Component Date Value Ref Range Status    Glucose 08/05/2024 90  65 - 99 mg/dL Final    BUN 08/05/2024 12  6 - 20 mg/dL Final    Creatinine 08/05/2024 0.83  0.76 - 1.27 mg/dL Final    Sodium 08/05/2024 142  136 - 145 mmol/L Final    Potassium 08/05/2024 3.6  3.5 - 5.2 mmol/L Final    Chloride 08/05/2024 107  98 - 107 mmol/L Final    CO2 08/05/2024 23.0  22.0 - 29.0 mmol/L Final    Calcium 08/05/2024 8.8  8.6 - 10.5 mg/dL Final    Total Protein 08/05/2024 7.2  6.0 - 8.5 g/dL Final    Albumin 08/05/2024 3.5  3.5 - 5.2 g/dL Final    ALT (SGPT) 08/05/2024 9  1 - 41 U/L Final    AST (SGOT) 08/05/2024 23  1 - 40 U/L " Final    Alkaline Phosphatase 08/05/2024 116  39 - 117 U/L Final    Total Bilirubin 08/05/2024 0.4  0.0 - 1.2 mg/dL Final    Globulin 08/05/2024 3.7  gm/dL Final    A/G Ratio 08/05/2024 0.9  g/dL Final    BUN/Creatinine Ratio 08/05/2024 14.5  7.0 - 25.0 Final    Anion Gap 08/05/2024 12.0  5.0 - 15.0 mmol/L Final    eGFR 08/05/2024 103.4  >60.0 mL/min/1.73 Final    Magnesium 08/05/2024 1.8  1.6 - 2.6 mg/dL Final    WBC 08/05/2024 8.67  3.40 - 10.80 10*3/mm3 Final    RBC 08/05/2024 4.44  4.14 - 5.80 10*6/mm3 Final    Hemoglobin 08/05/2024 11.4 (L)  13.0 - 17.7 g/dL Final    Hematocrit 08/05/2024 36.8 (L)  37.5 - 51.0 % Final    MCV 08/05/2024 82.9  79.0 - 97.0 fL Final    MCH 08/05/2024 25.7 (L)  26.6 - 33.0 pg Final    MCHC 08/05/2024 31.0 (L)  31.5 - 35.7 g/dL Final    RDW 08/05/2024 18.9 (H)  12.3 - 15.4 % Final    RDW-SD 08/05/2024 55.4 (H)  37.0 - 54.0 fl Final    MPV 08/05/2024 10.3  6.0 - 12.0 fL Final    Platelets 08/05/2024 195  140 - 450 10*3/mm3 Final    Neutrophil % 08/05/2024 77.7 (H)  42.7 - 76.0 % Final    Lymphocyte % 08/05/2024 12.3 (L)  19.6 - 45.3 % Final    Monocyte % 08/05/2024 8.8  5.0 - 12.0 % Final    Eosinophil % 08/05/2024 0.7  0.3 - 6.2 % Final    Basophil % 08/05/2024 0.2  0.0 - 1.5 % Final    Immature Grans % 08/05/2024 0.3  0.0 - 0.5 % Final    Neutrophils, Absolute 08/05/2024 6.73  1.70 - 7.00 10*3/mm3 Final    Lymphocytes, Absolute 08/05/2024 1.07  0.70 - 3.10 10*3/mm3 Final    Monocytes, Absolute 08/05/2024 0.76  0.10 - 0.90 10*3/mm3 Final    Eosinophils, Absolute 08/05/2024 0.06  0.00 - 0.40 10*3/mm3 Final    Basophils, Absolute 08/05/2024 0.02  0.00 - 0.20 10*3/mm3 Final    Immature Grans, Absolute 08/05/2024 0.03  0.00 - 0.05 10*3/mm3 Final    nRBC 08/05/2024 0.0  0.0 - 0.2 /100 WBC Final     [facility]us xt: APC gene TP53; Negative ADRIANNA, BRAF, NRAS, TMB stable. PDL1 negative  MRI Pelvis Without Contrast    Result Date: 7/31/2024  Narrative: MRI PELVIS WO CONTRAST Date of Exam: 7/30/2024  1:20 PM EDT Indication: rectal cancer.  Comparison: Rectal MRI 1/15/2024 Technique:  Routine multiplanar/multisequence images of the pelvis were obtained without contrast administration.  Findings: Annular upper third rectal mass has decreased in size since prior comparison previously measuring 6.4 cm in length now 4.1 cm in length. Tumor shows mixed heterogeneous signal on T2-weighted imaging with areas of markedly decreased T2 signal in the middle aspect of tumor example image 20 series 8 consistent with posttreatment related scar/fibrosis, areas of residual T2 intermediate signal which appears to restrict diffusion suggesting areas of viable tumor example image 24 series 8 as well as areas  of increased T2 signal which could reflect component of mucinous degeneration suggesting treatment change. Areas of persistent intermediate tumor signal extending beyond muscularis into adjacent perirectal fat. Tumor straddles peritoneal reflection without overt involvement. No visualized EMVI. Small perirectal tumor deposits and/or lymph nodes have decreased in size example at 4 o'clock position measuring 5 mm image 18 series 8 previously 7 mm and 5 mm at 10 o'clock position image 22 series 8 previously 6 mm. Similar prominent pelvic sidewall lymph nodes measuring 7 mm short axis on the left image 13 series 8 and 6 mm short axis on the right image 14 series 8. Asymmetric left internal iliac chain lymph node measuring 6 mm short axis image 15 series 8 stable from prior. Multiple prominent and fairly symmetric bilateral inguinal chain lymph nodes are without significant change measuring up to 1.2 cm short axis bilaterally image 12 series 8 and 9 mm short axis on the right image 22 series 8. Normal prostate gland size. Urinary bladder grossly unremarkable. Incidental note of a mildly heterogeneous tract containing T2 hyperintense fluid emanating from the 11 o'clock position of the mid anus image 54 series 8 tracking in multiple  directions. There is a T2 hyperintense tract that continues anteriorly probably through external sphincter into the peroneal soft tissues on the right terminating at axial image 49 series 8, a small probably blind-ending component that crosses midline towards 1 o'clock position at the level of the lower anus image 62 series 8, and a caudal component that is predominantly intersphincteric coursing inferomedially terminating near superficial gluteal cleft soft tissues image 63 series 8 and tracking more posteriorly within the gluteal cleft soft tissues image 57 series 8. There is a second discrete fluid containing tract in the left gluteal cleft and perineal soft tissues example axial image 62 series 8 and sagittal image 18 series 5 which may communicate at midline within the peritoneal tissues image 61 series 8, however not definitive. Heterogeneous areas of increased T2 signal in the superficial gluteal soft tissues example image 34 series 2 which appears similar to prior CT comparisons of uncertain significance. No infiltrative marrow process. No dilated loops of bowel. No pelvic fluid or drainable collection.     Impression: Impression: 1. Decreasing size of the annular upper third rectal mass with heterogeneous changes on T2-weighted imaging suggesting posttreatment related scar/fibrosis and probable mucinous degeneration overall indicating partial treatment response. Areas of more intermediate T2 signal restricting diffusion extending beyond rectal muscularis consistent with areas of viable tumor. 2. Few small perirectal nodules versus lymph nodes have slightly decreased in size. Few prominent pelvic sidewall, iliac chain and inguinal chain lymph nodes without significant change from prior exam. 3. 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 No discrete drainable collection. Recommend surgical  consultation/follow-up for management. A dedicated perianal fistula protocol MRI could be considered for more accurate assessment. Electronically Signed: Prince Keith MD  7/31/2024 10:41 AM EDT  Workstation ID: [id number]     Procedures    Assessment / Plan      Assessment/Plan:     1.  Rectal cancer   2.  Liver metastases  3.  Lung metastasis  4.  Chemo monitoring  5. 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  -He was found to have biopsy-proven metastasis in the liver and lung.  His case was presented at multidisciplinary tumor board.  -We will proceed with a liver MRI to better evaluate the extent of his hepatic metastasis as was recommended by radiology.  However, because of metastatic disease to both the lung and liver I do not think he will be downstage to resectable disease.  -Labs are adequate to proceed with cycle #5.  I reviewed his [facility] results which are notable for an APC mutation, T p53 mutation, negative for KRAS, BRAF, NRAS, tumor mutation burden stable.  Notch 1 VUS.  -Reviewed potential role for colorectal surgery consultation regarding diversion particularly if his obstructive symptoms do not improve, but they are currently improving.   -CBC adequate and CMP pending for treatment today with maintenance 5-FU and panitumumab today. Chemotherapy orders and premedications signed.   -We reviewed his imaging which is consistent with excellent disease control in Early July with normalization of CEA. He has had persistent rectal pain with escalating oxycodone requirements over the past couple of months, but no severe recent increase in pain, fever. WBC normal. 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 I have reached out to Dr. Méndez who will see him for evaluation.   -Continue maintenance 5FU/panitumumab.     5.  Cancer related pain  -PRN oxycodone.  I asked him to update palliative care regarding his symptoms for recommendations  regarding dose modification.  -I am going to increase his gabapentin to 900 mg 3 times daily    6. Access   -Port    7.  Panitumumab induced rash  -Continue doxycycline.     8. GERD  PPI    Follow Up:   2 weeks     Brenda Cronin MD  Hematology and Oncology     I have spent a total of 40 min on reviewing test results/preparing to see patient, counseling patient, performing medically appropriate exam, placing orders, coordinating care and documenting clinical information in the electronic or other health record

## 2024-08-05 NOTE — LETTER
2024       No Recipients    Patient: Edward Powell   YOB: 1970   Date of Visit: 2024     Dear Alysia Méndez MD:       Thank you for referring Edward Powell to me for evaluation. Below are the relevant portions of my assessment and plan of care.    If you have questions, please do not hesitate to call me. I look forward to following  along with you.         Sincerely,        Brenda Cronin MD        CC:   No Recipients    Brenda Cronin MD  24 0944  Sign when Signing Visit    [facility] [place]  Office number [phone]    Fax number [phone]     Follow up     Date: 24    Patient Name: Edward Powell  MRN: [id number]  : 1970    Referring Physician: Dr. Gautam    Chief Complaint: Rectal cancer, lung mass, liver lesions follow up on treatment    Cancer Staging:  IV    History of Present Illness: Edward Powell is a pleasant 54 y.o. male who presents today for evaluation of rectal cancer in the company of his supportive significant other.    Patient has a longstanding history of intermittent diarrhea since his cholecystectomy in 2019.  However he developed progressive diarrhea with associated loss of bowel control and urgency as well as weight loss and fatigue prompting additional workup.    CT of the abdomen pelvis 2023 showed an irregular circumferential wall thickening involving the rectum concerning for mass lesion.  There was associated mesorectal lymphadenopathy.  Masslike consolidation of left lower lobe.  CT of the chest showed a cavitary lesion in the left lower lobe up to 4.8 cm with an adjacent cavitary nodule up to 2 cm and a 5 mm nodule on the right minor fissure.      He underwent colonoscopy 2023 with findings of an infiltrative and ulcerated partially obstructing mass in the proximal rectum spanning 10 cm.  Rectal polyps.  Biopsy of the rectal mass showed invasive moderately differentiated  adenocarcinoma.  Additional biopsies showed tubular adenomas.  MSI testing was intact/low probability of MSI high.    PDL1 negative    PET/CT 12/22/2023 showed hypermetabolic rectal wall thickening compatible with known rectal malignancy.  Multiple small ill-defined hypoechoic hyper metabolic liver lesions compatible with metastatic disease.  Mildly enlarged and mildly hypermetabolic pelvic sidewall and internal iliac lymph node chain adenopathy.  Hypermetabolic lung mass with adjacent nodule.     He underwent liver biopsy and lung biopsy January 2024.  Results of both confirmed metastatic colorectal cancer.    The patient has been experiencing substantial rectal pain.  He is taking oxycodone every 6 hours with partial relief.  He reports ongoing bowel movements.  No abdominal pain.  No vomiting.  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   Painful neuropathy to bottom of feet to heels and bilateral fingers to MCPs. Gabapentin 600 TID is not helping.   Intermittent pain especially with defecation when constipated, not nearly as severe as at diagnosis. Taking oxycodone 15 mg 3-5 x daily on average.   He had a rectal MRI showing complex fistula. He has not had any recent severe increase in pain. No drainage. No fever. Has not yet been contacted by [facility].   Hands are dry cracking. No other rash.  Has lost 7 lbs but states he was outside working a lot in the heat. Eating and drinking well. Drinking protein drinks twice daily. Drinking plenty of fluids.     Past Medical History:   Past Medical History:   Diagnosis Date   • Arthritis    • Cancer     rectal cancer - diagnosed 2023   • Cholelithiasis 2019    Removed   •  COPD (chronic obstructive pulmonary disease)    • Coronary artery disease 2009    Stent - no cardiologist currently   • Elevated cholesterol    • GERD (gastroesophageal reflux disease)    • Hernia 2003    Lower hernia   • Perforated ulcer 2019   • Sleep apnea     history of; when weighed over 400lbs - no issues following bariatric surgery       Past Surgical History:   Past Surgical History:   Procedure Laterality Date   • APPENDECTOMY  1983    Removed   • BARIATRIC SURGERY  2011    Gastric bypass   • BLADDER TUMOR/ULCER BLEEDER CAUTERIZATION     • CARDIAC CATHETERIZATION  2009    stent placed   • CHOLECYSTECTOMY  2019    Removed   • COLONOSCOPY N/A 12/07/2023    Procedure: COLONOSCOPY WITH HOT SNARE POLYPECTOMY AND TATTOO;  Surgeon: Noman Gautam MD;  Location: [facility] ENDOSCOPY;  Service: Gastroenterology;  Laterality: N/A;   • PORTACATH PLACEMENT N/A 1/5/2024    Procedure: INSERTION OF PORTACATH WITH ULTRSOUND AND FLUOROSCOPIC GUIDANCE;  Surgeon: Ida Black MD;  Location: [facility] OR;  Service: General;  Laterality: N/A;   • JENNIFER-EN-Y         Family History: No family history on file.  Uncle had colon cancer    Social History:   Social History     Socioeconomic History   • Marital status:    Tobacco Use   • Smoking status: Every Day     Current packs/day: 0.75     Average packs/day: 0.8 packs/day for 30.0 years (22.5 ttl pk-yrs)     Types: Cigarettes   • Smokeless tobacco: Never   • Tobacco comments:     35 years      pt reports closer to 2 packs per day since cancer diagnosis   Vaping Use   • Vaping status: Never Used   Substance and Sexual Activity   • Alcohol use: Never   • Drug use: Never   • Sexual activity: Defer       Medications:     Current Outpatient Medications:   •  buPROPion XL (WELLBUTRIN XL) 150 MG 24 hr tablet, TAKE 1 TABLET BY MOUTH DAILY IN PLACE OF SERTRALINE, Disp: 30 tablet, Rfl: 2  •  cetirizine (zyrTEC) 10 MG tablet, Take 1 tablet by mouth Daily., Disp: 30  tablet, Rfl: 2  •  clindamycin (Clindagel) 1 % gel, Apply 1 Application topically to the appropriate area as directed 2 (Two) Times a Day. Use first, Disp: 30 g, Rfl: 1  •  dicyclomine (BENTYL) 20 MG tablet, Take 1 tablet by mouth 3 (Three) Times a Day As Needed for Abdominal Cramping., Disp: 30 tablet, Rfl: 3  •  docusate sodium (COLACE) 100 MG capsule, Take 1 capsule by mouth 2 (Two) Times a Day., Disp: , Rfl:   •  doxycycline (VIBRAMYICN) 100 MG tablet, , Disp: , Rfl:   •  gabapentin (Neurontin) 300 MG capsule, Take 2 capsules by mouth 3 (Three) Times a Day for 30 days., Disp: 180 capsule, Rfl: 0  •  Hydrocortisone, Perianal, (ANUSOL-HC) 2.5 % rectal cream, Insert  into the rectum 2 (Two) Times a Day. Indications: Inflamed Hemorrhoids, Disp: 30 g, Rfl: 1  •  lidocaine-prilocaine (EMLA) 2.5-2.5 % cream, Apply 1 Application topically to the appropriate area as directed As Needed (45-60 minutes prior to port access.  Cover with saran/plastic wrap.)., Disp: 30 g, Rfl: 3  •  LORazepam (ATIVAN) 0.5 MG tablet, Take 1 tablet by mouth Take As Directed. 1 tabelt by mouth 30 minutes prior to MRI, take a second tablet at the time of the MRI if needed., Disp: 2 tablet, Rfl: 0  •  Magic Mouthwash Oral Suspension (diphenhydrAMINE HCl - aluminum & magnesium hydroxide-simethicone - lidocaine - nystatin), Swish and Spit 10 mL by mouth every 6 (Six) Hours For 7 Days., Disp: 300 mL, Rfl: 3  •  mupirocin (BACTROBAN) 2 % cream, , Disp: , Rfl:   •  naloxone (NARCAN) 4 MG/0.1ML nasal spray, 1 spray into the nostril(s) as directed by provider As Needed for Opioid Reversal., Disp: 1 each, Rfl: 0  •  nystatin (MYCOSTATIN) 100,000 unit/mL suspension, Swish and Swallow 5mL by mouth four times a day, Disp: 473 mL, Rfl: 0  •  nystatin (MYCOSTATIN) 100,000 unit/mL suspension, Swish and swallow 5 mL 4 (Four) Times a Day., Disp: 473 mL, Rfl: 0  •  ondansetron (ZOFRAN) 8 MG tablet, Take 1 tablet by mouth 3 (Three) Times a Day As Needed for Nausea  "or Vomiting., Disp: 30 tablet, Rfl: 5  •  oxyCODONE (ROXICODONE) 15 MG immediate release tablet, Take 1 tablet by mouth 5 (Five) Times a Day As Needed for Moderate Pain or Severe Pain for up to 3 doses., Disp: 150 tablet, Rfl: 0  •  pantoprazole (Protonix) 40 MG EC tablet, Take 1 tablet by mouth Daily. Indications: Gastroesophageal Reflux Disease, Disp: 90 tablet, Rfl: 2  •  tiotropium bromide-olodaterol (Stiolto Respimat) 2.5-2.5 MCG/ACT aerosol solution inhaler, INHALE 2 INHALATION(S) BY MOUTH DAILY, Disp: 4 g, Rfl: 5  •  traZODone (DESYREL) 100 MG tablet, TAKE ONE TABLET BY MOUTH ONCE NIGHTLY, Disp: 30 tablet, Rfl: 1  •  triamcinolone (KENALOG) 0.025 % ointment, Apply 1 Application topically to the appropriate area as directed 2 (Two) Times a Day. Use second if topical treatment #1 ineffective, Disp: 80 g, Rfl: 0    Allergies:   Allergies   Allergen Reactions   • Bactrim [Sulfamethoxazole-Trimethoprim] Hives     and blisters   • Sulfa Antibiotics Hives     and blisters       Objective     Vital Signs:   Vitals:    08/05/24 0825   BP: 121/57   Pulse: 63   Resp: 16   Temp: 97.5 °F (36.4 °C)   SpO2: 99%   Weight: 94.3 kg (207 lb 12.8 oz)   Height: 182.9 cm (72.01\")   PainSc:   8        Body mass index is 28.18 kg/m².   Pain Score    08/05/24 0825   PainSc:   8           ECOG Performance Status: 1 - Symptomatic but completely ambulatory    Physical Exam:   General: No acute distress. Well appearing   HEENT: Normocephalic, atraumatic. Sclera anicteric.   Neck: supple, no adenopathy.   Cardiovascular: regular rate and rhythm. No murmurs.   Respiratory: Normal rate. Clear to auscultation bilaterally  Abdomen: Soft, nontender, non distended with normoactive bowel sounds  Lymph: no cervical, supraclavicular or axillary adenopathy  Neuro: Alert and oriented x 3. No focal deficits.   Ext: Symmetric, no swelling.   Accurate as of 8/5/24    Laboratory/Imaging Reviewed:   Infusion on 08/05/2024   Component Date Value Ref Range " Status   • Glucose 08/05/2024 90  65 - 99 mg/dL Final   • BUN 08/05/2024 12  6 - 20 mg/dL Final   • Creatinine 08/05/2024 0.83  0.76 - 1.27 mg/dL Final   • Sodium 08/05/2024 142  136 - 145 mmol/L Final   • Potassium 08/05/2024 3.6  3.5 - 5.2 mmol/L Final   • Chloride 08/05/2024 107  98 - 107 mmol/L Final   • CO2 08/05/2024 23.0  22.0 - 29.0 mmol/L Final   • Calcium 08/05/2024 8.8  8.6 - 10.5 mg/dL Final   • Total Protein 08/05/2024 7.2  6.0 - 8.5 g/dL Final   • Albumin 08/05/2024 3.5  3.5 - 5.2 g/dL Final   • ALT (SGPT) 08/05/2024 9  1 - 41 U/L Final   • AST (SGOT) 08/05/2024 23  1 - 40 U/L Final   • Alkaline Phosphatase 08/05/2024 116  39 - 117 U/L Final   • Total Bilirubin 08/05/2024 0.4  0.0 - 1.2 mg/dL Final   • Globulin 08/05/2024 3.7  gm/dL Final   • A/G Ratio 08/05/2024 0.9  g/dL Final   • BUN/Creatinine Ratio 08/05/2024 14.5  7.0 - 25.0 Final   • Anion Gap 08/05/2024 12.0  5.0 - 15.0 mmol/L Final   • eGFR 08/05/2024 103.4  >60.0 mL/min/1.73 Final   • Magnesium 08/05/2024 1.8  1.6 - 2.6 mg/dL Final   • WBC 08/05/2024 8.67  3.40 - 10.80 10*3/mm3 Final   • RBC 08/05/2024 4.44  4.14 - 5.80 10*6/mm3 Final   • Hemoglobin 08/05/2024 11.4 (L)  13.0 - 17.7 g/dL Final   • Hematocrit 08/05/2024 36.8 (L)  37.5 - 51.0 % Final   • MCV 08/05/2024 82.9  79.0 - 97.0 fL Final   • MCH 08/05/2024 25.7 (L)  26.6 - 33.0 pg Final   • MCHC 08/05/2024 31.0 (L)  31.5 - 35.7 g/dL Final   • RDW 08/05/2024 18.9 (H)  12.3 - 15.4 % Final   • RDW-SD 08/05/2024 55.4 (H)  37.0 - 54.0 fl Final   • MPV 08/05/2024 10.3  6.0 - 12.0 fL Final   • Platelets 08/05/2024 195  140 - 450 10*3/mm3 Final   • Neutrophil % 08/05/2024 77.7 (H)  42.7 - 76.0 % Final   • Lymphocyte % 08/05/2024 12.3 (L)  19.6 - 45.3 % Final   • Monocyte % 08/05/2024 8.8  5.0 - 12.0 % Final   • Eosinophil % 08/05/2024 0.7  0.3 - 6.2 % Final   • Basophil % 08/05/2024 0.2  0.0 - 1.5 % Final   • Immature Grans % 08/05/2024 0.3  0.0 - 0.5 % Final   • Neutrophils, Absolute 08/05/2024  6.73  1.70 - 7.00 10*3/mm3 Final   • Lymphocytes, Absolute 08/05/2024 1.07  0.70 - 3.10 10*3/mm3 Final   • Monocytes, Absolute 08/05/2024 0.76  0.10 - 0.90 10*3/mm3 Final   • Eosinophils, Absolute 08/05/2024 0.06  0.00 - 0.40 10*3/mm3 Final   • Basophils, Absolute 08/05/2024 0.02  0.00 - 0.20 10*3/mm3 Final   • Immature Grans, Absolute 08/05/2024 0.03  0.00 - 0.05 10*3/mm3 Final   • nRBC 08/05/2024 0.0  0.0 - 0.2 /100 WBC Final     [facility]us xt: APC gene TP53; Negative ADRIANNA, BRAF, NRAS, TMB stable. PDL1 negative  MRI Pelvis Without Contrast    Result Date: 7/31/2024  Narrative: MRI PELVIS WO CONTRAST Date of Exam: 7/30/2024 1:20 PM EDT Indication: rectal cancer.  Comparison: Rectal MRI 1/15/2024 Technique:  Routine multiplanar/multisequence images of the pelvis were obtained without contrast administration.  Findings: Annular upper third rectal mass has decreased in size since prior comparison previously measuring 6.4 cm in length now 4.1 cm in length. Tumor shows mixed heterogeneous signal on T2-weighted imaging with areas of markedly decreased T2 signal in the middle aspect of tumor example image 20 series 8 consistent with posttreatment related scar/fibrosis, areas of residual T2 intermediate signal which appears to restrict diffusion suggesting areas of viable tumor example image 24 series 8 as well as areas  of increased T2 signal which could reflect component of mucinous degeneration suggesting treatment change. Areas of persistent intermediate tumor signal extending beyond muscularis into adjacent perirectal fat. Tumor straddles peritoneal reflection without overt involvement. No visualized EMVI. Small perirectal tumor deposits and/or lymph nodes have decreased in size example at 4 o'clock position measuring 5 mm image 18 series 8 previously 7 mm and 5 mm at 10 o'clock position image 22 series 8 previously 6 mm. Similar prominent pelvic sidewall lymph nodes measuring 7 mm short axis on the left image 13 series 8  and 6 mm short axis on the right image 14 series 8. Asymmetric left internal iliac chain lymph node measuring 6 mm short axis image 15 series 8 stable from prior. Multiple prominent and fairly symmetric bilateral inguinal chain lymph nodes are without significant change measuring up to 1.2 cm short axis bilaterally image 12 series 8 and 9 mm short axis on the right image 22 series 8. Normal prostate gland size. Urinary bladder grossly unremarkable. Incidental note of a mildly heterogeneous tract containing T2 hyperintense fluid emanating from the 11 o'clock position of the mid anus image 54 series 8 tracking in multiple directions. There is a T2 hyperintense tract that continues anteriorly probably through external sphincter into the peroneal soft tissues on the right terminating at axial image 49 series 8, a small probably blind-ending component that crosses midline towards 1 o'clock position at the level of the lower anus image 62 series 8, and a caudal component that is predominantly intersphincteric coursing inferomedially terminating near superficial gluteal cleft soft tissues image 63 series 8 and tracking more posteriorly within the gluteal cleft soft tissues image 57 series 8. There is a second discrete fluid containing tract in the left gluteal cleft and perineal soft tissues example axial image 62 series 8 and sagittal image 18 series 5 which may communicate at midline within the peritoneal tissues image 61 series 8, however not definitive. Heterogeneous areas of increased T2 signal in the superficial gluteal soft tissues example image 34 series 2 which appears similar to prior CT comparisons of uncertain significance. No infiltrative marrow process. No dilated loops of bowel. No pelvic fluid or drainable collection.     Impression: Impression: 1. Decreasing size of the annular upper third rectal mass with heterogeneous changes on T2-weighted imaging suggesting posttreatment related scar/fibrosis and  probable mucinous degeneration overall indicating partial treatment response. Areas of more intermediate T2 signal restricting diffusion extending beyond rectal muscularis consistent with areas of viable tumor. 2. Few small perirectal nodules versus lymph nodes have slightly decreased in size. Few prominent pelvic sidewall, iliac chain and inguinal chain lymph nodes without significant change from prior exam. 3. 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 No discrete drainable collection. Recommend surgical consultation/follow-up for management. A dedicated perianal fistula protocol MRI could be considered for more accurate assessment. Electronically Signed: Prince Keith MD  7/31/2024 10:41 AM EDT  Workstation ID: [id number]     Procedures    Assessment / Plan      Assessment/Plan:     1.  Rectal cancer   2.  Liver metastases  3.  Lung metastasis  4.  Chemo monitoring  5. 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  -He was found to have biopsy-proven metastasis in the liver and lung.  His case was presented at multidisciplinary tumor board.  -We will proceed with a liver MRI to better evaluate the extent of his hepatic metastasis as was recommended by radiology.  However, because of metastatic disease to both the lung and liver I do not think he will be downstage to resectable disease.  -Labs are adequate to proceed with cycle #5.  I reviewed his [facility] results which are notable for an APC mutation, T p53 mutation, negative for KRAS, BRAF, NRAS, tumor mutation burden stable.  Notch 1 VUS.  -Reviewed potential role for colorectal surgery consultation regarding diversion particularly if his obstructive symptoms do not improve, but they are currently improving.   -CBC adequate and CMP pending for treatment today with maintenance 5-FU and  panitumumab today. Chemotherapy orders and premedications signed.   -We reviewed his imaging which is consistent with excellent disease control in Early July with normalization of CEA. He has had persistent rectal pain with escalating oxycodone requirements over the past couple of months, but no severe recent increase in pain, fever. WBC normal. 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 I have reached out to Dr. Méndez who will see him for evaluation.   -Continue maintenance 5FU/panitumumab.     5.  Cancer related pain  -PRN oxycodone.  I asked him to update palliative care regarding his symptoms for recommendations regarding dose modification.  -I am going to increase his gabapentin to 900 mg 3 times daily    6. Access   -Port    7.  Panitumumab induced rash  -Continue doxycycline.     8. GERD  PPI    Follow Up:   2 weeks     Brenda Cronin MD  Hematology and Oncology     I have spent a total of 40 min on reviewing test results/preparing to see patient, counseling patient, performing medically appropriate exam, placing orders, coordinating care and documenting clinical information in the electronic or other health record

## 2024-08-07 ENCOUNTER — INFUSION (OUTPATIENT)
Dept: ONCOLOGY | Facility: HOSPITAL | Age: 54
End: 2024-08-07
Payer: COMMERCIAL

## 2024-08-07 DIAGNOSIS — C20 RECTAL ADENOCARCINOMA: ICD-10-CM

## 2024-08-07 DIAGNOSIS — C78.7 RECTAL CANCER METASTASIZED TO LIVER: Primary | ICD-10-CM

## 2024-08-07 DIAGNOSIS — C20 RECTAL CANCER METASTASIZED TO LIVER: Primary | ICD-10-CM

## 2024-08-07 PROCEDURE — 25010000002 HEPARIN LOCK FLUSH PER 10 UNITS: Performed by: INTERNAL MEDICINE

## 2024-08-07 PROCEDURE — 96523 IRRIG DRUG DELIVERY DEVICE: CPT

## 2024-08-07 RX ORDER — HEPARIN SODIUM (PORCINE) LOCK FLUSH IV SOLN 100 UNIT/ML 100 UNIT/ML
500 SOLUTION INTRAVENOUS AS NEEDED
OUTPATIENT
Start: 2024-08-07

## 2024-08-07 RX ORDER — SODIUM CHLORIDE 0.9 % (FLUSH) 0.9 %
10 SYRINGE (ML) INJECTION AS NEEDED
Status: DISCONTINUED | OUTPATIENT
Start: 2024-08-07 | End: 2024-08-07 | Stop reason: HOSPADM

## 2024-08-07 RX ORDER — SODIUM CHLORIDE 0.9 % (FLUSH) 0.9 %
10 SYRINGE (ML) INJECTION AS NEEDED
OUTPATIENT
Start: 2024-08-07

## 2024-08-07 RX ORDER — HEPARIN SODIUM (PORCINE) LOCK FLUSH IV SOLN 100 UNIT/ML 100 UNIT/ML
500 SOLUTION INTRAVENOUS AS NEEDED
Status: DISCONTINUED | OUTPATIENT
Start: 2024-08-07 | End: 2024-08-07 | Stop reason: HOSPADM

## 2024-08-07 RX ADMIN — HEPARIN 500 UNITS: 100 SYRINGE at 14:59

## 2024-08-07 RX ADMIN — Medication 10 ML: at 14:59

## 2024-08-09 ENCOUNTER — OFFICE VISIT (OUTPATIENT)
Dept: PALLIATIVE CARE | Facility: CLINIC | Age: 54
End: 2024-08-09
Payer: COMMERCIAL

## 2024-08-09 ENCOUNTER — TELEPHONE (OUTPATIENT)
Dept: PALLIATIVE CARE | Facility: CLINIC | Age: 54
End: 2024-08-09

## 2024-08-09 VITALS
BODY MASS INDEX: 28.12 KG/M2 | DIASTOLIC BLOOD PRESSURE: 71 MMHG | OXYGEN SATURATION: 98 % | WEIGHT: 207.4 LBS | RESPIRATION RATE: 18 BRPM | HEART RATE: 65 BPM | TEMPERATURE: 98 F | SYSTOLIC BLOOD PRESSURE: 107 MMHG

## 2024-08-09 DIAGNOSIS — C20 RECTAL ADENOCARCINOMA: Primary | ICD-10-CM

## 2024-08-09 DIAGNOSIS — G89.3 CANCER RELATED PAIN: ICD-10-CM

## 2024-08-09 DIAGNOSIS — T45.1X5A CHEMOTHERAPY-INDUCED NEUROPATHY: ICD-10-CM

## 2024-08-09 DIAGNOSIS — T40.2X5A THERAPEUTIC OPIOID INDUCED CONSTIPATION: ICD-10-CM

## 2024-08-09 DIAGNOSIS — G62.0 CHEMOTHERAPY-INDUCED NEUROPATHY: ICD-10-CM

## 2024-08-09 DIAGNOSIS — K59.03 THERAPEUTIC OPIOID INDUCED CONSTIPATION: ICD-10-CM

## 2024-08-09 PROCEDURE — 99215 OFFICE O/P EST HI 40 MIN: CPT | Performed by: PHYSICIAN ASSISTANT

## 2024-08-09 RX ORDER — OXYCODONE HYDROCHLORIDE 15 MG/1
15 TABLET ORAL
Qty: 150 TABLET | Refills: 0 | Status: SHIPPED | OUTPATIENT
Start: 2024-08-09

## 2024-08-09 RX ORDER — MORPHINE SULFATE 15 MG/1
15 TABLET, FILM COATED, EXTENDED RELEASE ORAL 2 TIMES DAILY
Qty: 60 TABLET | Refills: 0 | Status: SHIPPED | OUTPATIENT
Start: 2024-08-09

## 2024-08-09 RX ORDER — PREGABALIN 100 MG/1
100 CAPSULE ORAL 2 TIMES DAILY
Qty: 60 CAPSULE | Refills: 0 | Status: SHIPPED | OUTPATIENT
Start: 2024-08-09

## 2024-08-09 NOTE — TELEPHONE ENCOUNTER
Hub staff attempted to follow warm transfer process and was unsuccessful     Caller: Edward Powell    Relationship to patient: Self    Best call back number: [phone]    Patient is needing: PT CALLED AND ASKED FOR A CALL BACK BECAUSE HE IS HAVING TROUBLE GETTING HIS MEDICINE . PT STATED HE NEEDS A CALL BACK TODAY BECAUSE HE IS IN SO MUCH PAIN

## 2024-08-09 NOTE — TELEPHONE ENCOUNTER
Spoke to pt and informed him the refills have not been signed by the physician. Once signed they will be sent to his pharmacy. Pt understood.

## 2024-08-09 NOTE — TELEPHONE ENCOUNTER
I have reviewed patient's RACIEL report prior to prescribing Schedule II, III, and IV medications. Request # [id number]. Next refills for MS Contin 15 mg BID #60 and Oxycodone 15 mg tab q5h PRN #150 were sent to the pharmacy. The patient is scheduled to follow-up in 3 months.

## 2024-08-09 NOTE — PROGRESS NOTES
Palliative Clinic Note      Name: Edward Powell  Age: 54 y.o.  Sex: male  : 1970  MRN: [id number]  Date of Service: 2024   Medical Oncologist: Dr. Cronin     Subjective:    Chief Complaint: Worsening neuropathy, constipation    History of Present Illness: Edward Powell is a 54 y.o. male with past medical history significant for metastatic rectal cancer who presents to the palliative clinic today as a follow up for pain and symptom management.     Treatment summary: Patient presented with complaints of worsening diarrhea with associated loss of bowel control, weight loss and fatigue. Imaging in 2023 revealed an irregular circumferential wall thickening involving the rectum, mesorectal lymphadenopathy, mass-like consolidation in the left lower lobe and nodules in the liver concerning for malignancy. Biopsy from colonoscopy on 23 consistent with adenocarcinoma. Biopsies of the lung and liver were consistent with metastatic colorectal cancer. Patient started systemic therapy with FOLFOX on 23.  Panitumumab add to cycle 5. Most recent imaging was reassuring. ED visit on 24 for pain at the port site. Oxaliplatin terminated early for allergic reaction. 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 Patient referred to CRC specialist, Dr. Méndez. Plan to continue maintenance 5FU/panitumumab.      Pain: Patient complains of rectal pain that is worse with riding in the car and bowel movements. The pain is constant.  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  Today, patient complains of worsening neuropathy despite oncologist increasing gabapentin to 900 3x daily at last appointment on 24. Patient uses acetaminophen and ibuprofen for other pains. He has started an OTC supplement called [employer].      Other symptoms: The patient complains of intermittent constipation.  Patient is currently  taking a stool softener, 1 tablet twice daily along with a laxative as needed.  Patient was instructed to increase stool softener to 2 in the morning 2 in the evening at his last appointment with palliative care.  Patient states he only takes 4 a day once he is constipated.       Pyschosocial: The patient presents to the clinic by himself. He lives with his significant other, Alysia.  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  No personal history of mental illness.  He is not on any mood stabilizers currently.     Goals: Maximize comfort, optimize function & psychosocial wellbeing, and promote advanced care planning.    The following portions of the patient's history were reviewed and updated as appropriate: allergies, current medications, past family history, past medical history, past social history, past surgical history and problem list.    ORT-R: Low risk   Decisional capacity: Full  ECOG: (1) Restricted in physically strenuous activity, ambulatory and able to do work of light nature     Objective:    /71   Pulse 65   Temp 98 °F (36.7 °C) (Temporal)   Resp 18   Wt 94.1 kg (207 lb 6.4 oz)   SpO2 98%   BMI 28.12 kg/m²     Constitutional: Awake, alert, analgesic gait, sitting up in exam chair, in no acute distress  Eyes: PERRLA, EOMS intact  HENT: NCAT, face symmetric  Neck: Supple, trachea midline  Respiratory: Nonlabored respirations  Cardiovascular: RRR, no edema observed  Gastrointestinal: Soft, no guarding  Musculoskeletal: Moves all extremities   Psychiatric: Appropriate affect, cooperative  Neurologic: Oriented x 3, Cranial Nerves grossly intact to confrontation, speech clear  Skin: Cool dry, no rashes or wounds appreciated     Medication Counts: Reviewed. See bottom of note for details. Did not bring medication to appointment  I have reviewed the patient's [place] PDMP. RACIEL Req #[id number].   UDS: Need to collect.     Assessment & Plan:    1. 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 Patient referred to CRC specialist, Dr. Méndez. Plan to continue maintenance 5FU/panitumumab.     2. Cancer related pain  - Patient is appropriate for opioid therapy due to cancer related pain. Partial effiacy with short-acting opioid therapy. Will start long-acting opioid with morphine ER 15 mg BID. Continue oxycodone 15 mg q5h PRN for break through pain.  Both scripts sent to the pharmacy and PA submitted for morphine. Side effects of the medication discussed at every visit. Patient was encouraged to continue bowel regimen of daily stool softeners, prn laxatives, and diet modifications.    3. Chemotherapy-induced neuropathy  - No efficacy with gabapentin at maximum dose. Start trial of pregabalin (LYRICA) 100 MG capsule; Take 1 capsule by mouth 2 (Two) Times a Day. PA submitted to the insurance company today.    4. Therapeutic opioid induced constipation  - Recommend patient increase stool softener to two tablets twice daily. Discussed worsening constipation will likely be a side effect of the extended release morphine.Continue Miralax as needed.     Code status: FULL   Advanced directives: No.    Return for Office Visit.    I spent 40 minutes caring for Edward Powell on this date of service. This time includes time spent by me in the following activities: preparing for the visit, reviewing tests, obtaining and/or reviewing a separately obtained history, performing a medically appropriate examination and/or evaluation , counseling and educating the patient/family/caregiver, ordering medications, tests, or procedures, documenting information in the medical record, independently interpreting results and communicating that information with the patient/family/caregiver, and care coordination    Linda Leslie PA-C  08/09/2024    Medication Date Filled # Filled Count Used # Days  MANUEL   Gabapentin 300 7/16/24 180 -- -- -- --   Oxycodone 15 7/8/24  150 -- -- -- --

## 2024-08-16 RX ORDER — BUPROPION HYDROCHLORIDE 150 MG/1
150 TABLET ORAL DAILY
Qty: 90 TABLET | Refills: 1 | Status: SHIPPED | OUTPATIENT
Start: 2024-08-16

## 2024-08-19 ENCOUNTER — OFFICE VISIT (OUTPATIENT)
Dept: ONCOLOGY | Facility: CLINIC | Age: 54
End: 2024-08-19
Payer: COMMERCIAL

## 2024-08-19 ENCOUNTER — INFUSION (OUTPATIENT)
Dept: ONCOLOGY | Facility: HOSPITAL | Age: 54
End: 2024-08-19
Payer: COMMERCIAL

## 2024-08-19 ENCOUNTER — TELEPHONE (OUTPATIENT)
Dept: ONCOLOGY | Facility: CLINIC | Age: 54
End: 2024-08-19
Payer: COMMERCIAL

## 2024-08-19 VITALS
DIASTOLIC BLOOD PRESSURE: 57 MMHG | HEART RATE: 70 BPM | HEIGHT: 72 IN | TEMPERATURE: 97.7 F | OXYGEN SATURATION: 100 % | RESPIRATION RATE: 18 BRPM | SYSTOLIC BLOOD PRESSURE: 122 MMHG | WEIGHT: 210.5 LBS | BODY MASS INDEX: 28.51 KG/M2

## 2024-08-19 DIAGNOSIS — C78.7 RECTAL CANCER METASTASIZED TO LIVER: Primary | ICD-10-CM

## 2024-08-19 DIAGNOSIS — C20 RECTAL CANCER METASTASIZED TO LIVER: Primary | ICD-10-CM

## 2024-08-19 LAB
ALBUMIN SERPL-MCNC: 3.3 G/DL (ref 3.5–5.2)
ALBUMIN/GLOB SERPL: 0.9 G/DL
ALP SERPL-CCNC: 123 U/L (ref 39–117)
ALT SERPL W P-5'-P-CCNC: 13 U/L (ref 1–41)
ANION GAP SERPL CALCULATED.3IONS-SCNC: 8.7 MMOL/L (ref 5–15)
AST SERPL-CCNC: 29 U/L (ref 1–40)
BASOPHILS # BLD AUTO: 0.04 10*3/MM3 (ref 0–0.2)
BASOPHILS NFR BLD AUTO: 0.4 % (ref 0–1.5)
BILIRUB SERPL-MCNC: 0.4 MG/DL (ref 0–1.2)
BUN SERPL-MCNC: 8 MG/DL (ref 6–20)
BUN/CREAT SERPL: 11.4 (ref 7–25)
CALCIUM SPEC-SCNC: 8.7 MG/DL (ref 8.6–10.5)
CEA SERPL-MCNC: 4.57 NG/ML
CHLORIDE SERPL-SCNC: 107 MMOL/L (ref 98–107)
CO2 SERPL-SCNC: 22.3 MMOL/L (ref 22–29)
CREAT SERPL-MCNC: 0.7 MG/DL (ref 0.76–1.27)
DEPRECATED RDW RBC AUTO: 55.7 FL (ref 37–54)
EGFRCR SERPLBLD CKD-EPI 2021: 108.8 ML/MIN/1.73
EOSINOPHIL # BLD AUTO: 0.1 10*3/MM3 (ref 0–0.4)
EOSINOPHIL NFR BLD AUTO: 1.1 % (ref 0.3–6.2)
ERYTHROCYTE [DISTWIDTH] IN BLOOD BY AUTOMATED COUNT: 19 % (ref 12.3–15.4)
GLOBULIN UR ELPH-MCNC: 3.5 GM/DL
GLUCOSE SERPL-MCNC: 92 MG/DL (ref 65–99)
HCT VFR BLD AUTO: 36.3 % (ref 37.5–51)
HGB BLD-MCNC: 11.3 G/DL (ref 13–17.7)
IMM GRANULOCYTES # BLD AUTO: 0.04 10*3/MM3 (ref 0–0.05)
IMM GRANULOCYTES NFR BLD AUTO: 0.4 % (ref 0–0.5)
LYMPHOCYTES # BLD AUTO: 1.3 10*3/MM3 (ref 0.7–3.1)
LYMPHOCYTES NFR BLD AUTO: 14.3 % (ref 19.6–45.3)
MAGNESIUM SERPL-MCNC: 1.4 MG/DL (ref 1.6–2.6)
MCH RBC QN AUTO: 25.7 PG (ref 26.6–33)
MCHC RBC AUTO-ENTMCNC: 31.1 G/DL (ref 31.5–35.7)
MCV RBC AUTO: 82.5 FL (ref 79–97)
MONOCYTES # BLD AUTO: 0.64 10*3/MM3 (ref 0.1–0.9)
MONOCYTES NFR BLD AUTO: 7.1 % (ref 5–12)
NEUTROPHILS NFR BLD AUTO: 6.95 10*3/MM3 (ref 1.7–7)
NEUTROPHILS NFR BLD AUTO: 76.7 % (ref 42.7–76)
NRBC BLD AUTO-RTO: 0 /100 WBC (ref 0–0.2)
PLATELET # BLD AUTO: 200 10*3/MM3 (ref 140–450)
PMV BLD AUTO: 10.6 FL (ref 6–12)
POTASSIUM SERPL-SCNC: 3.3 MMOL/L (ref 3.5–5.2)
PROT SERPL-MCNC: 6.8 G/DL (ref 6–8.5)
RBC # BLD AUTO: 4.4 10*6/MM3 (ref 4.14–5.8)
SODIUM SERPL-SCNC: 138 MMOL/L (ref 136–145)
WBC NRBC COR # BLD AUTO: 9.07 10*3/MM3 (ref 3.4–10.8)

## 2024-08-19 PROCEDURE — 96415 CHEMO IV INFUSION ADDL HR: CPT

## 2024-08-19 PROCEDURE — 25810000003 SODIUM CHLORIDE 0.9 % SOLUTION 250 ML FLEX CONT: Performed by: INTERNAL MEDICINE

## 2024-08-19 PROCEDURE — 96367 TX/PROPH/DG ADDL SEQ IV INF: CPT

## 2024-08-19 PROCEDURE — 25010000002 FLUOROURACIL PER 500 MG: Performed by: INTERNAL MEDICINE

## 2024-08-19 PROCEDURE — 25010000002 PANITUMUMAB 400 MG/20ML SOLUTION 20 ML VIAL: Performed by: INTERNAL MEDICINE

## 2024-08-19 PROCEDURE — 96416 CHEMO PROLONG INFUSE W/PUMP: CPT

## 2024-08-19 PROCEDURE — 25010000002 LEUCOVORIN CALCIUM PER 50MG: Performed by: INTERNAL MEDICINE

## 2024-08-19 PROCEDURE — 80053 COMPREHEN METABOLIC PANEL: CPT

## 2024-08-19 PROCEDURE — 96375 TX/PRO/DX INJ NEW DRUG ADDON: CPT

## 2024-08-19 PROCEDURE — 0 DEXTROSE 5 % SOLUTION 250 ML FLEX CONT: Performed by: INTERNAL MEDICINE

## 2024-08-19 PROCEDURE — G0498 CHEMO EXTEND IV INFUS W/PUMP: HCPCS

## 2024-08-19 PROCEDURE — 99215 OFFICE O/P EST HI 40 MIN: CPT | Performed by: INTERNAL MEDICINE

## 2024-08-19 PROCEDURE — 25010000002 PANITUMUMAB PER 10 MG: Performed by: INTERNAL MEDICINE

## 2024-08-19 PROCEDURE — 25010000002 PALONOSETRON 0.25 MG/5ML SOLUTION PREFILLED SYRINGE: Performed by: INTERNAL MEDICINE

## 2024-08-19 PROCEDURE — 85025 COMPLETE CBC W/AUTO DIFF WBC: CPT

## 2024-08-19 PROCEDURE — 83735 ASSAY OF MAGNESIUM: CPT

## 2024-08-19 PROCEDURE — 82378 CARCINOEMBRYONIC ANTIGEN: CPT

## 2024-08-19 PROCEDURE — 25010000002 DEXAMETHASONE SODIUM PHOSPHATE 20 MG/5ML SOLUTION: Performed by: INTERNAL MEDICINE

## 2024-08-19 PROCEDURE — 96413 CHEMO IV INFUSION 1 HR: CPT

## 2024-08-19 RX ORDER — DEXAMETHASONE 0.5 MG/5ML
1 SOLUTION ORAL 2 TIMES DAILY
Qty: 240 ML | Refills: 5 | Status: SHIPPED | OUTPATIENT
Start: 2024-08-19

## 2024-08-19 RX ORDER — DIPHENHYDRAMINE HYDROCHLORIDE 50 MG/ML
50 INJECTION INTRAMUSCULAR; INTRAVENOUS AS NEEDED
Status: CANCELLED | OUTPATIENT
Start: 2024-08-19

## 2024-08-19 RX ORDER — SODIUM CHLORIDE 9 MG/ML
20 INJECTION, SOLUTION INTRAVENOUS ONCE
Status: DISCONTINUED | OUTPATIENT
Start: 2024-08-19 | End: 2024-08-19 | Stop reason: HOSPADM

## 2024-08-19 RX ORDER — DEXTROSE MONOHYDRATE 50 MG/ML
20 INJECTION, SOLUTION INTRAVENOUS ONCE
Status: DISCONTINUED | OUTPATIENT
Start: 2024-08-19 | End: 2024-08-19 | Stop reason: HOSPADM

## 2024-08-19 RX ORDER — FAMOTIDINE 10 MG/ML
20 INJECTION, SOLUTION INTRAVENOUS AS NEEDED
Status: DISCONTINUED | OUTPATIENT
Start: 2024-08-19 | End: 2024-08-19 | Stop reason: HOSPADM

## 2024-08-19 RX ORDER — SODIUM CHLORIDE 9 MG/ML
20 INJECTION, SOLUTION INTRAVENOUS ONCE
Status: CANCELLED | OUTPATIENT
Start: 2024-08-19

## 2024-08-19 RX ORDER — DIPHENHYDRAMINE, LIDOCAINE, NYSTATIN
KIT ORAL
Qty: 240 ML | Refills: 3 | Status: SHIPPED | OUTPATIENT
Start: 2024-08-19

## 2024-08-19 RX ORDER — PALONOSETRON 0.05 MG/ML
0.25 INJECTION, SOLUTION INTRAVENOUS ONCE
Status: COMPLETED | OUTPATIENT
Start: 2024-08-19 | End: 2024-08-19

## 2024-08-19 RX ORDER — DIPHENHYDRAMINE HYDROCHLORIDE 50 MG/ML
50 INJECTION INTRAMUSCULAR; INTRAVENOUS AS NEEDED
Status: DISCONTINUED | OUTPATIENT
Start: 2024-08-19 | End: 2024-08-19 | Stop reason: HOSPADM

## 2024-08-19 RX ORDER — FAMOTIDINE 10 MG/ML
20 INJECTION, SOLUTION INTRAVENOUS AS NEEDED
Status: CANCELLED | OUTPATIENT
Start: 2024-08-19

## 2024-08-19 RX ORDER — PALONOSETRON 0.05 MG/ML
0.25 INJECTION, SOLUTION INTRAVENOUS ONCE
Status: CANCELLED | OUTPATIENT
Start: 2024-08-19

## 2024-08-19 RX ORDER — DOXYCYCLINE 100 MG/1
CAPSULE ORAL
COMMUNITY
Start: 2024-08-15

## 2024-08-19 RX ORDER — DEXTROSE MONOHYDRATE 50 MG/ML
20 INJECTION, SOLUTION INTRAVENOUS ONCE
Status: CANCELLED | OUTPATIENT
Start: 2024-08-19

## 2024-08-19 RX ADMIN — PANITUMUMAB 580 MG: 400 SOLUTION INTRAVENOUS at 10:47

## 2024-08-19 RX ADMIN — PALONOSETRON 0.25 MG: 0.25 INJECTION, SOLUTION INTRAVENOUS at 11:27

## 2024-08-19 RX ADMIN — DEXAMETHASONE SODIUM PHOSPHATE 12 MG: 4 INJECTION, SOLUTION INTRA-ARTICULAR; INTRALESIONAL; INTRAMUSCULAR; INTRAVENOUS; SOFT TISSUE at 11:27

## 2024-08-19 RX ADMIN — FLUOROURACIL 5260 MG: 50 INJECTION, SOLUTION INTRAVENOUS at 12:44

## 2024-08-19 RX ADMIN — LEUCOVORIN CALCIUM 880 MG: 10 INJECTION INTRAMUSCULAR; INTRAVENOUS at 11:47

## 2024-08-19 NOTE — TELEPHONE ENCOUNTER
Received message from infusion that patient's mag level today is 1.4.  Dr. Cronin advised that patient take over the counter magnesium supplement and I notified infusion to tell the patient.

## 2024-08-19 NOTE — PROGRESS NOTES
Hematology and Oncology [place]  Office number [phone]    Fax number [phone]     Follow up     Date: 24    Patient Name: Edward Powell  MRN: [id number]  : 1970    Referring Physician: Dr. Gautam    Chief Complaint: Rectal cancer, lung mass, liver lesions follow up on treatment    Cancer Staging:  IV    History of Present Illness: Edward Powell is a pleasant 54 y.o. male who presents today for evaluation of rectal cancer in the company of his supportive significant other.    Patient has a longstanding history of intermittent diarrhea since his cholecystectomy in 2019.  However he developed progressive diarrhea with associated loss of bowel control and urgency as well as weight loss and fatigue prompting additional workup.    CT of the abdomen pelvis 2023 showed an irregular circumferential wall thickening involving the rectum concerning for mass lesion.  There was associated mesorectal lymphadenopathy.  Masslike consolidation of left lower lobe.  CT of the chest showed a cavitary lesion in the left lower lobe up to 4.8 cm with an adjacent cavitary nodule up to 2 cm and a 5 mm nodule on the right minor fissure.      He underwent colonoscopy 2023 with findings of an infiltrative and ulcerated partially obstructing mass in the proximal rectum spanning 10 cm.  Rectal polyps.  Biopsy of the rectal mass showed invasive moderately differentiated adenocarcinoma.  Additional biopsies showed tubular adenomas.  MSI testing was intact/low probability of MSI high.    PDL1 negative    PET/CT 2023 showed hypermetabolic rectal wall thickening compatible with known rectal malignancy.  Multiple small ill-defined hypoechoic hyper metabolic liver lesions compatible with metastatic disease.  Mildly enlarged and mildly hypermetabolic pelvic sidewall and internal iliac lymph node chain adenopathy.  Hypermetabolic lung mass with adjacent nodule.     He underwent liver biopsy and lung  biopsy January 2024.  Results of both confirmed metastatic colorectal cancer.    The patient has been experiencing substantial rectal pain.  He is taking oxycodone every 6 hours with partial relief.  He reports ongoing bowel movements.  No abdominal pain.  No vomiting.  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 He is upset that he did not receive a prep and was incontinent of stool post procedure. Has been draining from perirectal lesion since exam. He reports hidradenitis is longstanding over many years.  Neuropathy symptoms are improving since palliative changed his gabapetin to Lyrica and pain well controlled on morphine currently.   Roof of mouth sore/dry. Completed thrush treatment.    Past Medical History:   Past Medical History:   Diagnosis Date    Arthritis     Cancer     rectal cancer - diagnosed 2023    Cholelithiasis 2019    Removed    COPD (chronic obstructive pulmonary disease)     Coronary artery disease 2009    Stent - no cardiologist currently    Elevated cholesterol     GERD (gastroesophageal reflux disease)     Hernia 2003    Lower hernia    Perforated ulcer 2019    Sleep apnea     history of; when weighed over 400lbs - no issues following bariatric surgery       Past Surgical History:   Past Surgical History:   Procedure Laterality Date    APPENDECTOMY  1983    Removed    BARIATRIC SURGERY  2011    Gastric bypass    BLADDER TUMOR/ULCER BLEEDER CAUTERIZATION      CARDIAC CATHETERIZATION  2009    stent placed     CHOLECYSTECTOMY  2019    Removed    COLONOSCOPY N/A 12/07/2023    Procedure: COLONOSCOPY WITH HOT SNARE POLYPECTOMY AND TATTOO;  Surgeon: Noman Gautam MD;  Location: [facility] ENDOSCOPY;  Service: Gastroenterology;  Laterality: N/A;    PORTACATH PLACEMENT N/A 1/5/2024    Procedure: INSERTION OF PORTACATH WITH ULTRSOUND AND FLUOROSCOPIC GUIDANCE;  Surgeon: Ida Black MD;  Location: [facility] OR;  Service: General;  Laterality: N/A;    JENNIFER-EN-Y         Family History: No family history on file.  Uncle had colon cancer    Social History:   Social History     Socioeconomic History    Marital status:    Tobacco Use    Smoking status: Every Day     Current packs/day: 0.75     Average packs/day: 0.8 packs/day for 30.0 years (22.5 ttl pk-yrs)     Types: Cigarettes    Smokeless tobacco: Never    Tobacco comments:     35 years      pt reports closer to 2 packs per day since cancer diagnosis   Vaping Use    Vaping status: Never Used   Substance and Sexual Activity    Alcohol use: Never    Drug use: Never    Sexual activity: Defer       Medications:     Current Outpatient Medications:     buPROPion XL (WELLBUTRIN XL) 150 MG 24 hr tablet, TAKE 1 TABLET BY MOUTH DAILY, Disp: 90 tablet, Rfl: 1    cetirizine (zyrTEC) 10 MG tablet, Take 1 tablet by mouth Daily., Disp: 30 tablet, Rfl: 2    clindamycin (Clindagel) 1 % gel, Apply 1 Application topically to the appropriate area as directed 2 (Two) Times a Day. Use first, Disp: 30 g, Rfl: 1    dicyclomine (BENTYL) 20 MG tablet, Take 1 tablet by mouth 3 (Three) Times a Day As Needed for Abdominal Cramping., Disp: 30 tablet, Rfl: 3    docusate sodium (COLACE) 100 MG capsule, Take 1 capsule by mouth 2 (Two) Times a Day., Disp: , Rfl:     doxycycline (MONODOX) 100 MG capsule, , Disp: , Rfl:     doxycycline (VIBRAMYICN) 100 MG tablet, , Disp: , Rfl:     Hydrocortisone, Perianal, (ANUSOL-HC) 2.5 % rectal cream, Insert  into the rectum 2 (Two) Times a Day.  Indications: Inflamed Hemorrhoids, Disp: 30 g, Rfl: 1    lidocaine-prilocaine (EMLA) 2.5-2.5 % cream, Apply 1 Application topically to the appropriate area as directed As Needed (45-60 minutes prior to port access.  Cover with saran/plastic wrap.)., Disp: 30 g, Rfl: 3    LORazepam (ATIVAN) 0.5 MG tablet, Take 1 tablet by mouth Take As Directed. 1 tabelt by mouth 30 minutes prior to MRI, take a second tablet at the time of the MRI if needed., Disp: 2 tablet, Rfl: 0    Magic Mouthwash Oral Suspension (diphenhydrAMINE HCl - aluminum & magnesium hydroxide-simethicone - lidocaine - nystatin), Swish and Spit 10 mL by mouth every 6 (Six) Hours For 7 Days., Disp: 300 mL, Rfl: 3    Morphine (MS CONTIN) 15 MG 12 hr tablet, Take 1 tablet by mouth 2 (Two) Times a Day., Disp: 60 tablet, Rfl: 0    mupirocin (BACTROBAN) 2 % cream, , Disp: , Rfl:     naloxone (NARCAN) 4 MG/0.1ML nasal spray, 1 spray into the nostril(s) as directed by provider As Needed for Opioid Reversal., Disp: 1 each, Rfl: 0    nystatin (MYCOSTATIN) 100,000 unit/mL suspension, Swish and Swallow 5mL by mouth four times a day, Disp: 473 mL, Rfl: 0    nystatin (MYCOSTATIN) 100,000 unit/mL suspension, Swish and swallow 5 mL 4 (Four) Times a Day., Disp: 473 mL, Rfl: 0    ondansetron (ZOFRAN) 8 MG tablet, Take 1 tablet by mouth 3 (Three) Times a Day As Needed for Nausea or Vomiting., Disp: 30 tablet, Rfl: 5    oxyCODONE (ROXICODONE) 15 MG immediate release tablet, Take 1 tablet by mouth 5 (Five) Times a Day As Needed for Moderate Pain or Severe Pain for up to 30 doses., Disp: 150 tablet, Rfl: 0    pantoprazole (Protonix) 40 MG EC tablet, Take 1 tablet by mouth Daily. Indications: Gastroesophageal Reflux Disease, Disp: 90 tablet, Rfl: 2    pregabalin (LYRICA) 100 MG capsule, Take 1 capsule by mouth 2 (Two) Times a Day., Disp: 60 capsule, Rfl: 0    tiotropium bromide-olodaterol (Stiolto Respimat) 2.5-2.5 MCG/ACT aerosol solution inhaler, INHALE 2 INHALATION(S) BY MOUTH  "DAILY, Disp: 4 g, Rfl: 5    traZODone (DESYREL) 100 MG tablet, TAKE ONE TABLET BY MOUTH ONCE NIGHTLY, Disp: 30 tablet, Rfl: 1    triamcinolone (KENALOG) 0.025 % ointment, Apply 1 Application topically to the appropriate area as directed 2 (Two) Times a Day. Use second if topical treatment #1 ineffective, Disp: 80 g, Rfl: 0    Allergies:   Allergies   Allergen Reactions    Bactrim [Sulfamethoxazole-Trimethoprim] Hives     and blisters    Sulfa Antibiotics Hives     and blisters       Objective     Vital Signs:   Vitals:    08/19/24 0819   BP: 122/57   Pulse: 70   Resp: 18   Temp: 97.7 °F (36.5 °C)   SpO2: 100%   Weight: 95.5 kg (210 lb 8 oz)   Height: 182.9 cm (72.01\")   PainSc:   6        Body mass index is 28.54 kg/m².   Pain Score    08/19/24 0819   PainSc:   6     ECOG Performance Status: 1 - Symptomatic but completely ambulatory    Physical Exam:   General: No acute distress. Well appearing   HEENT: Normocephalic, atraumatic. Sclera anicteric. Dry mouth  Neck: supple, no adenopathy.   Cardiovascular: regular rate and rhythm. No murmurs.   Respiratory: Normal rate. Clear to auscultation bilaterally  Abdomen: Soft, nontender, non distended with normoactive bowel sounds  Lymph: no cervical, supraclavicular or axillary adenopathy  Neuro: Alert and oriented x 3. No focal deficits.   Ext: Symmetric, no swelling.   Accurate as of 8/19/24    Laboratory/Imaging Reviewed:   Infusion on 08/19/2024   Component Date Value Ref Range Status    Glucose 08/19/2024 92  65 - 99 mg/dL Final    BUN 08/19/2024 8  6 - 20 mg/dL Final    Creatinine 08/19/2024 0.70 (L)  0.76 - 1.27 mg/dL Final    Sodium 08/19/2024 138  136 - 145 mmol/L Final    Potassium 08/19/2024 3.3 (L)  3.5 - 5.2 mmol/L Final    Chloride 08/19/2024 107  98 - 107 mmol/L Final    CO2 08/19/2024 22.3  22.0 - 29.0 mmol/L Final    Calcium 08/19/2024 8.7  8.6 - 10.5 mg/dL Final    Total Protein 08/19/2024 6.8  6.0 - 8.5 g/dL Final    Albumin 08/19/2024 3.3 (L)  3.5 - 5.2 " g/dL Final    ALT (SGPT) 08/19/2024 13  1 - 41 U/L Final    AST (SGOT) 08/19/2024 29  1 - 40 U/L Final    Alkaline Phosphatase 08/19/2024 123 (H)  39 - 117 U/L Final    Total Bilirubin 08/19/2024 0.4  0.0 - 1.2 mg/dL Final    Globulin 08/19/2024 3.5  gm/dL Final    A/G Ratio 08/19/2024 0.9  g/dL Final    BUN/Creatinine Ratio 08/19/2024 11.4  7.0 - 25.0 Final    Anion Gap 08/19/2024 8.7  5.0 - 15.0 mmol/L Final    eGFR 08/19/2024 108.8  >60.0 mL/min/1.73 Final    Magnesium 08/19/2024 1.4 (L)  1.6 - 2.6 mg/dL Final    WBC 08/19/2024 9.07  3.40 - 10.80 10*3/mm3 Final    RBC 08/19/2024 4.40  4.14 - 5.80 10*6/mm3 Final    Hemoglobin 08/19/2024 11.3 (L)  13.0 - 17.7 g/dL Final    Hematocrit 08/19/2024 36.3 (L)  37.5 - 51.0 % Final    MCV 08/19/2024 82.5  79.0 - 97.0 fL Final    MCH 08/19/2024 25.7 (L)  26.6 - 33.0 pg Final    MCHC 08/19/2024 31.1 (L)  31.5 - 35.7 g/dL Final    RDW 08/19/2024 19.0 (H)  12.3 - 15.4 % Final    RDW-SD 08/19/2024 55.7 (H)  37.0 - 54.0 fl Final    MPV 08/19/2024 10.6  6.0 - 12.0 fL Final    Platelets 08/19/2024 200  140 - 450 10*3/mm3 Final    Neutrophil % 08/19/2024 76.7 (H)  42.7 - 76.0 % Final    Lymphocyte % 08/19/2024 14.3 (L)  19.6 - 45.3 % Final    Monocyte % 08/19/2024 7.1  5.0 - 12.0 % Final    Eosinophil % 08/19/2024 1.1  0.3 - 6.2 % Final    Basophil % 08/19/2024 0.4  0.0 - 1.5 % Final    Immature Grans % 08/19/2024 0.4  0.0 - 0.5 % Final    Neutrophils, Absolute 08/19/2024 6.95  1.70 - 7.00 10*3/mm3 Final    Lymphocytes, Absolute 08/19/2024 1.30  0.70 - 3.10 10*3/mm3 Final    Monocytes, Absolute 08/19/2024 0.64  0.10 - 0.90 10*3/mm3 Final    Eosinophils, Absolute 08/19/2024 0.10  0.00 - 0.40 10*3/mm3 Final    Basophils, Absolute 08/19/2024 0.04  0.00 - 0.20 10*3/mm3 Final    Immature Grans, Absolute 08/19/2024 0.04  0.00 - 0.05 10*3/mm3 Final    nRBC 08/19/2024 0.0  0.0 - 0.2 /100 WBC Final     Tempus xt: APC gene TP53; Negative ADRIANNA, BRAF, NRAS, TMB stable. PDL1 negative  MRI Pelvis  Without Contrast    Result Date: 7/31/2024  Narrative: MRI PELVIS WO CONTRAST Date of Exam: 7/30/2024 1:20 PM EDT Indication: rectal cancer.  Comparison: Rectal MRI 1/15/2024 Technique:  Routine multiplanar/multisequence images of the pelvis were obtained without contrast administration.  Findings: Annular upper third rectal mass has decreased in size since prior comparison previously measuring 6.4 cm in length now 4.1 cm in length. Tumor shows mixed heterogeneous signal on T2-weighted imaging with areas of markedly decreased T2 signal in the middle aspect of tumor example image 20 series 8 consistent with posttreatment related scar/fibrosis, areas of residual T2 intermediate signal which appears to restrict diffusion suggesting areas of viable tumor example image 24 series 8 as well as areas  of increased T2 signal which could reflect component of mucinous degeneration suggesting treatment change. Areas of persistent intermediate tumor signal extending beyond muscularis into adjacent perirectal fat. Tumor straddles peritoneal reflection without overt involvement. No visualized EMVI. Small perirectal tumor deposits and/or lymph nodes have decreased in size example at 4 o'clock position measuring 5 mm image 18 series 8 previously 7 mm and 5 mm at 10 o'clock position image 22 series 8 previously 6 mm. Similar prominent pelvic sidewall lymph nodes measuring 7 mm short axis on the left image 13 series 8 and 6 mm short axis on the right image 14 series 8. Asymmetric left internal iliac chain lymph node measuring 6 mm short axis image 15 series 8 stable from prior. Multiple prominent and fairly symmetric bilateral inguinal chain lymph nodes are without significant change measuring up to 1.2 cm short axis bilaterally image 12 series 8 and 9 mm short axis on the right image 22 series 8. Normal prostate gland size. Urinary bladder grossly unremarkable. Incidental note of a mildly heterogeneous tract containing T2  hyperintense fluid emanating from the 11 o'clock position of the mid anus image 54 series 8 tracking in multiple directions. There is a T2 hyperintense tract that continues anteriorly probably through external sphincter into the peroneal soft tissues on the right terminating at axial image 49 series 8, a small probably blind-ending component that crosses midline towards 1 o'clock position at the level of the lower anus image 62 series 8, and a caudal component that is predominantly intersphincteric coursing inferomedially terminating near superficial gluteal cleft soft tissues image 63 series 8 and tracking more posteriorly within the gluteal cleft soft tissues image 57 series 8. There is a second discrete fluid containing tract in the left gluteal cleft and perineal soft tissues example axial image 62 series 8 and sagittal image 18 series 5 which may communicate at midline within the peritoneal tissues image 61 series 8, however not definitive. Heterogeneous areas of increased T2 signal in the superficial gluteal soft tissues example image 34 series 2 which appears similar to prior CT comparisons of uncertain significance. No infiltrative marrow process. No dilated loops of bowel. No pelvic fluid or drainable collection.     Impression: Impression: 1. Decreasing size of the annular upper third rectal mass with heterogeneous changes on T2-weighted imaging suggesting posttreatment related scar/fibrosis and probable mucinous degeneration overall indicating partial treatment response. Areas of more intermediate T2 signal restricting diffusion extending beyond rectal muscularis consistent with areas of viable tumor. 2. Few small perirectal nodules versus lymph nodes have slightly decreased in size. Few prominent pelvic sidewall, iliac chain and inguinal chain lymph nodes without significant change from prior exam. 3. 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 No discrete drainable collection. Recommend surgical consultation/follow-up for management. A dedicated perianal fistula protocol MRI could be considered for more accurate assessment. Electronically Signed: Prince Keith MD  7/31/2024 10:41 AM EDT  Workstation ID: [id number]     Procedures    Assessment / Plan      Assessment/Plan:     1.  Rectal cancer   2.  Liver metastases  3.  Lung metastasis  4.  Chemo monitoring  5.  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  -He was found to have biopsy-proven metastasis in the liver and lung.  His case was presented at multidisciplinary tumor board.  -Because of metastatic disease to both the lung and liver I do not think he will be downstage to resectable disease.  -Tempus results which are notable for an APC mutation, T p53 mutation, negative for KRAS, BRAF, NRAS, tumor mutation burden stable.  Notch 1 VUS.  -CBC adequate and CMP pending for treatment today with maintenance 5-FU and panitumumab 8/19.   Chemotherapy orders and premedications signed 8/19.   -We reviewed his imaging which is consistent with excellent disease control in early July with normalization of CEA. He has had persistent rectal pain with escalating oxycodone requirements over the past couple of months, but no severe recent increase in pain, fever. WBC normal. 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 I reviewed his EUA notes. Increase doxy to BID. No surgical intervention.  -Consolidative radiotherapy to the rectal tumor is not something he wants to pursue  -Continue maintenance 5FU/panitumumab. He is going to extend the interval to q 3 weeks this fall to accommodate travel plans.     6.  Cancer related pain  -Now following with palliative care and pain is well controlled on lyrica, oxycodone and MS contin    7.  Panitumumab induced rash  -Continue  doxycycline.     8. GERD  PPI    9. Mucositis  -MMW as needed  -Add dexamethasone mouthwash for prevention    10. Hypokalemia  11. Hypomagnesemia  -Add Otc magnesium and monitor levels    12. Access  -Port    Follow Up:   2 weeks     Brenda Cronin MD  Hematology and Oncology     I have spent a total of 40 min on the day of service including time before, during, and after the office visit on reviewing test results/preparing to see patient, counseling patient, performing medically appropriate exam, placing orders, coordinating care and documenting clinical information in the electronic or other health record

## 2024-08-19 NOTE — TELEPHONE ENCOUNTER
Caller: [facility] [id number] - [place], [place] - [address] [place] [address] AT [facility]. - [phone]  - [phone] FX    Relationship: Pharmacy    Best call back number: [phone]    What was the call regarding: THE PHARMACY CALLED REGARDING THE nystatin susp + lidocaine viscous (MAGIC MOUTHWASH) oral suspension MEDICATION. THEY SAY THEY DO NOT DO COMPOUNDING ANYMORE, SO THEY ARE UNABLE TO FILL IT. IT WILL NEED TO GO TO A DIFFERENT PHARMACY.

## 2024-08-19 NOTE — TELEPHONE ENCOUNTER
Message sent from [facility] that they did not mix the magic mouthwash at that pharmacy.   Called pharmacy at [facility] they had a refill on file for the patient.   They stated they would mix it up for him and have it ready.    Called patient to relay message from pharmacy, Patient stated he was already home and he would  medication on 8/20/24     Patient verbalized instructions and had no further questions

## 2024-08-20 ENCOUNTER — TELEPHONE (OUTPATIENT)
Dept: PALLIATIVE CARE | Facility: CLINIC | Age: 54
End: 2024-08-20
Payer: COMMERCIAL

## 2024-08-20 ENCOUNTER — TELEPHONE (OUTPATIENT)
Dept: ONCOLOGY | Facility: CLINIC | Age: 54
End: 2024-08-20
Payer: COMMERCIAL

## 2024-08-20 DIAGNOSIS — T45.1X5A CHEMOTHERAPY-INDUCED NEUROPATHY: Primary | ICD-10-CM

## 2024-08-20 DIAGNOSIS — G62.0 CHEMOTHERAPY-INDUCED NEUROPATHY: Primary | ICD-10-CM

## 2024-08-20 RX ORDER — PREGABALIN 150 MG/1
150 CAPSULE ORAL 2 TIMES DAILY
Qty: 60 CAPSULE | Refills: 0 | Status: SHIPPED | OUTPATIENT
Start: 2024-08-20

## 2024-08-20 NOTE — TELEPHONE ENCOUNTER
Contacted pt to inform him MARITZA Leslie has sent in a new script of Lyrica 150 MG BID. Informed pt to let us know how it works for him and if he experiences any side effects to stop immediately and let us know as well.

## 2024-08-20 NOTE — TELEPHONE ENCOUNTER
Provider: RAMESH THAYER    Caller: MIKA RANDHAWA    Relationship to Patient: SELF    Reason for Call: PATIENT WOULD LIKE RAMESH TO GIVE HIM A CALL TO TALK ABOUT THE MEDICATION HE WAS PUT ON. PLEASE CALL AND ADVISE

## 2024-08-20 NOTE — TELEPHONE ENCOUNTER
Per clarification from Dr. Cronin:  Sara with [facility] pharmacy contacted back and clarification given to her for twice daily, NOT 4 times per day.  She stated a verbal can be taken and verbalized understanding.

## 2024-08-20 NOTE — TELEPHONE ENCOUNTER
Spoke to pt regarding details of his Pregablin (lyrica) script. Pt has notice a little bit of a difference, however when he is on his feet the neuropathy pain is still the same. He is wanting to discuss an increase in this medication to see if that will provide more of a relief. Informed pt I will forward this update to MARITZA Leslie and will contact him once she has decided the next option.

## 2024-08-20 NOTE — TELEPHONE ENCOUNTER
Maribell with [facility] pharmacy called she needs clarification on the directions for the Dexamethasone. Please call.

## 2024-08-21 ENCOUNTER — INFUSION (OUTPATIENT)
Dept: ONCOLOGY | Facility: HOSPITAL | Age: 54
End: 2024-08-21
Payer: COMMERCIAL

## 2024-08-21 DIAGNOSIS — C20 RECTAL ADENOCARCINOMA: ICD-10-CM

## 2024-08-21 DIAGNOSIS — C78.7 RECTAL CANCER METASTASIZED TO LIVER: Primary | ICD-10-CM

## 2024-08-21 DIAGNOSIS — C20 RECTAL CANCER METASTASIZED TO LIVER: Primary | ICD-10-CM

## 2024-08-21 PROCEDURE — 25010000002 HEPARIN LOCK FLUSH PER 10 UNITS: Performed by: INTERNAL MEDICINE

## 2024-08-21 PROCEDURE — 96523 IRRIG DRUG DELIVERY DEVICE: CPT

## 2024-08-21 RX ORDER — SODIUM CHLORIDE 0.9 % (FLUSH) 0.9 %
10 SYRINGE (ML) INJECTION AS NEEDED
OUTPATIENT
Start: 2024-08-21

## 2024-08-21 RX ORDER — SODIUM CHLORIDE 0.9 % (FLUSH) 0.9 %
10 SYRINGE (ML) INJECTION AS NEEDED
Status: DISCONTINUED | OUTPATIENT
Start: 2024-08-21 | End: 2024-08-21 | Stop reason: HOSPADM

## 2024-08-21 RX ORDER — HEPARIN SODIUM (PORCINE) LOCK FLUSH IV SOLN 100 UNIT/ML 100 UNIT/ML
500 SOLUTION INTRAVENOUS AS NEEDED
OUTPATIENT
Start: 2024-08-21

## 2024-08-21 RX ORDER — HEPARIN SODIUM (PORCINE) LOCK FLUSH IV SOLN 100 UNIT/ML 100 UNIT/ML
500 SOLUTION INTRAVENOUS AS NEEDED
Status: DISCONTINUED | OUTPATIENT
Start: 2024-08-21 | End: 2024-08-21 | Stop reason: HOSPADM

## 2024-08-21 RX ADMIN — Medication 10 ML: at 15:33

## 2024-08-21 RX ADMIN — HEPARIN 500 UNITS: 100 SYRINGE at 15:33

## 2024-08-30 ENCOUNTER — TELEPHONE (OUTPATIENT)
Dept: NUTRITION | Facility: HOSPITAL | Age: 54
End: 2024-08-30
Payer: COMMERCIAL

## 2024-08-30 NOTE — PROGRESS NOTES
Adult Outpatient Nutrition  Assessment    Patient Name:  Edward Powell  YOB: 1970  MRN: [id number]    Assessment Date:  8/30/2024    Comments:  Called pt for nutrition oncology f/up. Last wt on file 210#. Wt is stable at this time. Pt appetite continues to fluctuate. Discussed snacking/small meals on days that it is low. Pt is drinking ONS. Pt had no nutrition questions at this time. RD to f/up in 1 month.     Electronically signed by:  Arabella Hong RD  08/30/24 11:06 EDT

## 2024-09-01 DIAGNOSIS — C20 RECTAL CANCER METASTASIZED TO LIVER: ICD-10-CM

## 2024-09-01 DIAGNOSIS — C78.7 RECTAL CANCER METASTASIZED TO LIVER: ICD-10-CM

## 2024-09-03 ENCOUNTER — HOSPITAL ENCOUNTER (OUTPATIENT)
Facility: HOSPITAL | Age: 54
Discharge: HOME | End: 2024-09-03
Admitting: INTERNAL MEDICINE
Payer: COMMERCIAL

## 2024-09-03 ENCOUNTER — OFFICE VISIT (OUTPATIENT)
Dept: ONCOLOGY | Facility: CLINIC | Age: 54
End: 2024-09-03
Payer: COMMERCIAL

## 2024-09-03 VITALS
SYSTOLIC BLOOD PRESSURE: 117 MMHG | OXYGEN SATURATION: 98 % | HEIGHT: 72 IN | HEART RATE: 62 BPM | RESPIRATION RATE: 18 BRPM | TEMPERATURE: 97.7 F | DIASTOLIC BLOOD PRESSURE: 53 MMHG | WEIGHT: 206.9 LBS | BODY MASS INDEX: 28.02 KG/M2

## 2024-09-03 DIAGNOSIS — C78.7 RECTAL CANCER METASTASIZED TO LIVER: Primary | ICD-10-CM

## 2024-09-03 DIAGNOSIS — C20 RECTAL CANCER METASTASIZED TO LIVER: Primary | ICD-10-CM

## 2024-09-03 LAB
ALBUMIN SERPL-MCNC: 3.4 G/DL (ref 3.5–5.2)
ALBUMIN/GLOB SERPL: 1 G/DL
ALP SERPL-CCNC: 112 U/L (ref 39–117)
ALT SERPL W P-5'-P-CCNC: 15 U/L (ref 1–41)
ANION GAP SERPL CALCULATED.3IONS-SCNC: 11.1 MMOL/L (ref 5–15)
AST SERPL-CCNC: 28 U/L (ref 1–40)
BASOPHILS # BLD AUTO: 0.03 10*3/MM3 (ref 0–0.2)
BASOPHILS NFR BLD AUTO: 0.4 % (ref 0–1.5)
BILIRUB SERPL-MCNC: 0.4 MG/DL (ref 0–1.2)
BUN SERPL-MCNC: 10 MG/DL (ref 6–20)
BUN/CREAT SERPL: 13.7 (ref 7–25)
CALCIUM SPEC-SCNC: 8.9 MG/DL (ref 8.6–10.5)
CEA SERPL-MCNC: 4.79 NG/ML
CHLORIDE SERPL-SCNC: 107 MMOL/L (ref 98–107)
CO2 SERPL-SCNC: 21.9 MMOL/L (ref 22–29)
CREAT SERPL-MCNC: 0.73 MG/DL (ref 0.76–1.27)
DEPRECATED RDW RBC AUTO: 55.5 FL (ref 37–54)
EGFRCR SERPLBLD CKD-EPI 2021: 107.4 ML/MIN/1.73
EOSINOPHIL # BLD AUTO: 0.08 10*3/MM3 (ref 0–0.4)
EOSINOPHIL NFR BLD AUTO: 1 % (ref 0.3–6.2)
ERYTHROCYTE [DISTWIDTH] IN BLOOD BY AUTOMATED COUNT: 19.1 % (ref 12.3–15.4)
GLOBULIN UR ELPH-MCNC: 3.4 GM/DL
GLUCOSE SERPL-MCNC: 106 MG/DL (ref 65–99)
HCT VFR BLD AUTO: 36.2 % (ref 37.5–51)
HGB BLD-MCNC: 11.2 G/DL (ref 13–17.7)
IMM GRANULOCYTES # BLD AUTO: 0.02 10*3/MM3 (ref 0–0.05)
IMM GRANULOCYTES NFR BLD AUTO: 0.2 % (ref 0–0.5)
LYMPHOCYTES # BLD AUTO: 1.25 10*3/MM3 (ref 0.7–3.1)
LYMPHOCYTES NFR BLD AUTO: 15.5 % (ref 19.6–45.3)
MAGNESIUM SERPL-MCNC: 1.4 MG/DL (ref 1.6–2.6)
MCH RBC QN AUTO: 25.5 PG (ref 26.6–33)
MCHC RBC AUTO-ENTMCNC: 30.9 G/DL (ref 31.5–35.7)
MCV RBC AUTO: 82.5 FL (ref 79–97)
MONOCYTES # BLD AUTO: 0.57 10*3/MM3 (ref 0.1–0.9)
MONOCYTES NFR BLD AUTO: 7 % (ref 5–12)
NEUTROPHILS NFR BLD AUTO: 6.14 10*3/MM3 (ref 1.7–7)
NEUTROPHILS NFR BLD AUTO: 75.9 % (ref 42.7–76)
NRBC BLD AUTO-RTO: 0 /100 WBC (ref 0–0.2)
PLATELET # BLD AUTO: 175 10*3/MM3 (ref 140–450)
PMV BLD AUTO: 11.4 FL (ref 6–12)
POTASSIUM SERPL-SCNC: 3.3 MMOL/L (ref 3.5–5.2)
PROT SERPL-MCNC: 6.8 G/DL (ref 6–8.5)
RBC # BLD AUTO: 4.39 10*6/MM3 (ref 4.14–5.8)
SODIUM SERPL-SCNC: 140 MMOL/L (ref 136–145)
WBC NRBC COR # BLD AUTO: 8.09 10*3/MM3 (ref 3.4–10.8)

## 2024-09-03 PROCEDURE — G0498 CHEMO EXTEND IV INFUS W/PUMP: HCPCS

## 2024-09-03 PROCEDURE — 25010000002 PANITUMUMAB 400 MG/20ML SOLUTION 20 ML VIAL: Performed by: NURSE PRACTITIONER

## 2024-09-03 PROCEDURE — 25010000002 LEUCOVORIN CALCIUM PER 50MG: Performed by: NURSE PRACTITIONER

## 2024-09-03 PROCEDURE — 25010000002 PALONOSETRON 0.25 MG/5ML SOLUTION PREFILLED SYRINGE: Performed by: NURSE PRACTITIONER

## 2024-09-03 PROCEDURE — 80053 COMPREHEN METABOLIC PANEL: CPT | Performed by: INTERNAL MEDICINE

## 2024-09-03 PROCEDURE — 25810000003 SODIUM CHLORIDE 0.9 % SOLUTION 250 ML FLEX CONT: Performed by: NURSE PRACTITIONER

## 2024-09-03 PROCEDURE — 83735 ASSAY OF MAGNESIUM: CPT | Performed by: INTERNAL MEDICINE

## 2024-09-03 PROCEDURE — 96416 CHEMO PROLONG INFUSE W/PUMP: CPT

## 2024-09-03 PROCEDURE — 25010000002 DEXAMETHASONE SODIUM PHOSPHATE 20 MG/5ML SOLUTION: Performed by: NURSE PRACTITIONER

## 2024-09-03 PROCEDURE — 25010000002 FLUOROURACIL PER 500 MG: Performed by: NURSE PRACTITIONER

## 2024-09-03 PROCEDURE — 82378 CARCINOEMBRYONIC ANTIGEN: CPT | Performed by: INTERNAL MEDICINE

## 2024-09-03 PROCEDURE — 96375 TX/PRO/DX INJ NEW DRUG ADDON: CPT

## 2024-09-03 PROCEDURE — 99215 OFFICE O/P EST HI 40 MIN: CPT | Performed by: NURSE PRACTITIONER

## 2024-09-03 PROCEDURE — 0 DEXTROSE 5 % SOLUTION 250 ML FLEX CONT: Performed by: NURSE PRACTITIONER

## 2024-09-03 PROCEDURE — 96417 CHEMO IV INFUS EACH ADDL SEQ: CPT

## 2024-09-03 PROCEDURE — 96413 CHEMO IV INFUSION 1 HR: CPT

## 2024-09-03 PROCEDURE — 96367 TX/PROPH/DG ADDL SEQ IV INF: CPT

## 2024-09-03 PROCEDURE — 25010000002 PANITUMUMAB PER 10 MG: Performed by: NURSE PRACTITIONER

## 2024-09-03 PROCEDURE — 85025 COMPLETE CBC W/AUTO DIFF WBC: CPT | Performed by: INTERNAL MEDICINE

## 2024-09-03 RX ORDER — TRAZODONE HYDROCHLORIDE 100 MG/1
100 TABLET ORAL NIGHTLY
Qty: 30 TABLET | Refills: 2 | Status: SHIPPED | OUTPATIENT
Start: 2024-09-03

## 2024-09-03 RX ORDER — SODIUM CHLORIDE 9 MG/ML
20 INJECTION, SOLUTION INTRAVENOUS ONCE
Status: DISCONTINUED | OUTPATIENT
Start: 2024-09-03 | End: 2024-09-04 | Stop reason: HOSPADM

## 2024-09-03 RX ORDER — DEXTROSE MONOHYDRATE 50 MG/ML
20 INJECTION, SOLUTION INTRAVENOUS ONCE
Status: CANCELLED | OUTPATIENT
Start: 2024-09-03

## 2024-09-03 RX ORDER — DIPHENHYDRAMINE HYDROCHLORIDE 50 MG/ML
50 INJECTION INTRAMUSCULAR; INTRAVENOUS AS NEEDED
Status: CANCELLED | OUTPATIENT
Start: 2024-09-03

## 2024-09-03 RX ORDER — PALONOSETRON 0.05 MG/ML
0.25 INJECTION, SOLUTION INTRAVENOUS ONCE
Status: COMPLETED | OUTPATIENT
Start: 2024-09-03 | End: 2024-09-03

## 2024-09-03 RX ORDER — NALOXEGOL OXALATE 25 MG/1
TABLET, FILM COATED ORAL
COMMUNITY
Start: 2024-08-20

## 2024-09-03 RX ORDER — DIPHENHYDRAMINE HYDROCHLORIDE 50 MG/ML
50 INJECTION INTRAMUSCULAR; INTRAVENOUS AS NEEDED
Status: DISCONTINUED | OUTPATIENT
Start: 2024-09-03 | End: 2024-09-04 | Stop reason: HOSPADM

## 2024-09-03 RX ORDER — FAMOTIDINE 10 MG/ML
20 INJECTION, SOLUTION INTRAVENOUS AS NEEDED
Status: CANCELLED | OUTPATIENT
Start: 2024-09-03

## 2024-09-03 RX ORDER — SODIUM CHLORIDE 9 MG/ML
20 INJECTION, SOLUTION INTRAVENOUS ONCE
Status: CANCELLED | OUTPATIENT
Start: 2024-09-03

## 2024-09-03 RX ORDER — TRAZODONE HYDROCHLORIDE 100 MG/1
100 TABLET ORAL NIGHTLY
Qty: 30 TABLET | Refills: 1 | OUTPATIENT
Start: 2024-09-03

## 2024-09-03 RX ORDER — DEXTROSE MONOHYDRATE 50 MG/ML
20 INJECTION, SOLUTION INTRAVENOUS ONCE
Status: DISCONTINUED | OUTPATIENT
Start: 2024-09-03 | End: 2024-09-04 | Stop reason: HOSPADM

## 2024-09-03 RX ORDER — FAMOTIDINE 10 MG/ML
20 INJECTION, SOLUTION INTRAVENOUS AS NEEDED
Status: DISCONTINUED | OUTPATIENT
Start: 2024-09-03 | End: 2024-09-04 | Stop reason: HOSPADM

## 2024-09-03 RX ORDER — PALONOSETRON 0.05 MG/ML
0.25 INJECTION, SOLUTION INTRAVENOUS ONCE
Status: CANCELLED | OUTPATIENT
Start: 2024-09-03

## 2024-09-03 RX ADMIN — DEXAMETHASONE SODIUM PHOSPHATE 12 MG: 4 INJECTION, SOLUTION INTRA-ARTICULAR; INTRALESIONAL; INTRAMUSCULAR; INTRAVENOUS; SOFT TISSUE at 10:49

## 2024-09-03 RX ADMIN — PANITUMUMAB 580 MG: 400 SOLUTION INTRAVENOUS at 10:11

## 2024-09-03 RX ADMIN — LEUCOVORIN CALCIUM 880 MG: 10 INJECTION INTRAMUSCULAR; INTRAVENOUS at 11:08

## 2024-09-03 RX ADMIN — FLUOROURACIL 5260 MG: 50 INJECTION, SOLUTION INTRAVENOUS at 12:18

## 2024-09-03 RX ADMIN — PALONOSETRON 0.25 MG: 0.25 INJECTION, SOLUTION INTRAVENOUS at 10:48

## 2024-09-03 NOTE — PROGRESS NOTES
Hematology and Oncology [place]  Office number [phone]    Fax number [phone]     Follow up     Date: 24    Patient Name: Edward Powell  MRN: [id number]  : 1970    Referring Physician: Dr. Gautam    Chief Complaint: Rectal cancer, lung mass, liver lesions follow up on treatment    Cancer Staging:  IV    History of Present Illness: Edward Powell is a pleasant 54 y.o. male who presents today for evaluation of rectal cancer in the company of his supportive significant other.    Patient has a longstanding history of intermittent diarrhea since his cholecystectomy in 2019.  However he developed progressive diarrhea with associated loss of bowel control and urgency as well as weight loss and fatigue prompting additional workup.    CT of the abdomen pelvis 2023 showed an irregular circumferential wall thickening involving the rectum concerning for mass lesion.  There was associated mesorectal lymphadenopathy.  Masslike consolidation of left lower lobe.  CT of the chest showed a cavitary lesion in the left lower lobe up to 4.8 cm with an adjacent cavitary nodule up to 2 cm and a 5 mm nodule on the right minor fissure.      He underwent colonoscopy 2023 with findings of an infiltrative and ulcerated partially obstructing mass in the proximal rectum spanning 10 cm.  Rectal polyps.  Biopsy of the rectal mass showed invasive moderately differentiated adenocarcinoma.  Additional biopsies showed tubular adenomas.  MSI testing was intact/low probability of MSI high.    PDL1 negative    PET/CT 2023 showed hypermetabolic rectal wall thickening compatible with known rectal malignancy.  Multiple small ill-defined hypoechoic hyper metabolic liver lesions compatible with metastatic disease.  Mildly enlarged and mildly hypermetabolic pelvic sidewall and internal iliac lymph node chain adenopathy.  Hypermetabolic lung mass with adjacent nodule.     He underwent liver biopsy and lung  biopsy January 2024.  Results of both confirmed metastatic colorectal cancer.    The patient has been experiencing substantial rectal pain.  He is taking oxycodone every 6 hours with partial relief.  He reports ongoing bowel movements.  No abdominal pain.  No vomiting.  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  He reports hidradenitis is longstanding over many years.  Neuropathy symptoms have improved and are overall stable since palliative started Lyrica.  Pain well-controlled on morphine.  Noted some dry cracked areas on his hands that are healing.    Past Medical History:   Past Medical History:   Diagnosis Date    Arthritis     Cancer     rectal cancer - diagnosed 2023    Cholelithiasis 2019    Removed    COPD (chronic obstructive pulmonary disease)     Coronary artery disease 2009    Stent - no cardiologist currently    Elevated cholesterol     GERD (gastroesophageal reflux disease)     Hernia 2003    Lower hernia    Perforated ulcer 2019    Sleep apnea     history of; when weighed over 400lbs - no issues following bariatric surgery       Past Surgical History:   Past Surgical History:   Procedure Laterality Date    APPENDECTOMY  1983    Removed    BARIATRIC SURGERY  2011    Gastric bypass    BLADDER TUMOR/ULCER BLEEDER CAUTERIZATION      CARDIAC CATHETERIZATION  2009    stent placed    CHOLECYSTECTOMY  2019    Removed    COLONOSCOPY N/A 12/07/2023    Procedure: COLONOSCOPY WITH HOT SNARE POLYPECTOMY AND TATTOO;  Surgeon: Noman Gautam MD;  Location: [facility] ENDOSCOPY;  Service:  Gastroenterology;  Laterality: N/A;    PORTACATH PLACEMENT N/A 1/5/2024    Procedure: INSERTION OF PORTACATH WITH ULTRSOUND AND FLUOROSCOPIC GUIDANCE;  Surgeon: Ida Black MD;  Location: [facility];  Service: General;  Laterality: N/A;    JENNIFER-EN-Y         Family History: No family history on file.  Uncle had colon cancer    Social History:   Social History     Socioeconomic History    Marital status:    Tobacco Use    Smoking status: Every Day     Current packs/day: 0.75     Average packs/day: 0.8 packs/day for 30.0 years (22.5 ttl pk-yrs)     Types: Cigarettes    Smokeless tobacco: Never    Tobacco comments:     35 years      pt reports closer to 2 packs per day since cancer diagnosis   Vaping Use    Vaping status: Never Used   Substance and Sexual Activity    Alcohol use: Never    Drug use: Never    Sexual activity: Defer       Medications:     Current Outpatient Medications:     buPROPion XL (WELLBUTRIN XL) 150 MG 24 hr tablet, TAKE 1 TABLET BY MOUTH DAILY, Disp: 90 tablet, Rfl: 1    cetirizine (zyrTEC) 10 MG tablet, Take 1 tablet by mouth Daily., Disp: 30 tablet, Rfl: 2    clindamycin (Clindagel) 1 % gel, Apply 1 Application topically to the appropriate area as directed 2 (Two) Times a Day. Use first, Disp: 30 g, Rfl: 1    dexAMETHasone 0.5 MG/5ML solution, Take 10 mL by mouth 2 (Two) Times a Day. Swish for 2 minutes and spit 10 mL 4 x daily, Disp: 240 mL, Rfl: 5    dicyclomine (BENTYL) 20 MG tablet, Take 1 tablet by mouth 3 (Three) Times a Day As Needed for Abdominal Cramping., Disp: 30 tablet, Rfl: 3    docusate sodium (COLACE) 100 MG capsule, Take 1 capsule by mouth 2 (Two) Times a Day., Disp: , Rfl:     doxycycline (MONODOX) 100 MG capsule, , Disp: , Rfl:     doxycycline (VIBRAMYICN) 100 MG tablet, , Disp: , Rfl:     Hydrocortisone, Perianal, (ANUSOL-HC) 2.5 % rectal cream, Insert  into the rectum 2 (Two) Times a Day. Indications: Inflamed Hemorrhoids, Disp: 30 g, Rfl: 1     lidocaine-prilocaine (EMLA) 2.5-2.5 % cream, Apply 1 Application topically to the appropriate area as directed As Needed (45-60 minutes prior to port access.  Cover with saran/plastic wrap.)., Disp: 30 g, Rfl: 3    LORazepam (ATIVAN) 0.5 MG tablet, Take 1 tablet by mouth Take As Directed. 1 tabelt by mouth 30 minutes prior to MRI, take a second tablet at the time of the MRI if needed., Disp: 2 tablet, Rfl: 0    Magic Mouthwash Oral Suspension (diphenhydrAMINE HCl - aluminum & magnesium hydroxide-simethicone - lidocaine - nystatin), Swish and Spit 10 mL by mouth every 6 (Six) Hours For 7 Days., Disp: 300 mL, Rfl: 3    Morphine (MS CONTIN) 15 MG 12 hr tablet, Take 1 tablet by mouth 2 (Two) Times a Day., Disp: 60 tablet, Rfl: 0    Movantik 25 MG tablet, , Disp: , Rfl:     mupirocin (BACTROBAN) 2 % cream, , Disp: , Rfl:     naloxone (NARCAN) 4 MG/0.1ML nasal spray, 1 spray into the nostril(s) as directed by provider As Needed for Opioid Reversal., Disp: 1 each, Rfl: 0    nystatin (MYCOSTATIN) 100,000 unit/mL suspension, Swish and Swallow 5mL by mouth four times a day, Disp: 473 mL, Rfl: 0    nystatin (MYCOSTATIN) 100,000 unit/mL suspension, Swish and swallow 5 mL 4 (Four) Times a Day., Disp: 473 mL, Rfl: 0    nystatin susp + lidocaine viscous (MAGIC MOUTHWASH) oral suspension, 5-10 ml swish and spit or swallow QID prn, Disp: 240 mL, Rfl: 3    ondansetron (ZOFRAN) 8 MG tablet, Take 1 tablet by mouth 3 (Three) Times a Day As Needed for Nausea or Vomiting., Disp: 30 tablet, Rfl: 5    oxyCODONE (ROXICODONE) 15 MG immediate release tablet, Take 1 tablet by mouth 5 (Five) Times a Day As Needed for Moderate Pain or Severe Pain for up to 30 doses., Disp: 150 tablet, Rfl: 0    pantoprazole (Protonix) 40 MG EC tablet, Take 1 tablet by mouth Daily. Indications: Gastroesophageal Reflux Disease, Disp: 90 tablet, Rfl: 2    pregabalin (LYRICA) 150 MG capsule, Take 1 capsule by mouth 2 (Two) Times a Day., Disp: 60 capsule, Rfl: 0     tiotropium bromide-olodaterol (Stiolto Respimat) 2.5-2.5 MCG/ACT aerosol solution inhaler, INHALE 2 INHALATION(S) BY MOUTH DAILY, Disp: 4 g, Rfl: 5    traZODone (DESYREL) 100 MG tablet, Take 1 tablet by mouth Every Night., Disp: 30 tablet, Rfl: 2    triamcinolone (KENALOG) 0.025 % ointment, Apply 1 Application topically to the appropriate area as directed 2 (Two) Times a Day. Use second if topical treatment #1 ineffective, Disp: 80 g, Rfl: 0  No current facility-administered medications for this visit.    Facility-Administered Medications Ordered in Other Visits:     dexAMETHasone (DECADRON) IVPB 12 mg, 12 mg, Intravenous, Once, Jacy Razo, APRN    dextrose (D5W) 5 % infusion, 20 mL/hr, Intravenous, Once, Jacy Razo R, APRN    diphenhydrAMINE (BENADRYL) injection 50 mg, 50 mg, Intravenous, PRN, Jacy Razo, APRN    famotidine (PEPCID) injection 20 mg, 20 mg, Intravenous, PRN, Jacy Razo R, APRN    fluorouracil (ADRUCIL) 5,260 mg in sodium chloride 0.9 % 230 mL chemo infusion - FOR HOME USE, 2,400 mg/m2 (Treatment Plan Recorded), Intravenous, Once, Jacy Razo R, APRN    Hydrocortisone Sod Suc (PF) (Solu-CORTEF) injection 100 mg, 100 mg, Intravenous, PRN, Jacy Razo R, APRN    leucovorin 880 mg in dextrose (D5W) 5 % 250 mL IVPB, 400 mg/m2 (Treatment Plan Recorded), Intravenous, Once, Tejinder Razossica R, APRN    palonosetron (ALOXI) injection 0.25 mg, 0.25 mg, Intravenous, Once, Jacy Razo R, APRN    panitumumab (VECTIBIX) 580 mg in sodium chloride 0.9 % 100 mL chemo IVPB, 6 mg/kg (Treatment Plan Recorded), Intravenous, Once, Jacy Razo R, APRN, Last Rate: 200 mL/hr at 09/03/24 1011, 580 mg at 09/03/24 1011    sodium chloride 0.9 % infusion, 20 mL/hr, Intravenous, Once, Jacy Razo, [place]    Allergies:   Allergies   Allergen Reactions    Bactrim [Sulfamethoxazole-Trimethoprim] Hives     and blisters    Sulfa Antibiotics Hives     and blisters       Objective     Vital Signs:   Vitals:    09/03/24 0853  "  BP: 117/53   Pulse: 62   Resp: 18   Temp: 97.7 °F (36.5 °C)   SpO2: 98%   Weight: 93.8 kg (206 lb 14.4 oz)   Height: 182.9 cm (72.01\")   PainSc:   5        Body mass index is 28.05 kg/m².   Pain Score    09/03/24 0853   PainSc:   5     ECOG Performance Status: 1 - Symptomatic but completely ambulatory    Physical Exam:   General: No acute distress. Well appearing   HEENT: Normocephalic, atraumatic. Sclera anicteric. Dry mouth  Neck: supple, no adenopathy.   Cardiovascular: regular rate and rhythm. No murmurs.   Respiratory: Normal rate. Clear to auscultation bilaterally  Abdomen: Soft, nontender, non distended with normoactive bowel sounds  Lymph: no cervical, supraclavicular or axillary adenopathy  Neuro: Alert and oriented x 3. No focal deficits.   Ext: Symmetric, no swelling.   Accurate as of 8/19/24    Laboratory/Imaging Reviewed:   Hospital Outpatient Visit on 09/03/2024   Component Date Value Ref Range Status    Glucose 09/03/2024 106 (H)  65 - 99 mg/dL Final    BUN 09/03/2024 10  6 - 20 mg/dL Final    Creatinine 09/03/2024 0.73 (L)  0.76 - 1.27 mg/dL Final    Sodium 09/03/2024 140  136 - 145 mmol/L Final    Potassium 09/03/2024 3.3 (L)  3.5 - 5.2 mmol/L Final    Chloride 09/03/2024 107  98 - 107 mmol/L Final    CO2 09/03/2024 21.9 (L)  22.0 - 29.0 mmol/L Final    Calcium 09/03/2024 8.9  8.6 - 10.5 mg/dL Final    Total Protein 09/03/2024 6.8  6.0 - 8.5 g/dL Final    Albumin 09/03/2024 3.4 (L)  3.5 - 5.2 g/dL Final    ALT (SGPT) 09/03/2024 15  1 - 41 U/L Final    AST (SGOT) 09/03/2024 28  1 - 40 U/L Final    Alkaline Phosphatase 09/03/2024 112  39 - 117 U/L Final    Total Bilirubin 09/03/2024 0.4  0.0 - 1.2 mg/dL Final    Globulin 09/03/2024 3.4  gm/dL Final    A/G Ratio 09/03/2024 1.0  g/dL Final    BUN/Creatinine Ratio 09/03/2024 13.7  7.0 - 25.0 Final    Anion Gap 09/03/2024 11.1  5.0 - 15.0 mmol/L Final    eGFR 09/03/2024 107.4  >60.0 mL/min/1.73 Final    Magnesium 09/03/2024 1.4 (L)  1.6 - 2.6 mg/dL " Final    WBC 09/03/2024 8.09  3.40 - 10.80 10*3/mm3 Final    RBC 09/03/2024 4.39  4.14 - 5.80 10*6/mm3 Final    Hemoglobin 09/03/2024 11.2 (L)  13.0 - 17.7 g/dL Final    Hematocrit 09/03/2024 36.2 (L)  37.5 - 51.0 % Final    MCV 09/03/2024 82.5  79.0 - 97.0 fL Final    MCH 09/03/2024 25.5 (L)  26.6 - 33.0 pg Final    MCHC 09/03/2024 30.9 (L)  31.5 - 35.7 g/dL Final    RDW 09/03/2024 19.1 (H)  12.3 - 15.4 % Final    RDW-SD 09/03/2024 55.5 (H)  37.0 - 54.0 fl Final    MPV 09/03/2024 11.4  6.0 - 12.0 fL Final    Platelets 09/03/2024 175  140 - 450 10*3/mm3 Final    Neutrophil % 09/03/2024 75.9  42.7 - 76.0 % Final    Lymphocyte % 09/03/2024 15.5 (L)  19.6 - 45.3 % Final    Monocyte % 09/03/2024 7.0  5.0 - 12.0 % Final    Eosinophil % 09/03/2024 1.0  0.3 - 6.2 % Final    Basophil % 09/03/2024 0.4  0.0 - 1.5 % Final    Immature Grans % 09/03/2024 0.2  0.0 - 0.5 % Final    Neutrophils, Absolute 09/03/2024 6.14  1.70 - 7.00 10*3/mm3 Final    Lymphocytes, Absolute 09/03/2024 1.25  0.70 - 3.10 10*3/mm3 Final    Monocytes, Absolute 09/03/2024 0.57  0.10 - 0.90 10*3/mm3 Final    Eosinophils, Absolute 09/03/2024 0.08  0.00 - 0.40 10*3/mm3 Final    Basophils, Absolute 09/03/2024 0.03  0.00 - 0.20 10*3/mm3 Final    Immature Grans, Absolute 09/03/2024 0.02  0.00 - 0.05 10*3/mm3 Final    nRBC 09/03/2024 0.0  0.0 - 0.2 /100 WBC Final     Tempus xt: APC gene TP53; Negative ADRIANNA, BRAF, NRAS, TMB stable. PDL1 negative  No results found.    Procedures    Assessment / Plan      Assessment/Plan:     1.  Rectal cancer   2.  Liver metastases  3.  Lung metastasis  4.  Chemo monitoring  5.  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  -He was found to have biopsy-proven metastasis in the liver and lung.  His case was presented at multidisciplinary tumor board.  -Because of metastatic disease to both the lung and liver I do not think he will be downstage  to resectable disease.  -Tempus results which are notable for an APC mutation, T p53 mutation, negative for KRAS, BRAF, NRAS, tumor mutation burden stable.  Notch 1 VUS.  -CBC adequate and CMP pending for treatment today with maintenance 5-FU and panitumumab 8/19.   Chemotherapy orders and premedications signed 8/19.   -We reviewed his imaging which is consistent with excellent disease control in early July with normalization of CEA. He has had persistent rectal pain with escalating oxycodone requirements over the past couple of months, but no severe recent increase in pain, fever. WBC normal. 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 I reviewed his EUA notes. Increase doxy to BID. No surgical intervention.  -Consolidative radiotherapy to the rectal tumor is not something he wants to pursue  -Continue maintenance 5FU/panitumumab. He is going to extend the interval to q 3 weeks this fall to accommodate travel plans.     6.  Cancer related pain  -Continue to follow with palliative care and pain is well controlled on lyrica, oxycodone and MS contin    7.  Panitumumab induced rash  -Continue doxycycline.     8. GERD  PPI    9. Mucositis  -MMW as needed  -Continue dexamethasone mouthwash for prevention    10. Hypokalemia  11. Hypomagnesemia  -Continue Otc magnesium and monitor levels    12. Access    13.  Dry cracked skin on hands  - Advised to start aloe as well as a nonpregnant lotion.  - Use antibiotic ointment on current area that is open due to cracking.    -Port    Follow Up:   2 weeks     [place]OR[place]IA Grimes    Hematology and Oncology     Total time of patient care including time prior to, face to face with patient, and following visit spent in reviewing records, lab results, imaging studies, discussion with patient, and documentation/charting was > 40 minutes

## 2024-09-05 ENCOUNTER — HOSPITAL ENCOUNTER (OUTPATIENT)
Facility: HOSPITAL | Age: 54
Discharge: HOME | End: 2024-09-05
Payer: COMMERCIAL

## 2024-09-05 DIAGNOSIS — C78.7 RECTAL CANCER METASTASIZED TO LIVER: ICD-10-CM

## 2024-09-05 DIAGNOSIS — C20 RECTAL CANCER METASTASIZED TO LIVER: ICD-10-CM

## 2024-09-05 DIAGNOSIS — C20 RECTAL ADENOCARCINOMA: Primary | ICD-10-CM

## 2024-09-05 PROCEDURE — 25010000002 HEPARIN LOCK FLUSH PER 10 UNITS: Performed by: INTERNAL MEDICINE

## 2024-09-05 PROCEDURE — 96523 IRRIG DRUG DELIVERY DEVICE: CPT

## 2024-09-05 RX ORDER — SODIUM CHLORIDE 0.9 % (FLUSH) 0.9 %
10 SYRINGE (ML) INJECTION AS NEEDED
OUTPATIENT
Start: 2024-09-05

## 2024-09-05 RX ORDER — HEPARIN SODIUM (PORCINE) LOCK FLUSH IV SOLN 100 UNIT/ML 100 UNIT/ML
500 SOLUTION INTRAVENOUS AS NEEDED
Status: DISCONTINUED | OUTPATIENT
Start: 2024-09-05 | End: 2024-09-06 | Stop reason: HOSPADM

## 2024-09-05 RX ORDER — HEPARIN SODIUM (PORCINE) LOCK FLUSH IV SOLN 100 UNIT/ML 100 UNIT/ML
500 SOLUTION INTRAVENOUS AS NEEDED
OUTPATIENT
Start: 2024-09-05

## 2024-09-05 RX ORDER — SODIUM CHLORIDE 0.9 % (FLUSH) 0.9 %
10 SYRINGE (ML) INJECTION AS NEEDED
Status: DISCONTINUED | OUTPATIENT
Start: 2024-09-05 | End: 2024-09-06 | Stop reason: HOSPADM

## 2024-09-05 RX ADMIN — Medication 10 ML: at 14:01

## 2024-09-05 RX ADMIN — HEPARIN 500 UNITS: 100 SYRINGE at 14:01

## 2024-09-05 NOTE — CODE DOCUMENTATION
Prior to disconnecting patient from his bulb a clear liquid leaked out of the female end of the quick connect. Patient stated he had taken a shower with the bulb connected and that water had leaked into the quick connect. Asked for charge to come evaluate and assist. Reeducated patient on how little microbes crawl up the catheter and this line goes straight into his heart. Explained to patient why we perform sterile technique when accessing port. Attached a new end cap to port a cath line withdrew blood first, discarded, then flushed, and hep locked. Educated patient about signs and symptoms of infection. Also to watch for reddening or tenderness at his port site. Patient stated he understood and would not be taking a shower anymore but washing up only while connected to his chemo.

## 2024-09-06 ENCOUNTER — OFFICE VISIT (OUTPATIENT)
Age: 54
End: 2024-09-06
Payer: COMMERCIAL

## 2024-09-06 VITALS
BODY MASS INDEX: 28.31 KG/M2 | DIASTOLIC BLOOD PRESSURE: 52 MMHG | HEIGHT: 72 IN | HEART RATE: 56 BPM | WEIGHT: 209 LBS | TEMPERATURE: 98.2 F | SYSTOLIC BLOOD PRESSURE: 82 MMHG | OXYGEN SATURATION: 98 %

## 2024-09-06 DIAGNOSIS — M79.2 NEUROPATHIC PAIN: Primary | ICD-10-CM

## 2024-09-06 NOTE — PROGRESS NOTES
Female Physical Note      Date: 2024   Patient Name: Edward Powell  : 1970   MRN: [id number]     Chief Complaint:    Chief Complaint   Patient presents with    Annual Exam     physical       History of Present Illness: Edward Powell is a 54 y.o. male who is here today for their annual health maintenance and physical.  Patient has been continuing with chemo treatment for his rectal cancer.  States that he has been progressively losing weight as well as having neuropathic pain in his feet.  Does state that he is seeing palliative medicine who is adjusting his medication for his neuropathic pain.    Does also state that he has been having problems with dry mouth as well as dry skin.  Does state that his oncologist feels that this could be side effects from his current chemo regimen.    Subjective      Review of Systems:   Review of Systems   Constitutional:  Negative for appetite change and unexpected weight loss.   HENT:  Negative for trouble swallowing.    Eyes:  Negative for blurred vision and double vision.   Respiratory:  Negative for cough and shortness of breath.    Cardiovascular:  Negative for chest pain and leg swelling.   Gastrointestinal:  Negative for blood in stool.   Endocrine: Negative for cold intolerance, heat intolerance and polyuria.   Musculoskeletal:  Negative for joint swelling.   Skin:  Negative for color change and bruise.   Neurological:  Negative for numbness and memory problem.   Hematological:  Does not bruise/bleed easily.   Psychiatric/Behavioral:  Negative for suicidal ideas and depressed mood. The patient is not nervous/anxious.        Past Medical History, Social History, Family History and Care Team were all reviewed with patient and updated as appropriate.     Medications:     Current Outpatient Medications:     buPROPion XL (WELLBUTRIN XL) 150 MG 24 hr tablet, TAKE 1 TABLET BY MOUTH DAILY, Disp: 90 tablet, Rfl: 1    cetirizine (zyrTEC) 10 MG tablet,  Take 1 tablet by mouth Daily., Disp: 30 tablet, Rfl: 2    clindamycin (Clindagel) 1 % gel, Apply 1 Application topically to the appropriate area as directed 2 (Two) Times a Day. Use first, Disp: 30 g, Rfl: 1    dexAMETHasone 0.5 MG/5ML solution, Take 10 mL by mouth 2 (Two) Times a Day. Swish for 2 minutes and spit 10 mL 4 x daily, Disp: 240 mL, Rfl: 5    dicyclomine (BENTYL) 20 MG tablet, Take 1 tablet by mouth 3 (Three) Times a Day As Needed for Abdominal Cramping., Disp: 30 tablet, Rfl: 3    docusate sodium (COLACE) 100 MG capsule, Take 1 capsule by mouth 2 (Two) Times a Day., Disp: , Rfl:     doxycycline (MONODOX) 100 MG capsule, , Disp: , Rfl:     doxycycline (VIBRAMYICN) 100 MG tablet, , Disp: , Rfl:     Hydrocortisone, Perianal, (ANUSOL-HC) 2.5 % rectal cream, Insert  into the rectum 2 (Two) Times a Day. Indications: Inflamed Hemorrhoids, Disp: 30 g, Rfl: 1    lidocaine-prilocaine (EMLA) 2.5-2.5 % cream, Apply 1 Application topically to the appropriate area as directed As Needed (45-60 minutes prior to port access.  Cover with saran/plastic wrap.)., Disp: 30 g, Rfl: 3    LORazepam (ATIVAN) 0.5 MG tablet, Take 1 tablet by mouth Take As Directed. 1 tabelt by mouth 30 minutes prior to MRI, take a second tablet at the time of the MRI if needed., Disp: 2 tablet, Rfl: 0    Morphine (MS CONTIN) 15 MG 12 hr tablet, Take 1 tablet by mouth 2 (Two) Times a Day., Disp: 60 tablet, Rfl: 0    Movantik 25 MG tablet, , Disp: , Rfl:     mupirocin (BACTROBAN) 2 % cream, , Disp: , Rfl:     naloxone (NARCAN) 4 MG/0.1ML nasal spray, 1 spray into the nostril(s) as directed by provider As Needed for Opioid Reversal., Disp: 1 each, Rfl: 0    nystatin (MYCOSTATIN) 100,000 unit/mL suspension, Swish and Swallow 5mL by mouth four times a day, Disp: 473 mL, Rfl: 0    nystatin (MYCOSTATIN) 100,000 unit/mL suspension, Swish and swallow 5 mL 4 (Four) Times a Day., Disp: 473 mL, Rfl: 0    nystatin susp + lidocaine viscous (MAGIC MOUTHWASH)  oral suspension, 5-10 ml swish and spit or swallow QID prn, Disp: 240 mL, Rfl: 3    ondansetron (ZOFRAN) 8 MG tablet, Take 1 tablet by mouth 3 (Three) Times a Day As Needed for Nausea or Vomiting., Disp: 30 tablet, Rfl: 5    oxyCODONE (ROXICODONE) 15 MG immediate release tablet, Take 1 tablet by mouth 5 (Five) Times a Day As Needed for Moderate Pain or Severe Pain for up to 30 doses., Disp: 150 tablet, Rfl: 0    pantoprazole (Protonix) 40 MG EC tablet, Take 1 tablet by mouth Daily. Indications: Gastroesophageal Reflux Disease, Disp: 90 tablet, Rfl: 2    pregabalin (LYRICA) 150 MG capsule, Take 1 capsule by mouth 2 (Two) Times a Day., Disp: 60 capsule, Rfl: 0    tiotropium bromide-olodaterol (Stiolto Respimat) 2.5-2.5 MCG/ACT aerosol solution inhaler, INHALE 2 INHALATION(S) BY MOUTH DAILY, Disp: 4 g, Rfl: 5    traZODone (DESYREL) 100 MG tablet, Take 1 tablet by mouth Every Night., Disp: 30 tablet, Rfl: 2    triamcinolone (KENALOG) 0.025 % ointment, Apply 1 Application topically to the appropriate area as directed 2 (Two) Times a Day. Use second if topical treatment #1 ineffective, Disp: 80 g, Rfl: 0    Magic Mouthwash Oral Suspension (diphenhydrAMINE HCl - aluminum & magnesium hydroxide-simethicone - lidocaine - nystatin), Swish and Spit 10 mL by mouth every 6 (Six) Hours For 7 Days. (Patient not taking: Reported on 9/6/2024), Disp: 300 mL, Rfl: 3  No current facility-administered medications for this visit.    Allergies:   Allergies   Allergen Reactions    Bactrim [Sulfamethoxazole-Trimethoprim] Hives     and blisters    Sulfa Antibiotics Hives     and blisters       Immunizations:  Health Maintenance Summary            Overdue - TDAP/TD VACCINES (1 - Tdap) Never done      No completion, postpone, or frequency change history exists for this topic.              Overdue - ANNUAL PHYSICAL (Yearly) Never done      No completion, postpone, or frequency change history exists for this topic.              Postponed -  "INFLUENZA VACCINE (Yearly - August to March) Postponed until 3/31/2025      09/06/2024  Postponed until 3/31/2025 by Claire Cox MA (Product Unavailable)              Postponed - ZOSTER VACCINE (1 of 2) Postponed until 5/16/2025 05/17/2024  Postponed until 5/16/2025 by Luz Barron MA (Product Unavailable)              Postponed - Pneumococcal Vaccine 0-64 (1 of 2 - PCV) Postponed until 5/17/2025 05/17/2024  Postponed until 5/17/2025 by Luz Barron MA (Patient Refused)              Postponed - COVID-19 Vaccine (1 - 2023-24 season) Postponed until 9/6/2025 09/06/2024  Postponed until 9/6/2025 by Claire Cox MA (Product Unavailable)    05/17/2024  Postponed until 5/16/2025 by Luz Barron MA (Product Unavailable)              BMI FOLLOWUP (Yearly) Next due on 3/18/2025      03/18/2024  Registry Metric: BMI Follow-up              LIPID PANEL (Yearly) Next due on 7/1/2025 07/01/2024  Lipid panel              LUNG CANCER SCREENING (Yearly) Next due on 7/5/2025 07/05/2024  CT Chest With Contrast Diagnostic    04/12/2024  CT Chest With Contrast Diagnostic    12/01/2023  CT Chest With Contrast Diagnostic              HEPATITIS C SCREENING  Completed      07/01/2024  Hepatitis C antibody              Discontinued - COLORECTAL CANCER SCREENING  Discontinued        Frequency changed to Never automatically (Topic No Longer Applies)    12/07/2023  COLONOSCOPY    12/07/2023  Surgical Procedure: COLONOSCOPY                     No orders of the defined types were placed in this encounter.       Colorectal Screening:   up to date   Last Completed Colonoscopy            Discontinued - COLORECTAL CANCER SCREENING  Discontinued        Frequency changed to Never automatically (Topic No Longer Applies)    12/07/2023  COLONOSCOPY    12/07/2023  Surgical Procedure: COLONOSCOPY                      HIV (Age 15-65 once): No results found for: \"HIV1X2\"  A1c: No results found for: \"HGBA1C\" " "  Lipid panel:  No results found for: \"LIPIDEXCLUSI\"    The ASCVD Risk score (Nereyda FATIMA, et al., 2019) failed to calculate for the following reasons:    The valid systolic blood pressure range is 90 to 200 mmHg      Ophthalmologist: up to date  Dentist: recommended    Tobacco Use: High Risk (9/6/2024)    Patient History     Smoking Tobacco Use: Every Day     Smokeless Tobacco Use: Never     Passive Exposure: Not on file       Social History     Substance and Sexual Activity   Alcohol Use Never        Social History     Substance and Sexual Activity   Drug Use Never        Diet/Physical activity: Given the fact the patient is progressively feeling weight I did not discuss that her physical activity    Sexual Health:  contraception,  attempting pregnancy       Depression: PHQ-2 Depression Screening  PHQ-9 Total Score:         Objective     Physical Exam:  Vital Signs:   Vitals:    09/06/24 1015   BP: (!) 82/52   Pulse: 56   Temp: 98.2 °F (36.8 °C)   SpO2: 98%   Weight: 94.8 kg (209 lb)   Height: 182.9 cm (72.01\")     Body mass index is 28.34 kg/m².     Physical Exam  Vitals and nursing note reviewed.   Constitutional:       Appearance: Normal appearance.   HENT:      Head: Normocephalic and atraumatic.   Eyes:      General: Lids are normal.      Conjunctiva/sclera: Conjunctivae normal.   Cardiovascular:      Rate and Rhythm: Normal rate and regular rhythm.   Pulmonary:      Effort: Pulmonary effort is normal.      Breath sounds: Normal breath sounds and air entry.   Abdominal:      General: Abdomen is flat. Bowel sounds are normal.      Palpations: Abdomen is soft.   Musculoskeletal:      Cervical back: Full passive range of motion without pain and normal range of motion.   Neurological:      General: No focal deficit present.      Mental Status: He is alert and oriented to person, place, and time.   Psychiatric:         Attention and Perception: Attention normal.         Mood and Affect: Mood normal.         " Behavior: Behavior normal. Behavior is cooperative.         POCT Results (if applicable);   Results for orders placed or performed during the hospital encounter of 09/03/24   CEA    Specimen: Blood   Result Value Ref Range    CEA 4.79 ng/mL   Comprehensive Metabolic Panel    Specimen: Blood   Result Value Ref Range    Glucose 106 (H) 65 - 99 mg/dL    BUN 10 6 - 20 mg/dL    Creatinine 0.73 (L) 0.76 - 1.27 mg/dL    Sodium 140 136 - 145 mmol/L    Potassium 3.3 (L) 3.5 - 5.2 mmol/L    Chloride 107 98 - 107 mmol/L    CO2 21.9 (L) 22.0 - 29.0 mmol/L    Calcium 8.9 8.6 - 10.5 mg/dL    Total Protein 6.8 6.0 - 8.5 g/dL    Albumin 3.4 (L) 3.5 - 5.2 g/dL    ALT (SGPT) 15 1 - 41 U/L    AST (SGOT) 28 1 - 40 U/L    Alkaline Phosphatase 112 39 - 117 U/L    Total Bilirubin 0.4 0.0 - 1.2 mg/dL    Globulin 3.4 gm/dL    A/G Ratio 1.0 g/dL    BUN/Creatinine Ratio 13.7 7.0 - 25.0    Anion Gap 11.1 5.0 - 15.0 mmol/L    eGFR 107.4 >60.0 mL/min/1.73   Magnesium    Specimen: Blood   Result Value Ref Range    Magnesium 1.4 (L) 1.6 - 2.6 mg/dL   CBC Auto Differential    Specimen: Blood   Result Value Ref Range    WBC 8.09 3.40 - 10.80 10*3/mm3    RBC 4.39 4.14 - 5.80 10*6/mm3    Hemoglobin 11.2 (L) 13.0 - 17.7 g/dL    Hematocrit 36.2 (L) 37.5 - 51.0 %    MCV 82.5 79.0 - 97.0 fL    MCH 25.5 (L) 26.6 - 33.0 pg    MCHC 30.9 (L) 31.5 - 35.7 g/dL    RDW 19.1 (H) 12.3 - 15.4 %    RDW-SD 55.5 (H) 37.0 - 54.0 fl    MPV 11.4 6.0 - 12.0 fL    Platelets 175 140 - 450 10*3/mm3    Neutrophil % 75.9 42.7 - 76.0 %    Lymphocyte % 15.5 (L) 19.6 - 45.3 %    Monocyte % 7.0 5.0 - 12.0 %    Eosinophil % 1.0 0.3 - 6.2 %    Basophil % 0.4 0.0 - 1.5 %    Immature Grans % 0.2 0.0 - 0.5 %    Neutrophils, Absolute 6.14 1.70 - 7.00 10*3/mm3    Lymphocytes, Absolute 1.25 0.70 - 3.10 10*3/mm3    Monocytes, Absolute 0.57 0.10 - 0.90 10*3/mm3    Eosinophils, Absolute 0.08 0.00 - 0.40 10*3/mm3    Basophils, Absolute 0.03 0.00 - 0.20 10*3/mm3    Immature Grans, Absolute 0.02  0.00 - 0.05 10*3/mm3    nRBC 0.0 0.0 - 0.2 /100 WBC        Procedures    Assessment / Plan      Assessment/Plan:   There are no diagnoses linked to this encounter.     Healthcare Maintenance:  Counseling provided based on age appropriate USPSTF guidelines.       Edward Powell voices understanding and acceptance of this advice and will call back with any further questions or concerns. AVS with preventive healthcare tips printed for patient.     Vaccine Counseling:      Follow Up:   Return in about 3 months (around 12/6/2024) for Recheck.    I have spent a total of 45 min on reviewing test results/preparing to see patient, counseling patient, performing medically appropriate exam and documenting clinical information in the electronic or other health record.     Westley Gonzales, DO  [facility] [facility]

## 2024-09-09 DIAGNOSIS — G62.0 CHEMOTHERAPY-INDUCED NEUROPATHY: Primary | ICD-10-CM

## 2024-09-09 DIAGNOSIS — T45.1X5A CHEMOTHERAPY-INDUCED NEUROPATHY: Primary | ICD-10-CM

## 2024-09-09 DIAGNOSIS — G89.3 CANCER RELATED PAIN: ICD-10-CM

## 2024-09-09 RX ORDER — OXYCODONE HYDROCHLORIDE 15 MG/1
15 TABLET ORAL
Qty: 150 TABLET | Refills: 0 | Status: SHIPPED | OUTPATIENT
Start: 2024-09-09 | End: 2024-10-09

## 2024-09-09 RX ORDER — PREGABALIN 300 MG/1
300 CAPSULE ORAL 2 TIMES DAILY
Qty: 60 CAPSULE | Refills: 0 | Status: SHIPPED | OUTPATIENT
Start: 2024-09-09

## 2024-09-09 RX ORDER — MORPHINE SULFATE 15 MG/1
15 TABLET, FILM COATED, EXTENDED RELEASE ORAL 2 TIMES DAILY
Qty: 60 TABLET | Refills: 0 | Status: SHIPPED | OUTPATIENT
Start: 2024-09-09 | End: 2024-10-09

## 2024-09-09 NOTE — TELEPHONE ENCOUNTER
RACIEL #: [id number]      Medication requested: Morphine (MS CONTIN) 15 MG 12 hr tablet     Last fill date: 8/9/24    Medication requested: oxyCODONE (ROXICODONE) 15 MG immediate release tablet     Last fill date: 8/9/24      Last appointment: 8/9/24    Next appointment:

## 2024-09-09 NOTE — TELEPHONE ENCOUNTER
Returned patient's phone call to discuss neuropathy. Overall, the patient states his pain is more controlled on long-acting opioid therapy. However he continues to c/o persistent, severe neuropathy in his feet despite rotating gabapentin to pregabalin. He is currently prescribed Lyrica 150 mg twice daily. Recommend increasing Lyrica to 300 mg twice daily. Will send a new prescription to his pharmacy. Patient is going on a trip with his wife to celebrate their anniversary on Thursday and will need a refill before then.

## 2024-09-09 NOTE — TELEPHONE ENCOUNTER
PATIENT CALLED TO REQUEST REFILLS ON OXYCODONE AND MORPHINE. PATIENT ALSO WANTED TO DISCUSS ISSUES WITH THE LYRICA. PATIENT STATED THAT IT HAS RECENTLY BEEN INCREASED  MG BUT PATIENT HAS NOT NOTICED A DIFFERENCE WITH THE PAIN IN HIS FEET. PATIENT WOULD LIKE A CALL BACK TO DISCUSS THIS MEDICATION. PLEASE ADVISE.

## 2024-09-16 ENCOUNTER — OFFICE VISIT (OUTPATIENT)
Dept: ONCOLOGY | Facility: CLINIC | Age: 54
End: 2024-09-16
Payer: COMMERCIAL

## 2024-09-16 ENCOUNTER — HOSPITAL ENCOUNTER (OUTPATIENT)
Dept: ULTRASOUND IMAGING | Facility: HOSPITAL | Age: 54
Discharge: HOME | End: 2024-09-16
Payer: COMMERCIAL

## 2024-09-16 ENCOUNTER — TELEPHONE (OUTPATIENT)
Dept: ONCOLOGY | Facility: CLINIC | Age: 54
End: 2024-09-16
Payer: COMMERCIAL

## 2024-09-16 ENCOUNTER — HOSPITAL ENCOUNTER (OUTPATIENT)
Facility: HOSPITAL | Age: 54
Discharge: HOME | End: 2024-09-16
Payer: COMMERCIAL

## 2024-09-16 VITALS
RESPIRATION RATE: 18 BRPM | DIASTOLIC BLOOD PRESSURE: 52 MMHG | HEIGHT: 72 IN | HEART RATE: 70 BPM | OXYGEN SATURATION: 99 % | TEMPERATURE: 97.3 F | SYSTOLIC BLOOD PRESSURE: 111 MMHG | BODY MASS INDEX: 27.52 KG/M2 | WEIGHT: 203.2 LBS

## 2024-09-16 DIAGNOSIS — M79.605 LEFT LEG PAIN: ICD-10-CM

## 2024-09-16 DIAGNOSIS — C20 RECTAL CANCER METASTASIZED TO LIVER: ICD-10-CM

## 2024-09-16 DIAGNOSIS — C78.7 RECTAL CANCER METASTASIZED TO LIVER: ICD-10-CM

## 2024-09-16 DIAGNOSIS — C20 RECTAL ADENOCARCINOMA: Primary | ICD-10-CM

## 2024-09-16 DIAGNOSIS — C20 RECTAL CANCER METASTASIZED TO LIVER: Primary | ICD-10-CM

## 2024-09-16 DIAGNOSIS — C78.7 RECTAL CANCER METASTASIZED TO LIVER: Primary | ICD-10-CM

## 2024-09-16 LAB
ALBUMIN SERPL-MCNC: 3 G/DL (ref 3.5–5.2)
ALBUMIN/GLOB SERPL: 0.8 G/DL
ALP SERPL-CCNC: 90 U/L (ref 39–117)
ALT SERPL W P-5'-P-CCNC: 30 U/L (ref 1–41)
ANION GAP SERPL CALCULATED.3IONS-SCNC: 12.3 MMOL/L (ref 5–15)
AST SERPL-CCNC: 33 U/L (ref 1–40)
BASOPHILS # BLD AUTO: 0.03 10*3/MM3 (ref 0–0.2)
BASOPHILS NFR BLD AUTO: 0.3 % (ref 0–1.5)
BILIRUB SERPL-MCNC: 0.7 MG/DL (ref 0–1.2)
BUN SERPL-MCNC: 7 MG/DL (ref 6–20)
BUN/CREAT SERPL: 9.6 (ref 7–25)
CALCIUM SPEC-SCNC: 8.5 MG/DL (ref 8.6–10.5)
CEA SERPL-MCNC: 5.3 NG/ML
CHLORIDE SERPL-SCNC: 105 MMOL/L (ref 98–107)
CO2 SERPL-SCNC: 22.7 MMOL/L (ref 22–29)
CREAT SERPL-MCNC: 0.73 MG/DL (ref 0.76–1.27)
DEPRECATED RDW RBC AUTO: 52.8 FL (ref 37–54)
EGFRCR SERPLBLD CKD-EPI 2021: 107.4 ML/MIN/1.73
EOSINOPHIL # BLD AUTO: 0.11 10*3/MM3 (ref 0–0.4)
EOSINOPHIL NFR BLD AUTO: 1.2 % (ref 0.3–6.2)
ERYTHROCYTE [DISTWIDTH] IN BLOOD BY AUTOMATED COUNT: 18.9 % (ref 12.3–15.4)
GLOBULIN UR ELPH-MCNC: 3.7 GM/DL
GLUCOSE SERPL-MCNC: 92 MG/DL (ref 65–99)
HCT VFR BLD AUTO: 32.2 % (ref 37.5–51)
HGB BLD-MCNC: 10.2 G/DL (ref 13–17.7)
IMM GRANULOCYTES # BLD AUTO: 0.04 10*3/MM3 (ref 0–0.05)
IMM GRANULOCYTES NFR BLD AUTO: 0.4 % (ref 0–0.5)
LYMPHOCYTES # BLD AUTO: 1.33 10*3/MM3 (ref 0.7–3.1)
LYMPHOCYTES NFR BLD AUTO: 14.7 % (ref 19.6–45.3)
MAGNESIUM SERPL-MCNC: 1.2 MG/DL (ref 1.6–2.6)
MCH RBC QN AUTO: 25.5 PG (ref 26.6–33)
MCHC RBC AUTO-ENTMCNC: 31.7 G/DL (ref 31.5–35.7)
MCV RBC AUTO: 80.5 FL (ref 79–97)
MONOCYTES # BLD AUTO: 0.82 10*3/MM3 (ref 0.1–0.9)
MONOCYTES NFR BLD AUTO: 9.1 % (ref 5–12)
NEUTROPHILS NFR BLD AUTO: 6.7 10*3/MM3 (ref 1.7–7)
NEUTROPHILS NFR BLD AUTO: 74.3 % (ref 42.7–76)
NRBC BLD AUTO-RTO: 0 /100 WBC (ref 0–0.2)
PLATELET # BLD AUTO: 205 10*3/MM3 (ref 140–450)
PMV BLD AUTO: 11.2 FL (ref 6–12)
POTASSIUM SERPL-SCNC: 3.1 MMOL/L (ref 3.5–5.2)
PROT SERPL-MCNC: 6.7 G/DL (ref 6–8.5)
RBC # BLD AUTO: 4 10*6/MM3 (ref 4.14–5.8)
SODIUM SERPL-SCNC: 140 MMOL/L (ref 136–145)
WBC NRBC COR # BLD AUTO: 9.03 10*3/MM3 (ref 3.4–10.8)

## 2024-09-16 PROCEDURE — G0498 CHEMO EXTEND IV INFUS W/PUMP: HCPCS

## 2024-09-16 PROCEDURE — 96413 CHEMO IV INFUSION 1 HR: CPT

## 2024-09-16 PROCEDURE — 93971 EXTREMITY STUDY: CPT

## 2024-09-16 PROCEDURE — 96361 HYDRATE IV INFUSION ADD-ON: CPT

## 2024-09-16 PROCEDURE — 25010000002 PANITUMUMAB 400 MG/20ML SOLUTION 20 ML VIAL: Performed by: INTERNAL MEDICINE

## 2024-09-16 PROCEDURE — 96409 CHEMO IV PUSH SNGL DRUG: CPT

## 2024-09-16 PROCEDURE — 96366 THER/PROPH/DIAG IV INF ADDON: CPT

## 2024-09-16 PROCEDURE — 96367 TX/PROPH/DG ADDL SEQ IV INF: CPT

## 2024-09-16 PROCEDURE — 85025 COMPLETE CBC W/AUTO DIFF WBC: CPT | Performed by: NURSE PRACTITIONER

## 2024-09-16 PROCEDURE — 25010000002 LEUCOVORIN CALCIUM PER 50MG: Performed by: INTERNAL MEDICINE

## 2024-09-16 PROCEDURE — 25010000002 FLUOROURACIL PER 500 MG: Performed by: INTERNAL MEDICINE

## 2024-09-16 PROCEDURE — 25010000002 PALONOSETRON 0.25 MG/5ML SOLUTION PREFILLED SYRINGE: Performed by: INTERNAL MEDICINE

## 2024-09-16 PROCEDURE — 99214 OFFICE O/P EST MOD 30 MIN: CPT | Performed by: INTERNAL MEDICINE

## 2024-09-16 PROCEDURE — 25010000002 DEXAMETHASONE SODIUM PHOSPHATE 20 MG/5ML SOLUTION: Performed by: INTERNAL MEDICINE

## 2024-09-16 PROCEDURE — 0 DEXTROSE 5 % SOLUTION 250 ML FLEX CONT: Performed by: INTERNAL MEDICINE

## 2024-09-16 PROCEDURE — 25810000003 SODIUM CHLORIDE 0.9 % SOLUTION: Performed by: INTERNAL MEDICINE

## 2024-09-16 PROCEDURE — 25010000002 PANITUMUMAB PER 10 MG: Performed by: INTERNAL MEDICINE

## 2024-09-16 PROCEDURE — 82378 CARCINOEMBRYONIC ANTIGEN: CPT | Performed by: NURSE PRACTITIONER

## 2024-09-16 PROCEDURE — 25810000003 SODIUM CHLORIDE 0.9 % SOLUTION 250 ML FLEX CONT: Performed by: INTERNAL MEDICINE

## 2024-09-16 PROCEDURE — 83735 ASSAY OF MAGNESIUM: CPT | Performed by: INTERNAL MEDICINE

## 2024-09-16 PROCEDURE — 96375 TX/PRO/DX INJ NEW DRUG ADDON: CPT

## 2024-09-16 PROCEDURE — 80053 COMPREHEN METABOLIC PANEL: CPT | Performed by: NURSE PRACTITIONER

## 2024-09-16 RX ORDER — DIPHENHYDRAMINE HYDROCHLORIDE 50 MG/ML
50 INJECTION INTRAMUSCULAR; INTRAVENOUS AS NEEDED
Status: CANCELLED | OUTPATIENT
Start: 2024-09-16

## 2024-09-16 RX ORDER — SODIUM CHLORIDE 9 MG/ML
20 INJECTION, SOLUTION INTRAVENOUS ONCE
Status: DISCONTINUED | OUTPATIENT
Start: 2024-09-16 | End: 2024-09-17 | Stop reason: HOSPADM

## 2024-09-16 RX ORDER — DEXTROSE MONOHYDRATE 50 MG/ML
20 INJECTION, SOLUTION INTRAVENOUS ONCE
Status: CANCELLED | OUTPATIENT
Start: 2024-09-16

## 2024-09-16 RX ORDER — FAMOTIDINE 10 MG/ML
20 INJECTION, SOLUTION INTRAVENOUS AS NEEDED
Status: CANCELLED | OUTPATIENT
Start: 2024-09-16

## 2024-09-16 RX ORDER — MAGNESIUM OXIDE 400 MG/1
400 TABLET ORAL DAILY
Qty: 30 TABLET | Refills: 5 | Status: SHIPPED | OUTPATIENT
Start: 2024-09-16

## 2024-09-16 RX ORDER — PALONOSETRON 0.05 MG/ML
0.25 INJECTION, SOLUTION INTRAVENOUS ONCE
Status: CANCELLED | OUTPATIENT
Start: 2024-09-16

## 2024-09-16 RX ORDER — SODIUM CHLORIDE 9 MG/ML
999 INJECTION, SOLUTION INTRAVENOUS ONCE
Status: COMPLETED | OUTPATIENT
Start: 2024-09-16 | End: 2024-09-16

## 2024-09-16 RX ORDER — SODIUM CHLORIDE 9 MG/ML
999 INJECTION, SOLUTION INTRAVENOUS ONCE
Status: CANCELLED
Start: 2024-09-16 | End: 2024-09-16

## 2024-09-16 RX ORDER — SODIUM CHLORIDE 9 MG/ML
20 INJECTION, SOLUTION INTRAVENOUS ONCE
Status: CANCELLED | OUTPATIENT
Start: 2024-09-16

## 2024-09-16 RX ORDER — DEXTROSE MONOHYDRATE 50 MG/ML
20 INJECTION, SOLUTION INTRAVENOUS ONCE
Status: DISCONTINUED | OUTPATIENT
Start: 2024-09-16 | End: 2024-09-17 | Stop reason: HOSPADM

## 2024-09-16 RX ORDER — PALONOSETRON 0.05 MG/ML
0.25 INJECTION, SOLUTION INTRAVENOUS ONCE
Status: COMPLETED | OUTPATIENT
Start: 2024-09-16 | End: 2024-09-16

## 2024-09-16 RX ADMIN — SODIUM CHLORIDE 999 ML/HR: 9 INJECTION, SOLUTION INTRAVENOUS at 09:18

## 2024-09-16 RX ADMIN — PANITUMUMAB 580 MG: 400 SOLUTION INTRAVENOUS at 10:58

## 2024-09-16 RX ADMIN — DEXAMETHASONE SODIUM PHOSPHATE 12 MG: 4 INJECTION, SOLUTION INTRA-ARTICULAR; INTRALESIONAL; INTRAMUSCULAR; INTRAVENOUS; SOFT TISSUE at 11:33

## 2024-09-16 RX ADMIN — PALONOSETRON 0.25 MG: 0.25 INJECTION, SOLUTION INTRAVENOUS at 11:33

## 2024-09-16 RX ADMIN — LEUCOVORIN CALCIUM 880 MG: 10 INJECTION INTRAMUSCULAR; INTRAVENOUS at 11:57

## 2024-09-16 RX ADMIN — FLUOROURACIL 5260 MG: 50 INJECTION, SOLUTION INTRAVENOUS at 12:59

## 2024-09-17 RX ORDER — POTASSIUM CHLORIDE 1500 MG/1
40 TABLET, EXTENDED RELEASE ORAL DAILY
Qty: 30 TABLET | Refills: 0 | Status: SHIPPED | OUTPATIENT
Start: 2024-09-17

## 2024-09-18 ENCOUNTER — HOSPITAL ENCOUNTER (OUTPATIENT)
Facility: HOSPITAL | Age: 54
Discharge: HOME | End: 2024-09-18
Admitting: INTERNAL MEDICINE
Payer: COMMERCIAL

## 2024-09-18 DIAGNOSIS — C20 RECTAL ADENOCARCINOMA: Primary | ICD-10-CM

## 2024-09-18 DIAGNOSIS — C20 RECTAL CANCER METASTASIZED TO LIVER: ICD-10-CM

## 2024-09-18 DIAGNOSIS — C78.7 RECTAL CANCER METASTASIZED TO LIVER: ICD-10-CM

## 2024-09-18 PROCEDURE — 96523 IRRIG DRUG DELIVERY DEVICE: CPT

## 2024-09-18 PROCEDURE — 25010000002 HEPARIN LOCK FLUSH PER 10 UNITS: Performed by: INTERNAL MEDICINE

## 2024-09-18 RX ORDER — SODIUM CHLORIDE 0.9 % (FLUSH) 0.9 %
10 SYRINGE (ML) INJECTION AS NEEDED
OUTPATIENT
Start: 2024-09-18

## 2024-09-18 RX ORDER — HEPARIN SODIUM (PORCINE) LOCK FLUSH IV SOLN 100 UNIT/ML 100 UNIT/ML
500 SOLUTION INTRAVENOUS AS NEEDED
Status: DISCONTINUED | OUTPATIENT
Start: 2024-09-18 | End: 2024-09-19 | Stop reason: HOSPADM

## 2024-09-18 RX ORDER — HEPARIN SODIUM (PORCINE) LOCK FLUSH IV SOLN 100 UNIT/ML 100 UNIT/ML
500 SOLUTION INTRAVENOUS AS NEEDED
OUTPATIENT
Start: 2024-09-18

## 2024-09-18 RX ORDER — SODIUM CHLORIDE 0.9 % (FLUSH) 0.9 %
10 SYRINGE (ML) INJECTION AS NEEDED
Status: DISCONTINUED | OUTPATIENT
Start: 2024-09-18 | End: 2024-09-19 | Stop reason: HOSPADM

## 2024-09-18 RX ADMIN — Medication 10 ML: at 14:09

## 2024-09-18 RX ADMIN — HEPARIN 500 UNITS: 100 SYRINGE at 14:08

## 2024-09-27 ENCOUNTER — OFFICE VISIT (OUTPATIENT)
Dept: PALLIATIVE CARE | Facility: CLINIC | Age: 54
End: 2024-09-27
Payer: COMMERCIAL

## 2024-09-27 VITALS
DIASTOLIC BLOOD PRESSURE: 55 MMHG | WEIGHT: 198.6 LBS | OXYGEN SATURATION: 100 % | RESPIRATION RATE: 18 BRPM | TEMPERATURE: 97.7 F | SYSTOLIC BLOOD PRESSURE: 104 MMHG | BODY MASS INDEX: 26.93 KG/M2 | HEART RATE: 51 BPM

## 2024-09-27 DIAGNOSIS — T45.1X5A CHEMOTHERAPY-INDUCED NEUROPATHY: ICD-10-CM

## 2024-09-27 DIAGNOSIS — R63.0 APPETITE LOSS: ICD-10-CM

## 2024-09-27 DIAGNOSIS — C20 RECTAL ADENOCARCINOMA: Primary | ICD-10-CM

## 2024-09-27 DIAGNOSIS — G89.3 CANCER RELATED PAIN: ICD-10-CM

## 2024-09-27 DIAGNOSIS — G62.0 CHEMOTHERAPY-INDUCED NEUROPATHY: ICD-10-CM

## 2024-09-27 RX ORDER — PREGABALIN 300 MG/1
300 CAPSULE ORAL 2 TIMES DAILY
Qty: 60 CAPSULE | Refills: 0 | Status: SHIPPED | OUTPATIENT
Start: 2024-10-09 | End: 2024-11-08

## 2024-09-30 RX ORDER — POTASSIUM CHLORIDE 1500 MG/1
40 TABLET, EXTENDED RELEASE ORAL DAILY
Qty: 30 TABLET | Refills: 0 | Status: SHIPPED | OUTPATIENT
Start: 2024-09-30

## 2024-09-30 NOTE — TELEPHONE ENCOUNTER
UPCOMING APPTS  With Oncology (Brenda Cronin MD)  10/07/2024 at 8:30 AM  LAST OFFICE VISIT - THIS DEPT  1/9/2024 Brenda Cronin MD  Last refill 9/17/24 30 tablets 0 refills

## 2024-10-07 ENCOUNTER — TELEPHONE (OUTPATIENT)
Dept: ONCOLOGY | Facility: CLINIC | Age: 54
End: 2024-10-07

## 2024-10-07 ENCOUNTER — OFFICE VISIT (OUTPATIENT)
Dept: ONCOLOGY | Facility: CLINIC | Age: 54
End: 2024-10-07
Payer: COMMERCIAL

## 2024-10-07 ENCOUNTER — HOSPITAL ENCOUNTER (OUTPATIENT)
Dept: GENERAL RADIOLOGY | Facility: HOSPITAL | Age: 54
Discharge: HOME | End: 2024-10-07
Payer: COMMERCIAL

## 2024-10-07 ENCOUNTER — HOSPITAL ENCOUNTER (OUTPATIENT)
Facility: HOSPITAL | Age: 54
Discharge: HOME | End: 2024-10-07
Payer: COMMERCIAL

## 2024-10-07 VITALS
TEMPERATURE: 101.1 F | HEIGHT: 72 IN | SYSTOLIC BLOOD PRESSURE: 128 MMHG | OXYGEN SATURATION: 99 % | DIASTOLIC BLOOD PRESSURE: 64 MMHG | WEIGHT: 201.8 LBS | HEART RATE: 104 BPM | BODY MASS INDEX: 27.33 KG/M2 | RESPIRATION RATE: 18 BRPM

## 2024-10-07 DIAGNOSIS — D64.9 NORMOCYTIC ANEMIA: ICD-10-CM

## 2024-10-07 DIAGNOSIS — C20 RECTAL CANCER METASTASIZED TO LIVER: ICD-10-CM

## 2024-10-07 DIAGNOSIS — R50.9 FEVER, UNSPECIFIED FEVER CAUSE: ICD-10-CM

## 2024-10-07 DIAGNOSIS — M79.2 NEUROPATHIC PAIN: ICD-10-CM

## 2024-10-07 DIAGNOSIS — N20.0 KIDNEY STONES: ICD-10-CM

## 2024-10-07 DIAGNOSIS — C78.7 RECTAL CANCER METASTASIZED TO LIVER: Primary | ICD-10-CM

## 2024-10-07 DIAGNOSIS — I87.8 VENOFIBROSIS: ICD-10-CM

## 2024-10-07 DIAGNOSIS — R50.9 FEVER, UNSPECIFIED FEVER CAUSE: Primary | ICD-10-CM

## 2024-10-07 DIAGNOSIS — R93.5 ABNORMAL CT OF THE ABDOMEN: ICD-10-CM

## 2024-10-07 DIAGNOSIS — C20 RECTAL ADENOCARCINOMA: ICD-10-CM

## 2024-10-07 DIAGNOSIS — C78.7 RECTAL CANCER METASTASIZED TO LIVER: ICD-10-CM

## 2024-10-07 DIAGNOSIS — K52.9 CHRONIC DIARRHEA: ICD-10-CM

## 2024-10-07 DIAGNOSIS — R19.4 CHANGE IN BOWEL HABITS: ICD-10-CM

## 2024-10-07 DIAGNOSIS — C20 RECTAL CANCER METASTASIZED TO LIVER: Primary | ICD-10-CM

## 2024-10-07 LAB
ALBUMIN SERPL-MCNC: 3.3 G/DL (ref 3.5–5.2)
ALBUMIN/GLOB SERPL: 0.9 G/DL
ALP SERPL-CCNC: 122 U/L (ref 39–117)
ALT SERPL W P-5'-P-CCNC: 13 U/L (ref 1–41)
ANION GAP SERPL CALCULATED.3IONS-SCNC: 8.8 MMOL/L (ref 5–15)
AST SERPL-CCNC: 22 U/L (ref 1–40)
BASOPHILS # BLD AUTO: 0.03 10*3/MM3 (ref 0–0.2)
BASOPHILS NFR BLD AUTO: 0.2 % (ref 0–1.5)
BILIRUB SERPL-MCNC: 0.4 MG/DL (ref 0–1.2)
BILIRUB UR QL STRIP: NEGATIVE
BUN SERPL-MCNC: 11 MG/DL (ref 6–20)
BUN/CREAT SERPL: 12.2 (ref 7–25)
CALCIUM SPEC-SCNC: 8.4 MG/DL (ref 8.6–10.5)
CEA SERPL-MCNC: 4.87 NG/ML
CHLORIDE SERPL-SCNC: 106 MMOL/L (ref 98–107)
CLARITY UR: CLEAR
CO2 SERPL-SCNC: 20.2 MMOL/L (ref 22–29)
COLOR UR: YELLOW
CREAT SERPL-MCNC: 0.9 MG/DL (ref 0.76–1.27)
DEPRECATED RDW RBC AUTO: 56.6 FL (ref 37–54)
EGFRCR SERPLBLD CKD-EPI 2021: 100.9 ML/MIN/1.73
EOSINOPHIL # BLD AUTO: 0.02 10*3/MM3 (ref 0–0.4)
EOSINOPHIL NFR BLD AUTO: 0.1 % (ref 0.3–6.2)
ERYTHROCYTE [DISTWIDTH] IN BLOOD BY AUTOMATED COUNT: 19.4 % (ref 12.3–15.4)
FLUAV SUBTYP SPEC NAA+PROBE: NOT DETECTED
FLUBV RNA ISLT QL NAA+PROBE: NOT DETECTED
GLOBULIN UR ELPH-MCNC: 3.6 GM/DL
GLUCOSE SERPL-MCNC: 105 MG/DL (ref 65–99)
GLUCOSE UR STRIP-MCNC: NEGATIVE MG/DL
HCT VFR BLD AUTO: 38.5 % (ref 37.5–51)
HGB BLD-MCNC: 11.8 G/DL (ref 13–17.7)
HGB UR QL STRIP.AUTO: NEGATIVE
IMM GRANULOCYTES # BLD AUTO: 0.07 10*3/MM3 (ref 0–0.05)
IMM GRANULOCYTES NFR BLD AUTO: 0.5 % (ref 0–0.5)
KETONES UR QL STRIP: NEGATIVE
LEUKOCYTE ESTERASE UR QL STRIP.AUTO: NEGATIVE
LYMPHOCYTES # BLD AUTO: 1.17 10*3/MM3 (ref 0.7–3.1)
LYMPHOCYTES NFR BLD AUTO: 8.1 % (ref 19.6–45.3)
MCH RBC QN AUTO: 25.7 PG (ref 26.6–33)
MCHC RBC AUTO-ENTMCNC: 30.6 G/DL (ref 31.5–35.7)
MCV RBC AUTO: 83.7 FL (ref 79–97)
MONOCYTES # BLD AUTO: 0.86 10*3/MM3 (ref 0.1–0.9)
MONOCYTES NFR BLD AUTO: 5.9 % (ref 5–12)
NEUTROPHILS NFR BLD AUTO: 12.36 10*3/MM3 (ref 1.7–7)
NEUTROPHILS NFR BLD AUTO: 85.2 % (ref 42.7–76)
NITRITE UR QL STRIP: NEGATIVE
NRBC BLD AUTO-RTO: 0 /100 WBC (ref 0–0.2)
PH UR STRIP.AUTO: 5.5 [PH] (ref 5–8)
PLATELET # BLD AUTO: 170 10*3/MM3 (ref 140–450)
PMV BLD AUTO: 12.1 FL (ref 6–12)
POTASSIUM SERPL-SCNC: 4.4 MMOL/L (ref 3.5–5.2)
PROT SERPL-MCNC: 6.9 G/DL (ref 6–8.5)
PROT UR QL STRIP: ABNORMAL
RBC # BLD AUTO: 4.6 10*6/MM3 (ref 4.14–5.8)
SARS-COV-2 RNA RESP QL NAA+PROBE: NOT DETECTED
SODIUM SERPL-SCNC: 135 MMOL/L (ref 136–145)
SP GR UR STRIP: 1.02 (ref 1–1.03)
UROBILINOGEN UR QL STRIP: ABNORMAL
WBC NRBC COR # BLD AUTO: 14.51 10*3/MM3 (ref 3.4–10.8)

## 2024-10-07 PROCEDURE — 71046 X-RAY EXAM CHEST 2 VIEWS: CPT

## 2024-10-07 PROCEDURE — 87040 BLOOD CULTURE FOR BACTERIA: CPT | Performed by: INTERNAL MEDICINE

## 2024-10-07 PROCEDURE — 85025 COMPLETE CBC W/AUTO DIFF WBC: CPT | Performed by: INTERNAL MEDICINE

## 2024-10-07 PROCEDURE — 81003 URINALYSIS AUTO W/O SCOPE: CPT | Performed by: INTERNAL MEDICINE

## 2024-10-07 PROCEDURE — 36415 COLL VENOUS BLD VENIPUNCTURE: CPT

## 2024-10-07 PROCEDURE — 80053 COMPREHEN METABOLIC PANEL: CPT | Performed by: INTERNAL MEDICINE

## 2024-10-07 PROCEDURE — 99214 OFFICE O/P EST MOD 30 MIN: CPT | Performed by: INTERNAL MEDICINE

## 2024-10-07 PROCEDURE — 87636 SARSCOV2 & INF A&B AMP PRB: CPT

## 2024-10-07 PROCEDURE — 82378 CARCINOEMBRYONIC ANTIGEN: CPT | Performed by: INTERNAL MEDICINE

## 2024-10-07 RX ORDER — LEVOFLOXACIN 500 MG/1
500 TABLET, FILM COATED ORAL DAILY
Qty: 7 TABLET | Refills: 0 | Status: SHIPPED | OUTPATIENT
Start: 2024-10-07 | End: 2024-10-14

## 2024-10-07 NOTE — TELEPHONE ENCOUNTER
Patient advised per Dr. Cronin that he has pneumonia and an antibiotic sent to his pharmacy that he should start as soon as possible.  Advised him on when to present to the ER, he verbalized understanding.

## 2024-10-07 NOTE — PROGRESS NOTES
Hematology and Oncology [place]  Office number [phone]    Fax number [phone]     Follow up     Date: 10/07/24    Patient Name: Edward Powell  MRN: [id number]  : 1970    Referring Physician: Dr. Gautam    Chief Complaint: Rectal cancer, lung mass, liver lesions follow up on treatment    Cancer Staging:  IV    History of Present Illness: Edward Powell is a pleasant 54 y.o. male who presents today for evaluation of rectal cancer in the company of his supportive significant other.    Patient has a longstanding history of intermittent diarrhea since his cholecystectomy in 2019.  However he developed progressive diarrhea with associated loss of bowel control and urgency as well as weight loss and fatigue prompting additional workup.    CT of the abdomen pelvis 2023 showed an irregular circumferential wall thickening involving the rectum concerning for mass lesion.  There was associated mesorectal lymphadenopathy.  Masslike consolidation of left lower lobe.  CT of the chest showed a cavitary lesion in the left lower lobe up to 4.8 cm with an adjacent cavitary nodule up to 2 cm and a 5 mm nodule on the right minor fissure.      He underwent colonoscopy 2023 with findings of an infiltrative and ulcerated partially obstructing mass in the proximal rectum spanning 10 cm.  Rectal polyps.  Biopsy of the rectal mass showed invasive moderately differentiated adenocarcinoma.  Additional biopsies showed tubular adenomas.  MSI testing was intact/low probability of MSI high.    PDL1 negative    PET/CT 2023 showed hypermetabolic rectal wall thickening compatible with known rectal malignancy.  Multiple small ill-defined hypoechoic hyper metabolic liver lesions compatible with metastatic disease.  Mildly enlarged and mildly hypermetabolic pelvic sidewall and internal iliac lymph node chain adenopathy.  Hypermetabolic lung mass with adjacent nodule.     He underwent liver biopsy and lung  biopsy January 2024.  Results of both confirmed metastatic colorectal cancer.    The patient has been experiencing substantial rectal pain.  He is taking oxycodone every 6 hours with partial relief.  He reports ongoing bowel movements.  No abdominal pain.  No vomiting.  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    Treatment history:  FOLFOX cycle 1 -4  FOLFOX panitumumab cycle 5 onward  -Oxaliplatin terminated early for allergic reaction after 4/29/24    Interval history:  He presents for consideration of chemotherapy. He has a temp to 101.5, starting at 4 AM with rigors, chills, fever.   Took a home COVID test which was normal. 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 No diarrhea, nausea or cough. No sore throat.   Hands are better with treatment holiday.       Past Medical History:   Past Medical History:   Diagnosis Date    Arthritis     Cancer     rectal cancer - diagnosed 2023    Cholelithiasis 2019    Removed    COPD (chronic obstructive pulmonary disease)     Coronary artery disease 2009    Stent - no cardiologist currently    Elevated cholesterol     GERD (gastroesophageal reflux disease)     Hernia 2003    Lower hernia    Perforated ulcer 2019    Sleep apnea     history of; when weighed over 400lbs - no issues following bariatric surgery       Past Surgical History:   Past Surgical History:   Procedure Laterality Date    APPENDECTOMY  1983    Removed    BARIATRIC SURGERY  2011    Gastric bypass    BLADDER TUMOR/ULCER BLEEDER CAUTERIZATION      CARDIAC CATHETERIZATION  2009    stent placed    CHOLECYSTECTOMY  2019    Removed    COLONOSCOPY N/A 12/07/2023    Procedure: COLONOSCOPY WITH HOT SNARE POLYPECTOMY AND TATTOO;  Surgeon: Noman Gautam MD;   Location: [facility] ENDOSCOPY;  Service: Gastroenterology;  Laterality: N/A;    PORTACATH PLACEMENT N/A 1/5/2024    Procedure: INSERTION OF PORTACATH WITH ULTRSOUND AND FLUOROSCOPIC GUIDANCE;  Surgeon: Ida Black MD;  Location: [facility] OR;  Service: General;  Laterality: N/A;    JENNIFER-EN-Y         Family History: No family history on file.  Uncle had colon cancer    Social History:   Social History     Socioeconomic History    Marital status:    Tobacco Use    Smoking status: Every Day     Current packs/day: 1.00     Average packs/day: 0.9 packs/day for 60.1 years (52.6 ttl pk-yrs)     Types: Cigarettes     Start date: 9/6/1994    Smokeless tobacco: Never    Tobacco comments:     35 years      pt reports closer to 2 packs per day since cancer diagnosis   Vaping Use    Vaping status: Never Used   Substance and Sexual Activity    Alcohol use: Never    Drug use: Never    Sexual activity: Defer       Medications:     Current Outpatient Medications:     buPROPion XL (WELLBUTRIN XL) 150 MG 24 hr tablet, TAKE 1 TABLET BY MOUTH DAILY, Disp: 90 tablet, Rfl: 1    cetirizine (zyrTEC) 10 MG tablet, Take 1 tablet by mouth Daily., Disp: 30 tablet, Rfl: 2    clindamycin (Clindagel) 1 % gel, Apply 1 Application topically to the appropriate area as directed 2 (Two) Times a Day. Use first, Disp: 30 g, Rfl: 1    dexAMETHasone 0.5 MG/5ML solution, Take 10 mL by mouth 2 (Two) Times a Day. Swish for 2 minutes and spit 10 mL 4 x daily, Disp: 240 mL, Rfl: 5    dicyclomine (BENTYL) 20 MG tablet, Take 1 tablet by mouth 3 (Three) Times a Day As Needed for Abdominal Cramping., Disp: 30 tablet, Rfl: 3    docusate sodium (COLACE) 100 MG capsule, Take 1 capsule by mouth 2 (Two) Times a Day., Disp: , Rfl:     doxycycline (MONODOX) 100 MG capsule, , Disp: , Rfl:     doxycycline (VIBRAMYICN) 100 MG tablet, , Disp: , Rfl:     Hydrocortisone, Perianal, (ANUSOL-HC) 2.5 % rectal cream, Insert  into the rectum 2 (Two) Times a Day.  Indications: Inflamed Hemorrhoids, Disp: 30 g, Rfl: 1    lidocaine-prilocaine (EMLA) 2.5-2.5 % cream, Apply 1 Application topically to the appropriate area as directed As Needed (45-60 minutes prior to port access.  Cover with saran/plastic wrap.)., Disp: 30 g, Rfl: 3    LORazepam (ATIVAN) 0.5 MG tablet, Take 1 tablet by mouth Take As Directed. 1 tabelt by mouth 30 minutes prior to MRI, take a second tablet at the time of the MRI if needed., Disp: 2 tablet, Rfl: 0    Magic Mouthwash Oral Suspension (diphenhydrAMINE HCl - aluminum & magnesium hydroxide-simethicone - lidocaine - nystatin), Swish and Spit 10 mL by mouth every 6 (Six) Hours For 7 Days., Disp: 300 mL, Rfl: 3    magnesium oxide (MAG-OX) 400 MG tablet, Take 1 tablet by mouth Daily., Disp: 30 tablet, Rfl: 5    Morphine (MS CONTIN) 15 MG 12 hr tablet, Take 1 tablet by mouth 2 (Two) Times a Day for 30 days., Disp: 60 tablet, Rfl: 0    Movantik 25 MG tablet, , Disp: , Rfl:     mupirocin (BACTROBAN) 2 % cream, , Disp: , Rfl:     naloxone (NARCAN) 4 MG/0.1ML nasal spray, 1 spray into the nostril(s) as directed by provider As Needed for Opioid Reversal., Disp: 1 each, Rfl: 0    nystatin (MYCOSTATIN) 100,000 unit/mL suspension, Swish and Swallow 5mL by mouth four times a day, Disp: 473 mL, Rfl: 0    nystatin (MYCOSTATIN) 100,000 unit/mL suspension, Swish and swallow 5 mL 4 (Four) Times a Day., Disp: 473 mL, Rfl: 0    nystatin susp + lidocaine viscous (MAGIC MOUTHWASH) oral suspension, 5-10 ml swish and spit or swallow QID prn, Disp: 240 mL, Rfl: 3    ondansetron (ZOFRAN) 8 MG tablet, Take 1 tablet by mouth 3 (Three) Times a Day As Needed for Nausea or Vomiting., Disp: 30 tablet, Rfl: 5    oxyCODONE (ROXICODONE) 15 MG immediate release tablet, Take 1 tablet by mouth 5 (Five) Times a Day As Needed for Moderate Pain or Severe Pain for up to 30 days., Disp: 150 tablet, Rfl: 0    pantoprazole (Protonix) 40 MG EC tablet, Take 1 tablet by mouth Daily. Indications:  "Gastroesophageal Reflux Disease, Disp: 90 tablet, Rfl: 2    potassium chloride ER (K-TAB) 20 MEQ tablet controlled-release ER tablet, TAKE 2 TABLETS BY MOUTH DAILY, Disp: 30 tablet, Rfl: 0    [START ON 10/9/2024] pregabalin (Lyrica) 300 MG capsule, Take 1 capsule by mouth 2 (Two) Times a Day for 30 days., Disp: 60 capsule, Rfl: 0    tiotropium bromide-olodaterol (Stiolto Respimat) 2.5-2.5 MCG/ACT aerosol solution inhaler, INHALE 2 INHALATION(S) BY MOUTH DAILY, Disp: 4 g, Rfl: 5    traZODone (DESYREL) 100 MG tablet, Take 1 tablet by mouth Every Night., Disp: 30 tablet, Rfl: 2    triamcinolone (KENALOG) 0.025 % ointment, Apply 1 Application topically to the appropriate area as directed 2 (Two) Times a Day. Use second if topical treatment #1 ineffective, Disp: 80 g, Rfl: 0    Allergies:   Allergies   Allergen Reactions    Bactrim [Sulfamethoxazole-Trimethoprim] Hives     and blisters    Sulfa Antibiotics Hives     and blisters       Objective     Vital Signs:   Vitals:    10/07/24 0834   BP: 128/64   Pulse: 104   Resp: 18   Temp: (!) 101.1 °F (38.4 °C)   SpO2: 99%   Weight: 91.5 kg (201 lb 12.8 oz)   Height: 182.9 cm (72.01\")   PainSc:   5        Body mass index is 27.36 kg/m².   Pain Score    10/07/24 0834   PainSc:   5     ECOG Performance Status: 1 - Symptomatic but completely ambulatory    Physical Exam:   General: No acute distress. Well appearing   HEENT: Normocephalic, atraumatic. Sclera anicteric. Dry mouth, mild erythema  Neck: supple, no adenopathy.   Cardiovascular: regular rate and rhythm. No murmurs.   Respiratory: Normal rate. Clear to auscultation bilaterally  Abdomen: Soft, nontender, non distended with normoactive bowel sounds  Lymph: no cervical, supraclavicular or axillary adenopathy  Neuro: Alert and oriented x 3. No focal deficits.   Ext: Symmetric, no swelling.       Laboratory/Imaging Reviewed:   No visits with results within 2 Week(s) from this visit.   Latest known visit with results is: "   Hospital Outpatient Visit on 09/16/2024   Component Date Value Ref Range Status    Magnesium 09/16/2024 1.2 (L)  1.6 - 2.6 mg/dL Final    Glucose 09/16/2024 92  65 - 99 mg/dL Final    BUN 09/16/2024 7  6 - 20 mg/dL Final    Creatinine 09/16/2024 0.73 (L)  0.76 - 1.27 mg/dL Final    Sodium 09/16/2024 140  136 - 145 mmol/L Final    Potassium 09/16/2024 3.1 (L)  3.5 - 5.2 mmol/L Final    Chloride 09/16/2024 105  98 - 107 mmol/L Final    CO2 09/16/2024 22.7  22.0 - 29.0 mmol/L Final    Calcium 09/16/2024 8.5 (L)  8.6 - 10.5 mg/dL Final    Total Protein 09/16/2024 6.7  6.0 - 8.5 g/dL Final    Albumin 09/16/2024 3.0 (L)  3.5 - 5.2 g/dL Final    ALT (SGPT) 09/16/2024 30  1 - 41 U/L Final    AST (SGOT) 09/16/2024 33  1 - 40 U/L Final    Alkaline Phosphatase 09/16/2024 90  39 - 117 U/L Final    Total Bilirubin 09/16/2024 0.7  0.0 - 1.2 mg/dL Final    Globulin 09/16/2024 3.7  gm/dL Final    A/G Ratio 09/16/2024 0.8  g/dL Final    BUN/Creatinine Ratio 09/16/2024 9.6  7.0 - 25.0 Final    Anion Gap 09/16/2024 12.3  5.0 - 15.0 mmol/L Final    eGFR 09/16/2024 107.4  >60.0 mL/min/1.73 Final    CEA 09/16/2024 5.30  ng/mL Final    WBC 09/16/2024 9.03  3.40 - 10.80 10*3/mm3 Final    RBC 09/16/2024 4.00 (L)  4.14 - 5.80 10*6/mm3 Final    Hemoglobin 09/16/2024 10.2 (L)  13.0 - 17.7 g/dL Final    Hematocrit 09/16/2024 32.2 (L)  37.5 - 51.0 % Final    MCV 09/16/2024 80.5  79.0 - 97.0 fL Final    MCH 09/16/2024 25.5 (L)  26.6 - 33.0 pg Final    MCHC 09/16/2024 31.7  31.5 - 35.7 g/dL Final    RDW 09/16/2024 18.9 (H)  12.3 - 15.4 % Final    RDW-SD 09/16/2024 52.8  37.0 - 54.0 fl Final    MPV 09/16/2024 11.2  6.0 - 12.0 fL Final    Platelets 09/16/2024 205  140 - 450 10*3/mm3 Final    Neutrophil % 09/16/2024 74.3  42.7 - 76.0 % Final    Lymphocyte % 09/16/2024 14.7 (L)  19.6 - 45.3 % Final    Monocyte % 09/16/2024 9.1  5.0 - 12.0 % Final    Eosinophil % 09/16/2024 1.2  0.3 - 6.2 % Final    Basophil % 09/16/2024 0.3  0.0 - 1.5 % Final     Immature Grans % 09/16/2024 0.4  0.0 - 0.5 % Final    Neutrophils, Absolute 09/16/2024 6.70  1.70 - 7.00 10*3/mm3 Final    Lymphocytes, Absolute 09/16/2024 1.33  0.70 - 3.10 10*3/mm3 Final    Monocytes, Absolute 09/16/2024 0.82  0.10 - 0.90 10*3/mm3 Final    Eosinophils, Absolute 09/16/2024 0.11  0.00 - 0.40 10*3/mm3 Final    Basophils, Absolute 09/16/2024 0.03  0.00 - 0.20 10*3/mm3 Final    Immature Grans, Absolute 09/16/2024 0.04  0.00 - 0.05 10*3/mm3 Final    nRBC 09/16/2024 0.0  0.0 - 0.2 /100 WBC Final     Tempus xt: APC gene TP53; Negative ADRIANNA, BRAF, NRAS, TMB stable. PDL1 negative  US Venous Doppler Lower Extremity Left (duplex)    Result Date: 9/16/2024  Narrative: PROCEDURE: US VENOUS DOPPLER LOWER EXTREMITY LEFT (DUPLEX)-  HISTORY: left leg swelling; M79.605-Pain in left leg  Multiple transverse and longitudinal scans were performed of the femoropopliteal deep venous system, with augmentation and compression maneuvers.  FINDINGS: Normal phasic flow was noted in the visualized deep venous system. No intraluminal increased echogenicity is noted to suggest thrombus. There is normal compression and augmentation of the venous structures. No abnormal venous collaterals are seen.      Impression: No evidence of deep venous thrombosis of the left lower extremity.   This report was signed and finalized on 9/16/2024 2:07 PM by Serjio Wright MD.       Procedures    Assessment / Plan      Assessment/Plan:     1.  Rectal cancer   2.  Liver metastases  3.  Lung metastasis  4.  Chemo monitoring  5.  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  Acute fever.   -The patient presents with a partially obstructing rectal cancer with adenopathy.  -He was found to have biopsy-proven metastasis in the liver and lung.  His case was presented at multidisciplinary tumor board.  -Because of metastatic disease to both the lung and liver I do not think he will be downstage to resectable disease.  -Tempus results which are  notable for an APC mutation, T p53 mutation, negative for KRAS, BRAF, NRAS, tumor mutation burden stable.  Notch 1 VUS.  -CBC adequate and CMP pending for treatment today with maintenance 5-FU and panitumumab 9/16/24  Chemotherapy orders and premedications signed9/16/24  -We reviewed his imaging which is consistent with excellent disease control in early July with normalization of CEA. He has had persistent rectal pain with escalating oxycodone requirements over the past couple of months, but no severe recent increase in pain, fever. WBC normal. 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 I reviewed his EUA notes. Increase doxy to BID. No surgical intervention.  -Consolidative radiotherapy to the rectal tumor is not something he wants to pursue  -Continue maintenance 5FU/panitumumab. He is going to extend the interval to q 3 weeks this fall to accommodate travel plans.   -Repeat scans in early October pending. Hold treatment today for fever, workup as below pending covid/flu  Orders Placed This Encounter   Procedures    Blood Culture - Blood,    Blood Culture - Blood,    XR Chest PA & Lateral    Comprehensive Metabolic Panel    Urinalysis With Culture If Indicated -    CEA    CBC & Differential         6.  Cancer related pain  -Now following with palliative care and pain is well controlled on lyrica, oxycodone and MS contin  Stable     7.  Panitumumab induced rash  -Continue doxycycline.     8. GERD  PPI    9. Mucositis  -MMW as needed  -Continue dexamethasone mouthwash for prevention  There are no Patient Instructions on file for this visit.      10. Hypokalemia  11. Hypomagnesemia  -Continue Otc magnesium and monitor levels    12. Access  -Port        Follow Up:   2 weeks     Brenda Cronin MD  Hematology and Oncology

## 2024-10-09 ENCOUNTER — TELEPHONE (OUTPATIENT)
Dept: PALLIATIVE CARE | Facility: CLINIC | Age: 54
End: 2024-10-09
Payer: COMMERCIAL

## 2024-10-09 DIAGNOSIS — G89.3 CANCER RELATED PAIN: ICD-10-CM

## 2024-10-09 DIAGNOSIS — T45.1X5A CHEMOTHERAPY-INDUCED NEUROPATHY: ICD-10-CM

## 2024-10-09 DIAGNOSIS — G62.0 CHEMOTHERAPY-INDUCED NEUROPATHY: ICD-10-CM

## 2024-10-09 RX ORDER — PREGABALIN 300 MG/1
300 CAPSULE ORAL 2 TIMES DAILY
Qty: 60 CAPSULE | Refills: 0 | Status: SHIPPED | OUTPATIENT
Start: 2024-10-09 | End: 2024-11-08

## 2024-10-09 RX ORDER — MORPHINE SULFATE 15 MG/1
15 TABLET, FILM COATED, EXTENDED RELEASE ORAL 2 TIMES DAILY
Qty: 60 TABLET | Refills: 0 | Status: SHIPPED | OUTPATIENT
Start: 2024-10-09 | End: 2024-11-08

## 2024-10-09 RX ORDER — OXYCODONE HYDROCHLORIDE 15 MG/1
15 TABLET ORAL
Qty: 150 TABLET | Refills: 0 | Status: SHIPPED | OUTPATIENT
Start: 2024-10-09 | End: 2024-11-08

## 2024-10-09 NOTE — TELEPHONE ENCOUNTER
Caller: Edward Powell    Relationship to patient: Self    Best call back number:     [phone]        Patient is needing: PT IS NEEDING A CALL BACK FOR HIS PRESCRIPTIONS PLEASE CALL BACK AND ADVISE.

## 2024-10-09 NOTE — TELEPHONE ENCOUNTER
RACIEL #: [id number]      Medication requested: Morphine (MS CONTIN) 15 MG 12 hr tablet     Last fill date: 9/9/24    Medication requested: oxyCODONE (ROXICODONE) 15 MG immediate release tablet      Last fill date:9/9/24    Medication requested: pregabalin (Lyrica) 300 MG capsule     Last fill date:9/9/24      Last appointment: 9/27/24    Next appointment: 12/27/24

## 2024-10-10 ENCOUNTER — HOSPITAL ENCOUNTER (OUTPATIENT)
Dept: CT IMAGING | Facility: HOSPITAL | Age: 54
Discharge: HOME | End: 2024-10-10
Admitting: INTERNAL MEDICINE
Payer: COMMERCIAL

## 2024-10-10 DIAGNOSIS — C20 RECTAL ADENOCARCINOMA: ICD-10-CM

## 2024-10-10 PROCEDURE — 25010000002 HEPARIN LOCK FLUSH PER 10 UNITS: Performed by: RADIOLOGY

## 2024-10-10 PROCEDURE — 25510000001 IOPAMIDOL 61 % SOLUTION: Performed by: INTERNAL MEDICINE

## 2024-10-10 PROCEDURE — 74177 CT ABD & PELVIS W/CONTRAST: CPT

## 2024-10-10 PROCEDURE — 71260 CT THORAX DX C+: CPT

## 2024-10-10 RX ORDER — IOPAMIDOL 612 MG/ML
100 INJECTION, SOLUTION INTRAVASCULAR
Status: COMPLETED | OUTPATIENT
Start: 2024-10-10 | End: 2024-10-10

## 2024-10-10 RX ORDER — HEPARIN SODIUM (PORCINE) LOCK FLUSH IV SOLN 100 UNIT/ML 100 UNIT/ML
500 SOLUTION INTRAVENOUS ONCE
Status: COMPLETED | OUTPATIENT
Start: 2024-10-10 | End: 2024-10-10

## 2024-10-10 RX ADMIN — SODIUM CHLORIDE, PRESERVATIVE FREE 500 UNITS: 5 INJECTION INTRAVENOUS at 14:54

## 2024-10-10 RX ADMIN — IOPAMIDOL 100 ML: 612 INJECTION, SOLUTION INTRAVENOUS at 14:41

## 2024-10-12 LAB
BACTERIA SPEC AEROBE CULT: NORMAL
BACTERIA SPEC AEROBE CULT: NORMAL

## 2024-10-14 ENCOUNTER — OFFICE VISIT (OUTPATIENT)
Dept: ONCOLOGY | Facility: CLINIC | Age: 54
End: 2024-10-14
Payer: COMMERCIAL

## 2024-10-14 VITALS
HEIGHT: 72 IN | TEMPERATURE: 97.8 F | BODY MASS INDEX: 27.83 KG/M2 | RESPIRATION RATE: 18 BRPM | HEART RATE: 74 BPM | OXYGEN SATURATION: 99 % | SYSTOLIC BLOOD PRESSURE: 135 MMHG | DIASTOLIC BLOOD PRESSURE: 65 MMHG | WEIGHT: 205.5 LBS

## 2024-10-14 DIAGNOSIS — C20 RECTAL CANCER METASTASIZED TO LIVER: Primary | ICD-10-CM

## 2024-10-14 DIAGNOSIS — C78.7 RECTAL CANCER METASTASIZED TO LIVER: Primary | ICD-10-CM

## 2024-10-14 DIAGNOSIS — J18.9 PNEUMONIA OF LEFT LOWER LOBE DUE TO INFECTIOUS ORGANISM: ICD-10-CM

## 2024-10-14 PROCEDURE — 99214 OFFICE O/P EST MOD 30 MIN: CPT | Performed by: NURSE PRACTITIONER

## 2024-10-14 NOTE — PROGRESS NOTES
[facility] [place]  Office number [phone]    Fax number [phone]     Follow up     Date: 10/14/24    Patient Name: Edward Powell  MRN: [id number]  : 1970    Referring Physician: Dr. Gautam    Chief Complaint: Rectal cancer, lung mass, liver lesions follow up on treatment    Cancer Staging:  IV    History of Present Illness: Edward Powell is a pleasant 54 y.o. male who presents today for evaluation of rectal cancer in the company of his supportive significant other.    Patient has a longstanding history of intermittent diarrhea since his cholecystectomy in 2019.  However he developed progressive diarrhea with associated loss of bowel control and urgency as well as weight loss and fatigue prompting additional workup.    CT of the abdomen pelvis 2023 showed an irregular circumferential wall thickening involving the rectum concerning for mass lesion.  There was associated mesorectal lymphadenopathy.  Masslike consolidation of left lower lobe.  CT of the chest showed a cavitary lesion in the left lower lobe up to 4.8 cm with an adjacent cavitary nodule up to 2 cm and a 5 mm nodule on the right minor fissure.      He underwent colonoscopy 2023 with findings of an infiltrative and ulcerated partially obstructing mass in the proximal rectum spanning 10 cm.  Rectal polyps.  Biopsy of the rectal mass showed invasive moderately differentiated adenocarcinoma.  Additional biopsies showed tubular adenomas.  MSI testing was intact/low probability of MSI high.    PDL1 negative    PET/CT 2023 showed hypermetabolic rectal wall thickening compatible with known rectal malignancy.  Multiple small ill-defined hypoechoic hyper metabolic liver lesions compatible with metastatic disease.  Mildly enlarged and mildly hypermetabolic pelvic sidewall and internal iliac lymph node chain adenopathy.  Hypermetabolic lung mass with adjacent nodule.     He underwent liver biopsy and lung  biopsy January 2024.  Results of both confirmed metastatic colorectal cancer.    The patient has been experiencing substantial rectal pain.  He is taking oxycodone every 6 hours with partial relief.  He reports ongoing bowel movements.  No abdominal pain.  No vomiting.  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    Treatment history:  FOLFOX cycle 1 -4  FOLFOX panitumumab cycle 5 onward  -Oxaliplatin terminated early for allergic reaction after 4/29/24    Interval history:  At last visit patient presented with temperature as well as rigors, chills, fever.  He was diagnosed with pneumonia.  He was placed on antibiotics.  Today he continues to struggle.  He is feeling better than he did last week.  He is having a significant cough.  He is having dark green sputum.  He completed his antibiotics today.  He is still having some intermittent pain in the left lower lobe of his lung he says.  No shortness of breath.  No diarrhea.  No nausea.  Denies sore throat.    Hands are much better.  He continues to struggle with his feet with his neuropathy pain.        Past Medical History:   Past Medical History:   Diagnosis Date    Arthritis     Cancer     rectal cancer - diagnosed 2023    Cholelithiasis 2019    Removed    COPD (chronic obstructive pulmonary disease)     Coronary artery disease 2009    Stent - no cardiologist currently    Elevated cholesterol     GERD (gastroesophageal reflux disease)     Hernia 2003    Lower hernia    Perforated ulcer 2019    Sleep apnea     history of; when weighed over 400lbs - no issues following bariatric surgery       Past Surgical History:   Past Surgical History:   Procedure Laterality Date    APPENDECTOMY  1983    Removed    BARIATRIC SURGERY  2011     Gastric bypass    BLADDER TUMOR/ULCER BLEEDER CAUTERIZATION      CARDIAC CATHETERIZATION  2009    stent placed    CHOLECYSTECTOMY  2019    Removed    COLONOSCOPY N/A 12/07/2023    Procedure: COLONOSCOPY WITH HOT SNARE POLYPECTOMY AND TATTOO;  Surgeon: Noman Gautam MD;  Location: [facility] ENDOSCOPY;  Service: Gastroenterology;  Laterality: N/A;    PORTACATH PLACEMENT N/A 1/5/2024    Procedure: INSERTION OF PORTACATH WITH ULTRSOUND AND FLUOROSCOPIC GUIDANCE;  Surgeon: Ida Black MD;  Location: [facility] OR;  Service: General;  Laterality: N/A;    JENNIFER-EN-Y         Family History: No family history on file.  Uncle had colon cancer    Social History:   Social History     Socioeconomic History    Marital status:    Tobacco Use    Smoking status: Every Day     Current packs/day: 1.00     Average packs/day: 0.9 packs/day for 60.1 years (52.6 ttl pk-yrs)     Types: Cigarettes     Start date: 9/6/1994    Smokeless tobacco: Never    Tobacco comments:     35 years      pt reports closer to 2 packs per day since cancer diagnosis   Vaping Use    Vaping status: Never Used   Substance and Sexual Activity    Alcohol use: Never    Drug use: Never    Sexual activity: Defer       Medications:     Current Outpatient Medications:     buPROPion XL (WELLBUTRIN XL) 150 MG 24 hr tablet, TAKE 1 TABLET BY MOUTH DAILY, Disp: 90 tablet, Rfl: 1    cetirizine (zyrTEC) 10 MG tablet, Take 1 tablet by mouth Daily., Disp: 30 tablet, Rfl: 2    clindamycin (Clindagel) 1 % gel, Apply 1 Application topically to the appropriate area as directed 2 (Two) Times a Day. Use first, Disp: 30 g, Rfl: 1    dexAMETHasone 0.5 MG/5ML solution, Take 10 mL by mouth 2 (Two) Times a Day. Swish for 2 minutes and spit 10 mL 4 x daily, Disp: 240 mL, Rfl: 5    dicyclomine (BENTYL) 20 MG tablet, Take 1 tablet by mouth 3 (Three) Times a Day As Needed for Abdominal Cramping., Disp: 30 tablet, Rfl: 3    docusate sodium (COLACE) 100 MG capsule, Take  1 capsule by mouth 2 (Two) Times a Day., Disp: , Rfl:     doxycycline (MONODOX) 100 MG capsule, , Disp: , Rfl:     doxycycline (VIBRAMYICN) 100 MG tablet, , Disp: , Rfl:     Hydrocortisone, Perianal, (ANUSOL-HC) 2.5 % rectal cream, Insert  into the rectum 2 (Two) Times a Day. Indications: Inflamed Hemorrhoids, Disp: 30 g, Rfl: 1    levoFLOXacin (Levaquin) 500 MG tablet, Take 1 tablet by mouth Daily for 7 days. 1 tablet, Disp: 7 tablet, Rfl: 0    lidocaine-prilocaine (EMLA) 2.5-2.5 % cream, Apply 1 Application topically to the appropriate area as directed As Needed (45-60 minutes prior to port access.  Cover with saran/plastic wrap.)., Disp: 30 g, Rfl: 3    LORazepam (ATIVAN) 0.5 MG tablet, Take 1 tablet by mouth Take As Directed. 1 tabelt by mouth 30 minutes prior to MRI, take a second tablet at the time of the MRI if needed., Disp: 2 tablet, Rfl: 0    Magic Mouthwash Oral Suspension (diphenhydrAMINE HCl - aluminum & magnesium hydroxide-simethicone - lidocaine - nystatin), Swish and Spit 10 mL by mouth every 6 (Six) Hours For 7 Days., Disp: 300 mL, Rfl: 3    magnesium oxide (MAG-OX) 400 MG tablet, Take 1 tablet by mouth Daily., Disp: 30 tablet, Rfl: 5    Morphine (MS CONTIN) 15 MG 12 hr tablet, Take 1 tablet by mouth 2 (Two) Times a Day for 30 days., Disp: 60 tablet, Rfl: 0    Movantik 25 MG tablet, , Disp: , Rfl:     mupirocin (BACTROBAN) 2 % cream, , Disp: , Rfl:     naloxone (NARCAN) 4 MG/0.1ML nasal spray, 1 spray into the nostril(s) as directed by provider As Needed for Opioid Reversal., Disp: 1 each, Rfl: 0    nystatin (MYCOSTATIN) 100,000 unit/mL suspension, Swish and Swallow 5mL by mouth four times a day, Disp: 473 mL, Rfl: 0    nystatin (MYCOSTATIN) 100,000 unit/mL suspension, Swish and swallow 5 mL 4 (Four) Times a Day., Disp: 473 mL, Rfl: 0    nystatin susp + lidocaine viscous (MAGIC MOUTHWASH) oral suspension, 5-10 ml swish and spit or swallow QID prn, Disp: 240 mL, Rfl: 3    ondansetron (ZOFRAN) 8 MG  "tablet, Take 1 tablet by mouth 3 (Three) Times a Day As Needed for Nausea or Vomiting., Disp: 30 tablet, Rfl: 5    oxyCODONE (ROXICODONE) 15 MG immediate release tablet, Take 1 tablet by mouth 5 (Five) Times a Day As Needed for Moderate Pain or Severe Pain for up to 30 days., Disp: 150 tablet, Rfl: 0    pantoprazole (Protonix) 40 MG EC tablet, Take 1 tablet by mouth Daily. Indications: Gastroesophageal Reflux Disease, Disp: 90 tablet, Rfl: 2    potassium chloride ER (K-TAB) 20 MEQ tablet controlled-release ER tablet, TAKE 2 TABLETS BY MOUTH DAILY, Disp: 30 tablet, Rfl: 0    pregabalin (Lyrica) 300 MG capsule, Take 1 capsule by mouth 2 (Two) Times a Day for 30 days., Disp: 60 capsule, Rfl: 0    tiotropium bromide-olodaterol (Stiolto Respimat) 2.5-2.5 MCG/ACT aerosol solution inhaler, INHALE 2 INHALATION(S) BY MOUTH DAILY, Disp: 4 g, Rfl: 5    traZODone (DESYREL) 100 MG tablet, Take 1 tablet by mouth Every Night., Disp: 30 tablet, Rfl: 2    triamcinolone (KENALOG) 0.025 % ointment, Apply 1 Application topically to the appropriate area as directed 2 (Two) Times a Day. Use second if topical treatment #1 ineffective, Disp: 80 g, Rfl: 0    Allergies:   Allergies   Allergen Reactions    Bactrim [Sulfamethoxazole-Trimethoprim] Hives     and blisters    Sulfa Antibiotics Hives     and blisters       Objective     Vital Signs:   Vitals:    10/14/24 0901   BP: 135/65   Pulse: 74   Resp: 18   Temp: 97.8 °F (36.6 °C)   SpO2: 99%   Weight: 93.2 kg (205 lb 8 oz)   Height: 182.9 cm (72.01\")   PainSc:   5          Body mass index is 27.86 kg/m².   Pain Score    10/14/24 0901   PainSc:   5       ECOG Performance Status: 1 - Symptomatic but completely ambulatory    Physical Exam:   General: No acute distress. Well appearing   HEENT: Normocephalic, atraumatic. Sclera anicteric. Dry mouth, mild erythema  Neck: supple, no adenopathy.   Cardiovascular: regular rate and rhythm. No murmurs.   Respiratory: Normal rate.  Left lower lobe " crackles  Abdomen: Soft, nontender, non distended with normoactive bowel sounds  Lymph: no cervical, supraclavicular or axillary adenopathy  Neuro: Alert and oriented x 3. No focal deficits.   Ext: Symmetric, no swelling.       Laboratory/Imaging Reviewed:   Hospital Outpatient Visit on 10/07/2024   Component Date Value Ref Range Status    COVID19 10/07/2024 Not Detected  Not Detected - Ref. Range Final    Influenza A PCR 10/07/2024 Not Detected  Not Detected Final    Influenza B PCR 10/07/2024 Not Detected  Not Detected Final    Glucose 10/07/2024 105 (H)  65 - 99 mg/dL Final    BUN 10/07/2024 11  6 - 20 mg/dL Final    Creatinine 10/07/2024 0.90  0.76 - 1.27 mg/dL Final    Sodium 10/07/2024 135 (L)  136 - 145 mmol/L Final    Potassium 10/07/2024 4.4  3.5 - 5.2 mmol/L Final    Chloride 10/07/2024 106  98 - 107 mmol/L Final    CO2 10/07/2024 20.2 (L)  22.0 - 29.0 mmol/L Final    Calcium 10/07/2024 8.4 (L)  8.6 - 10.5 mg/dL Final    Total Protein 10/07/2024 6.9  6.0 - 8.5 g/dL Final    Albumin 10/07/2024 3.3 (L)  3.5 - 5.2 g/dL Final    ALT (SGPT) 10/07/2024 13  1 - 41 U/L Final    AST (SGOT) 10/07/2024 22  1 - 40 U/L Final    Alkaline Phosphatase 10/07/2024 122 (H)  39 - 117 U/L Final    Total Bilirubin 10/07/2024 0.4  0.0 - 1.2 mg/dL Final    Globulin 10/07/2024 3.6  gm/dL Final    A/G Ratio 10/07/2024 0.9  g/dL Final    BUN/Creatinine Ratio 10/07/2024 12.2  7.0 - 25.0 Final    Anion Gap 10/07/2024 8.8  5.0 - 15.0 mmol/L Final    eGFR 10/07/2024 100.9  >60.0 mL/min/1.73 Final    Blood Culture 10/07/2024 No growth at 5 days   Final    Color, UA 10/07/2024 Yellow  Yellow, Straw Final    Appearance, UA 10/07/2024 Clear  Clear Final    pH, UA 10/07/2024 5.5  5.0 - 8.0 Final    Specific Gravity, UA 10/07/2024 1.017  1.005 - 1.030 Final    Glucose, UA 10/07/2024 Negative  Negative Final    Ketones, UA 10/07/2024 Negative  Negative Final    Bilirubin, UA 10/07/2024 Negative  Negative Final    Blood, UA 10/07/2024 Negative   Negative Final    Protein, UA 10/07/2024 Trace (A)  Negative Final    Leuk Esterase, UA 10/07/2024 Negative  Negative Final    Nitrite, UA 10/07/2024 Negative  Negative Final    Urobilinogen, UA 10/07/2024 1.0 E.U./dL  0.2 - 1.0 E.U./dL Final    Blood Culture 10/07/2024 No growth at 5 days   Final    CEA 10/07/2024 4.87  ng/mL Final    WBC 10/07/2024 14.51 (H)  3.40 - 10.80 10*3/mm3 Final    RBC 10/07/2024 4.60  4.14 - 5.80 10*6/mm3 Final    Hemoglobin 10/07/2024 11.8 (L)  13.0 - 17.7 g/dL Final    Hematocrit 10/07/2024 38.5  37.5 - 51.0 % Final    MCV 10/07/2024 83.7  79.0 - 97.0 fL Final    MCH 10/07/2024 25.7 (L)  26.6 - 33.0 pg Final    MCHC 10/07/2024 30.6 (L)  31.5 - 35.7 g/dL Final    RDW 10/07/2024 19.4 (H)  12.3 - 15.4 % Final    RDW-SD 10/07/2024 56.6 (H)  37.0 - 54.0 fl Final    MPV 10/07/2024 12.1 (H)  6.0 - 12.0 fL Final    Platelets 10/07/2024 170  140 - 450 10*3/mm3 Final    Neutrophil % 10/07/2024 85.2 (H)  42.7 - 76.0 % Final    Lymphocyte % 10/07/2024 8.1 (L)  19.6 - 45.3 % Final    Monocyte % 10/07/2024 5.9  5.0 - 12.0 % Final    Eosinophil % 10/07/2024 0.1 (L)  0.3 - 6.2 % Final    Basophil % 10/07/2024 0.2  0.0 - 1.5 % Final    Immature Grans % 10/07/2024 0.5  0.0 - 0.5 % Final    Neutrophils, Absolute 10/07/2024 12.36 (H)  1.70 - 7.00 10*3/mm3 Final    Lymphocytes, Absolute 10/07/2024 1.17  0.70 - 3.10 10*3/mm3 Final    Monocytes, Absolute 10/07/2024 0.86  0.10 - 0.90 10*3/mm3 Final    Eosinophils, Absolute 10/07/2024 0.02  0.00 - 0.40 10*3/mm3 Final    Basophils, Absolute 10/07/2024 0.03  0.00 - 0.20 10*3/mm3 Final    Immature Grans, Absolute 10/07/2024 0.07 (H)  0.00 - 0.05 10*3/mm3 Final    nRBC 10/07/2024 0.0  0.0 - 0.2 /100 WBC Final     Tempus xt: APC gene TP53; Negative ADRIANNA, BRAF, NRAS, TMB stable. PDL1 negative  CT Chest With Contrast Diagnostic    Result Date: 10/11/2024  Narrative: PROCEDURE: CT CHEST W CONTRAST DIAGNOSTIC-  HISTORY: rectal cancer; H36-Nyqgvcgko neoplasm of rectum   COMPARISON: 07/05/2024.  TECHNIQUE: Axial CT with IV contrast administration.  FINDINGS:  Rounded opacity in the posterior aspect left lower lobe is noted measuring 40 x 28 mm, previously 34 x 24 mm. There is associated cavitation. This is compatible with mild worsening of metastatic disease.  A smaller adjacent cavitary nodule is noted measuring 10 mm, unchanged.  There is new airspace disease of the left upper lobe. This is suspicious for pneumonia. If patient has received radiation to this region, radiation pneumonitis would be another consideration.  There is a small oval nodule along the minor fissure of the anterior right midlung measuring 4 mm, unchanged, compatible with lymph node.  No pleural or pericardial effusion is seen.  Fusiform aneurysm of the ascending aorta is noted measuring up to 45 mm, stable..      Impression: 1. Slight worsening of dominant cavitary lesion of the left lower lobe likely related to metastatic disease. 2. No new sites of disease. 3. New airspace disease within the left upper lobe suspicious for pneumonia, less likely posttreatment change.   This study was performed with techniques to keep radiation doses as low as reasonably achievable (ALARA). Individualized dose reduction techniques using automated exposure control or adjustment of vA and/or kV according to the patient size were employed.   This report was signed and finalized on 10/11/2024 9:45 AM by Serjio Wright MD.      CT Abdomen Pelvis With Contrast    Result Date: 10/11/2024  Narrative: PROCEDURE: CT ABDOMEN PELVIS W CONTRAST-  HISTORY:  rectal cancer restaging; Q57-Imuvicsqk neoplasm of rectum  COMPARISON: 7/5/2024.  TECHNIQUE: Multiple axial CT images were obtained from the lung bases through the pubic symphysis following the administration of Isovue 300 and oral contrast.  FINDINGS:  ABDOMEN: The lung bases are clear. The heart is normal in size. The liver is normal. No evidence of liver metastases. The gallbladder is  surgically absent. The spleen is mildly enlarged, unchanged from prior. No adrenal mass is present.  The pancreas is normal. The kidneys demonstrate a stable right renal cyst. The aorta is normal in caliber. No free fluid. No mesenteric or retroperitoneal adenopathy. The abdominal portions of the GI tract are unremarkable with no evidence of obstruction.  PELVIS: The appendix is not identified.  The urinary bladder is unremarkable. There is no significant free fluid. There is persistent eccentric wall thickening of the rectosigmoid junction measuring up to 11 mm, unchanged from prior. No perirectal adenopathy. External iliac lymph nodes are normal in size and decreased from prior. The largest on the left measures 8 x 8 mm, previously 14 x 10 mm. There is bilateral inguinal adenopathy with the largest right external iliac lymph node measuring 24 x 12 mm. This is unchanged compared to prior where it measured 23 x 14 mm.      Impression: No evidence of distant metastasis.  Stable rectal wall thickening which may present treated or stable disease.  Improved pelvic adenopathy, other inguinal adenopathy is unchanged and nonspecific.     This study was performed with techniques to keep radiation doses as low as reasonably achievable (ALARA). Individualized dose reduction techniques using automated exposure control or adjustment of mA and/or kV according to the patient size were employed.     Images were reviewed, interpreted, and dictated by Dr. Serjio Wright MD Transcribed by Kameron Tejeda PA-C.  This report was signed and finalized on 10/11/2024 8:37 AM by Serjio Wright MD.      XR Chest PA & Lateral    Result Date: 10/7/2024  Narrative: PROCEDURE: XR CHEST PA AND LATERAL-   HISTORY: fever; U82-Yrppigpxv neoplasm of rectum; C78.7-Secondary malignant neoplasm of liver and intrahepatic bile duct; R50.9-Fever, unspecified  COMPARISON: 1/18/2024.  FINDINGS: Left lower lobe perihilar airspace disease is seen compatible with  pneumonia new from prior exam.  There is no evidence of effusion or other pleural disease.  The mediastinum has a normal appearance.  The cardiac silhouette is unremarkable.   Right jugular Port-A-Cath is noted with tip in the SVC.      Impression: Left lower lobe pneumonia    This report was signed and finalized on 10/7/2024 12:15 PM by Serjio Wright MD.      US Venous Doppler Lower Extremity Left (duplex)    Result Date: 9/16/2024  Narrative: PROCEDURE: US VENOUS DOPPLER LOWER EXTREMITY LEFT (DUPLEX)-  HISTORY: left leg swelling; M79.605-Pain in left leg  Multiple transverse and longitudinal scans were performed of the femoropopliteal deep venous system, with augmentation and compression maneuvers.  FINDINGS: Normal phasic flow was noted in the visualized deep venous system. No intraluminal increased echogenicity is noted to suggest thrombus. There is normal compression and augmentation of the venous structures. No abnormal venous collaterals are seen.      Impression: No evidence of deep venous thrombosis of the left lower extremity.   This report was signed and finalized on 9/16/2024 2:07 PM by Serjio Wright MD.       Procedures    Assessment / Plan      Assessment/Plan:     1.  Rectal cancer   2.  Liver metastases  3.  Lung metastasis  4.  Chemo monitoring  5.  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  Acute fever.   -The patient presents with a partially obstructing rectal cancer with adenopathy.  -He was found to have biopsy-proven metastasis in the liver and lung.  His case was presented at multidisciplinary tumor board.  -Because of metastatic disease to both the lung and liver I do not think he will be downstage to resectable disease.  -Tempus results which are notable for an APC mutation, T p53 mutation, negative for KRAS, BRAF, NRAS, tumor mutation burden stable.  Notch 1 VUS.  -CBC adequate and CMP pending for treatment today with maintenance 5-FU and panitumumab 9/16/24  Chemotherapy  orders and premedications signed9/16/24  -We reviewed his imaging which is consistent with excellent disease control in early July with normalization of CEA. He has had persistent rectal pain with escalating oxycodone requirements over the past couple of months, but no severe recent increase in pain, fever. WBC normal. 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 I reviewed his EUA notes. Increase doxy to BID. No surgical intervention.  -Consolidative radiotherapy to the rectal tumor is not something he wants to pursue  -I discussed recent CT scan that showed slight worsening of the dominant cavitary lesion of the left lower lobe likely related to metastatic disease.  New airspace disease in the left lower lobe suspicious for pneumonia.  We discussed this could be related to his pneumonia.  I discussed the case with Dr. Cronin.  We will plan to continue with maintenance 5-FU/panitumumab for another 6 weeks and repeat CT without contrast.  - We discussed CT abdomen pelvis shows stable rectal wall thickening which may represent treated or stable disease.  He does have improved pelvic adenopathy, other inguinal adenopathy is unchanged.  - Due to his recent pneumonia and still symptomatic we will hold off on treatment today and plan for him to follow-up with treatment in 1 week.  We had a long discussion about taking a drug holiday from treatment and extending the interval every 3 weeks.  We discussed with the recent CT scan we would recommend him going back to an every 2-week schedule and foregoing any drug holidays for now.  -Continue maintenance 5FU/panitumumab.  We will plan to go back to every 2-week treatments.  -We will repeat CT scan in 6 weeks.  Hold treatment today for pneumonia.    Orders Placed This Encounter   Procedures    CT Chest Without Contrast         6.  Cancer related pain  -Now following with palliative care and pain is well controlled on lyrica, oxycodone and MS contin  Stable     7.   Panitumumab induced rash  -Continue doxycycline.     8. GERD  PPI    9. Mucositis  -MMW as needed  -Continue dexamethasone mouthwash for prevention  There are no Patient Instructions on file for this visit.      10. Hypokalemia  11. Hypomagnesemia  -Continue Otc magnesium and monitor levels    12. Access  -Port        Follow Up:   1 week     GEOR[place] Grimes    Hematology and Oncology

## 2024-10-16 DIAGNOSIS — C20 RECTAL CANCER METASTASIZED TO LIVER: Primary | ICD-10-CM

## 2024-10-16 DIAGNOSIS — C78.7 RECTAL CANCER METASTASIZED TO LIVER: Primary | ICD-10-CM

## 2024-10-18 RX ORDER — POTASSIUM CHLORIDE 1500 MG/1
40 TABLET, EXTENDED RELEASE ORAL DAILY
Qty: 30 TABLET | Refills: 0 | OUTPATIENT
Start: 2024-10-18

## 2024-10-21 ENCOUNTER — OFFICE VISIT (OUTPATIENT)
Dept: ONCOLOGY | Facility: CLINIC | Age: 54
End: 2024-10-21
Payer: COMMERCIAL

## 2024-10-21 ENCOUNTER — HOSPITAL ENCOUNTER (OUTPATIENT)
Facility: HOSPITAL | Age: 54
Discharge: HOME | End: 2024-10-21
Admitting: INTERNAL MEDICINE
Payer: COMMERCIAL

## 2024-10-21 VITALS
HEART RATE: 65 BPM | DIASTOLIC BLOOD PRESSURE: 55 MMHG | WEIGHT: 206.5 LBS | SYSTOLIC BLOOD PRESSURE: 116 MMHG | HEIGHT: 72 IN | RESPIRATION RATE: 18 BRPM | OXYGEN SATURATION: 99 % | TEMPERATURE: 97.8 F | BODY MASS INDEX: 27.97 KG/M2

## 2024-10-21 DIAGNOSIS — C78.7 RECTAL CANCER METASTASIZED TO LIVER: Primary | ICD-10-CM

## 2024-10-21 DIAGNOSIS — C20 RECTAL CANCER METASTASIZED TO LIVER: Primary | ICD-10-CM

## 2024-10-21 DIAGNOSIS — C20 RECTAL ADENOCARCINOMA: ICD-10-CM

## 2024-10-21 LAB
ALBUMIN SERPL-MCNC: 3.4 G/DL (ref 3.5–5.2)
ALBUMIN/GLOB SERPL: 0.8 G/DL
ALP SERPL-CCNC: 133 U/L (ref 39–117)
ALT SERPL W P-5'-P-CCNC: 9 U/L (ref 1–41)
ANION GAP SERPL CALCULATED.3IONS-SCNC: 7.7 MMOL/L (ref 5–15)
AST SERPL-CCNC: 18 U/L (ref 1–40)
BASOPHILS # BLD AUTO: 0.04 10*3/MM3 (ref 0–0.2)
BASOPHILS NFR BLD AUTO: 0.4 % (ref 0–1.5)
BILIRUB SERPL-MCNC: 0.3 MG/DL (ref 0–1.2)
BUN SERPL-MCNC: 12 MG/DL (ref 6–20)
BUN/CREAT SERPL: 11.9 (ref 7–25)
CALCIUM SPEC-SCNC: 8.6 MG/DL (ref 8.6–10.5)
CEA SERPL-MCNC: 5.47 NG/ML
CHLORIDE SERPL-SCNC: 103 MMOL/L (ref 98–107)
CO2 SERPL-SCNC: 23.3 MMOL/L (ref 22–29)
CREAT SERPL-MCNC: 1.01 MG/DL (ref 0.76–1.27)
DEPRECATED RDW RBC AUTO: 56.8 FL (ref 37–54)
EGFRCR SERPLBLD CKD-EPI 2021: 87.8 ML/MIN/1.73
EOSINOPHIL # BLD AUTO: 0.12 10*3/MM3 (ref 0–0.4)
EOSINOPHIL NFR BLD AUTO: 1.1 % (ref 0.3–6.2)
ERYTHROCYTE [DISTWIDTH] IN BLOOD BY AUTOMATED COUNT: 19.2 % (ref 12.3–15.4)
GLOBULIN UR ELPH-MCNC: 4.1 GM/DL
GLUCOSE SERPL-MCNC: 88 MG/DL (ref 65–99)
HCT VFR BLD AUTO: 35.8 % (ref 37.5–51)
HGB BLD-MCNC: 11 G/DL (ref 13–17.7)
IMM GRANULOCYTES # BLD AUTO: 0.03 10*3/MM3 (ref 0–0.05)
IMM GRANULOCYTES NFR BLD AUTO: 0.3 % (ref 0–0.5)
LYMPHOCYTES # BLD AUTO: 1.91 10*3/MM3 (ref 0.7–3.1)
LYMPHOCYTES NFR BLD AUTO: 16.9 % (ref 19.6–45.3)
MAGNESIUM SERPL-MCNC: 2.1 MG/DL (ref 1.6–2.6)
MCH RBC QN AUTO: 25.4 PG (ref 26.6–33)
MCHC RBC AUTO-ENTMCNC: 30.7 G/DL (ref 31.5–35.7)
MCV RBC AUTO: 82.7 FL (ref 79–97)
MONOCYTES # BLD AUTO: 0.76 10*3/MM3 (ref 0.1–0.9)
MONOCYTES NFR BLD AUTO: 6.7 % (ref 5–12)
NEUTROPHILS NFR BLD AUTO: 74.6 % (ref 42.7–76)
NEUTROPHILS NFR BLD AUTO: 8.41 10*3/MM3 (ref 1.7–7)
NRBC BLD AUTO-RTO: 0 /100 WBC (ref 0–0.2)
PLATELET # BLD AUTO: 203 10*3/MM3 (ref 140–450)
PMV BLD AUTO: 10.9 FL (ref 6–12)
POTASSIUM SERPL-SCNC: 4.6 MMOL/L (ref 3.5–5.2)
PROT SERPL-MCNC: 7.5 G/DL (ref 6–8.5)
RBC # BLD AUTO: 4.33 10*6/MM3 (ref 4.14–5.8)
SODIUM SERPL-SCNC: 134 MMOL/L (ref 136–145)
WBC NRBC COR # BLD AUTO: 11.27 10*3/MM3 (ref 3.4–10.8)

## 2024-10-21 PROCEDURE — 96375 TX/PRO/DX INJ NEW DRUG ADDON: CPT

## 2024-10-21 PROCEDURE — 96367 TX/PROPH/DG ADDL SEQ IV INF: CPT

## 2024-10-21 PROCEDURE — 85025 COMPLETE CBC W/AUTO DIFF WBC: CPT | Performed by: INTERNAL MEDICINE

## 2024-10-21 PROCEDURE — 96413 CHEMO IV INFUSION 1 HR: CPT

## 2024-10-21 PROCEDURE — 25010000002 PANITUMUMAB 400 MG/20ML SOLUTION 20 ML VIAL: Performed by: INTERNAL MEDICINE

## 2024-10-21 PROCEDURE — 80053 COMPREHEN METABOLIC PANEL: CPT | Performed by: INTERNAL MEDICINE

## 2024-10-21 PROCEDURE — 82378 CARCINOEMBRYONIC ANTIGEN: CPT | Performed by: INTERNAL MEDICINE

## 2024-10-21 PROCEDURE — 25010000002 FLUOROURACIL PER 500 MG: Performed by: INTERNAL MEDICINE

## 2024-10-21 PROCEDURE — 25010000002 PANITUMUMAB PER 10 MG: Performed by: INTERNAL MEDICINE

## 2024-10-21 PROCEDURE — 25010000002 PALONOSETRON 0.25 MG/5ML SOLUTION PREFILLED SYRINGE: Performed by: INTERNAL MEDICINE

## 2024-10-21 PROCEDURE — 83735 ASSAY OF MAGNESIUM: CPT | Performed by: INTERNAL MEDICINE

## 2024-10-21 PROCEDURE — 25810000003 SODIUM CHLORIDE 0.9 % SOLUTION 500 ML FLEX CONT: Performed by: INTERNAL MEDICINE

## 2024-10-21 PROCEDURE — 25010000002 LEUCOVORIN CALCIUM PER 50MG: Performed by: INTERNAL MEDICINE

## 2024-10-21 PROCEDURE — 0 DEXTROSE 5 % SOLUTION 250 ML FLEX CONT: Performed by: INTERNAL MEDICINE

## 2024-10-21 PROCEDURE — 25010000002 DEXAMETHASONE PER 1 MG: Performed by: INTERNAL MEDICINE

## 2024-10-21 PROCEDURE — 99214 OFFICE O/P EST MOD 30 MIN: CPT | Performed by: INTERNAL MEDICINE

## 2024-10-21 PROCEDURE — G0498 CHEMO EXTEND IV INFUS W/PUMP: HCPCS

## 2024-10-21 RX ORDER — HEPARIN SODIUM (PORCINE) LOCK FLUSH IV SOLN 100 UNIT/ML 100 UNIT/ML
500 SOLUTION INTRAVENOUS AS NEEDED
Status: DISCONTINUED | OUTPATIENT
Start: 2024-10-21 | End: 2024-10-22 | Stop reason: HOSPADM

## 2024-10-21 RX ORDER — DEXAMETHASONE SODIUM PHOSPHATE 10 MG/ML
12 INJECTION INTRAMUSCULAR; INTRAVENOUS ONCE
Status: DISCONTINUED | OUTPATIENT
Start: 2024-10-21 | End: 2024-10-21

## 2024-10-21 RX ORDER — SODIUM CHLORIDE 9 MG/ML
20 INJECTION, SOLUTION INTRAVENOUS ONCE
Status: DISCONTINUED | OUTPATIENT
Start: 2024-10-21 | End: 2024-10-22 | Stop reason: HOSPADM

## 2024-10-21 RX ORDER — DEXAMETHASONE SODIUM PHOSPHATE 4 MG/ML
12 INJECTION, SOLUTION INTRA-ARTICULAR; INTRALESIONAL; INTRAMUSCULAR; INTRAVENOUS; SOFT TISSUE ONCE
Status: COMPLETED | OUTPATIENT
Start: 2024-10-21 | End: 2024-10-21

## 2024-10-21 RX ORDER — FAMOTIDINE 10 MG/ML
20 INJECTION, SOLUTION INTRAVENOUS AS NEEDED
Status: CANCELLED | OUTPATIENT
Start: 2024-10-21

## 2024-10-21 RX ORDER — PALONOSETRON 0.05 MG/ML
0.25 INJECTION, SOLUTION INTRAVENOUS ONCE
Status: COMPLETED | OUTPATIENT
Start: 2024-10-21 | End: 2024-10-21

## 2024-10-21 RX ORDER — DEXTROSE MONOHYDRATE 50 MG/ML
20 INJECTION, SOLUTION INTRAVENOUS ONCE
Status: DISCONTINUED | OUTPATIENT
Start: 2024-10-21 | End: 2024-10-22 | Stop reason: HOSPADM

## 2024-10-21 RX ORDER — HEPARIN SODIUM (PORCINE) LOCK FLUSH IV SOLN 100 UNIT/ML 100 UNIT/ML
500 SOLUTION INTRAVENOUS AS NEEDED
Status: CANCELLED | OUTPATIENT
Start: 2024-10-21

## 2024-10-21 RX ORDER — SODIUM CHLORIDE 0.9 % (FLUSH) 0.9 %
10 SYRINGE (ML) INJECTION AS NEEDED
Status: CANCELLED | OUTPATIENT
Start: 2024-10-21

## 2024-10-21 RX ORDER — SODIUM CHLORIDE 9 MG/ML
20 INJECTION, SOLUTION INTRAVENOUS ONCE
Status: CANCELLED | OUTPATIENT
Start: 2024-10-21

## 2024-10-21 RX ORDER — SODIUM CHLORIDE 0.9 % (FLUSH) 0.9 %
10 SYRINGE (ML) INJECTION AS NEEDED
Status: DISCONTINUED | OUTPATIENT
Start: 2024-10-21 | End: 2024-10-22 | Stop reason: HOSPADM

## 2024-10-21 RX ORDER — DIPHENHYDRAMINE HYDROCHLORIDE 50 MG/ML
50 INJECTION INTRAMUSCULAR; INTRAVENOUS AS NEEDED
Status: CANCELLED | OUTPATIENT
Start: 2024-10-21

## 2024-10-21 RX ORDER — DEXTROSE MONOHYDRATE 50 MG/ML
20 INJECTION, SOLUTION INTRAVENOUS ONCE
Status: CANCELLED | OUTPATIENT
Start: 2024-10-21

## 2024-10-21 RX ORDER — PALONOSETRON 0.05 MG/ML
0.25 INJECTION, SOLUTION INTRAVENOUS ONCE
Status: CANCELLED | OUTPATIENT
Start: 2024-10-21

## 2024-10-21 RX ADMIN — FLUOROURACIL 5260 MG: 50 INJECTION, SOLUTION INTRAVENOUS at 13:12

## 2024-10-21 RX ADMIN — PANITUMUMAB 580 MG: 400 SOLUTION INTRAVENOUS at 11:08

## 2024-10-21 RX ADMIN — LEUCOVORIN CALCIUM 880 MG: 10 INJECTION INTRAMUSCULAR; INTRAVENOUS at 12:09

## 2024-10-21 RX ADMIN — DEXAMETHASONE SODIUM PHOSPHATE 12 MG: 4 INJECTION, SOLUTION INTRA-ARTICULAR; INTRALESIONAL; INTRAMUSCULAR; INTRAVENOUS; SOFT TISSUE at 10:35

## 2024-10-21 RX ADMIN — PALONOSETRON 0.25 MG: 0.25 INJECTION, SOLUTION INTRAVENOUS at 10:37

## 2024-10-21 NOTE — PROGRESS NOTES
Hematology and Oncology [place]  Office number [phone]    Fax number [phone]     Follow up     Date: 10/21/24    Patient Name: Edward Powell  MRN: [id number]  : 1970    Referring Physician: Dr. Gautam    Chief Complaint: Rectal cancer, lung mass, liver lesions follow up on treatment    Cancer Staging:  IV    History of Present Illness: Edward Powell is a pleasant 54 y.o. male who presents today for evaluation of rectal cancer in the company of his supportive significant other.    Patient has a longstanding history of intermittent diarrhea since his cholecystectomy in 2019.  However he developed progressive diarrhea with associated loss of bowel control and urgency as well as weight loss and fatigue prompting additional workup.    CT of the abdomen pelvis 2023 showed an irregular circumferential wall thickening involving the rectum concerning for mass lesion.  There was associated mesorectal lymphadenopathy.  Masslike consolidation of left lower lobe.  CT of the chest showed a cavitary lesion in the left lower lobe up to 4.8 cm with an adjacent cavitary nodule up to 2 cm and a 5 mm nodule on the right minor fissure.      He underwent colonoscopy 2023 with findings of an infiltrative and ulcerated partially obstructing mass in the proximal rectum spanning 10 cm.  Rectal polyps.  Biopsy of the rectal mass showed invasive moderately differentiated adenocarcinoma.  Additional biopsies showed tubular adenomas.  MSI testing was intact/low probability of MSI high.    PDL1 negative    PET/CT 2023 showed hypermetabolic rectal wall thickening compatible with known rectal malignancy.  Multiple small ill-defined hypoechoic hyper metabolic liver lesions compatible with metastatic disease.  Mildly enlarged and mildly hypermetabolic pelvic sidewall and internal iliac lymph node chain adenopathy.  Hypermetabolic lung mass with adjacent nodule.     He underwent liver biopsy and lung  biopsy January 2024.  Results of both confirmed metastatic colorectal cancer.    The patient has been experiencing substantial rectal pain.  He is taking oxycodone every 6 hours with partial relief.  He reports ongoing bowel movements.  No abdominal pain.  No vomiting.  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    Treatment history:  FOLFOX cycle 1 -4  FOLFOX panitumumab cycle 5 onward  -Oxaliplatin terminated early for allergic reaction after 4/29/24    Interval history:  Energy is improved  Cough is improving. Continued productive cough yellowish/brownish in AM and when outside late at night when it is cold outside.   No recurrent fever. Flu like symptoms have resolved.   Taking stool softeners BID with good control of BM which are stable for him  Pain is well controlled with the addition of morphine from palliative care  Neuropathy in toes to DIP and bilateral feet to heels  Worse with standing.   Dependent edema one day when slept for 5 hours leaning forward in recliner, none since    Past Medical History:   Past Medical History:   Diagnosis Date    Arthritis     Cancer     rectal cancer - diagnosed 2023    Cholelithiasis 2019    Removed    COPD (chronic obstructive pulmonary disease)     Coronary artery disease 2009    Stent - no cardiologist currently    Elevated cholesterol     GERD (gastroesophageal reflux disease)     Hernia 2003    Lower hernia    Perforated ulcer 2019    Sleep apnea     history of; when weighed over 400lbs - no issues following bariatric surgery       Past Surgical History:   Past Surgical History:   Procedure Laterality Date    APPENDECTOMY  1983    Removed    BARIATRIC SURGERY  2011    Gastric bypass    BLADDER TUMOR/ULCER BLEEDER CAUTERIZATION       CARDIAC CATHETERIZATION  2009    stent placed    CHOLECYSTECTOMY  2019    Removed    COLONOSCOPY N/A 12/07/2023    Procedure: COLONOSCOPY WITH HOT SNARE POLYPECTOMY AND TATTOO;  Surgeon: Noman Gautam MD;  Location: [facility] ENDOSCOPY;  Service: Gastroenterology;  Laterality: N/A;    PORTACATH PLACEMENT N/A 1/5/2024    Procedure: INSERTION OF PORTACATH WITH ULTRSOUND AND FLUOROSCOPIC GUIDANCE;  Surgeon: Ida Black MD;  Location: [facility] OR;  Service: General;  Laterality: N/A;    JENNIFER-EN-Y         Family History: No family history on file.  Uncle had colon cancer    Social History:   Social History     Socioeconomic History    Marital status:    Tobacco Use    Smoking status: Every Day     Current packs/day: 1.00     Average packs/day: 0.9 packs/day for 60.1 years (52.6 ttl pk-yrs)     Types: Cigarettes     Start date: 9/6/1994    Smokeless tobacco: Never    Tobacco comments:     35 years      pt reports closer to 2 packs per day since cancer diagnosis   Vaping Use    Vaping status: Never Used   Substance and Sexual Activity    Alcohol use: Never    Drug use: Never    Sexual activity: Defer       Medications:     Current Outpatient Medications:     buPROPion XL (WELLBUTRIN XL) 150 MG 24 hr tablet, TAKE 1 TABLET BY MOUTH DAILY, Disp: 90 tablet, Rfl: 1    cetirizine (zyrTEC) 10 MG tablet, Take 1 tablet by mouth Daily., Disp: 30 tablet, Rfl: 2    clindamycin (Clindagel) 1 % gel, Apply 1 Application topically to the appropriate area as directed 2 (Two) Times a Day. Use first, Disp: 30 g, Rfl: 1    dexAMETHasone 0.5 MG/5ML solution, Take 10 mL by mouth 2 (Two) Times a Day. Swish for 2 minutes and spit 10 mL 4 x daily, Disp: 240 mL, Rfl: 5    dicyclomine (BENTYL) 20 MG tablet, Take 1 tablet by mouth 3 (Three) Times a Day As Needed for Abdominal Cramping., Disp: 30 tablet, Rfl: 3    docusate sodium (COLACE) 100 MG capsule, Take 1 capsule by mouth 2 (Two) Times a Day., Disp: , Rfl:      doxycycline (MONODOX) 100 MG capsule, , Disp: , Rfl:     doxycycline (VIBRAMYICN) 100 MG tablet, , Disp: , Rfl:     Hydrocortisone, Perianal, (ANUSOL-HC) 2.5 % rectal cream, Insert  into the rectum 2 (Two) Times a Day. Indications: Inflamed Hemorrhoids, Disp: 30 g, Rfl: 1    lidocaine-prilocaine (EMLA) 2.5-2.5 % cream, Apply 1 Application topically to the appropriate area as directed As Needed (45-60 minutes prior to port access.  Cover with saran/plastic wrap.)., Disp: 30 g, Rfl: 3    LORazepam (ATIVAN) 0.5 MG tablet, Take 1 tablet by mouth Take As Directed. 1 tabelt by mouth 30 minutes prior to MRI, take a second tablet at the time of the MRI if needed., Disp: 2 tablet, Rfl: 0    Magic Mouthwash Oral Suspension (diphenhydrAMINE HCl - aluminum & magnesium hydroxide-simethicone - lidocaine - nystatin), Swish and Spit 10 mL by mouth every 6 (Six) Hours For 7 Days., Disp: 300 mL, Rfl: 3    magnesium oxide (MAG-OX) 400 MG tablet, Take 1 tablet by mouth Daily., Disp: 30 tablet, Rfl: 5    Morphine (MS CONTIN) 15 MG 12 hr tablet, Take 1 tablet by mouth 2 (Two) Times a Day for 30 days., Disp: 60 tablet, Rfl: 0    Movantik 25 MG tablet, , Disp: , Rfl:     mupirocin (BACTROBAN) 2 % cream, , Disp: , Rfl:     naloxone (NARCAN) 4 MG/0.1ML nasal spray, 1 spray into the nostril(s) as directed by provider As Needed for Opioid Reversal., Disp: 1 each, Rfl: 0    nystatin (MYCOSTATIN) 100,000 unit/mL suspension, Swish and Swallow 5mL by mouth four times a day, Disp: 473 mL, Rfl: 0    nystatin (MYCOSTATIN) 100,000 unit/mL suspension, Swish and swallow 5 mL 4 (Four) Times a Day., Disp: 473 mL, Rfl: 0    nystatin susp + lidocaine viscous (MAGIC MOUTHWASH) oral suspension, 5-10 ml swish and spit or swallow QID prn, Disp: 240 mL, Rfl: 3    ondansetron (ZOFRAN) 8 MG tablet, Take 1 tablet by mouth 3 (Three) Times a Day As Needed for Nausea or Vomiting., Disp: 30 tablet, Rfl: 5    oxyCODONE (ROXICODONE) 15 MG immediate release tablet, Take 1  "tablet by mouth 5 (Five) Times a Day As Needed for Moderate Pain or Severe Pain for up to 30 days., Disp: 150 tablet, Rfl: 0    pantoprazole (Protonix) 40 MG EC tablet, Take 1 tablet by mouth Daily. Indications: Gastroesophageal Reflux Disease, Disp: 90 tablet, Rfl: 2    potassium chloride ER (K-TAB) 20 MEQ tablet controlled-release ER tablet, TAKE 2 TABLETS BY MOUTH DAILY, Disp: 30 tablet, Rfl: 0    pregabalin (Lyrica) 300 MG capsule, Take 1 capsule by mouth 2 (Two) Times a Day for 30 days., Disp: 60 capsule, Rfl: 0    tiotropium bromide-olodaterol (Stiolto Respimat) 2.5-2.5 MCG/ACT aerosol solution inhaler, INHALE 2 INHALATION(S) BY MOUTH DAILY, Disp: 4 g, Rfl: 5    traZODone (DESYREL) 100 MG tablet, Take 1 tablet by mouth Every Night., Disp: 30 tablet, Rfl: 2    triamcinolone (KENALOG) 0.025 % ointment, Apply 1 Application topically to the appropriate area as directed 2 (Two) Times a Day. Use second if topical treatment #1 ineffective, Disp: 80 g, Rfl: 0    Allergies:   Allergies   Allergen Reactions    Bactrim [Sulfamethoxazole-Trimethoprim] Hives     and blisters    Sulfa Antibiotics Hives     and blisters       Objective     Vital Signs:   Vitals:    10/21/24 0805   BP: 116/55   Pulse: 65   Resp: 18   Temp: 97.8 °F (36.6 °C)   SpO2: 99%   Weight: 93.7 kg (206 lb 8 oz)   Height: 182.9 cm (72.01\")   PainSc:   5        Body mass index is 28 kg/m².   Pain Score    10/21/24 0805   PainSc:   5     ECOG Performance Status: 1 - Symptomatic but completely ambulatory    Physical Exam:   General: No acute distress. Well appearing   HEENT: Normocephalic, atraumatic. Sclera anicteric. Dry mouth, mild erythema  Neck: supple, no adenopathy.   Cardiovascular: regular rate and rhythm. No murmurs.   Respiratory: Normal rate. Clear to auscultation bilaterally  Abdomen: Soft, nontender, non distended with normoactive bowel sounds  Lymph: no cervical, supraclavicular or axillary adenopathy  Neuro: Alert and oriented x 3. No focal " deficits.   Ext: Symmetric, no swelling.   Accurate 10/21/24    Laboratory/Imaging Reviewed:   No visits with results within 2 Week(s) from this visit.   Latest known visit with results is:   Hospital Outpatient Visit on 10/07/2024   Component Date Value Ref Range Status    COVID19 10/07/2024 Not Detected  Not Detected - Ref. Range Final    Influenza A PCR 10/07/2024 Not Detected  Not Detected Final    Influenza B PCR 10/07/2024 Not Detected  Not Detected Final    Glucose 10/07/2024 105 (H)  65 - 99 mg/dL Final    BUN 10/07/2024 11  6 - 20 mg/dL Final    Creatinine 10/07/2024 0.90  0.76 - 1.27 mg/dL Final    Sodium 10/07/2024 135 (L)  136 - 145 mmol/L Final    Potassium 10/07/2024 4.4  3.5 - 5.2 mmol/L Final    Chloride 10/07/2024 106  98 - 107 mmol/L Final    CO2 10/07/2024 20.2 (L)  22.0 - 29.0 mmol/L Final    Calcium 10/07/2024 8.4 (L)  8.6 - 10.5 mg/dL Final    Total Protein 10/07/2024 6.9  6.0 - 8.5 g/dL Final    Albumin 10/07/2024 3.3 (L)  3.5 - 5.2 g/dL Final    ALT (SGPT) 10/07/2024 13  1 - 41 U/L Final    AST (SGOT) 10/07/2024 22  1 - 40 U/L Final    Alkaline Phosphatase 10/07/2024 122 (H)  39 - 117 U/L Final    Total Bilirubin 10/07/2024 0.4  0.0 - 1.2 mg/dL Final    Globulin 10/07/2024 3.6  gm/dL Final    A/G Ratio 10/07/2024 0.9  g/dL Final    BUN/Creatinine Ratio 10/07/2024 12.2  7.0 - 25.0 Final    Anion Gap 10/07/2024 8.8  5.0 - 15.0 mmol/L Final    eGFR 10/07/2024 100.9  >60.0 mL/min/1.73 Final    Blood Culture 10/07/2024 No growth at 5 days   Final    Color, UA 10/07/2024 Yellow  Yellow, Straw Final    Appearance, UA 10/07/2024 Clear  Clear Final    pH, UA 10/07/2024 5.5  5.0 - 8.0 Final    Specific Gravity, UA 10/07/2024 1.017  1.005 - 1.030 Final    Glucose, UA 10/07/2024 Negative  Negative Final    Ketones, UA 10/07/2024 Negative  Negative Final    Bilirubin, UA 10/07/2024 Negative  Negative Final    Blood, UA 10/07/2024 Negative  Negative Final    Protein, UA 10/07/2024 Trace (A)  Negative Final     Leuk Esterase, UA 10/07/2024 Negative  Negative Final    Nitrite, UA 10/07/2024 Negative  Negative Final    Urobilinogen, UA 10/07/2024 1.0 E.U./dL  0.2 - 1.0 E.U./dL Final    Blood Culture 10/07/2024 No growth at 5 days   Final    CEA 10/07/2024 4.87  ng/mL Final    WBC 10/07/2024 14.51 (H)  3.40 - 10.80 10*3/mm3 Final    RBC 10/07/2024 4.60  4.14 - 5.80 10*6/mm3 Final    Hemoglobin 10/07/2024 11.8 (L)  13.0 - 17.7 g/dL Final    Hematocrit 10/07/2024 38.5  37.5 - 51.0 % Final    MCV 10/07/2024 83.7  79.0 - 97.0 fL Final    MCH 10/07/2024 25.7 (L)  26.6 - 33.0 pg Final    MCHC 10/07/2024 30.6 (L)  31.5 - 35.7 g/dL Final    RDW 10/07/2024 19.4 (H)  12.3 - 15.4 % Final    RDW-SD 10/07/2024 56.6 (H)  37.0 - 54.0 fl Final    MPV 10/07/2024 12.1 (H)  6.0 - 12.0 fL Final    Platelets 10/07/2024 170  140 - 450 10*3/mm3 Final    Neutrophil % 10/07/2024 85.2 (H)  42.7 - 76.0 % Final    Lymphocyte % 10/07/2024 8.1 (L)  19.6 - 45.3 % Final    Monocyte % 10/07/2024 5.9  5.0 - 12.0 % Final    Eosinophil % 10/07/2024 0.1 (L)  0.3 - 6.2 % Final    Basophil % 10/07/2024 0.2  0.0 - 1.5 % Final    Immature Grans % 10/07/2024 0.5  0.0 - 0.5 % Final    Neutrophils, Absolute 10/07/2024 12.36 (H)  1.70 - 7.00 10*3/mm3 Final    Lymphocytes, Absolute 10/07/2024 1.17  0.70 - 3.10 10*3/mm3 Final    Monocytes, Absolute 10/07/2024 0.86  0.10 - 0.90 10*3/mm3 Final    Eosinophils, Absolute 10/07/2024 0.02  0.00 - 0.40 10*3/mm3 Final    Basophils, Absolute 10/07/2024 0.03  0.00 - 0.20 10*3/mm3 Final    Immature Grans, Absolute 10/07/2024 0.07 (H)  0.00 - 0.05 10*3/mm3 Final    nRBC 10/07/2024 0.0  0.0 - 0.2 /100 WBC Final     Tempus xt: APC gene TP53; Negative ADRIANNA, BRAF, NRAS, TMB stable. PDL1 negative  CT Chest With Contrast Diagnostic    Result Date: 10/11/2024  Narrative: PROCEDURE: CT CHEST W CONTRAST DIAGNOSTIC-  HISTORY: rectal cancer; E85-Qwuxfdael neoplasm of rectum  COMPARISON: 07/05/2024.  TECHNIQUE: Axial CT with IV contrast  administration.  FINDINGS:  Rounded opacity in the posterior aspect left lower lobe is noted measuring 40 x 28 mm, previously 34 x 24 mm. There is associated cavitation. This is compatible with mild worsening of metastatic disease.  A smaller adjacent cavitary nodule is noted measuring 10 mm, unchanged.  There is new airspace disease of the left upper lobe. This is suspicious for pneumonia. If patient has received radiation to this region, radiation pneumonitis would be another consideration.  There is a small oval nodule along the minor fissure of the anterior right midlung measuring 4 mm, unchanged, compatible with lymph node.  No pleural or pericardial effusion is seen.  Fusiform aneurysm of the ascending aorta is noted measuring up to 45 mm, stable..      Impression: 1. Slight worsening of dominant cavitary lesion of the left lower lobe likely related to metastatic disease. 2. No new sites of disease. 3. New airspace disease within the left upper lobe suspicious for pneumonia, less likely posttreatment change.   This study was performed with techniques to keep radiation doses as low as reasonably achievable (ALARA). Individualized dose reduction techniques using automated exposure control or adjustment of vA and/or kV according to the patient size were employed.   This report was signed and finalized on 10/11/2024 9:45 AM by Serjio Wright MD.      CT Abdomen Pelvis With Contrast    Result Date: 10/11/2024  Narrative: PROCEDURE: CT ABDOMEN PELVIS W CONTRAST-  HISTORY:  rectal cancer restaging; A56-Lebyhylht neoplasm of rectum  COMPARISON: 7/5/2024.  TECHNIQUE: Multiple axial CT images were obtained from the lung bases through the pubic symphysis following the administration of Isovue 300 and oral contrast.  FINDINGS:  ABDOMEN: The lung bases are clear. The heart is normal in size. The liver is normal. No evidence of liver metastases. The gallbladder is surgically absent. The spleen is mildly enlarged, unchanged  from prior. No adrenal mass is present.  The pancreas is normal. The kidneys demonstrate a stable right renal cyst. The aorta is normal in caliber. No free fluid. No mesenteric or retroperitoneal adenopathy. The abdominal portions of the GI tract are unremarkable with no evidence of obstruction.  PELVIS: The appendix is not identified.  The urinary bladder is unremarkable. There is no significant free fluid. There is persistent eccentric wall thickening of the rectosigmoid junction measuring up to 11 mm, unchanged from prior. No perirectal adenopathy. External iliac lymph nodes are normal in size and decreased from prior. The largest on the left measures 8 x 8 mm, previously 14 x 10 mm. There is bilateral inguinal adenopathy with the largest right external iliac lymph node measuring 24 x 12 mm. This is unchanged compared to prior where it measured 23 x 14 mm.      Impression: No evidence of distant metastasis.  Stable rectal wall thickening which may present treated or stable disease.  Improved pelvic adenopathy, other inguinal adenopathy is unchanged and nonspecific.     This study was performed with techniques to keep radiation doses as low as reasonably achievable (ALARA). Individualized dose reduction techniques using automated exposure control or adjustment of mA and/or kV according to the patient size were employed.     Images were reviewed, interpreted, and dictated by Dr. Serjio Wright MD Transcribed by Kameron Tejeda PA-C.  This report was signed and finalized on 10/11/2024 8:37 AM by Serjio Wright MD.      XR Chest PA & Lateral    Result Date: 10/7/2024  Narrative: PROCEDURE: XR CHEST PA AND LATERAL-   HISTORY: fever; U71-Grcmywvra neoplasm of rectum; C78.7-Secondary malignant neoplasm of liver and intrahepatic bile duct; R50.9-Fever, unspecified  COMPARISON: 1/18/2024.  FINDINGS: Left lower lobe perihilar airspace disease is seen compatible with pneumonia new from prior exam.  There is no evidence of  effusion or other pleural disease.  The mediastinum has a normal appearance.  The cardiac silhouette is unremarkable.   Right jugular Port-A-Cath is noted with tip in the SVC.      Impression: Left lower lobe pneumonia    This report was signed and finalized on 10/7/2024 12:15 PM by Serjio Wright MD.       Procedures    Assessment / Plan      Assessment/Plan:     1.  Rectal cancer   2.  Liver metastases  3.  Lung metastasis  4.  Chemo monitoring  5.  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  Acute fever.   -The patient presents with a partially obstructing rectal cancer with adenopathy.  -He was found to have biopsy-proven metastasis in the liver and lung.  His case was presented at multidisciplinary tumor board.  -Because of metastatic disease to both the lung and liver I do not think he will be downstage to resectable disease.  -Tempus results which are notable for an APC mutation, T p53 mutation, negative for KRAS, BRAF, NRAS, tumor mutation burden stable.  Notch 1 VUS.  -CBC adequate and CMP pending for treatment today with maintenance 5-FU and panitumumab 9/16/24  Chemotherapy orders and premedications signed9/16/24  -We reviewed his imaging which is consistent with excellent disease control in early July with normalization of CEA. He has had persistent rectal pain with escalating oxycodone requirements over the past couple of months, but no severe recent increase in pain, fever. WBC normal. 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 I reviewed his EUA notes. Increase doxy to BID. No surgical intervention.  -Consolidative radiotherapy to the rectal tumor is not something he wants to pursue  -Continue maintenance 5FU/panitumumab.   -Treatment has been on hold for pneumonia  -Plan to resume treatment today pending labs.   -Scans show equivocal progression in lung nodule confused by concurrent PNA. Repeat Chest CT after 6 weeks to ensure PNA clearing and re-evaluate lung nodule.   -Orders  signed    6.  Cancer related pain  -Now following with palliative care and pain is well controlled on lyrica, oxycodone and MS contin      7.  Panitumumab induced rash  -Continue doxycycline.     8. GERD  PPI    9. Mucositis  -MMW as needed  -Improved currently.    10. Hypokalemia  11. Hypomagnesemia  -Continue Otc magnesium and monitor levels    12. Access  -Port        Follow Up:   2 weeks     Brenda Cronin MD  Hematology and Oncology

## 2024-10-23 ENCOUNTER — HOSPITAL ENCOUNTER (OUTPATIENT)
Facility: HOSPITAL | Age: 54
Discharge: HOME | End: 2024-10-23
Admitting: INTERNAL MEDICINE
Payer: COMMERCIAL

## 2024-10-23 VITALS
OXYGEN SATURATION: 97 % | TEMPERATURE: 98.5 F | DIASTOLIC BLOOD PRESSURE: 55 MMHG | HEART RATE: 67 BPM | SYSTOLIC BLOOD PRESSURE: 100 MMHG

## 2024-10-23 DIAGNOSIS — C20 RECTAL ADENOCARCINOMA: Primary | ICD-10-CM

## 2024-10-23 DIAGNOSIS — C20 RECTAL CANCER METASTASIZED TO LIVER: ICD-10-CM

## 2024-10-23 DIAGNOSIS — C78.7 RECTAL CANCER METASTASIZED TO LIVER: ICD-10-CM

## 2024-10-23 PROCEDURE — 25010000002 HEPARIN LOCK FLUSH PER 10 UNITS: Performed by: INTERNAL MEDICINE

## 2024-10-23 PROCEDURE — 96523 IRRIG DRUG DELIVERY DEVICE: CPT

## 2024-10-23 RX ORDER — SODIUM CHLORIDE 0.9 % (FLUSH) 0.9 %
10 SYRINGE (ML) INJECTION AS NEEDED
Status: DISCONTINUED | OUTPATIENT
Start: 2024-10-23 | End: 2024-10-24 | Stop reason: HOSPADM

## 2024-10-23 RX ORDER — HEPARIN SODIUM (PORCINE) LOCK FLUSH IV SOLN 100 UNIT/ML 100 UNIT/ML
500 SOLUTION INTRAVENOUS AS NEEDED
OUTPATIENT
Start: 2024-10-23

## 2024-10-23 RX ORDER — HEPARIN SODIUM (PORCINE) LOCK FLUSH IV SOLN 100 UNIT/ML 100 UNIT/ML
500 SOLUTION INTRAVENOUS AS NEEDED
Status: DISCONTINUED | OUTPATIENT
Start: 2024-10-23 | End: 2024-10-24 | Stop reason: HOSPADM

## 2024-10-23 RX ORDER — SODIUM CHLORIDE 0.9 % (FLUSH) 0.9 %
10 SYRINGE (ML) INJECTION AS NEEDED
OUTPATIENT
Start: 2024-10-23

## 2024-10-23 RX ADMIN — Medication 10 ML: at 14:41

## 2024-10-23 RX ADMIN — HEPARIN 500 UNITS: 100 SYRINGE at 14:41

## 2024-10-28 ENCOUNTER — TELEPHONE (OUTPATIENT)
Dept: NUTRITION | Facility: HOSPITAL | Age: 54
End: 2024-10-28
Payer: COMMERCIAL

## 2024-10-28 NOTE — PROGRESS NOTES
Outpatient Oncology Nutrition Assessment      Patient Name:  Edward Powell  YOB: 1970  MRN: [id number]      Assessment Date:  10/28/2024    Comments:  Called pt for oncology nutrition f/up. Pt's wt continues to be stable. He is eating well. On days his appetite is low he will make up PO intake when his appetite is better. He is drinking Boost when he is able to get it. RD reviewed on phone how to make high calorie/high protein smoothie (fruit/vegetables/whole milk/peanut butter/yogurt) that is budget friendly.  Pt did not have any questions. RD to f/up in 1 month.    Electronically signed by:  Arabella Hong RD  10/28/24 10:27 EDT

## 2024-11-04 ENCOUNTER — HOSPITAL ENCOUNTER (OUTPATIENT)
Facility: HOSPITAL | Age: 54
Discharge: HOME | End: 2024-11-04
Admitting: INTERNAL MEDICINE
Payer: COMMERCIAL

## 2024-11-04 ENCOUNTER — OFFICE VISIT (OUTPATIENT)
Dept: ONCOLOGY | Facility: CLINIC | Age: 54
End: 2024-11-04
Payer: COMMERCIAL

## 2024-11-04 VITALS
BODY MASS INDEX: 27.77 KG/M2 | WEIGHT: 205 LBS | HEIGHT: 72 IN | DIASTOLIC BLOOD PRESSURE: 58 MMHG | TEMPERATURE: 97.3 F | OXYGEN SATURATION: 95 % | HEART RATE: 67 BPM | RESPIRATION RATE: 16 BRPM | SYSTOLIC BLOOD PRESSURE: 120 MMHG

## 2024-11-04 DIAGNOSIS — C78.7 RECTAL CANCER METASTASIZED TO LIVER: Primary | ICD-10-CM

## 2024-11-04 DIAGNOSIS — C20 RECTAL CANCER METASTASIZED TO LIVER: ICD-10-CM

## 2024-11-04 DIAGNOSIS — C20 RECTAL CANCER METASTASIZED TO LIVER: Primary | ICD-10-CM

## 2024-11-04 DIAGNOSIS — C20 RECTAL ADENOCARCINOMA: Primary | ICD-10-CM

## 2024-11-04 DIAGNOSIS — C78.7 RECTAL CANCER METASTASIZED TO LIVER: ICD-10-CM

## 2024-11-04 LAB
ALBUMIN SERPL-MCNC: 3.2 G/DL (ref 3.5–5.2)
ALBUMIN/GLOB SERPL: 0.9 G/DL
ALP SERPL-CCNC: 116 U/L (ref 39–117)
ALT SERPL W P-5'-P-CCNC: 10 U/L (ref 1–41)
ANION GAP SERPL CALCULATED.3IONS-SCNC: 9.8 MMOL/L (ref 5–15)
AST SERPL-CCNC: 17 U/L (ref 1–40)
BASOPHILS # BLD AUTO: 0.02 10*3/MM3 (ref 0–0.2)
BASOPHILS NFR BLD AUTO: 0.2 % (ref 0–1.5)
BILIRUB SERPL-MCNC: 0.7 MG/DL (ref 0–1.2)
BUN SERPL-MCNC: 11 MG/DL (ref 6–20)
BUN/CREAT SERPL: 13.4 (ref 7–25)
CALCIUM SPEC-SCNC: 8.8 MG/DL (ref 8.6–10.5)
CEA SERPL-MCNC: 5.13 NG/ML
CHLORIDE SERPL-SCNC: 106 MMOL/L (ref 98–107)
CO2 SERPL-SCNC: 24.2 MMOL/L (ref 22–29)
CREAT SERPL-MCNC: 0.82 MG/DL (ref 0.76–1.27)
DEPRECATED RDW RBC AUTO: 54.2 FL (ref 37–54)
EGFRCR SERPLBLD CKD-EPI 2021: 103.7 ML/MIN/1.73
EOSINOPHIL # BLD AUTO: 0.04 10*3/MM3 (ref 0–0.4)
EOSINOPHIL NFR BLD AUTO: 0.4 % (ref 0.3–6.2)
ERYTHROCYTE [DISTWIDTH] IN BLOOD BY AUTOMATED COUNT: 18.6 % (ref 12.3–15.4)
GLOBULIN UR ELPH-MCNC: 3.7 GM/DL
GLUCOSE SERPL-MCNC: 100 MG/DL (ref 65–99)
HCT VFR BLD AUTO: 34 % (ref 37.5–51)
HGB BLD-MCNC: 10.4 G/DL (ref 13–17.7)
IMM GRANULOCYTES # BLD AUTO: 0.04 10*3/MM3 (ref 0–0.05)
IMM GRANULOCYTES NFR BLD AUTO: 0.4 % (ref 0–0.5)
LYMPHOCYTES # BLD AUTO: 1.12 10*3/MM3 (ref 0.7–3.1)
LYMPHOCYTES NFR BLD AUTO: 11.1 % (ref 19.6–45.3)
MAGNESIUM SERPL-MCNC: 1.9 MG/DL (ref 1.6–2.6)
MCH RBC QN AUTO: 25.1 PG (ref 26.6–33)
MCHC RBC AUTO-ENTMCNC: 30.6 G/DL (ref 31.5–35.7)
MCV RBC AUTO: 82.1 FL (ref 79–97)
MONOCYTES # BLD AUTO: 0.91 10*3/MM3 (ref 0.1–0.9)
MONOCYTES NFR BLD AUTO: 9 % (ref 5–12)
NEUTROPHILS NFR BLD AUTO: 7.96 10*3/MM3 (ref 1.7–7)
NEUTROPHILS NFR BLD AUTO: 78.9 % (ref 42.7–76)
NRBC BLD AUTO-RTO: 0 /100 WBC (ref 0–0.2)
PLATELET # BLD AUTO: 151 10*3/MM3 (ref 140–450)
PMV BLD AUTO: 11.2 FL (ref 6–12)
POTASSIUM SERPL-SCNC: 3.5 MMOL/L (ref 3.5–5.2)
PROT SERPL-MCNC: 6.9 G/DL (ref 6–8.5)
RBC # BLD AUTO: 4.14 10*6/MM3 (ref 4.14–5.8)
SODIUM SERPL-SCNC: 140 MMOL/L (ref 136–145)
WBC NRBC COR # BLD AUTO: 10.09 10*3/MM3 (ref 3.4–10.8)

## 2024-11-04 PROCEDURE — 82378 CARCINOEMBRYONIC ANTIGEN: CPT | Performed by: INTERNAL MEDICINE

## 2024-11-04 PROCEDURE — 80053 COMPREHEN METABOLIC PANEL: CPT | Performed by: INTERNAL MEDICINE

## 2024-11-04 PROCEDURE — 83735 ASSAY OF MAGNESIUM: CPT | Performed by: INTERNAL MEDICINE

## 2024-11-04 PROCEDURE — 36415 COLL VENOUS BLD VENIPUNCTURE: CPT

## 2024-11-04 PROCEDURE — 25010000002 ALTEPLASE 2 MG RECONSTITUTED SOLUTION: Performed by: INTERNAL MEDICINE

## 2024-11-04 PROCEDURE — 25010000002 DEXAMETHASONE PER 1 MG

## 2024-11-04 PROCEDURE — 25010000002 PANITUMUMAB 400 MG/20ML SOLUTION 20 ML VIAL: Performed by: INTERNAL MEDICINE

## 2024-11-04 PROCEDURE — G0498 CHEMO EXTEND IV INFUS W/PUMP: HCPCS

## 2024-11-04 PROCEDURE — 99214 OFFICE O/P EST MOD 30 MIN: CPT | Performed by: INTERNAL MEDICINE

## 2024-11-04 PROCEDURE — 25010000002 PALONOSETRON 0.25 MG/5ML SOLUTION PREFILLED SYRINGE: Performed by: INTERNAL MEDICINE

## 2024-11-04 PROCEDURE — 96417 CHEMO IV INFUS EACH ADDL SEQ: CPT

## 2024-11-04 PROCEDURE — 85025 COMPLETE CBC W/AUTO DIFF WBC: CPT | Performed by: INTERNAL MEDICINE

## 2024-11-04 PROCEDURE — 36593 DECLOT VASCULAR DEVICE: CPT

## 2024-11-04 PROCEDURE — 25010000002 PANITUMUMAB PER 10 MG: Performed by: INTERNAL MEDICINE

## 2024-11-04 PROCEDURE — 25010000002 FLUOROURACIL PER 500 MG: Performed by: INTERNAL MEDICINE

## 2024-11-04 PROCEDURE — 0 DEXTROSE 5 % SOLUTION 250 ML FLEX CONT: Performed by: INTERNAL MEDICINE

## 2024-11-04 PROCEDURE — 25810000003 SODIUM CHLORIDE 0.9 % SOLUTION 500 ML FLEX CONT: Performed by: INTERNAL MEDICINE

## 2024-11-04 PROCEDURE — 96375 TX/PRO/DX INJ NEW DRUG ADDON: CPT

## 2024-11-04 PROCEDURE — 25010000002 LEUCOVORIN 500 MG RECONSTITUTED SOLUTION 1 EACH VIAL: Performed by: INTERNAL MEDICINE

## 2024-11-04 PROCEDURE — 96367 TX/PROPH/DG ADDL SEQ IV INF: CPT

## 2024-11-04 PROCEDURE — 96416 CHEMO PROLONG INFUSE W/PUMP: CPT

## 2024-11-04 PROCEDURE — 96413 CHEMO IV INFUSION 1 HR: CPT

## 2024-11-04 RX ORDER — DEXAMETHASONE SODIUM PHOSPHATE 10 MG/ML
12 INJECTION INTRAMUSCULAR; INTRAVENOUS ONCE
Status: CANCELLED
Start: 2024-11-06

## 2024-11-04 RX ORDER — PALONOSETRON 0.05 MG/ML
0.25 INJECTION, SOLUTION INTRAVENOUS ONCE
Status: CANCELLED | OUTPATIENT
Start: 2024-11-04

## 2024-11-04 RX ORDER — FAMOTIDINE 10 MG/ML
20 INJECTION, SOLUTION INTRAVENOUS AS NEEDED
Status: CANCELLED | OUTPATIENT
Start: 2024-11-04

## 2024-11-04 RX ORDER — SODIUM CHLORIDE 9 MG/ML
20 INJECTION, SOLUTION INTRAVENOUS ONCE
Status: DISCONTINUED | OUTPATIENT
Start: 2024-11-04 | End: 2024-11-05 | Stop reason: HOSPADM

## 2024-11-04 RX ORDER — WATER 10 ML/10ML
INJECTION INTRAMUSCULAR; INTRAVENOUS; SUBCUTANEOUS
Status: COMPLETED
Start: 2024-11-04 | End: 2024-11-04

## 2024-11-04 RX ORDER — PALONOSETRON 0.05 MG/ML
0.25 INJECTION, SOLUTION INTRAVENOUS ONCE
Status: COMPLETED | OUTPATIENT
Start: 2024-11-04 | End: 2024-11-04

## 2024-11-04 RX ORDER — HEPARIN SODIUM (PORCINE) LOCK FLUSH IV SOLN 100 UNIT/ML 100 UNIT/ML
500 SOLUTION INTRAVENOUS AS NEEDED
Status: DISCONTINUED | OUTPATIENT
Start: 2024-11-04 | End: 2024-11-05 | Stop reason: HOSPADM

## 2024-11-04 RX ORDER — SODIUM CHLORIDE 0.9 % (FLUSH) 0.9 %
10 SYRINGE (ML) INJECTION AS NEEDED
Status: DISCONTINUED | OUTPATIENT
Start: 2024-11-04 | End: 2024-11-05 | Stop reason: HOSPADM

## 2024-11-04 RX ORDER — DEXAMETHASONE SODIUM PHOSPHATE 10 MG/ML
12 INJECTION INTRAMUSCULAR; INTRAVENOUS ONCE
Status: DISCONTINUED | OUTPATIENT
Start: 2024-11-04 | End: 2024-11-04 | Stop reason: RX

## 2024-11-04 RX ORDER — DIPHENHYDRAMINE HYDROCHLORIDE 50 MG/ML
50 INJECTION INTRAMUSCULAR; INTRAVENOUS AS NEEDED
Status: CANCELLED | OUTPATIENT
Start: 2024-11-04

## 2024-11-04 RX ORDER — MONTELUKAST SODIUM 10 MG/1
10 TABLET ORAL NIGHTLY
Qty: 30 TABLET | Refills: 1 | Status: SHIPPED | OUTPATIENT
Start: 2024-11-04

## 2024-11-04 RX ORDER — HEPARIN SODIUM (PORCINE) LOCK FLUSH IV SOLN 100 UNIT/ML 100 UNIT/ML
500 SOLUTION INTRAVENOUS AS NEEDED
Status: CANCELLED | OUTPATIENT
Start: 2024-11-04

## 2024-11-04 RX ORDER — SODIUM CHLORIDE 9 MG/ML
20 INJECTION, SOLUTION INTRAVENOUS ONCE
Status: CANCELLED | OUTPATIENT
Start: 2024-11-04

## 2024-11-04 RX ORDER — DEXTROSE MONOHYDRATE 50 MG/ML
20 INJECTION, SOLUTION INTRAVENOUS ONCE
Status: DISCONTINUED | OUTPATIENT
Start: 2024-11-04 | End: 2024-11-05 | Stop reason: HOSPADM

## 2024-11-04 RX ORDER — DEXAMETHASONE SODIUM PHOSPHATE 4 MG/ML
12 INJECTION, SOLUTION INTRA-ARTICULAR; INTRALESIONAL; INTRAMUSCULAR; INTRAVENOUS; SOFT TISSUE ONCE
Status: COMPLETED | OUTPATIENT
Start: 2024-11-04 | End: 2024-11-04

## 2024-11-04 RX ORDER — DEXTROSE MONOHYDRATE 50 MG/ML
20 INJECTION, SOLUTION INTRAVENOUS ONCE
Status: CANCELLED | OUTPATIENT
Start: 2024-11-04

## 2024-11-04 RX ORDER — SODIUM CHLORIDE 0.9 % (FLUSH) 0.9 %
10 SYRINGE (ML) INJECTION AS NEEDED
Status: CANCELLED | OUTPATIENT
Start: 2024-11-04

## 2024-11-04 RX ADMIN — WATER 10 ML: 1 INJECTION INTRAMUSCULAR; INTRAVENOUS; SUBCUTANEOUS at 09:51

## 2024-11-04 RX ADMIN — ALTEPLASE: 2.2 INJECTION, POWDER, LYOPHILIZED, FOR SOLUTION INTRAVENOUS at 09:51

## 2024-11-04 RX ADMIN — PANITUMUMAB 580 MG: 400 SOLUTION INTRAVENOUS at 11:22

## 2024-11-04 RX ADMIN — FLUOROURACIL 5260 MG: 50 INJECTION, SOLUTION INTRAVENOUS at 13:04

## 2024-11-04 RX ADMIN — DEXAMETHASONE SODIUM PHOSPHATE 12 MG: 4 INJECTION, SOLUTION INTRA-ARTICULAR; INTRALESIONAL; INTRAMUSCULAR; INTRAVENOUS; SOFT TISSUE at 11:58

## 2024-11-04 RX ADMIN — LEUCOVORIN CALCIUM 880 MG: 500 INJECTION, POWDER, LYOPHILIZED, FOR SOLUTION INTRAMUSCULAR; INTRAVENOUS at 12:02

## 2024-11-04 RX ADMIN — PALONOSETRON 0.25 MG: 0.25 INJECTION, SOLUTION INTRAVENOUS at 12:00

## 2024-11-04 NOTE — PROGRESS NOTES
Hematology and Oncology [facility]  Office number [phone]    Fax number [phone]     Follow up     Date: 24    Patient Name: Edward Powell  MRN: [id number]  : 1970    Referring Physician: Dr. Gautam    Chief Complaint: Rectal cancer, lung mass, liver lesions follow up on treatment    Cancer Staging:  IV    History of Present Illness: Edward Powell is a pleasant 54 y.o. male who presents today for evaluation of rectal cancer in the company of his supportive significant other.    Patient has a longstanding history of intermittent diarrhea since his cholecystectomy in 2019.  However he developed progressive diarrhea with associated loss of bowel control and urgency as well as weight loss and fatigue prompting additional workup.    CT of the abdomen pelvis 2023 showed an irregular circumferential wall thickening involving the rectum concerning for mass lesion.  There was associated mesorectal lymphadenopathy.  Masslike consolidation of left lower lobe.  CT of the chest showed a cavitary lesion in the left lower lobe up to 4.8 cm with an adjacent cavitary nodule up to 2 cm and a 5 mm nodule on the right minor fissure.      He underwent colonoscopy 2023 with findings of an infiltrative and ulcerated partially obstructing mass in the proximal rectum spanning 10 cm.  Rectal polyps.  Biopsy of the rectal mass showed invasive moderately differentiated adenocarcinoma.  Additional biopsies showed tubular adenomas.  MSI testing was intact/low probability of MSI high.    PDL1 negative    PET/CT 2023 showed hypermetabolic rectal wall thickening compatible with known rectal malignancy.  Multiple small ill-defined hypoechoic hyper metabolic liver lesions compatible with metastatic disease.  Mildly enlarged and mildly hypermetabolic pelvic sidewall and internal iliac lymph node chain adenopathy.  Hypermetabolic lung mass with adjacent nodule.     He underwent liver biopsy and lung  biopsy January 2024.  Results of both confirmed metastatic colorectal cancer.    The patient has been experiencing substantial rectal pain.  He is taking oxycodone every 6 hours with partial relief.  He reports ongoing bowel movements.  No abdominal pain.  No vomiting.  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    Treatment history:  FOLFOX cycle 1 -4  FOLFOX panitumumab cycle 5 onward  -Oxaliplatin terminated early for allergic reaction after 4/29/24    Interval history:  He is here for follow up on treatment. Intermittent neuropathy flare especially after standing on feet for prolonged standing associated with leg swelling. Did not wear compression socks yesterday and worked on his camper, pain was severe at night. Symptoms are proportional to activity. Resolved overnight with propping legs up.   Breathing is good. Cough persistent in last couple of days, taking Mucinex and zyrtec, clear, since working outside more. Seasonal allergies flaring, nasal congestion over the last 2 months.    Past Medical History:   Past Medical History:   Diagnosis Date    Arthritis     Cancer     rectal cancer - diagnosed 2023    Cholelithiasis 2019    Removed    COPD (chronic obstructive pulmonary disease)     Coronary artery disease 2009    Stent - no cardiologist currently    Elevated cholesterol     GERD (gastroesophageal reflux disease)     Hernia 2003    Lower hernia    Perforated ulcer 2019    Sleep apnea     history of; when weighed over 400lbs - no issues following bariatric surgery       Past Surgical History:   Past Surgical History:   Procedure Laterality Date    APPENDECTOMY  1983    Removed    BARIATRIC SURGERY  2011    Gastric bypass    BLADDER TUMOR/ULCER BLEEDER CAUTERIZATION       CARDIAC CATHETERIZATION  2009    stent placed    CHOLECYSTECTOMY  2019    Removed    COLONOSCOPY N/A 12/07/2023    Procedure: COLONOSCOPY WITH HOT SNARE POLYPECTOMY AND TATTOO;  Surgeon: Noman Gautam MD;  Location: [facility] ENDOSCOPY;  Service: Gastroenterology;  Laterality: N/A;    PORTACATH PLACEMENT N/A 1/5/2024    Procedure: INSERTION OF PORTACATH WITH ULTRSOUND AND FLUOROSCOPIC GUIDANCE;  Surgeon: Ida Black MD;  Location: [facility] OR;  Service: General;  Laterality: N/A;    JENNIFER-EN-Y         Family History: No family history on file.  Uncle had colon cancer    Social History:   Social History     Socioeconomic History    Marital status:    Tobacco Use    Smoking status: Every Day     Current packs/day: 1.00     Average packs/day: 0.9 packs/day for 60.2 years (52.7 ttl pk-yrs)     Types: Cigarettes     Start date: 9/6/1994    Smokeless tobacco: Never    Tobacco comments:     35 years      pt reports closer to 2 packs per day since cancer diagnosis   Vaping Use    Vaping status: Never Used   Substance and Sexual Activity    Alcohol use: Never    Drug use: Never    Sexual activity: Defer       Medications:     Current Outpatient Medications:     buPROPion XL (WELLBUTRIN XL) 150 MG 24 hr tablet, TAKE 1 TABLET BY MOUTH DAILY, Disp: 90 tablet, Rfl: 1    cetirizine (zyrTEC) 10 MG tablet, Take 1 tablet by mouth Daily., Disp: 30 tablet, Rfl: 2    clindamycin (Clindagel) 1 % gel, Apply 1 Application topically to the appropriate area as directed 2 (Two) Times a Day. Use first, Disp: 30 g, Rfl: 1    dexAMETHasone 0.5 MG/5ML solution, Take 10 mL by mouth 2 (Two) Times a Day. Swish for 2 minutes and spit 10 mL 4 x daily, Disp: 240 mL, Rfl: 5    dicyclomine (BENTYL) 20 MG tablet, Take 1 tablet by mouth 3 (Three) Times a Day As Needed for Abdominal Cramping., Disp: 30 tablet, Rfl: 3    docusate sodium (COLACE) 100 MG capsule, Take 1 capsule by mouth 2 (Two) Times a Day., Disp: , Rfl:      doxycycline (MONODOX) 100 MG capsule, , Disp: , Rfl:     doxycycline (VIBRAMYICN) 100 MG tablet, , Disp: , Rfl:     Hydrocortisone, Perianal, (ANUSOL-HC) 2.5 % rectal cream, Insert  into the rectum 2 (Two) Times a Day. Indications: Inflamed Hemorrhoids, Disp: 30 g, Rfl: 1    lidocaine-prilocaine (EMLA) 2.5-2.5 % cream, Apply 1 Application topically to the appropriate area as directed As Needed (45-60 minutes prior to port access.  Cover with saran/plastic wrap.)., Disp: 30 g, Rfl: 3    LORazepam (ATIVAN) 0.5 MG tablet, Take 1 tablet by mouth Take As Directed. 1 tabelt by mouth 30 minutes prior to MRI, take a second tablet at the time of the MRI if needed., Disp: 2 tablet, Rfl: 0    Magic Mouthwash Oral Suspension (diphenhydrAMINE HCl - aluminum & magnesium hydroxide-simethicone - lidocaine - nystatin), Swish and Spit 10 mL by mouth every 6 (Six) Hours For 7 Days., Disp: 300 mL, Rfl: 3    magnesium oxide (MAG-OX) 400 MG tablet, Take 1 tablet by mouth Daily., Disp: 30 tablet, Rfl: 5    Morphine (MS CONTIN) 15 MG 12 hr tablet, Take 1 tablet by mouth 2 (Two) Times a Day for 30 days., Disp: 60 tablet, Rfl: 0    Movantik 25 MG tablet, , Disp: , Rfl:     mupirocin (BACTROBAN) 2 % cream, , Disp: , Rfl:     naloxone (NARCAN) 4 MG/0.1ML nasal spray, 1 spray into the nostril(s) as directed by provider As Needed for Opioid Reversal., Disp: 1 each, Rfl: 0    nystatin (MYCOSTATIN) 100,000 unit/mL suspension, Swish and Swallow 5mL by mouth four times a day, Disp: 473 mL, Rfl: 0    nystatin (MYCOSTATIN) 100,000 unit/mL suspension, Swish and swallow 5 mL 4 (Four) Times a Day., Disp: 473 mL, Rfl: 0    nystatin susp + lidocaine viscous (MAGIC MOUTHWASH) oral suspension, 5-10 ml swish and spit or swallow QID prn, Disp: 240 mL, Rfl: 3    ondansetron (ZOFRAN) 8 MG tablet, Take 1 tablet by mouth 3 (Three) Times a Day As Needed for Nausea or Vomiting., Disp: 30 tablet, Rfl: 5    oxyCODONE (ROXICODONE) 15 MG immediate release tablet, Take 1  "tablet by mouth 5 (Five) Times a Day As Needed for Moderate Pain or Severe Pain for up to 30 days., Disp: 150 tablet, Rfl: 0    pantoprazole (Protonix) 40 MG EC tablet, Take 1 tablet by mouth Daily. Indications: Gastroesophageal Reflux Disease, Disp: 90 tablet, Rfl: 2    potassium chloride ER (K-TAB) 20 MEQ tablet controlled-release ER tablet, TAKE 2 TABLETS BY MOUTH DAILY, Disp: 30 tablet, Rfl: 0    pregabalin (Lyrica) 300 MG capsule, Take 1 capsule by mouth 2 (Two) Times a Day for 30 days., Disp: 60 capsule, Rfl: 0    tiotropium bromide-olodaterol (Stiolto Respimat) 2.5-2.5 MCG/ACT aerosol solution inhaler, INHALE 2 INHALATION(S) BY MOUTH DAILY, Disp: 4 g, Rfl: 5    traZODone (DESYREL) 100 MG tablet, Take 1 tablet by mouth Every Night., Disp: 30 tablet, Rfl: 2    triamcinolone (KENALOG) 0.025 % ointment, Apply 1 Application topically to the appropriate area as directed 2 (Two) Times a Day. Use second if topical treatment #1 ineffective, Disp: 80 g, Rfl: 0    Allergies:   Allergies   Allergen Reactions    Bactrim [Sulfamethoxazole-Trimethoprim] Hives     and blisters    Sulfa Antibiotics Hives     and blisters       Objective     Vital Signs:   Vitals:    11/04/24 0837   BP: 120/58   Pulse: 67   Resp: 16   Temp: 97.3 °F (36.3 °C)   SpO2: 95%   Weight: 93 kg (205 lb)   Height: 182.9 cm (72.01\")   PainSc:   5        Body mass index is 27.8 kg/m².   Pain Score    11/04/24 0837   PainSc:   5     ECOG Performance Status: 1 - Symptomatic but completely ambulatory    Physical Exam:   General: No acute distress. Well appearing   HEENT: Normocephalic, atraumatic. Sclera anicteric. Dry mouth, mild erythema  Neck: supple, no adenopathy.   Cardiovascular: regular rate and rhythm. No murmurs.   Respiratory: Normal rate. Clear to auscultation bilaterally  Abdomen: Soft, nontender, non distended with normoactive bowel sounds  Lymph: no cervical, supraclavicular or axillary adenopathy  Neuro: Alert and oriented x 3. No focal " deficits.   Ext: Symmetric, no swelling.   Accurate 11/4/24    Laboratory/Imaging Reviewed:   No visits with results within 2 Week(s) from this visit.   Latest known visit with results is:   Hospital Outpatient Visit on 10/21/2024   Component Date Value Ref Range Status    Magnesium 10/21/2024 2.1  1.6 - 2.6 mg/dL Final    CEA 10/21/2024 5.47  ng/mL Final    Glucose 10/21/2024 88  65 - 99 mg/dL Final    BUN 10/21/2024 12  6 - 20 mg/dL Final    Creatinine 10/21/2024 1.01  0.76 - 1.27 mg/dL Final    Sodium 10/21/2024 134 (L)  136 - 145 mmol/L Final    Potassium 10/21/2024 4.6  3.5 - 5.2 mmol/L Final    Chloride 10/21/2024 103  98 - 107 mmol/L Final    CO2 10/21/2024 23.3  22.0 - 29.0 mmol/L Final    Calcium 10/21/2024 8.6  8.6 - 10.5 mg/dL Final    Total Protein 10/21/2024 7.5  6.0 - 8.5 g/dL Final    Albumin 10/21/2024 3.4 (L)  3.5 - 5.2 g/dL Final    ALT (SGPT) 10/21/2024 9  1 - 41 U/L Final    AST (SGOT) 10/21/2024 18  1 - 40 U/L Final    Alkaline Phosphatase 10/21/2024 133 (H)  39 - 117 U/L Final    Total Bilirubin 10/21/2024 0.3  0.0 - 1.2 mg/dL Final    Globulin 10/21/2024 4.1  gm/dL Final    A/G Ratio 10/21/2024 0.8  g/dL Final    BUN/Creatinine Ratio 10/21/2024 11.9  7.0 - 25.0 Final    Anion Gap 10/21/2024 7.7  5.0 - 15.0 mmol/L Final    eGFR 10/21/2024 87.8  >60.0 mL/min/1.73 Final    WBC 10/21/2024 11.27 (H)  3.40 - 10.80 10*3/mm3 Final    RBC 10/21/2024 4.33  4.14 - 5.80 10*6/mm3 Final    Hemoglobin 10/21/2024 11.0 (L)  13.0 - 17.7 g/dL Final    Hematocrit 10/21/2024 35.8 (L)  37.5 - 51.0 % Final    MCV 10/21/2024 82.7  79.0 - 97.0 fL Final    MCH 10/21/2024 25.4 (L)  26.6 - 33.0 pg Final    MCHC 10/21/2024 30.7 (L)  31.5 - 35.7 g/dL Final    RDW 10/21/2024 19.2 (H)  12.3 - 15.4 % Final    RDW-SD 10/21/2024 56.8 (H)  37.0 - 54.0 fl Final    MPV 10/21/2024 10.9  6.0 - 12.0 fL Final    Platelets 10/21/2024 203  140 - 450 10*3/mm3 Final    Neutrophil % 10/21/2024 74.6  42.7 - 76.0 % Final    Lymphocyte %  10/21/2024 16.9 (L)  19.6 - 45.3 % Final    Monocyte % 10/21/2024 6.7  5.0 - 12.0 % Final    Eosinophil % 10/21/2024 1.1  0.3 - 6.2 % Final    Basophil % 10/21/2024 0.4  0.0 - 1.5 % Final    Immature Grans % 10/21/2024 0.3  0.0 - 0.5 % Final    Neutrophils, Absolute 10/21/2024 8.41 (H)  1.70 - 7.00 10*3/mm3 Final    Lymphocytes, Absolute 10/21/2024 1.91  0.70 - 3.10 10*3/mm3 Final    Monocytes, Absolute 10/21/2024 0.76  0.10 - 0.90 10*3/mm3 Final    Eosinophils, Absolute 10/21/2024 0.12  0.00 - 0.40 10*3/mm3 Final    Basophils, Absolute 10/21/2024 0.04  0.00 - 0.20 10*3/mm3 Final    Immature Grans, Absolute 10/21/2024 0.03  0.00 - 0.05 10*3/mm3 Final    nRBC 10/21/2024 0.0  0.0 - 0.2 /100 WBC Final     Tempus xt: APC gene TP53; Negative ADRIANNA, BRAF, NRAS, TMB stable. PDL1 negative  CT Chest With Contrast Diagnostic    Result Date: 10/11/2024  Narrative: PROCEDURE: CT CHEST W CONTRAST DIAGNOSTIC-  HISTORY: rectal cancer; W71-Yzhucxhkv neoplasm of rectum  COMPARISON: 07/05/2024.  TECHNIQUE: Axial CT with IV contrast administration.  FINDINGS:  Rounded opacity in the posterior aspect left lower lobe is noted measuring 40 x 28 mm, previously 34 x 24 mm. There is associated cavitation. This is compatible with mild worsening of metastatic disease.  A smaller adjacent cavitary nodule is noted measuring 10 mm, unchanged.  There is new airspace disease of the left upper lobe. This is suspicious for pneumonia. If patient has received radiation to this region, radiation pneumonitis would be another consideration.  There is a small oval nodule along the minor fissure of the anterior right midlung measuring 4 mm, unchanged, compatible with lymph node.  No pleural or pericardial effusion is seen.  Fusiform aneurysm of the ascending aorta is noted measuring up to 45 mm, stable..      Impression: 1. Slight worsening of dominant cavitary lesion of the left lower lobe likely related to metastatic disease. 2. No new sites of disease. 3.  New airspace disease within the left upper lobe suspicious for pneumonia, less likely posttreatment change.   This study was performed with techniques to keep radiation doses as low as reasonably achievable (ALARA). Individualized dose reduction techniques using automated exposure control or adjustment of vA and/or kV according to the patient size were employed.   This report was signed and finalized on 10/11/2024 9:45 AM by Serjio Wright MD.      CT Abdomen Pelvis With Contrast    Result Date: 10/11/2024  Narrative: PROCEDURE: CT ABDOMEN PELVIS W CONTRAST-  HISTORY:  rectal cancer restaging; O38-Oijgmxwfn neoplasm of rectum  COMPARISON: 7/5/2024.  TECHNIQUE: Multiple axial CT images were obtained from the lung bases through the pubic symphysis following the administration of Isovue 300 and oral contrast.  FINDINGS:  ABDOMEN: The lung bases are clear. The heart is normal in size. The liver is normal. No evidence of liver metastases. The gallbladder is surgically absent. The spleen is mildly enlarged, unchanged from prior. No adrenal mass is present.  The pancreas is normal. The kidneys demonstrate a stable right renal cyst. The aorta is normal in caliber. No free fluid. No mesenteric or retroperitoneal adenopathy. The abdominal portions of the GI tract are unremarkable with no evidence of obstruction.  PELVIS: The appendix is not identified.  The urinary bladder is unremarkable. There is no significant free fluid. There is persistent eccentric wall thickening of the rectosigmoid junction measuring up to 11 mm, unchanged from prior. No perirectal adenopathy. External iliac lymph nodes are normal in size and decreased from prior. The largest on the left measures 8 x 8 mm, previously 14 x 10 mm. There is bilateral inguinal adenopathy with the largest right external iliac lymph node measuring 24 x 12 mm. This is unchanged compared to prior where it measured 23 x 14 mm.      Impression: No evidence of distant metastasis.   Stable rectal wall thickening which may present treated or stable disease.  Improved pelvic adenopathy, other inguinal adenopathy is unchanged and nonspecific.     This study was performed with techniques to keep radiation doses as low as reasonably achievable (ALARA). Individualized dose reduction techniques using automated exposure control or adjustment of mA and/or kV according to the patient size were employed.     Images were reviewed, interpreted, and dictated by Dr. Serjio Wright MD Transcribed by Kameron Tejeda PA-C.  This report was signed and finalized on 10/11/2024 8:37 AM by Serjio Wright MD.      XR Chest PA & Lateral    Result Date: 10/7/2024  Narrative: PROCEDURE: XR CHEST PA AND LATERAL-   HISTORY: fever; S68-Cevcfkqnk neoplasm of rectum; C78.7-Secondary malignant neoplasm of liver and intrahepatic bile duct; R50.9-Fever, unspecified  COMPARISON: 1/18/2024.  FINDINGS: Left lower lobe perihilar airspace disease is seen compatible with pneumonia new from prior exam.  There is no evidence of effusion or other pleural disease.  The mediastinum has a normal appearance.  The cardiac silhouette is unremarkable.   Right jugular Port-A-Cath is noted with tip in the SVC.      Impression: Left lower lobe pneumonia    This report was signed and finalized on 10/7/2024 12:15 PM by Serjio Wright MD.       Procedures    Assessment / Plan      Assessment/Plan:     1.  Rectal cancer   2.  Liver metastases  3.  Lung metastasis  4.  Chemo monitoring  5.  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  Acute fever.   -The patient presents with a partially obstructing rectal cancer with adenopathy.  -He was found to have biopsy-proven metastasis in the liver and lung.  His case was presented at multidisciplinary tumor board.  -Because of metastatic disease to both the lung and liver I do not think he will be downstage to resectable disease.  -Tempus results which are notable for an APC mutation, T p53 mutation,  negative for KRAS, BRAF, NRAS, tumor mutation burden stable.  Notch 1 VUS.  -CBC adequate and CMP pending for treatment today with maintenance 5-FU and panitumumab 9/16/24  Chemotherapy orders and premedications signed9/16/24  -We reviewed his imaging which is consistent with excellent disease control in early July with normalization of CEA. He has had persistent rectal pain with escalating oxycodone requirements over the past couple of months, but no severe recent increase in pain, fever. WBC normal. 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 I reviewed his EUA notes. Increase doxy to BID. No surgical intervention.  -Consolidative radiotherapy to the rectal tumor is not something he wants to pursue.  -Continue maintenance 5FU/panitumumab.   -Treatment held for PNA  -Scans show equivocal progression in lung nodule confused by concurrent PNA. Repeat Chest CT after 6 weeks to ensure PNA clearing and re-evaluate lung nodule.   -Orders signed today, Labs pending  -Scan later this month    6.  Cancer related pain  -Now following with palliative care and pain is well controlled on lyrica, oxycodone and MS contin. Follow up with them if symptoms persist. Daily compression socks recommended    7.  Panitumumab induced rash  -Continue doxycycline.     8. GERD  PPI    9. Mucositis  -MMW as needed  -Improved currently.    10. Seasonal allergies  -Add singulair at night    11. Access  -Port    Follow Up:   2 weeks     Brenda Cronin MD  Hematology and Oncology

## 2024-11-06 ENCOUNTER — HOSPITAL ENCOUNTER (OUTPATIENT)
Facility: HOSPITAL | Age: 54
Discharge: HOME | End: 2024-11-06
Admitting: INTERNAL MEDICINE
Payer: COMMERCIAL

## 2024-11-06 DIAGNOSIS — C20 RECTAL CANCER METASTASIZED TO LIVER: ICD-10-CM

## 2024-11-06 DIAGNOSIS — C20 RECTAL ADENOCARCINOMA: Primary | ICD-10-CM

## 2024-11-06 DIAGNOSIS — C78.7 RECTAL CANCER METASTASIZED TO LIVER: ICD-10-CM

## 2024-11-06 PROCEDURE — 96523 IRRIG DRUG DELIVERY DEVICE: CPT

## 2024-11-06 PROCEDURE — 25010000002 HEPARIN LOCK FLUSH PER 10 UNITS: Performed by: INTERNAL MEDICINE

## 2024-11-06 RX ORDER — SODIUM CHLORIDE 0.9 % (FLUSH) 0.9 %
10 SYRINGE (ML) INJECTION AS NEEDED
OUTPATIENT
Start: 2024-11-06

## 2024-11-06 RX ORDER — HEPARIN SODIUM (PORCINE) LOCK FLUSH IV SOLN 100 UNIT/ML 100 UNIT/ML
500 SOLUTION INTRAVENOUS AS NEEDED
OUTPATIENT
Start: 2024-11-06

## 2024-11-06 RX ORDER — SODIUM CHLORIDE 0.9 % (FLUSH) 0.9 %
10 SYRINGE (ML) INJECTION AS NEEDED
Status: DISCONTINUED | OUTPATIENT
Start: 2024-11-06 | End: 2024-11-07 | Stop reason: HOSPADM

## 2024-11-06 RX ORDER — HEPARIN SODIUM (PORCINE) LOCK FLUSH IV SOLN 100 UNIT/ML 100 UNIT/ML
500 SOLUTION INTRAVENOUS AS NEEDED
Status: DISCONTINUED | OUTPATIENT
Start: 2024-11-06 | End: 2024-11-07 | Stop reason: HOSPADM

## 2024-11-06 RX ADMIN — Medication 10 ML: at 14:40

## 2024-11-06 RX ADMIN — HEPARIN 500 UNITS: 100 SYRINGE at 14:40

## 2024-11-08 DIAGNOSIS — G62.0 CHEMOTHERAPY-INDUCED NEUROPATHY: ICD-10-CM

## 2024-11-08 DIAGNOSIS — T45.1X5A CHEMOTHERAPY-INDUCED NEUROPATHY: ICD-10-CM

## 2024-11-08 DIAGNOSIS — G89.3 CANCER RELATED PAIN: ICD-10-CM

## 2024-11-08 RX ORDER — OXYCODONE HYDROCHLORIDE 15 MG/1
15 TABLET ORAL
Qty: 150 TABLET | Refills: 0 | Status: SHIPPED | OUTPATIENT
Start: 2024-11-08 | End: 2024-12-08

## 2024-11-08 RX ORDER — MORPHINE SULFATE 15 MG/1
15 TABLET, FILM COATED, EXTENDED RELEASE ORAL 2 TIMES DAILY
Qty: 60 TABLET | Refills: 0 | Status: SHIPPED | OUTPATIENT
Start: 2024-11-08 | End: 2024-12-08

## 2024-11-08 RX ORDER — PREGABALIN 300 MG/1
300 CAPSULE ORAL 2 TIMES DAILY
Qty: 60 CAPSULE | Refills: 0 | Status: SHIPPED | OUTPATIENT
Start: 2024-11-08 | End: 2024-12-08

## 2024-11-08 NOTE — TELEPHONE ENCOUNTER
Caller: Edward Powell    Relationship: Self    Best call back number: [phone]    Requested Prescriptions:   Requested Prescriptions     Pending Prescriptions Disp Refills    oxyCODONE (ROXICODONE) 15 MG immediate release tablet 150 tablet 0     Sig: Take 1 tablet by mouth 5 (Five) Times a Day As Needed for Moderate Pain or Severe Pain for up to 30 days.    Morphine (MS CONTIN) 15 MG 12 hr tablet 60 tablet 0     Sig: Take 1 tablet by mouth 2 (Two) Times a Day for 30 days.    pregabalin (Lyrica) 300 MG capsule 60 capsule 0     Sig: Take 1 capsule by mouth 2 (Two) Times a Day for 30 days.        Pharmacy where request should be sent:  [facility] [phone] - [place] [address] AT [facility] [phone] [facility] [phone] FX    Last office visit with prescribing clinician: 9/27/2024   Last telemedicine visit with prescribing clinician: Visit date not found   Next office visit with prescribing clinician: 12/27/2024     Additional details provided by patient: PATIENT WILL BE OUT OF MEDS TOMORROW, 11/09/24.    Does the patient have less than a 3 day supply:  [x] Yes  [] No    Would you like a call back once the refill request has been completed: [] Yes [x] No    If the office needs to give you a call back, can they leave a voicemail: [x] Yes [] No

## 2024-11-08 NOTE — TELEPHONE ENCOUNTER
RACIEL #: [id number]      Medication requested: Morphine (MS CONTIN) 15 MG 12 hr tablet     Last fill date: 10/9/24      Medication requested:oxyCODONE (ROXICODONE) 15 MG immediate release tablet     Last fill date: 10/9/24      Medication requested: pregabalin (Lyrica) 300 MG capsule     Last fill date: 10/9/24      Last appointment: 9/27/24    Next appointment: 12/27/24

## 2024-11-15 DIAGNOSIS — C20 RECTAL CANCER METASTASIZED TO LIVER: Primary | ICD-10-CM

## 2024-11-15 DIAGNOSIS — C78.7 RECTAL CANCER METASTASIZED TO LIVER: Primary | ICD-10-CM

## 2024-11-18 ENCOUNTER — OFFICE VISIT (OUTPATIENT)
Dept: ONCOLOGY | Facility: CLINIC | Age: 54
End: 2024-11-18
Payer: COMMERCIAL

## 2024-11-18 ENCOUNTER — HOSPITAL ENCOUNTER (OUTPATIENT)
Facility: HOSPITAL | Age: 54
Discharge: HOME | End: 2024-11-18
Admitting: INTERNAL MEDICINE
Payer: COMMERCIAL

## 2024-11-18 VITALS
TEMPERATURE: 98.4 F | WEIGHT: 201.5 LBS | RESPIRATION RATE: 16 BRPM | SYSTOLIC BLOOD PRESSURE: 109 MMHG | OXYGEN SATURATION: 96 % | HEART RATE: 94 BPM | BODY MASS INDEX: 27.29 KG/M2 | DIASTOLIC BLOOD PRESSURE: 64 MMHG | HEIGHT: 72 IN

## 2024-11-18 DIAGNOSIS — C20 RECTAL CANCER METASTASIZED TO LIVER: Primary | ICD-10-CM

## 2024-11-18 DIAGNOSIS — C78.7 RECTAL CANCER METASTASIZED TO LIVER: Primary | ICD-10-CM

## 2024-11-18 LAB
ALBUMIN SERPL-MCNC: 3.2 G/DL (ref 3.5–5.2)
ALBUMIN/GLOB SERPL: 0.8 G/DL
ALP SERPL-CCNC: 129 U/L (ref 39–117)
ALT SERPL W P-5'-P-CCNC: 10 U/L (ref 1–41)
ANION GAP SERPL CALCULATED.3IONS-SCNC: 13.8 MMOL/L (ref 5–15)
AST SERPL-CCNC: 21 U/L (ref 1–40)
BASOPHILS # BLD AUTO: 0.04 10*3/MM3 (ref 0–0.2)
BASOPHILS NFR BLD AUTO: 0.2 % (ref 0–1.5)
BILIRUB SERPL-MCNC: 0.5 MG/DL (ref 0–1.2)
BUN SERPL-MCNC: 10 MG/DL (ref 6–20)
BUN/CREAT SERPL: 11.2 (ref 7–25)
CALCIUM SPEC-SCNC: 8.4 MG/DL (ref 8.6–10.5)
CEA SERPL-MCNC: 4.91 NG/ML
CHLORIDE SERPL-SCNC: 100 MMOL/L (ref 98–107)
CO2 SERPL-SCNC: 21.2 MMOL/L (ref 22–29)
CREAT SERPL-MCNC: 0.89 MG/DL (ref 0.76–1.27)
DEPRECATED RDW RBC AUTO: 51.8 FL (ref 37–54)
EGFRCR SERPLBLD CKD-EPI 2021: 101.2 ML/MIN/1.73
EOSINOPHIL # BLD AUTO: 0.01 10*3/MM3 (ref 0–0.4)
EOSINOPHIL NFR BLD AUTO: 0.1 % (ref 0.3–6.2)
ERYTHROCYTE [DISTWIDTH] IN BLOOD BY AUTOMATED COUNT: 18.5 % (ref 12.3–15.4)
GLOBULIN UR ELPH-MCNC: 3.9 GM/DL
GLUCOSE SERPL-MCNC: 88 MG/DL (ref 65–99)
HCT VFR BLD AUTO: 36.8 % (ref 37.5–51)
HGB BLD-MCNC: 11.5 G/DL (ref 13–17.7)
IMM GRANULOCYTES # BLD AUTO: 0.13 10*3/MM3 (ref 0–0.05)
IMM GRANULOCYTES NFR BLD AUTO: 0.7 % (ref 0–0.5)
LYMPHOCYTES # BLD AUTO: 0.59 10*3/MM3 (ref 0.7–3.1)
LYMPHOCYTES NFR BLD AUTO: 3.2 % (ref 19.6–45.3)
MAGNESIUM SERPL-MCNC: 1.7 MG/DL (ref 1.6–2.6)
MCH RBC QN AUTO: 25.2 PG (ref 26.6–33)
MCHC RBC AUTO-ENTMCNC: 31.3 G/DL (ref 31.5–35.7)
MCV RBC AUTO: 80.5 FL (ref 79–97)
MONOCYTES # BLD AUTO: 0.81 10*3/MM3 (ref 0.1–0.9)
MONOCYTES NFR BLD AUTO: 4.4 % (ref 5–12)
NEUTROPHILS NFR BLD AUTO: 16.92 10*3/MM3 (ref 1.7–7)
NEUTROPHILS NFR BLD AUTO: 91.4 % (ref 42.7–76)
NRBC BLD AUTO-RTO: 0 /100 WBC (ref 0–0.2)
PLATELET # BLD AUTO: 230 10*3/MM3 (ref 140–450)
PMV BLD AUTO: 11.3 FL (ref 6–12)
POTASSIUM SERPL-SCNC: 3.5 MMOL/L (ref 3.5–5.2)
PROT SERPL-MCNC: 7.1 G/DL (ref 6–8.5)
RBC # BLD AUTO: 4.57 10*6/MM3 (ref 4.14–5.8)
SODIUM SERPL-SCNC: 135 MMOL/L (ref 136–145)
WBC NRBC COR # BLD AUTO: 18.5 10*3/MM3 (ref 3.4–10.8)

## 2024-11-18 PROCEDURE — 80053 COMPREHEN METABOLIC PANEL: CPT | Performed by: INTERNAL MEDICINE

## 2024-11-18 PROCEDURE — 82378 CARCINOEMBRYONIC ANTIGEN: CPT | Performed by: INTERNAL MEDICINE

## 2024-11-18 PROCEDURE — 25810000003 SODIUM CHLORIDE 0.9 % SOLUTION 250 ML FLEX CONT: Performed by: INTERNAL MEDICINE

## 2024-11-18 PROCEDURE — G0498 CHEMO EXTEND IV INFUS W/PUMP: HCPCS

## 2024-11-18 PROCEDURE — 25010000002 PALONOSETRON 0.25 MG/5ML SOLUTION PREFILLED SYRINGE: Performed by: INTERNAL MEDICINE

## 2024-11-18 PROCEDURE — 25010000003 DEXTROSE 5 % SOLUTION 250 ML FLEX CONT: Performed by: INTERNAL MEDICINE

## 2024-11-18 PROCEDURE — 96375 TX/PRO/DX INJ NEW DRUG ADDON: CPT

## 2024-11-18 PROCEDURE — 96413 CHEMO IV INFUSION 1 HR: CPT

## 2024-11-18 PROCEDURE — 25010000002 LEUCOVORIN CALCIUM PER 50MG: Performed by: INTERNAL MEDICINE

## 2024-11-18 PROCEDURE — 25010000002 FLUOROURACIL PER 500 MG: Performed by: INTERNAL MEDICINE

## 2024-11-18 PROCEDURE — 85025 COMPLETE CBC W/AUTO DIFF WBC: CPT | Performed by: INTERNAL MEDICINE

## 2024-11-18 PROCEDURE — 96367 TX/PROPH/DG ADDL SEQ IV INF: CPT

## 2024-11-18 PROCEDURE — 25010000002 PANITUMUMAB PER 10 MG: Performed by: INTERNAL MEDICINE

## 2024-11-18 PROCEDURE — 25010000002 PANITUMUMAB 400 MG/20ML SOLUTION 20 ML VIAL: Performed by: INTERNAL MEDICINE

## 2024-11-18 PROCEDURE — 83735 ASSAY OF MAGNESIUM: CPT | Performed by: INTERNAL MEDICINE

## 2024-11-18 PROCEDURE — 99214 OFFICE O/P EST MOD 30 MIN: CPT | Performed by: INTERNAL MEDICINE

## 2024-11-18 PROCEDURE — 25010000002 DEXAMETHASONE PER 1 MG: Performed by: INTERNAL MEDICINE

## 2024-11-18 RX ORDER — FAMOTIDINE 10 MG/ML
20 INJECTION, SOLUTION INTRAVENOUS AS NEEDED
Status: DISCONTINUED | OUTPATIENT
Start: 2024-11-18 | End: 2024-11-19 | Stop reason: HOSPADM

## 2024-11-18 RX ORDER — PALONOSETRON 0.05 MG/ML
0.25 INJECTION, SOLUTION INTRAVENOUS ONCE
Status: CANCELLED | OUTPATIENT
Start: 2024-11-18

## 2024-11-18 RX ORDER — SODIUM CHLORIDE 9 MG/ML
20 INJECTION, SOLUTION INTRAVENOUS ONCE
Status: DISCONTINUED | OUTPATIENT
Start: 2024-11-18 | End: 2024-11-19 | Stop reason: HOSPADM

## 2024-11-18 RX ORDER — SODIUM CHLORIDE 9 MG/ML
20 INJECTION, SOLUTION INTRAVENOUS ONCE
Status: CANCELLED | OUTPATIENT
Start: 2024-11-18

## 2024-11-18 RX ORDER — DEXAMETHASONE SODIUM PHOSPHATE 10 MG/ML
12 INJECTION INTRAMUSCULAR; INTRAVENOUS ONCE
Status: CANCELLED
Start: 2024-11-18

## 2024-11-18 RX ORDER — FAMOTIDINE 10 MG/ML
20 INJECTION, SOLUTION INTRAVENOUS AS NEEDED
Status: CANCELLED | OUTPATIENT
Start: 2024-11-18

## 2024-11-18 RX ORDER — DEXTROSE MONOHYDRATE 50 MG/ML
20 INJECTION, SOLUTION INTRAVENOUS ONCE
Status: DISCONTINUED | OUTPATIENT
Start: 2024-11-18 | End: 2024-11-19 | Stop reason: HOSPADM

## 2024-11-18 RX ORDER — DEXAMETHASONE SODIUM PHOSPHATE 10 MG/ML
12 INJECTION INTRAMUSCULAR; INTRAVENOUS ONCE
Status: COMPLETED | OUTPATIENT
Start: 2024-11-18 | End: 2024-11-18

## 2024-11-18 RX ORDER — DIPHENHYDRAMINE HYDROCHLORIDE 50 MG/ML
50 INJECTION INTRAMUSCULAR; INTRAVENOUS AS NEEDED
Status: DISCONTINUED | OUTPATIENT
Start: 2024-11-18 | End: 2024-11-19 | Stop reason: HOSPADM

## 2024-11-18 RX ORDER — DEXTROSE MONOHYDRATE 50 MG/ML
20 INJECTION, SOLUTION INTRAVENOUS ONCE
Status: CANCELLED | OUTPATIENT
Start: 2024-11-18

## 2024-11-18 RX ORDER — PALONOSETRON 0.05 MG/ML
0.25 INJECTION, SOLUTION INTRAVENOUS ONCE
Status: COMPLETED | OUTPATIENT
Start: 2024-11-18 | End: 2024-11-18

## 2024-11-18 RX ORDER — DIPHENHYDRAMINE HYDROCHLORIDE 50 MG/ML
50 INJECTION INTRAMUSCULAR; INTRAVENOUS AS NEEDED
Status: CANCELLED | OUTPATIENT
Start: 2024-11-18

## 2024-11-18 RX ORDER — FAMOTIDINE 20 MG/1
20 TABLET, FILM COATED ORAL 2 TIMES DAILY
Qty: 60 TABLET | Refills: 4 | Status: SHIPPED | OUTPATIENT
Start: 2024-11-18

## 2024-11-18 RX ADMIN — DEXAMETHASONE SODIUM PHOSPHATE 12 MG: 10 INJECTION INTRAMUSCULAR; INTRAVENOUS at 12:12

## 2024-11-18 RX ADMIN — LEUCOVORIN CALCIUM 860 MG: 10 INJECTION INTRAMUSCULAR; INTRAVENOUS at 12:17

## 2024-11-18 RX ADMIN — PALONOSETRON 0.25 MG: 0.25 INJECTION, SOLUTION INTRAVENOUS at 12:14

## 2024-11-18 RX ADMIN — SODIUM CHLORIDE 550 MG: 9 INJECTION, SOLUTION INTRAVENOUS at 11:27

## 2024-11-18 RX ADMIN — FLUOROURACIL 5140 MG: 50 INJECTION, SOLUTION INTRAVENOUS at 13:24

## 2024-11-18 NOTE — PROGRESS NOTES
[facility]  Office number [phone]    Fax number [phone]     Follow up     Date: 24    Patient Name: Edward Powell  MRN: [id number]  : 1970    Referring Physician: Dr. Gautam    Chief Complaint: Rectal cancer, lung mass, liver lesions follow up on treatment    Cancer Staging:  IV    History of Present Illness: Edward Powell is a pleasant 54 y.o. male who presents today for evaluation of rectal cancer in the company of his supportive significant other.    Patient has a longstanding history of intermittent diarrhea since his cholecystectomy in 2019.  However he developed progressive diarrhea with associated loss of bowel control and urgency as well as weight loss and fatigue prompting additional workup.    CT of the abdomen pelvis 2023 showed an irregular circumferential wall thickening involving the rectum concerning for mass lesion.  There was associated mesorectal lymphadenopathy.  Masslike consolidation of left lower lobe.  CT of the chest showed a cavitary lesion in the left lower lobe up to 4.8 cm with an adjacent cavitary nodule up to 2 cm and a 5 mm nodule on the right minor fissure.      He underwent colonoscopy 2023 with findings of an infiltrative and ulcerated partially obstructing mass in the proximal rectum spanning 10 cm.  Rectal polyps.  Biopsy of the rectal mass showed invasive moderately differentiated adenocarcinoma.  Additional biopsies showed tubular adenomas.  MSI testing was intact/low probability of MSI high.    PDL1 negative    PET/CT 2023 showed hypermetabolic rectal wall thickening compatible with known rectal malignancy.  Multiple small ill-defined hypoechoic hyper metabolic liver lesions compatible with metastatic disease.  Mildly enlarged and mildly hypermetabolic pelvic sidewall and internal iliac lymph node chain adenopathy.  Hypermetabolic lung mass with adjacent nodule.     He underwent liver biopsy and lung  biopsy January 2024.  Results of both confirmed metastatic colorectal cancer.    The patient has been experiencing substantial rectal pain.  He is taking oxycodone every 6 hours with partial relief.  He reports ongoing bowel movements.  No abdominal pain.  No vomiting.  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    Treatment history:  FOLFOX cycle 1 -4  FOLFOX panitumumab cycle 5 onward  -Oxaliplatin terminated early for allergic reaction after 4/29/24    Interval history:  Had severe acid reflux overnight after eating spicy hot wings last night, triggering a cough and sore throat. Moved to recliner.   Developed chills, around 5 am. Resolved now.   Took temp which was 98.3 at time of symptoms and stable 1 hour later.   He was coughing all night. Had resolved until the GERD recurred  Epigastric pain.   Taking pantoprazole daily, and used prilosec OTC occasionally.   Fatigued.   Eating/mouth have been better overall.   Taking stool softener 1 BID with soft, narrow caliber BM, 3 yesterday. No pain with defecation, currently  Neuropathy in feet is stable on lyrica and morphine  Taking magnesium daily.    Past Medical History:   Past Medical History:   Diagnosis Date    Arthritis     Cancer     rectal cancer - diagnosed 2023    Cholelithiasis 2019    Removed    COPD (chronic obstructive pulmonary disease)     Coronary artery disease 2009    Stent - no cardiologist currently    Elevated cholesterol     GERD (gastroesophageal reflux disease)     Hernia 2003    Lower hernia    Perforated ulcer 2019    Sleep apnea     history of; when weighed over 400lbs - no issues following bariatric surgery       Past Surgical History:   Past Surgical History:   Procedure Laterality Date    APPENDECTOMY   1983    Removed    BARIATRIC SURGERY  2011    Gastric bypass    BLADDER TUMOR/ULCER BLEEDER CAUTERIZATION      CARDIAC CATHETERIZATION  2009    stent placed    CHOLECYSTECTOMY  2019    Removed    COLONOSCOPY N/A 12/07/2023    Procedure: COLONOSCOPY WITH HOT SNARE POLYPECTOMY AND TATTOO;  Surgeon: Noman Gautam MD;  Location: [facility] ENDOSCOPY;  Service: Gastroenterology;  Laterality: N/A;    PORTACATH PLACEMENT N/A 1/5/2024    Procedure: INSERTION OF PORTACATH WITH ULTRSOUND AND FLUOROSCOPIC GUIDANCE;  Surgeon: Ida Black MD;  Location: [facility] OR;  Service: General;  Laterality: N/A;    JENNIFER-EN-Y         Family History: No family history on file.  Uncle had colon cancer    Social History:   Social History     Socioeconomic History    Marital status:    Tobacco Use    Smoking status: Every Day     Current packs/day: 1.00     Average packs/day: 0.9 packs/day for 60.2 years (52.7 ttl pk-yrs)     Types: Cigarettes     Start date: 9/6/1994    Smokeless tobacco: Never    Tobacco comments:     35 years      pt reports closer to 2 packs per day since cancer diagnosis   Vaping Use    Vaping status: Never Used   Substance and Sexual Activity    Alcohol use: Never    Drug use: Never    Sexual activity: Defer       Medications:     Current Outpatient Medications:     buPROPion XL (WELLBUTRIN XL) 150 MG 24 hr tablet, TAKE 1 TABLET BY MOUTH DAILY, Disp: 90 tablet, Rfl: 1    cetirizine (zyrTEC) 10 MG tablet, Take 1 tablet by mouth Daily., Disp: 30 tablet, Rfl: 2    clindamycin (Clindagel) 1 % gel, Apply 1 Application topically to the appropriate area as directed 2 (Two) Times a Day. Use first, Disp: 30 g, Rfl: 1    dexAMETHasone 0.5 MG/5ML solution, Take 10 mL by mouth 2 (Two) Times a Day. Swish for 2 minutes and spit 10 mL 4 x daily, Disp: 240 mL, Rfl: 5    dicyclomine (BENTYL) 20 MG tablet, Take 1 tablet by mouth 3 (Three) Times a Day As Needed for Abdominal Cramping., Disp: 30 tablet, Rfl: 3     docusate sodium (COLACE) 100 MG capsule, Take 1 capsule by mouth 2 (Two) Times a Day., Disp: , Rfl:     doxycycline (MONODOX) 100 MG capsule, , Disp: , Rfl:     doxycycline (VIBRAMYICN) 100 MG tablet, , Disp: , Rfl:     Hydrocortisone, Perianal, (ANUSOL-HC) 2.5 % rectal cream, Insert  into the rectum 2 (Two) Times a Day. Indications: Inflamed Hemorrhoids, Disp: 30 g, Rfl: 1    lidocaine-prilocaine (EMLA) 2.5-2.5 % cream, Apply 1 Application topically to the appropriate area as directed As Needed (45-60 minutes prior to port access.  Cover with saran/plastic wrap.)., Disp: 30 g, Rfl: 3    LORazepam (ATIVAN) 0.5 MG tablet, Take 1 tablet by mouth Take As Directed. 1 tabelt by mouth 30 minutes prior to MRI, take a second tablet at the time of the MRI if needed., Disp: 2 tablet, Rfl: 0    Magic Mouthwash Oral Suspension (diphenhydrAMINE HCl - aluminum & magnesium hydroxide-simethicone - lidocaine - nystatin), Swish and Spit 10 mL by mouth every 6 (Six) Hours For 7 Days., Disp: 300 mL, Rfl: 3    magnesium oxide (MAG-OX) 400 MG tablet, Take 1 tablet by mouth Daily., Disp: 30 tablet, Rfl: 5    montelukast (Singulair) 10 MG tablet, Take 1 tablet by mouth Every Night., Disp: 30 tablet, Rfl: 1    Morphine (MS CONTIN) 15 MG 12 hr tablet, Take 1 tablet by mouth 2 (Two) Times a Day for 30 days., Disp: 60 tablet, Rfl: 0    Movantik 25 MG tablet, , Disp: , Rfl:     mupirocin (BACTROBAN) 2 % cream, , Disp: , Rfl:     naloxone (NARCAN) 4 MG/0.1ML nasal spray, 1 spray into the nostril(s) as directed by provider As Needed for Opioid Reversal., Disp: 1 each, Rfl: 0    nystatin (MYCOSTATIN) 100,000 unit/mL suspension, Swish and Swallow 5mL by mouth four times a day, Disp: 473 mL, Rfl: 0    nystatin (MYCOSTATIN) 100,000 unit/mL suspension, Swish and swallow 5 mL 4 (Four) Times a Day., Disp: 473 mL, Rfl: 0    nystatin susp + lidocaine viscous (MAGIC MOUTHWASH) oral suspension, 5-10 ml swish and spit or swallow QID prn, Disp: 240 mL, Rfl:  "3    ondansetron (ZOFRAN) 8 MG tablet, Take 1 tablet by mouth 3 (Three) Times a Day As Needed for Nausea or Vomiting., Disp: 30 tablet, Rfl: 5    oxyCODONE (ROXICODONE) 15 MG immediate release tablet, Take 1 tablet by mouth 5 (Five) Times a Day As Needed for Moderate Pain or Severe Pain for up to 30 days., Disp: 150 tablet, Rfl: 0    pantoprazole (Protonix) 40 MG EC tablet, Take 1 tablet by mouth Daily. Indications: Gastroesophageal Reflux Disease, Disp: 90 tablet, Rfl: 2    potassium chloride ER (K-TAB) 20 MEQ tablet controlled-release ER tablet, TAKE 2 TABLETS BY MOUTH DAILY, Disp: 30 tablet, Rfl: 0    pregabalin (Lyrica) 300 MG capsule, Take 1 capsule by mouth 2 (Two) Times a Day for 30 days., Disp: 60 capsule, Rfl: 0    tiotropium bromide-olodaterol (Stiolto Respimat) 2.5-2.5 MCG/ACT aerosol solution inhaler, INHALE 2 INHALATION(S) BY MOUTH DAILY, Disp: 4 g, Rfl: 5    traZODone (DESYREL) 100 MG tablet, Take 1 tablet by mouth Every Night., Disp: 30 tablet, Rfl: 2    triamcinolone (KENALOG) 0.025 % ointment, Apply 1 Application topically to the appropriate area as directed 2 (Two) Times a Day. Use second if topical treatment #1 ineffective, Disp: 80 g, Rfl: 0    Allergies:   Allergies   Allergen Reactions    Bactrim [Sulfamethoxazole-Trimethoprim] Hives     and blisters    Sulfa Antibiotics Hives     and blisters       Objective     Vital Signs:   Vitals:    11/18/24 0854   BP: 109/64   Pulse: 94   Resp: 16   Temp: 98.4 °F (36.9 °C)   SpO2: 96%   Weight: 91.4 kg (201 lb 8 oz)   Height: 182.9 cm (72.01\")   PainSc: 0-No pain        Body mass index is 27.32 kg/m².   Pain Score    11/18/24 0854   PainSc: 0-No pain     ECOG Performance Status: 1 - Symptomatic but completely ambulatory    Physical Exam:   General: No acute distress. Well appearing   HEENT: Normocephalic, atraumatic. Sclera anicteric. Dry mouth, mild erythema  Neck: supple, no adenopathy.   Cardiovascular: regular rate and rhythm. No murmurs. "   Respiratory: Normal rate. Clear to auscultation bilaterally  Abdomen: Soft, nontender, non distended with normoactive bowel sounds  Lymph: no cervical, supraclavicular or axillary adenopathy  Neuro: Alert and oriented x 3. No focal deficits.   Ext: Symmetric, no swelling.   Accurate 11/18/24    Laboratory/Imaging Reviewed:   No visits with results within 2 Week(s) from this visit.   Latest known visit with results is:   Hospital Outpatient Visit on 11/04/2024   Component Date Value Ref Range Status    Magnesium 11/04/2024 1.9  1.6 - 2.6 mg/dL Final    CEA 11/04/2024 5.13  ng/mL Final    Glucose 11/04/2024 100 (H)  65 - 99 mg/dL Final    BUN 11/04/2024 11  6 - 20 mg/dL Final    Creatinine 11/04/2024 0.82  0.76 - 1.27 mg/dL Final    Sodium 11/04/2024 140  136 - 145 mmol/L Final    Potassium 11/04/2024 3.5  3.5 - 5.2 mmol/L Final    Chloride 11/04/2024 106  98 - 107 mmol/L Final    CO2 11/04/2024 24.2  22.0 - 29.0 mmol/L Final    Calcium 11/04/2024 8.8  8.6 - 10.5 mg/dL Final    Total Protein 11/04/2024 6.9  6.0 - 8.5 g/dL Final    Albumin 11/04/2024 3.2 (L)  3.5 - 5.2 g/dL Final    ALT (SGPT) 11/04/2024 10  1 - 41 U/L Final    AST (SGOT) 11/04/2024 17  1 - 40 U/L Final    Alkaline Phosphatase 11/04/2024 116  39 - 117 U/L Final    Total Bilirubin 11/04/2024 0.7  0.0 - 1.2 mg/dL Final    Globulin 11/04/2024 3.7  gm/dL Final    A/G Ratio 11/04/2024 0.9  g/dL Final    BUN/Creatinine Ratio 11/04/2024 13.4  7.0 - 25.0 Final    Anion Gap 11/04/2024 9.8  5.0 - 15.0 mmol/L Final    eGFR 11/04/2024 103.7  >60.0 mL/min/1.73 Final    WBC 11/04/2024 10.09  3.40 - 10.80 10*3/mm3 Final    RBC 11/04/2024 4.14  4.14 - 5.80 10*6/mm3 Final    Hemoglobin 11/04/2024 10.4 (L)  13.0 - 17.7 g/dL Final    Hematocrit 11/04/2024 34.0 (L)  37.5 - 51.0 % Final    MCV 11/04/2024 82.1  79.0 - 97.0 fL Final    MCH 11/04/2024 25.1 (L)  26.6 - 33.0 pg Final    MCHC 11/04/2024 30.6 (L)  31.5 - 35.7 g/dL Final    RDW 11/04/2024 18.6 (H)  12.3 - 15.4 %  Final    RDW-SD 11/04/2024 54.2 (H)  37.0 - 54.0 fl Final    MPV 11/04/2024 11.2  6.0 - 12.0 fL Final    Platelets 11/04/2024 151  140 - 450 10*3/mm3 Final    Neutrophil % 11/04/2024 78.9 (H)  42.7 - 76.0 % Final    Lymphocyte % 11/04/2024 11.1 (L)  19.6 - 45.3 % Final    Monocyte % 11/04/2024 9.0  5.0 - 12.0 % Final    Eosinophil % 11/04/2024 0.4  0.3 - 6.2 % Final    Basophil % 11/04/2024 0.2  0.0 - 1.5 % Final    Immature Grans % 11/04/2024 0.4  0.0 - 0.5 % Final    Neutrophils, Absolute 11/04/2024 7.96 (H)  1.70 - 7.00 10*3/mm3 Final    Lymphocytes, Absolute 11/04/2024 1.12  0.70 - 3.10 10*3/mm3 Final    Monocytes, Absolute 11/04/2024 0.91 (H)  0.10 - 0.90 10*3/mm3 Final    Eosinophils, Absolute 11/04/2024 0.04  0.00 - 0.40 10*3/mm3 Final    Basophils, Absolute 11/04/2024 0.02  0.00 - 0.20 10*3/mm3 Final    Immature Grans, Absolute 11/04/2024 0.04  0.00 - 0.05 10*3/mm3 Final    nRBC 11/04/2024 0.0  0.0 - 0.2 /100 WBC Final     Tempus xt: APC gene TP53; Negative ADRIANNA, BRAF, NRAS, TMB stable. PDL1 negative  No results found.    Procedures    Assessment / Plan      Assessment/Plan:     1.  Rectal cancer   2.  Liver metastases  3.  Lung metastasis  4.  Chemo monitoring  5.  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  Acute fever.   -The patient presents with a partially obstructing rectal cancer with adenopathy.  -He was found to have biopsy-proven metastasis in the liver and lung.  His case was presented at multidisciplinary tumor board.  -Because of metastatic disease to both the lung and liver I do not think he will be downstage to resectable disease.  -Tempus results which are notable for an APC mutation, T p53 mutation, negative for KRAS, BRAF, NRAS, tumor mutation burden stable.  Notch 1 VUS.  -CBC adequate and CMP pending for treatment today with maintenance 5-FU and panitumumab 9/16/24  Chemotherapy orders and premedications signed9/16/24  -We reviewed his imaging which is consistent with  excellent disease control in early July with normalization of CEA. He has had persistent rectal pain with escalating oxycodone requirements over the past couple of months, but no severe recent increase in pain, fever. WBC normal. 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 I reviewed his EUA notes. Increase doxy to BID. No surgical intervention.  -Consolidative radiotherapy to the rectal tumor is not something he wants to pursue.  -Continue maintenance 5FU/panitumumab.   -Treatment held for PNA  -Scans show equivocal progression in lung nodule confused by concurrent PNA. Repeat Chest CT after 6 weeks to ensure PNA clearing and re-evaluate lung nodule.   -Orders signed today, Labs pending  -Scan 11/22/24    6.  Cancer related pain  -Now following with palliative care and pain is well controlled on lyrica, oxycodone and MS contin. Follow up with them if symptoms persist. Daily compression socks recommended    7.  Panitumumab induced rash  -Continue doxycycline.     8. GERD  PPI    9. Mucositis  -MMW as needed  -Improved currently.    10. Seasonal allergies  -Add singulair at night, better    11. Access  -Port    12. GERD  Patient Instructions   Verify you are taking pantoprazole daily. Recommend taking 30 min prior to supper.   Add famotidine (pepcid) with breakfast and bed time as needed.      Follow Up:   2 weeks     Brenda Cronin MD  Hematology and Oncology

## 2024-11-18 NOTE — PATIENT INSTRUCTIONS
Verify you are taking pantoprazole daily. Recommend taking 30 min prior to supper.   Add famotidine (pepcid) with breakfast and bed time as needed.

## 2024-11-20 ENCOUNTER — TELEPHONE (OUTPATIENT)
Dept: ONCOLOGY | Facility: CLINIC | Age: 54
End: 2024-11-20
Payer: COMMERCIAL

## 2024-11-20 ENCOUNTER — HOSPITAL ENCOUNTER (OUTPATIENT)
Facility: HOSPITAL | Age: 54
Discharge: HOME | End: 2024-11-20
Admitting: INTERNAL MEDICINE
Payer: COMMERCIAL

## 2024-11-20 VITALS
TEMPERATURE: 98.3 F | RESPIRATION RATE: 16 BRPM | DIASTOLIC BLOOD PRESSURE: 55 MMHG | SYSTOLIC BLOOD PRESSURE: 104 MMHG | HEART RATE: 50 BPM | OXYGEN SATURATION: 95 %

## 2024-11-20 DIAGNOSIS — C20 RECTAL ADENOCARCINOMA: Primary | ICD-10-CM

## 2024-11-20 DIAGNOSIS — C20 RECTAL CANCER METASTASIZED TO LIVER: ICD-10-CM

## 2024-11-20 DIAGNOSIS — C78.7 RECTAL CANCER METASTASIZED TO LIVER: ICD-10-CM

## 2024-11-20 PROCEDURE — 25810000003 SODIUM CHLORIDE 0.9 % SOLUTION: Performed by: INTERNAL MEDICINE

## 2024-11-20 PROCEDURE — 96360 HYDRATION IV INFUSION INIT: CPT

## 2024-11-20 PROCEDURE — 25010000002 HEPARIN LOCK FLUSH PER 10 UNITS: Performed by: INTERNAL MEDICINE

## 2024-11-20 RX ORDER — SODIUM CHLORIDE 0.9 % (FLUSH) 0.9 %
10 SYRINGE (ML) INJECTION AS NEEDED
OUTPATIENT
Start: 2024-11-20

## 2024-11-20 RX ORDER — SODIUM CHLORIDE 9 MG/ML
999 INJECTION, SOLUTION INTRAVENOUS ONCE
Status: COMPLETED | OUTPATIENT
Start: 2024-11-20 | End: 2024-11-20

## 2024-11-20 RX ORDER — SODIUM CHLORIDE 9 MG/ML
999 INJECTION, SOLUTION INTRAVENOUS ONCE
Status: CANCELLED
Start: 2024-11-20 | End: 2024-11-20

## 2024-11-20 RX ORDER — HEPARIN SODIUM (PORCINE) LOCK FLUSH IV SOLN 100 UNIT/ML 100 UNIT/ML
500 SOLUTION INTRAVENOUS AS NEEDED
Status: DISCONTINUED | OUTPATIENT
Start: 2024-11-20 | End: 2024-11-21 | Stop reason: HOSPADM

## 2024-11-20 RX ORDER — SODIUM CHLORIDE 0.9 % (FLUSH) 0.9 %
10 SYRINGE (ML) INJECTION AS NEEDED
Status: DISCONTINUED | OUTPATIENT
Start: 2024-11-20 | End: 2024-11-21 | Stop reason: HOSPADM

## 2024-11-20 RX ORDER — HEPARIN SODIUM (PORCINE) LOCK FLUSH IV SOLN 100 UNIT/ML 100 UNIT/ML
500 SOLUTION INTRAVENOUS AS NEEDED
OUTPATIENT
Start: 2024-11-20

## 2024-11-20 RX ADMIN — HEPARIN 500 UNITS: 100 SYRINGE at 16:23

## 2024-11-20 RX ADMIN — SODIUM CHLORIDE 999 ML/HR: 9 INJECTION, SOLUTION INTRAVENOUS at 15:14

## 2024-11-20 NOTE — TELEPHONE ENCOUNTER
FARHAT Cortés reported BP after IVF is 104/55 and patient is feeling better.  Dr. Cronin notified and per MD, Milana advised okay to discharge patient.  Milana verbalized understanding.

## 2024-11-20 NOTE — TELEPHONE ENCOUNTER
Called to connect Dr. Cronin to the peer to peer since RN told no appointment needed for peer to peer.  RN advised that an appointment for peer to peer is needed.  FARHAT Salas reviewer tried to contact personnel that schedules peer to peer with no one answering.  She stated she will have scheduling contact this RN back to set up appointment for peer to peer.  Advised her they can call anytime between 08:00 and 16:30 EST.  This office return phone number confirmed with Maritza.

## 2024-11-20 NOTE — TELEPHONE ENCOUNTER
Spoke with Venus BALLARD at [facility].  Peer to peer scheduled at 12:45 EST tomorrow, 11-21-24. Dr. Cronin notified.  Provided Dr. Cronni's phone number for Dr. Flores to contact her.

## 2024-11-20 NOTE — TELEPHONE ENCOUNTER
Caller: CHAVEZ    Relationship:     Best call back number: [phone]    Do you know the name of the person who called: AJIT    What was the call regarding: PATIENT 2ND INSURANCE [employer] MEDICAID DENIED THE CT SCAN CHEST. IF YOU WANT TO DO A PEER TO PEER CALL [phone]. SEE DOCUMENTS IN MEDIA.    PATIENT CAN STILL GET THE SCAN BUT 2ND INSURANCE WONT COVER, DUE TO HIM HAVING A CT SCAN CHEST IN THE LAST 60 DAYS.

## 2024-11-20 NOTE — TELEPHONE ENCOUNTER
Jacqueline, RN with [facility] called stating patient there for bulb unhook for chemo.  Patient BP is 88/50, 95/51, heart rate 55 bpm, 98.3 temperature, 95% oxygen saturation.  Patient reported feeling whoozy and lightheaded this morning but feeling better now.  Patient denies fever.  Jacqueline wants to know if IV fluids are needed.  Dr. Cronin notified and order received for 1 liter normal saline IV over one hour and to repeat BP and advise MD before discharging patient.  Jacqueline notified and verbalized understanding. Orders placed per MD.

## 2024-11-20 NOTE — TELEPHONE ENCOUNTER
Spoke with Jada CARTER at [facility].  She stated provider has 7 calendar days from 11-19-24 (11-19-24 being day 1) to complete peer to peer or provider courtsey review.  She stated these are not scheduled and provider should call [phone] with reference # [id number] 08:00 - 20:00 EST.  Notified GEORGIA Bennett whom deferred to Dr. Cronin to do peer to peer.  Notified Dr. Cronin.

## 2024-11-21 NOTE — TELEPHONE ENCOUNTER
Dr. Cronin completed peer to peer with authorization obtained.  Authorization # [id number] and expires 1/15/25. Therese reavesied.

## 2024-11-22 ENCOUNTER — HOSPITAL ENCOUNTER (OUTPATIENT)
Dept: CT IMAGING | Facility: HOSPITAL | Age: 54
Discharge: HOME | End: 2024-11-22
Admitting: NURSE PRACTITIONER
Payer: COMMERCIAL

## 2024-11-22 DIAGNOSIS — C20 RECTAL CANCER METASTASIZED TO LIVER: ICD-10-CM

## 2024-11-22 DIAGNOSIS — C78.7 RECTAL CANCER METASTASIZED TO LIVER: ICD-10-CM

## 2024-11-22 PROCEDURE — 71250 CT THORAX DX C-: CPT

## 2024-11-26 ENCOUNTER — TELEPHONE (OUTPATIENT)
Dept: ONCOLOGY | Facility: CLINIC | Age: 54
End: 2024-11-26
Payer: COMMERCIAL

## 2024-11-26 NOTE — TELEPHONE ENCOUNTER
Called patient to advise per Dr. Cronin: CT chest improved.  No answer received.  Left VM requesting return call.

## 2024-11-28 DIAGNOSIS — C20 RECTAL CANCER METASTASIZED TO LIVER: ICD-10-CM

## 2024-11-28 DIAGNOSIS — C78.7 RECTAL CANCER METASTASIZED TO LIVER: ICD-10-CM

## 2024-12-02 ENCOUNTER — HOSPITAL ENCOUNTER (OUTPATIENT)
Facility: HOSPITAL | Age: 54
Discharge: HOME | End: 2024-12-02
Admitting: INTERNAL MEDICINE
Payer: COMMERCIAL

## 2024-12-02 ENCOUNTER — OFFICE VISIT (OUTPATIENT)
Dept: ONCOLOGY | Facility: CLINIC | Age: 54
End: 2024-12-02
Payer: COMMERCIAL

## 2024-12-02 VITALS
TEMPERATURE: 97.3 F | HEART RATE: 61 BPM | HEIGHT: 72 IN | OXYGEN SATURATION: 99 % | DIASTOLIC BLOOD PRESSURE: 51 MMHG | RESPIRATION RATE: 16 BRPM | SYSTOLIC BLOOD PRESSURE: 103 MMHG | WEIGHT: 200 LBS | BODY MASS INDEX: 27.09 KG/M2

## 2024-12-02 DIAGNOSIS — C20 RECTAL CANCER METASTASIZED TO LIVER: Primary | ICD-10-CM

## 2024-12-02 DIAGNOSIS — C78.7 RECTAL CANCER METASTASIZED TO LIVER: Primary | ICD-10-CM

## 2024-12-02 LAB
ALBUMIN SERPL-MCNC: 3.1 G/DL (ref 3.5–5.2)
ALBUMIN/GLOB SERPL: 0.9 G/DL
ALP SERPL-CCNC: 139 U/L (ref 39–117)
ALT SERPL W P-5'-P-CCNC: 7 U/L (ref 1–41)
ANION GAP SERPL CALCULATED.3IONS-SCNC: 9.7 MMOL/L (ref 5–15)
AST SERPL-CCNC: 13 U/L (ref 1–40)
BASOPHILS # BLD AUTO: 0.04 10*3/MM3 (ref 0–0.2)
BASOPHILS NFR BLD AUTO: 0.4 % (ref 0–1.5)
BILIRUB SERPL-MCNC: 0.5 MG/DL (ref 0–1.2)
BUN SERPL-MCNC: 10 MG/DL (ref 6–20)
BUN/CREAT SERPL: 13.9 (ref 7–25)
CALCIUM SPEC-SCNC: 8.2 MG/DL (ref 8.6–10.5)
CEA SERPL-MCNC: 5.61 NG/ML
CHLORIDE SERPL-SCNC: 102 MMOL/L (ref 98–107)
CO2 SERPL-SCNC: 23.3 MMOL/L (ref 22–29)
CREAT SERPL-MCNC: 0.72 MG/DL (ref 0.76–1.27)
DEPRECATED RDW RBC AUTO: 52.3 FL (ref 37–54)
EGFRCR SERPLBLD CKD-EPI 2021: 107.9 ML/MIN/1.73
EOSINOPHIL # BLD AUTO: 0.11 10*3/MM3 (ref 0–0.4)
EOSINOPHIL NFR BLD AUTO: 1 % (ref 0.3–6.2)
ERYTHROCYTE [DISTWIDTH] IN BLOOD BY AUTOMATED COUNT: 18.6 % (ref 12.3–15.4)
GLOBULIN UR ELPH-MCNC: 3.5 GM/DL
GLUCOSE SERPL-MCNC: 102 MG/DL (ref 65–99)
HCT VFR BLD AUTO: 36.2 % (ref 37.5–51)
HGB BLD-MCNC: 11.2 G/DL (ref 13–17.7)
IMM GRANULOCYTES # BLD AUTO: 0.05 10*3/MM3 (ref 0–0.05)
IMM GRANULOCYTES NFR BLD AUTO: 0.5 % (ref 0–0.5)
LYMPHOCYTES # BLD AUTO: 1.31 10*3/MM3 (ref 0.7–3.1)
LYMPHOCYTES NFR BLD AUTO: 12 % (ref 19.6–45.3)
MAGNESIUM SERPL-MCNC: 1.6 MG/DL (ref 1.6–2.6)
MCH RBC QN AUTO: 24.7 PG (ref 26.6–33)
MCHC RBC AUTO-ENTMCNC: 30.9 G/DL (ref 31.5–35.7)
MCV RBC AUTO: 79.7 FL (ref 79–97)
MONOCYTES # BLD AUTO: 0.74 10*3/MM3 (ref 0.1–0.9)
MONOCYTES NFR BLD AUTO: 6.8 % (ref 5–12)
NEUTROPHILS NFR BLD AUTO: 79.3 % (ref 42.7–76)
NEUTROPHILS NFR BLD AUTO: 8.71 10*3/MM3 (ref 1.7–7)
NRBC BLD AUTO-RTO: 0 /100 WBC (ref 0–0.2)
PLATELET # BLD AUTO: 195 10*3/MM3 (ref 140–450)
PMV BLD AUTO: 11.3 FL (ref 6–12)
POTASSIUM SERPL-SCNC: 3.3 MMOL/L (ref 3.5–5.2)
PROT SERPL-MCNC: 6.6 G/DL (ref 6–8.5)
RBC # BLD AUTO: 4.54 10*6/MM3 (ref 4.14–5.8)
SODIUM SERPL-SCNC: 135 MMOL/L (ref 136–145)
WBC NRBC COR # BLD AUTO: 10.96 10*3/MM3 (ref 3.4–10.8)

## 2024-12-02 PROCEDURE — 96375 TX/PRO/DX INJ NEW DRUG ADDON: CPT

## 2024-12-02 PROCEDURE — 85025 COMPLETE CBC W/AUTO DIFF WBC: CPT | Performed by: INTERNAL MEDICINE

## 2024-12-02 PROCEDURE — 96413 CHEMO IV INFUSION 1 HR: CPT

## 2024-12-02 PROCEDURE — 25010000002 LEUCOVORIN CALCIUM PER 50MG: Performed by: INTERNAL MEDICINE

## 2024-12-02 PROCEDURE — 25010000002 PALONOSETRON 0.25 MG/5ML SOLUTION PREFILLED SYRINGE: Performed by: INTERNAL MEDICINE

## 2024-12-02 PROCEDURE — 25010000003 DEXTROSE 5 % SOLUTION 250 ML FLEX CONT: Performed by: INTERNAL MEDICINE

## 2024-12-02 PROCEDURE — 96367 TX/PROPH/DG ADDL SEQ IV INF: CPT

## 2024-12-02 PROCEDURE — 25010000002 DEXAMETHASONE PER 1 MG: Performed by: INTERNAL MEDICINE

## 2024-12-02 PROCEDURE — G0498 CHEMO EXTEND IV INFUS W/PUMP: HCPCS

## 2024-12-02 PROCEDURE — 25010000002 FLUOROURACIL PER 500 MG: Performed by: INTERNAL MEDICINE

## 2024-12-02 PROCEDURE — 25810000003 SODIUM CHLORIDE 0.9 % SOLUTION 500 ML FLEX CONT: Performed by: INTERNAL MEDICINE

## 2024-12-02 PROCEDURE — 25010000002 PANITUMUMAB 400 MG/20ML SOLUTION 20 ML VIAL: Performed by: INTERNAL MEDICINE

## 2024-12-02 PROCEDURE — 99214 OFFICE O/P EST MOD 30 MIN: CPT | Performed by: INTERNAL MEDICINE

## 2024-12-02 PROCEDURE — 82378 CARCINOEMBRYONIC ANTIGEN: CPT | Performed by: INTERNAL MEDICINE

## 2024-12-02 PROCEDURE — 25010000002 PANITUMUMAB PER 10 MG: Performed by: INTERNAL MEDICINE

## 2024-12-02 PROCEDURE — 83735 ASSAY OF MAGNESIUM: CPT | Performed by: INTERNAL MEDICINE

## 2024-12-02 PROCEDURE — 80053 COMPREHEN METABOLIC PANEL: CPT | Performed by: INTERNAL MEDICINE

## 2024-12-02 RX ORDER — DEXAMETHASONE SODIUM PHOSPHATE 10 MG/ML
12 INJECTION INTRAMUSCULAR; INTRAVENOUS ONCE
Status: COMPLETED | OUTPATIENT
Start: 2024-12-02 | End: 2024-12-02

## 2024-12-02 RX ORDER — DEXTROSE MONOHYDRATE 50 MG/ML
20 INJECTION, SOLUTION INTRAVENOUS ONCE
Status: DISCONTINUED | OUTPATIENT
Start: 2024-12-02 | End: 2024-12-03 | Stop reason: HOSPADM

## 2024-12-02 RX ORDER — PALONOSETRON 0.05 MG/ML
0.25 INJECTION, SOLUTION INTRAVENOUS ONCE
Status: CANCELLED | OUTPATIENT
Start: 2024-12-02

## 2024-12-02 RX ORDER — TRAZODONE HYDROCHLORIDE 100 MG/1
100 TABLET ORAL NIGHTLY
Qty: 90 TABLET | OUTPATIENT
Start: 2024-12-02

## 2024-12-02 RX ORDER — PALONOSETRON 0.05 MG/ML
0.25 INJECTION, SOLUTION INTRAVENOUS ONCE
Status: COMPLETED | OUTPATIENT
Start: 2024-12-02 | End: 2024-12-02

## 2024-12-02 RX ORDER — DEXTROSE MONOHYDRATE 50 MG/ML
20 INJECTION, SOLUTION INTRAVENOUS ONCE
Status: CANCELLED | OUTPATIENT
Start: 2024-12-02

## 2024-12-02 RX ORDER — DEXAMETHASONE SODIUM PHOSPHATE 10 MG/ML
12 INJECTION INTRAMUSCULAR; INTRAVENOUS ONCE
Status: CANCELLED
Start: 2024-12-02

## 2024-12-02 RX ORDER — DIPHENHYDRAMINE HYDROCHLORIDE 50 MG/ML
50 INJECTION INTRAMUSCULAR; INTRAVENOUS AS NEEDED
Status: CANCELLED | OUTPATIENT
Start: 2024-12-02

## 2024-12-02 RX ORDER — POTASSIUM CHLORIDE 1500 MG/1
20 TABLET, EXTENDED RELEASE ORAL DAILY
Qty: 30 TABLET | Refills: 0 | Status: SHIPPED | OUTPATIENT
Start: 2024-12-02

## 2024-12-02 RX ORDER — TRAZODONE HYDROCHLORIDE 100 MG/1
100 TABLET ORAL NIGHTLY
Qty: 30 TABLET | Refills: 2 | Status: SHIPPED | OUTPATIENT
Start: 2024-12-02

## 2024-12-02 RX ORDER — HYDROCORTISONE SODIUM SUCCINATE 100 MG/2ML
100 INJECTION INTRAMUSCULAR; INTRAVENOUS AS NEEDED
Status: CANCELLED | OUTPATIENT
Start: 2024-12-02

## 2024-12-02 RX ORDER — SODIUM CHLORIDE 9 MG/ML
20 INJECTION, SOLUTION INTRAVENOUS ONCE
Status: CANCELLED | OUTPATIENT
Start: 2024-12-02

## 2024-12-02 RX ORDER — FAMOTIDINE 10 MG/ML
20 INJECTION, SOLUTION INTRAVENOUS AS NEEDED
Status: CANCELLED | OUTPATIENT
Start: 2024-12-02

## 2024-12-02 RX ORDER — SODIUM CHLORIDE 9 MG/ML
20 INJECTION, SOLUTION INTRAVENOUS ONCE
Status: DISCONTINUED | OUTPATIENT
Start: 2024-12-02 | End: 2024-12-03 | Stop reason: HOSPADM

## 2024-12-02 RX ADMIN — DEXAMETHASONE SODIUM PHOSPHATE 12 MG: 10 INJECTION INTRAMUSCULAR; INTRAVENOUS at 11:32

## 2024-12-02 RX ADMIN — PALONOSETRON 0.25 MG: 0.25 INJECTION, SOLUTION INTRAVENOUS at 11:30

## 2024-12-02 RX ADMIN — FLUOROURACIL 5140 MG: 50 INJECTION, SOLUTION INTRAVENOUS at 12:46

## 2024-12-02 RX ADMIN — PANITUMUMAB 550 MG: 400 SOLUTION INTRAVENOUS at 10:53

## 2024-12-02 RX ADMIN — LEUCOVORIN CALCIUM 860 MG: 10 INJECTION INTRAMUSCULAR; INTRAVENOUS at 11:40

## 2024-12-02 NOTE — PROGRESS NOTES
Hematology and Oncology [place]  Office number [phone]    Fax number [phone]     Follow up     Date: 24    Patient Name: Edward Powell  MRN: [id number]  : 1970    Referring Physician: Dr. Gautam    Chief Complaint: Rectal cancer, lung mass, liver lesions follow up on treatment    Cancer Staging:  IV    History of Present Illness: Edward Powell is a pleasant 54 y.o. male who presents today for evaluation of rectal cancer in the company of his supportive significant other.    Patient has a longstanding history of intermittent diarrhea since his cholecystectomy in 2019.  However he developed progressive diarrhea with associated loss of bowel control and urgency as well as weight loss and fatigue prompting additional workup.    CT of the abdomen pelvis 2023 showed an irregular circumferential wall thickening involving the rectum concerning for mass lesion.  There was associated mesorectal lymphadenopathy.  Masslike consolidation of left lower lobe.  CT of the chest showed a cavitary lesion in the left lower lobe up to 4.8 cm with an adjacent cavitary nodule up to 2 cm and a 5 mm nodule on the right minor fissure.      He underwent colonoscopy 2023 with findings of an infiltrative and ulcerated partially obstructing mass in the proximal rectum spanning 10 cm.  Rectal polyps.  Biopsy of the rectal mass showed invasive moderately differentiated adenocarcinoma.  Additional biopsies showed tubular adenomas.  MSI testing was intact/low probability of MSI high.    PDL1 negative    PET/CT 2023 showed hypermetabolic rectal wall thickening compatible with known rectal malignancy.  Multiple small ill-defined hypoechoic hyper metabolic liver lesions compatible with metastatic disease.  Mildly enlarged and mildly hypermetabolic pelvic sidewall and internal iliac lymph node chain adenopathy.  Hypermetabolic lung mass with adjacent nodule.     He underwent liver biopsy and lung  biopsy January 2024.  Results of both confirmed metastatic colorectal cancer.    The patient has been experiencing substantial rectal pain.  He is taking oxycodone every 6 hours with partial relief.  He reports ongoing bowel movements.  No abdominal pain.  No vomiting.  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    Treatment history:  FOLFOX cycle 1 -4  FOLFOX panitumumab cycle 5 onward  -Oxaliplatin terminated early for allergic reaction after 4/29/24    Interval history:  Got IVF on day 3 for asymptomatic hypotension with improvement in BP. Drinks a pot of coffee a day plus pop. Drinks 12 bottles of water. Eating well. Good appetite.  Had noted mushier stools with slight decrease caliber. Had not been feeling well at the same time.  No increased pain.   GERD controlled.   No fever or infectious symptoms.   Neuropathy in feet is stable on lyrica and morphine  Taking magnesium daily.    Past Medical History:   Past Medical History:   Diagnosis Date    Arthritis     Cancer     rectal cancer - diagnosed 2023    Cholelithiasis 2019    Removed    COPD (chronic obstructive pulmonary disease)     Coronary artery disease 2009    Stent - no cardiologist currently    Elevated cholesterol     GERD (gastroesophageal reflux disease)     Hernia 2003    Lower hernia    Perforated ulcer 2019    Sleep apnea     history of; when weighed over 400lbs - no issues following bariatric surgery       Past Surgical History:   Past Surgical History:   Procedure Laterality Date    APPENDECTOMY  1983    Removed    BARIATRIC SURGERY  2011    Gastric bypass    BLADDER TUMOR/ULCER BLEEDER CAUTERIZATION      CARDIAC CATHETERIZATION  2009    stent placed    CHOLECYSTECTOMY  2019    Removed    COLONOSCOPY N/A  12/07/2023    Procedure: COLONOSCOPY WITH HOT SNARE POLYPECTOMY AND TATTOO;  Surgeon: Noman Gautam MD;  Location: [facility] ENDOSCOPY;  Service: Gastroenterology;  Laterality: N/A;    PORTACATH PLACEMENT N/A 1/5/2024    Procedure: INSERTION OF PORTACATH WITH ULTRSOUND AND FLUOROSCOPIC GUIDANCE;  Surgeon: Ida Black MD;  Location: [facility] OR;  Service: General;  Laterality: N/A;    JENNIFER-EN-Y         Family History: No family history on file.  Uncle had colon cancer    Social History:   Social History     Socioeconomic History    Marital status:    Tobacco Use    Smoking status: Every Day     Current packs/day: 1.00     Average packs/day: 0.9 packs/day for 60.2 years (52.7 ttl pk-yrs)     Types: Cigarettes     Start date: 9/6/1994    Smokeless tobacco: Never    Tobacco comments:     35 years      pt reports closer to 2 packs per day since cancer diagnosis   Vaping Use    Vaping status: Never Used   Substance and Sexual Activity    Alcohol use: Never    Drug use: Never    Sexual activity: Defer       Medications:     Current Outpatient Medications:     buPROPion XL (WELLBUTRIN XL) 150 MG 24 hr tablet, TAKE 1 TABLET BY MOUTH DAILY, Disp: 90 tablet, Rfl: 1    cetirizine (zyrTEC) 10 MG tablet, Take 1 tablet by mouth Daily., Disp: 30 tablet, Rfl: 2    clindamycin (Clindagel) 1 % gel, Apply 1 Application topically to the appropriate area as directed 2 (Two) Times a Day. Use first, Disp: 30 g, Rfl: 1    dexAMETHasone 0.5 MG/5ML solution, Take 10 mL by mouth 2 (Two) Times a Day. Swish for 2 minutes and spit 10 mL 4 x daily, Disp: 240 mL, Rfl: 5    dicyclomine (BENTYL) 20 MG tablet, Take 1 tablet by mouth 3 (Three) Times a Day As Needed for Abdominal Cramping., Disp: 30 tablet, Rfl: 3    docusate sodium (COLACE) 100 MG capsule, Take 1 capsule by mouth 2 (Two) Times a Day., Disp: , Rfl:     doxycycline (MONODOX) 100 MG capsule, , Disp: , Rfl:     doxycycline (VIBRAMYICN) 100 MG tablet, , Disp: ,  Rfl:     famotidine (PEPCID) 20 MG tablet, Take 1 tablet by mouth 2 (Two) Times a Day., Disp: 60 tablet, Rfl: 4    Hydrocortisone, Perianal, (ANUSOL-HC) 2.5 % rectal cream, Insert  into the rectum 2 (Two) Times a Day. Indications: Inflamed Hemorrhoids, Disp: 30 g, Rfl: 1    lidocaine-prilocaine (EMLA) 2.5-2.5 % cream, Apply 1 Application topically to the appropriate area as directed As Needed (45-60 minutes prior to port access.  Cover with saran/plastic wrap.)., Disp: 30 g, Rfl: 3    LORazepam (ATIVAN) 0.5 MG tablet, Take 1 tablet by mouth Take As Directed. 1 tabelt by mouth 30 minutes prior to MRI, take a second tablet at the time of the MRI if needed., Disp: 2 tablet, Rfl: 0    Magic Mouthwash Oral Suspension (diphenhydrAMINE HCl - aluminum & magnesium hydroxide-simethicone - lidocaine - nystatin), Swish and Spit 10 mL by mouth every 6 (Six) Hours For 7 Days., Disp: 300 mL, Rfl: 3    magnesium oxide (MAG-OX) 400 MG tablet, Take 1 tablet by mouth Daily., Disp: 30 tablet, Rfl: 5    montelukast (Singulair) 10 MG tablet, Take 1 tablet by mouth Every Night., Disp: 30 tablet, Rfl: 1    Morphine (MS CONTIN) 15 MG 12 hr tablet, Take 1 tablet by mouth 2 (Two) Times a Day for 30 days., Disp: 60 tablet, Rfl: 0    Movantik 25 MG tablet, , Disp: , Rfl:     mupirocin (BACTROBAN) 2 % cream, , Disp: , Rfl:     naloxone (NARCAN) 4 MG/0.1ML nasal spray, 1 spray into the nostril(s) as directed by provider As Needed for Opioid Reversal., Disp: 1 each, Rfl: 0    nystatin (MYCOSTATIN) 100,000 unit/mL suspension, Swish and Swallow 5mL by mouth four times a day, Disp: 473 mL, Rfl: 0    nystatin (MYCOSTATIN) 100,000 unit/mL suspension, Swish and swallow 5 mL 4 (Four) Times a Day., Disp: 473 mL, Rfl: 0    nystatin susp + lidocaine viscous (MAGIC MOUTHWASH) oral suspension, 5-10 ml swish and spit or swallow QID prn, Disp: 240 mL, Rfl: 3    ondansetron (ZOFRAN) 8 MG tablet, Take 1 tablet by mouth 3 (Three) Times a Day As Needed for Nausea  "or Vomiting., Disp: 30 tablet, Rfl: 5    oxyCODONE (ROXICODONE) 15 MG immediate release tablet, Take 1 tablet by mouth 5 (Five) Times a Day As Needed for Moderate Pain or Severe Pain for up to 30 days., Disp: 150 tablet, Rfl: 0    pantoprazole (Protonix) 40 MG EC tablet, Take 1 tablet by mouth Daily. Indications: Gastroesophageal Reflux Disease, Disp: 90 tablet, Rfl: 2    pregabalin (Lyrica) 300 MG capsule, Take 1 capsule by mouth 2 (Two) Times a Day for 30 days., Disp: 60 capsule, Rfl: 0    tiotropium bromide-olodaterol (Stiolto Respimat) 2.5-2.5 MCG/ACT aerosol solution inhaler, INHALE 2 INHALATION(S) BY MOUTH DAILY, Disp: 4 g, Rfl: 5    traZODone (DESYREL) 100 MG tablet, Take 1 tablet by mouth Every Night., Disp: 30 tablet, Rfl: 2    triamcinolone (KENALOG) 0.025 % ointment, Apply 1 Application topically to the appropriate area as directed 2 (Two) Times a Day. Use second if topical treatment #1 ineffective, Disp: 80 g, Rfl: 0    Allergies:   Allergies   Allergen Reactions    Bactrim [Sulfamethoxazole-Trimethoprim] Hives     and blisters    Sulfa Antibiotics Hives     and blisters       Objective     Vital Signs:   Vitals:    12/02/24 0854   BP: 103/51   Pulse: 61   Resp: 16   Temp: 97.3 °F (36.3 °C)   SpO2: 99%   Weight: 90.7 kg (200 lb)   Height: 182.9 cm (72.01\")   PainSc:   6        Body mass index is 27.12 kg/m².   Pain Score    12/02/24 0854   PainSc:   6     ECOG Performance Status: 1 - Symptomatic but completely ambulatory    Physical Exam:   General: No acute distress. Well appearing   HEENT: Normocephalic, atraumatic. Sclera anicteric. Dry mouth, mild erythema  Neck: supple, no adenopathy.   Cardiovascular: regular rate and rhythm. No murmurs.   Respiratory: Normal rate. Clear to auscultation bilaterally  Abdomen: Soft, nontender, non distended with normoactive bowel sounds  Lymph: no cervical, supraclavicular or axillary adenopathy  Neuro: Alert and oriented x 3. No focal deficits.   Ext: Symmetric, no " swelling.   Accurate 12/2/24    Laboratory/Imaging Reviewed:   Hospital Outpatient Visit on 12/02/2024   Component Date Value Ref Range Status    Glucose 12/02/2024 102 (H)  65 - 99 mg/dL Final    BUN 12/02/2024 10  6 - 20 mg/dL Final    Creatinine 12/02/2024 0.72 (L)  0.76 - 1.27 mg/dL Final    Sodium 12/02/2024 135 (L)  136 - 145 mmol/L Final    Potassium 12/02/2024 3.3 (L)  3.5 - 5.2 mmol/L Final    Chloride 12/02/2024 102  98 - 107 mmol/L Final    CO2 12/02/2024 23.3  22.0 - 29.0 mmol/L Final    Calcium 12/02/2024 8.2 (L)  8.6 - 10.5 mg/dL Final    Total Protein 12/02/2024 6.6  6.0 - 8.5 g/dL Final    Albumin 12/02/2024 3.1 (L)  3.5 - 5.2 g/dL Final    ALT (SGPT) 12/02/2024 7  1 - 41 U/L Final    AST (SGOT) 12/02/2024 13  1 - 40 U/L Final    Alkaline Phosphatase 12/02/2024 139 (H)  39 - 117 U/L Final    Total Bilirubin 12/02/2024 0.5  0.0 - 1.2 mg/dL Final    Globulin 12/02/2024 3.5  gm/dL Final    A/G Ratio 12/02/2024 0.9  g/dL Final    BUN/Creatinine Ratio 12/02/2024 13.9  7.0 - 25.0 Final    Anion Gap 12/02/2024 9.7  5.0 - 15.0 mmol/L Final    eGFR 12/02/2024 107.9  >60.0 mL/min/1.73 Final    Magnesium 12/02/2024 1.6  1.6 - 2.6 mg/dL Final    WBC 12/02/2024 10.96 (H)  3.40 - 10.80 10*3/mm3 Final    RBC 12/02/2024 4.54  4.14 - 5.80 10*6/mm3 Final    Hemoglobin 12/02/2024 11.2 (L)  13.0 - 17.7 g/dL Final    Hematocrit 12/02/2024 36.2 (L)  37.5 - 51.0 % Final    MCV 12/02/2024 79.7  79.0 - 97.0 fL Final    MCH 12/02/2024 24.7 (L)  26.6 - 33.0 pg Final    MCHC 12/02/2024 30.9 (L)  31.5 - 35.7 g/dL Final    RDW 12/02/2024 18.6 (H)  12.3 - 15.4 % Final    RDW-SD 12/02/2024 52.3  37.0 - 54.0 fl Final    MPV 12/02/2024 11.3  6.0 - 12.0 fL Final    Platelets 12/02/2024 195  140 - 450 10*3/mm3 Final    Neutrophil % 12/02/2024 79.3 (H)  42.7 - 76.0 % Final    Lymphocyte % 12/02/2024 12.0 (L)  19.6 - 45.3 % Final    Monocyte % 12/02/2024 6.8  5.0 - 12.0 % Final    Eosinophil % 12/02/2024 1.0  0.3 - 6.2 % Final     Basophil % 12/02/2024 0.4  0.0 - 1.5 % Final    Immature Grans % 12/02/2024 0.5  0.0 - 0.5 % Final    Neutrophils, Absolute 12/02/2024 8.71 (H)  1.70 - 7.00 10*3/mm3 Final    Lymphocytes, Absolute 12/02/2024 1.31  0.70 - 3.10 10*3/mm3 Final    Monocytes, Absolute 12/02/2024 0.74  0.10 - 0.90 10*3/mm3 Final    Eosinophils, Absolute 12/02/2024 0.11  0.00 - 0.40 10*3/mm3 Final    Basophils, Absolute 12/02/2024 0.04  0.00 - 0.20 10*3/mm3 Final    Immature Grans, Absolute 12/02/2024 0.05  0.00 - 0.05 10*3/mm3 Final    nRBC 12/02/2024 0.0  0.0 - 0.2 /100 WBC Final     Tempus xt: APC gene TP53; Negative ADRIANNA, BRAF, NRAS, TMB stable. PDL1 negative  CT Chest Without Contrast Diagnostic    Result Date: 11/22/2024  Narrative: PROCEDURE: CT CHEST WO CONTRAST DIAGNOSTIC-  HISTORY: Follow up pneumonia and possible worsening metastatic disease; C48-[id number] neoplasm of rectum; C78.7-Secondary malignant neoplasm of liver and intrahepatic bile duct  COMPARISON: October 10, 2024.  PROCEDURE:  Multiple axial CT images were obtained from the thoracic inlet through the upper abdomen without the use of contrast.  FINDINGS: Soft tissue windows reveal no axillary, hilar or mediastinal adenopathy. Heart size is normal. The aorta is normal in caliber. There are no pleural or pericardial effusions. There are faint groundglass opacities in the left upper lobe which have improved suggesting improving pneumonia. Radiation change is another consideration. A cavitary mass in the left lower lobe now measures 36 x 20 mm, previously 42 x 25 mm. An adjacent partially solid density along the superior aspect measures 12 mm, previously 15 mm. A subpleural right middle lobe nodule measures 4 mm on image #46, stable.      Impression: 1. Improving cavitary mass in the left lower lobe. 2. Improved groundglass opacity in the left upper lobe may represent improving pneumonia or evolving radiation change. 3. No new abnormality identified.    CTDI: 4.7 mGy DLP:  206.32 mGy.cm    This study was performed with techniques to keep radiation doses as low as reasonably achievable (ALARA). Individualized dose reduction techniques using automated exposure control or adjustment of mA and/or kV according to the patient size were employed.     Images were reviewed, interpreted, and dictated by Dr. Serjio Wright MD Transcribed by Krysta Aldana PA-C.  This report was signed and finalized on 11/22/2024 12:59 PM by Serjio Wright MD.       Procedures    Assessment / Plan      Assessment/Plan:     1.  Rectal cancer   2.  Liver metastases  3.  Lung metastasis  4.  Chemo monitoring  5.  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  -He was found to have biopsy-proven metastasis in the liver and lung.  His case was presented at multidisciplinary tumor board.  -Because of metastatic disease to both the lung and liver I do not think he will be downstage to resectable disease.  -Tempus results which are notable for an APC mutation, T p53 mutation, negative for KRAS, BRAF, NRAS, tumor mutation burden stable.  Notch 1 VUS.  -CBC adequate and CMP pending for treatment today with maintenance 5-FU and panitumumab 9/16/24  Chemotherapy orders and premedications signed9/16/24  -We reviewed his imaging which is consistent with excellent disease control in early July with normalization of CEA. He has had persistent rectal pain with escalating oxycodone requirements over the past couple of months, but no severe recent increase in pain, fever. WBC normal. 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 I reviewed his EUA notes. Increase doxy to BID. No surgical intervention.  -Consolidative radiotherapy to the rectal tumor is not something he wants to pursue initially but he now states he may reconsider, after the holidays. Particularly if continued changes to stool caliber, consider repeat MRI and radiation  consultation.   -Continue maintenance 5FU/panitumumab.   -Scans show equivocal progression in lung nodule confused by concurrent PNA 10/2024. But  Chest CT after 6 weeks to ensure PNA clearing and re-evaluate lung nodule reviewed and shows stable disease, improved PNA.   -Orders signed today, Labs pending    6.  Cancer related pain  -Now following with palliative care and pain is well controlled on lyrica, oxycodone and MS contin. Follow up with them if symptoms persist.     7.  Panitumumab induced rash  -Continue doxycycline.     8. GERD  PPI    9. Mucositis  -MMW as needed  -Improved currently.    10. Seasonal allergies  -Add singulair at night, better    11. Access  -Port    12. GERD  There are no Patient Instructions on file for this visit.     Follow Up:   2 weeks     Brenda Cronin MD  Hematology and Oncology

## 2024-12-04 ENCOUNTER — HOSPITAL ENCOUNTER (OUTPATIENT)
Facility: HOSPITAL | Age: 54
Discharge: HOME | End: 2024-12-04
Admitting: INTERNAL MEDICINE
Payer: COMMERCIAL

## 2024-12-04 VITALS
RESPIRATION RATE: 12 BRPM | HEART RATE: 54 BPM | TEMPERATURE: 98.2 F | WEIGHT: 203.5 LBS | OXYGEN SATURATION: 100 % | SYSTOLIC BLOOD PRESSURE: 92 MMHG | BODY MASS INDEX: 27.59 KG/M2 | DIASTOLIC BLOOD PRESSURE: 54 MMHG

## 2024-12-04 DIAGNOSIS — C78.7 RECTAL CANCER METASTASIZED TO LIVER: ICD-10-CM

## 2024-12-04 DIAGNOSIS — C20 RECTAL ADENOCARCINOMA: Primary | ICD-10-CM

## 2024-12-04 DIAGNOSIS — C20 RECTAL CANCER METASTASIZED TO LIVER: ICD-10-CM

## 2024-12-04 PROCEDURE — 25010000002 HEPARIN LOCK FLUSH PER 10 UNITS: Performed by: INTERNAL MEDICINE

## 2024-12-04 PROCEDURE — 96523 IRRIG DRUG DELIVERY DEVICE: CPT

## 2024-12-04 RX ORDER — HEPARIN SODIUM (PORCINE) LOCK FLUSH IV SOLN 100 UNIT/ML 100 UNIT/ML
500 SOLUTION INTRAVENOUS AS NEEDED
OUTPATIENT
Start: 2024-12-04

## 2024-12-04 RX ORDER — SODIUM CHLORIDE 0.9 % (FLUSH) 0.9 %
10 SYRINGE (ML) INJECTION AS NEEDED
OUTPATIENT
Start: 2024-12-04

## 2024-12-04 RX ORDER — SODIUM CHLORIDE 0.9 % (FLUSH) 0.9 %
10 SYRINGE (ML) INJECTION AS NEEDED
Status: DISCONTINUED | OUTPATIENT
Start: 2024-12-04 | End: 2024-12-05 | Stop reason: HOSPADM

## 2024-12-04 RX ORDER — HEPARIN SODIUM (PORCINE) LOCK FLUSH IV SOLN 100 UNIT/ML 100 UNIT/ML
500 SOLUTION INTRAVENOUS AS NEEDED
Status: DISCONTINUED | OUTPATIENT
Start: 2024-12-04 | End: 2024-12-05 | Stop reason: HOSPADM

## 2024-12-04 RX ADMIN — Medication 10 ML: at 13:42

## 2024-12-04 RX ADMIN — HEPARIN 500 UNITS: 100 SYRINGE at 13:42

## 2024-12-06 ENCOUNTER — OFFICE VISIT (OUTPATIENT)
Age: 54
End: 2024-12-06
Payer: COMMERCIAL

## 2024-12-06 VITALS
DIASTOLIC BLOOD PRESSURE: 57 MMHG | WEIGHT: 193 LBS | HEIGHT: 72 IN | BODY MASS INDEX: 26.14 KG/M2 | TEMPERATURE: 98 F | SYSTOLIC BLOOD PRESSURE: 105 MMHG | OXYGEN SATURATION: 97 % | HEART RATE: 67 BPM

## 2024-12-06 DIAGNOSIS — C20 RECTAL ADENOCARCINOMA: ICD-10-CM

## 2024-12-06 DIAGNOSIS — I95.9 HYPOTENSION, UNSPECIFIED HYPOTENSION TYPE: Primary | ICD-10-CM

## 2024-12-06 PROCEDURE — 99213 OFFICE O/P EST LOW 20 MIN: CPT | Performed by: FAMILY MEDICINE

## 2024-12-06 NOTE — PROGRESS NOTES
Follow Up Office Visit      Date: 2024   Patient Name: Edward Powell  : 1970   MRN: [id number]     Chief Complaint:    Chief Complaint   Patient presents with    Hypotension     Blood pressure running low  Follow up neuropathic pain       History of Present Illness: Edward Powell is a 54 y.o. male who is here today for hypotension.  States that he has recently had PNA in his left lung, which has inhibited some of his chemotherapy regimen.  Is somewhat concerned his blood pressure is running somewhat low.  However the patient does deny any type of dizziness or presyncopal issues whenever he goes from sitting to standing.    Subjective      Review of Systems:   Review of Systems   Constitutional:  Negative for appetite change and unexpected weight loss.   HENT:  Negative for trouble swallowing.    Eyes:  Negative for blurred vision and double vision.   Respiratory:  Negative for cough and shortness of breath.    Cardiovascular:  Negative for chest pain and leg swelling.   Gastrointestinal:  Negative for blood in stool.   Endocrine: Negative for cold intolerance, heat intolerance and polyuria.   Musculoskeletal:  Negative for joint swelling.   Skin:  Negative for color change and bruise.   Neurological:  Negative for numbness and memory problem.   Hematological:  Does not bruise/bleed easily.   Psychiatric/Behavioral:  Negative for suicidal ideas and depressed mood. The patient is not nervous/anxious.        I have reviewed the patients family history, social history, past medical history, past surgical history and have updated it as appropriate.     Medications:     Current Outpatient Medications:     buPROPion XL (WELLBUTRIN XL) 150 MG 24 hr tablet, TAKE 1 TABLET BY MOUTH DAILY, Disp: 90 tablet, Rfl: 1    cetirizine (zyrTEC) 10 MG tablet, Take 1 tablet by mouth Daily., Disp: 30 tablet, Rfl: 2    clindamycin (Clindagel) 1 % gel, Apply 1 Application topically to the appropriate area as  directed 2 (Two) Times a Day. Use first, Disp: 30 g, Rfl: 1    dexAMETHasone 0.5 MG/5ML solution, Take 10 mL by mouth 2 (Two) Times a Day. Swish for 2 minutes and spit 10 mL 4 x daily, Disp: 240 mL, Rfl: 5    dicyclomine (BENTYL) 20 MG tablet, Take 1 tablet by mouth 3 (Three) Times a Day As Needed for Abdominal Cramping., Disp: 30 tablet, Rfl: 3    docusate sodium (COLACE) 100 MG capsule, Take 1 capsule by mouth 2 (Two) Times a Day., Disp: , Rfl:     doxycycline (MONODOX) 100 MG capsule, , Disp: , Rfl:     doxycycline (VIBRAMYICN) 100 MG tablet, , Disp: , Rfl:     famotidine (PEPCID) 20 MG tablet, Take 1 tablet by mouth 2 (Two) Times a Day., Disp: 60 tablet, Rfl: 4    Hydrocortisone, Perianal, (ANUSOL-HC) 2.5 % rectal cream, Insert  into the rectum 2 (Two) Times a Day. Indications: Inflamed Hemorrhoids, Disp: 30 g, Rfl: 1    lidocaine-prilocaine (EMLA) 2.5-2.5 % cream, Apply 1 Application topically to the appropriate area as directed As Needed (45-60 minutes prior to port access.  Cover with saran/plastic wrap.)., Disp: 30 g, Rfl: 3    LORazepam (ATIVAN) 0.5 MG tablet, Take 1 tablet by mouth Take As Directed. 1 tabelt by mouth 30 minutes prior to MRI, take a second tablet at the time of the MRI if needed., Disp: 2 tablet, Rfl: 0    Magic Mouthwash Oral Suspension (diphenhydrAMINE HCl - aluminum & magnesium hydroxide-simethicone - lidocaine - nystatin), Swish and Spit 10 mL by mouth every 6 (Six) Hours For 7 Days. (Patient not taking: Reported on 12/6/2024), Disp: 300 mL, Rfl: 3    magnesium oxide (MAG-OX) 400 MG tablet, Take 1 tablet by mouth Daily. (Patient not taking: Reported on 12/6/2024), Disp: 30 tablet, Rfl: 5    montelukast (Singulair) 10 MG tablet, Take 1 tablet by mouth Every Night., Disp: 30 tablet, Rfl: 1    Morphine (MS CONTIN) 15 MG 12 hr tablet, Take 1 tablet by mouth 2 (Two) Times a Day for 30 days., Disp: 60 tablet, Rfl: 0    Movantik 25 MG tablet, , Disp: , Rfl:     mupirocin (BACTROBAN) 2 % cream,  , Disp: , Rfl:     naloxone (NARCAN) 4 MG/0.1ML nasal spray, 1 spray into the nostril(s) as directed by provider As Needed for Opioid Reversal., Disp: 1 each, Rfl: 0    nystatin (MYCOSTATIN) 100,000 unit/mL suspension, Swish and Swallow 5mL by mouth four times a day (Patient not taking: Reported on 12/6/2024), Disp: 473 mL, Rfl: 0    nystatin (MYCOSTATIN) 100,000 unit/mL suspension, Swish and swallow 5 mL 4 (Four) Times a Day. (Patient not taking: Reported on 12/6/2024), Disp: 473 mL, Rfl: 0    nystatin susp + lidocaine viscous (MAGIC MOUTHWASH) oral suspension, 5-10 ml swish and spit or swallow QID prn (Patient not taking: Reported on 12/6/2024), Disp: 240 mL, Rfl: 3    ondansetron (ZOFRAN) 8 MG tablet, Take 1 tablet by mouth 3 (Three) Times a Day As Needed for Nausea or Vomiting., Disp: 30 tablet, Rfl: 5    oxyCODONE (ROXICODONE) 15 MG immediate release tablet, Take 1 tablet by mouth 5 (Five) Times a Day As Needed for Moderate Pain or Severe Pain for up to 30 days., Disp: 150 tablet, Rfl: 0    pantoprazole (Protonix) 40 MG EC tablet, Take 1 tablet by mouth Daily. Indications: Gastroesophageal Reflux Disease, Disp: 90 tablet, Rfl: 2    potassium chloride (KLOR-CON M20) 20 MEQ CR tablet, Take 1 tablet by mouth Daily., Disp: 30 tablet, Rfl: 0    pregabalin (Lyrica) 300 MG capsule, Take 1 capsule by mouth 2 (Two) Times a Day for 30 days., Disp: 60 capsule, Rfl: 0    tiotropium bromide-olodaterol (Stiolto Respimat) 2.5-2.5 MCG/ACT aerosol solution inhaler, INHALE 2 INHALATION(S) BY MOUTH DAILY, Disp: 4 g, Rfl: 5    traZODone (DESYREL) 100 MG tablet, Take 1 tablet by mouth Every Night., Disp: 30 tablet, Rfl: 2    triamcinolone (KENALOG) 0.025 % ointment, Apply 1 Application topically to the appropriate area as directed 2 (Two) Times a Day. Use second if topical treatment #1 ineffective, Disp: 80 g, Rfl: 0    Allergies:   Allergies   Allergen Reactions    Bactrim [Sulfamethoxazole-Trimethoprim] Hives     and blisters     "Sulfa Antibiotics Hives     and blisters       Objective     Physical Exam: Please see above  Vital Signs:   Vitals:    12/06/24 1108   BP: 105/57   Pulse: 67   Temp: 98 °F (36.7 °C)   SpO2: 97%   Weight: 87.5 kg (193 lb)   Height: 182.9 cm (72.01\")     Body mass index is 26.17 kg/m².    Physical Exam  Vitals and nursing note reviewed.   Constitutional:       Appearance: Normal appearance.   HENT:      Head: Normocephalic and atraumatic.   Eyes:      General: Lids are normal.      Conjunctiva/sclera: Conjunctivae normal.   Cardiovascular:      Rate and Rhythm: Normal rate and regular rhythm.   Pulmonary:      Effort: Pulmonary effort is normal.      Breath sounds: Normal breath sounds and air entry.   Abdominal:      General: Abdomen is flat. Bowel sounds are normal.      Palpations: Abdomen is soft.   Musculoskeletal:      Cervical back: Full passive range of motion without pain and normal range of motion.   Neurological:      General: No focal deficit present.      Mental Status: He is alert and oriented to person, place, and time.   Psychiatric:         Attention and Perception: Attention normal.         Mood and Affect: Mood normal.         Behavior: Behavior normal. Behavior is cooperative.         Procedures    Results:   Labs:   No results found for: \"HGBA1C\", \"CMP\", \"CBCDIFFPANEL\", \"CREAT\", \"TSH\"     POCT Results (if applicable):   Results for orders placed or performed during the hospital encounter of 12/02/24   CEA    Collection Time: 12/02/24  8:32 AM    Specimen: Blood   Result Value Ref Range    CEA 5.61 ng/mL   Comprehensive Metabolic Panel    Collection Time: 12/02/24  8:32 AM    Specimen: Blood   Result Value Ref Range    Glucose 102 (H) 65 - 99 mg/dL    BUN 10 6 - 20 mg/dL    Creatinine 0.72 (L) 0.76 - 1.27 mg/dL    Sodium 135 (L) 136 - 145 mmol/L    Potassium 3.3 (L) 3.5 - 5.2 mmol/L    Chloride 102 98 - 107 mmol/L    CO2 23.3 22.0 - 29.0 mmol/L    Calcium 8.2 (L) 8.6 - 10.5 mg/dL    Total Protein " 6.6 6.0 - 8.5 g/dL    Albumin 3.1 (L) 3.5 - 5.2 g/dL    ALT (SGPT) 7 1 - 41 U/L    AST (SGOT) 13 1 - 40 U/L    Alkaline Phosphatase 139 (H) 39 - 117 U/L    Total Bilirubin 0.5 0.0 - 1.2 mg/dL    Globulin 3.5 gm/dL    A/G Ratio 0.9 g/dL    BUN/Creatinine Ratio 13.9 7.0 - 25.0    Anion Gap 9.7 5.0 - 15.0 mmol/L    eGFR 107.9 >60.0 mL/min/1.73   Magnesium    Collection Time: 12/02/24  8:32 AM    Specimen: Blood   Result Value Ref Range    Magnesium 1.6 1.6 - 2.6 mg/dL   CBC Auto Differential    Collection Time: 12/02/24  8:32 AM    Specimen: Blood   Result Value Ref Range    WBC 10.96 (H) 3.40 - 10.80 10*3/mm3    RBC 4.54 4.14 - 5.80 10*6/mm3    Hemoglobin 11.2 (L) 13.0 - 17.7 g/dL    Hematocrit 36.2 (L) 37.5 - 51.0 %    MCV 79.7 79.0 - 97.0 fL    MCH 24.7 (L) 26.6 - 33.0 pg    MCHC 30.9 (L) 31.5 - 35.7 g/dL    RDW 18.6 (H) 12.3 - 15.4 %    RDW-SD 52.3 37.0 - 54.0 fl    MPV 11.3 6.0 - 12.0 fL    Platelets 195 140 - 450 10*3/mm3    Neutrophil % 79.3 (H) 42.7 - 76.0 %    Lymphocyte % 12.0 (L) 19.6 - 45.3 %    Monocyte % 6.8 5.0 - 12.0 %    Eosinophil % 1.0 0.3 - 6.2 %    Basophil % 0.4 0.0 - 1.5 %    Immature Grans % 0.5 0.0 - 0.5 %    Neutrophils, Absolute 8.71 (H) 1.70 - 7.00 10*3/mm3    Lymphocytes, Absolute 1.31 0.70 - 3.10 10*3/mm3    Monocytes, Absolute 0.74 0.10 - 0.90 10*3/mm3    Eosinophils, Absolute 0.11 0.00 - 0.40 10*3/mm3    Basophils, Absolute 0.04 0.00 - 0.20 10*3/mm3    Immature Grans, Absolute 0.05 0.00 - 0.05 10*3/mm3    nRBC 0.0 0.0 - 0.2 /100 WBC       Imaging:   No valid procedures specified.         Assessment / Plan      Assessment/Plan:   Diagnoses and all orders for this visit:    1. Hypotension, unspecified hypotension type (Primary)   - As of right now the patient blood pressure seems to be about normal for him home at least compared to the last 6 months that he has been followed in this office.  Given the fact that he does not seem to have any overt symptoms with hypotension I will hold off on  treating this.  Did talk to him about the potential of adding something such as midodrine if his oncologist feels that his low blood pressure is an issue and will interfere with his cancer treatment.  Patient is to talk to his oncologist and let us know.    2. Rectal adenocarcinoma   - Currently being treated by oncology.  Continue to follow and support the patient.               Vaccine Counseling:      Follow Up:   Return in about 3 months (around 3/6/2025) for Recheck.      Westley Gonzales, DO   [facility]

## 2024-12-09 DIAGNOSIS — G89.3 CANCER RELATED PAIN: ICD-10-CM

## 2024-12-09 DIAGNOSIS — G62.0 CHEMOTHERAPY-INDUCED NEUROPATHY: ICD-10-CM

## 2024-12-09 DIAGNOSIS — T45.1X5A CHEMOTHERAPY-INDUCED NEUROPATHY: ICD-10-CM

## 2024-12-09 RX ORDER — OXYCODONE HYDROCHLORIDE 15 MG/1
15 TABLET ORAL
Qty: 150 TABLET | Refills: 0 | Status: SHIPPED | OUTPATIENT
Start: 2024-12-09 | End: 2025-01-08

## 2024-12-09 RX ORDER — MORPHINE SULFATE 15 MG/1
15 TABLET, FILM COATED, EXTENDED RELEASE ORAL 2 TIMES DAILY
Qty: 60 TABLET | Refills: 0 | Status: SHIPPED | OUTPATIENT
Start: 2024-12-09 | End: 2025-01-08

## 2024-12-09 RX ORDER — PREGABALIN 300 MG/1
300 CAPSULE ORAL 2 TIMES DAILY
Qty: 60 CAPSULE | Refills: 0 | Status: SHIPPED | OUTPATIENT
Start: 2024-12-09 | End: 2025-01-08

## 2024-12-09 NOTE — TELEPHONE ENCOUNTER
RACIEL #: [id number]      Medication requested: Morphine (MS CONTIN) 15 MG 12 hr tablet     Last fill date: 11/9/24      Medication requested: oxyCODONE (ROXICODONE) 15 MG immediate release tablet     Last fill date: 11/9/24      Medication requested: pregabalin (Lyrica) 300 MG capsule     Last fill date: 11/8/24      Last appointment: 9/27/24    Next appointment: 12/27/24

## 2024-12-09 NOTE — TELEPHONE ENCOUNTER
Caller: Edward Powell RAUL    Relationship: Self    Best call back number: [phone] (Mobile)     Requested Prescriptions:   Requested Prescriptions     Pending Prescriptions Disp Refills    Morphine (MS CONTIN) 15 MG 12 hr tablet 60 tablet 0     Sig: Take 1 tablet by mouth 2 (Two) Times a Day for 30 days.    pregabalin (Lyrica) 300 MG capsule 60 capsule 0     Sig: Take 1 capsule by mouth 2 (Two) Times a Day for 30 days.    oxyCODONE (ROXICODONE) 15 MG immediate release tablet 150 tablet 0     Sig: Take 1 tablet by mouth 5 (Five) Times a Day As Needed for Moderate Pain or Severe Pain for up to 30 days.        Pharmacy where request should be sent:    [facility] [id number] - [place] [address] AT [facility] - [phone] [facility] [phone]  [phone]          Last office visit with prescribing clinician: 9/27/2024   Last telemedicine visit with prescribing clinician: Visit date not found   Next office visit with prescribing clinician: 12/27/2024     Additional details provided by patient:     Does the patient have less than a 3 day supply:  [x] Yes  [] No    Would you like a call back once the refill request has been completed: [x] Yes [] No    If the office needs to give you a call back, can they leave a voicemail: [x] Yes [] No    Mariposa Pedro Rep   12/09/24 08:18 EST

## 2024-12-17 ENCOUNTER — HOSPITAL ENCOUNTER (OUTPATIENT)
Facility: HOSPITAL | Age: 54
Discharge: HOME | End: 2024-12-17
Payer: COMMERCIAL

## 2024-12-17 ENCOUNTER — OFFICE VISIT (OUTPATIENT)
Dept: ONCOLOGY | Facility: CLINIC | Age: 54
End: 2024-12-17
Payer: COMMERCIAL

## 2024-12-17 VITALS
SYSTOLIC BLOOD PRESSURE: 110 MMHG | BODY MASS INDEX: 26.51 KG/M2 | HEIGHT: 72 IN | RESPIRATION RATE: 16 BRPM | OXYGEN SATURATION: 95 % | WEIGHT: 195.7 LBS | TEMPERATURE: 97.7 F | HEART RATE: 87 BPM | DIASTOLIC BLOOD PRESSURE: 55 MMHG

## 2024-12-17 DIAGNOSIS — C20 RECTAL CANCER METASTASIZED TO LIVER: Primary | ICD-10-CM

## 2024-12-17 DIAGNOSIS — C78.7 RECTAL CANCER METASTASIZED TO LIVER: Primary | ICD-10-CM

## 2024-12-17 DIAGNOSIS — R05.1 ACUTE COUGH: ICD-10-CM

## 2024-12-17 PROCEDURE — 99214 OFFICE O/P EST MOD 30 MIN: CPT | Performed by: NURSE PRACTITIONER

## 2024-12-17 RX ORDER — DIPHENHYDRAMINE HYDROCHLORIDE 50 MG/ML
50 INJECTION INTRAMUSCULAR; INTRAVENOUS AS NEEDED
Status: CANCELLED | OUTPATIENT
Start: 2024-12-31

## 2024-12-17 RX ORDER — PALONOSETRON 0.05 MG/ML
0.25 INJECTION, SOLUTION INTRAVENOUS ONCE
Status: CANCELLED | OUTPATIENT
Start: 2024-12-31

## 2024-12-17 RX ORDER — DEXAMETHASONE SODIUM PHOSPHATE 10 MG/ML
12 INJECTION INTRAMUSCULAR; INTRAVENOUS ONCE
Status: CANCELLED
Start: 2024-12-31

## 2024-12-17 RX ORDER — FAMOTIDINE 10 MG/ML
20 INJECTION, SOLUTION INTRAVENOUS AS NEEDED
Status: CANCELLED | OUTPATIENT
Start: 2024-12-31

## 2024-12-17 RX ORDER — AMOXICILLIN AND CLAVULANATE POTASSIUM 500; 125 MG/1; MG/1
1 TABLET, FILM COATED ORAL 2 TIMES DAILY
Qty: 20 TABLET | Refills: 0 | Status: SHIPPED | OUTPATIENT
Start: 2024-12-17

## 2024-12-17 RX ORDER — HYDROCORTISONE SODIUM SUCCINATE 100 MG/2ML
100 INJECTION INTRAMUSCULAR; INTRAVENOUS AS NEEDED
Status: CANCELLED | OUTPATIENT
Start: 2024-12-31

## 2024-12-17 RX ORDER — SODIUM CHLORIDE 9 MG/ML
20 INJECTION, SOLUTION INTRAVENOUS ONCE
Status: CANCELLED | OUTPATIENT
Start: 2024-12-31

## 2024-12-17 RX ORDER — DEXTROSE MONOHYDRATE 50 MG/ML
20 INJECTION, SOLUTION INTRAVENOUS ONCE
Status: CANCELLED | OUTPATIENT
Start: 2024-12-31

## 2024-12-17 RX ORDER — DEXTROMETHORPHAN HYDROBROMIDE AND PROMETHAZINE HYDROCHLORIDE 15; 6.25 MG/5ML; MG/5ML
5 SYRUP ORAL 3 TIMES DAILY
Qty: 240 ML | Refills: 0 | Status: SHIPPED | OUTPATIENT
Start: 2024-12-17

## 2024-12-17 NOTE — PROGRESS NOTES
[facility]  Office number [phone]    Fax number [phone]     Follow up     Date: 24    Patient Name: Edward Powell  MRN: [id number]  : 1970    Referring Physician: Dr. Gautam    Chief Complaint: Rectal cancer, lung mass, liver lesions follow up on treatment    Cancer Staging:  IV    History of Present Illness: Edward Powell is a pleasant 54 y.o. male who presents today for evaluation of rectal cancer in the company of his supportive significant other.    Patient has a longstanding history of intermittent diarrhea since his cholecystectomy in 2019.  However he developed progressive diarrhea with associated loss of bowel control and urgency as well as weight loss and fatigue prompting additional workup.    CT of the abdomen pelvis 2023 showed an irregular circumferential wall thickening involving the rectum concerning for mass lesion.  There was associated mesorectal lymphadenopathy.  Masslike consolidation of left lower lobe.  CT of the chest showed a cavitary lesion in the left lower lobe up to 4.8 cm with an adjacent cavitary nodule up to 2 cm and a 5 mm nodule on the right minor fissure.      He underwent colonoscopy 2023 with findings of an infiltrative and ulcerated partially obstructing mass in the proximal rectum spanning 10 cm.  Rectal polyps.  Biopsy of the rectal mass showed invasive moderately differentiated adenocarcinoma.  Additional biopsies showed tubular adenomas.  MSI testing was intact/low probability of MSI high.    PDL1 negative    PET/CT 2023 showed hypermetabolic rectal wall thickening compatible with known rectal malignancy.  Multiple small ill-defined hypoechoic hyper metabolic liver lesions compatible with metastatic disease.  Mildly enlarged and mildly hypermetabolic pelvic sidewall and internal iliac lymph node chain adenopathy.  Hypermetabolic lung mass with adjacent nodule.     He underwent liver biopsy and lung  biopsy January 2024.  Results of both confirmed metastatic colorectal cancer.    The patient has been experiencing substantial rectal pain.  He is taking oxycodone every 6 hours with partial relief.  He reports ongoing bowel movements.  No abdominal pain.  No vomiting.  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    Treatment history:  FOLFOX cycle 1 -4  FOLFOX panitumumab cycle 5 onward  -Oxaliplatin terminated early for allergic reaction after 4/29/24    Interval history:  Blood pressure has been fairly stable.  Continues to drink coffee plus pop.  He reports drinking plenty of water during the day sometimes up to 12 bottles.  Eating well with good appetite.  Bowel movements are normal.  He has been complaining about an upper respiratory infection that roughly started last Thursday.  It has stayed about the same.  No fever.  Has cough that wakes him up at night.        Past Medical History:   Past Medical History:   Diagnosis Date    Arthritis     Cancer     rectal cancer - diagnosed 2023    Cholelithiasis 2019    Removed    COPD (chronic obstructive pulmonary disease)     Coronary artery disease 2009    Stent - no cardiologist currently    Elevated cholesterol     GERD (gastroesophageal reflux disease)     Hernia 2003    Lower hernia    Perforated ulcer 2019    Sleep apnea     history of; when weighed over 400lbs - no issues following bariatric surgery       Past Surgical History:   Past Surgical History:   Procedure Laterality Date    APPENDECTOMY  1983    Removed    BARIATRIC SURGERY  2011    Gastric bypass    BLADDER TUMOR/ULCER BLEEDER CAUTERIZATION      CARDIAC CATHETERIZATION  2009    stent placed    CHOLECYSTECTOMY  2019    Removed    COLONOSCOPY N/A 12/07/2023     Procedure: COLONOSCOPY WITH HOT SNARE POLYPECTOMY AND TATTOO;  Surgeon: Noman Gautam MD;  Location: [facility] ENDOSCOPY;  Service: Gastroenterology;  Laterality: N/A;    PORTACATH PLACEMENT N/A 1/5/2024    Procedure: INSERTION OF PORTACATH WITH ULTRSOUND AND FLUOROSCOPIC GUIDANCE;  Surgeon: Ida Black MD;  Location: [facility] OR;  Service: General;  Laterality: N/A;    JENNIFER-EN-Y         Family History: No family history on file.  Uncle had colon cancer    Social History:   Social History     Socioeconomic History    Marital status:    Tobacco Use    Smoking status: Every Day     Current packs/day: 1.00     Average packs/day: 0.9 packs/day for 60.3 years (52.8 ttl pk-yrs)     Types: Cigarettes     Start date: 9/6/1994    Smokeless tobacco: Never    Tobacco comments:     35 years      pt reports closer to 2 packs per day since cancer diagnosis   Vaping Use    Vaping status: Never Used   Substance and Sexual Activity    Alcohol use: Never    Drug use: Never    Sexual activity: Defer       Medications:     Current Outpatient Medications:     buPROPion XL (WELLBUTRIN XL) 150 MG 24 hr tablet, TAKE 1 TABLET BY MOUTH DAILY, Disp: 90 tablet, Rfl: 1    cetirizine (zyrTEC) 10 MG tablet, Take 1 tablet by mouth Daily., Disp: 30 tablet, Rfl: 2    clindamycin (Clindagel) 1 % gel, Apply 1 Application topically to the appropriate area as directed 2 (Two) Times a Day. Use first, Disp: 30 g, Rfl: 1    dexAMETHasone 0.5 MG/5ML solution, Take 10 mL by mouth 2 (Two) Times a Day. Swish for 2 minutes and spit 10 mL 4 x daily, Disp: 240 mL, Rfl: 5    dicyclomine (BENTYL) 20 MG tablet, Take 1 tablet by mouth 3 (Three) Times a Day As Needed for Abdominal Cramping., Disp: 30 tablet, Rfl: 3    docusate sodium (COLACE) 100 MG capsule, Take 1 capsule by mouth 2 (Two) Times a Day., Disp: , Rfl:     doxycycline (MONODOX) 100 MG capsule, , Disp: , Rfl:     doxycycline (VIBRAMYICN) 100 MG tablet, , Disp: , Rfl:      famotidine (PEPCID) 20 MG tablet, Take 1 tablet by mouth 2 (Two) Times a Day., Disp: 60 tablet, Rfl: 4    Hydrocortisone, Perianal, (ANUSOL-HC) 2.5 % rectal cream, Insert  into the rectum 2 (Two) Times a Day. Indications: Inflamed Hemorrhoids, Disp: 30 g, Rfl: 1    lidocaine-prilocaine (EMLA) 2.5-2.5 % cream, Apply 1 Application topically to the appropriate area as directed As Needed (45-60 minutes prior to port access.  Cover with saran/plastic wrap.)., Disp: 30 g, Rfl: 3    LORazepam (ATIVAN) 0.5 MG tablet, Take 1 tablet by mouth Take As Directed. 1 tabelt by mouth 30 minutes prior to MRI, take a second tablet at the time of the MRI if needed., Disp: 2 tablet, Rfl: 0    Magic Mouthwash Oral Suspension (diphenhydrAMINE HCl - aluminum & magnesium hydroxide-simethicone - lidocaine - nystatin), Swish and Spit 10 mL by mouth every 6 (Six) Hours For 7 Days., Disp: 300 mL, Rfl: 3    magnesium oxide (MAG-OX) 400 MG tablet, Take 1 tablet by mouth Daily., Disp: 30 tablet, Rfl: 5    montelukast (Singulair) 10 MG tablet, Take 1 tablet by mouth Every Night., Disp: 30 tablet, Rfl: 1    Morphine (MS CONTIN) 15 MG 12 hr tablet, Take 1 tablet by mouth 2 (Two) Times a Day for 30 days., Disp: 60 tablet, Rfl: 0    Movantik 25 MG tablet, , Disp: , Rfl:     mupirocin (BACTROBAN) 2 % cream, , Disp: , Rfl:     naloxone (NARCAN) 4 MG/0.1ML nasal spray, 1 spray into the nostril(s) as directed by provider As Needed for Opioid Reversal., Disp: 1 each, Rfl: 0    nystatin (MYCOSTATIN) 100,000 unit/mL suspension, Swish and Swallow 5mL by mouth four times a day, Disp: 473 mL, Rfl: 0    nystatin (MYCOSTATIN) 100,000 unit/mL suspension, Swish and swallow 5 mL 4 (Four) Times a Day., Disp: 473 mL, Rfl: 0    nystatin susp + lidocaine viscous (MAGIC MOUTHWASH) oral suspension, 5-10 ml swish and spit or swallow QID prn, Disp: 240 mL, Rfl: 3    ondansetron (ZOFRAN) 8 MG tablet, Take 1 tablet by mouth 3 (Three) Times a Day As Needed for Nausea or  "Vomiting., Disp: 30 tablet, Rfl: 5    oxyCODONE (ROXICODONE) 15 MG immediate release tablet, Take 1 tablet by mouth 5 (Five) Times a Day As Needed for Moderate Pain or Severe Pain for up to 30 days., Disp: 150 tablet, Rfl: 0    pantoprazole (Protonix) 40 MG EC tablet, Take 1 tablet by mouth Daily. Indications: Gastroesophageal Reflux Disease, Disp: 90 tablet, Rfl: 2    potassium chloride (KLOR-CON M20) 20 MEQ CR tablet, Take 1 tablet by mouth Daily., Disp: 30 tablet, Rfl: 0    pregabalin (Lyrica) 300 MG capsule, Take 1 capsule by mouth 2 (Two) Times a Day for 30 days., Disp: 60 capsule, Rfl: 0    tiotropium bromide-olodaterol (Stiolto Respimat) 2.5-2.5 MCG/ACT aerosol solution inhaler, INHALE 2 INHALATION(S) BY MOUTH DAILY, Disp: 4 g, Rfl: 5    traZODone (DESYREL) 100 MG tablet, Take 1 tablet by mouth Every Night., Disp: 30 tablet, Rfl: 2    triamcinolone (KENALOG) 0.025 % ointment, Apply 1 Application topically to the appropriate area as directed 2 (Two) Times a Day. Use second if topical treatment #1 ineffective, Disp: 80 g, Rfl: 0    Allergies:   Allergies   Allergen Reactions    Bactrim [Sulfamethoxazole-Trimethoprim] Hives     and blisters    Sulfa Antibiotics Hives     and blisters       Objective     Vital Signs:   Vitals:    12/17/24 0822   BP: 110/55   Pulse: 87   Resp: 16   Temp: 97.7 °F (36.5 °C)   SpO2: 95%   Weight: 88.8 kg (195 lb 11.2 oz)   Height: 182.9 cm (72.01\")   PainSc:   5        Body mass index is 26.54 kg/m².   Pain Score    12/17/24 0822   PainSc:   5     ECOG Performance Status: 1 - Symptomatic but completely ambulatory    Physical Exam:   General: No acute distress. Well appearing   HEENT: Normocephalic, atraumatic. Sclera anicteric. Dry mouth, mild erythema  Neck: supple, no adenopathy.   Cardiovascular: regular rate and rhythm. No murmurs.   Respiratory: Normal rate.  Bilateral lower lobe wheezing  Abdomen: Soft, nontender, non distended with normoactive bowel sounds  Lymph: no cervical, " supraclavicular or axillary adenopathy  Neuro: Alert and oriented x 3. No focal deficits.   Ext: Symmetric, no swelling.   Accurate 12/2/24    Laboratory/Imaging Reviewed:   No visits with results within 2 Week(s) from this visit.   Latest known visit with results is:   Hospital Outpatient Visit on 12/02/2024   Component Date Value Ref Range Status    CEA 12/02/2024 5.61  ng/mL Final    Glucose 12/02/2024 102 (H)  65 - 99 mg/dL Final    BUN 12/02/2024 10  6 - 20 mg/dL Final    Creatinine 12/02/2024 0.72 (L)  0.76 - 1.27 mg/dL Final    Sodium 12/02/2024 135 (L)  136 - 145 mmol/L Final    Potassium 12/02/2024 3.3 (L)  3.5 - 5.2 mmol/L Final    Chloride 12/02/2024 102  98 - 107 mmol/L Final    CO2 12/02/2024 23.3  22.0 - 29.0 mmol/L Final    Calcium 12/02/2024 8.2 (L)  8.6 - 10.5 mg/dL Final    Total Protein 12/02/2024 6.6  6.0 - 8.5 g/dL Final    Albumin 12/02/2024 3.1 (L)  3.5 - 5.2 g/dL Final    ALT (SGPT) 12/02/2024 7  1 - 41 U/L Final    AST (SGOT) 12/02/2024 13  1 - 40 U/L Final    Alkaline Phosphatase 12/02/2024 139 (H)  39 - 117 U/L Final    Total Bilirubin 12/02/2024 0.5  0.0 - 1.2 mg/dL Final    Globulin 12/02/2024 3.5  gm/dL Final    A/G Ratio 12/02/2024 0.9  g/dL Final    BUN/Creatinine Ratio 12/02/2024 13.9  7.0 - 25.0 Final    Anion Gap 12/02/2024 9.7  5.0 - 15.0 mmol/L Final    eGFR 12/02/2024 107.9  >60.0 mL/min/1.73 Final    Magnesium 12/02/2024 1.6  1.6 - 2.6 mg/dL Final    WBC 12/02/2024 10.96 (H)  3.40 - 10.80 10*3/mm3 Final    RBC 12/02/2024 4.54  4.14 - 5.80 10*6/mm3 Final    Hemoglobin 12/02/2024 11.2 (L)  13.0 - 17.7 g/dL Final    Hematocrit 12/02/2024 36.2 (L)  37.5 - 51.0 % Final    MCV 12/02/2024 79.7  79.0 - 97.0 fL Final    MCH 12/02/2024 24.7 (L)  26.6 - 33.0 pg Final    MCHC 12/02/2024 30.9 (L)  31.5 - 35.7 g/dL Final    RDW 12/02/2024 18.6 (H)  12.3 - 15.4 % Final    RDW-SD 12/02/2024 52.3  37.0 - 54.0 fl Final    MPV 12/02/2024 11.3  6.0 - 12.0 fL Final    Platelets 12/02/2024 195  140  - 450 10*3/mm3 Final    Neutrophil % 12/02/2024 79.3 (H)  42.7 - 76.0 % Final    Lymphocyte % 12/02/2024 12.0 (L)  19.6 - 45.3 % Final    Monocyte % 12/02/2024 6.8  5.0 - 12.0 % Final    Eosinophil % 12/02/2024 1.0  0.3 - 6.2 % Final    Basophil % 12/02/2024 0.4  0.0 - 1.5 % Final    Immature Grans % 12/02/2024 0.5  0.0 - 0.5 % Final    Neutrophils, Absolute 12/02/2024 8.71 (H)  1.70 - 7.00 10*3/mm3 Final    Lymphocytes, Absolute 12/02/2024 1.31  0.70 - 3.10 10*3/mm3 Final    Monocytes, Absolute 12/02/2024 0.74  0.10 - 0.90 10*3/mm3 Final    Eosinophils, Absolute 12/02/2024 0.11  0.00 - 0.40 10*3/mm3 Final    Basophils, Absolute 12/02/2024 0.04  0.00 - 0.20 10*3/mm3 Final    Immature Grans, Absolute 12/02/2024 0.05  0.00 - 0.05 10*3/mm3 Final    nRBC 12/02/2024 0.0  0.0 - 0.2 /100 WBC Final     Tempus xt: APC gene TP53; Negative ADRIANNA, BRAF, NRAS, TMB stable. PDL1 negative  CT Chest Without Contrast Diagnostic    Result Date: 11/22/2024  Narrative: PROCEDURE: CT CHEST WO CONTRAST DIAGNOSTIC-  HISTORY: Follow up pneumonia and possible worsening metastatic disease; [id number] neoplasm of rectum; C78.7-Secondary malignant neoplasm of liver and intrahepatic bile duct  COMPARISON: October 10, 2024.  PROCEDURE:  Multiple axial CT images were obtained from the thoracic inlet through the upper abdomen without the use of contrast.  FINDINGS: Soft tissue windows reveal no axillary, hilar or mediastinal adenopathy. Heart size is normal. The aorta is normal in caliber. There are no pleural or pericardial effusions. There are faint groundglass opacities in the left upper lobe which have improved suggesting improving pneumonia. Radiation change is another consideration. A cavitary mass in the left lower lobe now measures 36 x 20 mm, previously 42 x 25 mm. An adjacent partially solid density along the superior aspect measures 12 mm, previously 15 mm. A subpleural right middle lobe nodule measures 4 mm on image #46, stable.       Impression: 1. Improving cavitary mass in the left lower lobe. 2. Improved groundglass opacity in the left upper lobe may represent improving pneumonia or evolving radiation change. 3. No new abnormality identified.    CTDI: 4.7 mGy DLP: 206.32 mGy.cm    This study was performed with techniques to keep radiation doses as low as reasonably achievable (ALARA). Individualized dose reduction techniques using automated exposure control or adjustment of mA and/or kV according to the patient size were employed.     Images were reviewed, interpreted, and dictated by Dr. Serjio Wright MD Transcribed by Krysta Aldana PA-C.  This report was signed and finalized on 11/22/2024 12:59 PM by Serjio Wright MD.       Procedures    Assessment / Plan      Assessment/Plan:     1.  Rectal cancer   2.  Liver metastases  3.  Lung metastasis  4.  Chemo monitoring  5.  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  -He was found to have biopsy-proven metastasis in the liver and lung.  His case was presented at multidisciplinary tumor board.  -Because of metastatic disease to both the lung and liver I do not think he will be downstage to resectable disease.  -Tempus results which are notable for an APC mutation, T p53 mutation, negative for KRAS, BRAF, NRAS, tumor mutation burden stable.  Notch 1 VUS.  -CBC adequate and CMP pending for treatment today with maintenance 5-FU and panitumumab 9/16/24  Chemotherapy orders and premedications signed9/16/24  -We reviewed his imaging which is consistent with excellent disease control in early July with normalization of CEA. He has had persistent rectal pain with escalating oxycodone requirements over the past couple of months, but no severe recent increase in pain, fever. WBC normal. 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 I reviewed his EUA notes. Increase doxy to BID. No surgical  intervention.  -Consolidative radiotherapy to the rectal tumor is not something he wants to pursue initially but he now states he may reconsider, after the holidays. Particularly if continued changes to stool caliber, consider repeat MRI and radiation consultation.   -Continue maintenance 5FU/panitumumab.   -Scans show equivocal progression in lung nodule confused by concurrent PNA 10/2024. But  Chest CT after 6 weeks to ensure PNA clearing and re-evaluate lung nodule reviewed and shows stable disease, improved PNA.   -I discussed the case with Dr. Cronin.  We will hold treatment today.  Plan was to hold for 1 week and treat him next week.  Patient requesting an extra week to get through the holidays.  We will plan for him to follow-up with Dr. Cronin at that time.    6.  Cancer related pain  -Now following with palliative care and pain is well controlled on lyrica, oxycodone and MS contin. Follow up with them if symptoms persist.     7.  Panitumumab induced rash  -Continue doxycycline.     8. GERD  PPI    9. Mucositis  -MMW as needed  -Improved currently.    10. Seasonal allergies  -Add singulair at night, better    11. Access  -Port    12. GERD  -Continue Pepcid twice daily    13.  URI  -I discussed the case with Dr. Cronin.  We will do a stat chest x-ray due to bilateral lower lobe wheezing  - I will send in Augmentin daily  - I will send in cough syrup for at night.    There are no Patient Instructions on file for this visit.     Follow Up:   2 weeks with Cycle 19     GEORGIA Grimes    Hematology and Oncology

## 2024-12-19 ENCOUNTER — HOSPITAL ENCOUNTER (OUTPATIENT)
Dept: GENERAL RADIOLOGY | Facility: HOSPITAL | Age: 54
Discharge: HOME | End: 2024-12-19
Admitting: NURSE PRACTITIONER
Payer: COMMERCIAL

## 2024-12-19 DIAGNOSIS — R05.1 ACUTE COUGH: ICD-10-CM

## 2024-12-19 PROCEDURE — 71046 X-RAY EXAM CHEST 2 VIEWS: CPT

## 2024-12-27 ENCOUNTER — OFFICE VISIT (OUTPATIENT)
Dept: PALLIATIVE CARE | Facility: CLINIC | Age: 54
End: 2024-12-27
Payer: COMMERCIAL

## 2024-12-27 VITALS
RESPIRATION RATE: 16 BRPM | DIASTOLIC BLOOD PRESSURE: 57 MMHG | BODY MASS INDEX: 28.65 KG/M2 | SYSTOLIC BLOOD PRESSURE: 104 MMHG | TEMPERATURE: 97.7 F | HEIGHT: 72 IN | OXYGEN SATURATION: 96 % | WEIGHT: 211.5 LBS | HEART RATE: 61 BPM

## 2024-12-27 DIAGNOSIS — G89.3 CANCER RELATED PAIN: ICD-10-CM

## 2024-12-27 DIAGNOSIS — G62.0 CHEMOTHERAPY-INDUCED NEUROPATHY: ICD-10-CM

## 2024-12-27 DIAGNOSIS — T45.1X5A CHEMOTHERAPY-INDUCED NEUROPATHY: ICD-10-CM

## 2024-12-27 DIAGNOSIS — R05.1 ACUTE COUGH: ICD-10-CM

## 2024-12-27 DIAGNOSIS — C20 RECTAL ADENOCARCINOMA: Primary | ICD-10-CM

## 2024-12-27 RX ORDER — BENZONATATE 100 MG/1
100 CAPSULE ORAL 3 TIMES DAILY PRN
Qty: 45 CAPSULE | Refills: 0 | Status: SHIPPED | OUTPATIENT
Start: 2024-12-27

## 2024-12-27 RX ORDER — DULOXETIN HYDROCHLORIDE 30 MG/1
30 CAPSULE, DELAYED RELEASE ORAL DAILY
Qty: 30 CAPSULE | Refills: 0 | Status: SHIPPED | OUTPATIENT
Start: 2024-12-27 | End: 2024-12-27

## 2024-12-27 RX ORDER — DEXTROMETHORPHAN HYDROBROMIDE AND PROMETHAZINE HYDROCHLORIDE 15; 6.25 MG/5ML; MG/5ML
5 SYRUP ORAL 3 TIMES DAILY
Qty: 240 ML | Refills: 0 | Status: SHIPPED | OUTPATIENT
Start: 2024-12-27

## 2024-12-27 RX ORDER — MORPHINE SULFATE 30 MG/1
30 TABLET, FILM COATED, EXTENDED RELEASE ORAL 2 TIMES DAILY
Qty: 60 TABLET | Refills: 0 | Status: SHIPPED | OUTPATIENT
Start: 2024-12-27

## 2024-12-27 RX ORDER — PREGABALIN 300 MG/1
300 CAPSULE ORAL 2 TIMES DAILY
Qty: 60 CAPSULE | Refills: 0 | Status: SHIPPED | OUTPATIENT
Start: 2025-01-08 | End: 2025-02-07

## 2024-12-27 RX ORDER — OXYCODONE HYDROCHLORIDE 15 MG/1
15 TABLET ORAL
Qty: 150 TABLET | Refills: 0 | Status: SHIPPED | OUTPATIENT
Start: 2025-01-08 | End: 2025-02-07

## 2024-12-27 NOTE — TELEPHONE ENCOUNTER
I have reviewed patient's RACIEL report prior to prescribing Schedule II, III, and IV medications. Request # [id number] . Next refills for MS Contin 30 mg BID #60 and Oxycodone 15 mg tab q5h PRN #150 were sent to the pharmacy. The patient is scheduled to follow-up in 3 months.

## 2024-12-27 NOTE — PROGRESS NOTES
Palliative Clinic Note      Name: Edward Powell  Age: 54 y.o.  Sex: male  : 1970  MRN: [id number]  Date of Service: 2024   Medical Oncologist: Dr. Cronin    Subjective:    Chief Complaint: Cough, nerve pain, rectal pain    History of Present Illness: Edward Powell is a 54 y.o. male with past medical history significant for metastatic rectal cancer  who presents to the palliative clinic today as a follow up for pain and symptom management.     Treatment summary: Patient presented with complaints of worsening diarrhea with associated loss of bowel control, weight loss and fatigue. Imaging in 2023 revealed an irregular circumferential wall thickening involving the rectum, mesorectal lymphadenopathy, mass-like consolidation in the left lower lobe and nodules in the liver concerning for malignancy. Biopsy from colonoscopy on 23 consistent with adenocarcinoma. Biopsies of the lung and liver were consistent with metastatic colorectal cancer. Patient started systemic therapy with FOLFOX on 23.  Panitumumab add to cycle 5. Most recent imaging was reassuring. ED visit on 24 for pain at the port site. Oxaliplatin terminated early for allergic reaction. 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 Patient referred to CRC specialist, Dr. Méndez. Plan to continue maintenance 5FU/panitumumab. Treatments spaced out to every 3 weeks to accommodate travel. Treatment held due to pneumonia in 10/2024. Most recent CT of the chest demonstrated stable disease and improved pneumonia.     Pain: Patient complains of rectal pain that is worse with riding in the car and bowel movements. The pain is constant.  Exacerbated by episodes of diarrhea.  The patient is prescribed morphine ER 15 mg BID and  oxycodone to 15 mg every 5 hours as needed. The patient states the oxycodone tablets work some of the time but not all of the time. He sometimes takes two tablets at a time. The patient describes a  "\"loopy\" feeling when he takes the oxycodone. The patient complains of severe neuropathy in his hands and feet. Gabapentin was rotated to pregabalin at the patient's last appointment. Lyrica was titrated up to 300 mg BID on 9/9/24.  Patient uses acetaminophen and ibuprofen for other pains. He has started an OTC supplement called [employer].      Other symptoms: The patient complains of a dry cough.  The cough is worse during the night and interferes with his sleep.  Patient recently recovered from pneumonia and shortly after developed bronchitis. Patient completed antibiotics yesterday. Patient denies worsening dyspnea.  He is currently taking Mucinex, Zyrtec, Singulair and a inhaler for COPD.  Patient request a refill of cough syrup.  He requests additional cough medication.     Pyschosocial: The patient presents to the clinic by himself. He lives with his significant other, Alysia.  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  The patient is prescribed Wellbutrin for mood stabilization per oncology.       Goals: Maximize comfort, optimize function & psychosocial wellbeing, and promote advanced care planning.    The following portions of the patient's history were reviewed and updated as appropriate: allergies, current medications, past family history, past medical history, past social history, past surgical history and problem list.    ORT-R: Low risk  Decisional capacity: Full  ECOG: (1) Restricted in physically strenuous activity, ambulatory and able to do work of light nature     Objective:    /57   Pulse 61   Temp 97.7 °F (36.5 °C)   Resp 16   Ht 182.9 cm (72.01\")   Wt 95.9 kg (211 lb 8 oz)   SpO2 96%   BMI 28.68 kg/m²     Constitutional: Awake, alert, normal gait, sitting up in exam chair, in no acute distress  Eyes: PERRLA, EOMS intact  HENT: NCAT, face symmetric  Neck: Supple, trachea midline  Respiratory: Nonlabored respirations  Cardiovascular: RRR, no edema " observed  Gastrointestinal: Soft, no guarding  Musculoskeletal: Moves all extremities   Psychiatric: Appropriate affect, cooperative  Neurologic: Oriented x 3, Cranial Nerves grossly intact to confrontation, speech clear  Skin: Cool dry, no rashes or wounds appreciated     Medication Counts: Reviewed. See bottom of note for details. Brought medication.  No overuse or misuse evident.  I have reviewed the patient's [place] PDMP. RACIEL Req #[id number].   UDS: Need to collect.    Assessment & Plan:    1. Rectal adenocarcinoma  - Treatment held due to pneumonia in 10/2024. Most recent CT of the chest demonstrated stable disease and improved pneumonia. Upcoming appointment with oncology, 12/30/24. Requested patient complete UDS at this appointment.    2. Cancer related pain  - Patient is appropriate for opioid therapy due to cancer related pain. Patient feels his pain is not controlled on current regimen. Recommend increasing long-acting opioid therapy vs.the short-acting therapy. Discussed the goal of stabilized pain control with slow release medication.  MS contin 30 mg BID was sent to the pharmacy. PA submitted to the insurance company. Side effects of the medication discussed at every visit. Patient was encouraged to continue bowel regimen of daily stool softeners, prn laxatives, and diet modifications.    3. Chemotherapy-induced neuropathy  - Refilled pregabalin (Lyrica) 300 MG capsule; Take 1 capsule by mouth 2 (Two) Times a Day for 30 days.  Dispense: 60 capsule; Refill: 0    4. Acute cough  - Refilled promethazine-dextromethorphan (PROMETHAZINE-DM) 6.25-15 MG/5ML syrup; Take 5 mL by mouth 3 times a day.  Dispense: 240 mL; Refill: 0  - Start trial of benzonatate (Tessalon Perles) 100 MG capsule; Take 1 capsule by mouth 3 (Three) Times a Day As Needed for Cough.  Dispense: 45 capsule; Refill: 0    Code status: FULL   Advanced directives: No.    Return in about 3 months (around 3/27/2025) for Office Visit.    I spent 50  minutes caring for Edward Powell on this date of service. This time includes time spent by me in the following activities: preparing for the visit, reviewing tests, obtaining and/or reviewing a separately obtained history, performing a medically appropriate examination and/or evaluation , counseling and educating the patient/family/caregiver, ordering medications, tests, or procedures, documenting information in the medical record, independently interpreting results and communicating that information with the patient/family/caregiver, and care coordination    Linda Leslie PA-C  12/27/2024    Medication Date Filled # Filled Count Used # Days  MANUEL   Morphine ER 15 12/9/24 60 24 36 18 2   Oxycodone 15 12/9/24 150 73 77 18 4-5   Pregabalin 300 12/9/24 60 24 36 18 2

## 2024-12-30 ENCOUNTER — OFFICE VISIT (OUTPATIENT)
Dept: ONCOLOGY | Facility: CLINIC | Age: 54
End: 2024-12-30
Payer: COMMERCIAL

## 2024-12-30 VITALS
WEIGHT: 206.1 LBS | TEMPERATURE: 97.3 F | OXYGEN SATURATION: 98 % | HEART RATE: 62 BPM | RESPIRATION RATE: 16 BRPM | SYSTOLIC BLOOD PRESSURE: 126 MMHG | HEIGHT: 72 IN | BODY MASS INDEX: 27.92 KG/M2 | DIASTOLIC BLOOD PRESSURE: 60 MMHG

## 2024-12-30 DIAGNOSIS — C20 RECTAL CANCER METASTASIZED TO LIVER: Primary | ICD-10-CM

## 2024-12-30 DIAGNOSIS — C78.7 RECTAL CANCER METASTASIZED TO LIVER: Primary | ICD-10-CM

## 2024-12-30 PROCEDURE — 99214 OFFICE O/P EST MOD 30 MIN: CPT | Performed by: INTERNAL MEDICINE

## 2024-12-30 RX ORDER — MONTELUKAST SODIUM 10 MG/1
10 TABLET ORAL NIGHTLY
Qty: 30 TABLET | Refills: 1 | Status: SHIPPED | OUTPATIENT
Start: 2024-12-30

## 2024-12-30 RX ORDER — DULOXETIN HYDROCHLORIDE 30 MG/1
CAPSULE, DELAYED RELEASE ORAL
COMMUNITY
Start: 2024-12-27

## 2024-12-30 RX ORDER — POTASSIUM CHLORIDE 1500 MG/1
20 TABLET, EXTENDED RELEASE ORAL DAILY
Qty: 30 TABLET | Refills: 0 | Status: SHIPPED | OUTPATIENT
Start: 2024-12-30

## 2024-12-30 RX ORDER — DOXYCYCLINE HYCLATE 100 MG
100 TABLET ORAL 2 TIMES DAILY
Qty: 67 TABLET | Refills: 3 | Status: SHIPPED | OUTPATIENT
Start: 2024-12-30

## 2024-12-30 RX ORDER — PREDNISONE 20 MG/1
40 TABLET ORAL DAILY
Qty: 10 TABLET | Refills: 0 | Status: SHIPPED | OUTPATIENT
Start: 2024-12-30

## 2024-12-30 RX ORDER — ALBUTEROL SULFATE 90 UG/1
2 INHALANT RESPIRATORY (INHALATION) EVERY 4 HOURS PRN
Qty: 18 G | Refills: 3 | Status: SHIPPED | OUTPATIENT
Start: 2024-12-30

## 2024-12-30 NOTE — PROGRESS NOTES
[facility]ology [facility]  Office number [phone]    Fax number [phone]     Follow up     Date: 24    Patient Name: Edward Powell  MRN: [id number]  : 1970    Referring Physician: Dr. Gautam    Chief Complaint: Rectal cancer, lung mass, liver lesions follow up on treatment    Cancer Staging:  IV    History of Present Illness: Edward Powell is a pleasant 54 y.o. male who presents today for evaluation of rectal cancer in the company of his supportive significant other.    Patient has a longstanding history of intermittent diarrhea since his cholecystectomy in 2019.  However he developed progressive diarrhea with associated loss of bowel control and urgency as well as weight loss and fatigue prompting additional workup.    CT of the abdomen pelvis 2023 showed an irregular circumferential wall thickening involving the rectum concerning for mass lesion.  There was associated mesorectal lymphadenopathy.  Masslike consolidation of left lower lobe.  CT of the chest showed a cavitary lesion in the left lower lobe up to 4.8 cm with an adjacent cavitary nodule up to 2 cm and a 5 mm nodule on the right minor fissure.      He underwent colonoscopy 2023 with findings of an infiltrative and ulcerated partially obstructing mass in the proximal rectum spanning 10 cm.  Rectal polyps.  Biopsy of the rectal mass showed invasive moderately differentiated adenocarcinoma.  Additional biopsies showed tubular adenomas.  MSI testing was intact/low probability of MSI high.    PDL1 negative    PET/CT 2023 showed hypermetabolic rectal wall thickening compatible with known rectal malignancy.  Multiple small ill-defined hypoechoic hyper metabolic liver lesions compatible with metastatic disease.  Mildly enlarged and mildly hypermetabolic pelvic sidewall and internal iliac lymph node chain adenopathy.  Hypermetabolic lung mass with adjacent nodule.     He underwent liver biopsy and lung  biopsy January 2024.  Results of both confirmed metastatic colorectal cancer.    The patient has been experiencing substantial rectal pain.  He is taking oxycodone every 6 hours with partial relief.  He reports ongoing bowel movements.  No abdominal pain.  No vomiting.  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    Treatment history:  FOLFOX cycle 1 -4  FOLFOX panitumumab cycle 5 onward  -Oxaliplatin terminated early for allergic reaction after 4/29/24    Interval history:  Treatment has been on hold for cough. Had xray and treated with antibiotics.   Still with cough and difficulty bringing up sputum productive of thick yellow/brown sputum, feel things are hung up in his throat. Sometimes associated with SOA. Uses Stiolto BID. Does not have a pro air inhaler  Has been more hoarse since coughing spell on Friday  Just completed Augmentin Friday.  Taking Mucinex. Palliative care called in a cough syrup but he was not able to pick it up.  Laid in bed all day yesterday, still feeling bad. No fever.   Appetite is improving.   Palliative care just increased his MS Contin.   Has questions about medical marijuana    Past Medical History:   Past Medical History:   Diagnosis Date    Arthritis     Cancer     rectal cancer - diagnosed 2023    Cholelithiasis 2019    Removed    COPD (chronic obstructive pulmonary disease)     Coronary artery disease 2009    Stent - no cardiologist currently    Elevated cholesterol     GERD (gastroesophageal reflux disease)     Hernia 2003    Lower hernia    Perforated ulcer 2019    Sleep apnea     history of; when weighed over 400lbs - no issues following bariatric surgery       Past Surgical History:   Past Surgical History:   Procedure Laterality Date     APPENDECTOMY  1983    Removed    BARIATRIC SURGERY  2011    Gastric bypass    BLADDER TUMOR/ULCER BLEEDER CAUTERIZATION      CARDIAC CATHETERIZATION  2009    stent placed    CHOLECYSTECTOMY  2019    Removed    COLONOSCOPY N/A 12/07/2023    Procedure: COLONOSCOPY WITH HOT SNARE POLYPECTOMY AND TATTOO;  Surgeon: Noman Gautam MD;  Location: [facility] ENDOSCOPY;  Service: Gastroenterology;  Laterality: N/A;    PORTACATH PLACEMENT N/A 1/5/2024    Procedure: INSERTION OF PORTACATH WITH ULTRSOUND AND FLUOROSCOPIC GUIDANCE;  Surgeon: Ida Black MD;  Location: [facility] OR;  Service: General;  Laterality: N/A;    JENNIFER-EN-Y         Family History: No family history on file.  Uncle had colon cancer    Social History:   Social History     Socioeconomic History    Marital status:    Tobacco Use    Smoking status: Every Day     Current packs/day: 1.00     Average packs/day: 0.9 packs/day for 60.3 years (52.8 ttl pk-yrs)     Types: Cigarettes     Start date: 9/6/1994    Smokeless tobacco: Never    Tobacco comments:     35 years      pt reports closer to 2 packs per day since cancer diagnosis   Vaping Use    Vaping status: Never Used   Substance and Sexual Activity    Alcohol use: Never    Drug use: Never    Sexual activity: Defer       Medications:     Current Outpatient Medications:     benzonatate (Tessalon Perles) 100 MG capsule, Take 1 capsule by mouth 3 (Three) Times a Day As Needed for Cough., Disp: 45 capsule, Rfl: 0    buPROPion XL (WELLBUTRIN XL) 150 MG 24 hr tablet, TAKE 1 TABLET BY MOUTH DAILY, Disp: 90 tablet, Rfl: 1    cetirizine (zyrTEC) 10 MG tablet, Take 1 tablet by mouth Daily., Disp: 30 tablet, Rfl: 2    clindamycin (Clindagel) 1 % gel, Apply 1 Application topically to the appropriate area as directed 2 (Two) Times a Day. Use first, Disp: 30 g, Rfl: 1    dexAMETHasone 0.5 MG/5ML solution, Take 10 mL by mouth 2 (Two) Times a Day. Swish for 2 minutes and spit 10 mL 4 x daily, Disp: 240  mL, Rfl: 5    dicyclomine (BENTYL) 20 MG tablet, Take 1 tablet by mouth 3 (Three) Times a Day As Needed for Abdominal Cramping., Disp: 30 tablet, Rfl: 3    docusate sodium (COLACE) 100 MG capsule, Take 1 capsule by mouth 2 (Two) Times a Day., Disp: , Rfl:     DULoxetine (CYMBALTA) 30 MG capsule, , Disp: , Rfl:     famotidine (PEPCID) 20 MG tablet, Take 1 tablet by mouth 2 (Two) Times a Day., Disp: 60 tablet, Rfl: 4    Hydrocortisone, Perianal, (ANUSOL-HC) 2.5 % rectal cream, Insert  into the rectum 2 (Two) Times a Day. Indications: Inflamed Hemorrhoids, Disp: 30 g, Rfl: 1    lidocaine-prilocaine (EMLA) 2.5-2.5 % cream, Apply 1 Application topically to the appropriate area as directed As Needed (45-60 minutes prior to port access.  Cover with saran/plastic wrap.)., Disp: 30 g, Rfl: 3    LORazepam (ATIVAN) 0.5 MG tablet, Take 1 tablet by mouth Take As Directed. 1 tabelt by mouth 30 minutes prior to MRI, take a second tablet at the time of the MRI if needed., Disp: 2 tablet, Rfl: 0    Magic Mouthwash Oral Suspension (diphenhydrAMINE HCl - aluminum & magnesium hydroxide-simethicone - lidocaine - nystatin), Swish and Spit 10 mL by mouth every 6 (Six) Hours For 7 Days., Disp: 300 mL, Rfl: 3    magnesium oxide (MAG-OX) 400 MG tablet, Take 1 tablet by mouth Daily., Disp: 30 tablet, Rfl: 5    montelukast (Singulair) 10 MG tablet, Take 1 tablet by mouth Every Night., Disp: 30 tablet, Rfl: 1    Morphine (MS CONTIN) 30 MG 12 hr tablet, Take 1 tablet by mouth 2 (Two) Times a Day., Disp: 60 tablet, Rfl: 0    Movantik 25 MG tablet, , Disp: , Rfl:     mupirocin (BACTROBAN) 2 % cream, , Disp: , Rfl:     naloxone (NARCAN) 4 MG/0.1ML nasal spray, 1 spray into the nostril(s) as directed by provider As Needed for Opioid Reversal., Disp: 1 each, Rfl: 0    nystatin (MYCOSTATIN) 100,000 unit/mL suspension, Swish and Swallow 5mL by mouth four times a day, Disp: 473 mL, Rfl: 0    nystatin (MYCOSTATIN) 100,000 unit/mL suspension, Swish and  "swallow 5 mL 4 (Four) Times a Day., Disp: 473 mL, Rfl: 0    nystatin susp + lidocaine viscous (MAGIC MOUTHWASH) oral suspension, 5-10 ml swish and spit or swallow QID prn, Disp: 240 mL, Rfl: 3    ondansetron (ZOFRAN) 8 MG tablet, Take 1 tablet by mouth 3 (Three) Times a Day As Needed for Nausea or Vomiting., Disp: 30 tablet, Rfl: 5    [START ON 1/8/2025] oxyCODONE (ROXICODONE) 15 MG immediate release tablet, Take 1 tablet by mouth 5 (Five) Times a Day As Needed for Moderate Pain or Severe Pain for up to 30 days., Disp: 150 tablet, Rfl: 0    pantoprazole (Protonix) 40 MG EC tablet, Take 1 tablet by mouth Daily. Indications: Gastroesophageal Reflux Disease, Disp: 90 tablet, Rfl: 2    potassium chloride (KLOR-CON M20) 20 MEQ CR tablet, Take 1 tablet by mouth Daily., Disp: 30 tablet, Rfl: 0    [START ON 1/8/2025] pregabalin (Lyrica) 300 MG capsule, Take 1 capsule by mouth 2 (Two) Times a Day for 30 days., Disp: 60 capsule, Rfl: 0    promethazine-dextromethorphan (PROMETHAZINE-DM) 6.25-15 MG/5ML syrup, Take 5 mL by mouth 3 times a day., Disp: 240 mL, Rfl: 0    tiotropium bromide-olodaterol (Stiolto Respimat) 2.5-2.5 MCG/ACT aerosol solution inhaler, INHALE 2 INHALATION(S) BY MOUTH DAILY, Disp: 4 g, Rfl: 5    traZODone (DESYREL) 100 MG tablet, Take 1 tablet by mouth Every Night., Disp: 30 tablet, Rfl: 2    triamcinolone (KENALOG) 0.025 % ointment, Apply 1 Application topically to the appropriate area as directed 2 (Two) Times a Day. Use second if topical treatment #1 ineffective, Disp: 80 g, Rfl: 0    Allergies:   Allergies   Allergen Reactions    Bactrim [Sulfamethoxazole-Trimethoprim] Hives     and blisters    Sulfa Antibiotics Hives     and blisters       Objective     Vital Signs:   Vitals:    12/30/24 0935   BP: 126/60   Pulse: 62   Resp: 16   Temp: 97.3 °F (36.3 °C)   SpO2: 98%   Weight: 93.5 kg (206 lb 1.6 oz)   Height: 182.9 cm (72.01\")   PainSc:   4        Body mass index is 27.95 kg/m².   Pain Score    12/30/24 " 0935   PainSc:   4     ECOG Performance Status: 1 - Symptomatic but completely ambulatory    Physical Exam:   General: No acute distress. Well appearing   HEENT: Normocephalic, atraumatic. Sclera anicteric. Dry mouth  Neck: supple, no adenopathy.   Cardiovascular: regular rate and rhythm. No murmurs.   Respiratory: Normal rate. Scattered wheezing.  Abdomen: Soft, nontender, non distended with normoactive bowel sounds  Lymph: no cervical, supraclavicular or axillary adenopathy  Neuro: Alert and oriented x 3. No focal deficits.   Ext: Symmetric, no swelling.     Laboratory/Imaging Reviewed:   No visits with results within 2 Week(s) from this visit.   Latest known visit with results is:   Hospital Outpatient Visit on 12/02/2024   Component Date Value Ref Range Status    CEA 12/02/2024 5.61  ng/mL Final    Glucose 12/02/2024 102 (H)  65 - 99 mg/dL Final    BUN 12/02/2024 10  6 - 20 mg/dL Final    Creatinine 12/02/2024 0.72 (L)  0.76 - 1.27 mg/dL Final    Sodium 12/02/2024 135 (L)  136 - 145 mmol/L Final    Potassium 12/02/2024 3.3 (L)  3.5 - 5.2 mmol/L Final    Chloride 12/02/2024 102  98 - 107 mmol/L Final    CO2 12/02/2024 23.3  22.0 - 29.0 mmol/L Final    Calcium 12/02/2024 8.2 (L)  8.6 - 10.5 mg/dL Final    Total Protein 12/02/2024 6.6  6.0 - 8.5 g/dL Final    Albumin 12/02/2024 3.1 (L)  3.5 - 5.2 g/dL Final    ALT (SGPT) 12/02/2024 7  1 - 41 U/L Final    AST (SGOT) 12/02/2024 13  1 - 40 U/L Final    Alkaline Phosphatase 12/02/2024 139 (H)  39 - 117 U/L Final    Total Bilirubin 12/02/2024 0.5  0.0 - 1.2 mg/dL Final    Globulin 12/02/2024 3.5  gm/dL Final    A/G Ratio 12/02/2024 0.9  g/dL Final    BUN/Creatinine Ratio 12/02/2024 13.9  7.0 - 25.0 Final    Anion Gap 12/02/2024 9.7  5.0 - 15.0 mmol/L Final    eGFR 12/02/2024 107.9  >60.0 mL/min/1.73 Final    Magnesium 12/02/2024 1.6  1.6 - 2.6 mg/dL Final    WBC 12/02/2024 10.96 (H)  3.40 - 10.80 10*3/mm3 Final    RBC 12/02/2024 4.54  4.14 - 5.80 10*6/mm3 Final     Hemoglobin 12/02/2024 11.2 (L)  13.0 - 17.7 g/dL Final    Hematocrit 12/02/2024 36.2 (L)  37.5 - 51.0 % Final    MCV 12/02/2024 79.7  79.0 - 97.0 fL Final    MCH 12/02/2024 24.7 (L)  26.6 - 33.0 pg Final    MCHC 12/02/2024 30.9 (L)  31.5 - 35.7 g/dL Final    RDW 12/02/2024 18.6 (H)  12.3 - 15.4 % Final    RDW-SD 12/02/2024 52.3  37.0 - 54.0 fl Final    MPV 12/02/2024 11.3  6.0 - 12.0 fL Final    Platelets 12/02/2024 195  140 - 450 10*3/mm3 Final    Neutrophil % 12/02/2024 79.3 (H)  42.7 - 76.0 % Final    Lymphocyte % 12/02/2024 12.0 (L)  19.6 - 45.3 % Final    Monocyte % 12/02/2024 6.8  5.0 - 12.0 % Final    Eosinophil % 12/02/2024 1.0  0.3 - 6.2 % Final    Basophil % 12/02/2024 0.4  0.0 - 1.5 % Final    Immature Grans % 12/02/2024 0.5  0.0 - 0.5 % Final    Neutrophils, Absolute 12/02/2024 8.71 (H)  1.70 - 7.00 10*3/mm3 Final    Lymphocytes, Absolute 12/02/2024 1.31  0.70 - 3.10 10*3/mm3 Final    Monocytes, Absolute 12/02/2024 0.74  0.10 - 0.90 10*3/mm3 Final    Eosinophils, Absolute 12/02/2024 0.11  0.00 - 0.40 10*3/mm3 Final    Basophils, Absolute 12/02/2024 0.04  0.00 - 0.20 10*3/mm3 Final    Immature Grans, Absolute 12/02/2024 0.05  0.00 - 0.05 10*3/mm3 Final    nRBC 12/02/2024 0.0  0.0 - 0.2 /100 WBC Final     Tempus xt: APC gene TP53; Negative ADRIANNA, BRAF, NRAS, TMB stable. PDL1 negative  XR Chest PA & Lateral    Result Date: 12/19/2024  Narrative: PROCEDURE: XR CHEST PA AND LATERAL-  HISTORY: Cough, URI; R05.1-Acute cough  COMPARISON: October 7, 2024..  FINDINGS: The heart is normal in size. Right lung is clear. The airspace disease identified previously in the left lung has improved consistent with improving pneumonia. No new area of airspace disease identified. Right internal jugular port is stable in position.. There is bronchial wall thickening consistent with bronchitis. The mediastinum is unremarkable. There is no pneumothorax.  There are no acute osseous abnormalities. Apical lordotic positioning noted.       Impression: Improved left-sided airspace disease consistent with improving pneumonia.  Bronchitis..     This report was signed and finalized on 12/19/2024 12:19 PM by Gabriella Rodriguez MD.       Procedures    Assessment / Plan      Assessment/Plan:     1.  Rectal cancer   2.  Liver metastases  3.  Lung metastasis  4.  Chemo monitoring  5.  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  -He was found to have biopsy-proven metastasis in the liver and lung.  His case was presented at multidisciplinary tumor board.  -Because of metastatic disease to both the lung and liver I do not think he will be downstage to resectable disease.  -Tempus results which are notable for an APC mutation, T p53 mutation, negative for KRAS, BRAF, NRAS, tumor mutation burden stable.  Notch 1 VUS.  -CBC adequate and CMP pending for treatment today with maintenance 5-FU and panitumumab 9/16/24  Chemotherapy orders and premedications signed9/16/24  -We reviewed his imaging which is consistent with excellent disease control in early July with normalization of CEA. He has had persistent rectal pain with escalating oxycodone requirements over the past couple of months, but no severe recent increase in pain, fever. WBC normal. 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 I reviewed his EUA notes. Increase doxy to BID. No surgical intervention.  -Consolidative radiotherapy to the rectal tumor is not something he wants to pursue initially but he now states he may reconsider, after the holidays. Particularly if continued changes to stool caliber, consider repeat MRI and radiation consultation.   -Continue maintenance 5FU/panitumumab.   -Scans show equivocal progression in lung nodule confused by concurrent PNA 10/2024 but chest CT after 6 weeks to ensure PNA clearing and re-evaluate lung nodule reviewed and shows stable disease, improved PNA.   Treatment held  in mid December for pneumonia.  He completed a course of Augmentin.  I am going to hold his treatment today for ongoing symptoms and we will treat him for a COPD exacerbation with resumption of doxycycline, prednisone burst, and follow-up in 1 week.  -He is now interested in meeting with radiation oncology and I will place a referral for the middle of January.    6.  Cancer related pain  -Now following with palliative care and pain is well controlled on lyrica, oxycodone and MS contin.     7.  Panitumumab induced rash  -Continue doxycycline.  Resume now that he has completed antibiotics.    8. GERD  PPI    9. Access  -Port    Follow Up:   2 weeks     Brenda Cronin MD  Hematology and Oncology

## 2025-01-06 ENCOUNTER — TELEMEDICINE (OUTPATIENT)
Dept: ONCOLOGY | Facility: CLINIC | Age: 55
End: 2025-01-06
Payer: COMMERCIAL

## 2025-01-06 DIAGNOSIS — C78.7 RECTAL CANCER METASTASIZED TO LIVER: ICD-10-CM

## 2025-01-06 DIAGNOSIS — C20 RECTAL CANCER METASTASIZED TO LIVER: ICD-10-CM

## 2025-01-06 DIAGNOSIS — R05.1 ACUTE COUGH: Primary | ICD-10-CM

## 2025-01-06 DIAGNOSIS — J18.9 PNEUMONIA OF LEFT LOWER LOBE DUE TO INFECTIOUS ORGANISM: ICD-10-CM

## 2025-01-06 PROCEDURE — 99214 OFFICE O/P EST MOD 30 MIN: CPT | Performed by: INTERNAL MEDICINE

## 2025-01-06 RX ORDER — PREDNISONE 20 MG/1
TABLET ORAL
Qty: 10 TABLET | Refills: 0 | Status: SHIPPED | OUTPATIENT
Start: 2025-01-06 | End: 2025-01-14

## 2025-01-06 NOTE — PROGRESS NOTES
[facility]ology [facility]  Office number [phone]    Fax number [phone]     Follow up     Date: 25    Patient Name: Edward Powell  MRN: [id number]  : 1970    Referring Physician: Dr. Gautam    Chief Complaint: Rectal cancer, lung mass, liver lesions follow up on treatment. Pt provides verbal consent to receive care via [employer], my chart platform for severe weather. His location is his home in [place], [place]. My location is my home in [facility]    Cancer Staging:  IV    History of Present Illness: Edward Powell is a pleasant 54 y.o. male who presents today for evaluation of rectal cancer.    Patient has a longstanding history of intermittent diarrhea since his cholecystectomy in 2019.  However he developed progressive diarrhea with associated loss of bowel control and urgency as well as weight loss and fatigue prompting additional workup.    CT of the abdomen pelvis 2023 showed an irregular circumferential wall thickening involving the rectum concerning for mass lesion.  There was associated mesorectal lymphadenopathy.  Masslike consolidation of left lower lobe.  CT of the chest showed a cavitary lesion in the left lower lobe up to 4.8 cm with an adjacent cavitary nodule up to 2 cm and a 5 mm nodule on the right minor fissure.      He underwent colonoscopy 2023 with findings of an infiltrative and ulcerated partially obstructing mass in the proximal rectum spanning 10 cm.  Rectal polyps.  Biopsy of the rectal mass showed invasive moderately differentiated adenocarcinoma.  Additional biopsies showed tubular adenomas.  MSI testing was intact/low probability of MSI high.    PDL1 negative    PET/CT 2023 showed hypermetabolic rectal wall thickening compatible with known rectal malignancy.  Multiple small ill-defined hypoechoic hyper metabolic liver lesions compatible with metastatic disease.  Mildly enlarged and mildly hypermetabolic pelvic sidewall and  internal iliac lymph node chain adenopathy.  Hypermetabolic lung mass with adjacent nodule.     He underwent liver biopsy and lung biopsy January 2024.  Results of both confirmed metastatic colorectal cancer.    The patient has been experiencing substantial rectal pain.  He is taking oxycodone every 6 hours with partial relief.  He reports ongoing bowel movements.  No abdominal pain.  No vomiting.  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    Treatment history:  FOLFOX cycle 1 -4  FOLFOX panitumumab cycle 5 onward  -Oxaliplatin terminated early for allergic reaction after 4/29/24    Interval history:  Treatment has been on hold for cough. Had xray and treated with antibiotics.   Still with cough and difficulty bringing up sputum productive of thick yellow/brown sputum, feel things are hung up in his throat. Proair, prednisone helped with being able to bring up more phlegm, yellow to light brown, cough production has gone down since steroid burst ended.  Still coughing at night, taking cough syrup and narcotic. New hoarseness. No change to dry red mucosa in his mouth, no exudate.   Scheduled for radiation oncology consult in [place] and wants to change that to [place].     Past Medical History:   Past Medical History:   Diagnosis Date    Arthritis     Cancer     rectal cancer - diagnosed 2023    Cholelithiasis 2019    Removed    COPD (chronic obstructive pulmonary disease)     Coronary artery disease 2009    Stent - no cardiologist currently    Elevated cholesterol     GERD (gastroesophageal reflux disease)     Hernia 2003    Lower hernia    Perforated ulcer 2019    Sleep apnea     history of; when weighed over 400lbs - no issues following bariatric surgery       Past Surgical  History:   Past Surgical History:   Procedure Laterality Date    APPENDECTOMY  1983    Removed    BARIATRIC SURGERY  2011    Gastric bypass    BLADDER TUMOR/ULCER BLEEDER CAUTERIZATION      CARDIAC CATHETERIZATION  2009    stent placed    CHOLECYSTECTOMY  2019    Removed    COLONOSCOPY N/A 12/07/2023    Procedure: COLONOSCOPY WITH HOT SNARE POLYPECTOMY AND TATTOO;  Surgeon: Noman Gautam MD;  Location: [facility] ENDOSCOPY;  Service: Gastroenterology;  Laterality: N/A;    PORTACATH PLACEMENT N/A 1/5/2024    Procedure: INSERTION OF PORTACATH WITH ULTRSOUND AND FLUOROSCOPIC GUIDANCE;  Surgeon: Ida Black MD;  Location: [facility] OR;  Service: General;  Laterality: N/A;    JENNIFER-EN-Y         Family History: No family history on file.  Uncle had colon cancer    Social History:   Social History     Socioeconomic History    Marital status:    Tobacco Use    Smoking status: Every Day     Current packs/day: 1.00     Average packs/day: 0.9 packs/day for 60.3 years (52.8 ttl pk-yrs)     Types: Cigarettes     Start date: 9/6/1994    Smokeless tobacco: Never    Tobacco comments:     35 years      pt reports closer to 2 packs per day since cancer diagnosis   Vaping Use    Vaping status: Never Used   Substance and Sexual Activity    Alcohol use: Never    Drug use: Never    Sexual activity: Defer       Medications:     Current Outpatient Medications:     albuterol sulfate  (90 Base) MCG/ACT inhaler, Inhale 2 puffs Every 4 (Four) Hours As Needed for Wheezing., Disp: 18 g, Rfl: 3    benzonatate (Tessalon Perles) 100 MG capsule, Take 1 capsule by mouth 3 (Three) Times a Day As Needed for Cough., Disp: 45 capsule, Rfl: 0    buPROPion XL (WELLBUTRIN XL) 150 MG 24 hr tablet, TAKE 1 TABLET BY MOUTH DAILY, Disp: 90 tablet, Rfl: 1    cetirizine (zyrTEC) 10 MG tablet, Take 1 tablet by mouth Daily., Disp: 30 tablet, Rfl: 2    clindamycin (Clindagel) 1 % gel, Apply 1 Application topically to the appropriate  area as directed 2 (Two) Times a Day. Use first, Disp: 30 g, Rfl: 1    dexAMETHasone 0.5 MG/5ML solution, Take 10 mL by mouth 2 (Two) Times a Day. Swish for 2 minutes and spit 10 mL 4 x daily, Disp: 240 mL, Rfl: 5    dicyclomine (BENTYL) 20 MG tablet, Take 1 tablet by mouth 3 (Three) Times a Day As Needed for Abdominal Cramping., Disp: 30 tablet, Rfl: 3    docusate sodium (COLACE) 100 MG capsule, Take 1 capsule by mouth 2 (Two) Times a Day., Disp: , Rfl:     doxycycline (VIBRAMYICN) 100 MG tablet, Take 1 tablet by mouth 2 (Two) Times a Day. For 7 days, then 1 tablet daily indefinitely, Disp: 67 tablet, Rfl: 3    DULoxetine (CYMBALTA) 30 MG capsule, , Disp: , Rfl:     famotidine (PEPCID) 20 MG tablet, Take 1 tablet by mouth 2 (Two) Times a Day., Disp: 60 tablet, Rfl: 4    Hydrocortisone, Perianal, (ANUSOL-HC) 2.5 % rectal cream, Insert  into the rectum 2 (Two) Times a Day. Indications: Inflamed Hemorrhoids, Disp: 30 g, Rfl: 1    Klor-Con M20 20 MEQ CR tablet, TAKE 1 TABLET BY MOUTH DAILY, Disp: 30 tablet, Rfl: 0    lidocaine-prilocaine (EMLA) 2.5-2.5 % cream, Apply 1 Application topically to the appropriate area as directed As Needed (45-60 minutes prior to port access.  Cover with saran/plastic wrap.)., Disp: 30 g, Rfl: 3    LORazepam (ATIVAN) 0.5 MG tablet, Take 1 tablet by mouth Take As Directed. 1 tabelt by mouth 30 minutes prior to MRI, take a second tablet at the time of the MRI if needed., Disp: 2 tablet, Rfl: 0    Magic Mouthwash Oral Suspension (diphenhydrAMINE HCl - aluminum & magnesium hydroxide-simethicone - lidocaine - nystatin), Swish and Spit 10 mL by mouth every 6 (Six) Hours For 7 Days., Disp: 300 mL, Rfl: 3    magnesium oxide (MAG-OX) 400 MG tablet, Take 1 tablet by mouth Daily., Disp: 30 tablet, Rfl: 5    montelukast (SINGULAIR) 10 MG tablet, TAKE ONE TABLET BY MOUTH ONCE NIGHTLY, Disp: 30 tablet, Rfl: 1    Morphine (MS CONTIN) 30 MG 12 hr tablet, Take 1 tablet by mouth 2 (Two) Times a Day., Disp:  60 tablet, Rfl: 0    Movantik 25 MG tablet, , Disp: , Rfl:     mupirocin (BACTROBAN) 2 % cream, , Disp: , Rfl:     naloxone (NARCAN) 4 MG/0.1ML nasal spray, 1 spray into the nostril(s) as directed by provider As Needed for Opioid Reversal., Disp: 1 each, Rfl: 0    nystatin (MYCOSTATIN) 100,000 unit/mL suspension, Swish and Swallow 5mL by mouth four times a day, Disp: 473 mL, Rfl: 0    nystatin (MYCOSTATIN) 100,000 unit/mL suspension, Swish and swallow 5 mL 4 (Four) Times a Day., Disp: 473 mL, Rfl: 0    nystatin susp + lidocaine viscous (MAGIC MOUTHWASH) oral suspension, 5-10 ml swish and spit or swallow QID prn, Disp: 240 mL, Rfl: 3    ondansetron (ZOFRAN) 8 MG tablet, Take 1 tablet by mouth 3 (Three) Times a Day As Needed for Nausea or Vomiting., Disp: 30 tablet, Rfl: 5    [START ON 1/8/2025] oxyCODONE (ROXICODONE) 15 MG immediate release tablet, Take 1 tablet by mouth 5 (Five) Times a Day As Needed for Moderate Pain or Severe Pain for up to 30 days., Disp: 150 tablet, Rfl: 0    pantoprazole (Protonix) 40 MG EC tablet, Take 1 tablet by mouth Daily. Indications: Gastroesophageal Reflux Disease, Disp: 90 tablet, Rfl: 2    predniSONE (DELTASONE) 20 MG tablet, Take 2 tablets by mouth Daily., Disp: 10 tablet, Rfl: 0    [START ON 1/8/2025] pregabalin (Lyrica) 300 MG capsule, Take 1 capsule by mouth 2 (Two) Times a Day for 30 days., Disp: 60 capsule, Rfl: 0    promethazine-dextromethorphan (PROMETHAZINE-DM) 6.25-15 MG/5ML syrup, Take 5 mL by mouth 3 times a day., Disp: 240 mL, Rfl: 0    tiotropium bromide-olodaterol (Stiolto Respimat) 2.5-2.5 MCG/ACT aerosol solution inhaler, INHALE 2 INHALATION(S) BY MOUTH DAILY, Disp: 4 g, Rfl: 5    traZODone (DESYREL) 100 MG tablet, Take 1 tablet by mouth Every Night., Disp: 30 tablet, Rfl: 2    triamcinolone (KENALOG) 0.025 % ointment, Apply 1 Application topically to the appropriate area as directed 2 (Two) Times a Day. Use second if topical treatment #1 ineffective, Disp: 80 g, Rfl:  0    Allergies:   Allergies   Allergen Reactions    Bactrim [Sulfamethoxazole-Trimethoprim] Hives     and blisters    Sulfa Antibiotics Hives     and blisters       Objective     Vital Signs:   There were no vitals filed for this visit.       There is no height or weight on file to calculate BMI.   There were no vitals filed for this visit.    ECOG Performance Status: 1 - Symptomatic but completely ambulatory    Physical Exam:   Constitutional:  Well developed, Well nourished, No acute distress.    HENT:  Normocephalic, Atraumatic.  Eyes: Conjunctivae normal.  Sclerae anicteric  Musculoskeletal: No peripheral edema.  Skin:  Warm, Dry, No erythema, No rash.   Psych: normal affect  Neuro: A and O x 3     Laboratory/Imaging Reviewed:   No visits with results within 2 Week(s) from this visit.   Latest known visit with results is:   Hospital Outpatient Visit on 12/02/2024   Component Date Value Ref Range Status    CEA 12/02/2024 5.61  ng/mL Final    Glucose 12/02/2024 102 (H)  65 - 99 mg/dL Final    BUN 12/02/2024 10  6 - 20 mg/dL Final    Creatinine 12/02/2024 0.72 (L)  0.76 - 1.27 mg/dL Final    Sodium 12/02/2024 135 (L)  136 - 145 mmol/L Final    Potassium 12/02/2024 3.3 (L)  3.5 - 5.2 mmol/L Final    Chloride 12/02/2024 102  98 - 107 mmol/L Final    CO2 12/02/2024 23.3  22.0 - 29.0 mmol/L Final    Calcium 12/02/2024 8.2 (L)  8.6 - 10.5 mg/dL Final    Total Protein 12/02/2024 6.6  6.0 - 8.5 g/dL Final    Albumin 12/02/2024 3.1 (L)  3.5 - 5.2 g/dL Final    ALT (SGPT) 12/02/2024 7  1 - 41 U/L Final    AST (SGOT) 12/02/2024 13  1 - 40 U/L Final    Alkaline Phosphatase 12/02/2024 139 (H)  39 - 117 U/L Final    Total Bilirubin 12/02/2024 0.5  0.0 - 1.2 mg/dL Final    Globulin 12/02/2024 3.5  gm/dL Final    A/G Ratio 12/02/2024 0.9  g/dL Final    BUN/Creatinine Ratio 12/02/2024 13.9  7.0 - 25.0 Final    Anion Gap 12/02/2024 9.7  5.0 - 15.0 mmol/L Final    eGFR 12/02/2024 107.9  >60.0 mL/min/1.73 Final    Magnesium  12/02/2024 1.6  1.6 - 2.6 mg/dL Final    WBC 12/02/2024 10.96 (H)  3.40 - 10.80 10*3/mm3 Final    RBC 12/02/2024 4.54  4.14 - 5.80 10*6/mm3 Final    Hemoglobin 12/02/2024 11.2 (L)  13.0 - 17.7 g/dL Final    Hematocrit 12/02/2024 36.2 (L)  37.5 - 51.0 % Final    MCV 12/02/2024 79.7  79.0 - 97.0 fL Final    MCH 12/02/2024 24.7 (L)  26.6 - 33.0 pg Final    MCHC 12/02/2024 30.9 (L)  31.5 - 35.7 g/dL Final    RDW 12/02/2024 18.6 (H)  12.3 - 15.4 % Final    RDW-SD 12/02/2024 52.3  37.0 - 54.0 fl Final    MPV 12/02/2024 11.3  6.0 - 12.0 fL Final    Platelets 12/02/2024 195  140 - 450 10*3/mm3 Final    Neutrophil % 12/02/2024 79.3 (H)  42.7 - 76.0 % Final    Lymphocyte % 12/02/2024 12.0 (L)  19.6 - 45.3 % Final    Monocyte % 12/02/2024 6.8  5.0 - 12.0 % Final    Eosinophil % 12/02/2024 1.0  0.3 - 6.2 % Final    Basophil % 12/02/2024 0.4  0.0 - 1.5 % Final    Immature Grans % 12/02/2024 0.5  0.0 - 0.5 % Final    Neutrophils, Absolute 12/02/2024 8.71 (H)  1.70 - 7.00 10*3/mm3 Final    Lymphocytes, Absolute 12/02/2024 1.31  0.70 - 3.10 10*3/mm3 Final    Monocytes, Absolute 12/02/2024 0.74  0.10 - 0.90 10*3/mm3 Final    Eosinophils, Absolute 12/02/2024 0.11  0.00 - 0.40 10*3/mm3 Final    Basophils, Absolute 12/02/2024 0.04  0.00 - 0.20 10*3/mm3 Final    Immature Grans, Absolute 12/02/2024 0.05  0.00 - 0.05 10*3/mm3 Final    nRBC 12/02/2024 0.0  0.0 - 0.2 /100 WBC Final     Tempus xt: APC gene TP53; Negative ADRIANNA, BRAF, NRAS, TMB stable. PDL1 negative  XR Chest PA & Lateral    Result Date: 12/19/2024  Narrative: PROCEDURE: XR CHEST PA AND LATERAL-  HISTORY: Cough, URI; R05.1-Acute cough  COMPARISON: October 7, 2024..  FINDINGS: The heart is normal in size. Right lung is clear. The airspace disease identified previously in the left lung has improved consistent with improving pneumonia. No new area of airspace disease identified. Right internal jugular port is stable in position.. There is bronchial wall thickening consistent with  bronchitis. The mediastinum is unremarkable. There is no pneumothorax.  There are no acute osseous abnormalities. Apical lordotic positioning noted.      Impression: Improved left-sided airspace disease consistent with improving pneumonia.  Bronchitis..     This report was signed and finalized on 12/19/2024 12:19 PM by Gabriella Rodriguez MD.       Procedures    Assessment / Plan      Assessment/Plan:     1.  Rectal cancer   2.  Liver metastases  3.  Lung metastasis  4.  Chemo monitoring  5.  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  -He was found to have biopsy-proven metastasis in the liver and lung.  His case was presented at multidisciplinary tumor board.  -Because of metastatic disease to both the lung and liver I do not think he will be downstage to resectable disease.  -Tempus results which are notable for an APC mutation, T p53 mutation, negative for KRAS, BRAF, NRAS, tumor mutation burden stable.  Notch 1 VUS.  -CBC adequate and CMP pending for treatment today with maintenance 5-FU and panitumumab 9/16/24  Chemotherapy orders and premedications signed9/16/24  -We reviewed his imaging which is consistent with excellent disease control in early July with normalization of CEA. He has had persistent rectal pain with escalating oxycodone requirements over the past couple of months, but no severe recent increase in pain, fever. WBC normal. 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 I reviewed his EUA notes. Increase doxy to BID. No surgical intervention.  -Consolidative radiotherapy to the rectal tumor is not something he wants to pursue initially but he now states he may reconsider, after the holidays. Particularly if continued changes to stool caliber, consider repeat MRI and radiation consultation.   -Continue maintenance 5FU/panitumumab.   -Scans show equivocal progression in lung nodule confused by concurrent PNA  10/2024 but chest CT after 6 weeks to ensure PNA clearing and re-evaluate lung nodule reviewed and shows stable disease, improved PNA.   Treatment held in mid December for pneumonia.  He completed a course of Augmentin.  Treatment held last week for COPD exaccerbation doxycycline, prednisone burst. He is having some mild improvement but overall cough persists  -Send steroid taper.   -Hold infusion  -Restaging scans given ongoing symptoms  -Follow up with results for treatment resumption  -He is now interested in meeting with radiation oncology: move appt to Dr. Uribe in [facility] Placed This Encounter   Procedures    CT Chest With Contrast    CT Abdomen Pelvis With Contrast       6.  Cancer related pain  -Now following with palliative care and pain is well controlled on lyrica, oxycodone and MS contin.     7.  Panitumumab induced rash  -Continue doxycycline.      8. GERD  PPI    9. Access  -Port    Follow Up:   With scan results     Brenda Cronin MD  Hematology and Oncology

## 2025-01-08 DIAGNOSIS — T45.1X5A CHEMOTHERAPY-INDUCED NEUROPATHY: ICD-10-CM

## 2025-01-08 DIAGNOSIS — G62.0 CHEMOTHERAPY-INDUCED NEUROPATHY: ICD-10-CM

## 2025-01-08 DIAGNOSIS — G89.3 CANCER RELATED PAIN: ICD-10-CM

## 2025-01-08 RX ORDER — OXYCODONE HYDROCHLORIDE 15 MG/1
15 TABLET ORAL
Qty: 150 TABLET | Refills: 0 | Status: SHIPPED | OUTPATIENT
Start: 2025-01-08 | End: 2025-02-07

## 2025-01-08 RX ORDER — PREGABALIN 300 MG/1
300 CAPSULE ORAL 2 TIMES DAILY
Qty: 60 CAPSULE | Refills: 0 | Status: SHIPPED | OUTPATIENT
Start: 2025-01-08 | End: 2025-02-07

## 2025-01-08 NOTE — TELEPHONE ENCOUNTER
RACIEL #: [id number]      Medication requested:oxyCODONE (ROXICODONE) 15 MG immediate release tablet     Last fill date: 12/9/24    Medication requested: pregabalin (Lyrica) 300 MG capsule     Last fill date: 12/9/24        Last appointment: 12/27/24    Next appointment: 3/28/25

## 2025-01-13 ENCOUNTER — OFFICE VISIT (OUTPATIENT)
Age: 55
End: 2025-01-13
Payer: COMMERCIAL

## 2025-01-13 ENCOUNTER — TELEPHONE (OUTPATIENT)
Dept: ONCOLOGY | Facility: CLINIC | Age: 55
End: 2025-01-13

## 2025-01-13 VITALS
BODY MASS INDEX: 28.31 KG/M2 | HEART RATE: 75 BPM | HEIGHT: 72 IN | OXYGEN SATURATION: 96 % | WEIGHT: 209 LBS | DIASTOLIC BLOOD PRESSURE: 58 MMHG | TEMPERATURE: 97.9 F | SYSTOLIC BLOOD PRESSURE: 98 MMHG

## 2025-01-13 DIAGNOSIS — J18.9 PNEUMONIA OF BOTH LUNGS DUE TO INFECTIOUS ORGANISM, UNSPECIFIED PART OF LUNG: ICD-10-CM

## 2025-01-13 DIAGNOSIS — R05.1 ACUTE COUGH: Primary | ICD-10-CM

## 2025-01-13 PROCEDURE — 96372 THER/PROPH/DIAG INJ SC/IM: CPT | Performed by: FAMILY MEDICINE

## 2025-01-13 PROCEDURE — 99213 OFFICE O/P EST LOW 20 MIN: CPT | Performed by: FAMILY MEDICINE

## 2025-01-13 RX ORDER — CEFTRIAXONE 1 G/1
1 INJECTION, POWDER, FOR SOLUTION INTRAMUSCULAR; INTRAVENOUS EVERY 24 HOURS
Status: COMPLETED | OUTPATIENT
Start: 2025-01-13 | End: 2025-01-15

## 2025-01-13 RX ADMIN — CEFTRIAXONE 1 G: 1 INJECTION, POWDER, FOR SOLUTION INTRAMUSCULAR; INTRAVENOUS at 14:37

## 2025-01-13 NOTE — PROGRESS NOTES
Follow Up Office Visit      Date: 2025   Patient Name: Edward Powell  : 1970   MRN: [id number]     Chief Complaint:    Chief Complaint   Patient presents with    Hoarse     Diarrhea  Sore throat  Coughing       History of Present Illness: Edward Powell is a 54 y.o. male who is here today for sore throat, coughing, diarrhea.    Was put on Ampicillin by his Oncologist then was put on steroids.  This occurred around mid to end of December.    Yesterday started having abdominal pain, loss of appetite.  Later last night started having episodes of diarrhea    Subjective      Review of Systems:   Review of Systems   Constitutional:  Negative for appetite change and unexpected weight loss.   HENT:  Negative for trouble swallowing.    Eyes:  Negative for blurred vision and double vision.   Respiratory:  Negative for cough and shortness of breath.    Cardiovascular:  Negative for chest pain and leg swelling.   Gastrointestinal:  Negative for blood in stool.   Endocrine: Negative for cold intolerance, heat intolerance and polyuria.   Musculoskeletal:  Negative for joint swelling.   Skin:  Negative for color change and bruise.   Neurological:  Negative for numbness and memory problem.   Hematological:  Does not bruise/bleed easily.   Psychiatric/Behavioral:  Negative for suicidal ideas and depressed mood. The patient is not nervous/anxious.        I have reviewed the patients family history, social history, past medical history, past surgical history and have updated it as appropriate.     Medications:     Current Outpatient Medications:     albuterol sulfate  (90 Base) MCG/ACT inhaler, Inhale 2 puffs Every 4 (Four) Hours As Needed for Wheezing., Disp: 18 g, Rfl: 3    buPROPion XL (WELLBUTRIN XL) 150 MG 24 hr tablet, TAKE 1 TABLET BY MOUTH DAILY, Disp: 90 tablet, Rfl: 1    cetirizine (zyrTEC) 10 MG tablet, Take 1 tablet by mouth Daily., Disp: 30 tablet, Rfl: 2    clindamycin (Clindagel) 1 %  gel, Apply 1 Application topically to the appropriate area as directed 2 (Two) Times a Day. Use first, Disp: 30 g, Rfl: 1    dexAMETHasone 0.5 MG/5ML solution, Take 10 mL by mouth 2 (Two) Times a Day. Swish for 2 minutes and spit 10 mL 4 x daily, Disp: 240 mL, Rfl: 5    dicyclomine (BENTYL) 20 MG tablet, Take 1 tablet by mouth 3 (Three) Times a Day As Needed for Abdominal Cramping., Disp: 30 tablet, Rfl: 3    docusate sodium (COLACE) 100 MG capsule, Take 1 capsule by mouth 2 (Two) Times a Day., Disp: , Rfl:     DULoxetine (CYMBALTA) 30 MG capsule, , Disp: , Rfl:     famotidine (PEPCID) 20 MG tablet, Take 1 tablet by mouth 2 (Two) Times a Day., Disp: 60 tablet, Rfl: 4    Hydrocortisone, Perianal, (ANUSOL-HC) 2.5 % rectal cream, Insert  into the rectum 2 (Two) Times a Day. Indications: Inflamed Hemorrhoids, Disp: 30 g, Rfl: 1    Klor-Con M20 20 MEQ CR tablet, TAKE 1 TABLET BY MOUTH DAILY, Disp: 30 tablet, Rfl: 0    lidocaine-prilocaine (EMLA) 2.5-2.5 % cream, Apply 1 Application topically to the appropriate area as directed As Needed (45-60 minutes prior to port access.  Cover with saran/plastic wrap.)., Disp: 30 g, Rfl: 3    LORazepam (ATIVAN) 0.5 MG tablet, Take 1 tablet by mouth Take As Directed. 1 tabelt by mouth 30 minutes prior to MRI, take a second tablet at the time of the MRI if needed., Disp: 2 tablet, Rfl: 0    Magic Mouthwash Oral Suspension (diphenhydrAMINE HCl - aluminum & magnesium hydroxide-simethicone - lidocaine - nystatin), Swish and Spit 10 mL by mouth every 6 (Six) Hours For 7 Days., Disp: 300 mL, Rfl: 3    magnesium oxide (MAG-OX) 400 MG tablet, Take 1 tablet by mouth Daily., Disp: 30 tablet, Rfl: 5    montelukast (SINGULAIR) 10 MG tablet, TAKE ONE TABLET BY MOUTH ONCE NIGHTLY, Disp: 30 tablet, Rfl: 1    Morphine (MS CONTIN) 30 MG 12 hr tablet, Take 1 tablet by mouth 2 (Two) Times a Day., Disp: 60 tablet, Rfl: 0    Movantik 25 MG tablet, , Disp: , Rfl:     mupirocin (BACTROBAN) 2 % cream, , Disp:  , Rfl:     naloxone (NARCAN) 4 MG/0.1ML nasal spray, 1 spray into the nostril(s) as directed by provider As Needed for Opioid Reversal., Disp: 1 each, Rfl: 0    nystatin (MYCOSTATIN) 100,000 unit/mL suspension, Swish and Swallow 5mL by mouth four times a day, Disp: 473 mL, Rfl: 0    nystatin (MYCOSTATIN) 100,000 unit/mL suspension, Swish and swallow 5 mL 4 (Four) Times a Day., Disp: 473 mL, Rfl: 0    nystatin susp + lidocaine viscous (MAGIC MOUTHWASH) oral suspension, 5-10 ml swish and spit or swallow QID prn, Disp: 240 mL, Rfl: 3    ondansetron (ZOFRAN) 8 MG tablet, Take 1 tablet by mouth 3 (Three) Times a Day As Needed for Nausea or Vomiting., Disp: 30 tablet, Rfl: 5    oxyCODONE (ROXICODONE) 15 MG immediate release tablet, Take 1 tablet by mouth 5 (Five) Times a Day As Needed for Moderate Pain or Severe Pain for up to 30 days., Disp: 150 tablet, Rfl: 0    pantoprazole (Protonix) 40 MG EC tablet, Take 1 tablet by mouth Daily. Indications: Gastroesophageal Reflux Disease, Disp: 90 tablet, Rfl: 2    predniSONE (DELTASONE) 20 MG tablet, Take 2 tablets by mouth Daily for 2 days, THEN 1.5 tablets Daily for 2 days, THEN 1 tablet Daily for 2 days, THEN 0.5 tablets Daily for 2 days., Disp: 10 tablet, Rfl: 0    pregabalin (Lyrica) 300 MG capsule, Take 1 capsule by mouth 2 (Two) Times a Day for 30 days., Disp: 60 capsule, Rfl: 0    tiotropium bromide-olodaterol (Stiolto Respimat) 2.5-2.5 MCG/ACT aerosol solution inhaler, INHALE 2 INHALATION(S) BY MOUTH DAILY, Disp: 4 g, Rfl: 5    traZODone (DESYREL) 100 MG tablet, Take 1 tablet by mouth Every Night., Disp: 30 tablet, Rfl: 2    triamcinolone (KENALOG) 0.025 % ointment, Apply 1 Application topically to the appropriate area as directed 2 (Two) Times a Day. Use second if topical treatment #1 ineffective, Disp: 80 g, Rfl: 0    benzonatate (Tessalon Perles) 100 MG capsule, Take 1 capsule by mouth 3 (Three) Times a Day As Needed for Cough. (Patient not taking: Reported on  "1/13/2025), Disp: 45 capsule, Rfl: 0    doxycycline (VIBRAMYICN) 100 MG tablet, Take 1 tablet by mouth 2 (Two) Times a Day. For 7 days, then 1 tablet daily indefinitely (Patient not taking: Reported on 1/13/2025), Disp: 67 tablet, Rfl: 3    predniSONE (DELTASONE) 20 MG tablet, Take 2 tablets by mouth Daily. (Patient not taking: Reported on 1/13/2025), Disp: 10 tablet, Rfl: 0    promethazine-dextromethorphan (PROMETHAZINE-DM) 6.25-15 MG/5ML syrup, Take 5 mL by mouth 3 times a day. (Patient not taking: Reported on 1/13/2025), Disp: 240 mL, Rfl: 0    Current Facility-Administered Medications:     cefTRIAXone (ROCEPHIN) injection 1 g, 1 g, Intramuscular, Q24H, Westley Gonzales, DO, 1 g at 01/13/25 1437    Allergies:   Allergies   Allergen Reactions    Bactrim [Sulfamethoxazole-Trimethoprim] Hives     and blisters    Sulfa Antibiotics Hives     and blisters       Objective     Physical Exam: Please see above  Vital Signs:   Vitals:    01/13/25 1256   BP: 98/58   Pulse: 75   Temp: 97.9 °F (36.6 °C)   SpO2: 96%   Weight: 94.8 kg (209 lb)   Height: 182.9 cm (72.01\")     Body mass index is 28.34 kg/m².    Physical Exam  Vitals and nursing note reviewed.   Constitutional:       Appearance: Normal appearance.      Comments: Somewhat ill-appearing   HENT:      Head: Normocephalic and atraumatic.   Eyes:      General: Lids are normal.      Conjunctiva/sclera: Conjunctivae normal.   Cardiovascular:      Rate and Rhythm: Normal rate and regular rhythm.   Pulmonary:      Effort: Pulmonary effort is normal.      Breath sounds: Normal breath sounds and air entry.   Abdominal:      General: Abdomen is flat. Bowel sounds are normal.      Palpations: Abdomen is soft.   Musculoskeletal:      Cervical back: Full passive range of motion without pain and normal range of motion.   Neurological:      General: No focal deficit present.      Mental Status: He is alert and oriented to person, place, and time.   Psychiatric:         Attention and " "Perception: Attention normal.         Mood and Affect: Mood normal.         Behavior: Behavior normal. Behavior is cooperative.         Procedures    Results:   Labs:   No results found for: \"HGBA1C\", \"CMP\", \"CBCDIFFPANEL\", \"CREAT\", \"TSH\"     POCT Results (if applicable):   Results for orders placed or performed during the hospital encounter of 12/02/24   CEA    Collection Time: 12/02/24  8:32 AM    Specimen: Blood   Result Value Ref Range    CEA 5.61 ng/mL   Comprehensive Metabolic Panel    Collection Time: 12/02/24  8:32 AM    Specimen: Blood   Result Value Ref Range    Glucose 102 (H) 65 - 99 mg/dL    BUN 10 6 - 20 mg/dL    Creatinine 0.72 (L) 0.76 - 1.27 mg/dL    Sodium 135 (L) 136 - 145 mmol/L    Potassium 3.3 (L) 3.5 - 5.2 mmol/L    Chloride 102 98 - 107 mmol/L    CO2 23.3 22.0 - 29.0 mmol/L    Calcium 8.2 (L) 8.6 - 10.5 mg/dL    Total Protein 6.6 6.0 - 8.5 g/dL    Albumin 3.1 (L) 3.5 - 5.2 g/dL    ALT (SGPT) 7 1 - 41 U/L    AST (SGOT) 13 1 - 40 U/L    Alkaline Phosphatase 139 (H) 39 - 117 U/L    Total Bilirubin 0.5 0.0 - 1.2 mg/dL    Globulin 3.5 gm/dL    A/G Ratio 0.9 g/dL    BUN/Creatinine Ratio 13.9 7.0 - 25.0    Anion Gap 9.7 5.0 - 15.0 mmol/L    eGFR 107.9 >60.0 mL/min/1.73   Magnesium    Collection Time: 12/02/24  8:32 AM    Specimen: Blood   Result Value Ref Range    Magnesium 1.6 1.6 - 2.6 mg/dL   CBC Auto Differential    Collection Time: 12/02/24  8:32 AM    Specimen: Blood   Result Value Ref Range    WBC 10.96 (H) 3.40 - 10.80 10*3/mm3    RBC 4.54 4.14 - 5.80 10*6/mm3    Hemoglobin 11.2 (L) 13.0 - 17.7 g/dL    Hematocrit 36.2 (L) 37.5 - 51.0 %    MCV 79.7 79.0 - 97.0 fL    MCH 24.7 (L) 26.6 - 33.0 pg    MCHC 30.9 (L) 31.5 - 35.7 g/dL    RDW 18.6 (H) 12.3 - 15.4 %    RDW-SD 52.3 37.0 - 54.0 fl    MPV 11.3 6.0 - 12.0 fL    Platelets 195 140 - 450 10*3/mm3    Neutrophil % 79.3 (H) 42.7 - 76.0 %    Lymphocyte % 12.0 (L) 19.6 - 45.3 %    Monocyte % 6.8 5.0 - 12.0 %    Eosinophil % 1.0 0.3 - 6.2 %    " Basophil % 0.4 0.0 - 1.5 %    Immature Grans % 0.5 0.0 - 0.5 %    Neutrophils, Absolute 8.71 (H) 1.70 - 7.00 10*3/mm3    Lymphocytes, Absolute 1.31 0.70 - 3.10 10*3/mm3    Monocytes, Absolute 0.74 0.10 - 0.90 10*3/mm3    Eosinophils, Absolute 0.11 0.00 - 0.40 10*3/mm3    Basophils, Absolute 0.04 0.00 - 0.20 10*3/mm3    Immature Grans, Absolute 0.05 0.00 - 0.05 10*3/mm3    nRBC 0.0 0.0 - 0.2 /100 WBC       Imaging:   No valid procedures specified.         Assessment / Plan      Assessment/Plan:   Diagnoses and all orders for this visit:    1. Acute cough (Primary)  -     XR Chest PA & Lateral; Future    2. Pneumonia of both lungs due to infectious organism, unspecified part of lung  -     cefTRIAXone (ROCEPHIN) injection 1 g               Vaccine Counseling:      Follow Up:   No follow-ups on file.      Westley Gonzales,    [facility]

## 2025-01-13 NOTE — TELEPHONE ENCOUNTER
"Patient stated, \"I'm still sick.\"  Patient c/o cough that returned and is experiencing diarrhea.  Patient stated he finished his steroid taper.  He stated he lost his voice 3 times since video visit last week with Dr. Cronin.  Patient stated he has an appointment PCP today.  Dr. Cronin notified and patient advised to keep this clinic up to date with what he is advised by PCP and to keep his follow up next week.  Patient verbalized understanding.  "

## 2025-01-13 NOTE — TELEPHONE ENCOUNTER
Patient stated he saw his PCP today and a CXR was completed. He stated his PCP stated it was pneumonia vs bronchitis and that his lymph nodes are swollen in his neck.  He stated that his PCP gave him Rocephin IM in the office and he will get it daily for a total of 3 days.  He stated he received no PO antibiotics or steroids. Dr. Cronin notified and patient contacted back and advised to call this office if he has any questions/concerns and to keep CT CAP and follow up scheduled.  Patient verbalized understanding.

## 2025-01-14 ENCOUNTER — CLINICAL SUPPORT (OUTPATIENT)
Age: 55
End: 2025-01-14
Payer: COMMERCIAL

## 2025-01-14 DIAGNOSIS — J18.9 PNEUMONIA OF BOTH LUNGS DUE TO INFECTIOUS ORGANISM, UNSPECIFIED PART OF LUNG: Primary | ICD-10-CM

## 2025-01-14 PROCEDURE — 96372 THER/PROPH/DIAG INJ SC/IM: CPT | Performed by: FAMILY MEDICINE

## 2025-01-14 RX ADMIN — CEFTRIAXONE 1 G: 1 INJECTION, POWDER, FOR SOLUTION INTRAMUSCULAR; INTRAVENOUS at 13:46

## 2025-01-15 ENCOUNTER — TELEPHONE (OUTPATIENT)
Dept: NUTRITION | Facility: HOSPITAL | Age: 55
End: 2025-01-15
Payer: COMMERCIAL

## 2025-01-15 ENCOUNTER — CLINICAL SUPPORT (OUTPATIENT)
Age: 55
End: 2025-01-15
Payer: COMMERCIAL

## 2025-01-15 DIAGNOSIS — J18.9 PNEUMONIA OF BOTH LUNGS DUE TO INFECTIOUS ORGANISM, UNSPECIFIED PART OF LUNG: Primary | ICD-10-CM

## 2025-01-15 PROCEDURE — 96372 THER/PROPH/DIAG INJ SC/IM: CPT | Performed by: FAMILY MEDICINE

## 2025-01-15 RX ADMIN — CEFTRIAXONE 1 G: 1 INJECTION, POWDER, FOR SOLUTION INTRAMUSCULAR; INTRAVENOUS at 13:47

## 2025-01-15 NOTE — PROGRESS NOTES
Outpatient Oncology Nutrition Assessment      Patient Name:  Edward Powell  YOB: 1970  MRN: [id number]      Assessment Date:  1/15/2025    Comments:  Spoke with pt on phone for f/up. He reports eating well recently and thinks its d/t being off chemotherapy since he is sick. He has not had any wt loss. He reports not having any supplements d/t financial costs. RD will mail coupons. He does not have any nutrition questions. RD to f/up.    Electronically signed by:  Arabella Hong RD  01/15/25 14:24 EST

## 2025-01-16 ENCOUNTER — HOSPITAL ENCOUNTER (OUTPATIENT)
Dept: CT IMAGING | Facility: HOSPITAL | Age: 55
Discharge: HOME | End: 2025-01-16
Admitting: INTERNAL MEDICINE
Payer: COMMERCIAL

## 2025-01-16 DIAGNOSIS — C78.7 RECTAL CANCER METASTASIZED TO LIVER: ICD-10-CM

## 2025-01-16 DIAGNOSIS — R05.1 ACUTE COUGH: ICD-10-CM

## 2025-01-16 DIAGNOSIS — C20 RECTAL CANCER METASTASIZED TO LIVER: ICD-10-CM

## 2025-01-16 DIAGNOSIS — J18.9 PNEUMONIA OF LEFT LOWER LOBE DUE TO INFECTIOUS ORGANISM: ICD-10-CM

## 2025-01-16 PROCEDURE — 74177 CT ABD & PELVIS W/CONTRAST: CPT

## 2025-01-16 PROCEDURE — 25510000001 IOPAMIDOL 61 % SOLUTION: Performed by: INTERNAL MEDICINE

## 2025-01-16 PROCEDURE — 25010000002 HEPARIN LOCK FLUSH PER 10 UNITS: Performed by: RADIOLOGY

## 2025-01-16 PROCEDURE — 71260 CT THORAX DX C+: CPT

## 2025-01-16 RX ORDER — HEPARIN SODIUM (PORCINE) LOCK FLUSH IV SOLN 100 UNIT/ML 100 UNIT/ML
500 SOLUTION INTRAVENOUS ONCE
Status: COMPLETED | OUTPATIENT
Start: 2025-01-16 | End: 2025-01-16

## 2025-01-16 RX ORDER — IOPAMIDOL 612 MG/ML
100 INJECTION, SOLUTION INTRAVASCULAR
Status: COMPLETED | OUTPATIENT
Start: 2025-01-16 | End: 2025-01-16

## 2025-01-16 RX ADMIN — SODIUM CHLORIDE, PRESERVATIVE FREE 500 UNITS: 5 INJECTION INTRAVENOUS at 14:00

## 2025-01-16 RX ADMIN — IOPAMIDOL 100 ML: 612 INJECTION, SOLUTION INTRAVENOUS at 14:00

## 2025-01-20 ENCOUNTER — OFFICE VISIT (OUTPATIENT)
Dept: ONCOLOGY | Facility: CLINIC | Age: 55
End: 2025-01-20
Payer: COMMERCIAL

## 2025-01-20 ENCOUNTER — TELEPHONE (OUTPATIENT)
Dept: ONCOLOGY | Facility: CLINIC | Age: 55
End: 2025-01-20

## 2025-01-20 ENCOUNTER — HOSPITAL ENCOUNTER (OUTPATIENT)
Facility: HOSPITAL | Age: 55
Discharge: HOME | End: 2025-01-20
Admitting: INTERNAL MEDICINE
Payer: COMMERCIAL

## 2025-01-20 ENCOUNTER — APPOINTMENT (OUTPATIENT)
Facility: HOSPITAL | Age: 55
End: 2025-01-20
Payer: COMMERCIAL

## 2025-01-20 VITALS
BODY MASS INDEX: 28.54 KG/M2 | OXYGEN SATURATION: 97 % | DIASTOLIC BLOOD PRESSURE: 54 MMHG | HEART RATE: 69 BPM | SYSTOLIC BLOOD PRESSURE: 107 MMHG | HEIGHT: 72 IN | WEIGHT: 210.7 LBS | RESPIRATION RATE: 16 BRPM | TEMPERATURE: 98 F

## 2025-01-20 DIAGNOSIS — C20 RECTAL CANCER METASTASIZED TO LIVER: Primary | ICD-10-CM

## 2025-01-20 DIAGNOSIS — C78.7 RECTAL CANCER METASTASIZED TO LIVER: Primary | ICD-10-CM

## 2025-01-20 LAB
ALBUMIN SERPL-MCNC: 3.4 G/DL (ref 3.5–5.2)
ALBUMIN/GLOB SERPL: 1 G/DL
ALP SERPL-CCNC: 115 U/L (ref 39–117)
ALT SERPL W P-5'-P-CCNC: 12 U/L (ref 1–41)
ANION GAP SERPL CALCULATED.3IONS-SCNC: 6.2 MMOL/L (ref 5–15)
AST SERPL-CCNC: 22 U/L (ref 1–40)
BASOPHILS # BLD AUTO: 0.01 10*3/MM3 (ref 0–0.2)
BASOPHILS NFR BLD AUTO: 0.1 % (ref 0–1.5)
BILIRUB SERPL-MCNC: 0.3 MG/DL (ref 0–1.2)
BUN SERPL-MCNC: 9 MG/DL (ref 6–20)
BUN/CREAT SERPL: 11.1 (ref 7–25)
CALCIUM SPEC-SCNC: 8.2 MG/DL (ref 8.6–10.5)
CEA SERPL-MCNC: 7.81 NG/ML
CHLORIDE SERPL-SCNC: 102 MMOL/L (ref 98–107)
CO2 SERPL-SCNC: 26.8 MMOL/L (ref 22–29)
CREAT SERPL-MCNC: 0.81 MG/DL (ref 0.76–1.27)
DEPRECATED RDW RBC AUTO: 52.5 FL (ref 37–54)
EGFRCR SERPLBLD CKD-EPI 2021: 104.1 ML/MIN/1.73
EOSINOPHIL # BLD AUTO: 0.08 10*3/MM3 (ref 0–0.4)
EOSINOPHIL NFR BLD AUTO: 1 % (ref 0.3–6.2)
ERYTHROCYTE [DISTWIDTH] IN BLOOD BY AUTOMATED COUNT: 19 % (ref 12.3–15.4)
GLOBULIN UR ELPH-MCNC: 3.4 GM/DL
GLUCOSE SERPL-MCNC: 82 MG/DL (ref 65–99)
HCT VFR BLD AUTO: 35.1 % (ref 37.5–51)
HGB BLD-MCNC: 11 G/DL (ref 13–17.7)
IMM GRANULOCYTES # BLD AUTO: 0.02 10*3/MM3 (ref 0–0.05)
IMM GRANULOCYTES NFR BLD AUTO: 0.3 % (ref 0–0.5)
LYMPHOCYTES # BLD AUTO: 1.14 10*3/MM3 (ref 0.7–3.1)
LYMPHOCYTES NFR BLD AUTO: 14.3 % (ref 19.6–45.3)
MAGNESIUM SERPL-MCNC: 2.1 MG/DL (ref 1.6–2.6)
MCH RBC QN AUTO: 24.5 PG (ref 26.6–33)
MCHC RBC AUTO-ENTMCNC: 31.3 G/DL (ref 31.5–35.7)
MCV RBC AUTO: 78.2 FL (ref 79–97)
MONOCYTES # BLD AUTO: 0.6 10*3/MM3 (ref 0.1–0.9)
MONOCYTES NFR BLD AUTO: 7.5 % (ref 5–12)
NEUTROPHILS NFR BLD AUTO: 6.11 10*3/MM3 (ref 1.7–7)
NEUTROPHILS NFR BLD AUTO: 76.8 % (ref 42.7–76)
NRBC BLD AUTO-RTO: 0 /100 WBC (ref 0–0.2)
PLATELET # BLD AUTO: 127 10*3/MM3 (ref 140–450)
PMV BLD AUTO: 11.6 FL (ref 6–12)
POTASSIUM SERPL-SCNC: 4.5 MMOL/L (ref 3.5–5.2)
PROT SERPL-MCNC: 6.8 G/DL (ref 6–8.5)
RBC # BLD AUTO: 4.49 10*6/MM3 (ref 4.14–5.8)
SODIUM SERPL-SCNC: 135 MMOL/L (ref 136–145)
WBC NRBC COR # BLD AUTO: 7.96 10*3/MM3 (ref 3.4–10.8)

## 2025-01-20 PROCEDURE — 25010000002 FLUOROURACIL PER 500 MG: Performed by: INTERNAL MEDICINE

## 2025-01-20 PROCEDURE — 96375 TX/PRO/DX INJ NEW DRUG ADDON: CPT

## 2025-01-20 PROCEDURE — 96413 CHEMO IV INFUSION 1 HR: CPT

## 2025-01-20 PROCEDURE — 25010000002 DEXAMETHASONE PER 1 MG: Performed by: INTERNAL MEDICINE

## 2025-01-20 PROCEDURE — 96367 TX/PROPH/DG ADDL SEQ IV INF: CPT

## 2025-01-20 PROCEDURE — 25010000002 PANITUMUMAB PER 10 MG: Performed by: INTERNAL MEDICINE

## 2025-01-20 PROCEDURE — 85025 COMPLETE CBC W/AUTO DIFF WBC: CPT | Performed by: INTERNAL MEDICINE

## 2025-01-20 PROCEDURE — 25010000002 PALONOSETRON 0.25 MG/5ML SOLUTION PREFILLED SYRINGE: Performed by: INTERNAL MEDICINE

## 2025-01-20 PROCEDURE — 25010000002 LEUCOVORIN CALCIUM PER 50MG: Performed by: INTERNAL MEDICINE

## 2025-01-20 PROCEDURE — 25810000003 SODIUM CHLORIDE 0.9 % SOLUTION 250 ML FLEX CONT: Performed by: INTERNAL MEDICINE

## 2025-01-20 PROCEDURE — 80053 COMPREHEN METABOLIC PANEL: CPT | Performed by: INTERNAL MEDICINE

## 2025-01-20 PROCEDURE — 83735 ASSAY OF MAGNESIUM: CPT | Performed by: INTERNAL MEDICINE

## 2025-01-20 PROCEDURE — 82378 CARCINOEMBRYONIC ANTIGEN: CPT | Performed by: INTERNAL MEDICINE

## 2025-01-20 PROCEDURE — 25010000003 DEXTROSE 5 % SOLUTION 250 ML FLEX CONT: Performed by: INTERNAL MEDICINE

## 2025-01-20 PROCEDURE — 25010000002 PANITUMUMAB 400 MG/20ML SOLUTION 20 ML VIAL: Performed by: INTERNAL MEDICINE

## 2025-01-20 PROCEDURE — 36593 DECLOT VASCULAR DEVICE: CPT

## 2025-01-20 PROCEDURE — G0498 CHEMO EXTEND IV INFUS W/PUMP: HCPCS

## 2025-01-20 PROCEDURE — 25010000002 ALTEPLASE 2 MG RECONSTITUTED SOLUTION: Performed by: INTERNAL MEDICINE

## 2025-01-20 RX ORDER — DEXAMETHASONE SODIUM PHOSPHATE 10 MG/ML
12 INJECTION INTRAMUSCULAR; INTRAVENOUS ONCE
Status: CANCELLED
Start: 2025-01-20

## 2025-01-20 RX ORDER — PALONOSETRON 0.05 MG/ML
0.25 INJECTION, SOLUTION INTRAVENOUS ONCE
Status: CANCELLED | OUTPATIENT
Start: 2025-01-20

## 2025-01-20 RX ORDER — HYDROCORTISONE SODIUM SUCCINATE 100 MG/2ML
100 INJECTION INTRAMUSCULAR; INTRAVENOUS AS NEEDED
Status: CANCELLED | OUTPATIENT
Start: 2025-01-20

## 2025-01-20 RX ORDER — MAGNESIUM OXIDE 400 MG/1
400 TABLET ORAL DAILY
Qty: 30 TABLET | Refills: 5 | Status: SHIPPED | OUTPATIENT
Start: 2025-01-20

## 2025-01-20 RX ORDER — PALONOSETRON 0.05 MG/ML
0.25 INJECTION, SOLUTION INTRAVENOUS ONCE
Status: COMPLETED | OUTPATIENT
Start: 2025-01-20 | End: 2025-01-20

## 2025-01-20 RX ORDER — FAMOTIDINE 10 MG/ML
20 INJECTION, SOLUTION INTRAVENOUS AS NEEDED
Status: CANCELLED | OUTPATIENT
Start: 2025-01-20

## 2025-01-20 RX ORDER — DULOXETIN HYDROCHLORIDE 30 MG/1
30 CAPSULE, DELAYED RELEASE ORAL DAILY
Qty: 90 CAPSULE | Refills: 2 | Status: SHIPPED | OUTPATIENT
Start: 2025-01-20

## 2025-01-20 RX ORDER — SODIUM CHLORIDE 9 MG/ML
20 INJECTION, SOLUTION INTRAVENOUS ONCE
Status: CANCELLED | OUTPATIENT
Start: 2025-01-20

## 2025-01-20 RX ORDER — SODIUM CHLORIDE 9 MG/ML
20 INJECTION, SOLUTION INTRAVENOUS ONCE
Status: DISCONTINUED | OUTPATIENT
Start: 2025-01-20 | End: 2025-01-21 | Stop reason: HOSPADM

## 2025-01-20 RX ORDER — DEXTROSE MONOHYDRATE 50 MG/ML
20 INJECTION, SOLUTION INTRAVENOUS ONCE
Status: CANCELLED | OUTPATIENT
Start: 2025-01-20

## 2025-01-20 RX ORDER — DEXTROSE MONOHYDRATE 50 MG/ML
20 INJECTION, SOLUTION INTRAVENOUS ONCE
Status: DISCONTINUED | OUTPATIENT
Start: 2025-01-20 | End: 2025-01-21 | Stop reason: HOSPADM

## 2025-01-20 RX ORDER — DEXAMETHASONE SODIUM PHOSPHATE 10 MG/ML
12 INJECTION INTRAMUSCULAR; INTRAVENOUS ONCE
Status: COMPLETED | OUTPATIENT
Start: 2025-01-20 | End: 2025-01-20

## 2025-01-20 RX ORDER — DIPHENHYDRAMINE HYDROCHLORIDE 50 MG/ML
50 INJECTION INTRAMUSCULAR; INTRAVENOUS AS NEEDED
Status: CANCELLED | OUTPATIENT
Start: 2025-01-20

## 2025-01-20 RX ADMIN — DEXAMETHASONE SODIUM PHOSPHATE 12 MG: 10 INJECTION INTRAMUSCULAR; INTRAVENOUS at 10:05

## 2025-01-20 RX ADMIN — FLUOROURACIL 5140 MG: 50 INJECTION, SOLUTION INTRAVENOUS at 12:33

## 2025-01-20 RX ADMIN — SODIUM CHLORIDE 550 MG: 9 INJECTION, SOLUTION INTRAVENOUS at 10:35

## 2025-01-20 RX ADMIN — LEUCOVORIN CALCIUM 860 MG: 10 INJECTION INTRAMUSCULAR; INTRAVENOUS at 11:24

## 2025-01-20 RX ADMIN — PALONOSETRON 0.25 MG: 0.25 INJECTION, SOLUTION INTRAVENOUS at 10:07

## 2025-01-20 RX ADMIN — ALTEPLASE 2 MG: 2.2 INJECTION, POWDER, LYOPHILIZED, FOR SOLUTION INTRAVENOUS at 08:22

## 2025-01-20 NOTE — PROGRESS NOTES
Hematology and Oncology [employer]  Office number [phone]    Fax number [phone]     Follow up     Date: 25    Patient Name: Edward Powell  MRN: [id number]  : 1970    Referring Physician: Dr. Gautam    Chief Complaint: Rectal cancer with liver and lung metastases    Cancer Staging:  IV    History of Present Illness: Edward Powell is a pleasant 54 y.o. male who presents today for evaluation of rectal cancer.    Patient has a longstanding history of intermittent diarrhea since his cholecystectomy in 2019.  However he developed progressive diarrhea with associated loss of bowel control and urgency as well as weight loss and fatigue prompting additional workup.    CT of the abdomen pelvis 2023 showed an irregular circumferential wall thickening involving the rectum concerning for mass lesion.  There was associated mesorectal lymphadenopathy.  Masslike consolidation of left lower lobe.  CT of the chest showed a cavitary lesion in the left lower lobe up to 4.8 cm with an adjacent cavitary nodule up to 2 cm and a 5 mm nodule on the right minor fissure.      He underwent colonoscopy 2023 with findings of an infiltrative and ulcerated partially obstructing mass in the proximal rectum spanning 10 cm.  Rectal polyps.  Biopsy of the rectal mass showed invasive moderately differentiated adenocarcinoma.  Additional biopsies showed tubular adenomas.  MSI testing was intact/low probability of MSI high.    PDL1 negative    PET/CT 2023 showed hypermetabolic rectal wall thickening compatible with known rectal malignancy.  Multiple small ill-defined hypoechoic hyper metabolic liver lesions compatible with metastatic disease.  Mildly enlarged and mildly hypermetabolic pelvic sidewall and internal iliac lymph node chain adenopathy.  Hypermetabolic lung mass with adjacent nodule.     He underwent liver biopsy and lung biopsy 2024.  Results of both confirmed metastatic  colorectal cancer.    The patient has been experiencing substantial rectal pain.  He is taking oxycodone every 6 hours with partial relief.  He reports ongoing bowel movements.  No abdominal pain.  No vomiting.  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    Treatment history:  FOLFOX cycle 1-4  FOLFOX panitumumab cycle 5 onward  -Oxaliplatin terminated early for allergic reaction after 4/29/24:     Interval history:    Last treatment was 12/2/25. Feeling much better after ceftriaxone injections x 3 days. Resolution of malaise, hoarseness, improvement in breathing. Persistent cough with less production. Currently taking K once daily and off magnesium.   Cramps.        -Resume mag  -Resume chemo today  -Add irinotecan in 2 weeks  -Check her 2  -Check tempus blood  Treatment has been on hold for cough. Had xray and treated with antibiotics.   Still with cough and difficulty bringing up sputum productive of thick yellow/brown sputum, feel things are hung up in his throat. Proair, prednisone helped with being able to bring up more phlegm, yellow to light brown, cough production has gone down since steroid burst ended.  Still coughing at night, taking cough syrup and narcotic. New hoarseness. No change to dry red mucosa in his mouth, no exudate.   Scheduled for radiation oncology consult in [place] and wants to change that to [place].     Past Medical History:   Past Medical History:   Diagnosis Date    Arthritis     Cancer     rectal cancer - diagnosed 2023    Cholelithiasis 2019    Removed    COPD (chronic obstructive pulmonary disease)     Coronary artery disease 2009    Stent - no cardiologist currently    Elevated cholesterol     GERD (gastroesophageal reflux disease)     Hernia  2003    Lower hernia    Perforated ulcer 2019    Sleep apnea     history of; when weighed over 400lbs - no issues following bariatric surgery       Past Surgical History:   Past Surgical History:   Procedure Laterality Date    APPENDECTOMY  1983    Removed    BARIATRIC SURGERY  2011    Gastric bypass    BLADDER TUMOR/ULCER BLEEDER CAUTERIZATION      CARDIAC CATHETERIZATION  2009    stent placed    CHOLECYSTECTOMY  2019    Removed    COLONOSCOPY N/A 12/07/2023    Procedure: COLONOSCOPY WITH HOT SNARE POLYPECTOMY AND TATTOO;  Surgeon: Noman Gautam MD;  Location: [facility] ENDOSCOPY;  Service: Gastroenterology;  Laterality: N/A;    PORTACATH PLACEMENT N/A 1/5/2024    Procedure: INSERTION OF PORTACATH WITH ULTRSOUND AND FLUOROSCOPIC GUIDANCE;  Surgeon: Ida Black MD;  Location: [facility] OR;  Service: General;  Laterality: N/A;    JENNIFER-EN-Y         Family History: No family history on file.  Uncle had colon cancer    Social History:   Social History     Socioeconomic History    Marital status:    Tobacco Use    Smoking status: Every Day     Current packs/day: 1.00     Average packs/day: 0.9 packs/day for 60.4 years (52.9 ttl pk-yrs)     Types: Cigarettes     Start date: 9/6/1994    Smokeless tobacco: Never    Tobacco comments:     35 years      pt reports closer to 2 packs per day since cancer diagnosis   Vaping Use    Vaping status: Never Used   Substance and Sexual Activity    Alcohol use: Never    Drug use: Never    Sexual activity: Defer       Medications:     Current Outpatient Medications:     albuterol sulfate  (90 Base) MCG/ACT inhaler, Inhale 2 puffs Every 4 (Four) Hours As Needed for Wheezing., Disp: 18 g, Rfl: 3    benzonatate (Tessalon Perles) 100 MG capsule, Take 1 capsule by mouth 3 (Three) Times a Day As Needed for Cough. (Patient not taking: Reported on 1/13/2025), Disp: 45 capsule, Rfl: 0    buPROPion XL (WELLBUTRIN XL) 150 MG 24 hr tablet, TAKE 1 TABLET BY MOUTH  DAILY, Disp: 90 tablet, Rfl: 1    cetirizine (zyrTEC) 10 MG tablet, Take 1 tablet by mouth Daily., Disp: 30 tablet, Rfl: 2    clindamycin (Clindagel) 1 % gel, Apply 1 Application topically to the appropriate area as directed 2 (Two) Times a Day. Use first, Disp: 30 g, Rfl: 1    dexAMETHasone 0.5 MG/5ML solution, Take 10 mL by mouth 2 (Two) Times a Day. Swish for 2 minutes and spit 10 mL 4 x daily, Disp: 240 mL, Rfl: 5    dicyclomine (BENTYL) 20 MG tablet, Take 1 tablet by mouth 3 (Three) Times a Day As Needed for Abdominal Cramping., Disp: 30 tablet, Rfl: 3    docusate sodium (COLACE) 100 MG capsule, Take 1 capsule by mouth 2 (Two) Times a Day., Disp: , Rfl:     doxycycline (VIBRAMYICN) 100 MG tablet, Take 1 tablet by mouth 2 (Two) Times a Day. For 7 days, then 1 tablet daily indefinitely (Patient not taking: Reported on 1/13/2025), Disp: 67 tablet, Rfl: 3    DULoxetine (CYMBALTA) 30 MG capsule, , Disp: , Rfl:     famotidine (PEPCID) 20 MG tablet, Take 1 tablet by mouth 2 (Two) Times a Day., Disp: 60 tablet, Rfl: 4    Hydrocortisone, Perianal, (ANUSOL-HC) 2.5 % rectal cream, Insert  into the rectum 2 (Two) Times a Day. Indications: Inflamed Hemorrhoids, Disp: 30 g, Rfl: 1    Klor-Con M20 20 MEQ CR tablet, TAKE 1 TABLET BY MOUTH DAILY, Disp: 30 tablet, Rfl: 0    lidocaine-prilocaine (EMLA) 2.5-2.5 % cream, Apply 1 Application topically to the appropriate area as directed As Needed (45-60 minutes prior to port access.  Cover with saran/plastic wrap.)., Disp: 30 g, Rfl: 3    LORazepam (ATIVAN) 0.5 MG tablet, Take 1 tablet by mouth Take As Directed. 1 tabelt by mouth 30 minutes prior to MRI, take a second tablet at the time of the MRI if needed., Disp: 2 tablet, Rfl: 0    Magic Mouthwash Oral Suspension (diphenhydrAMINE HCl - aluminum & magnesium hydroxide-simethicone - lidocaine - nystatin), Swish and Spit 10 mL by mouth every 6 (Six) Hours For 7 Days., Disp: 300 mL, Rfl: 3    magnesium oxide (MAG-OX) 400 MG tablet,  Take 1 tablet by mouth Daily., Disp: 30 tablet, Rfl: 5    montelukast (SINGULAIR) 10 MG tablet, TAKE ONE TABLET BY MOUTH ONCE NIGHTLY, Disp: 30 tablet, Rfl: 1    Morphine (MS CONTIN) 30 MG 12 hr tablet, Take 1 tablet by mouth 2 (Two) Times a Day., Disp: 60 tablet, Rfl: 0    Movantik 25 MG tablet, , Disp: , Rfl:     mupirocin (BACTROBAN) 2 % cream, , Disp: , Rfl:     naloxone (NARCAN) 4 MG/0.1ML nasal spray, 1 spray into the nostril(s) as directed by provider As Needed for Opioid Reversal., Disp: 1 each, Rfl: 0    nystatin (MYCOSTATIN) 100,000 unit/mL suspension, Swish and Swallow 5mL by mouth four times a day, Disp: 473 mL, Rfl: 0    nystatin (MYCOSTATIN) 100,000 unit/mL suspension, Swish and swallow 5 mL 4 (Four) Times a Day., Disp: 473 mL, Rfl: 0    nystatin susp + lidocaine viscous (MAGIC MOUTHWASH) oral suspension, 5-10 ml swish and spit or swallow QID prn, Disp: 240 mL, Rfl: 3    ondansetron (ZOFRAN) 8 MG tablet, Take 1 tablet by mouth 3 (Three) Times a Day As Needed for Nausea or Vomiting., Disp: 30 tablet, Rfl: 5    oxyCODONE (ROXICODONE) 15 MG immediate release tablet, Take 1 tablet by mouth 5 (Five) Times a Day As Needed for Moderate Pain or Severe Pain for up to 30 days., Disp: 150 tablet, Rfl: 0    pantoprazole (Protonix) 40 MG EC tablet, Take 1 tablet by mouth Daily. Indications: Gastroesophageal Reflux Disease, Disp: 90 tablet, Rfl: 2    predniSONE (DELTASONE) 20 MG tablet, Take 2 tablets by mouth Daily. (Patient not taking: Reported on 1/13/2025), Disp: 10 tablet, Rfl: 0    pregabalin (Lyrica) 300 MG capsule, Take 1 capsule by mouth 2 (Two) Times a Day for 30 days., Disp: 60 capsule, Rfl: 0    promethazine-dextromethorphan (PROMETHAZINE-DM) 6.25-15 MG/5ML syrup, Take 5 mL by mouth 3 times a day. (Patient not taking: Reported on 1/13/2025), Disp: 240 mL, Rfl: 0    tiotropium bromide-olodaterol (Stiolto Respimat) 2.5-2.5 MCG/ACT aerosol solution inhaler, INHALE 2 INHALATION(S) BY MOUTH DAILY, Disp: 4 g,  Rfl: 5    traZODone (DESYREL) 100 MG tablet, Take 1 tablet by mouth Every Night., Disp: 30 tablet, Rfl: 2    triamcinolone (KENALOG) 0.025 % ointment, Apply 1 Application topically to the appropriate area as directed 2 (Two) Times a Day. Use second if topical treatment #1 ineffective, Disp: 80 g, Rfl: 0    Current Facility-Administered Medications:     alteplase (CATHFLO/ACTIVASE) injection 2 mg, 2 mg, Intravenous, Once, Brenda Cronin MD    Allergies:   Allergies   Allergen Reactions    Bactrim [Sulfamethoxazole-Trimethoprim] Hives     and blisters    Sulfa Antibiotics Hives     and blisters       Objective     Vital Signs:   There were no vitals filed for this visit.       There is no height or weight on file to calculate BMI.   There were no vitals filed for this visit.    ECOG Performance Status: 1 - Symptomatic but completely ambulatory    Physical Exam:   Constitutional:  Well developed, Well nourished, No acute distress.    HENT:  Normocephalic, Atraumatic.  Eyes: Conjunctivae normal.  Sclerae anicteric  Musculoskeletal: No peripheral edema.  Skin:  Warm, Dry, No erythema, No rash.   Psych: normal affect  Neuro: A and O x 3     Laboratory/Imaging Reviewed:   No visits with results within 2 Week(s) from this visit.   Latest known visit with results is:   Hospital Outpatient Visit on 12/02/2024   Component Date Value Ref Range Status    CEA 12/02/2024 5.61  ng/mL Final    Glucose 12/02/2024 102 (H)  65 - 99 mg/dL Final    BUN 12/02/2024 10  6 - 20 mg/dL Final    Creatinine 12/02/2024 0.72 (L)  0.76 - 1.27 mg/dL Final    Sodium 12/02/2024 135 (L)  136 - 145 mmol/L Final    Potassium 12/02/2024 3.3 (L)  3.5 - 5.2 mmol/L Final    Chloride 12/02/2024 102  98 - 107 mmol/L Final    CO2 12/02/2024 23.3  22.0 - 29.0 mmol/L Final    Calcium 12/02/2024 8.2 (L)  8.6 - 10.5 mg/dL Final    Total Protein 12/02/2024 6.6  6.0 - 8.5 g/dL Final    Albumin 12/02/2024 3.1 (L)  3.5 - 5.2 g/dL Final    ALT (SGPT) 12/02/2024 7  1 -  41 U/L Final    AST (SGOT) 12/02/2024 13  1 - 40 U/L Final    Alkaline Phosphatase 12/02/2024 139 (H)  39 - 117 U/L Final    Total Bilirubin 12/02/2024 0.5  0.0 - 1.2 mg/dL Final    Globulin 12/02/2024 3.5  gm/dL Final    A/G Ratio 12/02/2024 0.9  g/dL Final    BUN/Creatinine Ratio 12/02/2024 13.9  7.0 - 25.0 Final    Anion Gap 12/02/2024 9.7  5.0 - 15.0 mmol/L Final    eGFR 12/02/2024 107.9  >60.0 mL/min/1.73 Final    Magnesium 12/02/2024 1.6  1.6 - 2.6 mg/dL Final    WBC 12/02/2024 10.96 (H)  3.40 - 10.80 10*3/mm3 Final    RBC 12/02/2024 4.54  4.14 - 5.80 10*6/mm3 Final    Hemoglobin 12/02/2024 11.2 (L)  13.0 - 17.7 g/dL Final    Hematocrit 12/02/2024 36.2 (L)  37.5 - 51.0 % Final    MCV 12/02/2024 79.7  79.0 - 97.0 fL Final    MCH 12/02/2024 24.7 (L)  26.6 - 33.0 pg Final    MCHC 12/02/2024 30.9 (L)  31.5 - 35.7 g/dL Final    RDW 12/02/2024 18.6 (H)  12.3 - 15.4 % Final    RDW-SD 12/02/2024 52.3  37.0 - 54.0 fl Final    MPV 12/02/2024 11.3  6.0 - 12.0 fL Final    Platelets 12/02/2024 195  140 - 450 10*3/mm3 Final    Neutrophil % 12/02/2024 79.3 (H)  42.7 - 76.0 % Final    Lymphocyte % 12/02/2024 12.0 (L)  19.6 - 45.3 % Final    Monocyte % 12/02/2024 6.8  5.0 - 12.0 % Final    Eosinophil % 12/02/2024 1.0  0.3 - 6.2 % Final    Basophil % 12/02/2024 0.4  0.0 - 1.5 % Final    Immature Grans % 12/02/2024 0.5  0.0 - 0.5 % Final    Neutrophils, Absolute 12/02/2024 8.71 (H)  1.70 - 7.00 10*3/mm3 Final    Lymphocytes, Absolute 12/02/2024 1.31  0.70 - 3.10 10*3/mm3 Final    Monocytes, Absolute 12/02/2024 0.74  0.10 - 0.90 10*3/mm3 Final    Eosinophils, Absolute 12/02/2024 0.11  0.00 - 0.40 10*3/mm3 Final    Basophils, Absolute 12/02/2024 0.04  0.00 - 0.20 10*3/mm3 Final    Immature Grans, Absolute 12/02/2024 0.05  0.00 - 0.05 10*3/mm3 Final    nRBC 12/02/2024 0.0  0.0 - 0.2 /100 WBC Final     Tempus xt: APC gene TP53; Negative ADRIANNA, BRAF, NRAS, TMB stable. PDL1 negative  CT Abdomen Pelvis With Contrast    Result Date:  1/16/2025  Narrative: PROCEDURE: CT ABDOMEN PELVIS W CONTRAST-  HISTORY:  colon cancer with mets; R05.1-Acute cough; V75-Ztddqnunb neoplasm of rectum; C78.7-Secondary malignant neoplasm of liver and intrahepatic bile duct; J18.9-Pneumonia, unspecified organism  COMPARISON: 10/10/2024.  TECHNIQUE: Multiple axial CT images were obtained from the lung bases through the pubic symphysis following the administration of Isovue 300 and oral contrast.  FINDINGS:  ABDOMEN: The heart is normal in size. No focal hepatic abnormality. The spleen is unremarkable. No adrenal mass is present.  The pancreas is normal. There is a stable right renal cyst. The aorta is normal in caliber. There is no free fluid or adenopathy. Oral contrast is seen to the level of the distal ileum. There is a moderate stool burden, correlate for constipation. The gallbladder is surgically absent.  PELVIS: The appendix is not identified, no secondary signs to suggest appendicitis. The prostate is normal in size with calcification.  The urinary bladder is unremarkable. No free fluid. There is wall thickening of the distal sigmoid colon similar to prior. There is a stable right external iliac lymph node measuring 15 mm. There are bilateral inguinal lymph nodes which are not significant changed. No bony destructive lesions. There is stable rectal wall thickening.      Impression: Stable pelvic and inguinal adenopathy.  Wall thickening of the distal sigmoid colon similar to prior.  Stable rectal thickening.     This study was performed with techniques to keep radiation doses as low as reasonably achievable (ALARA). Individualized dose reduction techniques using automated exposure control or adjustment of mA and/or kV according to the patient size were employed.     Images were reviewed, interpreted, and dictated by Dr. Gabriella Rodriguez MD Transcribed by Kameron Tejeda PA-C.  This report was signed and finalized on 1/16/2025 2:56 PM by Gabriella Rodriguez MD.      CT Chest  With Contrast Diagnostic    Result Date: 1/16/2025  Narrative: PROCEDURE: CT CHEST W CONTRAST DIAGNOSTIC-  HISTORY: persistent cough, worsening, h/o colon cancer, lung mets; R05.1-Acute cough; R47-Nkvczfnkj neoplasm of rectum; C78.7-Secondary malignant neoplasm of liver and intrahepatic bile duct; J18.9-Pneumonia, unspecified organism  COMPARISON: 11/22/2024.  PROCEDURE: Multiple axial CT images were obtained from the thoracic inlet through the upper abdomen following the administration of intravenous contrast.  FINDINGS: Soft tissue windows demonstrate no adenopathy within the chest. The ascending aorta is mildly dilated at 43 mm, stable from prior exam. There is a right IJ chest port. The heart is normal in size. The aorta is normal in caliber.There is no pericardial or pleural effusion. There is evidence of old calcified granulomatous disease. There are mild changes of emphysema. There are 2 adjacent cavitary lesions within the posterior left lower lobe. The larger lesion has mildly increased in size measuring 47 mm, previously 42 mm. The smaller more lateral lesion is not significantly changed in size but does have a thicker appearance of the wall. This may represent recurrent or worsening disease. There is a subpleural nodule in the anterior lateral left upper lobe which is stable. Major fissural nodule is stable. No bony destructive lesions. Bone windows reveal no acute osseous abnormalities.      Impression: Mild increase in size of 2 left lower lobe cavitary lesions concerning for recurrent/worsening disease.  Stable bilateral subcentimeter noncalcified pulmonary nodules.    This study was performed with techniques to keep radiation doses as low as reasonably achievable (ALARA). Individualized dose reduction techniques using automated exposure control or adjustment of mA and/or kV according to the patient size were employed.    Images were reviewed, interpreted, and dictated by Dr. Gabriella Rodriguez MD  Transcribed by Kameron Tejeda PA-C.  This report was signed and finalized on 1/16/2025 2:52 PM by Gabriella Rodriguez MD.      XR Chest PA & Lateral    Result Date: 1/13/2025  Narrative: PROCEDURE: XR CHEST PA AND LATERAL-    HISTORY: cough; R05.1-Acute cough  COMPARISON: December 19, 2024.  FINDINGS: The heart is normal in size. The mediastinum is unremarkable. A stable right IJ Port-A-Cath is present. There is bronchial wall thickening. Interstitial disease is seen. Findings appear similar to the prior exam there is no pneumothorax. There are no acute osseous abnormalities.      Impression: Stable chronic findings as above.   Images were reviewed, interpreted, and dictated by Dr. Gabriella Rodriguez MD Transcribed by Krysta Aldana PA-C.  This report was signed and finalized on 1/13/2025 3:29 PM by Gabriella Rodriguez MD.       Procedures    Assessment / Plan      Assessment/Plan:     1.  Rectal cancer   2.  Liver metastases  3.  Lung metastasis  4.  Chemo monitoring  5.  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  -He was found to have biopsy-proven metastasis in the liver and lung.  His case was presented at multidisciplinary tumor board.  -Because of metastatic disease to both the lung and liver I do not think he will be downstage to resectable disease.  -Tempus results which are notable for an APC mutation, T p53 mutation, negative for KRAS, BRAF, NRAS, tumor mutation burden stable.  Notch 1 VUS.  -CBC adequate and CMP pending for treatment today with maintenance 5-FU and panitumumab 9/16/24  Chemotherapy orders and premedications signed9/16/24  -We reviewed his imaging which is consistent with excellent disease control in early July with normalization of CEA. He has had persistent rectal pain with escalating oxycodone requirements over the past couple of months, but no severe recent increase in pain, fever. WBC normal. Rectal MRI shows  findings concerning for fistula. I reviewed his EUA notes. Increase doxy to BID. No surgical intervention.  -Consolidative radiotherapy to the rectal tumor is not something he wants to pursue initially but has now undergone consultation.  Maintenance 5FU/panitumumab on hold since early December.  I reviewed his last 4 serial CT images which do so progressive and in his lung.  We are going to plan to resume 5-FU and panitumumab today and will escalate to FOLFIRI panitumumab on his return.  He is going to continue to hold off on consolidative radiotherapy to the rectum at this time.    6.  Cancer related pain  -Now following with palliative care and pain is well controlled on lyrica, oxycodone and MS contin.   -Refilled duloxetine.    7.  Panitumumab induced rash  -Continue doxycycline.      8. GERD  PPI    9. Access  -Port    10.  Hypokalemia  -Now resolved.  Okay to stop potassium.  Resume magnesium for leg cramps.    Follow Up:   2 weeks drug change     Brenda Cronin MD  Hematology and Oncology     I have spent a total of >40 min on reviewing test results/preparing to see patient, counseling patient, performing medically appropriate exam and documenting clinical information in the electronic or other health record

## 2025-01-20 NOTE — PROGRESS NOTES
Feeling much better after ceftriaxone injections x 3 days.   Resolution of malaise, hoarseness, improvement in breathing. Persistent cough with less production.   Last treatment was 12/2/25  Currently taking K once daily and off magnesium  Cramps        -Resume mag  -Resume chemo today  -Add irinotecan in 2 weeks  -Check her 2  -Check tempus blood

## 2025-01-20 NOTE — TELEPHONE ENCOUNTER
----- Message from Brenda Cronin sent at 1/20/2025  1:41 PM EST -----  Please ask him to stop his potassium supplement.  He can take magnesium as discussed.

## 2025-01-22 ENCOUNTER — HOSPITAL ENCOUNTER (OUTPATIENT)
Facility: HOSPITAL | Age: 55
Discharge: HOME | End: 2025-01-22
Admitting: INTERNAL MEDICINE
Payer: COMMERCIAL

## 2025-01-22 VITALS
TEMPERATURE: 98.1 F | SYSTOLIC BLOOD PRESSURE: 97 MMHG | HEART RATE: 60 BPM | OXYGEN SATURATION: 96 % | BODY MASS INDEX: 29.48 KG/M2 | DIASTOLIC BLOOD PRESSURE: 57 MMHG | RESPIRATION RATE: 12 BRPM | WEIGHT: 217.4 LBS

## 2025-01-22 DIAGNOSIS — C78.7 RECTAL CANCER METASTASIZED TO LIVER: ICD-10-CM

## 2025-01-22 DIAGNOSIS — C20 RECTAL ADENOCARCINOMA: Primary | ICD-10-CM

## 2025-01-22 DIAGNOSIS — C20 RECTAL CANCER METASTASIZED TO LIVER: ICD-10-CM

## 2025-01-22 PROCEDURE — 25010000002 HEPARIN LOCK FLUSH PER 10 UNITS: Performed by: INTERNAL MEDICINE

## 2025-01-22 PROCEDURE — 96523 IRRIG DRUG DELIVERY DEVICE: CPT

## 2025-01-22 RX ORDER — SODIUM CHLORIDE 0.9 % (FLUSH) 0.9 %
10 SYRINGE (ML) INJECTION AS NEEDED
Status: DISCONTINUED | OUTPATIENT
Start: 2025-01-22 | End: 2025-01-23 | Stop reason: HOSPADM

## 2025-01-22 RX ORDER — SODIUM CHLORIDE 0.9 % (FLUSH) 0.9 %
10 SYRINGE (ML) INJECTION AS NEEDED
OUTPATIENT
Start: 2025-01-22

## 2025-01-22 RX ORDER — HEPARIN SODIUM (PORCINE) LOCK FLUSH IV SOLN 100 UNIT/ML 100 UNIT/ML
500 SOLUTION INTRAVENOUS AS NEEDED
Status: DISCONTINUED | OUTPATIENT
Start: 2025-01-22 | End: 2025-01-23 | Stop reason: HOSPADM

## 2025-01-22 RX ORDER — HEPARIN SODIUM (PORCINE) LOCK FLUSH IV SOLN 100 UNIT/ML 100 UNIT/ML
500 SOLUTION INTRAVENOUS AS NEEDED
OUTPATIENT
Start: 2025-01-22

## 2025-01-22 RX ADMIN — HEPARIN 500 UNITS: 100 SYRINGE at 14:24

## 2025-01-22 RX ADMIN — Medication 10 ML: at 14:24

## 2025-01-23 RX ORDER — POTASSIUM CHLORIDE 1500 MG/1
20 TABLET, EXTENDED RELEASE ORAL DAILY
Qty: 30 TABLET | Refills: 0 | Status: SHIPPED | OUTPATIENT
Start: 2025-01-23

## 2025-01-31 DIAGNOSIS — C78.7 RECTAL CANCER METASTASIZED TO LIVER: Primary | ICD-10-CM

## 2025-01-31 DIAGNOSIS — C20 RECTAL CANCER METASTASIZED TO LIVER: Primary | ICD-10-CM

## 2025-01-31 LAB
DNA RANGE(S) EXAMINED NAR: NORMAL
GENE DIS ANL INTERP-IMP: POSITIVE
GENE DIS ASSESSED: NORMAL
REASON FOR STUDY: NORMAL
TEMPUS BLOOD TUMOR MUTATIONAL BURDEN: 0 M/MB
TEMPUS MSI NOTE: NORMAL
TEMPUS PORTAL: NORMAL
TEMPUS TREATMENT IMPLICATIONS NOTE: NORMAL
TEMPUS TRIALCOUNT: 3
TEMPUS TRIALMATCHES1: NORMAL
TEMPUS TRIALMATCHES2: NORMAL
TEMPUS TRIALMATCHES3: NORMAL

## 2025-01-31 RX ORDER — HYDROCORTISONE SODIUM SUCCINATE 100 MG/2ML
100 INJECTION INTRAMUSCULAR; INTRAVENOUS AS NEEDED
Status: CANCELLED | OUTPATIENT
Start: 2025-02-03

## 2025-01-31 RX ORDER — PALONOSETRON 0.05 MG/ML
0.25 INJECTION, SOLUTION INTRAVENOUS ONCE
Status: CANCELLED | OUTPATIENT
Start: 2025-02-03

## 2025-01-31 RX ORDER — FAMOTIDINE 10 MG/ML
20 INJECTION, SOLUTION INTRAVENOUS AS NEEDED
Status: CANCELLED | OUTPATIENT
Start: 2025-02-03

## 2025-01-31 RX ORDER — SODIUM CHLORIDE 9 MG/ML
20 INJECTION, SOLUTION INTRAVENOUS ONCE
Status: CANCELLED | OUTPATIENT
Start: 2025-02-03

## 2025-01-31 RX ORDER — ATROPINE SULFATE 1 MG/ML
0.25 INJECTION, SOLUTION INTRAMUSCULAR; INTRAVENOUS; SUBCUTANEOUS
Status: CANCELLED | OUTPATIENT
Start: 2025-02-03

## 2025-01-31 RX ORDER — DIPHENHYDRAMINE HYDROCHLORIDE 50 MG/ML
50 INJECTION INTRAMUSCULAR; INTRAVENOUS AS NEEDED
Status: CANCELLED | OUTPATIENT
Start: 2025-02-03

## 2025-02-03 ENCOUNTER — OFFICE VISIT (OUTPATIENT)
Dept: ONCOLOGY | Facility: CLINIC | Age: 55
End: 2025-02-03
Payer: COMMERCIAL

## 2025-02-03 ENCOUNTER — HOSPITAL ENCOUNTER (OUTPATIENT)
Facility: HOSPITAL | Age: 55
Discharge: HOME | End: 2025-02-03
Admitting: INTERNAL MEDICINE
Payer: COMMERCIAL

## 2025-02-03 ENCOUNTER — PATIENT OUTREACH (OUTPATIENT)
Dept: ONCOLOGY | Facility: HOSPITAL | Age: 55
End: 2025-02-03
Payer: COMMERCIAL

## 2025-02-03 VITALS
BODY MASS INDEX: 27.35 KG/M2 | DIASTOLIC BLOOD PRESSURE: 62 MMHG | HEART RATE: 83 BPM | TEMPERATURE: 98 F | HEIGHT: 72 IN | OXYGEN SATURATION: 98 % | WEIGHT: 201.9 LBS | RESPIRATION RATE: 16 BRPM | SYSTOLIC BLOOD PRESSURE: 113 MMHG

## 2025-02-03 DIAGNOSIS — C78.7 RECTAL CANCER METASTASIZED TO LIVER: Primary | ICD-10-CM

## 2025-02-03 DIAGNOSIS — R19.4 CHANGE IN BOWEL HABITS: ICD-10-CM

## 2025-02-03 DIAGNOSIS — C20 RECTAL CANCER METASTASIZED TO LIVER: Primary | ICD-10-CM

## 2025-02-03 DIAGNOSIS — M79.2 NEUROPATHIC PAIN: ICD-10-CM

## 2025-02-03 DIAGNOSIS — K52.9 CHRONIC DIARRHEA: ICD-10-CM

## 2025-02-03 DIAGNOSIS — C20 RECTAL ADENOCARCINOMA: ICD-10-CM

## 2025-02-03 LAB
ALBUMIN SERPL-MCNC: 3.3 G/DL (ref 3.5–5.2)
ALBUMIN/GLOB SERPL: 0.9 G/DL
ALP SERPL-CCNC: 144 U/L (ref 39–117)
ALT SERPL W P-5'-P-CCNC: 12 U/L (ref 1–41)
ANION GAP SERPL CALCULATED.3IONS-SCNC: 10.2 MMOL/L (ref 5–15)
AST SERPL-CCNC: 19 U/L (ref 1–40)
BASOPHILS # BLD AUTO: 0.03 10*3/MM3 (ref 0–0.2)
BASOPHILS NFR BLD AUTO: 0.4 % (ref 0–1.5)
BILIRUB SERPL-MCNC: 0.4 MG/DL (ref 0–1.2)
BUN SERPL-MCNC: 6 MG/DL (ref 6–20)
BUN/CREAT SERPL: 6.9 (ref 7–25)
CALCIUM SPEC-SCNC: 8.6 MG/DL (ref 8.6–10.5)
CHLORIDE SERPL-SCNC: 109 MMOL/L (ref 98–107)
CO2 SERPL-SCNC: 23.8 MMOL/L (ref 22–29)
CREAT SERPL-MCNC: 0.87 MG/DL (ref 0.76–1.27)
DEPRECATED RDW RBC AUTO: 52.5 FL (ref 37–54)
EGFRCR SERPLBLD CKD-EPI 2021: 101.9 ML/MIN/1.73
EOSINOPHIL # BLD AUTO: 0.08 10*3/MM3 (ref 0–0.4)
EOSINOPHIL NFR BLD AUTO: 1 % (ref 0.3–6.2)
ERYTHROCYTE [DISTWIDTH] IN BLOOD BY AUTOMATED COUNT: 19.4 % (ref 12.3–15.4)
GLOBULIN UR ELPH-MCNC: 3.6 GM/DL
GLUCOSE SERPL-MCNC: 108 MG/DL (ref 65–99)
HCT VFR BLD AUTO: 37.5 % (ref 37.5–51)
HGB BLD-MCNC: 11.8 G/DL (ref 13–17.7)
IMM GRANULOCYTES # BLD AUTO: 0.03 10*3/MM3 (ref 0–0.05)
IMM GRANULOCYTES NFR BLD AUTO: 0.4 % (ref 0–0.5)
LYMPHOCYTES # BLD AUTO: 1.31 10*3/MM3 (ref 0.7–3.1)
LYMPHOCYTES NFR BLD AUTO: 16.9 % (ref 19.6–45.3)
MAGNESIUM SERPL-MCNC: 2.4 MG/DL (ref 1.6–2.6)
MCH RBC QN AUTO: 24.6 PG (ref 26.6–33)
MCHC RBC AUTO-ENTMCNC: 31.5 G/DL (ref 31.5–35.7)
MCV RBC AUTO: 78.1 FL (ref 79–97)
MONOCYTES # BLD AUTO: 0.58 10*3/MM3 (ref 0.1–0.9)
MONOCYTES NFR BLD AUTO: 7.5 % (ref 5–12)
NEUTROPHILS NFR BLD AUTO: 5.73 10*3/MM3 (ref 1.7–7)
NEUTROPHILS NFR BLD AUTO: 73.8 % (ref 42.7–76)
NRBC BLD AUTO-RTO: 0 /100 WBC (ref 0–0.2)
PLATELET # BLD AUTO: 251 10*3/MM3 (ref 140–450)
PMV BLD AUTO: 10.5 FL (ref 6–12)
POTASSIUM SERPL-SCNC: 3.6 MMOL/L (ref 3.5–5.2)
PROT SERPL-MCNC: 6.9 G/DL (ref 6–8.5)
RBC # BLD AUTO: 4.8 10*6/MM3 (ref 4.14–5.8)
SODIUM SERPL-SCNC: 143 MMOL/L (ref 136–145)
WBC NRBC COR # BLD AUTO: 7.76 10*3/MM3 (ref 3.4–10.8)

## 2025-02-03 PROCEDURE — 96367 TX/PROPH/DG ADDL SEQ IV INF: CPT

## 2025-02-03 PROCEDURE — 96417 CHEMO IV INFUS EACH ADDL SEQ: CPT

## 2025-02-03 PROCEDURE — 25810000003 SODIUM CHLORIDE 0.9 % SOLUTION 250 ML FLEX CONT: Performed by: INTERNAL MEDICINE

## 2025-02-03 PROCEDURE — G0498 CHEMO EXTEND IV INFUS W/PUMP: HCPCS

## 2025-02-03 PROCEDURE — 96416 CHEMO PROLONG INFUSE W/PUMP: CPT

## 2025-02-03 PROCEDURE — 96375 TX/PRO/DX INJ NEW DRUG ADDON: CPT

## 2025-02-03 PROCEDURE — 25010000002 LEUCOVORIN 500 MG RECONSTITUTED SOLUTION 1 EACH VIAL: Performed by: INTERNAL MEDICINE

## 2025-02-03 PROCEDURE — 25010000002 FLUOROURACIL PER 500 MG: Performed by: INTERNAL MEDICINE

## 2025-02-03 PROCEDURE — 25010000003 DEXTROSE 5 % SOLUTION 250 ML FLEX CONT: Performed by: INTERNAL MEDICINE

## 2025-02-03 PROCEDURE — 96368 THER/DIAG CONCURRENT INF: CPT

## 2025-02-03 PROCEDURE — 80053 COMPREHEN METABOLIC PANEL: CPT | Performed by: INTERNAL MEDICINE

## 2025-02-03 PROCEDURE — 96413 CHEMO IV INFUSION 1 HR: CPT

## 2025-02-03 PROCEDURE — 25010000002 PALONOSETRON PER 25 MCG: Performed by: INTERNAL MEDICINE

## 2025-02-03 PROCEDURE — 83735 ASSAY OF MAGNESIUM: CPT | Performed by: INTERNAL MEDICINE

## 2025-02-03 PROCEDURE — 25010000002 IRINOTECAN PER 20 MG: Performed by: INTERNAL MEDICINE

## 2025-02-03 PROCEDURE — 25010000002 PANITUMUMAB 400 MG/20ML SOLUTION 20 ML VIAL: Performed by: INTERNAL MEDICINE

## 2025-02-03 PROCEDURE — 85025 COMPLETE CBC W/AUTO DIFF WBC: CPT | Performed by: INTERNAL MEDICINE

## 2025-02-03 PROCEDURE — 25010000002 DEXAMETHASONE SODIUM PHOSPHATE 20 MG/5ML SOLUTION: Performed by: INTERNAL MEDICINE

## 2025-02-03 PROCEDURE — 25010000003 DEXTROSE 5 % SOLUTION 500 ML FLEX CONT: Performed by: INTERNAL MEDICINE

## 2025-02-03 PROCEDURE — 25010000002 PANITUMUMAB PER 10 MG: Performed by: INTERNAL MEDICINE

## 2025-02-03 PROCEDURE — 25010000002 LEUCOVORIN 200 MG RECONSTITUTED SOLUTION 1 EACH VIAL: Performed by: INTERNAL MEDICINE

## 2025-02-03 PROCEDURE — 96415 CHEMO IV INFUSION ADDL HR: CPT

## 2025-02-03 RX ORDER — PALONOSETRON 0.05 MG/ML
0.25 INJECTION, SOLUTION INTRAVENOUS ONCE
Status: COMPLETED | OUTPATIENT
Start: 2025-02-03 | End: 2025-02-03

## 2025-02-03 RX ORDER — ATROPINE SULFATE 0.4 MG/ML
0.25 INJECTION, SOLUTION INTRAMUSCULAR; INTRAVENOUS; SUBCUTANEOUS
Status: DISCONTINUED | OUTPATIENT
Start: 2025-02-03 | End: 2025-02-04 | Stop reason: HOSPADM

## 2025-02-03 RX ORDER — SODIUM CHLORIDE 9 MG/ML
20 INJECTION, SOLUTION INTRAVENOUS ONCE
Status: DISCONTINUED | OUTPATIENT
Start: 2025-02-03 | End: 2025-02-04 | Stop reason: HOSPADM

## 2025-02-03 RX ADMIN — FLUOROURACIL 5230 MG: 50 INJECTION, SOLUTION INTRAVENOUS at 13:23

## 2025-02-03 RX ADMIN — ATROPINE SULFATE 0.25 MG: 0.4 INJECTION, SOLUTION INTRAVENOUS at 11:27

## 2025-02-03 RX ADMIN — DEXAMETHASONE SODIUM PHOSPHATE 12 MG: 4 INJECTION, SOLUTION INTRA-ARTICULAR; INTRALESIONAL; INTRAMUSCULAR; INTRAVENOUS; SOFT TISSUE at 09:52

## 2025-02-03 RX ADMIN — PANITUMUMAB 570 MG: 400 SOLUTION INTRAVENOUS at 10:40

## 2025-02-03 RX ADMIN — PALONOSETRON 0.25 MG: 0.05 INJECTION, SOLUTION INTRAVENOUS at 09:50

## 2025-02-03 RX ADMIN — DEXTROSE MONOHYDRATE 380 MG: 5 INJECTION, SOLUTION INTRAVENOUS at 11:33

## 2025-02-03 RX ADMIN — LEUCOVORIN CALCIUM 870 MG: 500 INJECTION, POWDER, LYOPHILIZED, FOR SOLUTION INTRAMUSCULAR; INTRAVENOUS at 11:32

## 2025-02-03 NOTE — PROGRESS NOTES
Hematology and On[facility] [place]  Office number [phone]    Fax number [phone]     Follow up     Date: 25    Patient Name: Edward Powell  MRN: [id number]  : 1970    Referring Physician: Dr. Gautam    Chief Complaint: Rectal cancer with liver and lung metastases    Cancer Staging:  IV    History of Present Illness: Edward Powell is a pleasant 54 y.o. male who presents today for evaluation of rectal cancer.    Patient has a longstanding history of intermittent diarrhea since his cholecystectomy in 2019.  However he developed progressive diarrhea with associated loss of bowel control and urgency as well as weight loss and fatigue prompting additional workup.    CT of the abdomen pelvis 2023 showed an irregular circumferential wall thickening involving the rectum concerning for mass lesion.  There was associated mesorectal lymphadenopathy.  Masslike consolidation of left lower lobe.  CT of the chest showed a cavitary lesion in the left lower lobe up to 4.8 cm with an adjacent cavitary nodule up to 2 cm and a 5 mm nodule on the right minor fissure.      He underwent colonoscopy 2023 with findings of an infiltrative and ulcerated partially obstructing mass in the proximal rectum spanning 10 cm.  Rectal polyps.  Biopsy of the rectal mass showed invasive moderately differentiated adenocarcinoma.  Additional biopsies showed tubular adenomas.  MSI testing was intact/low probability of MSI high.    PDL1 negative    PET/CT 2023 showed hypermetabolic rectal wall thickening compatible with known rectal malignancy.  Multiple small ill-defined hypoechoic hyper metabolic liver lesions compatible with metastatic disease.  Mildly enlarged and mildly hypermetabolic pelvic sidewall and internal iliac lymph node chain adenopathy.  Hypermetabolic lung mass with adjacent nodule.     He underwent liver biopsy and lung biopsy 2024.  Results of both confirmed metastatic  colorectal cancer.    The patient has been experiencing substantial rectal pain.  He is taking oxycodone every 6 hours with partial relief.  He reports ongoing bowel movements.  No abdominal pain.  No vomiting.  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    Treatment history:  FOLFOX cycle 1-4  FOLFOX panitumumab cycle 5 onward  -Oxaliplatin terminated early for allergic reaction after 4/29/24:   FOLFIRI with panitumumab started 2/3/2025    Interval history:    He resumed maintenance treatment at last visit after an illness.  He received ceftriaxone injections x 3 days. Resolution of malaise, cough, hoarseness, improvement in breathing.  Restarted magnesium for leg cramps.  This has improved.  Overall, he says he is doing fairly well.  He is completely back to his self prior to the illness.  He is anxious about starting treatment.  Denies any fever, shortness of breath, or cough.      Past Medical History:   Past Medical History:   Diagnosis Date    Arthritis     Cancer     rectal cancer - diagnosed 2023    Cholelithiasis 2019    Removed    COPD (chronic obstructive pulmonary disease)     Coronary artery disease 2009    Stent - no cardiologist currently    Elevated cholesterol     GERD (gastroesophageal reflux disease)     Hernia 2003    Lower hernia    Perforated ulcer 2019    Sleep apnea     history of; when weighed over 400lbs - no issues following bariatric surgery       Past Surgical History:   Past Surgical History:   Procedure Laterality Date    APPENDECTOMY  1983    Removed    BARIATRIC SURGERY  2011    Gastric bypass    BLADDER TUMOR/ULCER BLEEDER CAUTERIZATION      CARDIAC CATHETERIZATION  2009    stent placed    CHOLECYSTECTOMY  2019    Removed    COLONOSCOPY N/A  12/07/2023    Procedure: COLONOSCOPY WITH HOT SNARE POLYPECTOMY AND TATTOO;  Surgeon: Noman Gautam MD;  Location: [facility] ENDOSCOPY;  Service: Gastroenterology;  Laterality: N/A;    PORTACATH PLACEMENT N/A 1/5/2024    Procedure: INSERTION OF PORTACATH WITH ULTRSOUND AND FLUOROSCOPIC GUIDANCE;  Surgeon: Ida Black MD;  Location: [facility] OR;  Service: General;  Laterality: N/A;    JENNIFER-EN-Y         Family History: No family history on file.  Uncle had colon cancer    Social History:   Social History     Socioeconomic History    Marital status:    Tobacco Use    Smoking status: Every Day     Current packs/day: 1.00     Average packs/day: 0.9 packs/day for 60.4 years (52.9 ttl pk-yrs)     Types: Cigarettes     Start date: 9/6/1994    Smokeless tobacco: Never    Tobacco comments:     35 years      pt reports closer to 2 packs per day since cancer diagnosis   Vaping Use    Vaping status: Never Used   Substance and Sexual Activity    Alcohol use: Never    Drug use: Never    Sexual activity: Defer       Medications:     Current Outpatient Medications:     albuterol sulfate  (90 Base) MCG/ACT inhaler, Inhale 2 puffs Every 4 (Four) Hours As Needed for Wheezing., Disp: 18 g, Rfl: 3    benzonatate (Tessalon Perles) 100 MG capsule, Take 1 capsule by mouth 3 (Three) Times a Day As Needed for Cough., Disp: 45 capsule, Rfl: 0    buPROPion XL (WELLBUTRIN XL) 150 MG 24 hr tablet, TAKE 1 TABLET BY MOUTH DAILY, Disp: 90 tablet, Rfl: 1    cetirizine (zyrTEC) 10 MG tablet, Take 1 tablet by mouth Daily., Disp: 30 tablet, Rfl: 2    clindamycin (Clindagel) 1 % gel, Apply 1 Application topically to the appropriate area as directed 2 (Two) Times a Day. Use first, Disp: 30 g, Rfl: 1    dexAMETHasone 0.5 MG/5ML solution, Take 10 mL by mouth 2 (Two) Times a Day. Swish for 2 minutes and spit 10 mL 4 x daily, Disp: 240 mL, Rfl: 5    dicyclomine (BENTYL) 20 MG tablet, Take 1 tablet by mouth 3 (Three) Times a  Day As Needed for Abdominal Cramping., Disp: 30 tablet, Rfl: 3    docusate sodium (COLACE) 100 MG capsule, Take 1 capsule by mouth 2 (Two) Times a Day., Disp: , Rfl:     doxycycline (VIBRAMYICN) 100 MG tablet, Take 1 tablet by mouth 2 (Two) Times a Day. For 7 days, then 1 tablet daily indefinitely, Disp: 67 tablet, Rfl: 3    DULoxetine (CYMBALTA) 30 MG capsule, Take 1 capsule by mouth Daily., Disp: 90 capsule, Rfl: 2    famotidine (PEPCID) 20 MG tablet, Take 1 tablet by mouth 2 (Two) Times a Day., Disp: 60 tablet, Rfl: 4    Hydrocortisone, Perianal, (ANUSOL-HC) 2.5 % rectal cream, Insert  into the rectum 2 (Two) Times a Day. Indications: Inflamed Hemorrhoids, Disp: 30 g, Rfl: 1    Klor-Con M20 20 MEQ CR tablet, TAKE 1 TABLET BY MOUTH DAILY, Disp: 30 tablet, Rfl: 0    lidocaine-prilocaine (EMLA) 2.5-2.5 % cream, Apply 1 Application topically to the appropriate area as directed As Needed (45-60 minutes prior to port access.  Cover with saran/plastic wrap.)., Disp: 30 g, Rfl: 3    LORazepam (ATIVAN) 0.5 MG tablet, Take 1 tablet by mouth Take As Directed. 1 tabelt by mouth 30 minutes prior to MRI, take a second tablet at the time of the MRI if needed., Disp: 2 tablet, Rfl: 0    Magic Mouthwash Oral Suspension (diphenhydrAMINE HCl - aluminum & magnesium hydroxide-simethicone - lidocaine - nystatin), Swish and Spit 10 mL by mouth every 6 (Six) Hours For 7 Days., Disp: 300 mL, Rfl: 3    magnesium oxide (MAG-OX) 400 MG tablet, Take 1 tablet by mouth Daily., Disp: 30 tablet, Rfl: 5    montelukast (SINGULAIR) 10 MG tablet, TAKE ONE TABLET BY MOUTH ONCE NIGHTLY, Disp: 30 tablet, Rfl: 1    Morphine (MS CONTIN) 30 MG 12 hr tablet, Take 1 tablet by mouth 2 (Two) Times a Day., Disp: 60 tablet, Rfl: 0    Movantik 25 MG tablet, , Disp: , Rfl:     mupirocin (BACTROBAN) 2 % cream, , Disp: , Rfl:     naloxone (NARCAN) 4 MG/0.1ML nasal spray, 1 spray into the nostril(s) as directed by provider As Needed for Opioid Reversal., Disp: 1  each, Rfl: 0    nystatin (MYCOSTATIN) 100,000 unit/mL suspension, Swish and Swallow 5mL by mouth four times a day, Disp: 473 mL, Rfl: 0    nystatin (MYCOSTATIN) 100,000 unit/mL suspension, Swish and swallow 5 mL 4 (Four) Times a Day., Disp: 473 mL, Rfl: 0    nystatin susp + lidocaine viscous (MAGIC MOUTHWASH) oral suspension, 5-10 ml swish and spit or swallow QID prn, Disp: 240 mL, Rfl: 3    ondansetron (ZOFRAN) 8 MG tablet, Take 1 tablet by mouth 3 (Three) Times a Day As Needed for Nausea or Vomiting., Disp: 30 tablet, Rfl: 5    oxyCODONE (ROXICODONE) 15 MG immediate release tablet, Take 1 tablet by mouth 5 (Five) Times a Day As Needed for Moderate Pain or Severe Pain for up to 30 days., Disp: 150 tablet, Rfl: 0    pantoprazole (Protonix) 40 MG EC tablet, Take 1 tablet by mouth Daily. Indications: Gastroesophageal Reflux Disease, Disp: 90 tablet, Rfl: 2    predniSONE (DELTASONE) 20 MG tablet, Take 2 tablets by mouth Daily., Disp: 10 tablet, Rfl: 0    pregabalin (Lyrica) 300 MG capsule, Take 1 capsule by mouth 2 (Two) Times a Day for 30 days., Disp: 60 capsule, Rfl: 0    promethazine-dextromethorphan (PROMETHAZINE-DM) 6.25-15 MG/5ML syrup, Take 5 mL by mouth 3 times a day., Disp: 240 mL, Rfl: 0    tiotropium bromide-olodaterol (Stiolto Respimat) 2.5-2.5 MCG/ACT aerosol solution inhaler, INHALE 2 INHALATION(S) BY MOUTH DAILY, Disp: 4 g, Rfl: 5    traZODone (DESYREL) 100 MG tablet, Take 1 tablet by mouth Every Night., Disp: 30 tablet, Rfl: 2    triamcinolone (KENALOG) 0.025 % ointment, Apply 1 Application topically to the appropriate area as directed 2 (Two) Times a Day. Use second if topical treatment #1 ineffective, Disp: 80 g, Rfl: 0    Allergies:   Allergies   Allergen Reactions    Bactrim [Sulfamethoxazole-Trimethoprim] Hives     and blisters    Sulfa Antibiotics Hives     and blisters       Objective     Vital Signs:   Vitals:    02/03/25 0831   BP: 113/62   Pulse: 83   Resp: 16   Temp: 98 °F (36.7 °C)   SpO2:  "98%   Weight: 91.6 kg (201 lb 14.4 oz)   Height: 182.9 cm (72.01\")   PainSc:   5          Body mass index is 27.38 kg/m².   Pain Score    02/03/25 0831   PainSc:   5       ECOG Performance Status: 1 - Symptomatic but completely ambulatory    Physical Exam:   Constitutional:  Well developed, Well nourished, No acute distress.    HENT:  Normocephalic, Atraumatic.  Eyes: Conjunctivae normal.  Sclerae anicteric  Musculoskeletal: No peripheral edema.  Skin:  Warm, Dry, No erythema, No rash.   Psych: normal affect  Neuro: A and O x 3     Laboratory/Imaging Reviewed:   Hospital Outpatient Visit on 02/03/2025   Component Date Value Ref Range Status    WBC 02/03/2025 7.76  3.40 - 10.80 10*3/mm3 Final    RBC 02/03/2025 4.80  4.14 - 5.80 10*6/mm3 Final    Hemoglobin 02/03/2025 11.8 (L)  13.0 - 17.7 g/dL Final    Hematocrit 02/03/2025 37.5  37.5 - 51.0 % Final    MCV 02/03/2025 78.1 (L)  79.0 - 97.0 fL Final    MCH 02/03/2025 24.6 (L)  26.6 - 33.0 pg Final    MCHC 02/03/2025 31.5  31.5 - 35.7 g/dL Final    RDW 02/03/2025 19.4 (H)  12.3 - 15.4 % Final    RDW-SD 02/03/2025 52.5  37.0 - 54.0 fl Final    MPV 02/03/2025 10.5  6.0 - 12.0 fL Final    Platelets 02/03/2025 251  140 - 450 10*3/mm3 Final    Neutrophil % 02/03/2025 73.8  42.7 - 76.0 % Final    Lymphocyte % 02/03/2025 16.9 (L)  19.6 - 45.3 % Final    Monocyte % 02/03/2025 7.5  5.0 - 12.0 % Final    Eosinophil % 02/03/2025 1.0  0.3 - 6.2 % Final    Basophil % 02/03/2025 0.4  0.0 - 1.5 % Final    Immature Grans % 02/03/2025 0.4  0.0 - 0.5 % Final    Neutrophils, Absolute 02/03/2025 5.73  1.70 - 7.00 10*3/mm3 Final    Lymphocytes, Absolute 02/03/2025 1.31  0.70 - 3.10 10*3/mm3 Final    Monocytes, Absolute 02/03/2025 0.58  0.10 - 0.90 10*3/mm3 Final    Eosinophils, Absolute 02/03/2025 0.08  0.00 - 0.40 10*3/mm3 Final    Basophils, Absolute 02/03/2025 0.03  0.00 - 0.20 10*3/mm3 Final    Immature Grans, Absolute 02/03/2025 0.03  0.00 - 0.05 10*3/mm3 Final    nRBC 02/03/2025 0.0  " 0.0 - 0.2 /100 WBC Final     Tempus xt: APC gene TP53; Negative ADRIANNA, BRAF, NRAS, TMB stable. PDL1 negative  CT Abdomen Pelvis With Contrast    Result Date: 1/16/2025  Narrative: PROCEDURE: CT ABDOMEN PELVIS W CONTRAST-  HISTORY:  colon cancer with mets; R05.1-Acute cough; P62-Nmltysvmv neoplasm of rectum; C78.7-Secondary malignant neoplasm of liver and intrahepatic bile duct; J18.9-Pneumonia, unspecified organism  COMPARISON: 10/10/2024.  TECHNIQUE: Multiple axial CT images were obtained from the lung bases through the pubic symphysis following the administration of Isovue 300 and oral contrast.  FINDINGS:  ABDOMEN: The heart is normal in size. No focal hepatic abnormality. The spleen is unremarkable. No adrenal mass is present.  The pancreas is normal. There is a stable right renal cyst. The aorta is normal in caliber. There is no free fluid or adenopathy. Oral contrast is seen to the level of the distal ileum. There is a moderate stool burden, correlate for constipation. The gallbladder is surgically absent.  PELVIS: The appendix is not identified, no secondary signs to suggest appendicitis. The prostate is normal in size with calcification.  The urinary bladder is unremarkable. No free fluid. There is wall thickening of the distal sigmoid colon similar to prior. There is a stable right external iliac lymph node measuring 15 mm. There are bilateral inguinal lymph nodes which are not significant changed. No bony destructive lesions. There is stable rectal wall thickening.      Impression: Stable pelvic and inguinal adenopathy.  Wall thickening of the distal sigmoid colon similar to prior.  Stable rectal thickening.     This study was performed with techniques to keep radiation doses as low as reasonably achievable (ALARA). Individualized dose reduction techniques using automated exposure control or adjustment of mA and/or kV according to the patient size were employed.     Images were reviewed, interpreted, and  dictated by Dr. Gabriella Rodriguez MD Transcribed by Kameron Tejeda PA-C.  This report was signed and finalized on 1/16/2025 2:56 PM by Gabriella Rodriguez MD.      CT Chest With Contrast Diagnostic    Result Date: 1/16/2025  Narrative: PROCEDURE: CT CHEST W CONTRAST DIAGNOSTIC-  HISTORY: persistent cough, worsening, h/o colon cancer, lung mets; R05.1-Acute cough; E16-Rmnemvama neoplasm of rectum; C78.7-Secondary malignant neoplasm of liver and intrahepatic bile duct; J18.9-Pneumonia, unspecified organism  COMPARISON: 11/22/2024.  PROCEDURE: Multiple axial CT images were obtained from the thoracic inlet through the upper abdomen following the administration of intravenous contrast.  FINDINGS: Soft tissue windows demonstrate no adenopathy within the chest. The ascending aorta is mildly dilated at 43 mm, stable from prior exam. There is a right IJ chest port. The heart is normal in size. The aorta is normal in caliber.There is no pericardial or pleural effusion. There is evidence of old calcified granulomatous disease. There are mild changes of emphysema. There are 2 adjacent cavitary lesions within the posterior left lower lobe. The larger lesion has mildly increased in size measuring 47 mm, previously 42 mm. The smaller more lateral lesion is not significantly changed in size but does have a thicker appearance of the wall. This may represent recurrent or worsening disease. There is a subpleural nodule in the anterior lateral left upper lobe which is stable. Major fissural nodule is stable. No bony destructive lesions. Bone windows reveal no acute osseous abnormalities.      Impression: Mild increase in size of 2 left lower lobe cavitary lesions concerning for recurrent/worsening disease.  Stable bilateral subcentimeter noncalcified pulmonary nodules.    This study was performed with techniques to keep radiation doses as low as reasonably achievable (ALARA). Individualized dose reduction techniques using automated exposure control  or adjustment of mA and/or kV according to the patient size were employed.    Images were reviewed, interpreted, and dictated by Dr. Gabriella Rodriguez MD Transcribed by Kameron Tejeda PA-C.  This report was signed and finalized on 1/16/2025 2:52 PM by Gabriella Rodriguez MD.      XR Chest PA & Lateral    Result Date: 1/13/2025  Narrative: PROCEDURE: XR CHEST PA AND LATERAL-    HISTORY: cough; R05.1-Acute cough  COMPARISON: December 19, 2024.  FINDINGS: The heart is normal in size. The mediastinum is unremarkable. A stable right IJ Port-A-Cath is present. There is bronchial wall thickening. Interstitial disease is seen. Findings appear similar to the prior exam there is no pneumothorax. There are no acute osseous abnormalities.      Impression: Stable chronic findings as above.   Images were reviewed, interpreted, and dictated by Dr. Gabriella Rodriguez MD Transcribed by Krysta Aldana PA-C.  This report was signed and finalized on 1/13/2025 3:29 PM by Gabriella Rodriguez MD.           Procedures        TOPICS EDUCATION PROVIDED   ANEMIA:  role of RBC, cause, s/s, ways to manage, role of transfusion [x]   THROMBOCYTOPENIA:  role of platelet, cause, s/s, ways to prevent bleeding, things to avoid, when to seek help [x]   NEUTROPENIA:  role of WBC, cause, infection precautions, s/s of infection, when to call MD [x]   NUTRITION & APPETITE CHANGES:  importance of maintaining healthy diet & weight, ways to manage to improve intake, dietary consult, exercise regimen [x]   DIARRHEA:  causes, s/s of dehydration, ways to manage, dietary changes, when to call MD [x]   CONSTIPATION:  causes, ways to manage, dietary changes, when to call MD [x]   NAUSEA & VOMITING:  cause, use of antiemetics, dietary changes, when to call MD [x]   MOUTH SORES:  causes, oral care, ways to manage [x]   ALOPECIA:  cause, ways to manage, resources [x]   INFERTILITY & SEXUALITY:  causes, fertility preservation options, sexuality changes, ways to manage, importance of birth  control [x]   NERVOUS SYSTEM CHANGES:  causes, s/s, neuropathies, cognitive changes, ways to manage [x]   PAIN:  causes, ways to manage [x]   SKIN & NAIL CHANGES:  cause, s/s, ways to manage [x]   ORGAN TOXICITIES:  cause, s/s, need for diagnostic tests, labs, when to notify MD [x]   SURVIVORSHIP:  distress, distress assessment, secondary malignancies, early/late effects, follow-up, social issues, social support [x]   HOME CARE:  use of spill kits, storing of PO chemo, how to manage bodily fluids [x]   MISCELLANEOUS:  drug interactions, administration, vesicant, et [x]         Assessment / Plan      Assessment/Plan:     1.  Rectal cancer   2.  Liver metastases  3.  Lung metastasis  4.  Chemo monitoring  5.  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  -He was found to have biopsy-proven metastasis in the liver and lung.  His case was presented at multidisciplinary tumor board.  -Because of metastatic disease to both the lung and liver I do not think he will be downstage to resectable disease.  -Tempus results which are notable for an APC mutation, T p53 mutation, negative for KRAS, BRAF, NRAS, tumor mutation burden stable.  Notch 1 VUS.  -CBC adequate and CMP pending for treatment today with maintenance 5-FU and panitumumab 9/16/24  Chemotherapy orders and premedications signed9/16/24  -We reviewed his imaging which is consistent with excellent disease control in early July with normalization of CEA. He has had persistent rectal pain with escalating oxycodone requirements over the past couple of months, but no severe recent increase in pain, fever. WBC normal. 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 I reviewed his EUA notes. Increase doxy to BID. No surgical intervention.  -Consolidative radiotherapy to the rectal tumor is not something he wants to pursue initially but has now undergone consultation.  Maintenance  5FU/panitumumab on hold since early December.  I reviewed his last 4 serial CT images which do so progressive disease in his lung.  He resumed 5-FU and panitumumab at last visit.   -We will escalate to FOLFIRI with panitumumab today.    -Continue to hold off on consolidative radiotherapy to the rectum at this time.  -Tempus results are partly back.  We will continue to follow-up.    6.  Cancer related pain  -Now following with palliative care and pain is well controlled on lyrica, oxycodone and MS contin.   -Refilled duloxetine.    7.  Panitumumab induced rash  -Continue doxycycline.      8. GERD  PPI    9. Access  -Port    10.  Hypokalemia  -Now resolved.  Continue to hold potassium.  We will recheck labs today.  - Continue magnesium for leg cramps.    Follow Up:   2 weeks     GEORGIA Grimes    Hematology and Oncology     Above is reviewed updated and accurate as of 2/3/2025    Total time of patient care including time prior to, face to face with patient, and following visit spent in reviewing records, lab results, imaging studies, discussion with patient, and documentation/charting was > 60 minutes

## 2025-02-03 NOTE — SIGNIFICANT NOTE
02/03/25 1057   Nurse Navigation   Current Status Active   Type of Visit Follow up visit   Location of Visit Outpatient infusion   Visit Diagnosis Rectal - malignant   Barriers to Care Emotional   Emotional Needs emotional suppport/coping strategies   Intervention Tasks Performed   (Nurse Navigator stopped to see patient since he is a new chemo start. patient discussing concerning and chemo therapy and emotions of continueing chemo. nurse and patient discussing short term memory issues and how chemo affects his memory.)     Patient and Nurse Navigator discussed changes within the Cancer Clinic.  Nurse Navigator will follow up with patient to make sure he has no ongoing issues at next visit.

## 2025-02-04 LAB
CYTO UR: NORMAL
LAB AP CASE REPORT: NORMAL
LAB AP CLINICAL INFORMATION: NORMAL
LAB AP DIAGNOSIS COMMENT: NORMAL
LAB AP SPECIAL STAINS: NORMAL
Lab: NORMAL
Lab: NORMAL
PATH REPORT.ADDENDUM SPEC: NORMAL
PATH REPORT.FINAL DX SPEC: NORMAL
PATH REPORT.GROSS SPEC: NORMAL

## 2025-02-05 ENCOUNTER — HOSPITAL ENCOUNTER (OUTPATIENT)
Facility: HOSPITAL | Age: 55
Discharge: HOME | End: 2025-02-05
Admitting: INTERNAL MEDICINE
Payer: COMMERCIAL

## 2025-02-05 VITALS — SYSTOLIC BLOOD PRESSURE: 94 MMHG | OXYGEN SATURATION: 98 % | DIASTOLIC BLOOD PRESSURE: 50 MMHG | HEART RATE: 60 BPM

## 2025-02-05 DIAGNOSIS — G89.3 CANCER RELATED PAIN: ICD-10-CM

## 2025-02-05 DIAGNOSIS — C20 RECTAL ADENOCARCINOMA: ICD-10-CM

## 2025-02-05 DIAGNOSIS — C20 RECTAL CANCER METASTASIZED TO LIVER: Primary | ICD-10-CM

## 2025-02-05 DIAGNOSIS — G62.0 CHEMOTHERAPY-INDUCED NEUROPATHY: ICD-10-CM

## 2025-02-05 DIAGNOSIS — C78.7 RECTAL CANCER METASTASIZED TO LIVER: Primary | ICD-10-CM

## 2025-02-05 DIAGNOSIS — T45.1X5A CHEMOTHERAPY-INDUCED NEUROPATHY: ICD-10-CM

## 2025-02-05 PROCEDURE — 96523 IRRIG DRUG DELIVERY DEVICE: CPT

## 2025-02-05 PROCEDURE — 25010000002 HEPARIN LOCK FLUSH PER 10 UNITS: Performed by: INTERNAL MEDICINE

## 2025-02-05 RX ORDER — SODIUM CHLORIDE 0.9 % (FLUSH) 0.9 %
10 SYRINGE (ML) INJECTION AS NEEDED
OUTPATIENT
Start: 2025-02-05

## 2025-02-05 RX ORDER — HEPARIN SODIUM (PORCINE) LOCK FLUSH IV SOLN 100 UNIT/ML 100 UNIT/ML
500 SOLUTION INTRAVENOUS AS NEEDED
OUTPATIENT
Start: 2025-02-05

## 2025-02-05 RX ORDER — OXYCODONE HYDROCHLORIDE 15 MG/1
15 TABLET ORAL
Qty: 150 TABLET | Refills: 0 | Status: SHIPPED | OUTPATIENT
Start: 2025-02-07 | End: 2025-03-09

## 2025-02-05 RX ORDER — MORPHINE SULFATE 30 MG/1
30 TABLET, FILM COATED, EXTENDED RELEASE ORAL 2 TIMES DAILY
Qty: 60 TABLET | Refills: 0 | Status: SHIPPED | OUTPATIENT
Start: 2025-02-05 | End: 2025-03-07

## 2025-02-05 RX ORDER — PREGABALIN 300 MG/1
300 CAPSULE ORAL 2 TIMES DAILY
Qty: 60 CAPSULE | Refills: 0 | Status: SHIPPED | OUTPATIENT
Start: 2025-02-07 | End: 2025-03-09

## 2025-02-05 RX ORDER — SODIUM CHLORIDE 0.9 % (FLUSH) 0.9 %
10 SYRINGE (ML) INJECTION AS NEEDED
Status: DISCONTINUED | OUTPATIENT
Start: 2025-02-05 | End: 2025-02-06 | Stop reason: HOSPADM

## 2025-02-05 RX ORDER — HEPARIN SODIUM (PORCINE) LOCK FLUSH IV SOLN 100 UNIT/ML 100 UNIT/ML
500 SOLUTION INTRAVENOUS AS NEEDED
Status: DISCONTINUED | OUTPATIENT
Start: 2025-02-05 | End: 2025-02-06 | Stop reason: HOSPADM

## 2025-02-05 RX ADMIN — Medication 10 ML: at 14:16

## 2025-02-05 RX ADMIN — HEPARIN 500 UNITS: 100 SYRINGE at 14:17

## 2025-02-05 NOTE — TELEPHONE ENCOUNTER
RACIEL #: [id number]    Medication requested: Morphine (MS CONTIN) 30 MG 12 hr tablet     Last fill date:12/27/24    Medication requested: oxyCODONE (ROXICODONE) 15 MG immediate release tablet     Last fill date:1/8/25    Medication requested: pregabalin (Lyrica) 300 MG capsule     Last fill date:1/8/25    Last appointment: 12/27/24    Next appointment: 3/28/25

## 2025-02-17 ENCOUNTER — APPOINTMENT (OUTPATIENT)
Facility: HOSPITAL | Age: 55
End: 2025-02-17
Payer: COMMERCIAL

## 2025-02-17 ENCOUNTER — OFFICE VISIT (OUTPATIENT)
Dept: ONCOLOGY | Facility: CLINIC | Age: 55
End: 2025-02-17
Payer: COMMERCIAL

## 2025-02-17 ENCOUNTER — HOSPITAL ENCOUNTER (OUTPATIENT)
Facility: HOSPITAL | Age: 55
Discharge: HOME | End: 2025-02-17
Admitting: INTERNAL MEDICINE
Payer: COMMERCIAL

## 2025-02-17 VITALS
SYSTOLIC BLOOD PRESSURE: 120 MMHG | OXYGEN SATURATION: 97 % | TEMPERATURE: 97.5 F | RESPIRATION RATE: 16 BRPM | DIASTOLIC BLOOD PRESSURE: 65 MMHG | HEART RATE: 83 BPM | HEIGHT: 72 IN | BODY MASS INDEX: 27.48 KG/M2 | WEIGHT: 202.9 LBS

## 2025-02-17 DIAGNOSIS — C78.7 RECTAL CANCER METASTASIZED TO LIVER: Primary | ICD-10-CM

## 2025-02-17 DIAGNOSIS — C20 RECTAL CANCER METASTASIZED TO LIVER: Primary | ICD-10-CM

## 2025-02-17 LAB
ALBUMIN SERPL-MCNC: 3.1 G/DL (ref 3.5–5.2)
ALBUMIN/GLOB SERPL: 0.9 G/DL
ALP SERPL-CCNC: 134 U/L (ref 39–117)
ALT SERPL W P-5'-P-CCNC: 9 U/L (ref 1–41)
ANION GAP SERPL CALCULATED.3IONS-SCNC: 8.1 MMOL/L (ref 5–15)
AST SERPL-CCNC: 15 U/L (ref 1–40)
BASOPHILS # BLD AUTO: 0.06 10*3/MM3 (ref 0–0.2)
BASOPHILS NFR BLD AUTO: 0.7 % (ref 0–1.5)
BILIRUB SERPL-MCNC: 0.3 MG/DL (ref 0–1.2)
BUN SERPL-MCNC: 10 MG/DL (ref 6–20)
BUN/CREAT SERPL: 14.1 (ref 7–25)
CALCIUM SPEC-SCNC: 8.2 MG/DL (ref 8.6–10.5)
CHLORIDE SERPL-SCNC: 103 MMOL/L (ref 98–107)
CO2 SERPL-SCNC: 24.9 MMOL/L (ref 22–29)
CREAT SERPL-MCNC: 0.71 MG/DL (ref 0.76–1.27)
DEPRECATED RDW RBC AUTO: 53.2 FL (ref 37–54)
EGFRCR SERPLBLD CKD-EPI 2021: 108.4 ML/MIN/1.73
EOSINOPHIL # BLD AUTO: 0.19 10*3/MM3 (ref 0–0.4)
EOSINOPHIL NFR BLD AUTO: 2.2 % (ref 0.3–6.2)
ERYTHROCYTE [DISTWIDTH] IN BLOOD BY AUTOMATED COUNT: 19.7 % (ref 12.3–15.4)
GLOBULIN UR ELPH-MCNC: 3.4 GM/DL
GLUCOSE SERPL-MCNC: 85 MG/DL (ref 65–99)
HCT VFR BLD AUTO: 37.4 % (ref 37.5–51)
HGB BLD-MCNC: 11.8 G/DL (ref 13–17.7)
IMM GRANULOCYTES # BLD AUTO: 0.04 10*3/MM3 (ref 0–0.05)
IMM GRANULOCYTES NFR BLD AUTO: 0.5 % (ref 0–0.5)
LYMPHOCYTES # BLD AUTO: 1.51 10*3/MM3 (ref 0.7–3.1)
LYMPHOCYTES NFR BLD AUTO: 17.4 % (ref 19.6–45.3)
MAGNESIUM SERPL-MCNC: 1.8 MG/DL (ref 1.6–2.6)
MCH RBC QN AUTO: 24.6 PG (ref 26.6–33)
MCHC RBC AUTO-ENTMCNC: 31.6 G/DL (ref 31.5–35.7)
MCV RBC AUTO: 78.1 FL (ref 79–97)
MONOCYTES # BLD AUTO: 0.83 10*3/MM3 (ref 0.1–0.9)
MONOCYTES NFR BLD AUTO: 9.6 % (ref 5–12)
NEUTROPHILS NFR BLD AUTO: 6.03 10*3/MM3 (ref 1.7–7)
NEUTROPHILS NFR BLD AUTO: 69.6 % (ref 42.7–76)
NRBC BLD AUTO-RTO: 0 /100 WBC (ref 0–0.2)
PLATELET # BLD AUTO: 248 10*3/MM3 (ref 140–450)
PMV BLD AUTO: 10.5 FL (ref 6–12)
POTASSIUM SERPL-SCNC: 3.6 MMOL/L (ref 3.5–5.2)
PROT SERPL-MCNC: 6.5 G/DL (ref 6–8.5)
RBC # BLD AUTO: 4.79 10*6/MM3 (ref 4.14–5.8)
SODIUM SERPL-SCNC: 136 MMOL/L (ref 136–145)
WBC NRBC COR # BLD AUTO: 8.66 10*3/MM3 (ref 3.4–10.8)

## 2025-02-17 PROCEDURE — 83735 ASSAY OF MAGNESIUM: CPT | Performed by: INTERNAL MEDICINE

## 2025-02-17 PROCEDURE — 25010000002 FLUOROURACIL PER 500 MG: Performed by: INTERNAL MEDICINE

## 2025-02-17 PROCEDURE — 25010000002 PANITUMUMAB PER 10 MG: Performed by: INTERNAL MEDICINE

## 2025-02-17 PROCEDURE — 25010000002 DEXAMETHASONE SODIUM PHOSPHATE 20 MG/5ML SOLUTION: Performed by: INTERNAL MEDICINE

## 2025-02-17 PROCEDURE — 96417 CHEMO IV INFUS EACH ADDL SEQ: CPT

## 2025-02-17 PROCEDURE — 96415 CHEMO IV INFUSION ADDL HR: CPT

## 2025-02-17 PROCEDURE — 25810000003 SODIUM CHLORIDE 0.9 % SOLUTION 250 ML FLEX CONT: Performed by: INTERNAL MEDICINE

## 2025-02-17 PROCEDURE — 25010000003 DEXTROSE 5 % SOLUTION 250 ML FLEX CONT: Performed by: INTERNAL MEDICINE

## 2025-02-17 PROCEDURE — 96416 CHEMO PROLONG INFUSE W/PUMP: CPT

## 2025-02-17 PROCEDURE — 96375 TX/PRO/DX INJ NEW DRUG ADDON: CPT

## 2025-02-17 PROCEDURE — 25010000002 PALONOSETRON 0.25 MG/5ML SOLUTION PREFILLED SYRINGE: Performed by: INTERNAL MEDICINE

## 2025-02-17 PROCEDURE — 99214 OFFICE O/P EST MOD 30 MIN: CPT | Performed by: INTERNAL MEDICINE

## 2025-02-17 PROCEDURE — 96413 CHEMO IV INFUSION 1 HR: CPT

## 2025-02-17 PROCEDURE — 85025 COMPLETE CBC W/AUTO DIFF WBC: CPT | Performed by: INTERNAL MEDICINE

## 2025-02-17 PROCEDURE — 80053 COMPREHEN METABOLIC PANEL: CPT | Performed by: INTERNAL MEDICINE

## 2025-02-17 PROCEDURE — 25010000002 IRINOTECAN PER 20 MG: Performed by: INTERNAL MEDICINE

## 2025-02-17 PROCEDURE — 25010000002 LEUCOVORIN 500 MG RECONSTITUTED SOLUTION 1 EACH VIAL: Performed by: INTERNAL MEDICINE

## 2025-02-17 PROCEDURE — 25010000003 DEXTROSE 5 % SOLUTION 500 ML FLEX CONT: Performed by: INTERNAL MEDICINE

## 2025-02-17 PROCEDURE — 96368 THER/DIAG CONCURRENT INF: CPT

## 2025-02-17 PROCEDURE — 25010000002 PANITUMUMAB 400 MG/20ML SOLUTION 20 ML VIAL: Performed by: INTERNAL MEDICINE

## 2025-02-17 PROCEDURE — G0498 CHEMO EXTEND IV INFUS W/PUMP: HCPCS

## 2025-02-17 RX ORDER — FAMOTIDINE 10 MG/ML
20 INJECTION, SOLUTION INTRAVENOUS AS NEEDED
Status: CANCELLED | OUTPATIENT
Start: 2025-02-17

## 2025-02-17 RX ORDER — DIPHENHYDRAMINE HYDROCHLORIDE 50 MG/ML
50 INJECTION INTRAMUSCULAR; INTRAVENOUS AS NEEDED
Status: CANCELLED | OUTPATIENT
Start: 2025-02-17

## 2025-02-17 RX ORDER — HYDROCORTISONE SODIUM SUCCINATE 100 MG/2ML
100 INJECTION INTRAMUSCULAR; INTRAVENOUS AS NEEDED
Status: CANCELLED | OUTPATIENT
Start: 2025-02-17

## 2025-02-17 RX ORDER — PALONOSETRON 0.05 MG/ML
0.25 INJECTION, SOLUTION INTRAVENOUS ONCE
Status: CANCELLED | OUTPATIENT
Start: 2025-02-17

## 2025-02-17 RX ORDER — PALONOSETRON 0.05 MG/ML
0.25 INJECTION, SOLUTION INTRAVENOUS ONCE
Status: COMPLETED | OUTPATIENT
Start: 2025-02-17 | End: 2025-02-17

## 2025-02-17 RX ORDER — SODIUM CHLORIDE 9 MG/ML
20 INJECTION, SOLUTION INTRAVENOUS ONCE
Status: DISCONTINUED | OUTPATIENT
Start: 2025-02-17 | End: 2025-02-18 | Stop reason: HOSPADM

## 2025-02-17 RX ORDER — DIPHENOXYLATE HYDROCHLORIDE AND ATROPINE SULFATE 2.5; .025 MG/1; MG/1
1 TABLET ORAL EVERY 6 HOURS PRN
Qty: 30 TABLET | Refills: 0 | Status: SHIPPED | OUTPATIENT
Start: 2025-02-17

## 2025-02-17 RX ORDER — SODIUM CHLORIDE 9 MG/ML
20 INJECTION, SOLUTION INTRAVENOUS ONCE
Status: CANCELLED | OUTPATIENT
Start: 2025-02-17

## 2025-02-17 RX ORDER — ATROPINE SULFATE 1 MG/ML
0.25 INJECTION, SOLUTION INTRAMUSCULAR; INTRAVENOUS; SUBCUTANEOUS
Status: CANCELLED | OUTPATIENT
Start: 2025-02-17

## 2025-02-17 RX ADMIN — FLUOROURACIL 5230 MG: 50 INJECTION, SOLUTION INTRAVENOUS at 12:56

## 2025-02-17 RX ADMIN — PANITUMUMAB 570 MG: 400 SOLUTION INTRAVENOUS at 10:22

## 2025-02-17 RX ADMIN — DEXTROSE MONOHYDRATE 390 MG: 50 INJECTION, SOLUTION INTRAVENOUS at 11:10

## 2025-02-17 RX ADMIN — PALONOSETRON 0.25 MG: 0.25 INJECTION, SOLUTION INTRAVENOUS at 09:58

## 2025-02-17 RX ADMIN — ATROPINE SULFATE 0.25 MG: 0.4 INJECTION, SOLUTION INTRAVENOUS at 10:17

## 2025-02-17 RX ADMIN — LEUCOVORIN CALCIUM 870 MG: 500 INJECTION, POWDER, LYOPHILIZED, FOR SOLUTION INTRAMUSCULAR; INTRAVENOUS at 11:10

## 2025-02-17 RX ADMIN — DEXAMETHASONE SODIUM PHOSPHATE 12 MG: 4 INJECTION, SOLUTION INTRA-ARTICULAR; INTRALESIONAL; INTRAMUSCULAR; INTRAVENOUS; SOFT TISSUE at 09:57

## 2025-02-17 NOTE — PROGRESS NOTES
Hematology and Oncology [place]  Office number [phone]    Fax number [phone]     Follow up     Date: 25    Patient Name: Edward Powell  MRN: [id number]  : 1970    Referring Physician: Dr. Gautam    Chief Complaint: Rectal cancer with liver and lung metastases    Cancer Staging:  IV    History of Present Illness: Edward Powell is a pleasant 54 y.o. male who presents today for evaluation of rectal cancer.    Patient has a longstanding history of intermittent diarrhea since his cholecystectomy in 2019.  However he developed progressive diarrhea with associated loss of bowel control and urgency as well as weight loss and fatigue prompting additional workup.    CT of the abdomen pelvis 2023 showed an irregular circumferential wall thickening involving the rectum concerning for mass lesion.  There was associated mesorectal lymphadenopathy.  Masslike consolidation of left lower lobe.  CT of the chest showed a cavitary lesion in the left lower lobe up to 4.8 cm with an adjacent cavitary nodule up to 2 cm and a 5 mm nodule on the right minor fissure.      He underwent colonoscopy 2023 with findings of an infiltrative and ulcerated partially obstructing mass in the proximal rectum spanning 10 cm.  Rectal polyps.  Biopsy of the rectal mass showed invasive moderately differentiated adenocarcinoma.  Additional biopsies showed tubular adenomas.  MSI testing was intact/low probability of MSI high.    PDL1 negative    PET/CT 2023 showed hypermetabolic rectal wall thickening compatible with known rectal malignancy.  Multiple small ill-defined hypoechoic hyper metabolic liver lesions compatible with metastatic disease.  Mildly enlarged and mildly hypermetabolic pelvic sidewall and internal iliac lymph node chain adenopathy.  Hypermetabolic lung mass with adjacent nodule.     He underwent liver biopsy and lung biopsy 2024.  Results of both confirmed metastatic  colorectal cancer.    The patient has been experiencing substantial rectal pain.  He is taking oxycodone every 6 hours with partial relief.  He reports ongoing bowel movements.  No abdominal pain.  No vomiting.  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    Treatment history:  FOLFOX cycle 1-4  FOLFOX panitumumab cycle 5 onward  -Oxaliplatin terminated early for allergic reaction after 4/29/24    FOLFIRI/Panitumumab: 2/3/25 to present    Interval history:  2 days of fatigue, slept most of day Wed and Thurs and then energy rebounded  Had diarrhea up to 2 days 4-5 BM each. Took imodium with his laxative. Had painful rebound constipation after. Oxycodone taking longer to help his rectal pain  Painful neuropathy in feet is stable. Does not want to increase Cymbalta currently.   Currently taking magnesium. Off potassium  Mild nausea responded to zofran.   Mouth is good.  Fingertips splitting, dry skin otherwise controlled    Past Medical History:   Past Medical History:   Diagnosis Date    Arthritis     Cancer     rectal cancer - diagnosed 2023    Cholelithiasis 2019    Removed    COPD (chronic obstructive pulmonary disease)     Coronary artery disease 2009    Stent - no cardiologist currently    Elevated cholesterol     GERD (gastroesophageal reflux disease)     Hernia 2003    Lower hernia    Perforated ulcer 2019    Sleep apnea     history of; when weighed over 400lbs - no issues following bariatric surgery       Past Surgical History:   Past Surgical History:   Procedure Laterality Date    APPENDECTOMY  1983    Removed    BARIATRIC SURGERY  2011    Gastric bypass    BLADDER TUMOR/ULCER BLEEDER CAUTERIZATION      CARDIAC CATHETERIZATION  2009    stent placed    CHOLECYSTECTOMY  2019     Removed    COLONOSCOPY N/A 12/07/2023    Procedure: COLONOSCOPY WITH HOT SNARE POLYPECTOMY AND TATTOO;  Surgeon: Noman Gautam MD;  Location: [facility] ENDOSCOPY;  Service: Gastroenterology;  Laterality: N/A;    PORTACATH PLACEMENT N/A 1/5/2024    Procedure: INSERTION OF PORTACATH WITH ULTRSOUND AND FLUOROSCOPIC GUIDANCE;  Surgeon: Ida Black MD;  Location: [facility] OR;  Service: General;  Laterality: N/A;    JENNIFER-EN-Y         Family History: No family history on file.  Uncle had colon cancer    Social History:   Social History     Socioeconomic History    Marital status:    Tobacco Use    Smoking status: Every Day     Current packs/day: 1.00     Average packs/day: 0.9 packs/day for 60.4 years (52.9 ttl pk-yrs)     Types: Cigarettes     Start date: 9/6/1994    Smokeless tobacco: Never    Tobacco comments:     35 years      pt reports closer to 2 packs per day since cancer diagnosis   Vaping Use    Vaping status: Never Used   Substance and Sexual Activity    Alcohol use: Never    Drug use: Never    Sexual activity: Defer       Medications:     Current Outpatient Medications:     albuterol sulfate  (90 Base) MCG/ACT inhaler, Inhale 2 puffs Every 4 (Four) Hours As Needed for Wheezing., Disp: 18 g, Rfl: 3    benzonatate (Tessalon Perles) 100 MG capsule, Take 1 capsule by mouth 3 (Three) Times a Day As Needed for Cough., Disp: 45 capsule, Rfl: 0    buPROPion XL (WELLBUTRIN XL) 150 MG 24 hr tablet, TAKE 1 TABLET BY MOUTH DAILY, Disp: 90 tablet, Rfl: 1    cetirizine (zyrTEC) 10 MG tablet, Take 1 tablet by mouth Daily., Disp: 30 tablet, Rfl: 2    clindamycin (Clindagel) 1 % gel, Apply 1 Application topically to the appropriate area as directed 2 (Two) Times a Day. Use first, Disp: 30 g, Rfl: 1    dexAMETHasone 0.5 MG/5ML solution, Take 10 mL by mouth 2 (Two) Times a Day. Swish for 2 minutes and spit 10 mL 4 x daily, Disp: 240 mL, Rfl: 5    dicyclomine (BENTYL) 20 MG tablet, Take 1 tablet  by mouth 3 (Three) Times a Day As Needed for Abdominal Cramping., Disp: 30 tablet, Rfl: 3    docusate sodium (COLACE) 100 MG capsule, Take 1 capsule by mouth 2 (Two) Times a Day., Disp: , Rfl:     doxycycline (VIBRAMYICN) 100 MG tablet, Take 1 tablet by mouth 2 (Two) Times a Day. For 7 days, then 1 tablet daily indefinitely, Disp: 67 tablet, Rfl: 3    DULoxetine (CYMBALTA) 30 MG capsule, Take 1 capsule by mouth Daily., Disp: 90 capsule, Rfl: 2    famotidine (PEPCID) 20 MG tablet, Take 1 tablet by mouth 2 (Two) Times a Day., Disp: 60 tablet, Rfl: 4    Hydrocortisone, Perianal, (ANUSOL-HC) 2.5 % rectal cream, Insert  into the rectum 2 (Two) Times a Day. Indications: Inflamed Hemorrhoids, Disp: 30 g, Rfl: 1    Klor-Con M20 20 MEQ CR tablet, TAKE 1 TABLET BY MOUTH DAILY, Disp: 30 tablet, Rfl: 0    lidocaine-prilocaine (EMLA) 2.5-2.5 % cream, Apply 1 Application topically to the appropriate area as directed As Needed (45-60 minutes prior to port access.  Cover with saran/plastic wrap.)., Disp: 30 g, Rfl: 3    LORazepam (ATIVAN) 0.5 MG tablet, Take 1 tablet by mouth Take As Directed. 1 tabelt by mouth 30 minutes prior to MRI, take a second tablet at the time of the MRI if needed., Disp: 2 tablet, Rfl: 0    Magic Mouthwash Oral Suspension (diphenhydrAMINE HCl - aluminum & magnesium hydroxide-simethicone - lidocaine - nystatin), Swish and Spit 10 mL by mouth every 6 (Six) Hours For 7 Days., Disp: 300 mL, Rfl: 3    magnesium oxide (MAG-OX) 400 MG tablet, Take 1 tablet by mouth Daily., Disp: 30 tablet, Rfl: 5    montelukast (SINGULAIR) 10 MG tablet, TAKE ONE TABLET BY MOUTH ONCE NIGHTLY, Disp: 30 tablet, Rfl: 1    Morphine (MS CONTIN) 30 MG 12 hr tablet, Take 1 tablet by mouth 2 (Two) Times a Day for 30 days., Disp: 60 tablet, Rfl: 0    Movantik 25 MG tablet, , Disp: , Rfl:     mupirocin (BACTROBAN) 2 % cream, , Disp: , Rfl:     naloxone (NARCAN) 4 MG/0.1ML nasal spray, 1 spray into the nostril(s) as directed by provider As  Needed for Opioid Reversal., Disp: 1 each, Rfl: 0    nystatin (MYCOSTATIN) 100,000 unit/mL suspension, Swish and Swallow 5mL by mouth four times a day, Disp: 473 mL, Rfl: 0    nystatin (MYCOSTATIN) 100,000 unit/mL suspension, Swish and swallow 5 mL 4 (Four) Times a Day., Disp: 473 mL, Rfl: 0    nystatin susp + lidocaine viscous (MAGIC MOUTHWASH) oral suspension, 5-10 ml swish and spit or swallow QID prn, Disp: 240 mL, Rfl: 3    ondansetron (ZOFRAN) 8 MG tablet, Take 1 tablet by mouth 3 (Three) Times a Day As Needed for Nausea or Vomiting., Disp: 30 tablet, Rfl: 5    oxyCODONE (ROXICODONE) 15 MG immediate release tablet, Take 1 tablet by mouth 5 (Five) Times a Day As Needed for Moderate Pain or Severe Pain for up to 30 days., Disp: 150 tablet, Rfl: 0    pantoprazole (Protonix) 40 MG EC tablet, Take 1 tablet by mouth Daily. Indications: Gastroesophageal Reflux Disease, Disp: 90 tablet, Rfl: 2    predniSONE (DELTASONE) 20 MG tablet, Take 2 tablets by mouth Daily., Disp: 10 tablet, Rfl: 0    pregabalin (Lyrica) 300 MG capsule, Take 1 capsule by mouth 2 (Two) Times a Day for 30 days., Disp: 60 capsule, Rfl: 0    promethazine-dextromethorphan (PROMETHAZINE-DM) 6.25-15 MG/5ML syrup, Take 5 mL by mouth 3 times a day., Disp: 240 mL, Rfl: 0    tiotropium bromide-olodaterol (Stiolto Respimat) 2.5-2.5 MCG/ACT aerosol solution inhaler, INHALE 2 INHALATION(S) BY MOUTH DAILY, Disp: 4 g, Rfl: 5    traZODone (DESYREL) 100 MG tablet, Take 1 tablet by mouth Every Night., Disp: 30 tablet, Rfl: 2    triamcinolone (KENALOG) 0.025 % ointment, Apply 1 Application topically to the appropriate area as directed 2 (Two) Times a Day. Use second if topical treatment #1 ineffective, Disp: 80 g, Rfl: 0    Allergies:   Allergies   Allergen Reactions    Bactrim [Sulfamethoxazole-Trimethoprim] Hives     and blisters    Sulfa Antibiotics Hives     and blisters       Objective     Vital Signs:   Vitals:    02/17/25 0833   BP: 120/65   Pulse: 83   Resp:  "16   Temp: 97.5 °F (36.4 °C)   SpO2: 97%   Weight: 92 kg (202 lb 14.4 oz)   Height: 182.9 cm (72.01\")   PainSc:   5          Body mass index is 27.51 kg/m².   Pain Score    02/17/25 0833   PainSc:   5       ECOG Performance Status: 1 - Symptomatic but completely ambulatory    Physical Exam: General: No acute distress. Well appearing.  HEENT: Normocephalic, atraumatic. Sclera anicteric.   Neck: supple, no adenopathy.   Cardiovascular: regular rate and rhythm. No murmurs.   Respiratory: Normal rate. Clear to auscultation bilaterally.  Abdomen: Soft, nontender, non distended with normoactive bowel sounds.  Lymph: no cervical, supraclavicular or axillary adenopathy.  Neuro: Alert and oriented x 3. No focal deficits.   Ext: Symmetric, no swelling.   Psych: Euthymic.      Laboratory/Imaging Reviewed:   Hospital Outpatient Visit on 02/17/2025   Component Date Value Ref Range Status    Magnesium 02/17/2025 1.8  1.6 - 2.6 mg/dL Final    Glucose 02/17/2025 85  65 - 99 mg/dL Final    BUN 02/17/2025 10  6 - 20 mg/dL Final    Creatinine 02/17/2025 0.71 (L)  0.76 - 1.27 mg/dL Final    Sodium 02/17/2025 136  136 - 145 mmol/L Final    Potassium 02/17/2025 3.6  3.5 - 5.2 mmol/L Final    Chloride 02/17/2025 103  98 - 107 mmol/L Final    CO2 02/17/2025 24.9  22.0 - 29.0 mmol/L Final    Calcium 02/17/2025 8.2 (L)  8.6 - 10.5 mg/dL Final    Total Protein 02/17/2025 6.5  6.0 - 8.5 g/dL Final    Albumin 02/17/2025 3.1 (L)  3.5 - 5.2 g/dL Final    ALT (SGPT) 02/17/2025 9  1 - 41 U/L Final    AST (SGOT) 02/17/2025 15  1 - 40 U/L Final    Alkaline Phosphatase 02/17/2025 134 (H)  39 - 117 U/L Final    Total Bilirubin 02/17/2025 0.3  0.0 - 1.2 mg/dL Final    Globulin 02/17/2025 3.4  gm/dL Final    A/G Ratio 02/17/2025 0.9  g/dL Final    BUN/Creatinine Ratio 02/17/2025 14.1  7.0 - 25.0 Final    Anion Gap 02/17/2025 8.1  5.0 - 15.0 mmol/L Final    eGFR 02/17/2025 108.4  >60.0 mL/min/1.73 Final    WBC 02/17/2025 8.66  3.40 - 10.80 10*3/mm3 Final "    RBC 02/17/2025 4.79  4.14 - 5.80 10*6/mm3 Final    Hemoglobin 02/17/2025 11.8 (L)  13.0 - 17.7 g/dL Final    Hematocrit 02/17/2025 37.4 (L)  37.5 - 51.0 % Final    MCV 02/17/2025 78.1 (L)  79.0 - 97.0 fL Final    MCH 02/17/2025 24.6 (L)  26.6 - 33.0 pg Final    MCHC 02/17/2025 31.6  31.5 - 35.7 g/dL Final    RDW 02/17/2025 19.7 (H)  12.3 - 15.4 % Final    RDW-SD 02/17/2025 53.2  37.0 - 54.0 fl Final    MPV 02/17/2025 10.5  6.0 - 12.0 fL Final    Platelets 02/17/2025 248  140 - 450 10*3/mm3 Final    Neutrophil % 02/17/2025 69.6  42.7 - 76.0 % Final    Lymphocyte % 02/17/2025 17.4 (L)  19.6 - 45.3 % Final    Monocyte % 02/17/2025 9.6  5.0 - 12.0 % Final    Eosinophil % 02/17/2025 2.2  0.3 - 6.2 % Final    Basophil % 02/17/2025 0.7  0.0 - 1.5 % Final    Immature Grans % 02/17/2025 0.5  0.0 - 0.5 % Final    Neutrophils, Absolute 02/17/2025 6.03  1.70 - 7.00 10*3/mm3 Final    Lymphocytes, Absolute 02/17/2025 1.51  0.70 - 3.10 10*3/mm3 Final    Monocytes, Absolute 02/17/2025 0.83  0.10 - 0.90 10*3/mm3 Final    Eosinophils, Absolute 02/17/2025 0.19  0.00 - 0.40 10*3/mm3 Final    Basophils, Absolute 02/17/2025 0.06  0.00 - 0.20 10*3/mm3 Final    Immature Grans, Absolute 02/17/2025 0.04  0.00 - 0.05 10*3/mm3 Final    nRBC 02/17/2025 0.0  0.0 - 0.2 /100 WBC Final     Tempus xt: APC gene TP53; Negative ADRIANNA, BRAF, NRAS, TMB stable. PDL1 negative  No results found.    Procedures    Assessment / Plan      Assessment/Plan:     1.  Rectal cancer   2.  Liver metastases  3.  Lung metastasis  4.  Chemo monitoring  5.  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  -He was found to have biopsy-proven metastasis in the liver and lung.  His case was presented at multidisciplinary tumor board.  -Because of metastatic disease to both the lung and liver I do not think he will be downstage to resectable disease.  -Tempus results which are notable for  an APC mutation, T p53 mutation, negative for KRAS, BRAF, NRAS, tumor mutation burden stable.  Notch 1 VUS.  -CBC adequate and CMP pending for treatment today with maintenance 5-FU and panitumumab 9/16/24  Chemotherapy orders and premedications signed9/16/24  -We reviewed his imaging which is consistent with excellent disease control in early July with normalization of CEA. He has had persistent rectal pain with escalating oxycodone requirements over the past couple of months, but no severe recent increase in pain, fever. WBC normal. 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 I reviewed his EUA notes. Increase doxy to BID. No surgical intervention.  -Consolidative radiotherapy to the rectal tumor is not something he wants to pursue initially but has now undergone consultation.  Maintenance 5FU/panitumumab on hold since early December.  I reviewed his last 4 serial CT images which do so progressive and in his lung. Escalated to FOLFIRI panitumumab 2/2025 to present  -Labs adequate for cycle 2. He is going to continue to hold off on consolidative radiotherapy to the rectum at this time.  -Follow up in 2 weeks with next labs    6.  Cancer related pain  -Now following with palliative care and pain is well controlled on lyrica, oxycodone and MS contin.     7.  Panitumumab induced rash  -Continue doxycycline and emollients    8. GERD  PPI    9. Access  -Port    10. Chemo diarrhea  -Hold laxatives on day of diarrhea  -Lomotil PRN    Follow Up:   2 weeks drug change     Brenda Cronin MD  Hematology and Oncology

## 2025-02-19 ENCOUNTER — HOSPITAL ENCOUNTER (OUTPATIENT)
Facility: HOSPITAL | Age: 55
Discharge: HOME | End: 2025-02-19
Admitting: INTERNAL MEDICINE
Payer: COMMERCIAL

## 2025-02-19 DIAGNOSIS — C20 RECTAL CANCER METASTASIZED TO LIVER: ICD-10-CM

## 2025-02-19 DIAGNOSIS — C78.7 RECTAL CANCER METASTASIZED TO LIVER: ICD-10-CM

## 2025-02-19 DIAGNOSIS — C20 RECTAL ADENOCARCINOMA: Primary | ICD-10-CM

## 2025-02-19 PROCEDURE — 96523 IRRIG DRUG DELIVERY DEVICE: CPT

## 2025-02-19 PROCEDURE — 25010000002 HEPARIN LOCK FLUSH PER 10 UNITS: Performed by: INTERNAL MEDICINE

## 2025-02-19 RX ORDER — HEPARIN SODIUM (PORCINE) LOCK FLUSH IV SOLN 100 UNIT/ML 100 UNIT/ML
500 SOLUTION INTRAVENOUS AS NEEDED
OUTPATIENT
Start: 2025-02-19

## 2025-02-19 RX ORDER — SODIUM CHLORIDE 0.9 % (FLUSH) 0.9 %
10 SYRINGE (ML) INJECTION AS NEEDED
Status: DISCONTINUED | OUTPATIENT
Start: 2025-02-19 | End: 2025-02-20 | Stop reason: HOSPADM

## 2025-02-19 RX ORDER — HEPARIN SODIUM (PORCINE) LOCK FLUSH IV SOLN 100 UNIT/ML 100 UNIT/ML
500 SOLUTION INTRAVENOUS AS NEEDED
Status: DISCONTINUED | OUTPATIENT
Start: 2025-02-19 | End: 2025-02-20 | Stop reason: HOSPADM

## 2025-02-19 RX ORDER — SODIUM CHLORIDE 0.9 % (FLUSH) 0.9 %
10 SYRINGE (ML) INJECTION AS NEEDED
OUTPATIENT
Start: 2025-02-19

## 2025-02-19 RX ADMIN — Medication 10 ML: at 13:59

## 2025-02-19 RX ADMIN — HEPARIN 500 UNITS: 100 SYRINGE at 13:59

## 2025-02-24 RX ORDER — POTASSIUM CHLORIDE 1500 MG/1
20 TABLET, EXTENDED RELEASE ORAL DAILY
Qty: 30 TABLET | Refills: 0 | Status: SHIPPED | OUTPATIENT
Start: 2025-02-24

## 2025-02-24 NOTE — TELEPHONE ENCOUNTER
The original prescription was discontinued on 2/17/2025 by Brenda Cronin MD. Renewing this prescription may not be appropriate.

## 2025-03-03 ENCOUNTER — HOSPITAL ENCOUNTER (OUTPATIENT)
Facility: HOSPITAL | Age: 55
Discharge: HOME | End: 2025-03-03
Admitting: NURSE PRACTITIONER
Payer: COMMERCIAL

## 2025-03-03 ENCOUNTER — APPOINTMENT (OUTPATIENT)
Facility: HOSPITAL | Age: 55
End: 2025-03-03
Payer: COMMERCIAL

## 2025-03-03 ENCOUNTER — OFFICE VISIT (OUTPATIENT)
Dept: ONCOLOGY | Facility: CLINIC | Age: 55
End: 2025-03-03
Payer: COMMERCIAL

## 2025-03-03 VITALS
HEART RATE: 80 BPM | SYSTOLIC BLOOD PRESSURE: 119 MMHG | HEIGHT: 72 IN | OXYGEN SATURATION: 98 % | RESPIRATION RATE: 16 BRPM | TEMPERATURE: 97.5 F | WEIGHT: 205.8 LBS | DIASTOLIC BLOOD PRESSURE: 64 MMHG | BODY MASS INDEX: 27.87 KG/M2

## 2025-03-03 DIAGNOSIS — C20 RECTAL CANCER METASTASIZED TO LIVER: Primary | ICD-10-CM

## 2025-03-03 DIAGNOSIS — C78.7 RECTAL CANCER METASTASIZED TO LIVER: Primary | ICD-10-CM

## 2025-03-03 LAB
ALBUMIN SERPL-MCNC: 3 G/DL (ref 3.5–5.2)
ALBUMIN/GLOB SERPL: 0.9 G/DL
ALP SERPL-CCNC: 142 U/L (ref 39–117)
ALT SERPL W P-5'-P-CCNC: 11 U/L (ref 1–41)
ANION GAP SERPL CALCULATED.3IONS-SCNC: 9.6 MMOL/L (ref 5–15)
AST SERPL-CCNC: 16 U/L (ref 1–40)
BASOPHILS # BLD AUTO: 0.05 10*3/MM3 (ref 0–0.2)
BASOPHILS NFR BLD AUTO: 0.4 % (ref 0–1.5)
BILIRUB SERPL-MCNC: 0.3 MG/DL (ref 0–1.2)
BUN SERPL-MCNC: 9 MG/DL (ref 6–20)
BUN/CREAT SERPL: 12.9 (ref 7–25)
CALCIUM SPEC-SCNC: 8 MG/DL (ref 8.6–10.5)
CHLORIDE SERPL-SCNC: 105 MMOL/L (ref 98–107)
CO2 SERPL-SCNC: 23.4 MMOL/L (ref 22–29)
CREAT SERPL-MCNC: 0.7 MG/DL (ref 0.76–1.27)
DEPRECATED RDW RBC AUTO: 56.5 FL (ref 37–54)
EGFRCR SERPLBLD CKD-EPI 2021: 108.8 ML/MIN/1.73
ELLIPTOCYTES BLD QL SMEAR: NORMAL
EOSINOPHIL # BLD AUTO: 0.22 10*3/MM3 (ref 0–0.4)
EOSINOPHIL NFR BLD AUTO: 1.9 % (ref 0.3–6.2)
ERYTHROCYTE [DISTWIDTH] IN BLOOD BY AUTOMATED COUNT: 20.5 % (ref 12.3–15.4)
GLOBULIN UR ELPH-MCNC: 3.5 GM/DL
GLUCOSE SERPL-MCNC: 78 MG/DL (ref 65–99)
HCT VFR BLD AUTO: 38.1 % (ref 37.5–51)
HGB BLD-MCNC: 12 G/DL (ref 13–17.7)
HYPOCHROMIA BLD QL: NORMAL
IMM GRANULOCYTES # BLD AUTO: 0.03 10*3/MM3 (ref 0–0.05)
IMM GRANULOCYTES NFR BLD AUTO: 0.3 % (ref 0–0.5)
LYMPHOCYTES # BLD AUTO: 1.81 10*3/MM3 (ref 0.7–3.1)
LYMPHOCYTES NFR BLD AUTO: 15.8 % (ref 19.6–45.3)
MAGNESIUM SERPL-MCNC: 1.7 MG/DL (ref 1.6–2.6)
MCH RBC QN AUTO: 24.8 PG (ref 26.6–33)
MCHC RBC AUTO-ENTMCNC: 31.5 G/DL (ref 31.5–35.7)
MCV RBC AUTO: 78.7 FL (ref 79–97)
MICROCYTES BLD QL: NORMAL
MONOCYTES # BLD AUTO: 0.95 10*3/MM3 (ref 0.1–0.9)
MONOCYTES NFR BLD AUTO: 8.3 % (ref 5–12)
NEUTROPHILS NFR BLD AUTO: 73.3 % (ref 42.7–76)
NEUTROPHILS NFR BLD AUTO: 8.37 10*3/MM3 (ref 1.7–7)
NRBC BLD AUTO-RTO: 0 /100 WBC (ref 0–0.2)
PLAT MORPH BLD: NORMAL
PLATELET # BLD AUTO: 229 10*3/MM3 (ref 140–450)
PMV BLD AUTO: 11.2 FL (ref 6–12)
POTASSIUM SERPL-SCNC: 3.5 MMOL/L (ref 3.5–5.2)
PROT SERPL-MCNC: 6.5 G/DL (ref 6–8.5)
RBC # BLD AUTO: 4.84 10*6/MM3 (ref 4.14–5.8)
SODIUM SERPL-SCNC: 138 MMOL/L (ref 136–145)
WBC MORPH BLD: NORMAL
WBC NRBC COR # BLD AUTO: 11.43 10*3/MM3 (ref 3.4–10.8)

## 2025-03-03 PROCEDURE — 25010000002 PANITUMUMAB 400 MG/20ML SOLUTION 20 ML VIAL: Performed by: INTERNAL MEDICINE

## 2025-03-03 PROCEDURE — 96375 TX/PRO/DX INJ NEW DRUG ADDON: CPT

## 2025-03-03 PROCEDURE — 25010000002 LEUCOVORIN 200 MG RECONSTITUTED SOLUTION 1 EACH VIAL: Performed by: INTERNAL MEDICINE

## 2025-03-03 PROCEDURE — 25010000002 PANITUMUMAB PER 10 MG: Performed by: INTERNAL MEDICINE

## 2025-03-03 PROCEDURE — 96417 CHEMO IV INFUS EACH ADDL SEQ: CPT

## 2025-03-03 PROCEDURE — 96413 CHEMO IV INFUSION 1 HR: CPT

## 2025-03-03 PROCEDURE — G0498 CHEMO EXTEND IV INFUS W/PUMP: HCPCS

## 2025-03-03 PROCEDURE — 83735 ASSAY OF MAGNESIUM: CPT | Performed by: NURSE PRACTITIONER

## 2025-03-03 PROCEDURE — 96415 CHEMO IV INFUSION ADDL HR: CPT

## 2025-03-03 PROCEDURE — 25010000002 FLUOROURACIL PER 500 MG: Performed by: INTERNAL MEDICINE

## 2025-03-03 PROCEDURE — 25010000002 LEUCOVORIN 500 MG RECONSTITUTED SOLUTION 1 EACH VIAL: Performed by: INTERNAL MEDICINE

## 2025-03-03 PROCEDURE — 80053 COMPREHEN METABOLIC PANEL: CPT | Performed by: NURSE PRACTITIONER

## 2025-03-03 PROCEDURE — 96366 THER/PROPH/DIAG IV INF ADDON: CPT

## 2025-03-03 PROCEDURE — 85025 COMPLETE CBC W/AUTO DIFF WBC: CPT | Performed by: NURSE PRACTITIONER

## 2025-03-03 PROCEDURE — 96367 TX/PROPH/DG ADDL SEQ IV INF: CPT

## 2025-03-03 PROCEDURE — 85007 BL SMEAR W/DIFF WBC COUNT: CPT | Performed by: NURSE PRACTITIONER

## 2025-03-03 PROCEDURE — 99214 OFFICE O/P EST MOD 30 MIN: CPT | Performed by: INTERNAL MEDICINE

## 2025-03-03 PROCEDURE — 25010000002 PROCHLORPERAZINE 10 MG/2ML SOLUTION: Performed by: INTERNAL MEDICINE

## 2025-03-03 PROCEDURE — 96368 THER/DIAG CONCURRENT INF: CPT

## 2025-03-03 PROCEDURE — 25010000003 DEXTROSE 5 % SOLUTION 250 ML FLEX CONT: Performed by: INTERNAL MEDICINE

## 2025-03-03 PROCEDURE — 25010000002 DEXAMETHASONE SODIUM PHOSPHATE 20 MG/5ML SOLUTION: Performed by: INTERNAL MEDICINE

## 2025-03-03 PROCEDURE — 25010000002 IRINOTECAN PER 20 MG: Performed by: INTERNAL MEDICINE

## 2025-03-03 PROCEDURE — 25010000003 DEXTROSE 5 % SOLUTION 500 ML FLEX CONT: Performed by: INTERNAL MEDICINE

## 2025-03-03 PROCEDURE — 25010000002 PALONOSETRON 0.25 MG/5ML SOLUTION PREFILLED SYRINGE: Performed by: INTERNAL MEDICINE

## 2025-03-03 RX ORDER — ONDANSETRON 8 MG/1
8 TABLET, FILM COATED ORAL 3 TIMES DAILY PRN
Qty: 30 TABLET | Refills: 5 | Status: SHIPPED | OUTPATIENT
Start: 2025-03-03

## 2025-03-03 RX ORDER — DIPHENHYDRAMINE HYDROCHLORIDE 50 MG/ML
50 INJECTION INTRAMUSCULAR; INTRAVENOUS AS NEEDED
Status: CANCELLED | OUTPATIENT
Start: 2025-03-03

## 2025-03-03 RX ORDER — PALONOSETRON 0.05 MG/ML
0.25 INJECTION, SOLUTION INTRAVENOUS ONCE
Status: CANCELLED | OUTPATIENT
Start: 2025-03-03

## 2025-03-03 RX ORDER — HYDROCORTISONE SODIUM SUCCINATE 100 MG/2ML
100 INJECTION INTRAMUSCULAR; INTRAVENOUS AS NEEDED
Status: DISCONTINUED | OUTPATIENT
Start: 2025-03-03 | End: 2025-03-04 | Stop reason: HOSPADM

## 2025-03-03 RX ORDER — SODIUM CHLORIDE 9 MG/ML
20 INJECTION, SOLUTION INTRAVENOUS ONCE
Status: CANCELLED | OUTPATIENT
Start: 2025-03-03

## 2025-03-03 RX ORDER — HYDROCORTISONE SODIUM SUCCINATE 100 MG/2ML
100 INJECTION INTRAMUSCULAR; INTRAVENOUS AS NEEDED
Status: CANCELLED | OUTPATIENT
Start: 2025-03-03

## 2025-03-03 RX ORDER — PROCHLORPERAZINE EDISYLATE 5 MG/ML
5 INJECTION INTRAMUSCULAR; INTRAVENOUS EVERY 6 HOURS PRN
Status: CANCELLED
Start: 2025-03-03

## 2025-03-03 RX ORDER — PROCHLORPERAZINE EDISYLATE 5 MG/ML
5 INJECTION INTRAMUSCULAR; INTRAVENOUS EVERY 6 HOURS PRN
Status: DISCONTINUED | OUTPATIENT
Start: 2025-03-03 | End: 2025-03-04 | Stop reason: HOSPADM

## 2025-03-03 RX ORDER — FAMOTIDINE 10 MG/ML
20 INJECTION, SOLUTION INTRAVENOUS AS NEEDED
Status: CANCELLED | OUTPATIENT
Start: 2025-03-03

## 2025-03-03 RX ORDER — DIPHENHYDRAMINE HYDROCHLORIDE 50 MG/ML
50 INJECTION INTRAMUSCULAR; INTRAVENOUS AS NEEDED
Status: DISCONTINUED | OUTPATIENT
Start: 2025-03-03 | End: 2025-03-04 | Stop reason: HOSPADM

## 2025-03-03 RX ORDER — SODIUM CHLORIDE 9 MG/ML
20 INJECTION, SOLUTION INTRAVENOUS ONCE
Status: DISCONTINUED | OUTPATIENT
Start: 2025-03-03 | End: 2025-03-04 | Stop reason: HOSPADM

## 2025-03-03 RX ORDER — ATROPINE SULFATE 1 MG/ML
0.25 INJECTION, SOLUTION INTRAMUSCULAR; INTRAVENOUS; SUBCUTANEOUS
Status: CANCELLED | OUTPATIENT
Start: 2025-03-03

## 2025-03-03 RX ORDER — MONTELUKAST SODIUM 10 MG/1
10 TABLET ORAL NIGHTLY
Qty: 90 TABLET | Refills: 3 | Status: SHIPPED | OUTPATIENT
Start: 2025-03-03

## 2025-03-03 RX ORDER — PALONOSETRON 0.05 MG/ML
0.25 INJECTION, SOLUTION INTRAVENOUS ONCE
Status: COMPLETED | OUTPATIENT
Start: 2025-03-03 | End: 2025-03-03

## 2025-03-03 RX ORDER — FAMOTIDINE 10 MG/ML
20 INJECTION, SOLUTION INTRAVENOUS AS NEEDED
Status: DISCONTINUED | OUTPATIENT
Start: 2025-03-03 | End: 2025-03-04 | Stop reason: HOSPADM

## 2025-03-03 RX ORDER — TRAZODONE HYDROCHLORIDE 100 MG/1
100 TABLET ORAL NIGHTLY
Qty: 30 TABLET | Refills: 2 | Status: SHIPPED | OUTPATIENT
Start: 2025-03-03

## 2025-03-03 RX ADMIN — LEUCOVORIN CALCIUM 870 MG: 500 INJECTION, POWDER, LYOPHILIZED, FOR SOLUTION INTRAMUSCULAR; INTRAVENOUS at 10:58

## 2025-03-03 RX ADMIN — DEXTROSE MONOHYDRATE 390 MG: 50 INJECTION, SOLUTION INTRAVENOUS at 10:58

## 2025-03-03 RX ADMIN — PANITUMUMAB 570 MG: 400 SOLUTION INTRAVENOUS at 10:16

## 2025-03-03 RX ADMIN — DEXAMETHASONE SODIUM PHOSPHATE 12 MG: 4 INJECTION, SOLUTION INTRA-ARTICULAR; INTRALESIONAL; INTRAMUSCULAR; INTRAVENOUS; SOFT TISSUE at 09:52

## 2025-03-03 RX ADMIN — PALONOSETRON 0.25 MG: 0.25 INJECTION, SOLUTION INTRAVENOUS at 09:52

## 2025-03-03 RX ADMIN — FLUOROURACIL 5230 MG: 50 INJECTION, SOLUTION INTRAVENOUS at 13:19

## 2025-03-03 RX ADMIN — PROCHLORPERAZINE EDISYLATE 5 MG: 5 INJECTION INTRAMUSCULAR; INTRAVENOUS at 12:15

## 2025-03-03 RX ADMIN — ATROPINE SULFATE 0.25 MG: 0.4 INJECTION, SOLUTION INTRAVENOUS at 10:57

## 2025-03-03 NOTE — PROGRESS NOTES
[facility]  Office number [phone]    Fax number [phone]     Follow up     Date: 25    Patient Name: Edward Powell  MRN: [id number]  : 1970    Referring Physician: Dr. Gautam    Chief Complaint: Rectal cancer with liver and lung metastases    Cancer Staging:  IV    History of Present Illness: Edward Powell is a pleasant 54 y.o. male who presents today for evaluation of rectal cancer.    Patient has a longstanding history of intermittent diarrhea since his cholecystectomy in 2019.  However he developed progressive diarrhea with associated loss of bowel control and urgency as well as weight loss and fatigue prompting additional workup.    CT of the abdomen pelvis 2023 showed an irregular circumferential wall thickening involving the rectum concerning for mass lesion.  There was associated mesorectal lymphadenopathy.  Masslike consolidation of left lower lobe.  CT of the chest showed a cavitary lesion in the left lower lobe up to 4.8 cm with an adjacent cavitary nodule up to 2 cm and a 5 mm nodule on the right minor fissure.      He underwent colonoscopy 2023 with findings of an infiltrative and ulcerated partially obstructing mass in the proximal rectum spanning 10 cm.  Rectal polyps.  Biopsy of the rectal mass showed invasive moderately differentiated adenocarcinoma.  Additional biopsies showed tubular adenomas.  MSI testing was intact/low probability of MSI high.    PDL1 negative    PET/CT 2023 showed hypermetabolic rectal wall thickening compatible with known rectal malignancy.  Multiple small ill-defined hypoechoic hyper metabolic liver lesions compatible with metastatic disease.  Mildly enlarged and mildly hypermetabolic pelvic sidewall and internal iliac lymph node chain adenopathy.  Hypermetabolic lung mass with adjacent nodule.     He underwent liver biopsy and lung biopsy 2024.  Results of both confirmed metastatic  colorectal cancer.    The patient has been experiencing substantial rectal pain.  He is taking oxycodone every 6 hours with partial relief.  He reports ongoing bowel movements.  No abdominal pain.  No vomiting.  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    Treatment history:  FOLFOX cycle 1-4  FOLFOX panitumumab cycle 5 onward  -Oxaliplatin terminated early for allergic reaction after 4/29/24    FOLFIRI/Panitumumab: 2/3/25 to present    Interval history:  Dry palms with cracking on finger tips. Using emollients helps. Not using topical  steroids. None on soles. Worsened after cooking/chopping vegetables.     Rectal pain has been better.   Had diarrhea, better with addition of antidiarrheals.   Mild nausea responded to zofran.   Mouth is good.  Intermittently blue, overall good mood    Past Medical History:   Past Medical History:   Diagnosis Date    Arthritis     Cancer     rectal cancer - diagnosed 2023    Cholelithiasis 2019    Removed    COPD (chronic obstructive pulmonary disease)     Coronary artery disease 2009    Stent - no cardiologist currently    Elevated cholesterol     GERD (gastroesophageal reflux disease)     Hernia 2003    Lower hernia    Perforated ulcer 2019    Sleep apnea     history of; when weighed over 400lbs - no issues following bariatric surgery       Past Surgical History:   Past Surgical History:   Procedure Laterality Date    APPENDECTOMY  1983    Removed    BARIATRIC SURGERY  2011    Gastric bypass    BLADDER TUMOR/ULCER BLEEDER CAUTERIZATION      CARDIAC CATHETERIZATION  2009    stent placed    CHOLECYSTECTOMY  2019    Removed    COLONOSCOPY N/A 12/07/2023    Procedure: COLONOSCOPY WITH HOT SNARE POLYPECTOMY AND TATTOO;  Surgeon: Noman Gautam  MD;  Location: [facility] ENDOSCOPY;  Service: Gastroenterology;  Laterality: N/A;    PORTACATH PLACEMENT N/A 1/5/2024    Procedure: INSERTION OF PORTACATH WITH ULTRSOUND AND FLUOROSCOPIC GUIDANCE;  Surgeon: Ida Black MD;  Location: [facility] OR;  Service: General;  Laterality: N/A;    JENNIFER-EN-Y         Family History: No family history on file.  Uncle had colon cancer    Social History:   Social History     Socioeconomic History    Marital status:    Tobacco Use    Smoking status: Every Day     Current packs/day: 1.00     Average packs/day: 0.9 packs/day for 60.5 years (53.0 ttl pk-yrs)     Types: Cigarettes     Start date: 9/6/1994    Smokeless tobacco: Never    Tobacco comments:     35 years      pt reports closer to 2 packs per day since cancer diagnosis   Vaping Use    Vaping status: Never Used   Substance and Sexual Activity    Alcohol use: Never    Drug use: Never    Sexual activity: Defer       Medications:     Current Outpatient Medications:     albuterol sulfate  (90 Base) MCG/ACT inhaler, Inhale 2 puffs Every 4 (Four) Hours As Needed for Wheezing., Disp: 18 g, Rfl: 3    benzonatate (Tessalon Perles) 100 MG capsule, Take 1 capsule by mouth 3 (Three) Times a Day As Needed for Cough., Disp: 45 capsule, Rfl: 0    buPROPion XL (WELLBUTRIN XL) 150 MG 24 hr tablet, TAKE 1 TABLET BY MOUTH DAILY, Disp: 90 tablet, Rfl: 1    cetirizine (zyrTEC) 10 MG tablet, Take 1 tablet by mouth Daily., Disp: 30 tablet, Rfl: 2    clindamycin (Clindagel) 1 % gel, Apply 1 Application topically to the appropriate area as directed 2 (Two) Times a Day. Use first, Disp: 30 g, Rfl: 1    dexAMETHasone 0.5 MG/5ML solution, Take 10 mL by mouth 2 (Two) Times a Day. Swish for 2 minutes and spit 10 mL 4 x daily, Disp: 240 mL, Rfl: 5    dicyclomine (BENTYL) 20 MG tablet, Take 1 tablet by mouth 3 (Three) Times a Day As Needed for Abdominal Cramping., Disp: 30 tablet, Rfl: 3    diphenoxylate-atropine (Lomotil) 2.5-0.025  MG per tablet, Take 1 tablet by mouth Every 6 (Six) Hours As Needed for Diarrhea., Disp: 30 tablet, Rfl: 0    docusate sodium (COLACE) 100 MG capsule, Take 1 capsule by mouth 2 (Two) Times a Day., Disp: , Rfl:     doxycycline (VIBRAMYICN) 100 MG tablet, Take 1 tablet by mouth 2 (Two) Times a Day. For 7 days, then 1 tablet daily indefinitely, Disp: 67 tablet, Rfl: 3    DULoxetine (CYMBALTA) 30 MG capsule, Take 1 capsule by mouth Daily., Disp: 90 capsule, Rfl: 2    famotidine (PEPCID) 20 MG tablet, Take 1 tablet by mouth 2 (Two) Times a Day., Disp: 60 tablet, Rfl: 4    Hydrocortisone, Perianal, (ANUSOL-HC) 2.5 % rectal cream, Insert  into the rectum 2 (Two) Times a Day. Indications: Inflamed Hemorrhoids, Disp: 30 g, Rfl: 1    Klor-Con M20 20 MEQ CR tablet, TAKE 1 TABLET BY MOUTH DAILY, Disp: 30 tablet, Rfl: 0    lidocaine-prilocaine (EMLA) 2.5-2.5 % cream, Apply 1 Application topically to the appropriate area as directed As Needed (45-60 minutes prior to port access.  Cover with saran/plastic wrap.)., Disp: 30 g, Rfl: 3    LORazepam (ATIVAN) 0.5 MG tablet, Take 1 tablet by mouth Take As Directed. 1 tabelt by mouth 30 minutes prior to MRI, take a second tablet at the time of the MRI if needed., Disp: 2 tablet, Rfl: 0    Magic Mouthwash Oral Suspension (diphenhydrAMINE HCl - aluminum & magnesium hydroxide-simethicone - lidocaine - nystatin), Swish and Spit 10 mL by mouth every 6 (Six) Hours For 7 Days., Disp: 300 mL, Rfl: 3    magnesium oxide (MAG-OX) 400 MG tablet, Take 1 tablet by mouth Daily., Disp: 30 tablet, Rfl: 5    montelukast (SINGULAIR) 10 MG tablet, TAKE ONE TABLET BY MOUTH ONCE NIGHTLY, Disp: 30 tablet, Rfl: 1    Morphine (MS CONTIN) 30 MG 12 hr tablet, Take 1 tablet by mouth 2 (Two) Times a Day for 30 days., Disp: 60 tablet, Rfl: 0    Movantik 25 MG tablet, , Disp: , Rfl:     mupirocin (BACTROBAN) 2 % cream, , Disp: , Rfl:     naloxone (NARCAN) 4 MG/0.1ML nasal spray, 1 spray into the nostril(s) as directed  by provider As Needed for Opioid Reversal., Disp: 1 each, Rfl: 0    nystatin (MYCOSTATIN) 100,000 unit/mL suspension, Swish and Swallow 5mL by mouth four times a day, Disp: 473 mL, Rfl: 0    nystatin (MYCOSTATIN) 100,000 unit/mL suspension, Swish and swallow 5 mL 4 (Four) Times a Day., Disp: 473 mL, Rfl: 0    nystatin susp + lidocaine viscous (MAGIC MOUTHWASH) oral suspension, 5-10 ml swish and spit or swallow QID prn, Disp: 240 mL, Rfl: 3    ondansetron (ZOFRAN) 8 MG tablet, Take 1 tablet by mouth 3 (Three) Times a Day As Needed for Nausea or Vomiting., Disp: 30 tablet, Rfl: 5    oxyCODONE (ROXICODONE) 15 MG immediate release tablet, Take 1 tablet by mouth 5 (Five) Times a Day As Needed for Moderate Pain or Severe Pain for up to 30 days., Disp: 150 tablet, Rfl: 0    pantoprazole (Protonix) 40 MG EC tablet, Take 1 tablet by mouth Daily. Indications: Gastroesophageal Reflux Disease, Disp: 90 tablet, Rfl: 2    pregabalin (Lyrica) 300 MG capsule, Take 1 capsule by mouth 2 (Two) Times a Day for 30 days., Disp: 60 capsule, Rfl: 0    promethazine-dextromethorphan (PROMETHAZINE-DM) 6.25-15 MG/5ML syrup, Take 5 mL by mouth 3 times a day., Disp: 240 mL, Rfl: 0    tiotropium bromide-olodaterol (Stiolto Respimat) 2.5-2.5 MCG/ACT aerosol solution inhaler, INHALE 2 INHALATION(S) BY MOUTH DAILY, Disp: 4 g, Rfl: 5    traZODone (DESYREL) 100 MG tablet, Take 1 tablet by mouth Every Night., Disp: 30 tablet, Rfl: 2    triamcinolone (KENALOG) 0.025 % ointment, Apply 1 Application topically to the appropriate area as directed 2 (Two) Times a Day. Use second if topical treatment #1 ineffective, Disp: 80 g, Rfl: 0    Allergies:   Allergies   Allergen Reactions    Bactrim [Sulfamethoxazole-Trimethoprim] Hives     and blisters    Sulfa Antibiotics Hives     and blisters       Objective     Vital Signs:   Vitals:    03/03/25 0836   BP: 119/64   Pulse: 80   Resp: 16   Temp: 97.5 °F (36.4 °C)   SpO2: 98%   Weight: 93.4 kg (205 lb 12.8 oz)  "  Height: 182.9 cm (72.01\")   PainSc: 5           Body mass index is 27.91 kg/m².   Pain Score    03/03/25 0836   PainSc: 5        ECOG Performance Status: 1 - Symptomatic but completely ambulatory    Physical Exam: General: No acute distress. Well appearing.  HEENT: Normocephalic, atraumatic. Sclera anicteric.   Neck: supple, no adenopathy.   Cardiovascular: regular rate and rhythm. No murmurs.   Respiratory: Normal rate. Clear to auscultation bilaterally.  Abdomen: Soft, nontender, non distended with normoactive bowel sounds.  Lymph: no cervical, supraclavicular or axillary adenopathy.  Neuro: Alert and oriented x 3. No focal deficits.   Ext: Symmetric, no swelling.       Laboratory/Imaging Reviewed:   Hospital Outpatient Visit on 03/03/2025   Component Date Value Ref Range Status    Glucose 03/03/2025 78  65 - 99 mg/dL Final    BUN 03/03/2025 9  6 - 20 mg/dL Final    Creatinine 03/03/2025 0.70 (L)  0.76 - 1.27 mg/dL Final    Sodium 03/03/2025 138  136 - 145 mmol/L Final    Potassium 03/03/2025 3.5  3.5 - 5.2 mmol/L Final    Chloride 03/03/2025 105  98 - 107 mmol/L Final    CO2 03/03/2025 23.4  22.0 - 29.0 mmol/L Final    Calcium 03/03/2025 8.0 (L)  8.6 - 10.5 mg/dL Final    Total Protein 03/03/2025 6.5  6.0 - 8.5 g/dL Final    Albumin 03/03/2025 3.0 (L)  3.5 - 5.2 g/dL Final    ALT (SGPT) 03/03/2025 11  1 - 41 U/L Final    AST (SGOT) 03/03/2025 16  1 - 40 U/L Final    Alkaline Phosphatase 03/03/2025 142 (H)  39 - 117 U/L Final    Total Bilirubin 03/03/2025 0.3  0.0 - 1.2 mg/dL Final    Globulin 03/03/2025 3.5  gm/dL Final    A/G Ratio 03/03/2025 0.9  g/dL Final    BUN/Creatinine Ratio 03/03/2025 12.9  7.0 - 25.0 Final    Anion Gap 03/03/2025 9.6  5.0 - 15.0 mmol/L Final    eGFR 03/03/2025 108.8  >60.0 mL/min/1.73 Final    Magnesium 03/03/2025 1.7  1.6 - 2.6 mg/dL Final    WBC 03/03/2025 11.43 (H)  3.40 - 10.80 10*3/mm3 Final    RBC 03/03/2025 4.84  4.14 - 5.80 10*6/mm3 Final    Hemoglobin 03/03/2025 12.0 (L)  " 13.0 - 17.7 g/dL Final    Hematocrit 03/03/2025 38.1  37.5 - 51.0 % Final    MCV 03/03/2025 78.7 (L)  79.0 - 97.0 fL Final    MCH 03/03/2025 24.8 (L)  26.6 - 33.0 pg Final    MCHC 03/03/2025 31.5  31.5 - 35.7 g/dL Final    RDW 03/03/2025 20.5 (H)  12.3 - 15.4 % Final    RDW-SD 03/03/2025 56.5 (H)  37.0 - 54.0 fl Final    MPV 03/03/2025 11.2  6.0 - 12.0 fL Final    Platelets 03/03/2025 229  140 - 450 10*3/mm3 Final    Neutrophil % 03/03/2025 73.3  42.7 - 76.0 % Final    Lymphocyte % 03/03/2025 15.8 (L)  19.6 - 45.3 % Final    Monocyte % 03/03/2025 8.3  5.0 - 12.0 % Final    Eosinophil % 03/03/2025 1.9  0.3 - 6.2 % Final    Basophil % 03/03/2025 0.4  0.0 - 1.5 % Final    Immature Grans % 03/03/2025 0.3  0.0 - 0.5 % Final    Neutrophils, Absolute 03/03/2025 8.37 (H)  1.70 - 7.00 10*3/mm3 Final    Lymphocytes, Absolute 03/03/2025 1.81  0.70 - 3.10 10*3/mm3 Final    Monocytes, Absolute 03/03/2025 0.95 (H)  0.10 - 0.90 10*3/mm3 Final    Eosinophils, Absolute 03/03/2025 0.22  0.00 - 0.40 10*3/mm3 Final    Basophils, Absolute 03/03/2025 0.05  0.00 - 0.20 10*3/mm3 Final    Immature Grans, Absolute 03/03/2025 0.03  0.00 - 0.05 10*3/mm3 Final    nRBC 03/03/2025 0.0  0.0 - 0.2 /100 WBC Final    Elliptocytes 03/03/2025 Slight/1+  None Seen Final    Hypochromia 03/03/2025 Mod/2+  None Seen Final    Microcytes 03/03/2025 Mod/2+  None Seen Final    WBC Morphology 03/03/2025 Normal  Normal Final    Platelet Morphology 03/03/2025 Normal  Normal Final     Tempus xt: APC gene TP53; Negative ADRIANNA, BRAF, NRAS, TMB stable. PDL1 negative  No results found.    Procedures    Assessment / Plan      Assessment/Plan:     1.  Rectal cancer   2.  Liver metastases  3.  Lung metastasis  4.  Chemo monitoring  5.  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  -He was found to have biopsy-proven metastasis in the liver and lung.  His case was presented at  multidisciplinary tumor board.  -Because of metastatic disease to both the lung and liver I do not think he will be downstage to resectable disease.  -Tempus results which are notable for an APC mutation, T p53 mutation, negative for KRAS, BRAF, NRAS, tumor mutation burden stable.  Notch 1 VUS.  -CBC adequate and CMP pending for treatment today with maintenance 5-FU and panitumumab 9/16/24  Chemotherapy orders and premedications signed9/16/24  -We reviewed his imaging which is consistent with excellent disease control in early July with normalization of CEA. He has had persistent rectal pain with escalating oxycodone requirements over the past couple of months, but no severe recent increase in pain, fever. WBC normal. 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 I reviewed his EUA notes. Increase doxy to BID. No surgical intervention.  -Consolidative radiotherapy to the rectal tumor is not something he wants to pursue initially but has now undergone consultation.  Maintenance 5FU/panitumumab on hold since early December.  I reviewed his last 4 serial CT images which do so progressive and in his lung. Escalated to FOLFIRI panitumumab 2/2025 to present  -Labs adequate for cycle 3. He is going to continue to hold off on consolidative radiotherapy to the rectum at this time.  -Follow up in 2 weeks with next labs.   -Scans in May  Orders Placed This Encounter   Procedures    CT Chest With Contrast    CT Abdomen Pelvis With Contrast     6.  Cancer related pain  -Now following with palliative care and pain is well controlled on lyrica, oxycodone and MS contin.     7.  Panitumumab induced rash  -Continue doxycycline and emollients    8. GERD  PPI    9. Access  -Port    10. Chemo diarrhea  -Hold laxatives on day of diarrhea  -Lomotil PRN    11. Situational depression  -Offered medications or cancer psychology, declines for now.   -Refilled trazodone    Follow Up:   2 weeks     Brenda Cronin MD  Hematology and  Oncology

## 2025-03-05 ENCOUNTER — HOSPITAL ENCOUNTER (OUTPATIENT)
Facility: HOSPITAL | Age: 55
Discharge: HOME | End: 2025-03-05
Admitting: INTERNAL MEDICINE
Payer: COMMERCIAL

## 2025-03-05 DIAGNOSIS — C78.7 RECTAL CANCER METASTASIZED TO LIVER: ICD-10-CM

## 2025-03-05 DIAGNOSIS — C20 RECTAL ADENOCARCINOMA: Primary | ICD-10-CM

## 2025-03-05 DIAGNOSIS — C20 RECTAL CANCER METASTASIZED TO LIVER: ICD-10-CM

## 2025-03-05 PROCEDURE — 25010000002 HEPARIN LOCK FLUSH PER 10 UNITS: Performed by: INTERNAL MEDICINE

## 2025-03-05 PROCEDURE — 96523 IRRIG DRUG DELIVERY DEVICE: CPT

## 2025-03-05 RX ORDER — SODIUM CHLORIDE 0.9 % (FLUSH) 0.9 %
10 SYRINGE (ML) INJECTION AS NEEDED
Status: DISCONTINUED | OUTPATIENT
Start: 2025-03-05 | End: 2025-03-06 | Stop reason: HOSPADM

## 2025-03-05 RX ORDER — HEPARIN SODIUM (PORCINE) LOCK FLUSH IV SOLN 100 UNIT/ML 100 UNIT/ML
500 SOLUTION INTRAVENOUS AS NEEDED
Status: DISCONTINUED | OUTPATIENT
Start: 2025-03-05 | End: 2025-03-06 | Stop reason: HOSPADM

## 2025-03-05 RX ORDER — SODIUM CHLORIDE 0.9 % (FLUSH) 0.9 %
10 SYRINGE (ML) INJECTION AS NEEDED
OUTPATIENT
Start: 2025-03-05

## 2025-03-05 RX ORDER — HEPARIN SODIUM (PORCINE) LOCK FLUSH IV SOLN 100 UNIT/ML 100 UNIT/ML
500 SOLUTION INTRAVENOUS AS NEEDED
OUTPATIENT
Start: 2025-03-05

## 2025-03-05 RX ADMIN — HEPARIN 500 UNITS: 100 SYRINGE at 14:31

## 2025-03-05 RX ADMIN — Medication 10 ML: at 14:31

## 2025-03-06 DIAGNOSIS — G62.0 CHEMOTHERAPY-INDUCED NEUROPATHY: ICD-10-CM

## 2025-03-06 DIAGNOSIS — G89.3 CANCER RELATED PAIN: ICD-10-CM

## 2025-03-06 DIAGNOSIS — T45.1X5A CHEMOTHERAPY-INDUCED NEUROPATHY: ICD-10-CM

## 2025-03-06 RX ORDER — OXYCODONE HYDROCHLORIDE 15 MG/1
15 TABLET ORAL
Qty: 150 TABLET | Refills: 0 | Status: SHIPPED | OUTPATIENT
Start: 2025-03-09 | End: 2025-04-08

## 2025-03-06 RX ORDER — MORPHINE SULFATE 30 MG/1
30 TABLET, FILM COATED, EXTENDED RELEASE ORAL 2 TIMES DAILY
Qty: 60 TABLET | Refills: 0 | Status: SHIPPED | OUTPATIENT
Start: 2025-03-08 | End: 2025-04-07

## 2025-03-06 RX ORDER — PREGABALIN 300 MG/1
300 CAPSULE ORAL 2 TIMES DAILY
Qty: 60 CAPSULE | Refills: 0 | Status: SHIPPED | OUTPATIENT
Start: 2025-03-08 | End: 2025-04-07

## 2025-03-06 NOTE — TELEPHONE ENCOUNTER
Caller: Edward Powell    Relationship: Self    Best call back number: [phone]    Requested Prescriptions:   Requested Prescriptions     Pending Prescriptions Disp Refills    pregabalin (Lyrica) 300 MG capsule 60 capsule 0     Sig: Take 1 capsule by mouth 2 (Two) Times a Day for 30 days.    Morphine (MS CONTIN) 30 MG 12 hr tablet 60 tablet 0     Sig: Take 1 tablet by mouth 2 (Two) Times a Day for 30 days.    oxyCODONE (ROXICODONE) 15 MG immediate release tablet 150 tablet 0     Sig: Take 1 tablet by mouth 5 (Five) Times a Day As Needed for Moderate Pain or Severe Pain for up to 30 days.        Pharmacy where request should be sent:  [facility] [id number] - [place] [address] AT [facility] [phone] [facility] [phone] FX [[id number]]     Last office visit with prescribing clinician: 12/27/2024   Last telemedicine visit with prescribing clinician: Visit date not found   Next office visit with prescribing clinician: 3/28/2025     Additional details provided by patient:     Does the patient have less than a 3 day supply:  [] Yes  [x] No    Would you like a call back once the refill request has been completed: [] Yes [] No    If the office needs to give you a call back, can they leave a voicemail: [] Yes [] No    Mariposa Monzon   03/06/25 13:00 EST

## 2025-03-06 NOTE — TELEPHONE ENCOUNTER
RACIEL #: [id number]    Medication requested:Morphine (MS CONTIN) 30 MG 12 hr tablet     Last fill date: 2/6/25    Medication requested:oxyCODONE (ROXICODONE) 15 MG immediate release tablet     Last fill date:2/7/25      Medication requested:pregabalin (Lyrica) 300 MG capsule     Last fill date: 2/6/25      Last appointment: 12/27/24    Next appointment:

## 2025-03-07 ENCOUNTER — TELEPHONE (OUTPATIENT)
Dept: PALLIATIVE CARE | Facility: CLINIC | Age: 55
End: 2025-03-07
Payer: COMMERCIAL

## 2025-03-07 ENCOUNTER — OFFICE VISIT (OUTPATIENT)
Age: 55
End: 2025-03-07
Payer: COMMERCIAL

## 2025-03-07 VITALS
BODY MASS INDEX: 27.9 KG/M2 | DIASTOLIC BLOOD PRESSURE: 71 MMHG | OXYGEN SATURATION: 99 % | TEMPERATURE: 98 F | HEART RATE: 85 BPM | WEIGHT: 206 LBS | HEIGHT: 72 IN | SYSTOLIC BLOOD PRESSURE: 119 MMHG

## 2025-03-07 DIAGNOSIS — H69.93 EUSTACHIAN TUBE DYSFUNCTION, BILATERAL: Primary | ICD-10-CM

## 2025-03-07 DIAGNOSIS — L98.9 HAND LESION: ICD-10-CM

## 2025-03-07 PROCEDURE — 99214 OFFICE O/P EST MOD 30 MIN: CPT | Performed by: FAMILY MEDICINE

## 2025-03-07 RX ORDER — PREDNISONE 5 MG/1
1 TABLET ORAL TAKE AS DIRECTED
Qty: 21 TABLET | Refills: 0 | Status: SHIPPED | OUTPATIENT
Start: 2025-03-07 | End: 2025-03-17

## 2025-03-07 NOTE — PROGRESS NOTES
"    Follow Up Office Visit      Date: 2025   Patient Name: Edward Powell  : 1970   MRN: [id number]     Chief Complaint:    Chief Complaint   Patient presents with    Hypotension     Fingers have multiple cut like places  Vaseline and Vicks        History of Present Illness: Edward Powell is a 54 y.o. male who is here today for hypotension and rectal cancer.    States that he was treated for a cough around mid January.  Has gotten better but states that he continues to have somewhat of a dry hacking cough.    Has multiple open, cut like wounds, on both hands.  STates that his skin will randomly \"pop\" open.  Has been told that it is a side effect of his chemo.  Oncologist has recommended vaseline.    Subjective      Review of Systems:   Review of Systems   Constitutional:  Negative for appetite change and unexpected weight loss.   HENT:  Negative for trouble swallowing.    Eyes:  Negative for blurred vision and double vision.   Respiratory:  Positive for cough. Negative for shortness of breath.    Cardiovascular:  Negative for chest pain and leg swelling.   Gastrointestinal:  Negative for blood in stool.   Endocrine: Negative for cold intolerance, heat intolerance and polyuria.   Musculoskeletal:  Negative for joint swelling.   Skin:  Negative for color change and bruise.   Neurological:  Negative for numbness and memory problem.   Hematological:  Does not bruise/bleed easily.   Psychiatric/Behavioral:  Negative for suicidal ideas and depressed mood. The patient is not nervous/anxious.        I have reviewed the patients family history, social history, past medical history, past surgical history and have updated it as appropriate.     Medications:     Current Outpatient Medications:     albuterol sulfate  (90 Base) MCG/ACT inhaler, Inhale 2 puffs Every 4 (Four) Hours As Needed for Wheezing., Disp: 18 g, Rfl: 3    benzonatate (Tessalon Perles) 100 MG capsule, Take 1 capsule by mouth 3 " (Three) Times a Day As Needed for Cough., Disp: 45 capsule, Rfl: 0    buPROPion XL (WELLBUTRIN XL) 150 MG 24 hr tablet, TAKE 1 TABLET BY MOUTH DAILY, Disp: 90 tablet, Rfl: 1    cetirizine (zyrTEC) 10 MG tablet, Take 1 tablet by mouth Daily., Disp: 30 tablet, Rfl: 2    clindamycin (Clindagel) 1 % gel, Apply 1 Application topically to the appropriate area as directed 2 (Two) Times a Day. Use first, Disp: 30 g, Rfl: 1    dexAMETHasone 0.5 MG/5ML solution, Take 10 mL by mouth 2 (Two) Times a Day. Swish for 2 minutes and spit 10 mL 4 x daily, Disp: 240 mL, Rfl: 5    dicyclomine (BENTYL) 20 MG tablet, Take 1 tablet by mouth 3 (Three) Times a Day As Needed for Abdominal Cramping., Disp: 30 tablet, Rfl: 3    diphenoxylate-atropine (Lomotil) 2.5-0.025 MG per tablet, Take 1 tablet by mouth Every 6 (Six) Hours As Needed for Diarrhea., Disp: 30 tablet, Rfl: 0    docusate sodium (COLACE) 100 MG capsule, Take 1 capsule by mouth 2 (Two) Times a Day., Disp: , Rfl:     doxycycline (VIBRAMYICN) 100 MG tablet, Take 1 tablet by mouth 2 (Two) Times a Day. For 7 days, then 1 tablet daily indefinitely, Disp: 67 tablet, Rfl: 3    DULoxetine (CYMBALTA) 30 MG capsule, Take 1 capsule by mouth Daily., Disp: 90 capsule, Rfl: 2    famotidine (PEPCID) 20 MG tablet, Take 1 tablet by mouth 2 (Two) Times a Day., Disp: 60 tablet, Rfl: 4    Hydrocortisone, Perianal, (ANUSOL-HC) 2.5 % rectal cream, Insert  into the rectum 2 (Two) Times a Day. Indications: Inflamed Hemorrhoids, Disp: 30 g, Rfl: 1    Klor-Con M20 20 MEQ CR tablet, TAKE 1 TABLET BY MOUTH DAILY, Disp: 30 tablet, Rfl: 0    lidocaine-prilocaine (EMLA) 2.5-2.5 % cream, Apply 1 Application topically to the appropriate area as directed As Needed (45-60 minutes prior to port access.  Cover with saran/plastic wrap.)., Disp: 30 g, Rfl: 3    LORazepam (ATIVAN) 0.5 MG tablet, Take 1 tablet by mouth Take As Directed. 1 tabelt by mouth 30 minutes prior to MRI, take a second tablet at the time of the  MRI if needed., Disp: 2 tablet, Rfl: 0    Magic Mouthwash Oral Suspension (diphenhydrAMINE HCl - aluminum & magnesium hydroxide-simethicone - lidocaine - nystatin), Swish and Spit 10 mL by mouth every 6 (Six) Hours For 7 Days., Disp: 300 mL, Rfl: 3    magnesium oxide (MAG-OX) 400 MG tablet, Take 1 tablet by mouth Daily., Disp: 30 tablet, Rfl: 5    montelukast (SINGULAIR) 10 MG tablet, Take 1 tablet by mouth Every Night., Disp: 90 tablet, Rfl: 3    Morphine (MS CONTIN) 30 MG 12 hr tablet, Take 1 tablet by mouth 2 (Two) Times a Day for 30 days., Disp: 60 tablet, Rfl: 0    Movantik 25 MG tablet, , Disp: , Rfl:     mupirocin (BACTROBAN) 2 % cream, , Disp: , Rfl:     naloxone (NARCAN) 4 MG/0.1ML nasal spray, 1 spray into the nostril(s) as directed by provider As Needed for Opioid Reversal., Disp: 1 each, Rfl: 0    nystatin (MYCOSTATIN) 100,000 unit/mL suspension, Swish and Swallow 5mL by mouth four times a day, Disp: 473 mL, Rfl: 0    nystatin (MYCOSTATIN) 100,000 unit/mL suspension, Swish and swallow 5 mL 4 (Four) Times a Day., Disp: 473 mL, Rfl: 0    nystatin susp + lidocaine viscous (MAGIC MOUTHWASH) oral suspension, 5-10 ml swish and spit or swallow QID prn, Disp: 240 mL, Rfl: 3    ondansetron (ZOFRAN) 8 MG tablet, Take 1 tablet by mouth 3 (Three) Times a Day As Needed for Nausea or Vomiting., Disp: 30 tablet, Rfl: 5    oxyCODONE (ROXICODONE) 15 MG immediate release tablet, Take 1 tablet by mouth 5 (Five) Times a Day As Needed for Moderate Pain or Severe Pain for up to 30 days., Disp: 150 tablet, Rfl: 0    pantoprazole (Protonix) 40 MG EC tablet, Take 1 tablet by mouth Daily. Indications: Gastroesophageal Reflux Disease, Disp: 90 tablet, Rfl: 2    pregabalin (Lyrica) 300 MG capsule, Take 1 capsule by mouth 2 (Two) Times a Day for 30 days., Disp: 60 capsule, Rfl: 0    promethazine-dextromethorphan (PROMETHAZINE-DM) 6.25-15 MG/5ML syrup, Take 5 mL by mouth 3 times a day., Disp: 240 mL, Rfl: 0    tiotropium  "bromide-olodaterol (Stiolto Respimat) 2.5-2.5 MCG/ACT aerosol solution inhaler, INHALE 2 INHALATION(S) BY MOUTH DAILY, Disp: 4 g, Rfl: 5    traZODone (DESYREL) 100 MG tablet, Take 1 tablet by mouth Every Night., Disp: 30 tablet, Rfl: 2    triamcinolone (KENALOG) 0.025 % ointment, Apply 1 Application topically to the appropriate area as directed 2 (Two) Times a Day. Use second if topical treatment #1 ineffective, Disp: 80 g, Rfl: 0    predniSONE 5 MG (21) tablet therapy pack dose pack, Take 1 tablet by mouth Take As Directed. Take as directed on package instructions., Disp: 21 tablet, Rfl: 0    Allergies:   Allergies   Allergen Reactions    Bactrim [Sulfamethoxazole-Trimethoprim] Hives     and blisters    Sulfa Antibiotics Hives     and blisters       Objective     Physical Exam: Please see above  Vital Signs:   Vitals:    03/07/25 1309   BP: 119/71   Pulse: 85   Temp: 98 °F (36.7 °C)   SpO2: 99%   Weight: 93.4 kg (206 lb)   Height: 182.9 cm (72.01\")     Body mass index is 27.93 kg/m².    Physical Exam  Vitals and nursing note reviewed.   Constitutional:       Appearance: Normal appearance.   HENT:      Head: Normocephalic and atraumatic.   Eyes:      General: Lids are normal.      Conjunctiva/sclera: Conjunctivae normal.   Cardiovascular:      Rate and Rhythm: Normal rate and regular rhythm.   Pulmonary:      Effort: Pulmonary effort is normal.      Breath sounds: Normal breath sounds and air entry.   Abdominal:      General: Abdomen is flat. Bowel sounds are normal.      Palpations: Abdomen is soft.   Musculoskeletal:      Cervical back: Full passive range of motion without pain and normal range of motion.   Skin:     Comments: Patient with multiple skin tears involving both hands   Neurological:      General: No focal deficit present.      Mental Status: He is alert and oriented to person, place, and time.   Psychiatric:         Attention and Perception: Attention normal.         Mood and Affect: Mood normal.    " "     Behavior: Behavior normal. Behavior is cooperative.         Procedures    Results:   Labs:   No results found for: \"HGBA1C\", \"CMP\", \"CBCDIFFPANEL\", \"CREAT\", \"TSH\"     POCT Results (if applicable):   Results for orders placed or performed during the hospital encounter of 03/03/25   Comprehensive Metabolic Panel    Collection Time: 03/03/25  8:28 AM    Specimen: Blood   Result Value Ref Range    Glucose 78 65 - 99 mg/dL    BUN 9 6 - 20 mg/dL    Creatinine 0.70 (L) 0.76 - 1.27 mg/dL    Sodium 138 136 - 145 mmol/L    Potassium 3.5 3.5 - 5.2 mmol/L    Chloride 105 98 - 107 mmol/L    CO2 23.4 22.0 - 29.0 mmol/L    Calcium 8.0 (L) 8.6 - 10.5 mg/dL    Total Protein 6.5 6.0 - 8.5 g/dL    Albumin 3.0 (L) 3.5 - 5.2 g/dL    ALT (SGPT) 11 1 - 41 U/L    AST (SGOT) 16 1 - 40 U/L    Alkaline Phosphatase 142 (H) 39 - 117 U/L    Total Bilirubin 0.3 0.0 - 1.2 mg/dL    Globulin 3.5 gm/dL    A/G Ratio 0.9 g/dL    BUN/Creatinine Ratio 12.9 7.0 - 25.0    Anion Gap 9.6 5.0 - 15.0 mmol/L    eGFR 108.8 >60.0 mL/min/1.73   Magnesium    Collection Time: 03/03/25  8:28 AM    Specimen: Blood   Result Value Ref Range    Magnesium 1.7 1.6 - 2.6 mg/dL   CBC Auto Differential    Collection Time: 03/03/25  8:28 AM    Specimen: Blood   Result Value Ref Range    WBC 11.43 (H) 3.40 - 10.80 10*3/mm3    RBC 4.84 4.14 - 5.80 10*6/mm3    Hemoglobin 12.0 (L) 13.0 - 17.7 g/dL    Hematocrit 38.1 37.5 - 51.0 %    MCV 78.7 (L) 79.0 - 97.0 fL    MCH 24.8 (L) 26.6 - 33.0 pg    MCHC 31.5 31.5 - 35.7 g/dL    RDW 20.5 (H) 12.3 - 15.4 %    RDW-SD 56.5 (H) 37.0 - 54.0 fl    MPV 11.2 6.0 - 12.0 fL    Platelets 229 140 - 450 10*3/mm3    Neutrophil % 73.3 42.7 - 76.0 %    Lymphocyte % 15.8 (L) 19.6 - 45.3 %    Monocyte % 8.3 5.0 - 12.0 %    Eosinophil % 1.9 0.3 - 6.2 %    Basophil % 0.4 0.0 - 1.5 %    Immature Grans % 0.3 0.0 - 0.5 %    Neutrophils, Absolute 8.37 (H) 1.70 - 7.00 10*3/mm3    Lymphocytes, Absolute 1.81 0.70 - 3.10 10*3/mm3    Monocytes, Absolute 0.95 " (H) 0.10 - 0.90 10*3/mm3    Eosinophils, Absolute 0.22 0.00 - 0.40 10*3/mm3    Basophils, Absolute 0.05 0.00 - 0.20 10*3/mm3    Immature Grans, Absolute 0.03 0.00 - 0.05 10*3/mm3    nRBC 0.0 0.0 - 0.2 /100 WBC   Scan Slide    Collection Time: 03/03/25  8:28 AM    Specimen: Blood   Result Value Ref Range    Elliptocytes Slight/1+ None Seen    Hypochromia Mod/2+ None Seen    Microcytes Mod/2+ None Seen    WBC Morphology Normal Normal    Platelet Morphology Normal Normal       Imaging:   No valid procedures specified.         Assessment / Plan      Assessment/Plan:   Diagnoses and all orders for this visit:    1. Eustachian tube dysfunction, bilateral (Primary)  -     predniSONE 5 MG (21) tablet therapy pack dose pack; Take 1 tablet by mouth Take As Directed. Take as directed on package instructions.  Dispense: 21 tablet; Refill: 0    2. Hand lesion   - Most likely due to patient's chemo medication.  Will continue to recommend Vaseline as well as making sure to keep hands moisturized.               Vaccine Counseling:      Follow Up:   Return in about 3 months (around 6/7/2025) for Recheck.        Westley Gonzales, DO  [facility]

## 2025-03-07 NOTE — TELEPHONE ENCOUNTER
Caller: Edward Powell    Relationship: Self    Best call back number:   Telephone Information:   Mobile [phone]       What is the best time to reach you: ANYTIME    Who are you requesting to speak with (clinical staff, provider,  specific staff member): CLINICAL STAFF / PROVIDER     What was the call regarding: PT CALLED WONDERING STATUS ON MEDICATION MORPHINE AND OXYCODONE, PT STATED MEDICATION HASN'T BEEN SENT TO PHARMACY YET. PLEASE ADVISE. [facility] [id number] - [place], [place] - [facility] [address] AT [facility] [phone] [facility] [phone] FX

## 2025-03-07 NOTE — TELEPHONE ENCOUNTER
Spoke to pt and gave due date of the medications. They were already sent to his pharmacy. Morphine and Lyrica due 3/8/25 and Oxycodone due 3/9/25

## 2025-03-17 ENCOUNTER — PATIENT OUTREACH (OUTPATIENT)
Facility: HOSPITAL | Age: 55
End: 2025-03-17
Payer: COMMERCIAL

## 2025-03-17 ENCOUNTER — HOSPITAL ENCOUNTER (OUTPATIENT)
Facility: HOSPITAL | Age: 55
Discharge: HOME | End: 2025-03-17
Admitting: INTERNAL MEDICINE
Payer: COMMERCIAL

## 2025-03-17 ENCOUNTER — OFFICE VISIT (OUTPATIENT)
Dept: ONCOLOGY | Facility: CLINIC | Age: 55
End: 2025-03-17
Payer: COMMERCIAL

## 2025-03-17 VITALS
OXYGEN SATURATION: 97 % | TEMPERATURE: 97.3 F | DIASTOLIC BLOOD PRESSURE: 58 MMHG | BODY MASS INDEX: 28.32 KG/M2 | RESPIRATION RATE: 16 BRPM | HEART RATE: 78 BPM | SYSTOLIC BLOOD PRESSURE: 120 MMHG | WEIGHT: 209.1 LBS | HEIGHT: 72 IN

## 2025-03-17 DIAGNOSIS — C78.7 RECTAL CANCER METASTASIZED TO LIVER: Primary | ICD-10-CM

## 2025-03-17 DIAGNOSIS — C20 RECTAL CANCER METASTASIZED TO LIVER: Primary | ICD-10-CM

## 2025-03-17 LAB
ALBUMIN SERPL-MCNC: 3.1 G/DL (ref 3.5–5.2)
ALBUMIN/GLOB SERPL: 0.9 G/DL
ALP SERPL-CCNC: 126 U/L (ref 39–117)
ALT SERPL W P-5'-P-CCNC: 13 U/L (ref 1–41)
ANION GAP SERPL CALCULATED.3IONS-SCNC: 8.1 MMOL/L (ref 5–15)
ANISOCYTOSIS BLD QL: NORMAL
AST SERPL-CCNC: 19 U/L (ref 1–40)
BASOPHILS # BLD AUTO: 0.05 10*3/MM3 (ref 0–0.2)
BASOPHILS NFR BLD AUTO: 0.4 % (ref 0–1.5)
BILIRUB SERPL-MCNC: 0.4 MG/DL (ref 0–1.2)
BUN SERPL-MCNC: 9 MG/DL (ref 6–20)
BUN/CREAT SERPL: 12 (ref 7–25)
CALCIUM SPEC-SCNC: 8.2 MG/DL (ref 8.6–10.5)
CEA SERPL-MCNC: 7.49 NG/ML
CHLORIDE SERPL-SCNC: 102 MMOL/L (ref 98–107)
CO2 SERPL-SCNC: 25.9 MMOL/L (ref 22–29)
CREAT SERPL-MCNC: 0.75 MG/DL (ref 0.76–1.27)
DEPRECATED RDW RBC AUTO: 59.3 FL (ref 37–54)
EGFRCR SERPLBLD CKD-EPI 2021: 106.6 ML/MIN/1.73
EOSINOPHIL # BLD AUTO: 0.22 10*3/MM3 (ref 0–0.4)
EOSINOPHIL NFR BLD AUTO: 1.9 % (ref 0.3–6.2)
ERYTHROCYTE [DISTWIDTH] IN BLOOD BY AUTOMATED COUNT: 21.2 % (ref 12.3–15.4)
GLOBULIN UR ELPH-MCNC: 3.5 GM/DL
GLUCOSE SERPL-MCNC: 85 MG/DL (ref 65–99)
HCT VFR BLD AUTO: 40 % (ref 37.5–51)
HGB BLD-MCNC: 12.5 G/DL (ref 13–17.7)
HYPOCHROMIA BLD QL: NORMAL
IMM GRANULOCYTES # BLD AUTO: 0.05 10*3/MM3 (ref 0–0.05)
IMM GRANULOCYTES NFR BLD AUTO: 0.4 % (ref 0–0.5)
LYMPHOCYTES # BLD AUTO: 1.61 10*3/MM3 (ref 0.7–3.1)
LYMPHOCYTES NFR BLD AUTO: 13.7 % (ref 19.6–45.3)
MAGNESIUM SERPL-MCNC: 1.8 MG/DL (ref 1.6–2.6)
MCH RBC QN AUTO: 25.2 PG (ref 26.6–33)
MCHC RBC AUTO-ENTMCNC: 31.3 G/DL (ref 31.5–35.7)
MCV RBC AUTO: 80.6 FL (ref 79–97)
MONOCYTES # BLD AUTO: 1.14 10*3/MM3 (ref 0.1–0.9)
MONOCYTES NFR BLD AUTO: 9.7 % (ref 5–12)
NEUTROPHILS NFR BLD AUTO: 73.9 % (ref 42.7–76)
NEUTROPHILS NFR BLD AUTO: 8.65 10*3/MM3 (ref 1.7–7)
NRBC BLD AUTO-RTO: 0 /100 WBC (ref 0–0.2)
PLAT MORPH BLD: NORMAL
PLATELET # BLD AUTO: 198 10*3/MM3 (ref 140–450)
PMV BLD AUTO: 11.3 FL (ref 6–12)
POTASSIUM SERPL-SCNC: 4.2 MMOL/L (ref 3.5–5.2)
PROT SERPL-MCNC: 6.6 G/DL (ref 6–8.5)
RBC # BLD AUTO: 4.96 10*6/MM3 (ref 4.14–5.8)
SODIUM SERPL-SCNC: 136 MMOL/L (ref 136–145)
WBC MORPH BLD: NORMAL
WBC NRBC COR # BLD AUTO: 11.72 10*3/MM3 (ref 3.4–10.8)

## 2025-03-17 PROCEDURE — 25010000002 PANITUMUMAB 400 MG/20ML SOLUTION 20 ML VIAL: Performed by: INTERNAL MEDICINE

## 2025-03-17 PROCEDURE — 36593 DECLOT VASCULAR DEVICE: CPT

## 2025-03-17 PROCEDURE — 96415 CHEMO IV INFUSION ADDL HR: CPT

## 2025-03-17 PROCEDURE — 25010000002 DEXAMETHASONE SODIUM PHOSPHATE 20 MG/5ML SOLUTION: Performed by: INTERNAL MEDICINE

## 2025-03-17 PROCEDURE — 96368 THER/DIAG CONCURRENT INF: CPT

## 2025-03-17 PROCEDURE — 85007 BL SMEAR W/DIFF WBC COUNT: CPT | Performed by: INTERNAL MEDICINE

## 2025-03-17 PROCEDURE — 83735 ASSAY OF MAGNESIUM: CPT | Performed by: INTERNAL MEDICINE

## 2025-03-17 PROCEDURE — 82378 CARCINOEMBRYONIC ANTIGEN: CPT | Performed by: INTERNAL MEDICINE

## 2025-03-17 PROCEDURE — 25010000002 ALTEPLASE 2 MG RECONSTITUTED SOLUTION: Performed by: INTERNAL MEDICINE

## 2025-03-17 PROCEDURE — 96375 TX/PRO/DX INJ NEW DRUG ADDON: CPT

## 2025-03-17 PROCEDURE — 25010000002 PANITUMUMAB PER 10 MG: Performed by: INTERNAL MEDICINE

## 2025-03-17 PROCEDURE — 25010000002 PALONOSETRON 0.25 MG/5ML SOLUTION PREFILLED SYRINGE: Performed by: INTERNAL MEDICINE

## 2025-03-17 PROCEDURE — 80053 COMPREHEN METABOLIC PANEL: CPT | Performed by: INTERNAL MEDICINE

## 2025-03-17 PROCEDURE — 85025 COMPLETE CBC W/AUTO DIFF WBC: CPT | Performed by: INTERNAL MEDICINE

## 2025-03-17 PROCEDURE — 25810000003 SODIUM CHLORIDE 0.9 % SOLUTION 250 ML FLEX CONT: Performed by: INTERNAL MEDICINE

## 2025-03-17 PROCEDURE — 25010000002 IRINOTECAN PER 20 MG: Performed by: INTERNAL MEDICINE

## 2025-03-17 PROCEDURE — 99214 OFFICE O/P EST MOD 30 MIN: CPT | Performed by: INTERNAL MEDICINE

## 2025-03-17 PROCEDURE — 25010000002 FLUOROURACIL PER 500 MG: Performed by: INTERNAL MEDICINE

## 2025-03-17 PROCEDURE — G0498 CHEMO EXTEND IV INFUS W/PUMP: HCPCS

## 2025-03-17 PROCEDURE — 25010000003 DEXTROSE 5 % SOLUTION 500 ML FLEX CONT: Performed by: INTERNAL MEDICINE

## 2025-03-17 PROCEDURE — 96417 CHEMO IV INFUS EACH ADDL SEQ: CPT

## 2025-03-17 PROCEDURE — 96416 CHEMO PROLONG INFUSE W/PUMP: CPT

## 2025-03-17 PROCEDURE — 25010000002 LEUCOVORIN 500 MG RECONSTITUTED SOLUTION 1 EACH VIAL: Performed by: INTERNAL MEDICINE

## 2025-03-17 PROCEDURE — 96413 CHEMO IV INFUSION 1 HR: CPT

## 2025-03-17 PROCEDURE — 25010000003 DEXTROSE 5 % SOLUTION 250 ML FLEX CONT: Performed by: INTERNAL MEDICINE

## 2025-03-17 RX ORDER — PROCHLORPERAZINE EDISYLATE 5 MG/ML
5 INJECTION INTRAMUSCULAR; INTRAVENOUS EVERY 6 HOURS PRN
Status: DISCONTINUED | OUTPATIENT
Start: 2025-03-17 | End: 2025-03-18 | Stop reason: HOSPADM

## 2025-03-17 RX ORDER — DIPHENHYDRAMINE HYDROCHLORIDE 50 MG/ML
50 INJECTION INTRAMUSCULAR; INTRAVENOUS AS NEEDED
Status: CANCELLED | OUTPATIENT
Start: 2025-03-17

## 2025-03-17 RX ORDER — FAMOTIDINE 10 MG/ML
20 INJECTION, SOLUTION INTRAVENOUS AS NEEDED
Status: CANCELLED | OUTPATIENT
Start: 2025-03-17

## 2025-03-17 RX ORDER — FAMOTIDINE 10 MG/ML
20 INJECTION, SOLUTION INTRAVENOUS AS NEEDED
Status: DISCONTINUED | OUTPATIENT
Start: 2025-03-17 | End: 2025-03-18 | Stop reason: HOSPADM

## 2025-03-17 RX ORDER — PALONOSETRON 0.05 MG/ML
0.25 INJECTION, SOLUTION INTRAVENOUS ONCE
Status: COMPLETED | OUTPATIENT
Start: 2025-03-17 | End: 2025-03-17

## 2025-03-17 RX ORDER — SODIUM CHLORIDE 9 MG/ML
20 INJECTION, SOLUTION INTRAVENOUS ONCE
Status: DISCONTINUED | OUTPATIENT
Start: 2025-03-17 | End: 2025-03-18 | Stop reason: HOSPADM

## 2025-03-17 RX ORDER — HYDROCORTISONE SODIUM SUCCINATE 100 MG/2ML
100 INJECTION INTRAMUSCULAR; INTRAVENOUS AS NEEDED
Status: DISCONTINUED | OUTPATIENT
Start: 2025-03-17 | End: 2025-03-18 | Stop reason: HOSPADM

## 2025-03-17 RX ORDER — DIPHENHYDRAMINE HYDROCHLORIDE 50 MG/ML
50 INJECTION INTRAMUSCULAR; INTRAVENOUS AS NEEDED
Status: DISCONTINUED | OUTPATIENT
Start: 2025-03-17 | End: 2025-03-18 | Stop reason: HOSPADM

## 2025-03-17 RX ORDER — WATER 10 ML/10ML
INJECTION INTRAMUSCULAR; INTRAVENOUS; SUBCUTANEOUS
Status: COMPLETED
Start: 2025-03-17 | End: 2025-03-17

## 2025-03-17 RX ORDER — PROCHLORPERAZINE EDISYLATE 5 MG/ML
5 INJECTION INTRAMUSCULAR; INTRAVENOUS EVERY 6 HOURS PRN
Status: CANCELLED
Start: 2025-03-17

## 2025-03-17 RX ORDER — SODIUM CHLORIDE 9 MG/ML
20 INJECTION, SOLUTION INTRAVENOUS ONCE
Status: CANCELLED | OUTPATIENT
Start: 2025-03-17

## 2025-03-17 RX ORDER — PALONOSETRON 0.05 MG/ML
0.25 INJECTION, SOLUTION INTRAVENOUS ONCE
Status: CANCELLED | OUTPATIENT
Start: 2025-03-17

## 2025-03-17 RX ORDER — HYDROCORTISONE SODIUM SUCCINATE 100 MG/2ML
100 INJECTION INTRAMUSCULAR; INTRAVENOUS AS NEEDED
Status: CANCELLED | OUTPATIENT
Start: 2025-03-17

## 2025-03-17 RX ORDER — ATROPINE SULFATE 1 MG/ML
0.25 INJECTION, SOLUTION INTRAMUSCULAR; INTRAVENOUS; SUBCUTANEOUS
Status: CANCELLED | OUTPATIENT
Start: 2025-03-17

## 2025-03-17 RX ORDER — TRAZODONE HYDROCHLORIDE 150 MG/1
150 TABLET ORAL NIGHTLY
Qty: 30 TABLET | Refills: 2 | Status: SHIPPED | OUTPATIENT
Start: 2025-03-17

## 2025-03-17 RX ADMIN — WATER 2.2 ML: 1 INJECTION INTRAMUSCULAR; INTRAVENOUS; SUBCUTANEOUS at 09:21

## 2025-03-17 RX ADMIN — ALTEPLASE: 2.2 INJECTION, POWDER, LYOPHILIZED, FOR SOLUTION INTRAVENOUS at 09:21

## 2025-03-17 RX ADMIN — LEUCOVORIN CALCIUM 870 MG: 500 INJECTION, POWDER, LYOPHILIZED, FOR SOLUTION INTRAMUSCULAR; INTRAVENOUS at 11:39

## 2025-03-17 RX ADMIN — FLUOROURACIL 5230 MG: 50 INJECTION, SOLUTION INTRAVENOUS at 13:23

## 2025-03-17 RX ADMIN — ATROPINE SULFATE 0.25 MG: 0.4 INJECTION, SOLUTION INTRAVENOUS at 11:36

## 2025-03-17 RX ADMIN — PANITUMUMAB 570 MG: 400 SOLUTION INTRAVENOUS at 10:56

## 2025-03-17 RX ADMIN — PALONOSETRON 0.25 MG: 0.25 INJECTION, SOLUTION INTRAVENOUS at 10:31

## 2025-03-17 RX ADMIN — DEXAMETHASONE SODIUM PHOSPHATE 12 MG: 4 INJECTION, SOLUTION INTRA-ARTICULAR; INTRALESIONAL; INTRAMUSCULAR; INTRAVENOUS; SOFT TISSUE at 10:33

## 2025-03-17 RX ADMIN — DEXTROSE MONOHYDRATE 390 MG: 50 INJECTION, SOLUTION INTRAVENOUS at 11:39

## 2025-03-17 NOTE — PROGRESS NOTES
Hematology and Oncology [place]  Office number [phone]    Fax number [phone]     Follow up     Date: 25    Patient Name: Edward Powell  MRN: [id number]  : 1970    Referring Physician: Dr. Gautam    Chief Complaint: Rectal cancer with liver and lung metastases    Cancer Staging:  IV    History of Present Illness: Edward Powell is a pleasant 54 y.o. male who presents today for evaluation of rectal cancer.    Patient has a longstanding history of intermittent diarrhea since his cholecystectomy in 2019.  However he developed progressive diarrhea with associated loss of bowel control and urgency as well as weight loss and fatigue prompting additional workup.    CT of the abdomen pelvis 2023 showed an irregular circumferential wall thickening involving the rectum concerning for mass lesion.  There was associated mesorectal lymphadenopathy.  Masslike consolidation of left lower lobe.  CT of the chest showed a cavitary lesion in the left lower lobe up to 4.8 cm with an adjacent cavitary nodule up to 2 cm and a 5 mm nodule on the right minor fissure.      He underwent colonoscopy 2023 with findings of an infiltrative and ulcerated partially obstructing mass in the proximal rectum spanning 10 cm.  Rectal polyps.  Biopsy of the rectal mass showed invasive moderately differentiated adenocarcinoma.  Additional biopsies showed tubular adenomas.  MSI testing was intact/low probability of MSI high.    PDL1 negative    PET/CT 2023 showed hypermetabolic rectal wall thickening compatible with known rectal malignancy.  Multiple small ill-defined hypoechoic hyper metabolic liver lesions compatible with metastatic disease.  Mildly enlarged and mildly hypermetabolic pelvic sidewall and internal iliac lymph node chain adenopathy.  Hypermetabolic lung mass with adjacent nodule.     He underwent liver biopsy and lung biopsy 2024.  Results of both confirmed metastatic  colorectal cancer.    The patient has been experiencing substantial rectal pain.  He is taking oxycodone every 6 hours with partial relief.  He reports ongoing bowel movements.  No abdominal pain.  No vomiting.  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    Treatment history:  FOLFOX cycle 1-4  FOLFOX panitumumab cycle 5 onward  -Oxaliplatin terminated early for allergic reaction after 4/29/24    FOLFIRI/Panitumumab: 2/3/25 to present    Interval history:  Constipation day 2-5 despite stool softeners, followed by diarrhea day 5 up to 4-5 x per day with urgency, lasting 24 hours or less with addition of lomotil x 2 tablets. BM now back to normal.   He has not has as much breakthrough rectal pain/ none severe.  Nausea controlled with PRN antiemetics. No severe mucositis, has some dry mouth ongoing. GERD controlled. Dry palms with cracking on finger tips is better after steroid burst per PCP. Also had some on his feet, now resolved. Using emollients helps. Not using topical  steroids. None on soles. Worsened after cooking/chopping vegetables.     Has been off potassium for several weeks.   Mood controlled on Wellbutrin but insomnia is no longer controlled on trazodone 100 mg qhs. Drinking coffee in AM and multiple Pepsis through the day through 5 pm.     Past Medical History:   Past Medical History:   Diagnosis Date    Arthritis     Cancer     rectal cancer - diagnosed 2023    Cholelithiasis 2019    Removed    COPD (chronic obstructive pulmonary disease)     Coronary artery disease 2009    Stent - no cardiologist currently    Elevated cholesterol     GERD (gastroesophageal reflux disease)     Hernia 2003    Lower hernia    Perforated ulcer 2019    Sleep apnea     history of; when  weighed over 400lbs - no issues following bariatric surgery       Past Surgical History:   Past Surgical History:   Procedure Laterality Date    APPENDECTOMY  1983    Removed    BARIATRIC SURGERY  2011    Gastric bypass    BLADDER TUMOR/ULCER BLEEDER CAUTERIZATION      CARDIAC CATHETERIZATION  2009    stent placed    CHOLECYSTECTOMY  2019    Removed    COLONOSCOPY N/A 12/07/2023    Procedure: COLONOSCOPY WITH HOT SNARE POLYPECTOMY AND TATTOO;  Surgeon: Noman Gautam MD;  Location: [facility] ENDOSCOPY;  Service: Gastroenterology;  Laterality: N/A;    PORTACATH PLACEMENT N/A 1/5/2024    Procedure: INSERTION OF PORTACATH WITH ULTRSOUND AND FLUOROSCOPIC GUIDANCE;  Surgeon: Ida Black MD;  Location: [facility] OR;  Service: General;  Laterality: N/A;    JENNIFER-EN-Y         Family History: No family history on file.  Uncle had colon cancer    Social History:   Social History     Socioeconomic History    Marital status:    Tobacco Use    Smoking status: Every Day     Current packs/day: 1.00     Average packs/day: 0.9 packs/day for 60.5 years (53.0 ttl pk-yrs)     Types: Cigarettes     Start date: 9/6/1994    Smokeless tobacco: Never    Tobacco comments:     35 years      pt reports closer to 2 packs per day since cancer diagnosis   Vaping Use    Vaping status: Never Used   Substance and Sexual Activity    Alcohol use: Never    Drug use: Never    Sexual activity: Defer       Medications:     Current Outpatient Medications:     albuterol sulfate  (90 Base) MCG/ACT inhaler, Inhale 2 puffs Every 4 (Four) Hours As Needed for Wheezing., Disp: 18 g, Rfl: 3    benzonatate (Tessalon Perles) 100 MG capsule, Take 1 capsule by mouth 3 (Three) Times a Day As Needed for Cough., Disp: 45 capsule, Rfl: 0    buPROPion XL (WELLBUTRIN XL) 150 MG 24 hr tablet, TAKE 1 TABLET BY MOUTH DAILY, Disp: 90 tablet, Rfl: 1    cetirizine (zyrTEC) 10 MG tablet, Take 1 tablet by mouth Daily., Disp: 30 tablet, Rfl: 2     clindamycin (Clindagel) 1 % gel, Apply 1 Application topically to the appropriate area as directed 2 (Two) Times a Day. Use first, Disp: 30 g, Rfl: 1    dexAMETHasone 0.5 MG/5ML solution, Take 10 mL by mouth 2 (Two) Times a Day. Swish for 2 minutes and spit 10 mL 4 x daily, Disp: 240 mL, Rfl: 5    dicyclomine (BENTYL) 20 MG tablet, Take 1 tablet by mouth 3 (Three) Times a Day As Needed for Abdominal Cramping., Disp: 30 tablet, Rfl: 3    diphenoxylate-atropine (Lomotil) 2.5-0.025 MG per tablet, Take 1 tablet by mouth Every 6 (Six) Hours As Needed for Diarrhea., Disp: 30 tablet, Rfl: 0    docusate sodium (COLACE) 100 MG capsule, Take 1 capsule by mouth 2 (Two) Times a Day., Disp: , Rfl:     doxycycline (VIBRAMYICN) 100 MG tablet, Take 1 tablet by mouth 2 (Two) Times a Day. For 7 days, then 1 tablet daily indefinitely, Disp: 67 tablet, Rfl: 3    DULoxetine (CYMBALTA) 30 MG capsule, Take 1 capsule by mouth Daily., Disp: 90 capsule, Rfl: 2    famotidine (PEPCID) 20 MG tablet, Take 1 tablet by mouth 2 (Two) Times a Day., Disp: 60 tablet, Rfl: 4    Hydrocortisone, Perianal, (ANUSOL-HC) 2.5 % rectal cream, Insert  into the rectum 2 (Two) Times a Day. Indications: Inflamed Hemorrhoids, Disp: 30 g, Rfl: 1    Klor-Con M20 20 MEQ CR tablet, TAKE 1 TABLET BY MOUTH DAILY, Disp: 30 tablet, Rfl: 0    lidocaine-prilocaine (EMLA) 2.5-2.5 % cream, Apply 1 Application topically to the appropriate area as directed As Needed (45-60 minutes prior to port access.  Cover with saran/plastic wrap.)., Disp: 30 g, Rfl: 3    LORazepam (ATIVAN) 0.5 MG tablet, Take 1 tablet by mouth Take As Directed. 1 tabelt by mouth 30 minutes prior to MRI, take a second tablet at the time of the MRI if needed., Disp: 2 tablet, Rfl: 0    Magic Mouthwash Oral Suspension (diphenhydrAMINE HCl - aluminum & magnesium hydroxide-simethicone - lidocaine - nystatin), Swish and Spit 10 mL by mouth every 6 (Six) Hours For 7 Days., Disp: 300 mL, Rfl: 3    magnesium oxide  (MAG-OX) 400 MG tablet, Take 1 tablet by mouth Daily., Disp: 30 tablet, Rfl: 5    montelukast (SINGULAIR) 10 MG tablet, Take 1 tablet by mouth Every Night., Disp: 90 tablet, Rfl: 3    Morphine (MS CONTIN) 30 MG 12 hr tablet, Take 1 tablet by mouth 2 (Two) Times a Day for 30 days., Disp: 60 tablet, Rfl: 0    Movantik 25 MG tablet, , Disp: , Rfl:     mupirocin (BACTROBAN) 2 % cream, , Disp: , Rfl:     naloxone (NARCAN) 4 MG/0.1ML nasal spray, 1 spray into the nostril(s) as directed by provider As Needed for Opioid Reversal., Disp: 1 each, Rfl: 0    nystatin (MYCOSTATIN) 100,000 unit/mL suspension, Swish and Swallow 5mL by mouth four times a day, Disp: 473 mL, Rfl: 0    nystatin (MYCOSTATIN) 100,000 unit/mL suspension, Swish and swallow 5 mL 4 (Four) Times a Day., Disp: 473 mL, Rfl: 0    nystatin susp + lidocaine viscous (MAGIC MOUTHWASH) oral suspension, 5-10 ml swish and spit or swallow QID prn, Disp: 240 mL, Rfl: 3    ondansetron (ZOFRAN) 8 MG tablet, Take 1 tablet by mouth 3 (Three) Times a Day As Needed for Nausea or Vomiting., Disp: 30 tablet, Rfl: 5    oxyCODONE (ROXICODONE) 15 MG immediate release tablet, Take 1 tablet by mouth 5 (Five) Times a Day As Needed for Moderate Pain or Severe Pain for up to 30 days., Disp: 150 tablet, Rfl: 0    pantoprazole (Protonix) 40 MG EC tablet, Take 1 tablet by mouth Daily. Indications: Gastroesophageal Reflux Disease, Disp: 90 tablet, Rfl: 2    predniSONE 5 MG (21) tablet therapy pack dose pack, Take 1 tablet by mouth Take As Directed. Take as directed on package instructions., Disp: 21 tablet, Rfl: 0    pregabalin (Lyrica) 300 MG capsule, Take 1 capsule by mouth 2 (Two) Times a Day for 30 days., Disp: 60 capsule, Rfl: 0    promethazine-dextromethorphan (PROMETHAZINE-DM) 6.25-15 MG/5ML syrup, Take 5 mL by mouth 3 times a day., Disp: 240 mL, Rfl: 0    tiotropium bromide-olodaterol (Stiolto Respimat) 2.5-2.5 MCG/ACT aerosol solution inhaler, INHALE 2 INHALATION(S) BY MOUTH  "DAILY, Disp: 4 g, Rfl: 5    traZODone (DESYREL) 100 MG tablet, Take 1 tablet by mouth Every Night., Disp: 30 tablet, Rfl: 2    triamcinolone (KENALOG) 0.025 % ointment, Apply 1 Application topically to the appropriate area as directed 2 (Two) Times a Day. Use second if topical treatment #1 ineffective, Disp: 80 g, Rfl: 0    Allergies:   Allergies   Allergen Reactions    Bactrim [Sulfamethoxazole-Trimethoprim] Hives     and blisters    Sulfa Antibiotics Hives     and blisters       Objective     Vital Signs:   Vitals:    03/17/25 0805   BP: 120/58   Pulse: 78   Resp: 16   Temp: 97.3 °F (36.3 °C)   SpO2: 97%   Weight: 94.8 kg (209 lb 1.6 oz)   Height: 182.9 cm (72.01\")   PainSc: 4           Body mass index is 28.35 kg/m².   Pain Score    03/17/25 0805   PainSc: 4        ECOG Performance Status: 1 - Symptomatic but completely ambulatory    Physical Exam: General: No acute distress. Well appearing.  HEENT: Normocephalic, atraumatic. Sclera anicteric.   Neck: supple, no adenopathy.   Cardiovascular: regular rate and rhythm. No murmurs.   Respiratory: Normal rate. Clear to auscultation bilaterally.  Abdomen: Soft, nontender, non distended with normoactive bowel sounds.  Lymph: no cervical, supraclavicular or axillary adenopathy.  Neuro: Alert and oriented x 3. No focal deficits.   Ext: Symmetric, no swelling.   Accurate 3/17/25    Laboratory/Imaging Reviewed:   No visits with results within 2 Week(s) from this visit.   Latest known visit with results is:   Hospital Outpatient Visit on 03/03/2025   Component Date Value Ref Range Status    Glucose 03/03/2025 78  65 - 99 mg/dL Final    BUN 03/03/2025 9  6 - 20 mg/dL Final    Creatinine 03/03/2025 0.70 (L)  0.76 - 1.27 mg/dL Final    Sodium 03/03/2025 138  136 - 145 mmol/L Final    Potassium 03/03/2025 3.5  3.5 - 5.2 mmol/L Final    Chloride 03/03/2025 105  98 - 107 mmol/L Final    CO2 03/03/2025 23.4  22.0 - 29.0 mmol/L Final    Calcium 03/03/2025 8.0 (L)  8.6 - 10.5 mg/dL " Final    Total Protein 03/03/2025 6.5  6.0 - 8.5 g/dL Final    Albumin 03/03/2025 3.0 (L)  3.5 - 5.2 g/dL Final    ALT (SGPT) 03/03/2025 11  1 - 41 U/L Final    AST (SGOT) 03/03/2025 16  1 - 40 U/L Final    Alkaline Phosphatase 03/03/2025 142 (H)  39 - 117 U/L Final    Total Bilirubin 03/03/2025 0.3  0.0 - 1.2 mg/dL Final    Globulin 03/03/2025 3.5  gm/dL Final    A/G Ratio 03/03/2025 0.9  g/dL Final    BUN/Creatinine Ratio 03/03/2025 12.9  7.0 - 25.0 Final    Anion Gap 03/03/2025 9.6  5.0 - 15.0 mmol/L Final    eGFR 03/03/2025 108.8  >60.0 mL/min/1.73 Final    Magnesium 03/03/2025 1.7  1.6 - 2.6 mg/dL Final    WBC 03/03/2025 11.43 (H)  3.40 - 10.80 10*3/mm3 Final    RBC 03/03/2025 4.84  4.14 - 5.80 10*6/mm3 Final    Hemoglobin 03/03/2025 12.0 (L)  13.0 - 17.7 g/dL Final    Hematocrit 03/03/2025 38.1  37.5 - 51.0 % Final    MCV 03/03/2025 78.7 (L)  79.0 - 97.0 fL Final    MCH 03/03/2025 24.8 (L)  26.6 - 33.0 pg Final    MCHC 03/03/2025 31.5  31.5 - 35.7 g/dL Final    RDW 03/03/2025 20.5 (H)  12.3 - 15.4 % Final    RDW-SD 03/03/2025 56.5 (H)  37.0 - 54.0 fl Final    MPV 03/03/2025 11.2  6.0 - 12.0 fL Final    Platelets 03/03/2025 229  140 - 450 10*3/mm3 Final    Neutrophil % 03/03/2025 73.3  42.7 - 76.0 % Final    Lymphocyte % 03/03/2025 15.8 (L)  19.6 - 45.3 % Final    Monocyte % 03/03/2025 8.3  5.0 - 12.0 % Final    Eosinophil % 03/03/2025 1.9  0.3 - 6.2 % Final    Basophil % 03/03/2025 0.4  0.0 - 1.5 % Final    Immature Grans % 03/03/2025 0.3  0.0 - 0.5 % Final    Neutrophils, Absolute 03/03/2025 8.37 (H)  1.70 - 7.00 10*3/mm3 Final    Lymphocytes, Absolute 03/03/2025 1.81  0.70 - 3.10 10*3/mm3 Final    Monocytes, Absolute 03/03/2025 0.95 (H)  0.10 - 0.90 10*3/mm3 Final    Eosinophils, Absolute 03/03/2025 0.22  0.00 - 0.40 10*3/mm3 Final    Basophils, Absolute 03/03/2025 0.05  0.00 - 0.20 10*3/mm3 Final    Immature Grans, Absolute 03/03/2025 0.03  0.00 - 0.05 10*3/mm3 Final    nRBC 03/03/2025 0.0  0.0 - 0.2 /100  WBC Final    Elliptocytes 03/03/2025 Slight/1+  None Seen Final    Hypochromia 03/03/2025 Mod/2+  None Seen Final    Microcytes 03/03/2025 Mod/2+  None Seen Final    WBC Morphology 03/03/2025 Normal  Normal Final    Platelet Morphology 03/03/2025 Normal  Normal Final     Tempus xt: APC gene TP53; Negative ADRIANNA, BRAF, NRAS, TMB stable. PDL1 negative  No results found.    Procedures    Assessment / Plan      Assessment/Plan:     1.  Rectal cancer   2.  Liver metastases  3.  Lung metastasis  4.  Chemo monitoring  5.  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  -He was found to have biopsy-proven metastasis in the liver and lung.  His case was presented at multidisciplinary tumor board.  -Because of metastatic disease to both the lung and liver I do not think he will be downstage to resectable disease.  -Tempus results which are notable for an APC mutation, T p53 mutation, negative for KRAS, BRAF, NRAS, tumor mutation burden stable.  Notch 1 VUS.  -CBC adequate and CMP pending for treatment today with maintenance 5-FU and panitumumab 9/16/24  Chemotherapy orders and premedications signed9/16/24  -We reviewed his imaging which is consistent with excellent disease control in early July with normalization of CEA. He has had persistent rectal pain with escalating oxycodone requirements over the past couple of months, but no severe recent increase in pain, fever. WBC normal. 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 I reviewed his EUA notes. Increase doxy to BID. No surgical intervention.  -Consolidative radiotherapy to the rectal tumor is not something he wants to pursue initially but has now undergone consultation.  Maintenance 5FU/panitumumab on hold since early December.  I reviewed his last 4 serial CT images which do so progressive and in his lung. Escalated to FOLFIRI panitumumab 2/2025 to present  -Labs pending for  cycle 4. He is going to continue to hold off on consolidative radiotherapy to the rectum at this time.  -Follow up in 2 weeks with next labs and appt.     6.  Cancer related pain  -Now following with palliative care and pain. Well controlled on lyrica, oxycodone and MS contin.     7.  Panitumumab induced rash  -Continue doxycycline and emollients    8. GERD  PPI, well controlled    9. Access  -Port    10. Chemo diarrhea  -Hold laxatives on day of diarrhea  -Lomotil PRN    11. Insomnia  -Reduce caffeine after 12 pm  -Increase PRN trazodone dose.     Follow Up:   2 weeks     Brenda Cronin MD  Hematology and Oncology

## 2025-03-19 ENCOUNTER — HOSPITAL ENCOUNTER (OUTPATIENT)
Facility: HOSPITAL | Age: 55
Discharge: HOME | End: 2025-03-19
Admitting: INTERNAL MEDICINE
Payer: COMMERCIAL

## 2025-03-19 DIAGNOSIS — C20 RECTAL ADENOCARCINOMA: ICD-10-CM

## 2025-03-19 DIAGNOSIS — C78.7 RECTAL CANCER METASTASIZED TO LIVER: Primary | ICD-10-CM

## 2025-03-19 DIAGNOSIS — C20 RECTAL CANCER METASTASIZED TO LIVER: Primary | ICD-10-CM

## 2025-03-19 PROCEDURE — 96523 IRRIG DRUG DELIVERY DEVICE: CPT

## 2025-03-19 PROCEDURE — 25010000002 HEPARIN LOCK FLUSH PER 10 UNITS: Performed by: INTERNAL MEDICINE

## 2025-03-19 RX ORDER — HEPARIN SODIUM (PORCINE) LOCK FLUSH IV SOLN 100 UNIT/ML 100 UNIT/ML
500 SOLUTION INTRAVENOUS AS NEEDED
OUTPATIENT
Start: 2025-03-19

## 2025-03-19 RX ORDER — HEPARIN SODIUM (PORCINE) LOCK FLUSH IV SOLN 100 UNIT/ML 100 UNIT/ML
500 SOLUTION INTRAVENOUS AS NEEDED
Status: DISCONTINUED | OUTPATIENT
Start: 2025-03-19 | End: 2025-03-20 | Stop reason: HOSPADM

## 2025-03-19 RX ORDER — SODIUM CHLORIDE 0.9 % (FLUSH) 0.9 %
10 SYRINGE (ML) INJECTION AS NEEDED
OUTPATIENT
Start: 2025-03-19

## 2025-03-19 RX ORDER — PANTOPRAZOLE SODIUM 40 MG/1
40 TABLET, DELAYED RELEASE ORAL DAILY
Qty: 90 TABLET | Refills: 2 | Status: SHIPPED | OUTPATIENT
Start: 2025-03-19

## 2025-03-19 RX ORDER — SODIUM CHLORIDE 0.9 % (FLUSH) 0.9 %
10 SYRINGE (ML) INJECTION AS NEEDED
Status: DISCONTINUED | OUTPATIENT
Start: 2025-03-19 | End: 2025-03-20 | Stop reason: HOSPADM

## 2025-03-19 RX ORDER — DOXYCYCLINE HYCLATE 100 MG
100 TABLET ORAL 2 TIMES DAILY
Qty: 67 TABLET | Refills: 3 | Status: SHIPPED | OUTPATIENT
Start: 2025-03-19

## 2025-03-19 RX ADMIN — Medication 10 ML: at 13:53

## 2025-03-19 RX ADMIN — HEPARIN 500 UNITS: 100 SYRINGE at 13:53

## 2025-03-25 NOTE — PROGRESS NOTES
"     Palliative Clinic Note      Name: Edward Powell  Age: 54 y.o.  Sex: male  : 1970  MRN: [id number]  Date of Service: 2025   Medical Oncologist: Dr. Cronin     Subjective:    Chief Complaint: Peripheral neuropathy    History of Present Illness: Edward Powell is a 54 y.o. male with past medical history significant for metastatic rectal cancer and mood disorder who presents to the palliative clinic today as a follow up for pain and symptom management.     Treatment summary: Patient presented with complaints of worsening diarrhea with associated loss of bowel control, weight loss and fatigue. Imaging in 2023 revealed an irregular circumferential wall thickening involving the rectum, mesorectal lymphadenopathy, mass-like consolidation in the left lower lobe and nodules in the liver concerning for malignancy. Biopsy from colonoscopy on 23 consistent with adenocarcinoma. Biopsies of the lung and liver were consistent with metastatic colorectal cancer. Patient started systemic therapy in 2023. Therapy changed to FOLFIRI panitumumab in 2025 due to progression of disease. Patient has decided to hold off on consolidative radiotherapy to the rectum at this time.      Pain: Patient complains of rectal pain that is worse with riding in the car and bowel movements. Exacerbated by episodes of diarrhea.  The patient is prescribed morphine ER 15 mg BID and  oxycodone to 15 mg every 5 hours as needed. The patient states the oxycodone tablets work some of the time but not all of the time. He sometimes takes two tablets at a time. The patient describes a \"loopy\" feeling when he takes the oxycodone. The patient complains of severe neuropathy in his hands and feet. Gabapentin was rotated to pregabalin at the patient's last appointment. Lyrica was titrated up to 300 mg BID on 24. The patient complains of severe nerve pain in the evenings on days he is on his feet nonstop. Patient uses " acetaminophen and ibuprofen for other pains.      Pyschosocial: The patient presents to the clinic by himself. He lives with his significant other, Alysia.  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  The patient is prescribed Wellbutrin for mood stabilization per oncology. He was started on Cymbalta 30 mg by palliative care for his nerve pain. He reports improved mood.     Goals: Maximize comfort, optimize function & psychosocial wellbeing, and promote advanced care planning.    The following portions of the patient's history were reviewed and updated as appropriate: allergies, current medications, past family history, past medical history, past social history, past surgical history and problem list.    ORT-R: High risk   Aberrant behavior: 7/2024  Decisional capacity: Full  ECOG: (2) Ambulatory and capable of self care, unable to carry out work activity, up and about > 50% or waking hours     Objective:    /60   Pulse 78   Temp 97.3 °F (36.3 °C) (Temporal)   Resp 18   Wt 92.3 kg (203 lb 6.4 oz)   SpO2 94%   BMI 27.58 kg/m²     Constitutional: Awake, alert, normal gait, sitting up in exam chair, in no acute distress  Eyes: PERRLA, EOMS intact  HENT: NCAT, face symmetric  Neck: Supple, trachea midline  Respiratory: Nonlabored respirations  Cardiovascular: RRR, no edema observed  Gastrointestinal: Soft, no guarding  Musculoskeletal: Moves all extremities   Psychiatric: Appropriate affect, cooperative  Neurologic: Oriented x 3, Cranial Nerves grossly intact to confrontation, speech clear  Skin: Cool dry, no rashes or wounds appreciated     Medication Counts: Reviewed. See bottom of note for details. Brought medication.  No overuse or misuse evident.  I have reviewed the patient's [place] PDMP. [facility] Re #[id number].   UDS: Need to collect.     Assessment & Plan:    1. Rectal adenocarcinoma  - Therapy changed to FOLFIRI panitumumab in 2/2025 due to progression of disease. Patient  has decided to hold off on consolidative radiotherapy to the rectum at this time.     2. Cancer related pain  - Patient is appropriate for opioid therapy due to cancer related pain. Daily function and quality of life improved with pain medication. Refills for morphine ER 15 mg BID and  oxycodone to 15 mg every 5 hours as needed were sent to the pharmacy. Side effects of the medication discussed at every visit. Patient was encouraged to continue bowel regimen of daily stool softeners, prn laxatives, and diet modifications.    3. Chemotherapy-induced neuropathy  - increased DULoxetine (CYMBALTA) 60 MG capsule; Take 1 capsule by mouth Daily.  Dispense: 30 capsule; Refill: 2. Will also send a prescription to [facility] for topical analgesic. Provided directions to the patient to apply to his feet when neuropathy is severe in the evening. Also discussed behavior modification to prevent pain charlene.     Code status: FULL   Advanced directives: No.    Return in about 3 months (around 6/28/2025) for Office Visit.    I spent 40 minutes caring for Edward Powell on this date of service. This time includes time spent by me in the following activities: preparing for the visit, reviewing tests, obtaining and/or reviewing a separately obtained history, performing a medically appropriate examination and/or evaluation , counseling and educating the patient/family/caregiver, ordering medications, tests, or procedures, documenting information in the medical record, independently interpreting results and communicating that information with the patient/family/caregiver, and care coordination    Linda Leslie PA-C  03/28/2025    Medication Date Filled # Filled Count Used # Days  MANUEL   Morphine ER 30 3/9/25 60 23 37 19 2   Oxycodone 15 3/9/25 150 46 74 19 4   Pregabalin 300 3/6/25 60 25 35 22 2

## 2025-03-28 ENCOUNTER — OFFICE VISIT (OUTPATIENT)
Dept: PALLIATIVE CARE | Facility: CLINIC | Age: 55
End: 2025-03-28
Payer: COMMERCIAL

## 2025-03-28 VITALS
OXYGEN SATURATION: 94 % | BODY MASS INDEX: 27.58 KG/M2 | WEIGHT: 203.4 LBS | SYSTOLIC BLOOD PRESSURE: 124 MMHG | DIASTOLIC BLOOD PRESSURE: 60 MMHG | HEART RATE: 78 BPM | RESPIRATION RATE: 18 BRPM | TEMPERATURE: 97.3 F

## 2025-03-28 DIAGNOSIS — G62.0 CHEMOTHERAPY-INDUCED NEUROPATHY: ICD-10-CM

## 2025-03-28 DIAGNOSIS — G89.3 CANCER RELATED PAIN: ICD-10-CM

## 2025-03-28 DIAGNOSIS — C20 RECTAL ADENOCARCINOMA: Primary | ICD-10-CM

## 2025-03-28 DIAGNOSIS — T45.1X5A CHEMOTHERAPY-INDUCED NEUROPATHY: ICD-10-CM

## 2025-03-28 RX ORDER — OXYCODONE HYDROCHLORIDE 15 MG/1
15 TABLET ORAL
Qty: 150 TABLET | Refills: 0 | Status: SHIPPED | OUTPATIENT
Start: 2025-04-08 | End: 2025-05-08

## 2025-03-28 RX ORDER — MORPHINE SULFATE 30 MG/1
30 TABLET, FILM COATED, EXTENDED RELEASE ORAL 2 TIMES DAILY
Qty: 60 TABLET | Refills: 0 | Status: SHIPPED | OUTPATIENT
Start: 2025-04-08 | End: 2025-05-08

## 2025-03-28 RX ORDER — DULOXETIN HYDROCHLORIDE 60 MG/1
60 CAPSULE, DELAYED RELEASE ORAL DAILY
Qty: 30 CAPSULE | Refills: 2 | Status: SHIPPED | OUTPATIENT
Start: 2025-03-28

## 2025-03-28 NOTE — TELEPHONE ENCOUNTER
I have reviewed patient's RACIEL report prior to prescribing Schedule II, III, and IV medications. Request # [id number] . Next refills for morphine ER 15 mg BID and  oxycodone to 15 mg every 5 hours as needed were sent to the pharmacy. The patient is scheduled to follow-up in 3 months.

## 2025-03-30 ENCOUNTER — HOSPITAL ENCOUNTER (EMERGENCY)
Facility: HOSPITAL | Age: 55
Discharge: LEFT BEFORE DISCHARGE | End: 2025-03-30
Attending: STUDENT IN AN ORGANIZED HEALTH CARE EDUCATION/TRAINING PROGRAM | Admitting: STUDENT IN AN ORGANIZED HEALTH CARE EDUCATION/TRAINING PROGRAM
Payer: COMMERCIAL

## 2025-03-30 ENCOUNTER — APPOINTMENT (OUTPATIENT)
Dept: GENERAL RADIOLOGY | Facility: HOSPITAL | Age: 55
End: 2025-03-30
Payer: COMMERCIAL

## 2025-03-30 VITALS
DIASTOLIC BLOOD PRESSURE: 66 MMHG | WEIGHT: 203 LBS | BODY MASS INDEX: 27.5 KG/M2 | OXYGEN SATURATION: 94 % | TEMPERATURE: 98 F | HEIGHT: 72 IN | HEART RATE: 63 BPM | SYSTOLIC BLOOD PRESSURE: 108 MMHG | RESPIRATION RATE: 16 BRPM

## 2025-03-30 DIAGNOSIS — L85.3 DRY SKIN DERMATITIS: Primary | ICD-10-CM

## 2025-03-30 LAB
ALBUMIN SERPL-MCNC: 3.1 G/DL (ref 3.5–5.2)
ALBUMIN/GLOB SERPL: 1 G/DL
ALP SERPL-CCNC: 121 U/L (ref 39–117)
ALT SERPL W P-5'-P-CCNC: 14 U/L (ref 1–41)
ANION GAP SERPL CALCULATED.3IONS-SCNC: 13.5 MMOL/L (ref 5–15)
ANISOCYTOSIS BLD QL: NORMAL
AST SERPL-CCNC: 30 U/L (ref 1–40)
BACTERIA UR QL AUTO: ABNORMAL /HPF
BASOPHILS # BLD AUTO: 0.05 10*3/MM3 (ref 0–0.2)
BASOPHILS NFR BLD AUTO: 0.6 % (ref 0–1.5)
BILIRUB SERPL-MCNC: 0.2 MG/DL (ref 0–1.2)
BILIRUB UR QL STRIP: NEGATIVE
BUN SERPL-MCNC: 9 MG/DL (ref 6–20)
BUN/CREAT SERPL: 11.1 (ref 7–25)
BURR CELLS BLD QL SMEAR: NORMAL
CALCIUM SPEC-SCNC: 8 MG/DL (ref 8.6–10.5)
CHLORIDE SERPL-SCNC: 104 MMOL/L (ref 98–107)
CK SERPL-CCNC: 186 U/L (ref 20–200)
CLARITY UR: CLEAR
CO2 SERPL-SCNC: 20.5 MMOL/L (ref 22–29)
COLOR UR: YELLOW
CREAT SERPL-MCNC: 0.81 MG/DL (ref 0.76–1.27)
CRP SERPL-MCNC: 0.9 MG/DL (ref 0–0.5)
D-LACTATE SERPL-SCNC: 0.6 MMOL/L (ref 0.5–2)
DEPRECATED RDW RBC AUTO: 60.8 FL (ref 37–54)
EGFRCR SERPLBLD CKD-EPI 2021: 104.1 ML/MIN/1.73
EOSINOPHIL # BLD AUTO: 0.23 10*3/MM3 (ref 0–0.4)
EOSINOPHIL NFR BLD AUTO: 2.5 % (ref 0.3–6.2)
ERYTHROCYTE [DISTWIDTH] IN BLOOD BY AUTOMATED COUNT: 21.5 % (ref 12.3–15.4)
FLUAV SUBTYP SPEC NAA+PROBE: NOT DETECTED
FLUBV RNA ISLT QL NAA+PROBE: NOT DETECTED
GLOBULIN UR ELPH-MCNC: 3.1 GM/DL
GLUCOSE SERPL-MCNC: 82 MG/DL (ref 65–99)
GLUCOSE UR STRIP-MCNC: NEGATIVE MG/DL
HCT VFR BLD AUTO: 37.9 % (ref 37.5–51)
HGB BLD-MCNC: 11.9 G/DL (ref 13–17.7)
HGB UR QL STRIP.AUTO: NEGATIVE
HIV 1+2 AB+HIV1P24 AG SERPLBLD IA.RAPID: NORMAL
HIV 1+2 AB+HIV1P24 AG SERPLBLD IA.RAPID: NORMAL
HYALINE CASTS UR QL AUTO: ABNORMAL /LPF
IMM GRANULOCYTES # BLD AUTO: 0.03 10*3/MM3 (ref 0–0.05)
IMM GRANULOCYTES NFR BLD AUTO: 0.3 % (ref 0–0.5)
INR PPP: 1.04 (ref 0.9–1.1)
KETONES UR QL STRIP: NEGATIVE
LEUKOCYTE ESTERASE UR QL STRIP.AUTO: NEGATIVE
LIPASE SERPL-CCNC: 10 U/L (ref 13–60)
LYMPHOCYTES # BLD AUTO: 2.19 10*3/MM3 (ref 0.7–3.1)
LYMPHOCYTES NFR BLD AUTO: 24.1 % (ref 19.6–45.3)
MAGNESIUM SERPL-MCNC: 1.5 MG/DL (ref 1.6–2.6)
MCH RBC QN AUTO: 25.4 PG (ref 26.6–33)
MCHC RBC AUTO-ENTMCNC: 31.4 G/DL (ref 31.5–35.7)
MCV RBC AUTO: 80.8 FL (ref 79–97)
MONOCYTES # BLD AUTO: 0.72 10*3/MM3 (ref 0.1–0.9)
MONOCYTES NFR BLD AUTO: 7.9 % (ref 5–12)
MUCOUS THREADS URNS QL MICRO: ABNORMAL /HPF
NEUTROPHILS NFR BLD AUTO: 5.86 10*3/MM3 (ref 1.7–7)
NEUTROPHILS NFR BLD AUTO: 64.6 % (ref 42.7–76)
NITRITE UR QL STRIP: NEGATIVE
NRBC BLD AUTO-RTO: 0 /100 WBC (ref 0–0.2)
NT-PROBNP SERPL-MCNC: 148.9 PG/ML (ref 0–900)
OVALOCYTES BLD QL SMEAR: NORMAL
PH UR STRIP.AUTO: 6 [PH] (ref 5–8)
PLATELET # BLD AUTO: 254 10*3/MM3 (ref 140–450)
PMV BLD AUTO: 10.4 FL (ref 6–12)
POIKILOCYTOSIS BLD QL SMEAR: NORMAL
POTASSIUM SERPL-SCNC: 3.8 MMOL/L (ref 3.5–5.2)
PROCALCITONIN SERPL-MCNC: <0.02 NG/ML (ref 0–0.25)
PROT SERPL-MCNC: 6.2 G/DL (ref 6–8.5)
PROT UR QL STRIP: ABNORMAL
PROTHROMBIN TIME: 14.1 SECONDS (ref 12.3–15.1)
RBC # BLD AUTO: 4.69 10*6/MM3 (ref 4.14–5.8)
RBC # UR STRIP: ABNORMAL /HPF
REF LAB TEST METHOD: ABNORMAL
SARS-COV-2 RNA RESP QL NAA+PROBE: NOT DETECTED
SMALL PLATELETS BLD QL SMEAR: ADEQUATE
SODIUM SERPL-SCNC: 138 MMOL/L (ref 136–145)
SP GR UR STRIP: 1.02 (ref 1–1.03)
SQUAMOUS #/AREA URNS HPF: ABNORMAL /HPF
TROPONIN T SERPL HS-MCNC: <6 NG/L
UROBILINOGEN UR QL STRIP: ABNORMAL
WBC # UR STRIP: ABNORMAL /HPF
WBC MORPH BLD: NORMAL
WBC NRBC COR # BLD AUTO: 9.08 10*3/MM3 (ref 3.4–10.8)

## 2025-03-30 PROCEDURE — 83880 ASSAY OF NATRIURETIC PEPTIDE: CPT | Performed by: STUDENT IN AN ORGANIZED HEALTH CARE EDUCATION/TRAINING PROGRAM

## 2025-03-30 PROCEDURE — 85610 PROTHROMBIN TIME: CPT | Performed by: STUDENT IN AN ORGANIZED HEALTH CARE EDUCATION/TRAINING PROGRAM

## 2025-03-30 PROCEDURE — 83735 ASSAY OF MAGNESIUM: CPT | Performed by: STUDENT IN AN ORGANIZED HEALTH CARE EDUCATION/TRAINING PROGRAM

## 2025-03-30 PROCEDURE — 82550 ASSAY OF CK (CPK): CPT | Performed by: STUDENT IN AN ORGANIZED HEALTH CARE EDUCATION/TRAINING PROGRAM

## 2025-03-30 PROCEDURE — 86140 C-REACTIVE PROTEIN: CPT | Performed by: STUDENT IN AN ORGANIZED HEALTH CARE EDUCATION/TRAINING PROGRAM

## 2025-03-30 PROCEDURE — 99284 EMERGENCY DEPT VISIT MOD MDM: CPT | Performed by: STUDENT IN AN ORGANIZED HEALTH CARE EDUCATION/TRAINING PROGRAM

## 2025-03-30 PROCEDURE — 84484 ASSAY OF TROPONIN QUANT: CPT | Performed by: STUDENT IN AN ORGANIZED HEALTH CARE EDUCATION/TRAINING PROGRAM

## 2025-03-30 PROCEDURE — 83690 ASSAY OF LIPASE: CPT | Performed by: STUDENT IN AN ORGANIZED HEALTH CARE EDUCATION/TRAINING PROGRAM

## 2025-03-30 PROCEDURE — 36415 COLL VENOUS BLD VENIPUNCTURE: CPT

## 2025-03-30 PROCEDURE — 80053 COMPREHEN METABOLIC PANEL: CPT | Performed by: STUDENT IN AN ORGANIZED HEALTH CARE EDUCATION/TRAINING PROGRAM

## 2025-03-30 PROCEDURE — 83605 ASSAY OF LACTIC ACID: CPT | Performed by: STUDENT IN AN ORGANIZED HEALTH CARE EDUCATION/TRAINING PROGRAM

## 2025-03-30 PROCEDURE — 86592 SYPHILIS TEST NON-TREP QUAL: CPT | Performed by: STUDENT IN AN ORGANIZED HEALTH CARE EDUCATION/TRAINING PROGRAM

## 2025-03-30 PROCEDURE — 93005 ELECTROCARDIOGRAM TRACING: CPT | Performed by: STUDENT IN AN ORGANIZED HEALTH CARE EDUCATION/TRAINING PROGRAM

## 2025-03-30 PROCEDURE — 87636 SARSCOV2 & INF A&B AMP PRB: CPT | Performed by: STUDENT IN AN ORGANIZED HEALTH CARE EDUCATION/TRAINING PROGRAM

## 2025-03-30 PROCEDURE — 84145 PROCALCITONIN (PCT): CPT | Performed by: STUDENT IN AN ORGANIZED HEALTH CARE EDUCATION/TRAINING PROGRAM

## 2025-03-30 PROCEDURE — 85007 BL SMEAR W/DIFF WBC COUNT: CPT | Performed by: STUDENT IN AN ORGANIZED HEALTH CARE EDUCATION/TRAINING PROGRAM

## 2025-03-30 PROCEDURE — 87040 BLOOD CULTURE FOR BACTERIA: CPT | Performed by: STUDENT IN AN ORGANIZED HEALTH CARE EDUCATION/TRAINING PROGRAM

## 2025-03-30 PROCEDURE — 71045 X-RAY EXAM CHEST 1 VIEW: CPT

## 2025-03-30 PROCEDURE — 81001 URINALYSIS AUTO W/SCOPE: CPT | Performed by: STUDENT IN AN ORGANIZED HEALTH CARE EDUCATION/TRAINING PROGRAM

## 2025-03-30 PROCEDURE — 85025 COMPLETE CBC W/AUTO DIFF WBC: CPT | Performed by: STUDENT IN AN ORGANIZED HEALTH CARE EDUCATION/TRAINING PROGRAM

## 2025-03-30 PROCEDURE — G0475 HIV COMBINATION ASSAY: HCPCS | Performed by: STUDENT IN AN ORGANIZED HEALTH CARE EDUCATION/TRAINING PROGRAM

## 2025-03-30 RX ORDER — MAGNESIUM SULFATE HEPTAHYDRATE 40 MG/ML
2 INJECTION, SOLUTION INTRAVENOUS ONCE
Status: DISCONTINUED | OUTPATIENT
Start: 2025-03-30 | End: 2025-03-30 | Stop reason: HOSPADM

## 2025-03-30 RX ORDER — MINERAL OIL/HYDROPHIL PETROLAT
1 OINTMENT (GRAM) TOPICAL AS NEEDED
Qty: 198 G | Refills: 0 | Status: SHIPPED | OUTPATIENT
Start: 2025-03-30

## 2025-03-30 NOTE — ED NOTES
"This RN went into pt room to administer magnesium and pt stated \"No. I'm done.\" This RN then educated the pt on the benefits of treatment and the risks of leaving. Pt verbalized understanding and requested to leave. Pt signed out against medical advice. Provider notified.  "

## 2025-03-30 NOTE — ED PROVIDER NOTES
Subjective:  History of Present Illness:    Patient is a 54-year-old male with history of colorectal cancer, COPD, CAD, GERD, sleep apnea, perforated ulcer who presents today with rash to the hands.  Reports he is on active chemotherapy, has developed a rash on his hands.  Reports that he had increased cracking from his chemotherapy and had been started on Voltaren gel to help with this per his report.  Denies any chest pain or shortness of breath.  Denies any fevers.  No abdominal pain nausea or vomiting over his baseline.  Denies any new leg swelling or leg pain.  No personal history of PE/DVT.  Hypotensive on arrival.  Patient reports that this is typical for him.  Denies any dizziness or lightheadedness.  Does appear well-appearing on evaluation      Nurses Notes reviewed and agree, including vitals, allergies, social history and prior medical history.     REVIEW OF SYSTEMS: All systems reviewed and not pertinent unless noted.  Review of Systems   Constitutional:  Positive for activity change. Negative for appetite change, chills, fatigue and fever.   HENT:  Negative for congestion, sinus pressure, sneezing and trouble swallowing.    Eyes:  Negative for discharge and itching.   Respiratory:  Negative for cough and shortness of breath.    Cardiovascular:  Negative for chest pain and palpitations.   Gastrointestinal:  Negative for abdominal distention and abdominal pain.   Endocrine: Negative for cold intolerance and heat intolerance.   Genitourinary:  Negative for decreased urine volume, dysuria and urgency.   Musculoskeletal:  Negative for gait problem, neck pain and neck stiffness.   Skin:  Positive for rash. Negative for color change.        Maculopapular rash to bilateral hands   Allergic/Immunologic: Negative for immunocompromised state.   Neurological:  Negative for facial asymmetry and headaches.   Hematological:  Negative for adenopathy.   Psychiatric/Behavioral:  Negative for self-injury and suicidal  ideas.        Past Medical History:   Diagnosis Date    Arthritis     Cancer     rectal cancer - diagnosed 2023    Cholelithiasis 2019    Removed    COPD (chronic obstructive pulmonary disease)     Coronary artery disease 2009    Stent - no cardiologist currently    Elevated cholesterol     GERD (gastroesophageal reflux disease)     Hernia 2003    Lower hernia    Perforated ulcer 2019    Sleep apnea     history of; when weighed over 400lbs - no issues following bariatric surgery       Allergies:    Bactrim [sulfamethoxazole-trimethoprim] and Sulfa antibiotics      Past Surgical History:   Procedure Laterality Date    APPENDECTOMY  1983    Removed    BARIATRIC SURGERY  2011    Gastric bypass    BLADDER TUMOR/ULCER BLEEDER CAUTERIZATION      CARDIAC CATHETERIZATION  2009    stent placed    CHOLECYSTECTOMY  2019    Removed    COLONOSCOPY N/A 12/07/2023    Procedure: COLONOSCOPY WITH HOT SNARE POLYPECTOMY AND TATTOO;  Surgeon: Noman Gautam MD;  Location: [facility] ENDOSCOPY;  Service: Gastroenterology;  Laterality: N/A;    PORTACATH PLACEMENT N/A 1/5/2024    Procedure: INSERTION OF PORTACATH WITH ULTRSOUND AND FLUOROSCOPIC GUIDANCE;  Surgeon: Ida Black MD;  Location: [facility] OR;  Service: General;  Laterality: N/A;    JENNIFER-EN-Y           Social History     Socioeconomic History    Marital status:    Tobacco Use    Smoking status: Every Day     Current packs/day: 1.00     Average packs/day: 0.9 packs/day for 60.6 years (53.1 ttl pk-yrs)     Types: Cigarettes     Start date: 9/6/1994    Smokeless tobacco: Never    Tobacco comments:     35 years      pt reports closer to 2 packs per day since cancer diagnosis   Vaping Use    Vaping status: Never Used   Substance and Sexual Activity    Alcohol use: Never    Drug use: Never    Sexual activity: Defer         History reviewed. No pertinent family history.    Objective  Physical Exam:  /66   Pulse 63   Temp 98 °F (36.7 °C) (Oral)   Resp 16   Ht  "182.9 cm (72\")   Wt 92.1 kg (203 lb)   SpO2 94%   BMI 27.53 kg/m²      Physical Exam  Constitutional:       General: He is not in acute distress.     Appearance: Normal appearance. He is normal weight. He is obese. He is not ill-appearing.   HENT:      Head: Normocephalic and atraumatic.      Nose: Nose normal. No congestion or rhinorrhea.      Mouth/Throat:      Mouth: Mucous membranes are moist.      Pharynx: Oropharynx is clear.   Eyes:      Extraocular Movements: Extraocular movements intact.      Conjunctiva/sclera: Conjunctivae normal.      Pupils: Pupils are equal, round, and reactive to light.   Cardiovascular:      Rate and Rhythm: Normal rate and regular rhythm.      Pulses: Normal pulses.   Pulmonary:      Effort: Pulmonary effort is normal. No respiratory distress.      Breath sounds: Normal breath sounds.   Abdominal:      General: Abdomen is flat. Bowel sounds are normal. There is no distension.      Palpations: Abdomen is soft.      Tenderness: There is no abdominal tenderness. There is no guarding or rebound.   Musculoskeletal:         General: No swelling or tenderness. Normal range of motion.      Cervical back: Normal range of motion and neck supple. No rigidity or tenderness.      Right lower leg: No edema.      Left lower leg: No edema.   Skin:     General: Skin is warm and dry.      Capillary Refill: Capillary refill takes less than 2 seconds.      Findings: Rash present.      Comments: Maculopapular rash with some cracking to bilateral hands   Neurological:      General: No focal deficit present.      Mental Status: He is alert and oriented to person, place, and time. Mental status is at baseline.      Cranial Nerves: No cranial nerve deficit.      Sensory: No sensory deficit.      Motor: No weakness.      Coordination: Coordination normal.      Gait: Gait normal.   Psychiatric:         Mood and Affect: Mood normal.         Behavior: Behavior normal.         Thought Content: Thought content " normal.         Judgment: Judgment normal.         Procedures    ED Course:    ED Course as of 03/31/25 0004   Sun Mar 30, 2025   1711 EKG interpreted by me, normal sinus rhythm with no concerning ST changes noted, rate of 104 [JE]   1806 Chest x-ray independently interpreted by me showing no acute process [JE]      ED Course User Index  [JE] Prince Helm MD       Lab Results (last 24 hours)       Procedure Component Value Units Date/Time    CBC & Differential [[id number]]  (Abnormal) Collected: 03/30/25 1650    Specimen: Blood Updated: 03/30/25 1756    Narrative:      The following orders were created for panel order CBC & Differential.  Procedure                               Abnormality         Status                     ---------                               -----------         ------                     CBC Auto Differential[[id number]]        Abnormal            Final result               Scan Slide[[id number]]                                       Final result                 Please view results for these tests on the individual orders.    Comprehensive Metabolic Panel [[id number]]  (Abnormal) Collected: 03/30/25 1650    Specimen: Blood Updated: 03/30/25 1734     Glucose 82 mg/dL      BUN 9 mg/dL      Creatinine 0.81 mg/dL      Sodium 138 mmol/L      Potassium 3.8 mmol/L      Chloride 104 mmol/L      CO2 20.5 mmol/L      Calcium 8.0 mg/dL      Total Protein 6.2 g/dL      Albumin 3.1 g/dL      ALT (SGPT) 14 U/L      AST (SGOT) 30 U/L      Alkaline Phosphatase 121 U/L      Total Bilirubin 0.2 mg/dL      Globulin 3.1 gm/dL      A/G Ratio 1.0 g/dL      BUN/Creatinine Ratio 11.1     Anion Gap 13.5 mmol/L      eGFR 104.1 mL/min/1.73     Narrative:      GFR Categories in Chronic Kidney Disease (CKD)      GFR Category          GFR (mL/min/1.73)    Interpretation  G1                     90 or greater         Normal or high (1)  G2                      60-89                Mild decrease (1)  G3a                    45-59                Mild to moderate decrease  G3b                   30-44                Moderate to severe decrease  G4                    15-29                Severe decrease  G5                    14 or less           Kidney failure          (1)In the absence of evidence of kidney disease, neither GFR category G1 or G2 fulfill the criteria for CKD.    eGFR calculation 2021 CKD-EPI creatinine equation, which does not include race as a factor    Lipase [[id number]]  (Abnormal) Collected: 03/30/25 1650    Specimen: Blood Updated: 03/30/25 1734     Lipase 10 U/L     Protime-INR [[id number]]  (Normal) Collected: 03/30/25 1650    Specimen: Blood Updated: 03/30/25 1726     Protime 14.1 Seconds      INR 1.04    Narrative:      Suggested INR therapeutic range for stable oral anticoagulant therapy:    Low Intensity therapy:   1.5-2.0  Moderate Intensity therapy:   2.0-3.0  High Intensity therapy:   2.5-4.0    High Sensitivity Troponin T [[id number]]  (Normal) Collected: 03/30/25 1650    Specimen: Blood Updated: 03/30/25 1738     HS Troponin T <6 ng/L     Narrative:      High Sensitive Troponin T Reference Range:  <14.0 ng/L- Negative Female for AMI  <22.0 ng/L- Negative Male for AMI  >=14 - Abnormal Female indicating possible myocardial injury.  >=22 - Abnormal Male indicating possible myocardial injury.   Clinicians would have to utilize clinical acumen, EKG, Troponin, and serial changes to determine if it is an Acute Myocardial Infarction or myocardial injury due to an underlying chronic condition.         BNP [[id number]]  (Normal) Collected: 03/30/25 1650    Specimen: Blood Updated: 03/30/25 1738     proBNP 148.9 pg/mL     Narrative:      This assay is used as an aid in the diagnosis of individuals suspected of having heart failure. It can be used as an aid in the diagnosis of acute decompensated heart failure (ADHF) in patients presenting with signs and symptoms of ADHF to the emergency department (ED). In addition,  "NT-proBNP of <300 pg/mL indicates ADHF is not likely.    Age Range Result Interpretation  NT-proBNP Concentration (pg/mL:      <50             Positive            >450                   Gray                 300-450                    Negative             <300    50-75           Positive            >900                  Gray                300-900                  Negative            <300      >75             Positive            >1800                  Gray                300-1800                  Negative            <300    C-reactive Protein [[id number]]  (Abnormal) Collected: 03/30/25 1650    Specimen: Blood Updated: 03/30/25 1734     C-Reactive Protein 0.90 mg/dL     Procalcitonin [[id number]]  (Normal) Collected: 03/30/25 1650    Specimen: Blood Updated: 03/30/25 1741     Procalcitonin <0.02 ng/mL     Narrative:      As a Marker for Sepsis (Non-Neonates):    1. <0.5 ng/mL represents a low risk of severe sepsis and/or septic shock.  2. >2 ng/mL represents a high risk of severe sepsis and/or septic shock.    As a Marker for Lower Respiratory Tract Infections that require antibiotic therapy:    PCT on Admission    Antibiotic Therapy       6-12 Hrs later    >0.5                Strongly Recommended  >0.25 - <0.5        Recommended   0.1 - 0.25          Discouraged              Remeasure/reassess PCT  <0.1                Strongly Discouraged     Remeasure/reassess PCT    As 28 day mortality risk marker: \"Change in Procalcitonin Result\" (>80% or <=80%) if Day 0 (or Day 1) and Day 4 values are available. Refer to http://www.[facility]-pct-calculator.com    Change in PCT <=80%  A decrease of PCT levels below or equal to 80% defines a positive change in PCT test result representing a higher risk for 28-day all-cause mortality of patients diagnosed with severe sepsis for septic shock.    Change in PCT >80%  A decrease of PCT levels of more than 80% defines a negative change in PCT result representing a lower risk for 28-day " all-cause mortality of patients diagnosed with severe sepsis or septic shock.       Lactic Acid, Plasma [[id number]]  (Normal) Collected: 03/30/25 1650    Specimen: Blood Updated: 03/30/25 1730     Lactate 0.6 mmol/L     Magnesium [[id number]]  (Abnormal) Collected: 03/30/25 1650    Specimen: Blood Updated: 03/30/25 1734     Magnesium 1.5 mg/dL     CK [[id number]]  (Normal) Collected: 03/30/25 1650    Specimen: Blood Updated: 03/30/25 1734     Creatine Kinase 186 U/L     CBC Auto Differential [[id number]]  (Abnormal) Collected: 03/30/25 1650    Specimen: Blood Updated: 03/30/25 1733     WBC 9.08 10*3/mm3      RBC 4.69 10*6/mm3      Hemoglobin 11.9 g/dL      Hematocrit 37.9 %      MCV 80.8 fL      MCH 25.4 pg      MCHC 31.4 g/dL      RDW 21.5 %      RDW-SD 60.8 fl      MPV 10.4 fL      Platelets 254 10*3/mm3      Neutrophil % 64.6 %      Lymphocyte % 24.1 %      Monocyte % 7.9 %      Eosinophil % 2.5 %      Basophil % 0.6 %      Immature Grans % 0.3 %      Neutrophils, Absolute 5.86 10*3/mm3      Lymphocytes, Absolute 2.19 10*3/mm3      Monocytes, Absolute 0.72 10*3/mm3      Eosinophils, Absolute 0.23 10*3/mm3      Basophils, Absolute 0.05 10*3/mm3      Immature Grans, Absolute 0.03 10*3/mm3      nRBC 0.0 /100 WBC     Scan Slide [[id number]] Collected: 03/30/25 1650    Specimen: Blood Updated: 03/30/25 1756     Anisocytosis Mod/2+     [place] Cells Mod/2+     Ovalocytes Slight/1+     Poikilocytes Slight/1+     WBC Morphology Normal     Platelet Estimate Adequate    Blood Culture With MERLIN - Blood, Hand, Left [[id number]] Collected: 03/30/25 1652    Specimen: Blood from Hand, Left Updated: 03/30/25 1709    Blood Culture With MERLIN - Blood, Arm, Left [[id number]] Collected: 03/30/25 1652    Specimen: Blood from Arm, Left Updated: 03/30/25 1709    RPR Qualitative with Reflex to Quant [[id number]] Collected: 03/30/25 1700    Specimen: Blood Updated: 03/30/25 1834    HIV-1 / O / 2 Ag / Antibody [[id number]]  (Normal) Collected: 03/30/25  1700    Specimen: Blood Updated: 03/30/25 1853     HIV-1/ HIV-2 Ab Non-Reactive     HIV-1 P24 Ag Screen Non-Reactive    Narrative:      Detection of p24 may be inhibited by biotin in the sample, causing false negative results in acute infection. Therefore, do not test samples from patients who are taking biotin    COVID-19 and FLU A/B PCR, 1 HR TAT - Swab, Nasopharynx [[id number]]  (Normal) Collected: 03/30/25 1728    Specimen: Swab from Nasopharynx Updated: 03/30/25 1753     COVID19 Not Detected     Influenza A PCR Not Detected     Influenza B PCR Not Detected    Narrative:      Fact sheet for providers: https://www.fda.gov/media/907041/download    Fact sheet for patients: https://www.fda.gov/media/631111/download    Test performed by PCR.    Urinalysis With Culture If Indicated - Urine, Clean Catch [[id number]]  (Abnormal) Collected: 03/30/25 1752    Specimen: Urine, Clean Catch Updated: 03/30/25 1805     Color, UA Yellow     Appearance, UA Clear     pH, UA 6.0     Specific Gravity, UA 1.020     Glucose, UA Negative     Ketones, UA Negative     Bilirubin, UA Negative     Blood, UA Negative     Protein, UA 30 mg/dL (1+)     Leuk Esterase, UA Negative     Nitrite, UA Negative     Urobilinogen, UA 1.0 E.U./dL    Narrative:      In absence of clinical symptoms, the presence of pyuria, bacteria, and/or nitrites on the urinalysis result does not correlate with infection.    Urinalysis, Microscopic Only - Urine, Clean Catch [[id number]]  (Abnormal) Collected: 03/30/25 1752    Specimen: Urine, Clean Catch Updated: 03/30/25 1812     RBC, UA None Seen /HPF      WBC, UA 0-2 /HPF      Comment: Urine culture not indicated.        Bacteria, UA None Seen /HPF      Squamous Epithelial Cells, UA None Seen /HPF      Hyaline Casts, UA None Seen /LPF      Mucus, UA Moderate/2+ /HPF      Methodology Manual Light Microscopy             No radiology results from the last 24 hrs       MDM     Amount and/or Complexity of Data  Reviewed  Independent visualization of images, tracings, or specimens: yes        Initial impression of presenting illness: Rash, hypotension    DDX: includes but is not limited to: Dehydration, sepsis, pancytopenia, neutropenia    Patient arrives stable with vitals interpreted by myself.     Pertinent features from physical exam: Clear to auscultation, regular rate and rhythm, no murmur, nontender to abdominal palpation, patient with maculopapular rash to bilateral hands.    Initial diagnostic plan: CBC, CMP, troponin, lipase, UA, BNP, EKG, chest x-ray, CRP, Pro-Reinaldo, lactic acid, magnesium    Results from initial plan were reviewed and interpreted by me revealing no concern for sepsis on lab work prior to patient reporting emergency department, hypomagnesemia and IV magnesium was ordered, patient reported the emergency department prior to this being and answered    Diagnostic information from other sources: Discussed with patient's wife at bedside reviewed past medical records    Interventions / Re-evaluation: Ordered magnesium due to hypomagnesemia and patient departed the emergency department prior to this being administered    Results/clinical rationale were discussed with N/AA    Consultations/Discussion of results with other physicians: Patient deferred emergency department prior to end of workup.  He also department department prior to magnesium being administered.  Workup here negative for any concerns for sepsis.  Patient's blood pressure resolved without any intervention.  Have prescribed patient Aquaphor for concerns for dry skin and encouraged to follow closely with oncology.  Prior to completion of patient's workup patient stated that he was going to immediately leave to nursing staff.  Nursing staff was unable to call me to the bedside.  They did however obtain AMA paperwork prior to patient departing the emergency department.    Disposition plan: AMA  -----        Final diagnoses:   Dry skin  dermatitis          Edge, Prince Joya MD  03/31/25 0004

## 2025-03-31 ENCOUNTER — HOSPITAL ENCOUNTER (OUTPATIENT)
Facility: HOSPITAL | Age: 55
Discharge: HOME | End: 2025-03-31
Payer: COMMERCIAL

## 2025-03-31 ENCOUNTER — OFFICE VISIT (OUTPATIENT)
Dept: ONCOLOGY | Facility: CLINIC | Age: 55
End: 2025-03-31
Payer: COMMERCIAL

## 2025-03-31 ENCOUNTER — APPOINTMENT (OUTPATIENT)
Facility: HOSPITAL | Age: 55
End: 2025-03-31
Payer: COMMERCIAL

## 2025-03-31 VITALS
TEMPERATURE: 97.3 F | OXYGEN SATURATION: 98 % | WEIGHT: 209 LBS | BODY MASS INDEX: 28.31 KG/M2 | DIASTOLIC BLOOD PRESSURE: 57 MMHG | SYSTOLIC BLOOD PRESSURE: 111 MMHG | HEIGHT: 72 IN | HEART RATE: 70 BPM

## 2025-03-31 DIAGNOSIS — C78.7 RECTAL CANCER METASTASIZED TO LIVER: Primary | ICD-10-CM

## 2025-03-31 DIAGNOSIS — C20 RECTAL CANCER METASTASIZED TO LIVER: Primary | ICD-10-CM

## 2025-03-31 LAB — RPR SER QL: NORMAL

## 2025-03-31 PROCEDURE — 25010000002 DEXAMETHASONE SODIUM PHOSPHATE 20 MG/5ML SOLUTION: Performed by: INTERNAL MEDICINE

## 2025-03-31 PROCEDURE — 96375 TX/PRO/DX INJ NEW DRUG ADDON: CPT

## 2025-03-31 PROCEDURE — 96417 CHEMO IV INFUS EACH ADDL SEQ: CPT

## 2025-03-31 PROCEDURE — 25010000002 PANITUMUMAB PER 10 MG: Performed by: INTERNAL MEDICINE

## 2025-03-31 PROCEDURE — 25010000002 PANITUMUMAB 400 MG/20ML SOLUTION 20 ML VIAL: Performed by: INTERNAL MEDICINE

## 2025-03-31 PROCEDURE — 99214 OFFICE O/P EST MOD 30 MIN: CPT | Performed by: INTERNAL MEDICINE

## 2025-03-31 PROCEDURE — G0498 CHEMO EXTEND IV INFUS W/PUMP: HCPCS

## 2025-03-31 PROCEDURE — 25810000003 SODIUM CHLORIDE 0.9 % SOLUTION 250 ML FLEX CONT: Performed by: INTERNAL MEDICINE

## 2025-03-31 PROCEDURE — 96415 CHEMO IV INFUSION ADDL HR: CPT

## 2025-03-31 PROCEDURE — 25010000003 DEXTROSE 5 % SOLUTION 250 ML FLEX CONT: Performed by: INTERNAL MEDICINE

## 2025-03-31 PROCEDURE — 25010000002 LEUCOVORIN CALCIUM PER 50MG: Performed by: INTERNAL MEDICINE

## 2025-03-31 PROCEDURE — 25010000002 IRINOTECAN PER 20 MG: Performed by: INTERNAL MEDICINE

## 2025-03-31 PROCEDURE — 96413 CHEMO IV INFUSION 1 HR: CPT

## 2025-03-31 PROCEDURE — 25010000002 FLUOROURACIL PER 500 MG: Performed by: INTERNAL MEDICINE

## 2025-03-31 PROCEDURE — 96368 THER/DIAG CONCURRENT INF: CPT

## 2025-03-31 PROCEDURE — 25010000002 PALONOSETRON 0.25 MG/5ML SOLUTION PREFILLED SYRINGE: Performed by: INTERNAL MEDICINE

## 2025-03-31 PROCEDURE — 25010000003 DEXTROSE 5 % SOLUTION 500 ML FLEX CONT: Performed by: INTERNAL MEDICINE

## 2025-03-31 RX ORDER — DIPHENHYDRAMINE HYDROCHLORIDE 50 MG/ML
50 INJECTION, SOLUTION INTRAMUSCULAR; INTRAVENOUS AS NEEDED
Status: CANCELLED | OUTPATIENT
Start: 2025-03-31

## 2025-03-31 RX ORDER — SODIUM CHLORIDE 9 MG/ML
20 INJECTION, SOLUTION INTRAVENOUS ONCE
Status: DISCONTINUED | OUTPATIENT
Start: 2025-03-31 | End: 2025-04-01 | Stop reason: HOSPADM

## 2025-03-31 RX ORDER — PROCHLORPERAZINE EDISYLATE 5 MG/ML
5 INJECTION INTRAMUSCULAR; INTRAVENOUS EVERY 6 HOURS PRN
Status: CANCELLED
Start: 2025-03-31

## 2025-03-31 RX ORDER — HYDROCORTISONE 25 MG/G
1 OINTMENT TOPICAL 2 TIMES DAILY
Qty: 60 G | Refills: 1 | Status: SHIPPED | OUTPATIENT
Start: 2025-03-31

## 2025-03-31 RX ORDER — ATROPINE SULFATE 1 MG/ML
0.25 INJECTION, SOLUTION INTRAMUSCULAR; INTRAVENOUS; SUBCUTANEOUS
Status: CANCELLED | OUTPATIENT
Start: 2025-03-31

## 2025-03-31 RX ORDER — PALONOSETRON 0.05 MG/ML
0.25 INJECTION, SOLUTION INTRAVENOUS ONCE
Status: CANCELLED | OUTPATIENT
Start: 2025-03-31

## 2025-03-31 RX ORDER — SODIUM CHLORIDE 9 MG/ML
20 INJECTION, SOLUTION INTRAVENOUS ONCE
Status: CANCELLED | OUTPATIENT
Start: 2025-03-31

## 2025-03-31 RX ORDER — HYDROCORTISONE SODIUM SUCCINATE 100 MG/2ML
100 INJECTION INTRAMUSCULAR; INTRAVENOUS AS NEEDED
Status: CANCELLED | OUTPATIENT
Start: 2025-03-31

## 2025-03-31 RX ORDER — PALONOSETRON 0.05 MG/ML
0.25 INJECTION, SOLUTION INTRAVENOUS ONCE
Status: COMPLETED | OUTPATIENT
Start: 2025-03-31 | End: 2025-03-31

## 2025-03-31 RX ORDER — FAMOTIDINE 10 MG/ML
20 INJECTION, SOLUTION INTRAVENOUS AS NEEDED
Status: CANCELLED | OUTPATIENT
Start: 2025-03-31

## 2025-03-31 RX ORDER — PROCHLORPERAZINE EDISYLATE 5 MG/ML
5 INJECTION INTRAMUSCULAR; INTRAVENOUS EVERY 6 HOURS PRN
Status: DISCONTINUED | OUTPATIENT
Start: 2025-03-31 | End: 2025-04-01 | Stop reason: HOSPADM

## 2025-03-31 RX ADMIN — PANITUMUMAB 570 MG: 400 SOLUTION INTRAVENOUS at 09:54

## 2025-03-31 RX ADMIN — DEXAMETHASONE SODIUM PHOSPHATE 12 MG: 4 INJECTION, SOLUTION INTRA-ARTICULAR; INTRALESIONAL; INTRAMUSCULAR; INTRAVENOUS; SOFT TISSUE at 09:34

## 2025-03-31 RX ADMIN — DEXTROSE MONOHYDRATE 390 MG: 50 INJECTION, SOLUTION INTRAVENOUS at 10:36

## 2025-03-31 RX ADMIN — FLUOROURACIL 5230 MG: 50 INJECTION, SOLUTION INTRAVENOUS at 12:18

## 2025-03-31 RX ADMIN — LEUCOVORIN CALCIUM 870 MG: 10 INJECTION INTRAMUSCULAR; INTRAVENOUS at 10:37

## 2025-03-31 RX ADMIN — PALONOSETRON 0.25 MG: 0.25 INJECTION, SOLUTION INTRAVENOUS at 09:34

## 2025-03-31 NOTE — PROGRESS NOTES
Hematology and Oncology [place]  Office number [phone]    Fax number [phone]     Follow up     Date: 25    Patient Name: Edward Powell  MRN: [id number]  : 1970    Referring Physician: Dr. Gautam    Chief Complaint: Rectal cancer with liver and lung metastases    Cancer Staging:  IV    History of Present Illness: Edward Powell is a pleasant 54 y.o. male who presents today for evaluation of rectal cancer.    Patient has a longstanding history of intermittent diarrhea since his cholecystectomy in 2019.  However he developed progressive diarrhea with associated loss of bowel control and urgency as well as weight loss and fatigue prompting additional workup.    CT of the abdomen pelvis 2023 showed an irregular circumferential wall thickening involving the rectum concerning for mass lesion.  There was associated mesorectal lymphadenopathy.  Masslike consolidation of left lower lobe.  CT of the chest showed a cavitary lesion in the left lower lobe up to 4.8 cm with an adjacent cavitary nodule up to 2 cm and a 5 mm nodule on the right minor fissure.      He underwent colonoscopy 2023 with findings of an infiltrative and ulcerated partially obstructing mass in the proximal rectum spanning 10 cm.  Rectal polyps.  Biopsy of the rectal mass showed invasive moderately differentiated adenocarcinoma.  Additional biopsies showed tubular adenomas.  MSI testing was intact/low probability of MSI high.    PDL1 negative    PET/CT 2023 showed hypermetabolic rectal wall thickening compatible with known rectal malignancy.  Multiple small ill-defined hypoechoic hyper metabolic liver lesions compatible with metastatic disease.  Mildly enlarged and mildly hypermetabolic pelvic sidewall and internal iliac lymph node chain adenopathy.  Hypermetabolic lung mass with adjacent nodule.     He underwent liver biopsy and lung biopsy 2024.  Results of both confirmed metastatic  colorectal cancer.    The patient has been experiencing substantial rectal pain.  He is taking oxycodone every 6 hours with partial relief.  He reports ongoing bowel movements.  No abdominal pain.  No vomiting.  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    Treatment history:  FOLFOX cycle 1-4  FOLFOX panitumumab cycle 5 onward  -Oxaliplatin terminated early for allergic reaction after 4/29/24    FOLFIRI/Panitumumab: 2/3/25 to present    Interval history:  Developed rash on his palms last night and went to ED. Not pruritic or painful.   Was found to have hypotension 108/66 and underwent cardiac workup  He was started on aquaphor, left AMA prior to magnesium IV. BP resolved without intervention  His only new medication had been voltaren gel for 2 weeks    Past Medical History:   Past Medical History:   Diagnosis Date    Arthritis     Cancer     rectal cancer - diagnosed 2023    Cholelithiasis 2019    Removed    COPD (chronic obstructive pulmonary disease)     Coronary artery disease 2009    Stent - no cardiologist currently    Elevated cholesterol     GERD (gastroesophageal reflux disease)     Hernia 2003    Lower hernia    Perforated ulcer 2019    Sleep apnea     history of; when weighed over 400lbs - no issues following bariatric surgery       Past Surgical History:   Past Surgical History:   Procedure Laterality Date    APPENDECTOMY  1983    Removed    BARIATRIC SURGERY  2011    Gastric bypass    BLADDER TUMOR/ULCER BLEEDER CAUTERIZATION      CARDIAC CATHETERIZATION  2009    stent placed    CHOLECYSTECTOMY  2019    Removed    COLONOSCOPY N/A 12/07/2023    Procedure: COLONOSCOPY WITH HOT SNARE POLYPECTOMY AND TATTOO;  Surgeon: Noman Gautam MD;  Location: [facility] ENDOSCOPY;   Service: Gastroenterology;  Laterality: N/A;    PORTACATH PLACEMENT N/A 1/5/2024    Procedure: INSERTION OF PORTACATH WITH ULTRSOUND AND FLUOROSCOPIC GUIDANCE;  Surgeon: Ida Black MD;  Location: [facility];  Service: General;  Laterality: N/A;    JENNIFER-EN-Y         Family History: No family history on file.  Uncle had colon cancer    Social History:   Social History     Socioeconomic History    Marital status:    Tobacco Use    Smoking status: Every Day     Current packs/day: 1.00     Average packs/day: 0.9 packs/day for 60.6 years (53.1 ttl pk-yrs)     Types: Cigarettes     Start date: 9/6/1994    Smokeless tobacco: Never    Tobacco comments:     35 years      pt reports closer to 2 packs per day since cancer diagnosis   Vaping Use    Vaping status: Never Used   Substance and Sexual Activity    Alcohol use: Never    Drug use: Never    Sexual activity: Defer       Medications:     Current Outpatient Medications:     albuterol sulfate  (90 Base) MCG/ACT inhaler, Inhale 2 puffs Every 4 (Four) Hours As Needed for Wheezing., Disp: 18 g, Rfl: 3    buPROPion XL (WELLBUTRIN XL) 150 MG 24 hr tablet, TAKE 1 TABLET BY MOUTH DAILY, Disp: 90 tablet, Rfl: 1    cetirizine (zyrTEC) 10 MG tablet, Take 1 tablet by mouth Daily., Disp: 30 tablet, Rfl: 2    clindamycin (Clindagel) 1 % gel, Apply 1 Application topically to the appropriate area as directed 2 (Two) Times a Day. Use first, Disp: 30 g, Rfl: 1    dexAMETHasone 0.5 MG/5ML solution, Take 10 mL by mouth 2 (Two) Times a Day. Swish for 2 minutes and spit 10 mL 4 x daily, Disp: 240 mL, Rfl: 5    Diclofenac Sodium (Voltaren) 1 % gel gel, Apply 4 g topically to the appropriate area as directed 2 (Two) Times a Day., Disp: 150 g, Rfl: 1    diphenoxylate-atropine (Lomotil) 2.5-0.025 MG per tablet, Take 1 tablet by mouth Every 6 (Six) Hours As Needed for Diarrhea., Disp: 30 tablet, Rfl: 0    docusate sodium (COLACE) 100 MG capsule, Take 1 capsule by  mouth 2 (Two) Times a Day., Disp: , Rfl:     doxycycline (VIBRAMYICN) 100 MG tablet, Take 1 tablet by mouth 2 (Two) Times a Day. For 7 days, then 1 tablet daily indefinitely, Disp: 67 tablet, Rfl: 3    DULoxetine (CYMBALTA) 60 MG capsule, Take 1 capsule by mouth Daily., Disp: 30 capsule, Rfl: 2    famotidine (PEPCID) 20 MG tablet, Take 1 tablet by mouth 2 (Two) Times a Day., Disp: 60 tablet, Rfl: 4    Hydrocortisone, Perianal, (ANUSOL-HC) 2.5 % rectal cream, Insert  into the rectum 2 (Two) Times a Day. Indications: Inflamed Hemorrhoids, Disp: 30 g, Rfl: 1    lidocaine-prilocaine (EMLA) 2.5-2.5 % cream, Apply 1 Application topically to the appropriate area as directed As Needed (45-60 minutes prior to port access.  Cover with saran/plastic wrap.)., Disp: 30 g, Rfl: 3    LORazepam (ATIVAN) 0.5 MG tablet, Take 1 tablet by mouth Take As Directed. 1 tabelt by mouth 30 minutes prior to MRI, take a second tablet at the time of the MRI if needed., Disp: 2 tablet, Rfl: 0    Magic Mouthwash Oral Suspension (diphenhydrAMINE HCl - aluminum & magnesium hydroxide-simethicone - lidocaine - nystatin), Swish and Spit 10 mL by mouth every 6 (Six) Hours For 7 Days., Disp: 300 mL, Rfl: 3    magnesium oxide (MAG-OX) 400 MG tablet, Take 1 tablet by mouth Daily., Disp: 30 tablet, Rfl: 5    mineral oil-hydrophilic petrolatum (AQUAPHOR) ointment, Apply 1 Application topically to the appropriate area as directed As Needed for Dry Skin., Disp: 198 g, Rfl: 0    montelukast (SINGULAIR) 10 MG tablet, Take 1 tablet by mouth Every Night., Disp: 90 tablet, Rfl: 3    [START ON 4/8/2025] Morphine (MS CONTIN) 30 MG 12 hr tablet, Take 1 tablet by mouth 2 (Two) Times a Day for 30 days., Disp: 60 tablet, Rfl: 0    Movantik 25 MG tablet, , Disp: , Rfl:     mupirocin (BACTROBAN) 2 % cream, , Disp: , Rfl:     naloxone (NARCAN) 4 MG/0.1ML nasal spray, 1 spray into the nostril(s) as directed by provider As Needed for Opioid Reversal., Disp: 1 each, Rfl: 0     "nystatin susp + lidocaine viscous (MAGIC MOUTHWASH) oral suspension, 5-10 ml swish and spit or swallow QID prn, Disp: 240 mL, Rfl: 3    ondansetron (ZOFRAN) 8 MG tablet, Take 1 tablet by mouth 3 (Three) Times a Day As Needed for Nausea or Vomiting., Disp: 30 tablet, Rfl: 5    [START ON 4/8/2025] oxyCODONE (ROXICODONE) 15 MG immediate release tablet, Take 1 tablet by mouth 5 (Five) Times a Day As Needed for Moderate Pain or Severe Pain for up to 30 days., Disp: 150 tablet, Rfl: 0    pantoprazole (Protonix) 40 MG EC tablet, Take 1 tablet by mouth Daily. Indications: Gastroesophageal Reflux Disease, Disp: 90 tablet, Rfl: 2    pregabalin (Lyrica) 300 MG capsule, Take 1 capsule by mouth 2 (Two) Times a Day for 30 days., Disp: 60 capsule, Rfl: 0    promethazine-dextromethorphan (PROMETHAZINE-DM) 6.25-15 MG/5ML syrup, Take 5 mL by mouth 3 times a day., Disp: 240 mL, Rfl: 0    tiotropium bromide-olodaterol (Stiolto Respimat) 2.5-2.5 MCG/ACT aerosol solution inhaler, INHALE 2 INHALATION(S) BY MOUTH DAILY, Disp: 4 g, Rfl: 5    traZODone (DESYREL) 150 MG tablet, Take 1 tablet by mouth Every Night., Disp: 30 tablet, Rfl: 2    triamcinolone (KENALOG) 0.025 % ointment, Apply 1 Application topically to the appropriate area as directed 2 (Two) Times a Day. Use second if topical treatment #1 ineffective, Disp: 80 g, Rfl: 0  No current facility-administered medications for this visit.    Allergies:   Allergies   Allergen Reactions    Bactrim [Sulfamethoxazole-Trimethoprim] Hives     and blisters    Sulfa Antibiotics Hives     and blisters       Objective     Vital Signs:   Vitals:    03/31/25 0822   BP: 111/57   Pulse: 70   Temp: 97.3 °F (36.3 °C)   TempSrc: Infrared   SpO2: 98%   Weight: 94.8 kg (209 lb)   Height: 182.9 cm (72.01\")   PainSc: 4    PainLoc: Generalized          Body mass index is 28.34 kg/m².   Pain Score    03/31/25 0822   PainSc: 4    PainLoc: Generalized       ECOG Performance Status: 1 - Symptomatic but completely " ambulatory    Physical Exam: General: No acute distress. Well appearing.  HEENT: Normocephalic, atraumatic. Sclera anicteric.   Neck: supple, no adenopathy.   Cardiovascular: regular rate and rhythm. No murmurs.   Respiratory: Normal rate. Clear to auscultation bilaterally.  Abdomen: Soft, nontender, non distended with normoactive bowel sounds.  Lymph: no cervical, supraclavicular or axillary adenopathy.  Neuro: Alert and oriented x 3. No focal deficits.   Ext: Symmetric, no swelling.   Skin: rash on hands based on photos  Accurate 3/31/25    Laboratory/Imaging Reviewed:   Admission on 03/30/2025, Discharged on 03/30/2025   Component Date Value Ref Range Status    COVID19 03/30/2025 Not Detected  Not Detected - Ref. Range Final    Influenza A PCR 03/30/2025 Not Detected  Not Detected Final    Influenza B PCR 03/30/2025 Not Detected  Not Detected Final    Glucose 03/30/2025 82  65 - 99 mg/dL Final    BUN 03/30/2025 9  6 - 20 mg/dL Final    Creatinine 03/30/2025 0.81  0.76 - 1.27 mg/dL Final    Sodium 03/30/2025 138  136 - 145 mmol/L Final    Potassium 03/30/2025 3.8  3.5 - 5.2 mmol/L Final    Chloride 03/30/2025 104  98 - 107 mmol/L Final    CO2 03/30/2025 20.5 (L)  22.0 - 29.0 mmol/L Final    Calcium 03/30/2025 8.0 (L)  8.6 - 10.5 mg/dL Final    Total Protein 03/30/2025 6.2  6.0 - 8.5 g/dL Final    Albumin 03/30/2025 3.1 (L)  3.5 - 5.2 g/dL Final    ALT (SGPT) 03/30/2025 14  1 - 41 U/L Final    AST (SGOT) 03/30/2025 30  1 - 40 U/L Final    Alkaline Phosphatase 03/30/2025 121 (H)  39 - 117 U/L Final    Total Bilirubin 03/30/2025 0.2  0.0 - 1.2 mg/dL Final    Globulin 03/30/2025 3.1  gm/dL Final    A/G Ratio 03/30/2025 1.0  g/dL Final    BUN/Creatinine Ratio 03/30/2025 11.1  7.0 - 25.0 Final    Anion Gap 03/30/2025 13.5  5.0 - 15.0 mmol/L Final    eGFR 03/30/2025 104.1  >60.0 mL/min/1.73 Final    Lipase 03/30/2025 10 (L)  13 - 60 U/L Final    Color, UA 03/30/2025 Yellow  Yellow, Straw Final    Appearance, UA  03/30/2025 Clear  Clear Final    pH, UA 03/30/2025 6.0  5.0 - 8.0 Final    Specific Gravity, UA 03/30/2025 1.020  1.005 - 1.030 Final    Glucose, UA 03/30/2025 Negative  Negative Final    Ketones, UA 03/30/2025 Negative  Negative Final    Bilirubin, UA 03/30/2025 Negative  Negative Final    Blood, UA 03/30/2025 Negative  Negative Final    Protein, UA 03/30/2025 30 mg/dL (1+) (A)  Negative Final    Leuk Esterase, UA 03/30/2025 Negative  Negative Final    Nitrite, UA 03/30/2025 Negative  Negative Final    Urobilinogen, UA 03/30/2025 1.0 E.U./dL  0.2 - 1.0 E.U./dL Final    Protime 03/30/2025 14.1  12.3 - 15.1 Seconds Final    INR 03/30/2025 1.04  0.90 - 1.10 Final    HS Troponin T 03/30/2025 <6  <22 ng/L Final    proBNP 03/30/2025 148.9  0.0 - 900.0 pg/mL Final    C-Reactive Protein 03/30/2025 0.90 (H)  0.00 - 0.50 mg/dL Final    Procalcitonin 03/30/2025 <0.02  0.00 - 0.25 ng/mL Final    Lactate 03/30/2025 0.6  0.5 - 2.0 mmol/L Final    Magnesium 03/30/2025 1.5 (L)  1.6 - 2.6 mg/dL Final    Creatine Kinase 03/30/2025 186  20 - 200 U/L Final    WBC 03/30/2025 9.08  3.40 - 10.80 10*3/mm3 Final    RBC 03/30/2025 4.69  4.14 - 5.80 10*6/mm3 Final    Hemoglobin 03/30/2025 11.9 (L)  13.0 - 17.7 g/dL Final    Hematocrit 03/30/2025 37.9  37.5 - 51.0 % Final    MCV 03/30/2025 80.8  79.0 - 97.0 fL Final    MCH 03/30/2025 25.4 (L)  26.6 - 33.0 pg Final    MCHC 03/30/2025 31.4 (L)  31.5 - 35.7 g/dL Final    RDW 03/30/2025 21.5 (H)  12.3 - 15.4 % Final    RDW-SD 03/30/2025 60.8 (H)  37.0 - 54.0 fl Final    MPV 03/30/2025 10.4  6.0 - 12.0 fL Final    Platelets 03/30/2025 254  140 - 450 10*3/mm3 Final    Neutrophil % 03/30/2025 64.6  42.7 - 76.0 % Final    Lymphocyte % 03/30/2025 24.1  19.6 - 45.3 % Final    Monocyte % 03/30/2025 7.9  5.0 - 12.0 % Final    Eosinophil % 03/30/2025 2.5  0.3 - 6.2 % Final    Basophil % 03/30/2025 0.6  0.0 - 1.5 % Final    Immature Grans % 03/30/2025 0.3  0.0 - 0.5 % Final    Neutrophils, Absolute  03/30/2025 5.86  1.70 - 7.00 10*3/mm3 Final    Lymphocytes, Absolute 03/30/2025 2.19  0.70 - 3.10 10*3/mm3 Final    Monocytes, Absolute 03/30/2025 0.72  0.10 - 0.90 10*3/mm3 Final    Eosinophils, Absolute 03/30/2025 0.23  0.00 - 0.40 10*3/mm3 Final    Basophils, Absolute 03/30/2025 0.05  0.00 - 0.20 10*3/mm3 Final    Immature Grans, Absolute 03/30/2025 0.03  0.00 - 0.05 10*3/mm3 Final    nRBC 03/30/2025 0.0  0.0 - 0.2 /100 WBC Final    Blood Culture 03/30/2025 No growth at less than 24 hours   Preliminary    Blood Culture 03/30/2025 No growth at less than 24 hours   Preliminary    Anisocytosis 03/30/2025 Mod/2+  None Seen Final    Ender Cells 03/30/2025 Mod/2+  None Seen Final    Ovalocytes 03/30/2025 Slight/1+  None Seen Final    Poikilocytes 03/30/2025 Slight/1+  None Seen Final    WBC Morphology 03/30/2025 Normal  Normal Final    Platelet Estimate 03/30/2025 Adequate  Normal Final    RBC, UA 03/30/2025 None Seen  None Seen, 0-2 /HPF Final    WBC, UA 03/30/2025 0-2  None Seen, 0-2 /HPF Final    Urine culture not indicated.    Bacteria, UA 03/30/2025 None Seen  None Seen /HPF Final    Squamous Epithelial Cells, UA 03/30/2025 None Seen  None Seen, 0-2 /HPF Final    Hyaline Casts, UA 03/30/2025 None Seen  None Seen /LPF Final    Mucus, UA 03/30/2025 Moderate/2+ (A)  None Seen, Trace /HPF Final    Methodology 03/30/2025 Manual Light Microscopy   Final    HIV-1/ HIV-2 Ab 03/30/2025 Non-Reactive  Non-Reactive Final    HIV-1 P24 Ag Screen 03/30/2025 Non-Reactive  Non-Reactive Final     Tempus xt: APC gene TP53; Negative ADRIANNA, BRAF, NRAS, TMB stable. PDL1 negative  No results found.    Procedures    Assessment / Plan      Assessment/Plan:     1.  Rectal cancer   2.  Liver metastases  3.  Lung metastasis  4.  Chemo monitoring  5.  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  -He was found to have biopsy-proven metastasis in the liver  and lung.  His case was presented at multidisciplinary tumor board.  -Because of metastatic disease to both the lung and liver I do not think he will be downstage to resectable disease.  -Tempus results which are notable for an APC mutation, T p53 mutation, negative for KRAS, BRAF, NRAS, tumor mutation burden stable.  Notch 1 VUS.  -CBC adequate and CMP pending for treatment today with maintenance 5-FU and panitumumab 9/16/24  Chemotherapy orders and premedications signed9/16/24  -We reviewed his imaging which is consistent with excellent disease control in early July with normalization of CEA. He has had persistent rectal pain with escalating oxycodone requirements over the past couple of months, but no severe recent increase in pain, fever. WBC normal. 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 I reviewed his EUA notes. Increase doxy to BID. No surgical intervention.  -Consolidative radiotherapy to the rectal tumor is not something he wants to pursue initially but has now undergone consultation.  Maintenance 5FU/panitumumab on hold since early December.  I reviewed his last 4 serial CT images which do so progressive and in his lung. Escalated to FOLFIRI panitumumab 2/2025 to present  -Labs ok for treatment. He is going to continue to hold off on consolidative radiotherapy to the rectum at this time.  -Follow up in 2 weeks with next labs and appt.     6.  Cancer related pain  -Now following with palliative care and pain. Well controlled on lyrica, oxycodone and MS contin.     7.  Panitumumab induced rash  -Continue doxycycline and emollients  -Stop voltaren gel, start hydrocortisone 2.5 % to palms    8. GERD  PPI, well controlled    9. Access  -Port    10. Chemo diarrhea  -Hold laxatives on day of diarrhea  -Lomotil PRN    11. Hypomagnesemia  -Mg 1.5  -BID oral mag then resume daily    Follow Up:   2 weeks     Brenda Cronin MD  Hematology and Oncology

## 2025-04-02 ENCOUNTER — HOSPITAL ENCOUNTER (OUTPATIENT)
Facility: HOSPITAL | Age: 55
Discharge: HOME | End: 2025-04-02
Admitting: INTERNAL MEDICINE
Payer: COMMERCIAL

## 2025-04-02 DIAGNOSIS — C20 RECTAL CANCER METASTASIZED TO LIVER: Primary | ICD-10-CM

## 2025-04-02 DIAGNOSIS — C78.7 RECTAL CANCER METASTASIZED TO LIVER: Primary | ICD-10-CM

## 2025-04-02 DIAGNOSIS — C20 RECTAL ADENOCARCINOMA: ICD-10-CM

## 2025-04-02 PROCEDURE — 96523 IRRIG DRUG DELIVERY DEVICE: CPT

## 2025-04-02 PROCEDURE — 25010000002 HEPARIN LOCK FLUSH PER 10 UNITS: Performed by: INTERNAL MEDICINE

## 2025-04-02 RX ORDER — HEPARIN SODIUM (PORCINE) LOCK FLUSH IV SOLN 100 UNIT/ML 100 UNIT/ML
500 SOLUTION INTRAVENOUS AS NEEDED
Status: DISCONTINUED | OUTPATIENT
Start: 2025-04-02 | End: 2025-04-03 | Stop reason: HOSPADM

## 2025-04-02 RX ORDER — SODIUM CHLORIDE 0.9 % (FLUSH) 0.9 %
10 SYRINGE (ML) INJECTION AS NEEDED
Status: DISCONTINUED | OUTPATIENT
Start: 2025-04-02 | End: 2025-04-03 | Stop reason: HOSPADM

## 2025-04-02 RX ORDER — SODIUM CHLORIDE 0.9 % (FLUSH) 0.9 %
10 SYRINGE (ML) INJECTION AS NEEDED
OUTPATIENT
Start: 2025-04-02

## 2025-04-02 RX ORDER — HEPARIN SODIUM (PORCINE) LOCK FLUSH IV SOLN 100 UNIT/ML 100 UNIT/ML
500 SOLUTION INTRAVENOUS AS NEEDED
OUTPATIENT
Start: 2025-04-02

## 2025-04-02 RX ADMIN — Medication 10 ML: at 15:15

## 2025-04-02 RX ADMIN — HEPARIN 500 UNITS: 100 SYRINGE at 15:15

## 2025-04-04 LAB
BACTERIA SPEC AEROBE CULT: NORMAL
BACTERIA SPEC AEROBE CULT: NORMAL

## 2025-04-08 DIAGNOSIS — G62.0 CHEMOTHERAPY-INDUCED NEUROPATHY: ICD-10-CM

## 2025-04-08 DIAGNOSIS — G89.3 CANCER RELATED PAIN: ICD-10-CM

## 2025-04-08 DIAGNOSIS — T45.1X5A CHEMOTHERAPY-INDUCED NEUROPATHY: ICD-10-CM

## 2025-04-08 RX ORDER — OXYCODONE HYDROCHLORIDE 15 MG/1
15 TABLET ORAL
Qty: 150 TABLET | Refills: 0 | Status: CANCELLED | OUTPATIENT
Start: 2025-04-08 | End: 2025-05-08

## 2025-04-08 RX ORDER — MORPHINE SULFATE 30 MG/1
30 TABLET, FILM COATED, EXTENDED RELEASE ORAL 2 TIMES DAILY
Qty: 60 TABLET | Refills: 0 | Status: CANCELLED | OUTPATIENT
Start: 2025-04-08 | End: 2025-05-08

## 2025-04-08 NOTE — TELEPHONE ENCOUNTER
RACIEL #: [id number]    Medication requested: pregabalin (Lyrica) 300 MG capsule     Last fill date: 3/6/25    Last appointment: 3/28/25    Next appointment: 6/27/25

## 2025-04-09 ENCOUNTER — PATIENT OUTREACH (OUTPATIENT)
Facility: HOSPITAL | Age: 55
End: 2025-04-09
Payer: COMMERCIAL

## 2025-04-09 RX ORDER — PREGABALIN 300 MG/1
300 CAPSULE ORAL 2 TIMES DAILY
Qty: 60 CAPSULE | Refills: 0 | Status: SHIPPED | OUTPATIENT
Start: 2025-04-09 | End: 2025-05-09

## 2025-04-09 NOTE — SIGNIFICANT NOTE
Patient requesting gas cards to help with transportation needs with getting back and forth from his home to infusion clinic and doctors appointment. Informing patient that he will just need to ask for Nurse Navigator so that I can get him a gas card as needed. Patient stating he understood process.

## 2025-04-14 ENCOUNTER — APPOINTMENT (OUTPATIENT)
Facility: HOSPITAL | Age: 55
End: 2025-04-14
Payer: COMMERCIAL

## 2025-04-14 ENCOUNTER — HOSPITAL ENCOUNTER (OUTPATIENT)
Facility: HOSPITAL | Age: 55
Discharge: HOME | End: 2025-04-14
Payer: COMMERCIAL

## 2025-04-14 ENCOUNTER — OFFICE VISIT (OUTPATIENT)
Dept: ONCOLOGY | Facility: CLINIC | Age: 55
End: 2025-04-14
Payer: COMMERCIAL

## 2025-04-14 VITALS
HEART RATE: 87 BPM | BODY MASS INDEX: 28.36 KG/M2 | OXYGEN SATURATION: 92 % | DIASTOLIC BLOOD PRESSURE: 64 MMHG | SYSTOLIC BLOOD PRESSURE: 109 MMHG | HEIGHT: 72 IN | WEIGHT: 209.4 LBS | TEMPERATURE: 97.1 F

## 2025-04-14 VITALS — RESPIRATION RATE: 18 BRPM

## 2025-04-14 DIAGNOSIS — C20 RECTAL CANCER METASTASIZED TO LIVER: Primary | ICD-10-CM

## 2025-04-14 DIAGNOSIS — C78.7 RECTAL CANCER METASTASIZED TO LIVER: Primary | ICD-10-CM

## 2025-04-14 LAB
ALBUMIN SERPL-MCNC: 2.5 G/DL (ref 3.5–5.2)
ALBUMIN/GLOB SERPL: 0.9 G/DL
ALP SERPL-CCNC: 112 U/L (ref 39–117)
ALT SERPL W P-5'-P-CCNC: 11 U/L (ref 1–41)
ANION GAP SERPL CALCULATED.3IONS-SCNC: 9.6 MMOL/L (ref 5–15)
ANISOCYTOSIS BLD QL: NORMAL
AST SERPL-CCNC: 14 U/L (ref 1–40)
BASOPHILS # BLD AUTO: 0.02 10*3/MM3 (ref 0–0.2)
BASOPHILS NFR BLD AUTO: 0.2 % (ref 0–1.5)
BILIRUB SERPL-MCNC: 0.3 MG/DL (ref 0–1.2)
BUN SERPL-MCNC: 9 MG/DL (ref 6–20)
BUN/CREAT SERPL: 13.4 (ref 7–25)
CALCIUM SPEC-SCNC: 7.6 MG/DL (ref 8.6–10.5)
CEA SERPL-MCNC: 8.1 NG/ML
CHLORIDE SERPL-SCNC: 100 MMOL/L (ref 98–107)
CO2 SERPL-SCNC: 24.4 MMOL/L (ref 22–29)
CREAT SERPL-MCNC: 0.67 MG/DL (ref 0.76–1.27)
DEPRECATED RDW RBC AUTO: 57.4 FL (ref 37–54)
EGFRCR SERPLBLD CKD-EPI 2021: 110.3 ML/MIN/1.73
EOSINOPHIL # BLD AUTO: 0.11 10*3/MM3 (ref 0–0.4)
EOSINOPHIL NFR BLD AUTO: 1 % (ref 0.3–6.2)
ERYTHROCYTE [DISTWIDTH] IN BLOOD BY AUTOMATED COUNT: 20.3 % (ref 12.3–15.4)
GLOBULIN UR ELPH-MCNC: 2.8 GM/DL
GLUCOSE SERPL-MCNC: 91 MG/DL (ref 65–99)
HCT VFR BLD AUTO: 33.2 % (ref 37.5–51)
HGB BLD-MCNC: 10.6 G/DL (ref 13–17.7)
IMM GRANULOCYTES # BLD AUTO: 0.05 10*3/MM3 (ref 0–0.05)
IMM GRANULOCYTES NFR BLD AUTO: 0.5 % (ref 0–0.5)
LYMPHOCYTES # BLD AUTO: 1.52 10*3/MM3 (ref 0.7–3.1)
LYMPHOCYTES NFR BLD AUTO: 14.5 % (ref 19.6–45.3)
MAGNESIUM SERPL-MCNC: 1.2 MG/DL (ref 1.6–2.6)
MCH RBC QN AUTO: 25.3 PG (ref 26.6–33)
MCHC RBC AUTO-ENTMCNC: 31.9 G/DL (ref 31.5–35.7)
MCV RBC AUTO: 79.2 FL (ref 79–97)
MONOCYTES # BLD AUTO: 0.83 10*3/MM3 (ref 0.1–0.9)
MONOCYTES NFR BLD AUTO: 7.9 % (ref 5–12)
NEUTROPHILS NFR BLD AUTO: 7.98 10*3/MM3 (ref 1.7–7)
NEUTROPHILS NFR BLD AUTO: 75.9 % (ref 42.7–76)
NRBC BLD AUTO-RTO: 0 /100 WBC (ref 0–0.2)
PLAT MORPH BLD: NORMAL
PLATELET # BLD AUTO: 189 10*3/MM3 (ref 140–450)
PMV BLD AUTO: 11.1 FL (ref 6–12)
POTASSIUM SERPL-SCNC: 3.1 MMOL/L (ref 3.5–5.2)
PROT SERPL-MCNC: 5.3 G/DL (ref 6–8.5)
RBC # BLD AUTO: 4.19 10*6/MM3 (ref 4.14–5.8)
SODIUM SERPL-SCNC: 134 MMOL/L (ref 136–145)
WBC MORPH BLD: NORMAL
WBC NRBC COR # BLD AUTO: 10.51 10*3/MM3 (ref 3.4–10.8)

## 2025-04-14 PROCEDURE — 25010000002 PANITUMUMAB 400 MG/20ML SOLUTION 20 ML VIAL: Performed by: INTERNAL MEDICINE

## 2025-04-14 PROCEDURE — 96413 CHEMO IV INFUSION 1 HR: CPT

## 2025-04-14 PROCEDURE — 25010000003 DEXTROSE 5 % SOLUTION 250 ML FLEX CONT: Performed by: INTERNAL MEDICINE

## 2025-04-14 PROCEDURE — 85025 COMPLETE CBC W/AUTO DIFF WBC: CPT | Performed by: INTERNAL MEDICINE

## 2025-04-14 PROCEDURE — 25010000002 PALONOSETRON PER 25 MCG: Performed by: INTERNAL MEDICINE

## 2025-04-14 PROCEDURE — 82378 CARCINOEMBRYONIC ANTIGEN: CPT | Performed by: INTERNAL MEDICINE

## 2025-04-14 PROCEDURE — 96367 TX/PROPH/DG ADDL SEQ IV INF: CPT

## 2025-04-14 PROCEDURE — 83735 ASSAY OF MAGNESIUM: CPT | Performed by: INTERNAL MEDICINE

## 2025-04-14 PROCEDURE — 25010000002 FLUOROURACIL PER 500 MG: Performed by: INTERNAL MEDICINE

## 2025-04-14 PROCEDURE — G0498 CHEMO EXTEND IV INFUS W/PUMP: HCPCS

## 2025-04-14 PROCEDURE — 99214 OFFICE O/P EST MOD 30 MIN: CPT | Performed by: INTERNAL MEDICINE

## 2025-04-14 PROCEDURE — 25010000002 IRINOTECAN PER 20 MG: Performed by: INTERNAL MEDICINE

## 2025-04-14 PROCEDURE — 96368 THER/DIAG CONCURRENT INF: CPT

## 2025-04-14 PROCEDURE — 25010000002 PANITUMUMAB PER 10 MG: Performed by: INTERNAL MEDICINE

## 2025-04-14 PROCEDURE — 96375 TX/PRO/DX INJ NEW DRUG ADDON: CPT

## 2025-04-14 PROCEDURE — 25010000003 DEXTROSE 5 % SOLUTION 500 ML FLEX CONT: Performed by: INTERNAL MEDICINE

## 2025-04-14 PROCEDURE — 85007 BL SMEAR W/DIFF WBC COUNT: CPT | Performed by: INTERNAL MEDICINE

## 2025-04-14 PROCEDURE — 25810000003 SODIUM CHLORIDE 0.9 % SOLUTION 250 ML FLEX CONT: Performed by: INTERNAL MEDICINE

## 2025-04-14 PROCEDURE — 80053 COMPREHEN METABOLIC PANEL: CPT | Performed by: INTERNAL MEDICINE

## 2025-04-14 PROCEDURE — 25010000002 DEXAMETHASONE PER 1 MG: Performed by: INTERNAL MEDICINE

## 2025-04-14 PROCEDURE — 96417 CHEMO IV INFUS EACH ADDL SEQ: CPT

## 2025-04-14 PROCEDURE — 96415 CHEMO IV INFUSION ADDL HR: CPT

## 2025-04-14 PROCEDURE — 25010000002 LEUCOVORIN CALCIUM PER 50MG: Performed by: INTERNAL MEDICINE

## 2025-04-14 RX ORDER — PROCHLORPERAZINE EDISYLATE 5 MG/ML
5 INJECTION INTRAMUSCULAR; INTRAVENOUS EVERY 6 HOURS PRN
Status: CANCELLED
Start: 2025-04-14

## 2025-04-14 RX ORDER — PALONOSETRON 0.05 MG/ML
0.25 INJECTION, SOLUTION INTRAVENOUS ONCE
Status: CANCELLED | OUTPATIENT
Start: 2025-04-14

## 2025-04-14 RX ORDER — ATROPINE SULFATE 1 MG/ML
0.25 INJECTION, SOLUTION INTRAMUSCULAR; INTRAVENOUS; SUBCUTANEOUS
Status: CANCELLED | OUTPATIENT
Start: 2025-04-14

## 2025-04-14 RX ORDER — PALONOSETRON 0.05 MG/ML
0.25 INJECTION, SOLUTION INTRAVENOUS ONCE
Status: COMPLETED | OUTPATIENT
Start: 2025-04-14 | End: 2025-04-14

## 2025-04-14 RX ORDER — HYDROCORTISONE SODIUM SUCCINATE 100 MG/2ML
100 INJECTION INTRAMUSCULAR; INTRAVENOUS AS NEEDED
Status: CANCELLED | OUTPATIENT
Start: 2025-04-14

## 2025-04-14 RX ORDER — PROCHLORPERAZINE EDISYLATE 5 MG/ML
5 INJECTION INTRAMUSCULAR; INTRAVENOUS EVERY 6 HOURS PRN
Status: DISCONTINUED | OUTPATIENT
Start: 2025-04-14 | End: 2025-04-15 | Stop reason: HOSPADM

## 2025-04-14 RX ORDER — FAMOTIDINE 10 MG/ML
20 INJECTION, SOLUTION INTRAVENOUS AS NEEDED
Status: CANCELLED | OUTPATIENT
Start: 2025-04-14

## 2025-04-14 RX ORDER — DIPHENHYDRAMINE HYDROCHLORIDE 50 MG/ML
50 INJECTION, SOLUTION INTRAMUSCULAR; INTRAVENOUS AS NEEDED
Status: CANCELLED | OUTPATIENT
Start: 2025-04-14

## 2025-04-14 RX ORDER — SODIUM CHLORIDE 9 MG/ML
20 INJECTION, SOLUTION INTRAVENOUS ONCE
Status: CANCELLED | OUTPATIENT
Start: 2025-04-14

## 2025-04-14 RX ADMIN — PALONOSETRON 0.25 MG: 0.05 INJECTION, SOLUTION INTRAVENOUS at 11:06

## 2025-04-14 RX ADMIN — DEXTROSE MONOHYDRATE 390 MG: 50 INJECTION, SOLUTION INTRAVENOUS at 12:28

## 2025-04-14 RX ADMIN — PANITUMUMAB 570 MG: 400 SOLUTION INTRAVENOUS at 11:44

## 2025-04-14 RX ADMIN — LEUCOVORIN CALCIUM 870 MG: 10 INJECTION INTRAMUSCULAR; INTRAVENOUS at 12:28

## 2025-04-14 RX ADMIN — SODIUM CHLORIDE 5230 MG: 9 INJECTION, SOLUTION INTRAVENOUS at 14:04

## 2025-04-14 RX ADMIN — DEXAMETHASONE SODIUM PHOSPHATE 12 MG: 4 INJECTION, SOLUTION INTRA-ARTICULAR; INTRALESIONAL; INTRAMUSCULAR; INTRAVENOUS; SOFT TISSUE at 11:05

## 2025-04-14 NOTE — PROGRESS NOTES
Hematology and Oncology [place]  Office number [phone]    Fax number [phone]     Follow up     Date: 25    Patient Name: Edward Powell  MRN: [id number]  : 1970    Referring Physician: Dr. Gautam    Chief Complaint: Rectal cancer with liver and lung metastases    Cancer Staging:  IV    History of Present Illness: Edward Powell is a pleasant 54 y.o. male who presents today for evaluation of rectal cancer.    Patient has a longstanding history of intermittent diarrhea since his cholecystectomy in 2019.  However he developed progressive diarrhea with associated loss of bowel control and urgency as well as weight loss and fatigue prompting additional workup.    CT of the abdomen pelvis 2023 showed an irregular circumferential wall thickening involving the rectum concerning for mass lesion.  There was associated mesorectal lymphadenopathy.  Masslike consolidation of left lower lobe.  CT of the chest showed a cavitary lesion in the left lower lobe up to 4.8 cm with an adjacent cavitary nodule up to 2 cm and a 5 mm nodule on the right minor fissure.      He underwent colonoscopy 2023 with findings of an infiltrative and ulcerated partially obstructing mass in the proximal rectum spanning 10 cm.  Rectal polyps.  Biopsy of the rectal mass showed invasive moderately differentiated adenocarcinoma.  Additional biopsies showed tubular adenomas.  MSI testing was intact/low probability of MSI high.    PDL1 negative    PET/CT 2023 showed hypermetabolic rectal wall thickening compatible with known rectal malignancy.  Multiple small ill-defined hypoechoic hyper metabolic liver lesions compatible with metastatic disease.  Mildly enlarged and mildly hypermetabolic pelvic sidewall and internal iliac lymph node chain adenopathy.  Hypermetabolic lung mass with adjacent nodule.     He underwent liver biopsy and lung biopsy 2024.  Results of both confirmed metastatic  colorectal cancer.    The patient has been experiencing substantial rectal pain.  He is taking oxycodone every 6 hours with partial relief.  He reports ongoing bowel movements.  No abdominal pain.  No vomiting.  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    Treatment history:  FOLFOX cycle 1-4  FOLFOX panitumumab cycle 5 onward  -Oxaliplatin terminated early for allergic reaction after 4/29/24    FOLFIRI/Panitumumab: 2/3/25 to present    Interval history:  Had nausea and diarrhea starting Thursday night - Sun evening. No fever, did have left sided chest pain after coughing spells. All that is now improved.    Drinking and eating since Friday. Developed rash on his palms last night and went to ED. Not pruritic or painful.   Feeling back to fairly good again today except lingering fatigue.   Hands have been healing up.   Taking magnesium once daily.     Past Medical History:   Past Medical History:   Diagnosis Date    Arthritis     Cancer     rectal cancer - diagnosed 2023    Cholelithiasis 2019    Removed    COPD (chronic obstructive pulmonary disease)     Coronary artery disease 2009    Stent - no cardiologist currently    Elevated cholesterol     GERD (gastroesophageal reflux disease)     Hernia 2003    Lower hernia    Perforated ulcer 2019    Sleep apnea     history of; when weighed over 400lbs - no issues following bariatric surgery       Past Surgical History:   Past Surgical History:   Procedure Laterality Date    APPENDECTOMY  1983    Removed    BARIATRIC SURGERY  2011    Gastric bypass    BLADDER TUMOR/ULCER BLEEDER CAUTERIZATION      CARDIAC CATHETERIZATION  2009    stent placed    CHOLECYSTECTOMY  2019    Removed    COLONOSCOPY N/A 12/07/2023    Procedure: COLONOSCOPY WITH  HOT SNARE POLYPECTOMY AND TATTOO;  Surgeon: Noman Gautam MD;  Location: [facility] ENDOSCOPY;  Service: Gastroenterology;  Laterality: N/A;    PORTACATH PLACEMENT N/A 1/5/2024    Procedure: INSERTION OF PORTACATH WITH ULTRSOUND AND FLUOROSCOPIC GUIDANCE;  Surgeon: Ida Black MD;  Location: [facility] OR;  Service: General;  Laterality: N/A;    JENNIFER-EN-Y         Family History: History reviewed. No pertinent family history.  Uncle had colon cancer    Social History:   Social History     Socioeconomic History    Marital status:    Tobacco Use    Smoking status: Every Day     Current packs/day: 1.00     Average packs/day: 0.9 packs/day for 60.6 years (53.1 ttl pk-yrs)     Types: Cigarettes     Start date: 9/6/1994    Smokeless tobacco: Never    Tobacco comments:     35 years      pt reports closer to 2 packs per day since cancer diagnosis   Vaping Use    Vaping status: Never Used   Substance and Sexual Activity    Alcohol use: Never    Drug use: Never    Sexual activity: Defer       Medications:     Current Outpatient Medications:     albuterol sulfate  (90 Base) MCG/ACT inhaler, Inhale 2 puffs Every 4 (Four) Hours As Needed for Wheezing., Disp: 18 g, Rfl: 3    buPROPion XL (WELLBUTRIN XL) 150 MG 24 hr tablet, TAKE 1 TABLET BY MOUTH DAILY, Disp: 90 tablet, Rfl: 1    cetirizine (zyrTEC) 10 MG tablet, Take 1 tablet by mouth Daily., Disp: 30 tablet, Rfl: 2    clindamycin (Clindagel) 1 % gel, Apply 1 Application topically to the appropriate area as directed 2 (Two) Times a Day. Use first, Disp: 30 g, Rfl: 1    dexAMETHasone 0.5 MG/5ML solution, Take 10 mL by mouth 2 (Two) Times a Day. Swish for 2 minutes and spit 10 mL 4 x daily, Disp: 240 mL, Rfl: 5    Diclofenac Sodium (Voltaren) 1 % gel gel, Apply 4 g topically to the appropriate area as directed 2 (Two) Times a Day., Disp: 150 g, Rfl: 1    diphenoxylate-atropine (Lomotil) 2.5-0.025 MG per tablet, Take 1 tablet by mouth Every 6 (Six)  Hours As Needed for Diarrhea., Disp: 30 tablet, Rfl: 0    docusate sodium (COLACE) 100 MG capsule, Take 1 capsule by mouth 2 (Two) Times a Day., Disp: , Rfl:     doxycycline (VIBRAMYICN) 100 MG tablet, Take 1 tablet by mouth 2 (Two) Times a Day. For 7 days, then 1 tablet daily indefinitely, Disp: 67 tablet, Rfl: 3    DULoxetine (CYMBALTA) 60 MG capsule, Take 1 capsule by mouth Daily., Disp: 30 capsule, Rfl: 2    famotidine (PEPCID) 20 MG tablet, Take 1 tablet by mouth 2 (Two) Times a Day., Disp: 60 tablet, Rfl: 4    hydrocortisone 2.5 % ointment, Apply 1 Application topically to the appropriate area as directed 2 (Two) Times a Day., Disp: 60 g, Rfl: 1    Hydrocortisone, Perianal, (ANUSOL-HC) 2.5 % rectal cream, Insert  into the rectum 2 (Two) Times a Day. Indications: Inflamed Hemorrhoids, Disp: 30 g, Rfl: 1    KETOPROFEN-LIDO-GABAPENTIN EX, APPLY 1-2 GRAMS TO AFFECTED AREAS 3-4 TIMES DAILY, Disp: , Rfl:     lidocaine-prilocaine (EMLA) 2.5-2.5 % cream, Apply 1 Application topically to the appropriate area as directed As Needed (45-60 minutes prior to port access.  Cover with saran/plastic wrap.)., Disp: 30 g, Rfl: 3    LORazepam (ATIVAN) 0.5 MG tablet, Take 1 tablet by mouth Take As Directed. 1 tabelt by mouth 30 minutes prior to MRI, take a second tablet at the time of the MRI if needed., Disp: 2 tablet, Rfl: 0    Magic Mouthwash Oral Suspension (diphenhydrAMINE HCl - aluminum & magnesium hydroxide-simethicone - lidocaine - nystatin), Swish and Spit 10 mL by mouth every 6 (Six) Hours For 7 Days., Disp: 300 mL, Rfl: 3    magnesium oxide (MAG-OX) 400 MG tablet, Take 1 tablet by mouth Daily., Disp: 30 tablet, Rfl: 5    mineral oil-hydrophilic petrolatum (AQUAPHOR) ointment, Apply 1 Application topically to the appropriate area as directed As Needed for Dry Skin., Disp: 198 g, Rfl: 0    montelukast (SINGULAIR) 10 MG tablet, Take 1 tablet by mouth Every Night., Disp: 90 tablet, Rfl: 3    Morphine (MS CONTIN) 30 MG 12 hr  tablet, Take 1 tablet by mouth 2 (Two) Times a Day for 30 days., Disp: 60 tablet, Rfl: 0    Movantik 25 MG tablet, , Disp: , Rfl:     mupirocin (BACTROBAN) 2 % cream, , Disp: , Rfl:     naloxone (NARCAN) 4 MG/0.1ML nasal spray, 1 spray into the nostril(s) as directed by provider As Needed for Opioid Reversal., Disp: 1 each, Rfl: 0    nystatin susp + lidocaine viscous (MAGIC MOUTHWASH) oral suspension, 5-10 ml swish and spit or swallow QID prn, Disp: 240 mL, Rfl: 3    ondansetron (ZOFRAN) 8 MG tablet, Take 1 tablet by mouth 3 (Three) Times a Day As Needed for Nausea or Vomiting., Disp: 30 tablet, Rfl: 5    oxyCODONE (ROXICODONE) 15 MG immediate release tablet, Take 1 tablet by mouth 5 (Five) Times a Day As Needed for Moderate Pain or Severe Pain for up to 30 days., Disp: 150 tablet, Rfl: 0    pantoprazole (Protonix) 40 MG EC tablet, Take 1 tablet by mouth Daily. Indications: Gastroesophageal Reflux Disease, Disp: 90 tablet, Rfl: 2    pregabalin (Lyrica) 300 MG capsule, Take 1 capsule by mouth 2 (Two) Times a Day for 30 days., Disp: 60 capsule, Rfl: 0    promethazine-dextromethorphan (PROMETHAZINE-DM) 6.25-15 MG/5ML syrup, Take 5 mL by mouth 3 times a day., Disp: 240 mL, Rfl: 0    tiotropium bromide-olodaterol (Stiolto Respimat) 2.5-2.5 MCG/ACT aerosol solution inhaler, INHALE 2 INHALATION(S) BY MOUTH DAILY, Disp: 4 g, Rfl: 5    traZODone (DESYREL) 150 MG tablet, Take 1 tablet by mouth Every Night., Disp: 30 tablet, Rfl: 2    triamcinolone (KENALOG) 0.025 % ointment, Apply 1 Application topically to the appropriate area as directed 2 (Two) Times a Day. Use second if topical treatment #1 ineffective, Disp: 80 g, Rfl: 0    Allergies:   Allergies   Allergen Reactions    Bactrim [Sulfamethoxazole-Trimethoprim] Hives     and blisters    Sulfa Antibiotics Hives     and blisters       Objective     Vital Signs:   Vitals:    04/14/25 0946   BP: 109/64   Pulse: 87   Temp: 97.1 °F (36.2 °C)   TempSrc: Temporal   SpO2: 92%  "  Weight: 95 kg (209 lb 6.4 oz)   Height: 182.9 cm (72.01\")   PainSc: 5    PainLoc: Generalized  Comment: feet & \"inside\"          Body mass index is 28.39 kg/m².   Pain Score    04/14/25 0946   PainSc: 5    PainLoc: Generalized  Comment: feet & \"inside\"       ECOG Performance Status: 1 - Symptomatic but completely ambulatory    Physical Exam: General: No acute distress. Well appearing.  HEENT: Normocephalic, atraumatic. Sclera anicteric.   Neck: supple, no adenopathy.   Cardiovascular: regular rate and rhythm. No murmurs.   Respiratory: Normal rate. Clear to auscultation bilaterally.  Abdomen: Soft, nontender, non distended with normoactive bowel sounds.  Lymph: no cervical, supraclavicular or axillary adenopathy.  Neuro: Alert and oriented x 3. No focal deficits.   Ext: Symmetric, no swelling.   Skin: rash on hands based on photos  Accurate 4/14/25    Laboratory/Imaging Reviewed:   Hospital Outpatient Visit on 04/14/2025   Component Date Value Ref Range Status    Magnesium 04/14/2025 1.2 (L)  1.6 - 2.6 mg/dL Final    Glucose 04/14/2025 91  65 - 99 mg/dL Final    BUN 04/14/2025 9  6 - 20 mg/dL Final    Creatinine 04/14/2025 0.67 (L)  0.76 - 1.27 mg/dL Final    Sodium 04/14/2025 134 (L)  136 - 145 mmol/L Final    Potassium 04/14/2025 3.1 (L)  3.5 - 5.2 mmol/L Final    Chloride 04/14/2025 100  98 - 107 mmol/L Final    CO2 04/14/2025 24.4  22.0 - 29.0 mmol/L Final    Calcium 04/14/2025 7.6 (L)  8.6 - 10.5 mg/dL Final    Total Protein 04/14/2025 5.3 (L)  6.0 - 8.5 g/dL Final    Albumin 04/14/2025 2.5 (L)  3.5 - 5.2 g/dL Final    ALT (SGPT) 04/14/2025 11  1 - 41 U/L Final    AST (SGOT) 04/14/2025 14  1 - 40 U/L Final    Alkaline Phosphatase 04/14/2025 112  39 - 117 U/L Final    Total Bilirubin 04/14/2025 0.3  0.0 - 1.2 mg/dL Final    Globulin 04/14/2025 2.8  gm/dL Final    A/G Ratio 04/14/2025 0.9  g/dL Final    BUN/Creatinine Ratio 04/14/2025 13.4  7.0 - 25.0 Final    Anion Gap 04/14/2025 9.6  5.0 - 15.0 mmol/L Final "    eGFR 04/14/2025 110.3  >60.0 mL/min/1.73 Final    WBC 04/14/2025 10.51  3.40 - 10.80 10*3/mm3 Final    RBC 04/14/2025 4.19  4.14 - 5.80 10*6/mm3 Final    Hemoglobin 04/14/2025 10.6 (L)  13.0 - 17.7 g/dL Final    Hematocrit 04/14/2025 33.2 (L)  37.5 - 51.0 % Final    MCV 04/14/2025 79.2  79.0 - 97.0 fL Final    MCH 04/14/2025 25.3 (L)  26.6 - 33.0 pg Final    MCHC 04/14/2025 31.9  31.5 - 35.7 g/dL Final    RDW 04/14/2025 20.3 (H)  12.3 - 15.4 % Final    RDW-SD 04/14/2025 57.4 (H)  37.0 - 54.0 fl Final    MPV 04/14/2025 11.1  6.0 - 12.0 fL Final    Platelets 04/14/2025 189  140 - 450 10*3/mm3 Final    Neutrophil % 04/14/2025 75.9  42.7 - 76.0 % Final    Lymphocyte % 04/14/2025 14.5 (L)  19.6 - 45.3 % Final    Monocyte % 04/14/2025 7.9  5.0 - 12.0 % Final    Eosinophil % 04/14/2025 1.0  0.3 - 6.2 % Final    Basophil % 04/14/2025 0.2  0.0 - 1.5 % Final    Immature Grans % 04/14/2025 0.5  0.0 - 0.5 % Final    Neutrophils, Absolute 04/14/2025 7.98 (H)  1.70 - 7.00 10*3/mm3 Final    Lymphocytes, Absolute 04/14/2025 1.52  0.70 - 3.10 10*3/mm3 Final    Monocytes, Absolute 04/14/2025 0.83  0.10 - 0.90 10*3/mm3 Final    Eosinophils, Absolute 04/14/2025 0.11  0.00 - 0.40 10*3/mm3 Final    Basophils, Absolute 04/14/2025 0.02  0.00 - 0.20 10*3/mm3 Final    Immature Grans, Absolute 04/14/2025 0.05  0.00 - 0.05 10*3/mm3 Final    nRBC 04/14/2025 0.0  0.0 - 0.2 /100 WBC Final     Tempus xt: APC gene TP53; Negative ADRIANNA, BRAF, NRAS, TMB stable. PDL1 negative  XR Chest 1 View  Result Date: 3/31/2025  Narrative: PROCEDURE: XR CHEST 1 VW-    HISTORY: Concern for sepsis, eval pneumonia  COMPARISON: January 2025.  FINDINGS: The heart is enlarged. The mediastinum is unremarkable. The right-sided Port-A-Cath is stable. There is stable elevation of the left hemidiaphragm. The lungs are clear. Infiltrate. There is no pneumothorax. There are no acute osseous abnormalities.      Impression: No acute cardiopulmonary process.        Images were  reviewed, interpreted, and dictated by Dr. Gabriella Rodriguez MD Transcribed by Krysta Aldana PA-C.  This report was signed and finalized on 3/31/2025 10:41 AM by Gabriella Rodriguez MD.        Procedures    Assessment / Plan      Assessment/Plan:     1.  Rectal cancer   2.  Liver metastases  3.  Lung metastasis  4.  Chemo monitoring  5.  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  -He was found to have biopsy-proven metastasis in the liver and lung.  His case was presented at multidisciplinary tumor board.  -Because of metastatic disease to both the lung and liver I do not think he will be downstage to resectable disease.  -Tempus results which are notable for an APC mutation, T p53 mutation, negative for KRAS, BRAF, NRAS, tumor mutation burden stable.  Notch 1 VUS.  -CBC adequate and CMP pending for treatment today with maintenance 5-FU and panitumumab 9/16/24  Chemotherapy orders and premedications signed9/16/24  -We reviewed his imaging which is consistent with excellent disease control in early July with normalization of CEA. He has had persistent rectal pain with escalating oxycodone requirements over the past couple of months, but no severe recent increase in pain, fever. WBC normal. 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 I reviewed his EUA notes. Increase doxy to BID. No surgical intervention.  -Consolidative radiotherapy to the rectal tumor is not something he wants to pursue initially but has now undergone consultation.  Maintenance 5FU/panitumumab on hold since early December.  I reviewed his last 4 serial CT images which do so progressive and in his lung. Escalated to FOLFIRI panitumumab 2/2025 to present  -Labs ok for treatment today. Orders signed. He is going to continue to hold off on consolidative radiotherapy to the rectum at this time.  -Follow up in 2 weeks with next labs and appt.     6.  Cancer related  pain  -Now following with palliative care and pain. Well controlled on lyrica, oxycodone and MS contin.     7.  Panitumumab induced rash  -Continue doxycycline and emollients  -Stop voltaren gel, start hydrocortisone 2.5 % to palms    8. GERD  PPI, well controlled    9. Access  -Port    10. Chemo diarrhea  -Hold laxatives on day of diarrhea  -Lomotil PRN    11. Hypomagnesemia  -Mg 1.5      12. Hypokalemia  -Resume daily for 7 days from home supply, then stop. Verifies he has plenty at home.     Follow Up:   2 weeks     Brenda Cronin MD  Hematology and Oncology

## 2025-04-14 NOTE — CODE DOCUMENTATION
IVAD established on first attempt; pt needed 20mL 0.9% NS flush to receive blood return, labs obtained off site. Labs WNL. Treatment provided per tx plan (see EMAR). Pt tolerated tx w/o complications. IVAD Fluorouracil home pump applied and infusing. AVS provided. Pt ambulated out of clinic w/o any acute s/sx of distress.

## 2025-04-16 ENCOUNTER — HOSPITAL ENCOUNTER (OUTPATIENT)
Facility: HOSPITAL | Age: 55
Discharge: HOME | End: 2025-04-16
Admitting: INTERNAL MEDICINE
Payer: COMMERCIAL

## 2025-04-16 DIAGNOSIS — C20 RECTAL CANCER METASTASIZED TO LIVER: Primary | ICD-10-CM

## 2025-04-16 DIAGNOSIS — C20 RECTAL ADENOCARCINOMA: ICD-10-CM

## 2025-04-16 DIAGNOSIS — C78.7 RECTAL CANCER METASTASIZED TO LIVER: Primary | ICD-10-CM

## 2025-04-16 PROCEDURE — 96523 IRRIG DRUG DELIVERY DEVICE: CPT

## 2025-04-16 PROCEDURE — 25010000002 HEPARIN LOCK FLUSH PER 10 UNITS: Performed by: INTERNAL MEDICINE

## 2025-04-16 RX ORDER — SODIUM CHLORIDE 0.9 % (FLUSH) 0.9 %
10 SYRINGE (ML) INJECTION AS NEEDED
OUTPATIENT
Start: 2025-04-16

## 2025-04-16 RX ORDER — SODIUM CHLORIDE 0.9 % (FLUSH) 0.9 %
10 SYRINGE (ML) INJECTION AS NEEDED
Status: DISCONTINUED | OUTPATIENT
Start: 2025-04-16 | End: 2025-04-17 | Stop reason: HOSPADM

## 2025-04-16 RX ORDER — HEPARIN SODIUM (PORCINE) LOCK FLUSH IV SOLN 100 UNIT/ML 100 UNIT/ML
500 SOLUTION INTRAVENOUS AS NEEDED
OUTPATIENT
Start: 2025-04-16

## 2025-04-16 RX ORDER — HEPARIN SODIUM (PORCINE) LOCK FLUSH IV SOLN 100 UNIT/ML 100 UNIT/ML
500 SOLUTION INTRAVENOUS AS NEEDED
Status: DISCONTINUED | OUTPATIENT
Start: 2025-04-16 | End: 2025-04-17 | Stop reason: HOSPADM

## 2025-04-16 RX ADMIN — HEPARIN 500 UNITS: 100 SYRINGE at 14:31

## 2025-04-16 RX ADMIN — Medication 10 ML: at 14:30

## 2025-04-24 ENCOUNTER — HOSPITAL ENCOUNTER (OUTPATIENT)
Dept: CT IMAGING | Facility: HOSPITAL | Age: 55
Discharge: HOME | End: 2025-04-24
Admitting: INTERNAL MEDICINE
Payer: COMMERCIAL

## 2025-04-24 DIAGNOSIS — C78.7 RECTAL CANCER METASTASIZED TO LIVER: ICD-10-CM

## 2025-04-24 DIAGNOSIS — C20 RECTAL CANCER METASTASIZED TO LIVER: ICD-10-CM

## 2025-04-24 PROCEDURE — 71260 CT THORAX DX C+: CPT

## 2025-04-24 PROCEDURE — 25510000001 IOPAMIDOL 61 % SOLUTION: Performed by: INTERNAL MEDICINE

## 2025-04-24 PROCEDURE — 25010000002 HEPARIN LOCK FLUSH PER 10 UNITS: Performed by: RADIOLOGY

## 2025-04-24 PROCEDURE — 74177 CT ABD & PELVIS W/CONTRAST: CPT

## 2025-04-24 RX ORDER — IOPAMIDOL 612 MG/ML
100 INJECTION, SOLUTION INTRAVASCULAR
Status: COMPLETED | OUTPATIENT
Start: 2025-04-24 | End: 2025-04-24

## 2025-04-24 RX ORDER — HEPARIN SODIUM (PORCINE) LOCK FLUSH IV SOLN 100 UNIT/ML 100 UNIT/ML
500 SOLUTION INTRAVENOUS ONCE
Status: COMPLETED | OUTPATIENT
Start: 2025-04-24 | End: 2025-04-24

## 2025-04-24 RX ADMIN — IOPAMIDOL 100 ML: 612 INJECTION, SOLUTION INTRAVENOUS at 13:28

## 2025-04-24 RX ADMIN — SODIUM CHLORIDE, PRESERVATIVE FREE 500 UNITS: 5 INJECTION INTRAVENOUS at 14:31

## 2025-04-28 ENCOUNTER — HOSPITAL ENCOUNTER (OUTPATIENT)
Facility: HOSPITAL | Age: 55
Discharge: HOME | End: 2025-04-28
Admitting: INTERNAL MEDICINE
Payer: COMMERCIAL

## 2025-04-28 ENCOUNTER — APPOINTMENT (OUTPATIENT)
Facility: HOSPITAL | Age: 55
End: 2025-04-28
Payer: COMMERCIAL

## 2025-04-28 ENCOUNTER — OFFICE VISIT (OUTPATIENT)
Dept: ONCOLOGY | Facility: CLINIC | Age: 55
End: 2025-04-28
Payer: COMMERCIAL

## 2025-04-28 VITALS
HEART RATE: 81 BPM | SYSTOLIC BLOOD PRESSURE: 108 MMHG | BODY MASS INDEX: 26.41 KG/M2 | WEIGHT: 195 LBS | OXYGEN SATURATION: 99 % | TEMPERATURE: 97.1 F | DIASTOLIC BLOOD PRESSURE: 65 MMHG | HEIGHT: 72 IN

## 2025-04-28 DIAGNOSIS — C78.7 RECTAL CANCER METASTASIZED TO LIVER: Primary | ICD-10-CM

## 2025-04-28 DIAGNOSIS — C20 RECTAL CANCER METASTASIZED TO LIVER: Primary | ICD-10-CM

## 2025-04-28 DIAGNOSIS — C20 RECTAL ADENOCARCINOMA: ICD-10-CM

## 2025-04-28 LAB
ALBUMIN SERPL-MCNC: 2.8 G/DL (ref 3.5–5.2)
ALBUMIN/GLOB SERPL: 0.8 G/DL
ALP SERPL-CCNC: 112 U/L (ref 39–117)
ALT SERPL W P-5'-P-CCNC: 11 U/L (ref 1–41)
ANION GAP SERPL CALCULATED.3IONS-SCNC: 9.7 MMOL/L (ref 5–15)
ANISOCYTOSIS BLD QL: NORMAL
AST SERPL-CCNC: 13 U/L (ref 1–40)
BASOPHILS # BLD AUTO: 0.03 10*3/MM3 (ref 0–0.2)
BASOPHILS NFR BLD AUTO: 0.4 % (ref 0–1.5)
BILIRUB SERPL-MCNC: 0.3 MG/DL (ref 0–1.2)
BUN SERPL-MCNC: 7 MG/DL (ref 6–20)
BUN/CREAT SERPL: 8.8 (ref 7–25)
CALCIUM SPEC-SCNC: 8.2 MG/DL (ref 8.6–10.5)
CHLORIDE SERPL-SCNC: 106 MMOL/L (ref 98–107)
CO2 SERPL-SCNC: 21.3 MMOL/L (ref 22–29)
CREAT SERPL-MCNC: 0.8 MG/DL (ref 0.76–1.27)
DEPRECATED RDW RBC AUTO: 59.3 FL (ref 37–54)
EGFRCR SERPLBLD CKD-EPI 2021: 104.5 ML/MIN/1.73
ELLIPTOCYTES BLD QL SMEAR: NORMAL
EOSINOPHIL # BLD AUTO: 0.1 10*3/MM3 (ref 0–0.4)
EOSINOPHIL NFR BLD AUTO: 1.2 % (ref 0.3–6.2)
ERYTHROCYTE [DISTWIDTH] IN BLOOD BY AUTOMATED COUNT: 20.7 % (ref 12.3–15.4)
GLOBULIN UR ELPH-MCNC: 3.4 GM/DL
GLUCOSE SERPL-MCNC: 97 MG/DL (ref 65–99)
HCT VFR BLD AUTO: 36.6 % (ref 37.5–51)
HGB BLD-MCNC: 11.6 G/DL (ref 13–17.7)
IMM GRANULOCYTES # BLD AUTO: 0.04 10*3/MM3 (ref 0–0.05)
IMM GRANULOCYTES NFR BLD AUTO: 0.5 % (ref 0–0.5)
LYMPHOCYTES # BLD AUTO: 1.44 10*3/MM3 (ref 0.7–3.1)
LYMPHOCYTES NFR BLD AUTO: 17.5 % (ref 19.6–45.3)
MAGNESIUM SERPL-MCNC: 1.2 MG/DL (ref 1.6–2.6)
MCH RBC QN AUTO: 25.8 PG (ref 26.6–33)
MCHC RBC AUTO-ENTMCNC: 31.7 G/DL (ref 31.5–35.7)
MCV RBC AUTO: 81.3 FL (ref 79–97)
MICROCYTES BLD QL: NORMAL
MONOCYTES # BLD AUTO: 0.76 10*3/MM3 (ref 0.1–0.9)
MONOCYTES NFR BLD AUTO: 9.2 % (ref 5–12)
NEUTROPHILS NFR BLD AUTO: 5.85 10*3/MM3 (ref 1.7–7)
NEUTROPHILS NFR BLD AUTO: 71.2 % (ref 42.7–76)
NRBC BLD AUTO-RTO: 0 /100 WBC (ref 0–0.2)
PLAT MORPH BLD: NORMAL
PLATELET # BLD AUTO: 400 10*3/MM3 (ref 140–450)
PMV BLD AUTO: 10.3 FL (ref 6–12)
POTASSIUM SERPL-SCNC: 3.2 MMOL/L (ref 3.5–5.2)
PROT SERPL-MCNC: 6.2 G/DL (ref 6–8.5)
RBC # BLD AUTO: 4.5 10*6/MM3 (ref 4.14–5.8)
SODIUM SERPL-SCNC: 137 MMOL/L (ref 136–145)
WBC MORPH BLD: NORMAL
WBC NRBC COR # BLD AUTO: 8.22 10*3/MM3 (ref 3.4–10.8)

## 2025-04-28 PROCEDURE — 96523 IRRIG DRUG DELIVERY DEVICE: CPT

## 2025-04-28 PROCEDURE — 83735 ASSAY OF MAGNESIUM: CPT | Performed by: INTERNAL MEDICINE

## 2025-04-28 PROCEDURE — 25010000002 HEPARIN LOCK FLUSH PER 10 UNITS: Performed by: INTERNAL MEDICINE

## 2025-04-28 PROCEDURE — 85025 COMPLETE CBC W/AUTO DIFF WBC: CPT | Performed by: INTERNAL MEDICINE

## 2025-04-28 PROCEDURE — 99214 OFFICE O/P EST MOD 30 MIN: CPT | Performed by: INTERNAL MEDICINE

## 2025-04-28 PROCEDURE — 80053 COMPREHEN METABOLIC PANEL: CPT | Performed by: INTERNAL MEDICINE

## 2025-04-28 PROCEDURE — 85007 BL SMEAR W/DIFF WBC COUNT: CPT | Performed by: INTERNAL MEDICINE

## 2025-04-28 PROCEDURE — 36591 DRAW BLOOD OFF VENOUS DEVICE: CPT

## 2025-04-28 RX ORDER — SODIUM CHLORIDE 0.9 % (FLUSH) 0.9 %
10 SYRINGE (ML) INJECTION AS NEEDED
OUTPATIENT
Start: 2025-04-28

## 2025-04-28 RX ORDER — HEPARIN SODIUM (PORCINE) LOCK FLUSH IV SOLN 100 UNIT/ML 100 UNIT/ML
500 SOLUTION INTRAVENOUS AS NEEDED
Status: DISCONTINUED | OUTPATIENT
Start: 2025-04-28 | End: 2025-04-29 | Stop reason: HOSPADM

## 2025-04-28 RX ORDER — HEPARIN SODIUM (PORCINE) LOCK FLUSH IV SOLN 100 UNIT/ML 100 UNIT/ML
500 SOLUTION INTRAVENOUS AS NEEDED
OUTPATIENT
Start: 2025-04-28

## 2025-04-28 RX ORDER — POTASSIUM CHLORIDE 1500 MG/1
20 TABLET, EXTENDED RELEASE ORAL DAILY
Qty: 30 TABLET | Refills: 1 | Status: SHIPPED | OUTPATIENT
Start: 2025-04-28

## 2025-04-28 RX ORDER — SODIUM CHLORIDE 0.9 % (FLUSH) 0.9 %
10 SYRINGE (ML) INJECTION AS NEEDED
Status: DISCONTINUED | OUTPATIENT
Start: 2025-04-28 | End: 2025-04-29 | Stop reason: HOSPADM

## 2025-04-28 RX ADMIN — HEPARIN 500 UNITS: 100 SYRINGE at 10:07

## 2025-04-28 NOTE — PROGRESS NOTES
Hematology and Oncology [facility]  Office number [phone]    Fax number [phone]     Follow up     Date: 25    Patient Name: Edward Powell  MRN: [id number]  : 1970    Referring Physician: Dr. Gautam    Chief Complaint: Rectal cancer with liver and lung metastases    Cancer Staging:  IV    History of Present Illness: Edward Powell is a pleasant 54 y.o. male who presents today for evaluation of rectal cancer.    Patient has a longstanding history of intermittent diarrhea since his cholecystectomy in 2019.  However he developed progressive diarrhea with associated loss of bowel control and urgency as well as weight loss and fatigue prompting additional workup.    CT of the abdomen pelvis 2023 showed an irregular circumferential wall thickening involving the rectum concerning for mass lesion.  There was associated mesorectal lymphadenopathy.  Masslike consolidation of left lower lobe.  CT of the chest showed a cavitary lesion in the left lower lobe up to 4.8 cm with an adjacent cavitary nodule up to 2 cm and a 5 mm nodule on the right minor fissure.      He underwent colonoscopy 2023 with findings of an infiltrative and ulcerated partially obstructing mass in the proximal rectum spanning 10 cm.  Rectal polyps.  Biopsy of the rectal mass showed invasive moderately differentiated adenocarcinoma.  Additional biopsies showed tubular adenomas.  MSI testing was intact/low probability of MSI high.    PDL1 negative    PET/CT 2023 showed hypermetabolic rectal wall thickening compatible with known rectal malignancy.  Multiple small ill-defined hypoechoic hyper metabolic liver lesions compatible with metastatic disease.  Mildly enlarged and mildly hypermetabolic pelvic sidewall and internal iliac lymph node chain adenopathy.  Hypermetabolic lung mass with adjacent nodule.     He underwent liver biopsy and lung biopsy 2024.  Results of both confirmed metastatic  colorectal cancer.    The patient has been experiencing substantial rectal pain.  He is taking oxycodone every 6 hours with partial relief.  He reports ongoing bowel movements.  No abdominal pain.  No vomiting.  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    Treatment history:  FOLFOX cycle 1-4  FOLFOX panitumumab cycle 5 onward  -Oxaliplatin terminated early for allergic reaction after 4/29/24    FOLFIRI/Panitumumab: 2/3/25 to present    Interval history:  More dyspepsia and fatigue. Wants to take break from chemo this week and next. His wife is having hand surgery this week, bilaterally. Had cough for 2 weeks now resolved. No fever  Taking potassium and magnesium     Past Medical History:   Past Medical History:   Diagnosis Date    Arthritis     Cancer     rectal cancer - diagnosed 2023    Cholelithiasis 2019    Removed    COPD (chronic obstructive pulmonary disease)     Coronary artery disease 2009    Stent - no cardiologist currently    Elevated cholesterol     GERD (gastroesophageal reflux disease)     Hernia 2003    Lower hernia    Perforated ulcer 2019    Sleep apnea     history of; when weighed over 400lbs - no issues following bariatric surgery       Past Surgical History:   Past Surgical History:   Procedure Laterality Date    APPENDECTOMY  1983    Removed    BARIATRIC SURGERY  2011    Gastric bypass    BLADDER TUMOR/ULCER BLEEDER CAUTERIZATION      CARDIAC CATHETERIZATION  2009    stent placed    CHOLECYSTECTOMY  2019    Removed    COLONOSCOPY N/A 12/07/2023    Procedure: COLONOSCOPY WITH HOT SNARE POLYPECTOMY AND TATTOO;  Surgeon: Noman Gautam MD;  Location: [facility] ENDOSCOPY;  Service: Gastroenterology;  Laterality: N/A;    PORTACATH PLACEMENT N/A 1/5/2024     Procedure: INSERTION OF PORTACATH WITH ULTRSOUND AND FLUOROSCOPIC GUIDANCE;  Surgeon: Ida Balck MD;  Location: [facility];  Service: General;  Laterality: N/A;    JENNIFER-EN-Y         Family History: No family history on file.  Uncle had colon cancer    Social History:   Social History     Socioeconomic History    Marital status:    Tobacco Use    Smoking status: Every Day     Current packs/day: 1.00     Average packs/day: 0.9 packs/day for 60.6 years (53.1 ttl pk-yrs)     Types: Cigarettes     Start date: 9/6/1994    Smokeless tobacco: Never    Tobacco comments:     35 years      pt reports closer to 2 packs per day since cancer diagnosis   Vaping Use    Vaping status: Never Used   Substance and Sexual Activity    Alcohol use: Never    Drug use: Never    Sexual activity: Defer       Medications:     Current Outpatient Medications:     albuterol sulfate  (90 Base) MCG/ACT inhaler, Inhale 2 puffs Every 4 (Four) Hours As Needed for Wheezing., Disp: 18 g, Rfl: 3    buPROPion XL (WELLBUTRIN XL) 150 MG 24 hr tablet, TAKE 1 TABLET BY MOUTH DAILY, Disp: 90 tablet, Rfl: 1    cetirizine (zyrTEC) 10 MG tablet, Take 1 tablet by mouth Daily., Disp: 30 tablet, Rfl: 2    clindamycin (Clindagel) 1 % gel, Apply 1 Application topically to the appropriate area as directed 2 (Two) Times a Day. Use first, Disp: 30 g, Rfl: 1    dexAMETHasone 0.5 MG/5ML solution, Take 10 mL by mouth 2 (Two) Times a Day. Swish for 2 minutes and spit 10 mL 4 x daily, Disp: 240 mL, Rfl: 5    Diclofenac Sodium (Voltaren) 1 % gel gel, Apply 4 g topically to the appropriate area as directed 2 (Two) Times a Day., Disp: 150 g, Rfl: 1    diphenoxylate-atropine (Lomotil) 2.5-0.025 MG per tablet, Take 1 tablet by mouth Every 6 (Six) Hours As Needed for Diarrhea., Disp: 30 tablet, Rfl: 0    docusate sodium (COLACE) 100 MG capsule, Take 1 capsule by mouth 2 (Two) Times a Day., Disp: , Rfl:     doxycycline (VIBRAMYICN) 100 MG tablet, Take  1 tablet by mouth 2 (Two) Times a Day. For 7 days, then 1 tablet daily indefinitely, Disp: 67 tablet, Rfl: 3    DULoxetine (CYMBALTA) 60 MG capsule, Take 1 capsule by mouth Daily., Disp: 30 capsule, Rfl: 2    famotidine (PEPCID) 20 MG tablet, Take 1 tablet by mouth 2 (Two) Times a Day., Disp: 60 tablet, Rfl: 4    hydrocortisone 2.5 % ointment, Apply 1 Application topically to the appropriate area as directed 2 (Two) Times a Day., Disp: 60 g, Rfl: 1    Hydrocortisone, Perianal, (ANUSOL-HC) 2.5 % rectal cream, Insert  into the rectum 2 (Two) Times a Day. Indications: Inflamed Hemorrhoids, Disp: 30 g, Rfl: 1    KETOPROFEN-LIDO-GABAPENTIN EX, APPLY 1-2 GRAMS TO AFFECTED AREAS 3-4 TIMES DAILY, Disp: , Rfl:     lidocaine-prilocaine (EMLA) 2.5-2.5 % cream, Apply 1 Application topically to the appropriate area as directed As Needed (45-60 minutes prior to port access.  Cover with saran/plastic wrap.)., Disp: 30 g, Rfl: 3    LORazepam (ATIVAN) 0.5 MG tablet, Take 1 tablet by mouth Take As Directed. 1 tabelt by mouth 30 minutes prior to MRI, take a second tablet at the time of the MRI if needed., Disp: 2 tablet, Rfl: 0    Magic Mouthwash Oral Suspension (diphenhydrAMINE HCl - aluminum & magnesium hydroxide-simethicone - lidocaine - nystatin), Swish and Spit 10 mL by mouth every 6 (Six) Hours For 7 Days., Disp: 300 mL, Rfl: 3    magnesium oxide (MAG-OX) 400 MG tablet, Take 1 tablet by mouth Daily., Disp: 30 tablet, Rfl: 5    mineral oil-hydrophilic petrolatum (AQUAPHOR) ointment, Apply 1 Application topically to the appropriate area as directed As Needed for Dry Skin., Disp: 198 g, Rfl: 0    montelukast (SINGULAIR) 10 MG tablet, Take 1 tablet by mouth Every Night., Disp: 90 tablet, Rfl: 3    Morphine (MS CONTIN) 30 MG 12 hr tablet, Take 1 tablet by mouth 2 (Two) Times a Day for 30 days., Disp: 60 tablet, Rfl: 0    Movantik 25 MG tablet, , Disp: , Rfl:     mupirocin (BACTROBAN) 2 % cream, , Disp: , Rfl:     naloxone (NARCAN) 4  MG/0.1ML nasal spray, 1 spray into the nostril(s) as directed by provider As Needed for Opioid Reversal., Disp: 1 each, Rfl: 0    nystatin susp + lidocaine viscous (MAGIC MOUTHWASH) oral suspension, 5-10 ml swish and spit or swallow QID prn, Disp: 240 mL, Rfl: 3    ondansetron (ZOFRAN) 8 MG tablet, Take 1 tablet by mouth 3 (Three) Times a Day As Needed for Nausea or Vomiting., Disp: 30 tablet, Rfl: 5    oxyCODONE (ROXICODONE) 15 MG immediate release tablet, Take 1 tablet by mouth 5 (Five) Times a Day As Needed for Moderate Pain or Severe Pain for up to 30 days., Disp: 150 tablet, Rfl: 0    pantoprazole (Protonix) 40 MG EC tablet, Take 1 tablet by mouth Daily. Indications: Gastroesophageal Reflux Disease, Disp: 90 tablet, Rfl: 2    pregabalin (Lyrica) 300 MG capsule, Take 1 capsule by mouth 2 (Two) Times a Day for 30 days., Disp: 60 capsule, Rfl: 0    promethazine-dextromethorphan (PROMETHAZINE-DM) 6.25-15 MG/5ML syrup, Take 5 mL by mouth 3 times a day., Disp: 240 mL, Rfl: 0    tiotropium bromide-olodaterol (Stiolto Respimat) 2.5-2.5 MCG/ACT aerosol solution inhaler, INHALE 2 INHALATION(S) BY MOUTH DAILY, Disp: 4 g, Rfl: 5    traZODone (DESYREL) 150 MG tablet, Take 1 tablet by mouth Every Night., Disp: 30 tablet, Rfl: 2    triamcinolone (KENALOG) 0.025 % ointment, Apply 1 Application topically to the appropriate area as directed 2 (Two) Times a Day. Use second if topical treatment #1 ineffective, Disp: 80 g, Rfl: 0  No current facility-administered medications for this visit.    Facility-Administered Medications Ordered in Other Visits:     heparin injection 500 Units, 500 Units, Intravenous, PRN, Brenda Cronin MD    sodium chloride 0.9 % flush 10 mL, 10 mL, Intravenous, PRN, Brenda Cronin MD    Allergies:   Allergies   Allergen Reactions    Bactrim [Sulfamethoxazole-Trimethoprim] Hives     and blisters    Sulfa Antibiotics Hives     and blisters       Objective     Vital Signs:   Vitals:    04/28/25 0912   BP:  "108/65   Pulse: 81   Temp: 97.1 °F (36.2 °C)   TempSrc: Infrared   SpO2: 99%   Weight: 88.5 kg (195 lb)   Height: 182.9 cm (72.01\")   PainSc: 0-No pain          Body mass index is 26.44 kg/m².   Pain Score    04/28/25 0912   PainSc: 0-No pain       ECOG Performance Status: 1 - Symptomatic but completely ambulatory    Physical Exam: General: No acute distress. Well appearing.  HEENT: Normocephalic, atraumatic. Sclera anicteric.   Neck: supple, no adenopathy.   Cardiovascular: regular rate and rhythm. No murmurs.   Respiratory: Normal rate. Clear to auscultation bilaterally.  Abdomen: Soft, nontender, non distended with normoactive bowel sounds.  Lymph: no cervical, supraclavicular or axillary adenopathy.  Neuro: Alert and oriented x 3. No focal deficits.   Ext: Symmetric, no swelling.   Accurate 4/28/25    Laboratory/Imaging Reviewed:   Hospital Outpatient Visit on 04/28/2025   Component Date Value Ref Range Status    Glucose 04/28/2025 97  65 - 99 mg/dL Final    BUN 04/28/2025 7  6 - 20 mg/dL Final    Creatinine 04/28/2025 0.80  0.76 - 1.27 mg/dL Final    Sodium 04/28/2025 137  136 - 145 mmol/L Final    Potassium 04/28/2025 3.2 (L)  3.5 - 5.2 mmol/L Final    Chloride 04/28/2025 106  98 - 107 mmol/L Final    CO2 04/28/2025 21.3 (L)  22.0 - 29.0 mmol/L Final    Calcium 04/28/2025 8.2 (L)  8.6 - 10.5 mg/dL Final    Total Protein 04/28/2025 6.2  6.0 - 8.5 g/dL Final    Albumin 04/28/2025 2.8 (L)  3.5 - 5.2 g/dL Final    ALT (SGPT) 04/28/2025 11  1 - 41 U/L Final    AST (SGOT) 04/28/2025 13  1 - 40 U/L Final    Alkaline Phosphatase 04/28/2025 112  39 - 117 U/L Final    Total Bilirubin 04/28/2025 0.3  0.0 - 1.2 mg/dL Final    Globulin 04/28/2025 3.4  gm/dL Final    A/G Ratio 04/28/2025 0.8  g/dL Final    BUN/Creatinine Ratio 04/28/2025 8.8  7.0 - 25.0 Final    Anion Gap 04/28/2025 9.7  5.0 - 15.0 mmol/L Final    eGFR 04/28/2025 104.5  >60.0 mL/min/1.73 Final    Magnesium 04/28/2025 1.2 (L)  1.6 - 2.6 mg/dL Final    WBC " 04/28/2025 8.22  3.40 - 10.80 10*3/mm3 Final    RBC 04/28/2025 4.50  4.14 - 5.80 10*6/mm3 Final    Hemoglobin 04/28/2025 11.6 (L)  13.0 - 17.7 g/dL Final    Hematocrit 04/28/2025 36.6 (L)  37.5 - 51.0 % Final    MCV 04/28/2025 81.3  79.0 - 97.0 fL Final    MCH 04/28/2025 25.8 (L)  26.6 - 33.0 pg Final    MCHC 04/28/2025 31.7  31.5 - 35.7 g/dL Final    RDW 04/28/2025 20.7 (H)  12.3 - 15.4 % Final    RDW-SD 04/28/2025 59.3 (H)  37.0 - 54.0 fl Final    MPV 04/28/2025 10.3  6.0 - 12.0 fL Final    Platelets 04/28/2025 400  140 - 450 10*3/mm3 Final    Neutrophil % 04/28/2025 71.2  42.7 - 76.0 % Final    Lymphocyte % 04/28/2025 17.5 (L)  19.6 - 45.3 % Final    Monocyte % 04/28/2025 9.2  5.0 - 12.0 % Final    Eosinophil % 04/28/2025 1.2  0.3 - 6.2 % Final    Basophil % 04/28/2025 0.4  0.0 - 1.5 % Final    Immature Grans % 04/28/2025 0.5  0.0 - 0.5 % Final    Neutrophils, Absolute 04/28/2025 5.85  1.70 - 7.00 10*3/mm3 Final    Lymphocytes, Absolute 04/28/2025 1.44  0.70 - 3.10 10*3/mm3 Final    Monocytes, Absolute 04/28/2025 0.76  0.10 - 0.90 10*3/mm3 Final    Eosinophils, Absolute 04/28/2025 0.10  0.00 - 0.40 10*3/mm3 Final    Basophils, Absolute 04/28/2025 0.03  0.00 - 0.20 10*3/mm3 Final    Immature Grans, Absolute 04/28/2025 0.04  0.00 - 0.05 10*3/mm3 Final    nRBC 04/28/2025 0.0  0.0 - 0.2 /100 WBC Final    Anisocytosis 04/28/2025 Slight/1+  None Seen Final    Elliptocytes 04/28/2025 Slight/1+  None Seen Final    Microcytes 04/28/2025 Slight/1+  None Seen Final    WBC Morphology 04/28/2025 Normal  Normal Final    Platelet Morphology 04/28/2025 Normal  Normal Final     Tempus xt: APC gene TP53; Negative ADRIANNA, BRAF, NRAS, TMB stable. PDL1 negative  CT Chest With Contrast Diagnostic  CT Chest With Contrast Diagnostic, CT Abdomen Pelvis With Contrast  Result Date: 4/25/2025  Narrative: PROCEDURE: CT ABDOMEN PELVIS W CONTRAST-, CT CHEST W CONTRAST DIAGNOSTIC-  HISTORY: rectal cancer; S29-Kcttegvoz neoplasm of rectum;  C78.7-Secondary malignant neoplasm of liver and intrahepatic bile duct  COMPARISON: 01/16/2025.  PROCEDURE: The patient was injected with IV contrast. Oral contrast was administered. Axial images were obtained from the lung apex to the pubic symphysis by computed tomography. This study was performed with techniques to keep radiation doses as low as reasonably achievable, (ALARA). Individualized dose reduction techniques using automated exposure control or adjustment of mA and/or kV according to the patient size were employed.  FINDINGS:  CHEST: There is no axillary adenopathy. Right internal jugular port noted. There is no hilar or mediastinal adenopathy.  The heart size is normal. There is no pericardial or pleural effusion. Again noted is the lobulated cavitary mass posterior left lower lobe, slightly decreased in size from prior exam from 47 to 43 mm. Cavitary lesion seen lateral to the first mass posterior left lower lobe has a stable transverse diameter of 15 mm. There is new patchy airspace disease in both the left upper and lower lobes, predominantly ground-glass opacities; appearance suggests pneumonia. There are a few 5 mm or less noncalcified nodules, as well as a mixed density lesion anterior left upper lobe measuring up to 12 mm, additional metastatic disease is not excluded. Recommend follow-up in 3 months. Very faint ground-glass opacity is seen anteriorly in the right middle lobe. Minor fissural nodule is stable from prior and likely represents a lymph node. Faint ground-glass opacities are also seen in the right upper lobe. Here is evidence of old calcified granulomatous disease. Vascular calcifications noted. Emphysematous change noted. No bony destructive lesion seen  ABDOMEN: The liver is homogenous with no focal abnormality. Liver is enlarged up to 19 cm, stable from prior. Portal vein is upper limits of normal; recommend correlation with liver function tests. There are metallic surgical clips in  the right upper quadrant consistent with previous cholecystectomy. The spleen is unremarkable. No adrenal mass is present.  The pancreas is unremarkable. The kidneys demonstrate circumscribed hypodense lesions in the right kidney, consistent with cysts and stable from prior. No left renal lesion seen. The aorta is normal in caliber. There is no free fluid or adenopathy. No adrenal mass identified. Calcification again noted in the lateral limb of the left adrenal gland, stable vascular calcifications noted.  PELVIS: The GI tract demonstrates no obstruction. The appendix is not identified. The urinary bladder is unremarkable.  There is no free fluid, adenopathy, or inflammatory process. There is an area of increased attenuation in the subcutaneous fat superficial and lateral to the greater trochanter, recommend correlation with physical exam. Prostate is normal in size. There is asymmetric wall thickening of the distal sigmoid and the rectum, which may correlate with the patient's known rectal carcinoma. There is stable appearance to the bilateral inguinal lymph nodes, as well as bilateral external iliac lymph nodes. No bony destructive lesion seen.      Impression:  1. Slight interval decrease in larger cavitary lesion posterior left lower lobe with stable smaller lesion as described.  2. New ground-glass opacities bilaterally, left greater than right, with subcentimeter nodules on the left and a 12 mm mixed density lesion on the left; suspect infectious/inflammatory process, but metastatic disease not excluded. Recommend follow-up in 8 to 12 weeks.  3. Stable appearance of bilateral inguinal and external iliac lymph nodes.  4. Asymmetric wall thickening of the rectum as described.  5. Question inflammatory/infectious process in the subcutaneous fat lateral to the greater trochanter; recommend correlation with physical exam.   CTDI: 7.2 mGy DLP:380.15 mGy.cm  This report was signed and finalized on 4/25/2025 10:46 AM  by Gabriella Rodriguez MD.      XR Chest 1 View  Result Date: 3/31/2025  Narrative: PROCEDURE: XR CHEST 1 VW-    HISTORY: Concern for sepsis, eval pneumonia  COMPARISON: January 2025.  FINDINGS: The heart is enlarged. The mediastinum is unremarkable. The right-sided Port-A-Cath is stable. There is stable elevation of the left hemidiaphragm. The lungs are clear. Infiltrate. There is no pneumothorax. There are no acute osseous abnormalities.      Impression: No acute cardiopulmonary process.        Images were reviewed, interpreted, and dictated by Dr. Gabriella Rodriguez MD Transcribed by Krysta Aldana PA-C.  This report was signed and finalized on 3/31/2025 10:41 AM by Gabriella Rodriguez MD.        Procedures    Assessment / Plan      Assessment/Plan:     1.  Rectal cancer   2.  Liver metastases  3.  Lung metastasis  4.  Chemo monitoring  5.  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  -He was found to have biopsy-proven metastasis in the liver and lung.  His case was presented at multidisciplinary tumor board.  -Because of metastatic disease to both the lung and liver I do not think he will be downstage to resectable disease.  -Tempus results which are notable for an APC mutation, T p53 mutation, negative for KRAS, BRAF, NRAS, tumor mutation burden stable.  Notch 1 VUS.  -CBC adequate and CMP pending for treatment today with maintenance 5-FU and panitumumab 9/16/24  Chemotherapy orders and premedications signed9/16/24  -We reviewed his imaging which is consistent with excellent disease control in early July with normalization of CEA. He has had persistent rectal pain with escalating oxycodone requirements over the past couple of months, but no severe recent increase in pain, fever. WBC normal. 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 I reviewed his EUA notes. Increase doxy to BID. No surgical intervention.  -Consolidative  radiotherapy to the rectal tumor is not something he wants to pursue initially but has now undergone consultation.  Maintenance 5FU/panitumumab on hold since early December.  I reviewed his last 4 serial CT images which do so progressive and in his lung. Escalated to FOLFIRI panitumumab 2/2025 to present  -Labs ok for treatment today. However will hold treatment today or his wife's surgery and follow up in 2 weeks  -Scans and labs reviewed, overall stable, follow up pulmonary infiltrate, clinically he is recovering from recent respiratory infection    6.  Cancer related pain  -Now following with palliative care and pain. Well controlled on lyrica, oxycodone and MS contin.     7.  Panitumumab induced rash  -Continue doxycycline and emollients  -Stop voltaren gel, start hydrocortisone 2.5 % to palms    8. GERD  PPI, well controlled    9. Access  -Port    10. Chemo diarrhea  -Hold laxatives on day of diarrhea  -Lomotil PRN    11. Hypomagnesemia  -Mg 1.5    12. Hypokalemia  -continue potassium supplement    Follow Up:   2 weeks     Brenda Cronin MD  Hematology and Oncology

## 2025-05-06 ENCOUNTER — TELEPHONE (OUTPATIENT)
Dept: NUTRITION | Facility: HOSPITAL | Age: 55
End: 2025-05-06
Payer: COMMERCIAL

## 2025-05-06 DIAGNOSIS — C20 RECTAL CANCER METASTASIZED TO LIVER: ICD-10-CM

## 2025-05-06 DIAGNOSIS — C78.7 RECTAL CANCER METASTASIZED TO LIVER: ICD-10-CM

## 2025-05-06 RX ORDER — DIPHENOXYLATE HYDROCHLORIDE AND ATROPINE SULFATE 2.5; .025 MG/1; MG/1
TABLET ORAL
Qty: 30 TABLET | Refills: 2 | Status: SHIPPED | OUTPATIENT
Start: 2025-05-06

## 2025-05-06 NOTE — PROGRESS NOTES
Outpatient Oncology Nutrition Assessment      Patient Name:  Edward Powell  YOB: 1970  MRN: [id number]      Assessment Date:  5/6/2025    Comments:  Pt called back for nutrition f/up. Reports recent wt loss and decrease in appetite. He has been outside and states he has kept up with resting and hydration. EMR shows wt loss of 14# (6.6%) within 2 weeks and is significant. Discussed ways to increase calories into diet, snacking often, and keeping up with supplements.Peanut butter would be a good snack to add into diet d/t affordability and has protein, fat, and carbs. Pt is agreeable with increasing calories into diet. He may benefit from appetite stimulant if he is unable to increase calories d/t lack of appetite. He does not have any nutrition questions at this time. RD to f/up.    Electronically signed by:  Arabella Hong RD  05/06/25 10:48 EDT

## 2025-05-06 NOTE — TELEPHONE ENCOUNTER
Upcoming Appts  With Oncology (Brenda Cronin MD)  05/12/2025 at 8:00 AM    Last appt 4/28/2025  Last refill 2/17/25 30 tablets 0 refills

## 2025-05-06 NOTE — PROGRESS NOTES
Outpatient Oncology Nutrition Assessment      Patient Name:  Edward Powell  YOB: 1970  MRN: [id number]      Assessment Date:  5/6/2025    Comments:  Attempted to call pt for nutrition f/up. Pt did not answer. Left voicemail. RD to f/up.    Electronically signed by:  Arabella Hong RD  05/06/25 10:24 EDT

## 2025-05-07 DIAGNOSIS — G62.0 CHEMOTHERAPY-INDUCED NEUROPATHY: ICD-10-CM

## 2025-05-07 DIAGNOSIS — T45.1X5A CHEMOTHERAPY-INDUCED NEUROPATHY: ICD-10-CM

## 2025-05-07 DIAGNOSIS — G89.3 CANCER RELATED PAIN: ICD-10-CM

## 2025-05-07 NOTE — TELEPHONE ENCOUNTER
RACIEL #:[id number]      Medication requested: Morphine (MS CONTIN) 30 MG 12 hr tablet     Last fill date: 4/8/25    Medication requested:oxyCODONE (ROXICODONE) 15 MG immediate release tablet     Last fill date:4/8/25    Medication requested: pregabalin (Lyrica) 300 MG capsule     Last fill date: 4/9/25      Last appointment:3/28/25    Next appointment:6/27/25

## 2025-05-08 NOTE — TELEPHONE ENCOUNTER
PATIENT CALLED TO CHECK STATUS ON MEDICATION REFILLS AS HE WILL BE OUT OF THE MEDS THIS EVENING. PLEASE ADVISE.

## 2025-05-09 RX ORDER — OXYCODONE HYDROCHLORIDE 15 MG/1
15 TABLET ORAL
Qty: 150 TABLET | Refills: 0 | Status: SHIPPED | OUTPATIENT
Start: 2025-05-09 | End: 2025-06-08

## 2025-05-09 RX ORDER — MORPHINE SULFATE 30 MG/1
30 TABLET, FILM COATED, EXTENDED RELEASE ORAL 2 TIMES DAILY
Qty: 60 TABLET | Refills: 0 | Status: SHIPPED | OUTPATIENT
Start: 2025-05-09 | End: 2025-06-08

## 2025-05-09 RX ORDER — PREGABALIN 300 MG/1
300 CAPSULE ORAL 2 TIMES DAILY
Qty: 60 CAPSULE | Refills: 0 | Status: SHIPPED | OUTPATIENT
Start: 2025-05-09 | End: 2025-06-08

## 2025-05-12 ENCOUNTER — HOSPITAL ENCOUNTER (OUTPATIENT)
Dept: ULTRASOUND IMAGING | Facility: HOSPITAL | Age: 55
Discharge: HOME | End: 2025-05-12
Payer: COMMERCIAL

## 2025-05-12 ENCOUNTER — HOSPITAL ENCOUNTER (OUTPATIENT)
Facility: HOSPITAL | Age: 55
Discharge: HOME | End: 2025-05-12
Payer: COMMERCIAL

## 2025-05-12 ENCOUNTER — OFFICE VISIT (OUTPATIENT)
Dept: ONCOLOGY | Facility: CLINIC | Age: 55
End: 2025-05-12
Payer: COMMERCIAL

## 2025-05-12 VITALS
DIASTOLIC BLOOD PRESSURE: 59 MMHG | HEART RATE: 78 BPM | TEMPERATURE: 98 F | OXYGEN SATURATION: 99 % | SYSTOLIC BLOOD PRESSURE: 113 MMHG | RESPIRATION RATE: 16 BRPM

## 2025-05-12 VITALS
TEMPERATURE: 97.2 F | WEIGHT: 208 LBS | BODY MASS INDEX: 28.17 KG/M2 | SYSTOLIC BLOOD PRESSURE: 116 MMHG | HEART RATE: 76 BPM | DIASTOLIC BLOOD PRESSURE: 60 MMHG | OXYGEN SATURATION: 96 % | HEIGHT: 72 IN

## 2025-05-12 DIAGNOSIS — M79.89 LEG SWELLING: Primary | ICD-10-CM

## 2025-05-12 DIAGNOSIS — C20 RECTAL CANCER METASTASIZED TO LIVER: ICD-10-CM

## 2025-05-12 DIAGNOSIS — C78.7 RECTAL CANCER METASTASIZED TO LIVER: ICD-10-CM

## 2025-05-12 DIAGNOSIS — C78.7 RECTAL CANCER METASTASIZED TO LIVER: Primary | ICD-10-CM

## 2025-05-12 DIAGNOSIS — C20 RECTAL CANCER METASTASIZED TO LIVER: Primary | ICD-10-CM

## 2025-05-12 LAB
ALBUMIN SERPL-MCNC: 2.4 G/DL (ref 3.5–5.2)
ALBUMIN/GLOB SERPL: 0.9 G/DL
ALP SERPL-CCNC: 160 U/L (ref 39–117)
ALT SERPL W P-5'-P-CCNC: 13 U/L (ref 1–41)
ANION GAP SERPL CALCULATED.3IONS-SCNC: 6.9 MMOL/L (ref 5–15)
ANISOCYTOSIS BLD QL: NORMAL
AST SERPL-CCNC: 19 U/L (ref 1–40)
BASOPHILS # BLD AUTO: 0.03 10*3/MM3 (ref 0–0.2)
BASOPHILS NFR BLD AUTO: 0.3 % (ref 0–1.5)
BILIRUB SERPL-MCNC: 0.3 MG/DL (ref 0–1.2)
BUN SERPL-MCNC: 9 MG/DL (ref 6–20)
BUN/CREAT SERPL: 11.4 (ref 7–25)
CALCIUM SPEC-SCNC: 7.5 MG/DL (ref 8.6–10.5)
CHLORIDE SERPL-SCNC: 106 MMOL/L (ref 98–107)
CO2 SERPL-SCNC: 24.1 MMOL/L (ref 22–29)
CREAT SERPL-MCNC: 0.79 MG/DL (ref 0.76–1.27)
DEPRECATED RDW RBC AUTO: 65.9 FL (ref 37–54)
EGFRCR SERPLBLD CKD-EPI 2021: 104.9 ML/MIN/1.73
ELLIPTOCYTES BLD QL SMEAR: NORMAL
EOSINOPHIL # BLD AUTO: 0.08 10*3/MM3 (ref 0–0.4)
EOSINOPHIL NFR BLD AUTO: 0.8 % (ref 0.3–6.2)
ERYTHROCYTE [DISTWIDTH] IN BLOOD BY AUTOMATED COUNT: 22.3 % (ref 12.3–15.4)
GLOBULIN UR ELPH-MCNC: 2.8 GM/DL
GLUCOSE SERPL-MCNC: 70 MG/DL (ref 65–99)
HCT VFR BLD AUTO: 35.3 % (ref 37.5–51)
HGB BLD-MCNC: 11.1 G/DL (ref 13–17.7)
HYPOCHROMIA BLD QL: NORMAL
IMM GRANULOCYTES # BLD AUTO: 0.05 10*3/MM3 (ref 0–0.05)
IMM GRANULOCYTES NFR BLD AUTO: 0.5 % (ref 0–0.5)
LYMPHOCYTES # BLD AUTO: 1.55 10*3/MM3 (ref 0.7–3.1)
LYMPHOCYTES NFR BLD AUTO: 15.6 % (ref 19.6–45.3)
MAGNESIUM SERPL-MCNC: 1.7 MG/DL (ref 1.6–2.6)
MCH RBC QN AUTO: 26.1 PG (ref 26.6–33)
MCHC RBC AUTO-ENTMCNC: 31.4 G/DL (ref 31.5–35.7)
MCV RBC AUTO: 82.9 FL (ref 79–97)
MONOCYTES # BLD AUTO: 0.9 10*3/MM3 (ref 0.1–0.9)
MONOCYTES NFR BLD AUTO: 9 % (ref 5–12)
NEUTROPHILS NFR BLD AUTO: 7.35 10*3/MM3 (ref 1.7–7)
NEUTROPHILS NFR BLD AUTO: 73.8 % (ref 42.7–76)
NRBC BLD AUTO-RTO: 0 /100 WBC (ref 0–0.2)
PLATELET # BLD AUTO: 138 10*3/MM3 (ref 140–450)
PMV BLD AUTO: 12.2 FL (ref 6–12)
POTASSIUM SERPL-SCNC: 4.3 MMOL/L (ref 3.5–5.2)
PROT SERPL-MCNC: 5.2 G/DL (ref 6–8.5)
RBC # BLD AUTO: 4.26 10*6/MM3 (ref 4.14–5.8)
SMALL PLATELETS BLD QL SMEAR: ADEQUATE
SODIUM SERPL-SCNC: 137 MMOL/L (ref 136–145)
WBC MORPH BLD: NORMAL
WBC NRBC COR # BLD AUTO: 9.96 10*3/MM3 (ref 3.4–10.8)

## 2025-05-12 PROCEDURE — 25010000002 PALONOSETRON PER 25 MCG: Performed by: INTERNAL MEDICINE

## 2025-05-12 PROCEDURE — 25010000002 LEUCOVORIN CALCIUM PER 50MG: Performed by: INTERNAL MEDICINE

## 2025-05-12 PROCEDURE — 99214 OFFICE O/P EST MOD 30 MIN: CPT | Performed by: INTERNAL MEDICINE

## 2025-05-12 PROCEDURE — 25010000003 DEXTROSE 5 % SOLUTION 250 ML FLEX CONT: Performed by: INTERNAL MEDICINE

## 2025-05-12 PROCEDURE — 96375 TX/PRO/DX INJ NEW DRUG ADDON: CPT

## 2025-05-12 PROCEDURE — 25010000002 DEXAMETHASONE SODIUM PHOSPHATE 20 MG/5ML SOLUTION: Performed by: INTERNAL MEDICINE

## 2025-05-12 PROCEDURE — 25010000002 PANITUMUMAB PER 10 MG: Performed by: INTERNAL MEDICINE

## 2025-05-12 PROCEDURE — 85025 COMPLETE CBC W/AUTO DIFF WBC: CPT | Performed by: INTERNAL MEDICINE

## 2025-05-12 PROCEDURE — 25010000002 FLUOROURACIL PER 500 MG: Performed by: INTERNAL MEDICINE

## 2025-05-12 PROCEDURE — 25010000002 LEUCOVORIN 200 MG RECONSTITUTED SOLUTION 1 EACH VIAL: Performed by: INTERNAL MEDICINE

## 2025-05-12 PROCEDURE — 25010000002 IRINOTECAN PER 20 MG: Performed by: INTERNAL MEDICINE

## 2025-05-12 PROCEDURE — 25810000003 SODIUM CHLORIDE 0.9 % SOLUTION 250 ML FLEX CONT: Performed by: INTERNAL MEDICINE

## 2025-05-12 PROCEDURE — 83735 ASSAY OF MAGNESIUM: CPT | Performed by: INTERNAL MEDICINE

## 2025-05-12 PROCEDURE — 85007 BL SMEAR W/DIFF WBC COUNT: CPT | Performed by: INTERNAL MEDICINE

## 2025-05-12 PROCEDURE — 96368 THER/DIAG CONCURRENT INF: CPT

## 2025-05-12 PROCEDURE — 96415 CHEMO IV INFUSION ADDL HR: CPT

## 2025-05-12 PROCEDURE — 96413 CHEMO IV INFUSION 1 HR: CPT

## 2025-05-12 PROCEDURE — 96417 CHEMO IV INFUS EACH ADDL SEQ: CPT

## 2025-05-12 PROCEDURE — 96376 TX/PRO/DX INJ SAME DRUG ADON: CPT

## 2025-05-12 PROCEDURE — 93971 EXTREMITY STUDY: CPT

## 2025-05-12 PROCEDURE — 25010000003 DEXTROSE 5 % SOLUTION 500 ML FLEX CONT: Performed by: INTERNAL MEDICINE

## 2025-05-12 PROCEDURE — 80053 COMPREHEN METABOLIC PANEL: CPT | Performed by: INTERNAL MEDICINE

## 2025-05-12 PROCEDURE — G0498 CHEMO EXTEND IV INFUS W/PUMP: HCPCS

## 2025-05-12 PROCEDURE — 96416 CHEMO PROLONG INFUSE W/PUMP: CPT

## 2025-05-12 RX ORDER — PALONOSETRON 0.05 MG/ML
0.25 INJECTION, SOLUTION INTRAVENOUS ONCE
Status: COMPLETED | OUTPATIENT
Start: 2025-05-12 | End: 2025-05-12

## 2025-05-12 RX ORDER — PROCHLORPERAZINE EDISYLATE 5 MG/ML
5 INJECTION INTRAMUSCULAR; INTRAVENOUS EVERY 6 HOURS PRN
Status: CANCELLED
Start: 2025-05-12

## 2025-05-12 RX ORDER — BUPROPION HYDROCHLORIDE 150 MG/1
150 TABLET ORAL DAILY
Qty: 90 TABLET | Refills: 1 | Status: SHIPPED | OUTPATIENT
Start: 2025-05-12

## 2025-05-12 RX ORDER — HYDROCORTISONE SODIUM SUCCINATE 100 MG/2ML
100 INJECTION INTRAMUSCULAR; INTRAVENOUS AS NEEDED
Status: CANCELLED | OUTPATIENT
Start: 2025-05-12

## 2025-05-12 RX ORDER — FAMOTIDINE 10 MG/ML
20 INJECTION, SOLUTION INTRAVENOUS AS NEEDED
Status: CANCELLED | OUTPATIENT
Start: 2025-05-12

## 2025-05-12 RX ORDER — SODIUM CHLORIDE 9 MG/ML
20 INJECTION, SOLUTION INTRAVENOUS ONCE
Status: CANCELLED | OUTPATIENT
Start: 2025-05-12

## 2025-05-12 RX ORDER — PROCHLORPERAZINE EDISYLATE 5 MG/ML
5 INJECTION INTRAMUSCULAR; INTRAVENOUS EVERY 6 HOURS PRN
Status: DISCONTINUED | OUTPATIENT
Start: 2025-05-12 | End: 2025-05-13 | Stop reason: HOSPADM

## 2025-05-12 RX ORDER — SODIUM CHLORIDE 9 MG/ML
20 INJECTION, SOLUTION INTRAVENOUS ONCE
Status: DISCONTINUED | OUTPATIENT
Start: 2025-05-12 | End: 2025-05-13 | Stop reason: HOSPADM

## 2025-05-12 RX ORDER — FLUTICASONE PROPIONATE 50 MCG
2 SPRAY, SUSPENSION (ML) NASAL DAILY
Qty: 16 G | Refills: 4 | Status: SHIPPED | OUTPATIENT
Start: 2025-05-12

## 2025-05-12 RX ORDER — DIPHENHYDRAMINE HYDROCHLORIDE 50 MG/ML
50 INJECTION, SOLUTION INTRAMUSCULAR; INTRAVENOUS AS NEEDED
Status: CANCELLED | OUTPATIENT
Start: 2025-05-12

## 2025-05-12 RX ORDER — PALONOSETRON 0.05 MG/ML
0.25 INJECTION, SOLUTION INTRAVENOUS ONCE
Status: CANCELLED | OUTPATIENT
Start: 2025-05-12

## 2025-05-12 RX ORDER — ATROPINE SULFATE 1 MG/ML
0.25 INJECTION, SOLUTION INTRAMUSCULAR; INTRAVENOUS; SUBCUTANEOUS
Status: CANCELLED | OUTPATIENT
Start: 2025-05-12

## 2025-05-12 RX ORDER — TRAZODONE HYDROCHLORIDE 150 MG/1
150 TABLET ORAL NIGHTLY
Qty: 30 TABLET | Refills: 2 | Status: SHIPPED | OUTPATIENT
Start: 2025-05-12

## 2025-05-12 RX ORDER — ATROPINE SULFATE 0.4 MG/ML
0.25 INJECTION, SOLUTION INTRAMUSCULAR; INTRAVENOUS; SUBCUTANEOUS
Status: DISCONTINUED | OUTPATIENT
Start: 2025-05-12 | End: 2025-05-13 | Stop reason: HOSPADM

## 2025-05-12 RX ADMIN — LEUCOVORIN CALCIUM 870 MG: 500 INJECTION, POWDER, LYOPHILIZED, FOR SOLUTION INTRAMUSCULAR; INTRAVENOUS at 11:41

## 2025-05-12 RX ADMIN — PANITUMUMAB 570 MG: 100 SOLUTION INTRAVENOUS at 10:57

## 2025-05-12 RX ADMIN — ATROPINE SULFATE 0.25 MG: 0.4 INJECTION, SOLUTION INTRAVENOUS at 11:40

## 2025-05-12 RX ADMIN — SODIUM CHLORIDE 5230 MG: 9 INJECTION, SOLUTION INTRAVENOUS at 13:18

## 2025-05-12 RX ADMIN — PALONOSETRON 0.25 MG: 0.05 INJECTION, SOLUTION INTRAVENOUS at 10:37

## 2025-05-12 RX ADMIN — IRINOTECAN HYDROCHLORIDE 390 MG: 20 INJECTION, SOLUTION INTRAVENOUS at 11:41

## 2025-05-12 RX ADMIN — DEXAMETHASONE SODIUM PHOSPHATE 12 MG: 4 INJECTION, SOLUTION INTRA-ARTICULAR; INTRALESIONAL; INTRAMUSCULAR; INTRAVENOUS; SOFT TISSUE at 10:36

## 2025-05-12 NOTE — PROGRESS NOTES
Hematology and Oncology [place]  Office number [phone]    Fax number [phone]     Follow up     Date: 25    Patient Name: Edward Powell  MRN: [id number]  : 1970    Referring Physician: Dr. Gautam    Chief Complaint: Rectal cancer with liver and lung metastases    Cancer Staging:  IV    History of Present Illness: Edward Powell is a pleasant 54 y.o. male who presents today for evaluation of rectal cancer.    Patient has a longstanding history of intermittent diarrhea since his cholecystectomy in 2019.  However he developed progressive diarrhea with associated loss of bowel control and urgency as well as weight loss and fatigue prompting additional workup.    CT of the abdomen pelvis 2023 showed an irregular circumferential wall thickening involving the rectum concerning for mass lesion.  There was associated mesorectal lymphadenopathy.  Masslike consolidation of left lower lobe.  CT of the chest showed a cavitary lesion in the left lower lobe up to 4.8 cm with an adjacent cavitary nodule up to 2 cm and a 5 mm nodule on the right minor fissure.      He underwent colonoscopy 2023 with findings of an infiltrative and ulcerated partially obstructing mass in the proximal rectum spanning 10 cm.  Rectal polyps.  Biopsy of the rectal mass showed invasive moderately differentiated adenocarcinoma.  Additional biopsies showed tubular adenomas.  MSI testing was intact/low probability of MSI high.    PDL1 negative    PET/CT 2023 showed hypermetabolic rectal wall thickening compatible with known rectal malignancy.  Multiple small ill-defined hypoechoic hyper metabolic liver lesions compatible with metastatic disease.  Mildly enlarged and mildly hypermetabolic pelvic sidewall and internal iliac lymph node chain adenopathy.  Hypermetabolic lung mass with adjacent nodule.     He underwent liver biopsy and lung biopsy 2024.  Results of both confirmed metastatic  colorectal cancer.    The patient has been experiencing substantial rectal pain.  He is taking oxycodone every 6 hours with partial relief.  He reports ongoing bowel movements.  No abdominal pain.  No vomiting.  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    Treatment history:  FOLFOX cycle 1-4  FOLFOX panitumumab cycle 5 onward  -Oxaliplatin terminated early for allergic reaction after 4/29/24    FOLFIRI/Panitumumab: 2/3/25 to present    Interval history:  Has been outside doing more work like cleaning out garage, getting hoarse and cough is back. Takes zyrtec daily. Intermittent nasal congestion/rhinorrhea.   Left > right leg swelling since Thursday when he was more active on feet  More dyspepsia and fatigue. Wants to take break from chemo this week and next. His wife is having hand surgery this week, bilaterally. Had cough for 2 weeks now resolved. No fever  Taking potassium and magnesium     Past Medical History:   Past Medical History:   Diagnosis Date    Arthritis     Cancer     rectal cancer - diagnosed 2023    Cholelithiasis 2019    Removed    COPD (chronic obstructive pulmonary disease)     Coronary artery disease 2009    Stent - no cardiologist currently    Elevated cholesterol     GERD (gastroesophageal reflux disease)     Hernia 2003    Lower hernia    Perforated ulcer 2019    Sleep apnea     history of; when weighed over 400lbs - no issues following bariatric surgery       Past Surgical History:   Past Surgical History:   Procedure Laterality Date    APPENDECTOMY  1983    Removed    BARIATRIC SURGERY  2011    Gastric bypass    BLADDER TUMOR/ULCER BLEEDER CAUTERIZATION      CARDIAC CATHETERIZATION  2009    stent placed    CHOLECYSTECTOMY  2019    Removed    COLONOSCOPY  N/A 12/07/2023    Procedure: COLONOSCOPY WITH HOT SNARE POLYPECTOMY AND TATTOO;  Surgeon: Noman Gautam MD;  Location: [facility];  Service: Gastroenterology;  Laterality: N/A;    PORTACATH PLACEMENT N/A 1/5/2024    Procedure: INSERTION OF PORTACATH WITH ULTRSOUND AND FLUOROSCOPIC GUIDANCE;  Surgeon: Ida Black MD;  Location: [facility] OR;  Service: General;  Laterality: N/A;    JENNIFER-EN-Y         Family History: No family history on file.  Uncle had colon cancer    Social History:   Social History     Socioeconomic History    Marital status:    Tobacco Use    Smoking status: Every Day     Current packs/day: 1.00     Average packs/day: 0.9 packs/day for 60.7 years (53.2 ttl pk-yrs)     Types: Cigarettes     Start date: 9/6/1994    Smokeless tobacco: Never    Tobacco comments:     35 years      pt reports closer to 2 packs per day since cancer diagnosis   Vaping Use    Vaping status: Never Used   Substance and Sexual Activity    Alcohol use: Never    Drug use: Never    Sexual activity: Defer       Medications:     Current Outpatient Medications:     albuterol sulfate  (90 Base) MCG/ACT inhaler, Inhale 2 puffs Every 4 (Four) Hours As Needed for Wheezing., Disp: 18 g, Rfl: 3    buPROPion XL (WELLBUTRIN XL) 150 MG 24 hr tablet, TAKE 1 TABLET BY MOUTH DAILY, Disp: 90 tablet, Rfl: 1    cetirizine (zyrTEC) 10 MG tablet, Take 1 tablet by mouth Daily., Disp: 30 tablet, Rfl: 2    clindamycin (Clindagel) 1 % gel, Apply 1 Application topically to the appropriate area as directed 2 (Two) Times a Day. Use first, Disp: 30 g, Rfl: 1    dexAMETHasone 0.5 MG/5ML solution, Take 10 mL by mouth 2 (Two) Times a Day. Swish for 2 minutes and spit 10 mL 4 x daily, Disp: 240 mL, Rfl: 5    Diclofenac Sodium (Voltaren) 1 % gel gel, Apply 4 g topically to the appropriate area as directed 2 (Two) Times a Day., Disp: 150 g, Rfl: 1    diphenoxylate-atropine (LOMOTIL) 2.5-0.025 MG per tablet, TAKE 1  TABLET BY MOUTH EVERY 6 HOURS AS NEEDED FOR DIARRHEA, Disp: 30 tablet, Rfl: 2    docusate sodium (COLACE) 100 MG capsule, Take 1 capsule by mouth 2 (Two) Times a Day., Disp: , Rfl:     doxycycline (VIBRAMYICN) 100 MG tablet, Take 1 tablet by mouth 2 (Two) Times a Day. For 7 days, then 1 tablet daily indefinitely, Disp: 67 tablet, Rfl: 3    DULoxetine (CYMBALTA) 60 MG capsule, Take 1 capsule by mouth Daily., Disp: 30 capsule, Rfl: 2    famotidine (PEPCID) 20 MG tablet, Take 1 tablet by mouth 2 (Two) Times a Day., Disp: 60 tablet, Rfl: 4    hydrocortisone 2.5 % ointment, Apply 1 Application topically to the appropriate area as directed 2 (Two) Times a Day., Disp: 60 g, Rfl: 1    Hydrocortisone, Perianal, (ANUSOL-HC) 2.5 % rectal cream, Insert  into the rectum 2 (Two) Times a Day. Indications: Inflamed Hemorrhoids, Disp: 30 g, Rfl: 1    KETOPROFEN-LIDO-GABAPENTIN EX, APPLY 1-2 GRAMS TO AFFECTED AREAS 3-4 TIMES DAILY, Disp: , Rfl:     lidocaine-prilocaine (EMLA) 2.5-2.5 % cream, Apply 1 Application topically to the appropriate area as directed As Needed (45-60 minutes prior to port access.  Cover with saran/plastic wrap.)., Disp: 30 g, Rfl: 3    LORazepam (ATIVAN) 0.5 MG tablet, Take 1 tablet by mouth Take As Directed. 1 tabelt by mouth 30 minutes prior to MRI, take a second tablet at the time of the MRI if needed., Disp: 2 tablet, Rfl: 0    Magic Mouthwash Oral Suspension (diphenhydrAMINE HCl - aluminum & magnesium hydroxide-simethicone - lidocaine - nystatin), Swish and Spit 10 mL by mouth every 6 (Six) Hours For 7 Days., Disp: 300 mL, Rfl: 3    magnesium oxide (MAG-OX) 400 MG tablet, Take 1 tablet by mouth Daily., Disp: 30 tablet, Rfl: 5    mineral oil-hydrophilic petrolatum (AQUAPHOR) ointment, Apply 1 Application topically to the appropriate area as directed As Needed for Dry Skin., Disp: 198 g, Rfl: 0    montelukast (SINGULAIR) 10 MG tablet, Take 1 tablet by mouth Every Night., Disp: 90 tablet, Rfl: 3    Morphine  (MS CONTIN) 30 MG 12 hr tablet, Take 1 tablet by mouth 2 (Two) Times a Day for 30 days., Disp: 60 tablet, Rfl: 0    Movantik 25 MG tablet, , Disp: , Rfl:     mupirocin (BACTROBAN) 2 % cream, , Disp: , Rfl:     naloxone (NARCAN) 4 MG/0.1ML nasal spray, 1 spray into the nostril(s) as directed by provider As Needed for Opioid Reversal., Disp: 1 each, Rfl: 0    nystatin susp + lidocaine viscous (MAGIC MOUTHWASH) oral suspension, 5-10 ml swish and spit or swallow QID prn, Disp: 240 mL, Rfl: 3    ondansetron (ZOFRAN) 8 MG tablet, Take 1 tablet by mouth 3 (Three) Times a Day As Needed for Nausea or Vomiting., Disp: 30 tablet, Rfl: 5    oxyCODONE (ROXICODONE) 15 MG immediate release tablet, Take 1 tablet by mouth 5 (Five) Times a Day As Needed for Moderate Pain or Severe Pain for up to 30 days., Disp: 150 tablet, Rfl: 0    pantoprazole (Protonix) 40 MG EC tablet, Take 1 tablet by mouth Daily. Indications: Gastroesophageal Reflux Disease, Disp: 90 tablet, Rfl: 2    potassium chloride (K-TAB) 20 MEQ tablet controlled-release ER tablet, Take 1 tablet by mouth Daily., Disp: 30 tablet, Rfl: 1    pregabalin (Lyrica) 300 MG capsule, Take 1 capsule by mouth 2 (Two) Times a Day for 30 days., Disp: 60 capsule, Rfl: 0    promethazine-dextromethorphan (PROMETHAZINE-DM) 6.25-15 MG/5ML syrup, Take 5 mL by mouth 3 times a day., Disp: 240 mL, Rfl: 0    tiotropium bromide-olodaterol (Stiolto Respimat) 2.5-2.5 MCG/ACT aerosol solution inhaler, INHALE 2 INHALATION(S) BY MOUTH DAILY, Disp: 4 g, Rfl: 5    traZODone (DESYREL) 150 MG tablet, Take 1 tablet by mouth Every Night., Disp: 30 tablet, Rfl: 2    triamcinolone (KENALOG) 0.025 % ointment, Apply 1 Application topically to the appropriate area as directed 2 (Two) Times a Day. Use second if topical treatment #1 ineffective, Disp: 80 g, Rfl: 0    Allergies:   Allergies   Allergen Reactions    Bactrim [Sulfamethoxazole-Trimethoprim] Hives     and blisters    Sulfa Antibiotics Hives     and  "blisters       Objective     Vital Signs:   Vitals:    05/12/25 0806   BP: 116/60   Pulse: 76   Temp: 97.2 °F (36.2 °C)   TempSrc: Infrared   SpO2: 96%   Weight: 94.3 kg (208 lb)   Height: 182.9 cm (72.01\")   PainSc: 0-No pain          Body mass index is 28.2 kg/m².   Pain Score    05/12/25 0806   PainSc: 0-No pain       ECOG Performance Status: 1 - Symptomatic but completely ambulatory    Physical Exam: General: No acute distress. Well appearing.  HEENT: Normocephalic, atraumatic. Sclera anicteric.   Neck: supple, no adenopathy.   Cardiovascular: regular rate and rhythm. No murmurs.   Respiratory: Normal rate. Clear to auscultation bilaterally.  Abdomen: Soft, nontender, non distended with normoactive bowel sounds.  Lymph: no cervical, supraclavicular or axillary adenopathy.  Neuro: Alert and oriented x 3. No focal deficits.   Ext: Asymmetric left > right 2+      Laboratory/Imaging Reviewed:   No visits with results within 2 Week(s) from this visit.   Latest known visit with results is:   Hospital Outpatient Visit on 04/28/2025   Component Date Value Ref Range Status    Glucose 04/28/2025 97  65 - 99 mg/dL Final    BUN 04/28/2025 7  6 - 20 mg/dL Final    Creatinine 04/28/2025 0.80  0.76 - 1.27 mg/dL Final    Sodium 04/28/2025 137  136 - 145 mmol/L Final    Potassium 04/28/2025 3.2 (L)  3.5 - 5.2 mmol/L Final    Chloride 04/28/2025 106  98 - 107 mmol/L Final    CO2 04/28/2025 21.3 (L)  22.0 - 29.0 mmol/L Final    Calcium 04/28/2025 8.2 (L)  8.6 - 10.5 mg/dL Final    Total Protein 04/28/2025 6.2  6.0 - 8.5 g/dL Final    Albumin 04/28/2025 2.8 (L)  3.5 - 5.2 g/dL Final    ALT (SGPT) 04/28/2025 11  1 - 41 U/L Final    AST (SGOT) 04/28/2025 13  1 - 40 U/L Final    Alkaline Phosphatase 04/28/2025 112  39 - 117 U/L Final    Total Bilirubin 04/28/2025 0.3  0.0 - 1.2 mg/dL Final    Globulin 04/28/2025 3.4  gm/dL Final    A/G Ratio 04/28/2025 0.8  g/dL Final    BUN/Creatinine Ratio 04/28/2025 8.8  7.0 - 25.0 Final    Anion " Gap 04/28/2025 9.7  5.0 - 15.0 mmol/L Final    eGFR 04/28/2025 104.5  >60.0 mL/min/1.73 Final    Magnesium 04/28/2025 1.2 (L)  1.6 - 2.6 mg/dL Final    WBC 04/28/2025 8.22  3.40 - 10.80 10*3/mm3 Final    RBC 04/28/2025 4.50  4.14 - 5.80 10*6/mm3 Final    Hemoglobin 04/28/2025 11.6 (L)  13.0 - 17.7 g/dL Final    Hematocrit 04/28/2025 36.6 (L)  37.5 - 51.0 % Final    MCV 04/28/2025 81.3  79.0 - 97.0 fL Final    MCH 04/28/2025 25.8 (L)  26.6 - 33.0 pg Final    MCHC 04/28/2025 31.7  31.5 - 35.7 g/dL Final    RDW 04/28/2025 20.7 (H)  12.3 - 15.4 % Final    RDW-SD 04/28/2025 59.3 (H)  37.0 - 54.0 fl Final    MPV 04/28/2025 10.3  6.0 - 12.0 fL Final    Platelets 04/28/2025 400  140 - 450 10*3/mm3 Final    Neutrophil % 04/28/2025 71.2  42.7 - 76.0 % Final    Lymphocyte % 04/28/2025 17.5 (L)  19.6 - 45.3 % Final    Monocyte % 04/28/2025 9.2  5.0 - 12.0 % Final    Eosinophil % 04/28/2025 1.2  0.3 - 6.2 % Final    Basophil % 04/28/2025 0.4  0.0 - 1.5 % Final    Immature Grans % 04/28/2025 0.5  0.0 - 0.5 % Final    Neutrophils, Absolute 04/28/2025 5.85  1.70 - 7.00 10*3/mm3 Final    Lymphocytes, Absolute 04/28/2025 1.44  0.70 - 3.10 10*3/mm3 Final    Monocytes, Absolute 04/28/2025 0.76  0.10 - 0.90 10*3/mm3 Final    Eosinophils, Absolute 04/28/2025 0.10  0.00 - 0.40 10*3/mm3 Final    Basophils, Absolute 04/28/2025 0.03  0.00 - 0.20 10*3/mm3 Final    Immature Grans, Absolute 04/28/2025 0.04  0.00 - 0.05 10*3/mm3 Final    nRBC 04/28/2025 0.0  0.0 - 0.2 /100 WBC Final    Anisocytosis 04/28/2025 Slight/1+  None Seen Final    Elliptocytes 04/28/2025 Slight/1+  None Seen Final    Microcytes 04/28/2025 Slight/1+  None Seen Final    WBC Morphology 04/28/2025 Normal  Normal Final    Platelet Morphology 04/28/2025 Normal  Normal Final     Tempus xt: APC gene TP53; Negative ADRIANNA, BRAF, NRAS, TMB stable. PDL1 negative  CT Chest With Contrast Diagnostic  CT Chest With Contrast Diagnostic, CT Abdomen Pelvis With Contrast  Result Date:  4/25/2025  Narrative: PROCEDURE: CT ABDOMEN PELVIS W CONTRAST-, CT CHEST W CONTRAST DIAGNOSTIC-  HISTORY: rectal cancer; M40-Wlkecjwwv neoplasm of rectum; C78.7-Secondary malignant neoplasm of liver and intrahepatic bile duct  COMPARISON: 01/16/2025.  PROCEDURE: The patient was injected with IV contrast. Oral contrast was administered. Axial images were obtained from the lung apex to the pubic symphysis by computed tomography. This study was performed with techniques to keep radiation doses as low as reasonably achievable, (ALARA). Individualized dose reduction techniques using automated exposure control or adjustment of mA and/or kV according to the patient size were employed.  FINDINGS:  CHEST: There is no axillary adenopathy. Right internal jugular port noted. There is no hilar or mediastinal adenopathy.  The heart size is normal. There is no pericardial or pleural effusion. Again noted is the lobulated cavitary mass posterior left lower lobe, slightly decreased in size from prior exam from 47 to 43 mm. Cavitary lesion seen lateral to the first mass posterior left lower lobe has a stable transverse diameter of 15 mm. There is new patchy airspace disease in both the left upper and lower lobes, predominantly ground-glass opacities; appearance suggests pneumonia. There are a few 5 mm or less noncalcified nodules, as well as a mixed density lesion anterior left upper lobe measuring up to 12 mm, additional metastatic disease is not excluded. Recommend follow-up in 3 months. Very faint ground-glass opacity is seen anteriorly in the right middle lobe. Minor fissural nodule is stable from prior and likely represents a lymph node. Faint ground-glass opacities are also seen in the right upper lobe. Here is evidence of old calcified granulomatous disease. Vascular calcifications noted. Emphysematous change noted. No bony destructive lesion seen  ABDOMEN: The liver is homogenous with no focal abnormality. Liver is enlarged  up to 19 cm, stable from prior. Portal vein is upper limits of normal; recommend correlation with liver function tests. There are metallic surgical clips in the right upper quadrant consistent with previous cholecystectomy. The spleen is unremarkable. No adrenal mass is present.  The pancreas is unremarkable. The kidneys demonstrate circumscribed hypodense lesions in the right kidney, consistent with cysts and stable from prior. No left renal lesion seen. The aorta is normal in caliber. There is no free fluid or adenopathy. No adrenal mass identified. Calcification again noted in the lateral limb of the left adrenal gland, stable vascular calcifications noted.  PELVIS: The GI tract demonstrates no obstruction. The appendix is not identified. The urinary bladder is unremarkable.  There is no free fluid, adenopathy, or inflammatory process. There is an area of increased attenuation in the subcutaneous fat superficial and lateral to the greater trochanter, recommend correlation with physical exam. Prostate is normal in size. There is asymmetric wall thickening of the distal sigmoid and the rectum, which may correlate with the patient's known rectal carcinoma. There is stable appearance to the bilateral inguinal lymph nodes, as well as bilateral external iliac lymph nodes. No bony destructive lesion seen.      Impression:  1. Slight interval decrease in larger cavitary lesion posterior left lower lobe with stable smaller lesion as described.  2. New ground-glass opacities bilaterally, left greater than right, with subcentimeter nodules on the left and a 12 mm mixed density lesion on the left; suspect infectious/inflammatory process, but metastatic disease not excluded. Recommend follow-up in 8 to 12 weeks.  3. Stable appearance of bilateral inguinal and external iliac lymph nodes.  4. Asymmetric wall thickening of the rectum as described.  5. Question inflammatory/infectious process in the subcutaneous fat lateral to  the greater trochanter; recommend correlation with physical exam.   CTDI: 7.2 mGy DLP:380.15 mGy.cm  This report was signed and finalized on 4/25/2025 10:46 AM by Gabriella Rodriguez MD.        Procedures    Assessment / Plan      Assessment/Plan:     1.  Rectal cancer   2.  Liver metastases  3.  Lung metastasis  4.  Chemo monitoring  5.  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  -He was found to have biopsy-proven metastasis in the liver and lung.  His case was presented at multidisciplinary tumor board.  -Because of metastatic disease to both the lung and liver I do not think he will be downstage to resectable disease.  -Tempus results which are notable for an APC mutation, T p53 mutation, negative for KRAS, BRAF, NRAS, tumor mutation burden stable.  Notch 1 VUS.  -CBC adequate and CMP pending for treatment today with maintenance 5-FU and panitumumab 9/16/24  Chemotherapy orders and premedications signed9/16/24  -We reviewed his imaging which is consistent with excellent disease control in early July with normalization of CEA. He has had persistent rectal pain with escalating oxycodone requirements over the past couple of months, but no severe recent increase in pain, fever. WBC normal. 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 I reviewed his EUA notes. Increase doxy to BID. No surgical intervention.  -Consolidative radiotherapy to the rectal tumor is not something he wants to pursue initially but has now undergone consultation.  Maintenance 5FU/panitumumab on hold since early December.  I reviewed his last 4 serial CT images which do so progressive and in his lung. Escalated to FOLFIRI panitumumab 2/2025 to present  -Labs ok for treatment today. Orders sigend      6.  Cancer related pain  -Now following with palliative care and pain. Well controlled on lyrica, oxycodone and MS contin.     7.  Panitumumab induced  rash  -Continue doxycycline and emollients  -Stop voltaren gel, start hydrocortisone 2.5 % to palms    8. GERD  PPI, well controlled    9. Access  -Port    10. Chemo diarrhea  -Hold laxatives on day of diarrhea  -Lomotil PRN    11. Hypomagnesemia  -Mg 1.5    12. Hypokalemia  -continue potassium supplement    13. Seasonal allergies  -Add fonase    Follow Up:   2 weeks     Brenda Cronin MD  Hematology and Oncology

## 2025-05-13 ENCOUNTER — TELEPHONE (OUTPATIENT)
Dept: ONCOLOGY | Facility: CLINIC | Age: 55
End: 2025-05-13
Payer: COMMERCIAL

## 2025-05-13 NOTE — TELEPHONE ENCOUNTER
----- Message from Brenda Cronin sent at 5/13/2025  4:04 PM EDT -----  Regarding: RE: calcium  Encourage him to take otc ca/d if not already doing  ----- Message -----  From: Adriana Mendoza RN  Sent: 5/12/2025  10:42 AM EDT  To: Brenda Cronin MD  Subject: calcium                                          Patients calcium is 7.5 today. Its not a hold or notify, but I just wanted to make sure you were aware. Thank you!

## 2025-05-14 ENCOUNTER — HOSPITAL ENCOUNTER (EMERGENCY)
Facility: HOSPITAL | Age: 55
Discharge: HOME | End: 2025-05-15
Attending: EMERGENCY MEDICINE | Admitting: EMERGENCY MEDICINE
Payer: COMMERCIAL

## 2025-05-14 ENCOUNTER — APPOINTMENT (OUTPATIENT)
Dept: ULTRASOUND IMAGING | Facility: HOSPITAL | Age: 55
End: 2025-05-14
Payer: COMMERCIAL

## 2025-05-14 ENCOUNTER — APPOINTMENT (OUTPATIENT)
Dept: GENERAL RADIOLOGY | Facility: HOSPITAL | Age: 55
End: 2025-05-14
Payer: COMMERCIAL

## 2025-05-14 ENCOUNTER — APPOINTMENT (OUTPATIENT)
Dept: CT IMAGING | Facility: HOSPITAL | Age: 55
End: 2025-05-14
Payer: COMMERCIAL

## 2025-05-14 ENCOUNTER — HOSPITAL ENCOUNTER (OUTPATIENT)
Facility: HOSPITAL | Age: 55
Discharge: HOME | End: 2025-05-14
Admitting: INTERNAL MEDICINE
Payer: COMMERCIAL

## 2025-05-14 DIAGNOSIS — R60.0 LEG EDEMA: Primary | ICD-10-CM

## 2025-05-14 DIAGNOSIS — C20 RECTAL ADENOCARCINOMA: ICD-10-CM

## 2025-05-14 DIAGNOSIS — C78.7 RECTAL CANCER METASTASIZED TO LIVER: Primary | ICD-10-CM

## 2025-05-14 DIAGNOSIS — C20 RECTAL CANCER METASTASIZED TO LIVER: Primary | ICD-10-CM

## 2025-05-14 LAB
ALBUMIN SERPL-MCNC: 2.3 G/DL (ref 3.5–5.2)
ALBUMIN/GLOB SERPL: 0.8 G/DL
ALP SERPL-CCNC: 130 U/L (ref 39–117)
ALT SERPL W P-5'-P-CCNC: 18 U/L (ref 1–41)
ANION GAP SERPL CALCULATED.3IONS-SCNC: 7.1 MMOL/L (ref 5–15)
ANISOCYTOSIS BLD QL: NORMAL
AST SERPL-CCNC: 26 U/L (ref 1–40)
BASOPHILS # BLD AUTO: 0.02 10*3/MM3 (ref 0–0.2)
BASOPHILS NFR BLD AUTO: 0.3 % (ref 0–1.5)
BILIRUB SERPL-MCNC: 0.3 MG/DL (ref 0–1.2)
BUN SERPL-MCNC: 12 MG/DL (ref 6–20)
BUN/CREAT SERPL: 16.7 (ref 7–25)
CALCIUM SPEC-SCNC: 7.6 MG/DL (ref 8.6–10.5)
CHLORIDE SERPL-SCNC: 102 MMOL/L (ref 98–107)
CO2 SERPL-SCNC: 23.9 MMOL/L (ref 22–29)
CREAT SERPL-MCNC: 0.72 MG/DL (ref 0.76–1.27)
DEPRECATED RDW RBC AUTO: 66.2 FL (ref 37–54)
EGFRCR SERPLBLD CKD-EPI 2021: 107.9 ML/MIN/1.73
EOSINOPHIL # BLD AUTO: 0.1 10*3/MM3 (ref 0–0.4)
EOSINOPHIL NFR BLD AUTO: 1.4 % (ref 0.3–6.2)
ERYTHROCYTE [DISTWIDTH] IN BLOOD BY AUTOMATED COUNT: 22.3 % (ref 12.3–15.4)
GLOBULIN UR ELPH-MCNC: 3 GM/DL
GLUCOSE SERPL-MCNC: 85 MG/DL (ref 65–99)
HCT VFR BLD AUTO: 32.3 % (ref 37.5–51)
HGB BLD-MCNC: 10.3 G/DL (ref 13–17.7)
IMM GRANULOCYTES # BLD AUTO: 0.02 10*3/MM3 (ref 0–0.05)
IMM GRANULOCYTES NFR BLD AUTO: 0.3 % (ref 0–0.5)
INR PPP: 1.71 (ref 0.9–1.1)
LYMPHOCYTES # BLD AUTO: 1.67 10*3/MM3 (ref 0.7–3.1)
LYMPHOCYTES NFR BLD AUTO: 23.2 % (ref 19.6–45.3)
MCH RBC QN AUTO: 26.6 PG (ref 26.6–33)
MCHC RBC AUTO-ENTMCNC: 31.9 G/DL (ref 31.5–35.7)
MCV RBC AUTO: 83.5 FL (ref 79–97)
MONOCYTES # BLD AUTO: 0.13 10*3/MM3 (ref 0.1–0.9)
MONOCYTES NFR BLD AUTO: 1.8 % (ref 5–12)
NEUTROPHILS NFR BLD AUTO: 5.27 10*3/MM3 (ref 1.7–7)
NEUTROPHILS NFR BLD AUTO: 73 % (ref 42.7–76)
NRBC BLD AUTO-RTO: 0 /100 WBC (ref 0–0.2)
NT-PROBNP SERPL-MCNC: 230.8 PG/ML (ref 0–900)
PLATELET # BLD AUTO: 116 10*3/MM3 (ref 140–450)
PMV BLD AUTO: 12.2 FL (ref 6–12)
POTASSIUM SERPL-SCNC: 4.2 MMOL/L (ref 3.5–5.2)
PROT SERPL-MCNC: 5.3 G/DL (ref 6–8.5)
PROTHROMBIN TIME: 21.3 SECONDS (ref 12.3–15.1)
RBC # BLD AUTO: 3.87 10*6/MM3 (ref 4.14–5.8)
SMALL PLATELETS BLD QL SMEAR: ADEQUATE
SODIUM SERPL-SCNC: 133 MMOL/L (ref 136–145)
WBC MORPH BLD: NORMAL
WBC NRBC COR # BLD AUTO: 7.21 10*3/MM3 (ref 3.4–10.8)

## 2025-05-14 PROCEDURE — 85007 BL SMEAR W/DIFF WBC COUNT: CPT | Performed by: EMERGENCY MEDICINE

## 2025-05-14 PROCEDURE — 83880 ASSAY OF NATRIURETIC PEPTIDE: CPT | Performed by: EMERGENCY MEDICINE

## 2025-05-14 PROCEDURE — 93971 EXTREMITY STUDY: CPT

## 2025-05-14 PROCEDURE — 85025 COMPLETE CBC W/AUTO DIFF WBC: CPT | Performed by: EMERGENCY MEDICINE

## 2025-05-14 PROCEDURE — 71046 X-RAY EXAM CHEST 2 VIEWS: CPT

## 2025-05-14 PROCEDURE — 25010000002 HEPARIN LOCK FLUSH PER 10 UNITS: Performed by: INTERNAL MEDICINE

## 2025-05-14 PROCEDURE — 85610 PROTHROMBIN TIME: CPT | Performed by: EMERGENCY MEDICINE

## 2025-05-14 PROCEDURE — 80053 COMPREHEN METABOLIC PANEL: CPT | Performed by: EMERGENCY MEDICINE

## 2025-05-14 PROCEDURE — 99285 EMERGENCY DEPT VISIT HI MDM: CPT | Performed by: EMERGENCY MEDICINE

## 2025-05-14 PROCEDURE — 74174 CTA ABD&PLVS W/CONTRAST: CPT

## 2025-05-14 PROCEDURE — 25510000001 IOPAMIDOL 61 % SOLUTION: Performed by: EMERGENCY MEDICINE

## 2025-05-14 RX ORDER — IOPAMIDOL 612 MG/ML
100 INJECTION, SOLUTION INTRAVASCULAR
Status: COMPLETED | OUTPATIENT
Start: 2025-05-14 | End: 2025-05-14

## 2025-05-14 RX ORDER — SODIUM CHLORIDE 0.9 % (FLUSH) 0.9 %
10 SYRINGE (ML) INJECTION AS NEEDED
OUTPATIENT
Start: 2025-05-14

## 2025-05-14 RX ORDER — SODIUM CHLORIDE 0.9 % (FLUSH) 0.9 %
10 SYRINGE (ML) INJECTION AS NEEDED
Status: DISCONTINUED | OUTPATIENT
Start: 2025-05-14 | End: 2025-05-15 | Stop reason: HOSPADM

## 2025-05-14 RX ORDER — HEPARIN SODIUM (PORCINE) LOCK FLUSH IV SOLN 100 UNIT/ML 100 UNIT/ML
500 SOLUTION INTRAVENOUS AS NEEDED
Status: DISCONTINUED | OUTPATIENT
Start: 2025-05-14 | End: 2025-05-15 | Stop reason: HOSPADM

## 2025-05-14 RX ORDER — HEPARIN SODIUM (PORCINE) LOCK FLUSH IV SOLN 100 UNIT/ML 100 UNIT/ML
500 SOLUTION INTRAVENOUS AS NEEDED
OUTPATIENT
Start: 2025-05-14

## 2025-05-14 RX ADMIN — Medication 20 ML: at 14:32

## 2025-05-14 RX ADMIN — HEPARIN 500 UNITS: 100 SYRINGE at 14:32

## 2025-05-14 RX ADMIN — IOPAMIDOL 100 ML: 612 INJECTION, SOLUTION INTRAVENOUS at 23:05

## 2025-05-15 VITALS
BODY MASS INDEX: 29.55 KG/M2 | SYSTOLIC BLOOD PRESSURE: 112 MMHG | RESPIRATION RATE: 18 BRPM | HEIGHT: 70 IN | OXYGEN SATURATION: 97 % | HEART RATE: 90 BPM | TEMPERATURE: 98.2 F | DIASTOLIC BLOOD PRESSURE: 67 MMHG | WEIGHT: 206.4 LBS

## 2025-05-15 NOTE — DISCHARGE INSTRUCTIONS
Patient should follow up with their primary care physician/provider within one week and with your oncologist within the next 2 weeks.  Keep legs elevated at night and wear compression stockings during the day.  Return to the emergency department before then for any concerning symptoms, worsening symptoms or new concerns.

## 2025-05-15 NOTE — ED PROVIDER NOTES
EMERGENCY DEPARTMENT ENCOUNTER    Pt Name: Edward Powell  MRN: [id number]  Pt :   1970  Room Number:  19SF/19  Date of encounter:  2025  PCP: Westley Gonzales DO  ED Provider: Evan French MD    Historian: Patient      HPI:  Chief Complaint: Bilateral leg swelling        Context: Edward Powell is a 54 y.o. male who presents to the ED c/o bilateral leg swelling.  Patient is a past history significant for metastatic rectal cancer and is currently on chemotherapy.  Patient says that over the past several days he has had progressive worsening bilateral lower extremity swelling.  He denies any associated dyspnea, orthopnea, chest pain or abdominal distention.  He says he has been urinating normally.  Says that his oncologist ordered an outpatient ultrasound of the left leg which was negative on Monday.  He says he continues to have worsening bilateral lower extremity swelling.      PAST MEDICAL HISTORY  Past Medical History:   Diagnosis Date    Arthritis     Cancer     rectal cancer - diagnosed     Cholelithiasis     Removed    COPD (chronic obstructive pulmonary disease)     Coronary artery disease 2009    Stent - no cardiologist currently    Elevated cholesterol     GERD (gastroesophageal reflux disease)     Hernia 2003    Lower hernia    Perforated ulcer 2019    Sleep apnea     history of; when weighed over 400lbs - no issues following bariatric surgery         PAST SURGICAL HISTORY  Past Surgical History:   Procedure Laterality Date    APPENDECTOMY      Removed    BARIATRIC SURGERY      Gastric bypass    BLADDER TUMOR/ULCER BLEEDER CAUTERIZATION      CARDIAC CATHETERIZATION  2009    stent placed    CHOLECYSTECTOMY  2019    Removed    COLONOSCOPY N/A 2023    Procedure: COLONOSCOPY WITH HOT SNARE POLYPECTOMY AND TATTOO;  Surgeon: Noman Gautam MD;  Location: [facility] ENDOSCOPY;  Service: Gastroenterology;  Laterality: N/A;    PORTACATH PLACEMENT N/A 2024     Procedure: INSERTION OF PORTACATH WITH ULTRSOUND AND FLUOROSCOPIC GUIDANCE;  Surgeon: Ida Black MD;  Location: [facility] OR;  Service: General;  Laterality: N/A;    JENNIFER-EN-Y           FAMILY HISTORY  History reviewed. No pertinent family history.      SOCIAL HISTORY  Social History     Socioeconomic History    Marital status:    Tobacco Use    Smoking status: Every Day     Current packs/day: 1.00     Average packs/day: 0.9 packs/day for 60.7 years (53.2 ttl pk-yrs)     Types: Cigarettes     Start date: 9/6/1994    Smokeless tobacco: Never    Tobacco comments:     35 years      pt reports closer to 2 packs per day since cancer diagnosis   Vaping Use    Vaping status: Never Used   Substance and Sexual Activity    Alcohol use: Never    Drug use: Never    Sexual activity: Defer         ALLERGIES  Bactrim [sulfamethoxazole-trimethoprim] and Sulfa antibiotics        REVIEW OF SYSTEMS    All systems reviewed and negative except for those discussed in HPI.       PHYSICAL EXAM    I have reviewed the triage vital signs and nursing notes.    ED Triage Vitals [05/14/25 2019]   Temp Heart Rate Resp BP SpO2   98.2 °F (36.8 °C) 90 18 106/70 97 %      Temp src Heart Rate Source Patient Position BP Location FiO2 (%)   Oral Monitor Sitting Left arm --         General: no acute distress, well-appearing, non-toxic  Skin: normal color, warm and dry  Head: normocephalic, atraumatic  Eyes: Pupils equally round and reactive to light.  Nose: normal nasal mucosa, no visible deformity.  Mouth: moist mucous membranes.  Neck: supple.  Chest: no retractions, no visible deformity  Cardiovascular: Regular rate and rhythm.  Lungs: clear to auscultation bilaterally.  No wheezing, rales or rhonchi throughout all lung fields bilaterally.  Abdomen: soft, non-tender, non-distended. No rebound tenderness, no guarding.  No fluid wave.  Extremities: 2+ pitting edema about the bilateral distal lower extremities.  Palpable radial pulses  bilaterally. Palpable dorsalis pedis pulses bilaterally.  Neuro:  alert and oriented x3, no focal neurological deficits.  Psych:  appropriate mood and behavior.        LAB RESULTS  Recent Results (from the past 24 hours)   Comprehensive Metabolic Panel    Collection Time: 05/14/25  9:44 PM    Specimen: Blood   Result Value Ref Range    Glucose 85 65 - 99 mg/dL    BUN 12 6 - 20 mg/dL    Creatinine 0.72 (L) 0.76 - 1.27 mg/dL    Sodium 133 (L) 136 - 145 mmol/L    Potassium 4.2 3.5 - 5.2 mmol/L    Chloride 102 98 - 107 mmol/L    CO2 23.9 22.0 - 29.0 mmol/L    Calcium 7.6 (L) 8.6 - 10.5 mg/dL    Total Protein 5.3 (L) 6.0 - 8.5 g/dL    Albumin 2.3 (L) 3.5 - 5.2 g/dL    ALT (SGPT) 18 1 - 41 U/L    AST (SGOT) 26 1 - 40 U/L    Alkaline Phosphatase 130 (H) 39 - 117 U/L    Total Bilirubin 0.3 0.0 - 1.2 mg/dL    Globulin 3.0 gm/dL    A/G Ratio 0.8 g/dL    BUN/Creatinine Ratio 16.7 7.0 - 25.0    Anion Gap 7.1 5.0 - 15.0 mmol/L    eGFR 107.9 >60.0 mL/min/1.73   CBC Auto Differential    Collection Time: 05/14/25  9:44 PM    Specimen: Blood   Result Value Ref Range    WBC 7.21 3.40 - 10.80 10*3/mm3    RBC 3.87 (L) 4.14 - 5.80 10*6/mm3    Hemoglobin 10.3 (L) 13.0 - 17.7 g/dL    Hematocrit 32.3 (L) 37.5 - 51.0 %    MCV 83.5 79.0 - 97.0 fL    MCH 26.6 26.6 - 33.0 pg    MCHC 31.9 31.5 - 35.7 g/dL    RDW 22.3 (H) 12.3 - 15.4 %    RDW-SD 66.2 (H) 37.0 - 54.0 fl    MPV 12.2 (H) 6.0 - 12.0 fL    Platelets 116 (L) 140 - 450 10*3/mm3    Neutrophil % 73.0 42.7 - 76.0 %    Lymphocyte % 23.2 19.6 - 45.3 %    Monocyte % 1.8 (L) 5.0 - 12.0 %    Eosinophil % 1.4 0.3 - 6.2 %    Basophil % 0.3 0.0 - 1.5 %    Immature Grans % 0.3 0.0 - 0.5 %    Neutrophils, Absolute 5.27 1.70 - 7.00 10*3/mm3    Lymphocytes, Absolute 1.67 0.70 - 3.10 10*3/mm3    Monocytes, Absolute 0.13 0.10 - 0.90 10*3/mm3    Eosinophils, Absolute 0.10 0.00 - 0.40 10*3/mm3    Basophils, Absolute 0.02 0.00 - 0.20 10*3/mm3    Immature Grans, Absolute 0.02 0.00 - 0.05 10*3/mm3    nRBC  0.0 0.0 - 0.2 /100 WBC   Protime-INR    Collection Time: 05/14/25  9:44 PM    Specimen: Blood   Result Value Ref Range    Protime 21.3 (H) 12.3 - 15.1 Seconds    INR 1.71 (H) 0.90 - 1.10   BNP    Collection Time: 05/14/25  9:44 PM    Specimen: Blood   Result Value Ref Range    proBNP 230.8 0.0 - 900.0 pg/mL   Scan Slide    Collection Time: 05/14/25  9:44 PM    Specimen: Blood   Result Value Ref Range    Anisocytosis Mod/2+ None Seen    WBC Morphology Normal Normal    Platelet Estimate Adequate Normal       If labs were ordered, I independently reviewed the results and considered them in treating the patient.  See medical decision making discussion section for my interpretation of lab results.        RADIOLOGY  CT Angiogram Abdomen Pelvis  Result Date: 5/15/2025  FINAL REPORT TECHNIQUE: null CLINICAL HISTORY: B/L lower extremity swelling, hx of metastatic rectal cancer w/ liver mets. r/ COMPARISON: null FINDINGS: CT angiography abdomen and pelvis with contrast. 3-D post processing. Comparison: None Findings: Aorta, mesenteric/renal arteries, and iliofemoral systems are within normal limits. Mild scattered atelectasis at lung bases. Lingular pleural-based nodule 6 mm image 22. Cavitary lesion incompletely included left lower lobe along the pleura 4.2 cm on image 1. Small right superior renal cyst. Cholecystectomy. Abdominal solid organs are otherwise unremarkable. Partial right colectomy. Gastric bypass. Moderate urinary bladder distention. Mildly prominent bilateral groin lymph nodes with no pathologic features. Likely reactive. There is an approximately 6.5 cm segment of circumferential rectal wall thickening centered on image 381:4. Multilevel small Schmorl's nodes. No acute fractures. There is skin thickening and subcutaneous fat induration at the right inferior hip extending to the proximal posterior leg. No underlying fluid collection.     IMPRESSION: 1. Widely patent systemic arterial system. 2. Possible  metastatic disease within the left lung. 3. Circumferential rectal wall thickening. Consider malignancy. 4. Possible cellulitis at the right posterior hip extending into the proximal posterior leg. Follow up as needed. 5. Additional findings as above. Authenticated and Electronically Signed by Quique Siegel MD on 05/15/2025 12:36:58 AM    US Venous Doppler Lower Extremity Right (duplex)  Result Date: 5/14/2025  FINAL REPORT TECHNIQUE: null CLINICAL HISTORY: R leg swelling, hx cancer COMPARISON: null FINDINGS: Venous duplex ultrasound right lower extremity Comparison: None Findings: The visualized deep veins are fully compressible with normal Doppler color flow and spectral tracings. No popliteal cyst. Mild leg subcutaneous edema.     IMPRESSION: 1. Negative for right lower extremity deep vein thrombosis. Authenticated and Electronically Signed by Quique Siegel MD on 05/14/2025 10:34:09 PM      I ordered and independently reviewed the above noted radiographic studies.  See radiologist's dictation for official interpretation.    Per my independent reading:      Chest radiograph obtained and based on my independent reading shows no cardiomegaly, pulmonary edema, pneumothorax or pneumonia based on my independent reading.            PROCEDURES    Procedures    No orders to display       MEDICATIONS GIVEN IN ER    Medications   iopamidol (ISOVUE-300) 61 % injection 100 mL (100 mL Intravenous Given 5/14/25 5335)         MEDICAL DECISION MAKING, PROGRESS, and CONSULTS    All labs, if obtained, have been independently reviewed by me.  All radiology studies, if obtained, have been reviewed by me and the radiologist dictating the report.  All EKG's, if obtained, have been independently viewed and interpreted by me/my attending physician.      Discussion below represents my analysis of pertinent findings related to patient's condition, differential diagnosis, treatment plan and final disposition.                          Differential diagnosis:    Differential includes heart failure, DVT, portal venous thrombosis, deep pelvic vein thrombosis, renal failure, hepatic failure, dependent edema, other acute emergency.    Medical Decision Making Discussion:    Vitals reviewed and are normal.    Patient had outpatient left lower extremity venous ultrasound on the 12th which was negative.  Will order venous ultrasound of the right.  Ultrasound negative for DVT per radiology.    Labs show no evidence of hepatic or renal failure.  Normal proBNP.  Also no pulmonary edema on chest radiograph.  I think this is sufficient to exclude acute heart failure as the etiology of his lower extremity edema.    Given known metastatic rectal cancer CT angiogram of the abdomen pelvis was obtained to exclude portal venous thrombus or other pelvic DVT resulting in bilateral lower extremity edema which is negative for evidence of this per radiology.  Chronic findings related to rectal cancer are shown on CT imaging.  Also radiology comments on possible cellulitis about right hip.  Patient has known chronic hidradenitis in this area.  No concerning physical exam evidence of cellulitis in this area.  Patient is aware of these CT scan findings.  Instructed to follow closely with his primary care provider and oncologist.  Instructed to keep legs elevated at night and to wear compression stockings during the day.  Instructed to return to the emergency department before then for any concerning symptoms, worsening symptoms or new concerns    Additional sources:    - External (non-ED) record review: Hematology oncology note from May 12 documenting rectal cancer with liver and lung metastases, COPD, heart disease    - Chronic or social conditions impacting care: Metastatic rectal cancer    Shared Decision Making:  After my consideration of clinical presentation and any laboratory/radiology studies obtained, I discussed the findings with the patient/patient  representative who is in agreement with the treatment plan and the final disposition.   Risks and benefits of discharge and/or observation/admission were discussed.    Orders placed during this visit:  Orders Placed This Encounter   Procedures    US Venous Doppler Lower Extremity Right (duplex)    CT Angiogram Abdomen Pelvis    XR Chest 2 View    Comprehensive Metabolic Panel    CBC Auto Differential    Protime-INR    BNP    Scan Slide    Apply OK to use port for labs/meds Until Discontinued       AS OF 03:36 EDT VITALS:    BP - 112/67  HR - 90  TEMP - 98.2 °F (36.8 °C) (Oral)  O2 SATS - 97%                  DIAGNOSIS  Final diagnoses:   Leg edema         DISPOSITION  Discharge      Please note that portions of this document were completed with voice recognition software.        Evan French MD  05/15/25 0331

## 2025-05-19 ENCOUNTER — TELEPHONE (OUTPATIENT)
Dept: PALLIATIVE CARE | Facility: CLINIC | Age: 55
End: 2025-05-19
Payer: COMMERCIAL

## 2025-05-19 ENCOUNTER — TELEPHONE (OUTPATIENT)
Dept: ONCOLOGY | Facility: CLINIC | Age: 55
End: 2025-05-19
Payer: COMMERCIAL

## 2025-05-19 ENCOUNTER — OFFICE VISIT (OUTPATIENT)
Age: 55
End: 2025-05-19
Payer: COMMERCIAL

## 2025-05-19 VITALS
HEART RATE: 98 BPM | WEIGHT: 195 LBS | OXYGEN SATURATION: 97 % | SYSTOLIC BLOOD PRESSURE: 122 MMHG | BODY MASS INDEX: 27.92 KG/M2 | TEMPERATURE: 97.6 F | HEIGHT: 70 IN | DIASTOLIC BLOOD PRESSURE: 78 MMHG

## 2025-05-19 DIAGNOSIS — M25.561 ACUTE PAIN OF RIGHT KNEE: Primary | ICD-10-CM

## 2025-05-19 DIAGNOSIS — R42 DIZZINESS: ICD-10-CM

## 2025-05-19 DIAGNOSIS — G62.0 CHEMOTHERAPY-INDUCED NEUROPATHY: Primary | ICD-10-CM

## 2025-05-19 DIAGNOSIS — T45.1X5A CHEMOTHERAPY-INDUCED NEUROPATHY: Primary | ICD-10-CM

## 2025-05-19 PROCEDURE — 99214 OFFICE O/P EST MOD 30 MIN: CPT | Performed by: FAMILY MEDICINE

## 2025-05-19 RX ORDER — DULOXETIN HYDROCHLORIDE 30 MG/1
30 CAPSULE, DELAYED RELEASE ORAL DAILY
Qty: 30 CAPSULE | Refills: 1 | Status: SHIPPED | OUTPATIENT
Start: 2025-05-19 | End: 2025-05-19

## 2025-05-19 NOTE — TELEPHONE ENCOUNTER
"Patient stated he recently increased his dose of duloxetine per direction of GEORGIA Schulte from 30 mg to 60 mg and since then he c/o dizziness.  He stated that a few days ago he got dizzy which resolved.  He stated that 2 days later he became dizzy again and his BLE \"felt like jelly.\"  Patient stated he then fell but denied any LOC and denied hitting his head.  Patient stated that he did hurt his knee but is able to bear weight.  He stated that he fell again yesterday due to dizziness but denied LOC, injury or hitting his head.  Patient stated he thinks it may be due to his duloxetine.  Patient stated he is going to see his PCP today to have his right knee evaluated.  Dr. Cronin notified and per MD, patient advised he should follow up with PCP to evaluate knee and that he should also see this office this week (Jacy) with possible IV fluids afterward.  Also, per MD, message sent to Linda regarding medication prescribed by her.  Patient verbalized understanding and was given appointments with this office this week.  "

## 2025-05-19 NOTE — PROGRESS NOTES
Follow Up Office Visit      Date: 2025   Patient Name: Edward Powell  : 1970   MRN: [id number]     Chief Complaint:    Chief Complaint   Patient presents with    Dizziness     Mouth dryness  Friday evening, dizziness, leg weakness, fatigue         History of Present Illness: Edward Powell is a 54 y.o. male who is here today for dizziness, fatigue, weakness.      States that he got out of a truck on  and started to feel dizzy.  Dizziness continued for about 5-6 minutes.  Has continued to have intermittent episodes since that point.  States that he has even fallen down due to dizziness and weakness.    Subjective      Review of Systems:   Review of Systems   Constitutional:  Negative for appetite change and unexpected weight loss.   HENT:  Negative for trouble swallowing.    Eyes:  Negative for blurred vision and double vision.   Respiratory:  Negative for cough and shortness of breath.    Cardiovascular:  Negative for chest pain and leg swelling.   Gastrointestinal:  Negative for blood in stool.   Endocrine: Negative for cold intolerance, heat intolerance and polyuria.   Musculoskeletal:  Negative for joint swelling.   Skin:  Negative for color change and bruise.   Neurological:  Negative for numbness and memory problem.   Hematological:  Does not bruise/bleed easily.   Psychiatric/Behavioral:  Negative for suicidal ideas and depressed mood. The patient is not nervous/anxious.        I have reviewed the patients family history, social history, past medical history, past surgical history and have updated it as appropriate.     Medications:     Current Outpatient Medications:     albuterol sulfate  (90 Base) MCG/ACT inhaler, Inhale 2 puffs Every 4 (Four) Hours As Needed for Wheezing., Disp: 18 g, Rfl: 3    buPROPion XL (WELLBUTRIN XL) 150 MG 24 hr tablet, Take 1 tablet by mouth Daily., Disp: 90 tablet, Rfl: 1    cetirizine (zyrTEC) 10 MG tablet, Take 1 tablet by mouth Daily.,  Disp: 30 tablet, Rfl: 2    clindamycin (Clindagel) 1 % gel, Apply 1 Application topically to the appropriate area as directed 2 (Two) Times a Day. Use first, Disp: 30 g, Rfl: 1    dexAMETHasone 0.5 MG/5ML solution, Take 10 mL by mouth 2 (Two) Times a Day. Swish for 2 minutes and spit 10 mL 4 x daily, Disp: 240 mL, Rfl: 5    Diclofenac Sodium (Voltaren) 1 % gel gel, Apply 4 g topically to the appropriate area as directed 2 (Two) Times a Day., Disp: 150 g, Rfl: 1    diphenoxylate-atropine (LOMOTIL) 2.5-0.025 MG per tablet, TAKE 1 TABLET BY MOUTH EVERY 6 HOURS AS NEEDED FOR DIARRHEA, Disp: 30 tablet, Rfl: 2    docusate sodium (COLACE) 100 MG capsule, Take 1 capsule by mouth 2 (Two) Times a Day., Disp: , Rfl:     doxycycline (VIBRAMYICN) 100 MG tablet, Take 1 tablet by mouth 2 (Two) Times a Day. For 7 days, then 1 tablet daily indefinitely, Disp: 67 tablet, Rfl: 3    famotidine (PEPCID) 20 MG tablet, Take 1 tablet by mouth 2 (Two) Times a Day., Disp: 60 tablet, Rfl: 4    fluticasone (FLONASE) 50 MCG/ACT nasal spray, Administer 2 sprays into the nostril(s) as directed by provider Daily. Administer 2 sprays in each nostril for each dose., Disp: 16 g, Rfl: 4    hydrocortisone 2.5 % ointment, Apply 1 Application topically to the appropriate area as directed 2 (Two) Times a Day., Disp: 60 g, Rfl: 1    Hydrocortisone, Perianal, (ANUSOL-HC) 2.5 % rectal cream, Insert  into the rectum 2 (Two) Times a Day. Indications: Inflamed Hemorrhoids, Disp: 30 g, Rfl: 1    KETOPROFEN-LIDO-GABAPENTIN EX, APPLY 1-2 GRAMS TO AFFECTED AREAS 3-4 TIMES DAILY, Disp: , Rfl:     lidocaine-prilocaine (EMLA) 2.5-2.5 % cream, Apply 1 Application topically to the appropriate area as directed As Needed (45-60 minutes prior to port access.  Cover with saran/plastic wrap.)., Disp: 30 g, Rfl: 3    LORazepam (ATIVAN) 0.5 MG tablet, Take 1 tablet by mouth Take As Directed. 1 tabelt by mouth 30 minutes prior to MRI, take a second tablet at the time of the MRI  if needed., Disp: 2 tablet, Rfl: 0    Magic Mouthwash Oral Suspension (diphenhydrAMINE HCl - aluminum & magnesium hydroxide-simethicone - lidocaine - nystatin), Swish and Spit 10 mL by mouth every 6 (Six) Hours For 7 Days., Disp: 300 mL, Rfl: 3    magnesium oxide (MAG-OX) 400 MG tablet, Take 1 tablet by mouth Daily., Disp: 30 tablet, Rfl: 5    mineral oil-hydrophilic petrolatum (AQUAPHOR) ointment, Apply 1 Application topically to the appropriate area as directed As Needed for Dry Skin., Disp: 198 g, Rfl: 0    montelukast (SINGULAIR) 10 MG tablet, Take 1 tablet by mouth Every Night., Disp: 90 tablet, Rfl: 3    Morphine (MS CONTIN) 30 MG 12 hr tablet, Take 1 tablet by mouth 2 (Two) Times a Day for 30 days., Disp: 60 tablet, Rfl: 0    Movantik 25 MG tablet, , Disp: , Rfl:     mupirocin (BACTROBAN) 2 % cream, , Disp: , Rfl:     naloxone (NARCAN) 4 MG/0.1ML nasal spray, 1 spray into the nostril(s) as directed by provider As Needed for Opioid Reversal., Disp: 1 each, Rfl: 0    nystatin susp + lidocaine viscous (MAGIC MOUTHWASH) oral suspension, 5-10 ml swish and spit or swallow QID prn, Disp: 240 mL, Rfl: 3    ondansetron (ZOFRAN) 8 MG tablet, Take 1 tablet by mouth 3 (Three) Times a Day As Needed for Nausea or Vomiting., Disp: 30 tablet, Rfl: 5    oxyCODONE (ROXICODONE) 15 MG immediate release tablet, Take 1 tablet by mouth 5 (Five) Times a Day As Needed for Moderate Pain or Severe Pain for up to 30 days., Disp: 150 tablet, Rfl: 0    pantoprazole (Protonix) 40 MG EC tablet, Take 1 tablet by mouth Daily. Indications: Gastroesophageal Reflux Disease, Disp: 90 tablet, Rfl: 2    potassium chloride (K-TAB) 20 MEQ tablet controlled-release ER tablet, Take 1 tablet by mouth Daily., Disp: 30 tablet, Rfl: 1    pregabalin (Lyrica) 300 MG capsule, Take 1 capsule by mouth 2 (Two) Times a Day for 30 days., Disp: 60 capsule, Rfl: 0    promethazine-dextromethorphan (PROMETHAZINE-DM) 6.25-15 MG/5ML syrup, Take 5 mL by mouth 3 times a  "day., Disp: 240 mL, Rfl: 0    tiotropium bromide-olodaterol (Stiolto Respimat) 2.5-2.5 MCG/ACT aerosol solution inhaler, INHALE 2 INHALATION(S) BY MOUTH DAILY, Disp: 4 g, Rfl: 5    traZODone (DESYREL) 150 MG tablet, Take 1 tablet by mouth Every Night., Disp: 30 tablet, Rfl: 2    triamcinolone (KENALOG) 0.025 % ointment, Apply 1 Application topically to the appropriate area as directed 2 (Two) Times a Day. Use second if topical treatment #1 ineffective, Disp: 80 g, Rfl: 0    Allergies:   Allergies   Allergen Reactions    Bactrim [Sulfamethoxazole-Trimethoprim] Hives     and blisters    Sulfa Antibiotics Hives     and blisters       Objective     Physical Exam: Please see above  Vital Signs:   Vitals:    05/19/25 1041   BP: 122/78   Pulse: 98   Temp: 97.6 °F (36.4 °C)   SpO2: 97%   Weight: 88.5 kg (195 lb)   Height: 177.8 cm (70\")     Body mass index is 27.98 kg/m².    Physical Exam  Vitals and nursing note reviewed.   Constitutional:       Appearance: Normal appearance.   HENT:      Head: Normocephalic and atraumatic.   Eyes:      General: Lids are normal.      Conjunctiva/sclera: Conjunctivae normal.   Cardiovascular:      Rate and Rhythm: Normal rate and regular rhythm.   Pulmonary:      Effort: Pulmonary effort is normal.      Breath sounds: Normal breath sounds and air entry.   Abdominal:      General: Abdomen is flat. Bowel sounds are normal.      Palpations: Abdomen is soft.   Musculoskeletal:      Cervical back: Full passive range of motion without pain and normal range of motion.      Comments: TTP over medial aspect of right knee   Neurological:      General: No focal deficit present.      Mental Status: He is alert and oriented to person, place, and time.   Psychiatric:         Attention and Perception: Attention normal.         Mood and Affect: Mood normal.         Behavior: Behavior normal. Behavior is cooperative.         Procedures    Results:   Labs:   No results found for: \"HGBA1C\", \"CMP\", " "\"CBCDIFFPANEL\", \"CREAT\", \"TSH\"     POCT Results (if applicable):   Results for orders placed or performed during the hospital encounter of 05/14/25   Comprehensive Metabolic Panel    Collection Time: 05/14/25  9:44 PM    Specimen: Blood   Result Value Ref Range    Glucose 85 65 - 99 mg/dL    BUN 12 6 - 20 mg/dL    Creatinine 0.72 (L) 0.76 - 1.27 mg/dL    Sodium 133 (L) 136 - 145 mmol/L    Potassium 4.2 3.5 - 5.2 mmol/L    Chloride 102 98 - 107 mmol/L    CO2 23.9 22.0 - 29.0 mmol/L    Calcium 7.6 (L) 8.6 - 10.5 mg/dL    Total Protein 5.3 (L) 6.0 - 8.5 g/dL    Albumin 2.3 (L) 3.5 - 5.2 g/dL    ALT (SGPT) 18 1 - 41 U/L    AST (SGOT) 26 1 - 40 U/L    Alkaline Phosphatase 130 (H) 39 - 117 U/L    Total Bilirubin 0.3 0.0 - 1.2 mg/dL    Globulin 3.0 gm/dL    A/G Ratio 0.8 g/dL    BUN/Creatinine Ratio 16.7 7.0 - 25.0    Anion Gap 7.1 5.0 - 15.0 mmol/L    eGFR 107.9 >60.0 mL/min/1.73   CBC Auto Differential    Collection Time: 05/14/25  9:44 PM    Specimen: Blood   Result Value Ref Range    WBC 7.21 3.40 - 10.80 10*3/mm3    RBC 3.87 (L) 4.14 - 5.80 10*6/mm3    Hemoglobin 10.3 (L) 13.0 - 17.7 g/dL    Hematocrit 32.3 (L) 37.5 - 51.0 %    MCV 83.5 79.0 - 97.0 fL    MCH 26.6 26.6 - 33.0 pg    MCHC 31.9 31.5 - 35.7 g/dL    RDW 22.3 (H) 12.3 - 15.4 %    RDW-SD 66.2 (H) 37.0 - 54.0 fl    MPV 12.2 (H) 6.0 - 12.0 fL    Platelets 116 (L) 140 - 450 10*3/mm3    Neutrophil % 73.0 42.7 - 76.0 %    Lymphocyte % 23.2 19.6 - 45.3 %    Monocyte % 1.8 (L) 5.0 - 12.0 %    Eosinophil % 1.4 0.3 - 6.2 %    Basophil % 0.3 0.0 - 1.5 %    Immature Grans % 0.3 0.0 - 0.5 %    Neutrophils, Absolute 5.27 1.70 - 7.00 10*3/mm3    Lymphocytes, Absolute 1.67 0.70 - 3.10 10*3/mm3    Monocytes, Absolute 0.13 0.10 - 0.90 10*3/mm3    Eosinophils, Absolute 0.10 0.00 - 0.40 10*3/mm3    Basophils, Absolute 0.02 0.00 - 0.20 10*3/mm3    Immature Grans, Absolute 0.02 0.00 - 0.05 10*3/mm3    nRBC 0.0 0.0 - 0.2 /100 WBC   Protime-INR    Collection Time: 05/14/25  9:44 PM "    Specimen: Blood   Result Value Ref Range    Protime 21.3 (H) 12.3 - 15.1 Seconds    INR 1.71 (H) 0.90 - 1.10   BNP    Collection Time: 05/14/25  9:44 PM    Specimen: Blood   Result Value Ref Range    proBNP 230.8 0.0 - 900.0 pg/mL   Scan Slide    Collection Time: 05/14/25  9:44 PM    Specimen: Blood   Result Value Ref Range    Anisocytosis Mod/2+ None Seen    WBC Morphology Normal Normal    Platelet Estimate Adequate Normal       Imaging:   No valid procedures specified.         Assessment / Plan      Assessment/Plan:   Diagnoses and all orders for this visit:    1. Acute pain of right knee (Primary)  -     XR Knee 3 View Right; Future    2. Dizziness   - Could be a side effect of his medication versus progression of his metastatic cancer. He has Talked with palliative doctor who is decreasing his medication.            Vaccine Counseling:      Follow Up:   No follow-ups on file.        Westley Gonzales,   [facility]

## 2025-05-19 NOTE — TELEPHONE ENCOUNTER
Spoke with patient over the telephone. Patient complains of dizziness, dry mouth, irregular BM's, drowsiness. He reports looking online at the side effects listed for Cymbalta and felt they were consistent with his symptoms. Provider explained that the Cymbalta could be contributing to these symptoms however several of his medications including morphine ER, oxycodone and Lyrica are also known to cause these side effects. Will decrease Cymbalta back down to 30 mg daily. May consider discontinuing in the future. Will also monitor his symptoms closely and look to adjust further if needed.

## 2025-05-22 ENCOUNTER — OFFICE VISIT (OUTPATIENT)
Dept: ONCOLOGY | Facility: CLINIC | Age: 55
End: 2025-05-22
Payer: COMMERCIAL

## 2025-05-22 ENCOUNTER — HOSPITAL ENCOUNTER (OUTPATIENT)
Facility: HOSPITAL | Age: 55
Discharge: HOME | End: 2025-05-22
Admitting: NURSE PRACTITIONER
Payer: COMMERCIAL

## 2025-05-22 VITALS
DIASTOLIC BLOOD PRESSURE: 60 MMHG | SYSTOLIC BLOOD PRESSURE: 96 MMHG | TEMPERATURE: 98.4 F | HEART RATE: 90 BPM | RESPIRATION RATE: 18 BRPM

## 2025-05-22 VITALS
HEART RATE: 97 BPM | HEIGHT: 70 IN | DIASTOLIC BLOOD PRESSURE: 60 MMHG | WEIGHT: 196.2 LBS | TEMPERATURE: 97 F | BODY MASS INDEX: 28.09 KG/M2 | OXYGEN SATURATION: 99 % | SYSTOLIC BLOOD PRESSURE: 122 MMHG

## 2025-05-22 DIAGNOSIS — C20 RECTAL ADENOCARCINOMA: ICD-10-CM

## 2025-05-22 DIAGNOSIS — C20 RECTAL ADENOCARCINOMA: Primary | ICD-10-CM

## 2025-05-22 DIAGNOSIS — C20 RECTAL CANCER METASTASIZED TO LIVER: ICD-10-CM

## 2025-05-22 DIAGNOSIS — C78.7 RECTAL CANCER METASTASIZED TO LIVER: ICD-10-CM

## 2025-05-22 DIAGNOSIS — C78.7 RECTAL CANCER METASTASIZED TO LIVER: Primary | ICD-10-CM

## 2025-05-22 DIAGNOSIS — C20 RECTAL CANCER METASTASIZED TO LIVER: Primary | ICD-10-CM

## 2025-05-22 LAB
ALBUMIN SERPL-MCNC: 2.5 G/DL (ref 3.5–5.2)
ALBUMIN/GLOB SERPL: 0.8 G/DL
ALP SERPL-CCNC: 126 U/L (ref 39–117)
ALT SERPL W P-5'-P-CCNC: 16 U/L (ref 1–41)
ANION GAP SERPL CALCULATED.3IONS-SCNC: 8.3 MMOL/L (ref 5–15)
AST SERPL-CCNC: 17 U/L (ref 1–40)
BASOPHILS # BLD AUTO: 0.03 10*3/MM3 (ref 0–0.2)
BASOPHILS NFR BLD AUTO: 0.3 % (ref 0–1.5)
BILIRUB SERPL-MCNC: 0.2 MG/DL (ref 0–1.2)
BUN SERPL-MCNC: 10 MG/DL (ref 6–20)
BUN/CREAT SERPL: 12.3 (ref 7–25)
CALCIUM SPEC-SCNC: 7.9 MG/DL (ref 8.6–10.5)
CHLORIDE SERPL-SCNC: 108 MMOL/L (ref 98–107)
CO2 SERPL-SCNC: 21.7 MMOL/L (ref 22–29)
CREAT SERPL-MCNC: 0.81 MG/DL (ref 0.76–1.27)
DEPRECATED RDW RBC AUTO: 66.3 FL (ref 37–54)
EGFRCR SERPLBLD CKD-EPI 2021: 104.1 ML/MIN/1.73
EOSINOPHIL # BLD AUTO: 0.1 10*3/MM3 (ref 0–0.4)
EOSINOPHIL NFR BLD AUTO: 1.1 % (ref 0.3–6.2)
ERYTHROCYTE [DISTWIDTH] IN BLOOD BY AUTOMATED COUNT: 21.8 % (ref 12.3–15.4)
GLOBULIN UR ELPH-MCNC: 3 GM/DL
GLUCOSE SERPL-MCNC: 72 MG/DL (ref 65–99)
HCT VFR BLD AUTO: 38 % (ref 37.5–51)
HGB BLD-MCNC: 12.2 G/DL (ref 13–17.7)
IMM GRANULOCYTES # BLD AUTO: 0.04 10*3/MM3 (ref 0–0.05)
IMM GRANULOCYTES NFR BLD AUTO: 0.4 % (ref 0–0.5)
LYMPHOCYTES # BLD AUTO: 1.56 10*3/MM3 (ref 0.7–3.1)
LYMPHOCYTES NFR BLD AUTO: 17.1 % (ref 19.6–45.3)
MCH RBC QN AUTO: 27.1 PG (ref 26.6–33)
MCHC RBC AUTO-ENTMCNC: 32.1 G/DL (ref 31.5–35.7)
MCV RBC AUTO: 84.3 FL (ref 79–97)
MONOCYTES # BLD AUTO: 0.74 10*3/MM3 (ref 0.1–0.9)
MONOCYTES NFR BLD AUTO: 8.1 % (ref 5–12)
NEUTROPHILS NFR BLD AUTO: 6.63 10*3/MM3 (ref 1.7–7)
NEUTROPHILS NFR BLD AUTO: 73 % (ref 42.7–76)
NRBC BLD AUTO-RTO: 0 /100 WBC (ref 0–0.2)
PLATELET # BLD AUTO: 257 10*3/MM3 (ref 140–450)
PMV BLD AUTO: 10.9 FL (ref 6–12)
POTASSIUM SERPL-SCNC: 3.5 MMOL/L (ref 3.5–5.2)
PROT SERPL-MCNC: 5.5 G/DL (ref 6–8.5)
RBC # BLD AUTO: 4.51 10*6/MM3 (ref 4.14–5.8)
SODIUM SERPL-SCNC: 138 MMOL/L (ref 136–145)
WBC NRBC COR # BLD AUTO: 9.1 10*3/MM3 (ref 3.4–10.8)

## 2025-05-22 PROCEDURE — 96360 HYDRATION IV INFUSION INIT: CPT

## 2025-05-22 PROCEDURE — 36591 DRAW BLOOD OFF VENOUS DEVICE: CPT

## 2025-05-22 PROCEDURE — 85025 COMPLETE CBC W/AUTO DIFF WBC: CPT | Performed by: NURSE PRACTITIONER

## 2025-05-22 PROCEDURE — 25810000003 SODIUM CHLORIDE 0.9 % SOLUTION: Performed by: NURSE PRACTITIONER

## 2025-05-22 PROCEDURE — 25010000002 HEPARIN LOCK FLUSH PER 10 UNITS: Performed by: INTERNAL MEDICINE

## 2025-05-22 PROCEDURE — 80053 COMPREHEN METABOLIC PANEL: CPT | Performed by: NURSE PRACTITIONER

## 2025-05-22 PROCEDURE — 96361 HYDRATE IV INFUSION ADD-ON: CPT

## 2025-05-22 RX ORDER — SODIUM CHLORIDE 0.9 % (FLUSH) 0.9 %
10 SYRINGE (ML) INJECTION AS NEEDED
Status: DISCONTINUED | OUTPATIENT
Start: 2025-05-22 | End: 2025-05-23 | Stop reason: HOSPADM

## 2025-05-22 RX ORDER — HEPARIN SODIUM (PORCINE) LOCK FLUSH IV SOLN 100 UNIT/ML 100 UNIT/ML
500 SOLUTION INTRAVENOUS AS NEEDED
OUTPATIENT
Start: 2025-05-22

## 2025-05-22 RX ORDER — SODIUM CHLORIDE 0.9 % (FLUSH) 0.9 %
10 SYRINGE (ML) INJECTION AS NEEDED
OUTPATIENT
Start: 2025-05-22

## 2025-05-22 RX ORDER — DULOXETIN HYDROCHLORIDE 30 MG/1
CAPSULE, DELAYED RELEASE ORAL
COMMUNITY
Start: 2025-05-19

## 2025-05-22 RX ORDER — HEPARIN SODIUM (PORCINE) LOCK FLUSH IV SOLN 100 UNIT/ML 100 UNIT/ML
500 SOLUTION INTRAVENOUS AS NEEDED
Status: DISCONTINUED | OUTPATIENT
Start: 2025-05-22 | End: 2025-05-23 | Stop reason: HOSPADM

## 2025-05-22 RX ADMIN — SODIUM CHLORIDE 1000 ML: 0.9 INJECTION, SOLUTION INTRAVENOUS at 11:15

## 2025-05-22 RX ADMIN — SODIUM CHLORIDE, PRESERVATIVE FREE 500 UNITS: 5 INJECTION INTRAVENOUS at 13:21

## 2025-05-22 NOTE — PROGRESS NOTES
[facility]ology [facility] [place]  Office number [phone]    Fax number [phone]     Follow up     Date: 25    Patient Name: Ewdard Powell  MRN: [id number]  : 1970    Referring Physician: Dr. Gautam    Chief Complaint: Rectal cancer with liver and lung metastases    Cancer Staging:  IV    History of Present Illness: Edward Powell is a pleasant 54 y.o. male who presents today for evaluation of rectal cancer.    Patient has a longstanding history of intermittent diarrhea since his cholecystectomy in 2019.  However he developed progressive diarrhea with associated loss of bowel control and urgency as well as weight loss and fatigue prompting additional workup.    CT of the abdomen pelvis 2023 showed an irregular circumferential wall thickening involving the rectum concerning for mass lesion.  There was associated mesorectal lymphadenopathy.  Masslike consolidation of left lower lobe.  CT of the chest showed a cavitary lesion in the left lower lobe up to 4.8 cm with an adjacent cavitary nodule up to 2 cm and a 5 mm nodule on the right minor fissure.      He underwent colonoscopy 2023 with findings of an infiltrative and ulcerated partially obstructing mass in the proximal rectum spanning 10 cm.  Rectal polyps.  Biopsy of the rectal mass showed invasive moderately differentiated adenocarcinoma.  Additional biopsies showed tubular adenomas.  MSI testing was intact/low probability of MSI high.    PDL1 negative    PET/CT 2023 showed hypermetabolic rectal wall thickening compatible with known rectal malignancy.  Multiple small ill-defined hypoechoic hyper metabolic liver lesions compatible with metastatic disease.  Mildly enlarged and mildly hypermetabolic pelvic sidewall and internal iliac lymph node chain adenopathy.  Hypermetabolic lung mass with adjacent nodule.     He underwent liver biopsy and lung biopsy 2024.  Results of both confirmed metastatic  colorectal cancer.    The patient has been experiencing substantial rectal pain.  He is taking oxycodone every 6 hours with partial relief.  He reports ongoing bowel movements.  No abdominal pain.  No vomiting.  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    Treatment history:  FOLFOX cycle 1-4  FOLFOX panitumumab cycle 5 onward  -Oxaliplatin terminated early for allergic reaction after 4/29/24    FOLFIRI/Panitumumab: 2/3/25 to present    Interval history:  Patient reports dizziness, dry mouth, irregular bowel movements, and drowsiness.  He is Cymbalta was increased from 30-60.  This is when the symptoms started.  Cymbalta was decreased down to 30 mg on 5/19/2025.  He did see PCP who worked up his knee pain.  Overall he is feeling a bit better since decreasing his Cymbalta.  He also reports having worsening diarrhea in the past 2 weeks.  He is taking Lomotil every 6 hours.  Fatigue has somewhat worsened.  Some right knee pain due to fall but evaluated by PCP which shows bruising.  Continues potassium and magnesium.      Past Medical History:   Past Medical History:   Diagnosis Date    Arthritis     Cancer     rectal cancer - diagnosed 2023    Cholelithiasis 2019    Removed    COPD (chronic obstructive pulmonary disease)     Coronary artery disease 2009    Stent - no cardiologist currently    Elevated cholesterol     GERD (gastroesophageal reflux disease)     Hernia 2003    Lower hernia    Perforated ulcer 2019    Sleep apnea     history of; when weighed over 400lbs - no issues following bariatric surgery       Past Surgical History:   Past Surgical History:   Procedure Laterality Date    APPENDECTOMY  1983    Removed    BARIATRIC SURGERY  2011    Gastric bypass    BLADDER TUMOR/ULCER  BLEEDER CAUTERIZATION      CARDIAC CATHETERIZATION  2009    stent placed    CHOLECYSTECTOMY  2019    Removed    COLONOSCOPY N/A 12/07/2023    Procedure: COLONOSCOPY WITH HOT SNARE POLYPECTOMY AND TATTOO;  Surgeon: Noman Gautam MD;  Location: [facility] ENDOSCOPY;  Service: Gastroenterology;  Laterality: N/A;    PORTACATH PLACEMENT N/A 1/5/2024    Procedure: INSERTION OF PORTACATH WITH ULTRSOUND AND FLUOROSCOPIC GUIDANCE;  Surgeon: Ida Black MD;  Location: [facility] OR;  Service: General;  Laterality: N/A;    JENNIFER-EN-Y         Family History: History reviewed. No pertinent family history.  Uncle had colon cancer    Social History:   Social History     Socioeconomic History    Marital status:    Tobacco Use    Smoking status: Every Day     Current packs/day: 1.00     Average packs/day: 0.9 packs/day for 60.7 years (53.2 ttl pk-yrs)     Types: Cigarettes     Start date: 9/6/1994    Smokeless tobacco: Never    Tobacco comments:     35 years      pt reports closer to 2 packs per day since cancer diagnosis   Vaping Use    Vaping status: Never Used   Substance and Sexual Activity    Alcohol use: Never    Drug use: Never    Sexual activity: Defer       Medications:     Current Outpatient Medications:     albuterol sulfate  (90 Base) MCG/ACT inhaler, Inhale 2 puffs Every 4 (Four) Hours As Needed for Wheezing., Disp: 18 g, Rfl: 3    buPROPion XL (WELLBUTRIN XL) 150 MG 24 hr tablet, Take 1 tablet by mouth Daily., Disp: 90 tablet, Rfl: 1    cetirizine (zyrTEC) 10 MG tablet, Take 1 tablet by mouth Daily., Disp: 30 tablet, Rfl: 2    clindamycin (Clindagel) 1 % gel, Apply 1 Application topically to the appropriate area as directed 2 (Two) Times a Day. Use first, Disp: 30 g, Rfl: 1    dexAMETHasone 0.5 MG/5ML solution, Take 10 mL by mouth 2 (Two) Times a Day. Swish for 2 minutes and spit 10 mL 4 x daily, Disp: 240 mL, Rfl: 5    Diclofenac Sodium (Voltaren) 1 % gel gel, Apply 4 g topically to the  appropriate area as directed 2 (Two) Times a Day., Disp: 150 g, Rfl: 1    diphenoxylate-atropine (LOMOTIL) 2.5-0.025 MG per tablet, TAKE 1 TABLET BY MOUTH EVERY 6 HOURS AS NEEDED FOR DIARRHEA, Disp: 30 tablet, Rfl: 2    docusate sodium (COLACE) 100 MG capsule, Take 1 capsule by mouth 2 (Two) Times a Day., Disp: , Rfl:     doxycycline (VIBRAMYICN) 100 MG tablet, Take 1 tablet by mouth 2 (Two) Times a Day. For 7 days, then 1 tablet daily indefinitely, Disp: 67 tablet, Rfl: 3    DULoxetine (CYMBALTA) 30 MG capsule, , Disp: , Rfl:     famotidine (PEPCID) 20 MG tablet, Take 1 tablet by mouth 2 (Two) Times a Day., Disp: 60 tablet, Rfl: 4    fluticasone (FLONASE) 50 MCG/ACT nasal spray, Administer 2 sprays into the nostril(s) as directed by provider Daily. Administer 2 sprays in each nostril for each dose., Disp: 16 g, Rfl: 4    hydrocortisone 2.5 % ointment, Apply 1 Application topically to the appropriate area as directed 2 (Two) Times a Day., Disp: 60 g, Rfl: 1    Hydrocortisone, Perianal, (ANUSOL-HC) 2.5 % rectal cream, Insert  into the rectum 2 (Two) Times a Day. Indications: Inflamed Hemorrhoids, Disp: 30 g, Rfl: 1    KETOPROFEN-LIDO-GABAPENTIN EX, APPLY 1-2 GRAMS TO AFFECTED AREAS 3-4 TIMES DAILY, Disp: , Rfl:     lidocaine-prilocaine (EMLA) 2.5-2.5 % cream, Apply 1 Application topically to the appropriate area as directed As Needed (45-60 minutes prior to port access.  Cover with saran/plastic wrap.)., Disp: 30 g, Rfl: 3    LORazepam (ATIVAN) 0.5 MG tablet, Take 1 tablet by mouth Take As Directed. 1 tabelt by mouth 30 minutes prior to MRI, take a second tablet at the time of the MRI if needed., Disp: 2 tablet, Rfl: 0    Magic Mouthwash Oral Suspension (diphenhydrAMINE HCl - aluminum & magnesium hydroxide-simethicone - lidocaine - nystatin), Swish and Spit 10 mL by mouth every 6 (Six) Hours For 7 Days., Disp: 300 mL, Rfl: 3    magnesium oxide (MAG-OX) 400 MG tablet, Take 1 tablet by mouth Daily., Disp: 30 tablet,  Rfl: 5    mineral oil-hydrophilic petrolatum (AQUAPHOR) ointment, Apply 1 Application topically to the appropriate area as directed As Needed for Dry Skin., Disp: 198 g, Rfl: 0    montelukast (SINGULAIR) 10 MG tablet, Take 1 tablet by mouth Every Night., Disp: 90 tablet, Rfl: 3    Morphine (MS CONTIN) 30 MG 12 hr tablet, Take 1 tablet by mouth 2 (Two) Times a Day for 30 days., Disp: 60 tablet, Rfl: 0    Movantik 25 MG tablet, , Disp: , Rfl:     mupirocin (BACTROBAN) 2 % cream, , Disp: , Rfl:     naloxone (NARCAN) 4 MG/0.1ML nasal spray, 1 spray into the nostril(s) as directed by provider As Needed for Opioid Reversal., Disp: 1 each, Rfl: 0    nystatin susp + lidocaine viscous (MAGIC MOUTHWASH) oral suspension, 5-10 ml swish and spit or swallow QID prn, Disp: 240 mL, Rfl: 3    ondansetron (ZOFRAN) 8 MG tablet, Take 1 tablet by mouth 3 (Three) Times a Day As Needed for Nausea or Vomiting., Disp: 30 tablet, Rfl: 5    oxyCODONE (ROXICODONE) 15 MG immediate release tablet, Take 1 tablet by mouth 5 (Five) Times a Day As Needed for Moderate Pain or Severe Pain for up to 30 days., Disp: 150 tablet, Rfl: 0    pantoprazole (Protonix) 40 MG EC tablet, Take 1 tablet by mouth Daily. Indications: Gastroesophageal Reflux Disease, Disp: 90 tablet, Rfl: 2    potassium chloride (K-TAB) 20 MEQ tablet controlled-release ER tablet, Take 1 tablet by mouth Daily., Disp: 30 tablet, Rfl: 1    pregabalin (Lyrica) 300 MG capsule, Take 1 capsule by mouth 2 (Two) Times a Day for 30 days., Disp: 60 capsule, Rfl: 0    promethazine-dextromethorphan (PROMETHAZINE-DM) 6.25-15 MG/5ML syrup, Take 5 mL by mouth 3 times a day., Disp: 240 mL, Rfl: 0    tiotropium bromide-olodaterol (Stiolto Respimat) 2.5-2.5 MCG/ACT aerosol solution inhaler, INHALE 2 INHALATION(S) BY MOUTH DAILY, Disp: 4 g, Rfl: 5    traZODone (DESYREL) 150 MG tablet, Take 1 tablet by mouth Every Night., Disp: 30 tablet, Rfl: 2    triamcinolone (KENALOG) 0.025 % ointment, Apply 1  "Application topically to the appropriate area as directed 2 (Two) Times a Day. Use second if topical treatment #1 ineffective, Disp: 80 g, Rfl: 0    Allergies:   Allergies   Allergen Reactions    Bactrim [Sulfamethoxazole-Trimethoprim] Hives     and blisters    Sulfa Antibiotics Hives     and blisters       Objective     Vital Signs:   Vitals:    05/22/25 0950   BP: 122/60   Pulse: 97   Temp: 97 °F (36.1 °C)   TempSrc: Temporal   SpO2: 99%   Weight: 89 kg (196 lb 3.2 oz)   Height: 177.8 cm (70\")   PainSc: 6    PainLoc: Foot          Body mass index is 28.15 kg/m².   Pain Score    05/22/25 0950   PainSc: 6    PainLoc: Foot       ECOG Performance Status: 1 - Symptomatic but completely ambulatory    Physical Exam: General: No acute distress. Well appearing.  HEENT: Normocephalic, atraumatic. Sclera anicteric.   Neck: supple, no adenopathy.   Cardiovascular: regular rate and rhythm. No murmurs.   Respiratory: Normal rate. Clear to auscultation bilaterally.  Abdomen: Soft, nontender, non distended with normoactive bowel sounds.  Lymph: no cervical, supraclavicular or axillary adenopathy.  Neuro: Alert and oriented x 3. No focal deficits.   Ext: Asymmetric left > right 2+      Laboratory/Imaging Reviewed:   Admission on 05/14/2025, Discharged on 05/15/2025   Component Date Value Ref Range Status    Glucose 05/14/2025 85  65 - 99 mg/dL Final    BUN 05/14/2025 12  6 - 20 mg/dL Final    Creatinine 05/14/2025 0.72 (L)  0.76 - 1.27 mg/dL Final    Sodium 05/14/2025 133 (L)  136 - 145 mmol/L Final    Potassium 05/14/2025 4.2  3.5 - 5.2 mmol/L Final    Chloride 05/14/2025 102  98 - 107 mmol/L Final    CO2 05/14/2025 23.9  22.0 - 29.0 mmol/L Final    Calcium 05/14/2025 7.6 (L)  8.6 - 10.5 mg/dL Final    Total Protein 05/14/2025 5.3 (L)  6.0 - 8.5 g/dL Final    Albumin 05/14/2025 2.3 (L)  3.5 - 5.2 g/dL Final    ALT (SGPT) 05/14/2025 18  1 - 41 U/L Final    AST (SGOT) 05/14/2025 26  1 - 40 U/L Final    Alkaline Phosphatase " 05/14/2025 130 (H)  39 - 117 U/L Final    Total Bilirubin 05/14/2025 0.3  0.0 - 1.2 mg/dL Final    Globulin 05/14/2025 3.0  gm/dL Final    A/G Ratio 05/14/2025 0.8  g/dL Final    BUN/Creatinine Ratio 05/14/2025 16.7  7.0 - 25.0 Final    Anion Gap 05/14/2025 7.1  5.0 - 15.0 mmol/L Final    eGFR 05/14/2025 107.9  >60.0 mL/min/1.73 Final    WBC 05/14/2025 7.21  3.40 - 10.80 10*3/mm3 Final    RBC 05/14/2025 3.87 (L)  4.14 - 5.80 10*6/mm3 Final    Hemoglobin 05/14/2025 10.3 (L)  13.0 - 17.7 g/dL Final    Hematocrit 05/14/2025 32.3 (L)  37.5 - 51.0 % Final    MCV 05/14/2025 83.5  79.0 - 97.0 fL Final    MCH 05/14/2025 26.6  26.6 - 33.0 pg Final    MCHC 05/14/2025 31.9  31.5 - 35.7 g/dL Final    RDW 05/14/2025 22.3 (H)  12.3 - 15.4 % Final    RDW-SD 05/14/2025 66.2 (H)  37.0 - 54.0 fl Final    MPV 05/14/2025 12.2 (H)  6.0 - 12.0 fL Final    Platelets 05/14/2025 116 (L)  140 - 450 10*3/mm3 Final    Neutrophil % 05/14/2025 73.0  42.7 - 76.0 % Final    Lymphocyte % 05/14/2025 23.2  19.6 - 45.3 % Final    Monocyte % 05/14/2025 1.8 (L)  5.0 - 12.0 % Final    Eosinophil % 05/14/2025 1.4  0.3 - 6.2 % Final    Basophil % 05/14/2025 0.3  0.0 - 1.5 % Final    Immature Grans % 05/14/2025 0.3  0.0 - 0.5 % Final    Neutrophils, Absolute 05/14/2025 5.27  1.70 - 7.00 10*3/mm3 Final    Lymphocytes, Absolute 05/14/2025 1.67  0.70 - 3.10 10*3/mm3 Final    Monocytes, Absolute 05/14/2025 0.13  0.10 - 0.90 10*3/mm3 Final    Eosinophils, Absolute 05/14/2025 0.10  0.00 - 0.40 10*3/mm3 Final    Basophils, Absolute 05/14/2025 0.02  0.00 - 0.20 10*3/mm3 Final    Immature Grans, Absolute 05/14/2025 0.02  0.00 - 0.05 10*3/mm3 Final    nRBC 05/14/2025 0.0  0.0 - 0.2 /100 WBC Final    Protime 05/14/2025 21.3 (H)  12.3 - 15.1 Seconds Final    INR 05/14/2025 1.71 (H)  0.90 - 1.10 Final    proBNP 05/14/2025 230.8  0.0 - 900.0 pg/mL Final    Anisocytosis 05/14/2025 Mod/2+  None Seen Final    WBC Morphology 05/14/2025 Normal  Normal Final    Platelet  Estimate 05/14/2025 Adequate  Normal Final   Hospital Outpatient Visit on 05/12/2025   Component Date Value Ref Range Status    Glucose 05/12/2025 70  65 - 99 mg/dL Final    BUN 05/12/2025 9  6 - 20 mg/dL Final    Creatinine 05/12/2025 0.79  0.76 - 1.27 mg/dL Final    Sodium 05/12/2025 137  136 - 145 mmol/L Final    Potassium 05/12/2025 4.3  3.5 - 5.2 mmol/L Final    Chloride 05/12/2025 106  98 - 107 mmol/L Final    CO2 05/12/2025 24.1  22.0 - 29.0 mmol/L Final    Calcium 05/12/2025 7.5 (L)  8.6 - 10.5 mg/dL Final    Total Protein 05/12/2025 5.2 (L)  6.0 - 8.5 g/dL Final    Albumin 05/12/2025 2.4 (L)  3.5 - 5.2 g/dL Final    ALT (SGPT) 05/12/2025 13  1 - 41 U/L Final    AST (SGOT) 05/12/2025 19  1 - 40 U/L Final    Alkaline Phosphatase 05/12/2025 160 (H)  39 - 117 U/L Final    Total Bilirubin 05/12/2025 0.3  0.0 - 1.2 mg/dL Final    Globulin 05/12/2025 2.8  gm/dL Final    A/G Ratio 05/12/2025 0.9  g/dL Final    BUN/Creatinine Ratio 05/12/2025 11.4  7.0 - 25.0 Final    Anion Gap 05/12/2025 6.9  5.0 - 15.0 mmol/L Final    eGFR 05/12/2025 104.9  >60.0 mL/min/1.73 Final    Magnesium 05/12/2025 1.7  1.6 - 2.6 mg/dL Final    WBC 05/12/2025 9.96  3.40 - 10.80 10*3/mm3 Final    RBC 05/12/2025 4.26  4.14 - 5.80 10*6/mm3 Final    Hemoglobin 05/12/2025 11.1 (L)  13.0 - 17.7 g/dL Final    Hematocrit 05/12/2025 35.3 (L)  37.5 - 51.0 % Final    MCV 05/12/2025 82.9  79.0 - 97.0 fL Final    MCH 05/12/2025 26.1 (L)  26.6 - 33.0 pg Final    MCHC 05/12/2025 31.4 (L)  31.5 - 35.7 g/dL Final    RDW 05/12/2025 22.3 (H)  12.3 - 15.4 % Final    RDW-SD 05/12/2025 65.9 (H)  37.0 - 54.0 fl Final    MPV 05/12/2025 12.2 (H)  6.0 - 12.0 fL Final    Platelets 05/12/2025 138 (L)  140 - 450 10*3/mm3 Final    Neutrophil % 05/12/2025 73.8  42.7 - 76.0 % Final    Lymphocyte % 05/12/2025 15.6 (L)  19.6 - 45.3 % Final    Monocyte % 05/12/2025 9.0  5.0 - 12.0 % Final    Eosinophil % 05/12/2025 0.8  0.3 - 6.2 % Final    Basophil % 05/12/2025 0.3  0.0 -  1.5 % Final    Immature Grans % 05/12/2025 0.5  0.0 - 0.5 % Final    Neutrophils, Absolute 05/12/2025 7.35 (H)  1.70 - 7.00 10*3/mm3 Final    Lymphocytes, Absolute 05/12/2025 1.55  0.70 - 3.10 10*3/mm3 Final    Monocytes, Absolute 05/12/2025 0.90  0.10 - 0.90 10*3/mm3 Final    Eosinophils, Absolute 05/12/2025 0.08  0.00 - 0.40 10*3/mm3 Final    Basophils, Absolute 05/12/2025 0.03  0.00 - 0.20 10*3/mm3 Final    Immature Grans, Absolute 05/12/2025 0.05  0.00 - 0.05 10*3/mm3 Final    nRBC 05/12/2025 0.0  0.0 - 0.2 /100 WBC Final    Anisocytosis 05/12/2025 Slight/1+  None Seen Final    Elliptocytes 05/12/2025 Slight/1+  None Seen Final    Hypochromia 05/12/2025 Slight/1+  None Seen Final    WBC Morphology 05/12/2025 Normal  Normal Final    Platelet Estimate 05/12/2025 Adequate  Normal Final     Tempus xt: APC gene TP53; Negative ADRIANNA, BRAF, NRAS, TMB stable. PDL1 negative  XR Knee 3 View Right  Result Date: 5/19/2025  Narrative: PROCEDURE: XR KNEE 3 VW RIGHT-  History: right knee pain; M25.561-Pain in right knee  COMPARISON: None.  FINDINGS:  A 3 view exam demonstrates no acute fracture or dislocation. The joint spaces are preserved. No soft tissue abnormality is seen.      Impression: No acute fracture.       Images were reviewed, interpreted, and dictated by Dr. Gabriella Rodriguez MD Transcribed by Krysta Aldana PA-C.   This report was signed and finalized on 5/19/2025 1:58 PM by Gabriella Rodriguez MD.      XR Chest 2 View  Result Date: 5/15/2025  Narrative: PROCEDURE: XR CHEST 2 VW-   HISTORY: B/L lower extremity swelling  COMPARISON: 3/30/2025.  FINDINGS:  The lungs are clear.   There is no evidence of effusion or other pleural disease.  The mediastinum has a normal appearance.  The cardiac silhouette is unremarkable. A right chest port is noted with tip in the SVC.      Impression: Unremarkable chest exam.    This report was signed and finalized on 5/15/2025 8:17 AM by Serjio Wright MD.      CT Angiogram Abdomen  Pelvis  Result Date: 5/15/2025  Narrative: FINAL REPORT TECHNIQUE: null CLINICAL HISTORY: B/L lower extremity swelling, hx of metastatic rectal cancer w/ liver mets. r/ COMPARISON: null FINDINGS: CT angiography abdomen and pelvis with contrast. 3-D post processing. Comparison: None Findings: Aorta, mesenteric/renal arteries, and iliofemoral systems are within normal limits. Mild scattered atelectasis at lung bases. Lingular pleural-based nodule 6 mm image 22. Cavitary lesion incompletely included left lower lobe along the pleura 4.2 cm on image 1. Small right superior renal cyst. Cholecystectomy. Abdominal solid organs are otherwise unremarkable. Partial right colectomy. Gastric bypass. Moderate urinary bladder distention. Mildly prominent bilateral groin lymph nodes with no pathologic features. Likely reactive. There is an approximately 6.5 cm segment of circumferential rectal wall thickening centered on image 381:4. Multilevel small Schmorl's nodes. No acute fractures. There is skin thickening and subcutaneous fat induration at the right inferior hip extending to the proximal posterior leg. No underlying fluid collection.     Impression: IMPRESSION: 1. Widely patent systemic arterial system. 2. Possible metastatic disease within the left lung. 3. Circumferential rectal wall thickening. Consider malignancy. 4. Possible cellulitis at the right posterior hip extending into the proximal posterior leg. Follow up as needed. 5. Additional findings as above. Authenticated and Electronically Signed by Quique Siegel MD on 05/15/2025 12:36:58 AM    US Venous Doppler Lower Extremity Right (duplex)  Result Date: 5/14/2025  Narrative: FINAL REPORT TECHNIQUE: null CLINICAL HISTORY: R leg swelling, hx cancer COMPARISON: null FINDINGS: Venous duplex ultrasound right lower extremity Comparison: None Findings: The visualized deep veins are fully compressible with normal Doppler color flow and spectral tracings. No popliteal  cyst. Mild leg subcutaneous edema.     Impression: IMPRESSION: 1. Negative for right lower extremity deep vein thrombosis. Authenticated and Electronically Signed by Quique Siegel MD on 05/14/2025 10:34:09 PM    US Venous Doppler Lower Extremity Left (duplex)  Result Date: 5/12/2025  Narrative: PROCEDURE: US VENOUS DOPPLER LOWER EXTREMITY LEFT (DUPLEX)-  HISTORY: leg swelling; I97-Ktmpaqeko neoplasm of rectum; C78.7-Secondary malignant neoplasm of liver and intrahepatic bile duct  Multiple transverse and longitudinal scans were performed of the femoropopliteal deep venous system, with augmentation and compression maneuvers.  FINDINGS: Normal phasic flow was noted in the visualized deep venous system. No intraluminal increased echogenicity is noted to suggest thrombus. There is normal compression and augmentation of the venous structures. No abnormal venous collaterals are seen.      Impression: No evidence of deep venous thrombosis of the left lower extremity.   This report was signed and finalized on 5/12/2025 3:12 PM by Serjio Wright MD.      CT Chest With Contrast Diagnostic  CT Chest With Contrast Diagnostic, CT Abdomen Pelvis With Contrast  Result Date: 4/25/2025  Narrative: PROCEDURE: CT ABDOMEN PELVIS W CONTRAST-, CT CHEST W CONTRAST DIAGNOSTIC-  HISTORY: rectal cancer; J84-Mgejzdnml neoplasm of rectum; C78.7-Secondary malignant neoplasm of liver and intrahepatic bile duct  COMPARISON: 01/16/2025.  PROCEDURE: The patient was injected with IV contrast. Oral contrast was administered. Axial images were obtained from the lung apex to the pubic symphysis by computed tomography. This study was performed with techniques to keep radiation doses as low as reasonably achievable, (ALARA). Individualized dose reduction techniques using automated exposure control or adjustment of mA and/or kV according to the patient size were employed.  FINDINGS:  CHEST: There is no axillary adenopathy. Right internal jugular port  noted. There is no hilar or mediastinal adenopathy.  The heart size is normal. There is no pericardial or pleural effusion. Again noted is the lobulated cavitary mass posterior left lower lobe, slightly decreased in size from prior exam from 47 to 43 mm. Cavitary lesion seen lateral to the first mass posterior left lower lobe has a stable transverse diameter of 15 mm. There is new patchy airspace disease in both the left upper and lower lobes, predominantly ground-glass opacities; appearance suggests pneumonia. There are a few 5 mm or less noncalcified nodules, as well as a mixed density lesion anterior left upper lobe measuring up to 12 mm, additional metastatic disease is not excluded. Recommend follow-up in 3 months. Very faint ground-glass opacity is seen anteriorly in the right middle lobe. Minor fissural nodule is stable from prior and likely represents a lymph node. Faint ground-glass opacities are also seen in the right upper lobe. Here is evidence of old calcified granulomatous disease. Vascular calcifications noted. Emphysematous change noted. No bony destructive lesion seen  ABDOMEN: The liver is homogenous with no focal abnormality. Liver is enlarged up to 19 cm, stable from prior. Portal vein is upper limits of normal; recommend correlation with liver function tests. There are metallic surgical clips in the right upper quadrant consistent with previous cholecystectomy. The spleen is unremarkable. No adrenal mass is present.  The pancreas is unremarkable. The kidneys demonstrate circumscribed hypodense lesions in the right kidney, consistent with cysts and stable from prior. No left renal lesion seen. The aorta is normal in caliber. There is no free fluid or adenopathy. No adrenal mass identified. Calcification again noted in the lateral limb of the left adrenal gland, stable vascular calcifications noted.  PELVIS: The GI tract demonstrates no obstruction. The appendix is not identified. The urinary  bladder is unremarkable.  There is no free fluid, adenopathy, or inflammatory process. There is an area of increased attenuation in the subcutaneous fat superficial and lateral to the greater trochanter, recommend correlation with physical exam. Prostate is normal in size. There is asymmetric wall thickening of the distal sigmoid and the rectum, which may correlate with the patient's known rectal carcinoma. There is stable appearance to the bilateral inguinal lymph nodes, as well as bilateral external iliac lymph nodes. No bony destructive lesion seen.      Impression:  1. Slight interval decrease in larger cavitary lesion posterior left lower lobe with stable smaller lesion as described.  2. New ground-glass opacities bilaterally, left greater than right, with subcentimeter nodules on the left and a 12 mm mixed density lesion on the left; suspect infectious/inflammatory process, but metastatic disease not excluded. Recommend follow-up in 8 to 12 weeks.  3. Stable appearance of bilateral inguinal and external iliac lymph nodes.  4. Asymmetric wall thickening of the rectum as described.  5. Question inflammatory/infectious process in the subcutaneous fat lateral to the greater trochanter; recommend correlation with physical exam.   CTDI: 7.2 mGy DLP:380.15 mGy.cm  This report was signed and finalized on 4/25/2025 10:46 AM by Gabriella Rodriguez MD.        Procedures    Assessment / Plan      Assessment/Plan:     1.  Rectal cancer   2.  Liver metastases  3.  Lung metastasis  4.  Chemo monitoring  5.  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  -He was found to have biopsy-proven metastasis in the liver and lung.  His case was presented at multidisciplinary tumor board.  -Because of metastatic disease to both the lung and liver I do not think he will be downstage to resectable disease.  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  Notch 1 VUS.  -CBC adequate and CMP pending for treatment today with maintenance 5-FU and panitumumab 9/16/24  Chemotherapy orders and premedications signed9/16/24  -We reviewed his imaging which is consistent with excellent disease control in early July with normalization of CEA. He has had persistent rectal pain with escalating oxycodone requirements over the past couple of months, but no severe recent increase in pain, fever. WBC normal. 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 I reviewed his EUA notes. Increase doxy to BID. No surgical intervention.  -Consolidative radiotherapy to the rectal tumor is not something he wants to pursue initially but has now undergone consultation.  Maintenance 5FU/panitumumab on hold since early December.  I reviewed his last 4 serial CT images which do so progressive and in his lung. Escalated to FOLFIRI panitumumab 2/2025 to present  -    6.  Cancer related pain  -Now following with palliative care and pain. Well controlled on lyrica, oxycodone and MS contin.     7.  Panitumumab induced rash  -Continue doxycycline and emollients  -Stop voltaren gel, start hydrocortisone 2.5 % to palms    8. GERD  PPI, well controlled    9. Access  -Port    10. Chemo diarrhea  -Hold laxatives on day of diarrhea  -Lomotil PRN.  We will add Imodium    11. Hypomagnesemia  -Mg 1.5    12. Hypokalemia  -continue potassium supplement    13. Seasonal allergies  - Continue flonase    14.  Dizziness with falls  A.  Seems somewhat improved since stopping Cymbalta.  He continues on 30 mg dosing.  B.  We discussed the dizziness could be the increased dose of Cymbalta versus blood pressure versus dehydration with his increase of diarrhea this week.  We will check CBC and CMP today.  We will also give 1 L normal saline.    Follow Up:   1 week with treatment    Total time of patient care including time prior to, face  to face with patient, and following visit spent in reviewing records, lab results, imaging studies, discussion with patient, and documentation/charting was > 40 minutes       GEORGIA Grimes    Hematology and Oncology

## 2025-05-27 ENCOUNTER — OFFICE VISIT (OUTPATIENT)
Dept: ONCOLOGY | Facility: CLINIC | Age: 55
End: 2025-05-27
Payer: COMMERCIAL

## 2025-05-27 VITALS
OXYGEN SATURATION: 97 % | WEIGHT: 209.3 LBS | HEART RATE: 114 BPM | TEMPERATURE: 98.2 F | RESPIRATION RATE: 18 BRPM | DIASTOLIC BLOOD PRESSURE: 72 MMHG | BODY MASS INDEX: 29.96 KG/M2 | SYSTOLIC BLOOD PRESSURE: 131 MMHG | HEIGHT: 70 IN

## 2025-05-27 DIAGNOSIS — C20 RECTAL ADENOCARCINOMA: Primary | ICD-10-CM

## 2025-05-27 DIAGNOSIS — C78.7 RECTAL CANCER METASTASIZED TO LIVER: ICD-10-CM

## 2025-05-27 DIAGNOSIS — T45.1X5A CHEMOTHERAPY-INDUCED NEUROPATHY: ICD-10-CM

## 2025-05-27 DIAGNOSIS — G62.0 CHEMOTHERAPY-INDUCED NEUROPATHY: ICD-10-CM

## 2025-05-27 DIAGNOSIS — C20 RECTAL CANCER METASTASIZED TO LIVER: ICD-10-CM

## 2025-05-27 PROCEDURE — 99215 OFFICE O/P EST HI 40 MIN: CPT | Performed by: NURSE PRACTITIONER

## 2025-05-27 RX ORDER — DULOXETIN HYDROCHLORIDE 60 MG/1
60 CAPSULE, DELAYED RELEASE ORAL DAILY
Qty: 30 CAPSULE | Refills: 2 | OUTPATIENT
Start: 2025-05-27

## 2025-05-27 NOTE — TELEPHONE ENCOUNTER
Duloxetine decreased to 30 mg last week due to side effects. Refill request for duloxetine 60 mg denied.

## 2025-05-27 NOTE — PROGRESS NOTES
[facility]  Office number [phone]    Fax number [phone]     Follow up     Date: 25    Patient Name: Edward Powell  MRN: [id number]  : 1970    Referring Physician: Dr. Gautam    Chief Complaint: Rectal cancer with liver and lung metastases    Cancer Staging:  IV    History of Present Illness: Edward Powell is a pleasant 54 y.o. male who presents today for evaluation of rectal cancer.    Patient has a longstanding history of intermittent diarrhea since his cholecystectomy in 2019.  However he developed progressive diarrhea with associated loss of bowel control and urgency as well as weight loss and fatigue prompting additional workup.    CT of the abdomen pelvis 2023 showed an irregular circumferential wall thickening involving the rectum concerning for mass lesion.  There was associated mesorectal lymphadenopathy.  Masslike consolidation of left lower lobe.  CT of the chest showed a cavitary lesion in the left lower lobe up to 4.8 cm with an adjacent cavitary nodule up to 2 cm and a 5 mm nodule on the right minor fissure.      He underwent colonoscopy 2023 with findings of an infiltrative and ulcerated partially obstructing mass in the proximal rectum spanning 10 cm.  Rectal polyps.  Biopsy of the rectal mass showed invasive moderately differentiated adenocarcinoma.  Additional biopsies showed tubular adenomas.  MSI testing was intact/low probability of MSI high.    PDL1 negative    PET/CT 2023 showed hypermetabolic rectal wall thickening compatible with known rectal malignancy.  Multiple small ill-defined hypoechoic hyper metabolic liver lesions compatible with metastatic disease.  Mildly enlarged and mildly hypermetabolic pelvic sidewall and internal iliac lymph node chain adenopathy.  Hypermetabolic lung mass with adjacent nodule.     He underwent liver biopsy and lung biopsy 2024.  Results of both confirmed metastatic  colorectal cancer.    The patient has been experiencing substantial rectal pain.  He is taking oxycodone every 6 hours with partial relief.  He reports ongoing bowel movements.  No abdominal pain.  No vomiting.  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    Treatment history:  FOLFOX cycle 1-4  FOLFOX panitumumab cycle 5 onward  -Oxaliplatin terminated early for allergic reaction after 4/29/24    FOLFIRI/Panitumumab: 2/3/25 to present    Interval history:  Patient has been doing much better since decreasing his Cymbalta from 60-30.  Dizziness has pretty much resolved.  He has noted some increased swelling in his bilateral lower extremities that started on Friday.    He also has been having some constipation and some fullness. he says he is generally not feeling well.  His wife is sick at home.  He would like to defer treatment for 1 week and see if he gets feeling better.  Fatigue has worsened.  He has CT abdomen in ED on 5/14/2025 and he is concerned about results.  He had a boil on his right hip at the time of his CT scan as well as bilateral boils in his groin areas.  Currently on doxycycline  Right knee pain is stable to improved.    He took his pain medication this morning on an empty stomach with just Ensure.  He has been nauseous ever since.  Would like to defer treatment 1 week.  His wife is losing her job in the next couple weeks.  He is concerned that they will not have a paycheck coming in at all he says.    Past Medical History:   Past Medical History:   Diagnosis Date    Arthritis     Cancer     rectal cancer - diagnosed 2023    Cholelithiasis 2019    Removed    COPD (chronic obstructive pulmonary disease)     Coronary artery disease 2009    Stent - no cardiologist  currently    Elevated cholesterol     GERD (gastroesophageal reflux disease)     Hernia 2003    Lower hernia    Perforated ulcer 2019    Sleep apnea     history of; when weighed over 400lbs - no issues following bariatric surgery       Past Surgical History:   Past Surgical History:   Procedure Laterality Date    APPENDECTOMY  1983    Removed    BARIATRIC SURGERY  2011    Gastric bypass    BLADDER TUMOR/ULCER BLEEDER CAUTERIZATION      CARDIAC CATHETERIZATION  2009    stent placed    CHOLECYSTECTOMY  2019    Removed    COLONOSCOPY N/A 12/07/2023    Procedure: COLONOSCOPY WITH HOT SNARE POLYPECTOMY AND TATTOO;  Surgeon: Noman Gautam MD;  Location: [facility] ENDOSCOPY;  Service: Gastroenterology;  Laterality: N/A;    PORTACATH PLACEMENT N/A 1/5/2024    Procedure: INSERTION OF PORTACATH WITH ULTRSOUND AND FLUOROSCOPIC GUIDANCE;  Surgeon: Ida Black MD;  Location: [facility] OR;  Service: General;  Laterality: N/A;    JENNIFER-EN-Y         Family History: History reviewed. No pertinent family history.  Uncle had colon cancer    Social History:   Social History     Socioeconomic History    Marital status:    Tobacco Use    Smoking status: Every Day     Current packs/day: 1.00     Average packs/day: 0.9 packs/day for 60.7 years (53.2 ttl pk-yrs)     Types: Cigarettes     Start date: 9/6/1994    Smokeless tobacco: Never    Tobacco comments:     35 years      pt reports closer to 2 packs per day since cancer diagnosis   Vaping Use    Vaping status: Never Used   Substance and Sexual Activity    Alcohol use: Never    Drug use: Never    Sexual activity: Defer       Medications:     Current Outpatient Medications:     albuterol sulfate  (90 Base) MCG/ACT inhaler, Inhale 2 puffs Every 4 (Four) Hours As Needed for Wheezing., Disp: 18 g, Rfl: 3    buPROPion XL (WELLBUTRIN XL) 150 MG 24 hr tablet, Take 1 tablet by mouth Daily., Disp: 90 tablet, Rfl: 1    cetirizine (zyrTEC) 10 MG tablet, Take 1  tablet by mouth Daily., Disp: 30 tablet, Rfl: 2    clindamycin (Clindagel) 1 % gel, Apply 1 Application topically to the appropriate area as directed 2 (Two) Times a Day. Use first, Disp: 30 g, Rfl: 1    dexAMETHasone 0.5 MG/5ML solution, Take 10 mL by mouth 2 (Two) Times a Day. Swish for 2 minutes and spit 10 mL 4 x daily, Disp: 240 mL, Rfl: 5    Diclofenac Sodium (Voltaren) 1 % gel gel, Apply 4 g topically to the appropriate area as directed 2 (Two) Times a Day., Disp: 150 g, Rfl: 1    diphenoxylate-atropine (LOMOTIL) 2.5-0.025 MG per tablet, TAKE 1 TABLET BY MOUTH EVERY 6 HOURS AS NEEDED FOR DIARRHEA, Disp: 30 tablet, Rfl: 2    docusate sodium (COLACE) 100 MG capsule, Take 1 capsule by mouth 2 (Two) Times a Day., Disp: , Rfl:     doxycycline (VIBRAMYICN) 100 MG tablet, Take 1 tablet by mouth 2 (Two) Times a Day. For 7 days, then 1 tablet daily indefinitely, Disp: 67 tablet, Rfl: 3    DULoxetine (CYMBALTA) 30 MG capsule, , Disp: , Rfl:     famotidine (PEPCID) 20 MG tablet, Take 1 tablet by mouth 2 (Two) Times a Day., Disp: 60 tablet, Rfl: 4    fluticasone (FLONASE) 50 MCG/ACT nasal spray, Administer 2 sprays into the nostril(s) as directed by provider Daily. Administer 2 sprays in each nostril for each dose., Disp: 16 g, Rfl: 4    hydrocortisone 2.5 % ointment, Apply 1 Application topically to the appropriate area as directed 2 (Two) Times a Day., Disp: 60 g, Rfl: 1    Hydrocortisone, Perianal, (ANUSOL-HC) 2.5 % rectal cream, Insert  into the rectum 2 (Two) Times a Day. Indications: Inflamed Hemorrhoids, Disp: 30 g, Rfl: 1    KETOPROFEN-LIDO-GABAPENTIN EX, APPLY 1-2 GRAMS TO AFFECTED AREAS 3-4 TIMES DAILY, Disp: , Rfl:     lidocaine-prilocaine (EMLA) 2.5-2.5 % cream, Apply 1 Application topically to the appropriate area as directed As Needed (45-60 minutes prior to port access.  Cover with saran/plastic wrap.)., Disp: 30 g, Rfl: 3    LORazepam (ATIVAN) 0.5 MG tablet, Take 1 tablet by mouth Take As Directed. 1  tabelt by mouth 30 minutes prior to MRI, take a second tablet at the time of the MRI if needed., Disp: 2 tablet, Rfl: 0    Magic Mouthwash Oral Suspension (diphenhydrAMINE HCl - aluminum & magnesium hydroxide-simethicone - lidocaine - nystatin), Swish and Spit 10 mL by mouth every 6 (Six) Hours For 7 Days., Disp: 300 mL, Rfl: 3    magnesium oxide (MAG-OX) 400 MG tablet, Take 1 tablet by mouth Daily., Disp: 30 tablet, Rfl: 5    mineral oil-hydrophilic petrolatum (AQUAPHOR) ointment, Apply 1 Application topically to the appropriate area as directed As Needed for Dry Skin., Disp: 198 g, Rfl: 0    montelukast (SINGULAIR) 10 MG tablet, Take 1 tablet by mouth Every Night., Disp: 90 tablet, Rfl: 3    Morphine (MS CONTIN) 30 MG 12 hr tablet, Take 1 tablet by mouth 2 (Two) Times a Day for 30 days., Disp: 60 tablet, Rfl: 0    Movantik 25 MG tablet, , Disp: , Rfl:     mupirocin (BACTROBAN) 2 % cream, , Disp: , Rfl:     naloxone (NARCAN) 4 MG/0.1ML nasal spray, 1 spray into the nostril(s) as directed by provider As Needed for Opioid Reversal., Disp: 1 each, Rfl: 0    nystatin susp + lidocaine viscous (MAGIC MOUTHWASH) oral suspension, 5-10 ml swish and spit or swallow QID prn, Disp: 240 mL, Rfl: 3    ondansetron (ZOFRAN) 8 MG tablet, Take 1 tablet by mouth 3 (Three) Times a Day As Needed for Nausea or Vomiting., Disp: 30 tablet, Rfl: 5    oxyCODONE (ROXICODONE) 15 MG immediate release tablet, Take 1 tablet by mouth 5 (Five) Times a Day As Needed for Moderate Pain or Severe Pain for up to 30 days., Disp: 150 tablet, Rfl: 0    pantoprazole (Protonix) 40 MG EC tablet, Take 1 tablet by mouth Daily. Indications: Gastroesophageal Reflux Disease, Disp: 90 tablet, Rfl: 2    potassium chloride (K-TAB) 20 MEQ tablet controlled-release ER tablet, Take 1 tablet by mouth Daily., Disp: 30 tablet, Rfl: 1    pregabalin (Lyrica) 300 MG capsule, Take 1 capsule by mouth 2 (Two) Times a Day for 30 days., Disp: 60 capsule, Rfl: 0     "promethazine-dextromethorphan (PROMETHAZINE-DM) 6.25-15 MG/5ML syrup, Take 5 mL by mouth 3 times a day., Disp: 240 mL, Rfl: 0    tiotropium bromide-olodaterol (Stiolto Respimat) 2.5-2.5 MCG/ACT aerosol solution inhaler, INHALE 2 INHALATION(S) BY MOUTH DAILY, Disp: 4 g, Rfl: 5    traZODone (DESYREL) 150 MG tablet, Take 1 tablet by mouth Every Night., Disp: 30 tablet, Rfl: 2    triamcinolone (KENALOG) 0.025 % ointment, Apply 1 Application topically to the appropriate area as directed 2 (Two) Times a Day. Use second if topical treatment #1 ineffective, Disp: 80 g, Rfl: 0    Allergies:   Allergies   Allergen Reactions    Bactrim [Sulfamethoxazole-Trimethoprim] Hives     and blisters    Sulfa Antibiotics Hives     and blisters       Objective     Vital Signs:   Vitals:    05/27/25 0843   BP: 131/72   Pulse: 114   Resp: 18   Temp: 98.2 °F (36.8 °C)   TempSrc: Temporal   SpO2: 97%   Weight: 94.9 kg (209 lb 4.8 oz)   Height: 177.8 cm (70\")   PainSc: 6           Body mass index is 30.03 kg/m².   Pain Score    05/27/25 0843   PainSc: 6        ECOG Performance Status: 1 - Symptomatic but completely ambulatory    Physical Exam: General: No acute distress. Well appearing.  HEENT: Normocephalic, atraumatic. Sclera anicteric.   Neck: supple, no adenopathy.   Cardiovascular: regular rate and rhythm. No murmurs.   Respiratory: Normal rate. Clear to auscultation bilaterally.  Abdomen: Soft, nontender, non distended with normoactive bowel sounds.  Lymph: no cervical, supraclavicular or axillary adenopathy.  Neuro: Alert and oriented x 3. No focal deficits.   Ext: Asymmetric left > right 2+    Above noted and reviewed as of 5/27/2025      Laboratory/Imaging Reviewed:   Hospital Outpatient Visit on 05/22/2025   Component Date Value Ref Range Status    Glucose 05/22/2025 72  65 - 99 mg/dL Final    BUN 05/22/2025 10  6 - 20 mg/dL Final    Creatinine 05/22/2025 0.81  0.76 - 1.27 mg/dL Final    Sodium 05/22/2025 138  136 - 145 mmol/L Final "    Potassium 05/22/2025 3.5  3.5 - 5.2 mmol/L Final    Chloride 05/22/2025 108 (H)  98 - 107 mmol/L Final    CO2 05/22/2025 21.7 (L)  22.0 - 29.0 mmol/L Final    Calcium 05/22/2025 7.9 (L)  8.6 - 10.5 mg/dL Final    Total Protein 05/22/2025 5.5 (L)  6.0 - 8.5 g/dL Final    Albumin 05/22/2025 2.5 (L)  3.5 - 5.2 g/dL Final    ALT (SGPT) 05/22/2025 16  1 - 41 U/L Final    AST (SGOT) 05/22/2025 17  1 - 40 U/L Final    Alkaline Phosphatase 05/22/2025 126 (H)  39 - 117 U/L Final    Total Bilirubin 05/22/2025 0.2  0.0 - 1.2 mg/dL Final    Globulin 05/22/2025 3.0  gm/dL Final    A/G Ratio 05/22/2025 0.8  g/dL Final    BUN/Creatinine Ratio 05/22/2025 12.3  7.0 - 25.0 Final    Anion Gap 05/22/2025 8.3  5.0 - 15.0 mmol/L Final    eGFR 05/22/2025 104.1  >60.0 mL/min/1.73 Final    WBC 05/22/2025 9.10  3.40 - 10.80 10*3/mm3 Final    RBC 05/22/2025 4.51  4.14 - 5.80 10*6/mm3 Final    Hemoglobin 05/22/2025 12.2 (L)  13.0 - 17.7 g/dL Final    Hematocrit 05/22/2025 38.0  37.5 - 51.0 % Final    MCV 05/22/2025 84.3  79.0 - 97.0 fL Final    MCH 05/22/2025 27.1  26.6 - 33.0 pg Final    MCHC 05/22/2025 32.1  31.5 - 35.7 g/dL Final    RDW 05/22/2025 21.8 (H)  12.3 - 15.4 % Final    RDW-SD 05/22/2025 66.3 (H)  37.0 - 54.0 fl Final    MPV 05/22/2025 10.9  6.0 - 12.0 fL Final    Platelets 05/22/2025 257  140 - 450 10*3/mm3 Final    Neutrophil % 05/22/2025 73.0  42.7 - 76.0 % Final    Lymphocyte % 05/22/2025 17.1 (L)  19.6 - 45.3 % Final    Monocyte % 05/22/2025 8.1  5.0 - 12.0 % Final    Eosinophil % 05/22/2025 1.1  0.3 - 6.2 % Final    Basophil % 05/22/2025 0.3  0.0 - 1.5 % Final    Immature Grans % 05/22/2025 0.4  0.0 - 0.5 % Final    Neutrophils, Absolute 05/22/2025 6.63  1.70 - 7.00 10*3/mm3 Final    Lymphocytes, Absolute 05/22/2025 1.56  0.70 - 3.10 10*3/mm3 Final    Monocytes, Absolute 05/22/2025 0.74  0.10 - 0.90 10*3/mm3 Final    Eosinophils, Absolute 05/22/2025 0.10  0.00 - 0.40 10*3/mm3 Final    Basophils, Absolute 05/22/2025 0.03   0.00 - 0.20 10*3/mm3 Final    Immature Grans, Absolute 05/22/2025 0.04  0.00 - 0.05 10*3/mm3 Final    nRBC 05/22/2025 0.0  0.0 - 0.2 /100 WBC Final   Admission on 05/14/2025, Discharged on 05/15/2025   Component Date Value Ref Range Status    Glucose 05/14/2025 85  65 - 99 mg/dL Final    BUN 05/14/2025 12  6 - 20 mg/dL Final    Creatinine 05/14/2025 0.72 (L)  0.76 - 1.27 mg/dL Final    Sodium 05/14/2025 133 (L)  136 - 145 mmol/L Final    Potassium 05/14/2025 4.2  3.5 - 5.2 mmol/L Final    Chloride 05/14/2025 102  98 - 107 mmol/L Final    CO2 05/14/2025 23.9  22.0 - 29.0 mmol/L Final    Calcium 05/14/2025 7.6 (L)  8.6 - 10.5 mg/dL Final    Total Protein 05/14/2025 5.3 (L)  6.0 - 8.5 g/dL Final    Albumin 05/14/2025 2.3 (L)  3.5 - 5.2 g/dL Final    ALT (SGPT) 05/14/2025 18  1 - 41 U/L Final    AST (SGOT) 05/14/2025 26  1 - 40 U/L Final    Alkaline Phosphatase 05/14/2025 130 (H)  39 - 117 U/L Final    Total Bilirubin 05/14/2025 0.3  0.0 - 1.2 mg/dL Final    Globulin 05/14/2025 3.0  gm/dL Final    A/G Ratio 05/14/2025 0.8  g/dL Final    BUN/Creatinine Ratio 05/14/2025 16.7  7.0 - 25.0 Final    Anion Gap 05/14/2025 7.1  5.0 - 15.0 mmol/L Final    eGFR 05/14/2025 107.9  >60.0 mL/min/1.73 Final    WBC 05/14/2025 7.21  3.40 - 10.80 10*3/mm3 Final    RBC 05/14/2025 3.87 (L)  4.14 - 5.80 10*6/mm3 Final    Hemoglobin 05/14/2025 10.3 (L)  13.0 - 17.7 g/dL Final    Hematocrit 05/14/2025 32.3 (L)  37.5 - 51.0 % Final    MCV 05/14/2025 83.5  79.0 - 97.0 fL Final    MCH 05/14/2025 26.6  26.6 - 33.0 pg Final    MCHC 05/14/2025 31.9  31.5 - 35.7 g/dL Final    RDW 05/14/2025 22.3 (H)  12.3 - 15.4 % Final    RDW-SD 05/14/2025 66.2 (H)  37.0 - 54.0 fl Final    MPV 05/14/2025 12.2 (H)  6.0 - 12.0 fL Final    Platelets 05/14/2025 116 (L)  140 - 450 10*3/mm3 Final    Neutrophil % 05/14/2025 73.0  42.7 - 76.0 % Final    Lymphocyte % 05/14/2025 23.2  19.6 - 45.3 % Final    Monocyte % 05/14/2025 1.8 (L)  5.0 - 12.0 % Final    Eosinophil %  05/14/2025 1.4  0.3 - 6.2 % Final    Basophil % 05/14/2025 0.3  0.0 - 1.5 % Final    Immature Grans % 05/14/2025 0.3  0.0 - 0.5 % Final    Neutrophils, Absolute 05/14/2025 5.27  1.70 - 7.00 10*3/mm3 Final    Lymphocytes, Absolute 05/14/2025 1.67  0.70 - 3.10 10*3/mm3 Final    Monocytes, Absolute 05/14/2025 0.13  0.10 - 0.90 10*3/mm3 Final    Eosinophils, Absolute 05/14/2025 0.10  0.00 - 0.40 10*3/mm3 Final    Basophils, Absolute 05/14/2025 0.02  0.00 - 0.20 10*3/mm3 Final    Immature Grans, Absolute 05/14/2025 0.02  0.00 - 0.05 10*3/mm3 Final    nRBC 05/14/2025 0.0  0.0 - 0.2 /100 WBC Final    Protime 05/14/2025 21.3 (H)  12.3 - 15.1 Seconds Final    INR 05/14/2025 1.71 (H)  0.90 - 1.10 Final    proBNP 05/14/2025 230.8  0.0 - 900.0 pg/mL Final    Anisocytosis 05/14/2025 Mod/2+  None Seen Final    WBC Morphology 05/14/2025 Normal  Normal Final    Platelet Estimate 05/14/2025 Adequate  Normal Final     Tempus xt: APC gene TP53; Negative ADRIANNA, BRAF, NRAS, TMB stable. PDL1 negative  XR Knee 3 View Right  Result Date: 5/19/2025  Narrative: PROCEDURE: XR KNEE 3 VW RIGHT-  History: right knee pain; M25.561-Pain in right knee  COMPARISON: None.  FINDINGS:  A 3 view exam demonstrates no acute fracture or dislocation. The joint spaces are preserved. No soft tissue abnormality is seen.      Impression: No acute fracture.       Images were reviewed, interpreted, and dictated by Dr. Gabriella Rodriguez MD Transcribed by Krysta Aldana PA-C.   This report was signed and finalized on 5/19/2025 1:58 PM by Gabriella Rodriguez MD.      XR Chest 2 View  Result Date: 5/15/2025  Narrative: PROCEDURE: XR CHEST 2 VW-   HISTORY: B/L lower extremity swelling  COMPARISON: 3/30/2025.  FINDINGS:  The lungs are clear.   There is no evidence of effusion or other pleural disease.  The mediastinum has a normal appearance.  The cardiac silhouette is unremarkable. A right chest port is noted with tip in the SVC.      Impression: Unremarkable chest exam.    This  report was signed and finalized on 5/15/2025 8:17 AM by Serjio Wright MD.      CT Angiogram Abdomen Pelvis  Result Date: 5/15/2025  Narrative: FINAL REPORT TECHNIQUE: null CLINICAL HISTORY: B/L lower extremity swelling, hx of metastatic rectal cancer w/ liver mets. r/ COMPARISON: null FINDINGS: CT angiography abdomen and pelvis with contrast. 3-D post processing. Comparison: None Findings: Aorta, mesenteric/renal arteries, and iliofemoral systems are within normal limits. Mild scattered atelectasis at lung bases. Lingular pleural-based nodule 6 mm image 22. Cavitary lesion incompletely included left lower lobe along the pleura 4.2 cm on image 1. Small right superior renal cyst. Cholecystectomy. Abdominal solid organs are otherwise unremarkable. Partial right colectomy. Gastric bypass. Moderate urinary bladder distention. Mildly prominent bilateral groin lymph nodes with no pathologic features. Likely reactive. There is an approximately 6.5 cm segment of circumferential rectal wall thickening centered on image 381:4. Multilevel small Schmorl's nodes. No acute fractures. There is skin thickening and subcutaneous fat induration at the right inferior hip extending to the proximal posterior leg. No underlying fluid collection.     Impression: IMPRESSION: 1. Widely patent systemic arterial system. 2. Possible metastatic disease within the left lung. 3. Circumferential rectal wall thickening. Consider malignancy. 4. Possible cellulitis at the right posterior hip extending into the proximal posterior leg. Follow up as needed. 5. Additional findings as above. Authenticated and Electronically Signed by Quique Siegel MD on 05/15/2025 12:36:58 AM    US Venous Doppler Lower Extremity Right (duplex)  Result Date: 5/14/2025  Narrative: FINAL REPORT TECHNIQUE: null CLINICAL HISTORY: R leg swelling, hx cancer COMPARISON: null FINDINGS: Venous duplex ultrasound right lower extremity Comparison: None Findings: The visualized deep  veins are fully compressible with normal Doppler color flow and spectral tracings. No popliteal cyst. Mild leg subcutaneous edema.     Impression: IMPRESSION: 1. Negative for right lower extremity deep vein thrombosis. Authenticated and Electronically Signed by Quique Siegel MD on 05/14/2025 10:34:09 PM    US Venous Doppler Lower Extremity Left (duplex)  Result Date: 5/12/2025  Narrative: PROCEDURE: US VENOUS DOPPLER LOWER EXTREMITY LEFT (DUPLEX)-  HISTORY: leg swelling; T16-[id number] neoplasm of rectum; C78.7-Secondary malignant neoplasm of liver and intrahepatic bile duct  Multiple transverse and longitudinal scans were performed of the femoropopliteal deep venous system, with augmentation and compression maneuvers.  FINDINGS: Normal phasic flow was noted in the visualized deep venous system. No intraluminal increased echogenicity is noted to suggest thrombus. There is normal compression and augmentation of the venous structures. No abnormal venous collaterals are seen.      Impression: No evidence of deep venous thrombosis of the left lower extremity.   This report was signed and finalized on 5/12/2025 3:12 PM by Serjio Wright MD.        Procedures    Assessment / Plan      Assessment/Plan:     1.  Rectal cancer   2.  Liver metastases  3.  Lung metastasis  4.  Chemo monitoring  5.  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  -He was found to have biopsy-proven metastasis in the liver and lung.  His case was presented at multidisciplinary tumor board.  -Because of metastatic disease to both the lung and liver I do not think he will be downstage to resectable disease.  -Tempus results which are notable for an APC mutation, T p53 mutation, negative for KRAS, BRAF, NRAS, tumor mutation burden stable.  Notch 1 VUS.  -CBC adequate and CMP pending for treatment today with maintenance 5-FU and panitumumab  9/16/24  Chemotherapy orders and premedications signed9/16/24  -We reviewed his imaging which is consistent with excellent disease control in early July with normalization of CEA. He has had persistent rectal pain with escalating oxycodone requirements over the past couple of months, but no severe recent increase in pain, fever. WBC normal. 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 I reviewed his EUA notes. Increase doxy to BID. No surgical intervention.  -Consolidative radiotherapy to the rectal tumor is not something he wants to pursue initially but has now undergone consultation.  Maintenance 5FU/panitumumab on hold since early December.  I reviewed his last 4 serial CT images which do so progressive and in his lung. Escalated to FOLFIRI panitumumab 2/2025 to present  - We will hold treatment today per patient request.  He is feeling more fatigued and has some increased nausea.  He did take his pain medication with just an Ensure this morning and he is unsure if that is the cause.  - We will have him follow-up in 1 week for treatment and follow-up.  - I will call radiology and have them compare scans from 5/14/2025 to 4/24/2025 for any new updates.    6.  Cancer related pain  -Now following with palliative care and pain. Well controlled on lyrica, oxycodone and MS contin.     7.  Panitumumab induced rash  -Continue doxycycline and emollients  -Stop voltaren gel, start hydrocortisone 2.5 % to palms    8. GERD  PPI, well controlled    9. Access  -Port    10. Chemo diarrhea  -Hold laxatives on day of diarrhea  -Lomotil PRN.  We will add Imodium    11. Hypomagnesemia  -Mg 1.5    12. Hypokalemia  -continue potassium supplement    13. Seasonal allergies  - Continue flonase    14.  Dizziness with falls  A.  Seems somewhat improved since stopping Cymbalta.  He continues on 30 mg dosing.  B.  We discussed the dizziness could be the increased dose of Cymbalta versus blood pressure versus dehydration with his  increase of diarrhea this week.  We will check CBC and CMP today.  We will also give 1 L normal saline.    Follow Up:   1 week with treatment    Total time of patient care including time prior to, face to face with patient, and following visit spent in reviewing records, lab results, imaging studies, discussion with patient, and documentation/charting was > 40 minutes         GEORGIA Grimes    Hematology and Oncology

## 2025-06-02 ENCOUNTER — OFFICE VISIT (OUTPATIENT)
Dept: ONCOLOGY | Facility: CLINIC | Age: 55
End: 2025-06-02
Payer: COMMERCIAL

## 2025-06-02 ENCOUNTER — HOSPITAL ENCOUNTER (OUTPATIENT)
Facility: HOSPITAL | Age: 55
Discharge: HOME | End: 2025-06-02
Admitting: NURSE PRACTITIONER
Payer: COMMERCIAL

## 2025-06-02 ENCOUNTER — TELEPHONE (OUTPATIENT)
Dept: ONCOLOGY | Facility: CLINIC | Age: 55
End: 2025-06-02
Payer: COMMERCIAL

## 2025-06-02 ENCOUNTER — PATIENT OUTREACH (OUTPATIENT)
Facility: HOSPITAL | Age: 55
End: 2025-06-02
Payer: COMMERCIAL

## 2025-06-02 VITALS
HEIGHT: 70 IN | BODY MASS INDEX: 28.77 KG/M2 | TEMPERATURE: 96.9 F | OXYGEN SATURATION: 98 % | SYSTOLIC BLOOD PRESSURE: 119 MMHG | RESPIRATION RATE: 18 BRPM | HEART RATE: 103 BPM | DIASTOLIC BLOOD PRESSURE: 71 MMHG | WEIGHT: 201 LBS

## 2025-06-02 DIAGNOSIS — C78.7 RECTAL CANCER METASTASIZED TO LIVER: Primary | ICD-10-CM

## 2025-06-02 DIAGNOSIS — C20 RECTAL CANCER METASTASIZED TO LIVER: Primary | ICD-10-CM

## 2025-06-02 LAB
ALBUMIN SERPL-MCNC: 2.2 G/DL (ref 3.5–5.2)
ALBUMIN/GLOB SERPL: 0.8 G/DL
ALP SERPL-CCNC: 162 U/L (ref 39–117)
ALT SERPL W P-5'-P-CCNC: 20 U/L (ref 1–41)
ANION GAP SERPL CALCULATED.3IONS-SCNC: 7.1 MMOL/L (ref 5–15)
AST SERPL-CCNC: 20 U/L (ref 1–40)
BASOPHILS # BLD AUTO: 0.04 10*3/MM3 (ref 0–0.2)
BASOPHILS NFR BLD AUTO: 0.4 % (ref 0–1.5)
BILIRUB SERPL-MCNC: 0.3 MG/DL (ref 0–1.2)
BUN SERPL-MCNC: 10 MG/DL (ref 6–20)
BUN/CREAT SERPL: 11.9 (ref 7–25)
CALCIUM SPEC-SCNC: 7.5 MG/DL (ref 8.6–10.5)
CEA SERPL-MCNC: 11.8 NG/ML
CHLORIDE SERPL-SCNC: 107 MMOL/L (ref 98–107)
CO2 SERPL-SCNC: 24.9 MMOL/L (ref 22–29)
CREAT SERPL-MCNC: 0.84 MG/DL (ref 0.76–1.27)
DEPRECATED RDW RBC AUTO: 66.4 FL (ref 37–54)
EGFRCR SERPLBLD CKD-EPI 2021: 103 ML/MIN/1.73
EOSINOPHIL # BLD AUTO: 0.08 10*3/MM3 (ref 0–0.4)
EOSINOPHIL NFR BLD AUTO: 0.8 % (ref 0.3–6.2)
ERYTHROCYTE [DISTWIDTH] IN BLOOD BY AUTOMATED COUNT: 21.5 % (ref 12.3–15.4)
GLOBULIN UR ELPH-MCNC: 2.8 GM/DL
GLUCOSE SERPL-MCNC: 97 MG/DL (ref 65–99)
HCT VFR BLD AUTO: 36 % (ref 37.5–51)
HGB BLD-MCNC: 11.8 G/DL (ref 13–17.7)
IMM GRANULOCYTES # BLD AUTO: 0.04 10*3/MM3 (ref 0–0.05)
IMM GRANULOCYTES NFR BLD AUTO: 0.4 % (ref 0–0.5)
LYMPHOCYTES # BLD AUTO: 1.77 10*3/MM3 (ref 0.7–3.1)
LYMPHOCYTES NFR BLD AUTO: 17.9 % (ref 19.6–45.3)
MAGNESIUM SERPL-MCNC: 1.9 MG/DL (ref 1.6–2.6)
MCH RBC QN AUTO: 28 PG (ref 26.6–33)
MCHC RBC AUTO-ENTMCNC: 32.8 G/DL (ref 31.5–35.7)
MCV RBC AUTO: 85.3 FL (ref 79–97)
MONOCYTES # BLD AUTO: 0.85 10*3/MM3 (ref 0.1–0.9)
MONOCYTES NFR BLD AUTO: 8.6 % (ref 5–12)
NEUTROPHILS NFR BLD AUTO: 7.1 10*3/MM3 (ref 1.7–7)
NEUTROPHILS NFR BLD AUTO: 71.9 % (ref 42.7–76)
NRBC BLD AUTO-RTO: 0 /100 WBC (ref 0–0.2)
PLATELET # BLD AUTO: 214 10*3/MM3 (ref 140–450)
PMV BLD AUTO: 11.7 FL (ref 6–12)
POTASSIUM SERPL-SCNC: 3.8 MMOL/L (ref 3.5–5.2)
PROT SERPL-MCNC: 5 G/DL (ref 6–8.5)
RBC # BLD AUTO: 4.22 10*6/MM3 (ref 4.14–5.8)
SODIUM SERPL-SCNC: 139 MMOL/L (ref 136–145)
WBC NRBC COR # BLD AUTO: 9.88 10*3/MM3 (ref 3.4–10.8)

## 2025-06-02 PROCEDURE — 25010000002 LEUCOVORIN CALCIUM PER 50MG: Performed by: NURSE PRACTITIONER

## 2025-06-02 PROCEDURE — 25010000002 PANITUMUMAB 400 MG/20ML SOLUTION 20 ML VIAL: Performed by: NURSE PRACTITIONER

## 2025-06-02 PROCEDURE — 96367 TX/PROPH/DG ADDL SEQ IV INF: CPT

## 2025-06-02 PROCEDURE — 99215 OFFICE O/P EST HI 40 MIN: CPT | Performed by: NURSE PRACTITIONER

## 2025-06-02 PROCEDURE — 80053 COMPREHEN METABOLIC PANEL: CPT | Performed by: NURSE PRACTITIONER

## 2025-06-02 PROCEDURE — 96415 CHEMO IV INFUSION ADDL HR: CPT

## 2025-06-02 PROCEDURE — 25010000002 PANITUMUMAB PER 10 MG: Performed by: NURSE PRACTITIONER

## 2025-06-02 PROCEDURE — 96413 CHEMO IV INFUSION 1 HR: CPT

## 2025-06-02 PROCEDURE — 25010000003 DEXTROSE 5 % SOLUTION 500 ML FLEX CONT: Performed by: NURSE PRACTITIONER

## 2025-06-02 PROCEDURE — 25010000002 PALONOSETRON PER 25 MCG: Performed by: NURSE PRACTITIONER

## 2025-06-02 PROCEDURE — 82378 CARCINOEMBRYONIC ANTIGEN: CPT | Performed by: NURSE PRACTITIONER

## 2025-06-02 PROCEDURE — 25010000002 DEXAMETHASONE SODIUM PHOSPHATE 20 MG/5ML SOLUTION: Performed by: NURSE PRACTITIONER

## 2025-06-02 PROCEDURE — 83735 ASSAY OF MAGNESIUM: CPT | Performed by: NURSE PRACTITIONER

## 2025-06-02 PROCEDURE — 96368 THER/DIAG CONCURRENT INF: CPT

## 2025-06-02 PROCEDURE — G0498 CHEMO EXTEND IV INFUS W/PUMP: HCPCS

## 2025-06-02 PROCEDURE — 25010000002 IRINOTECAN PER 20 MG: Performed by: NURSE PRACTITIONER

## 2025-06-02 PROCEDURE — 96375 TX/PRO/DX INJ NEW DRUG ADDON: CPT

## 2025-06-02 PROCEDURE — 85025 COMPLETE CBC W/AUTO DIFF WBC: CPT | Performed by: NURSE PRACTITIONER

## 2025-06-02 PROCEDURE — 96417 CHEMO IV INFUS EACH ADDL SEQ: CPT

## 2025-06-02 PROCEDURE — 25010000002 FLUOROURACIL PER 500 MG: Performed by: NURSE PRACTITIONER

## 2025-06-02 PROCEDURE — 25010000003 DEXTROSE 5 % SOLUTION 250 ML FLEX CONT: Performed by: NURSE PRACTITIONER

## 2025-06-02 PROCEDURE — 25810000003 SODIUM CHLORIDE 0.9 % SOLUTION 250 ML FLEX CONT: Performed by: NURSE PRACTITIONER

## 2025-06-02 RX ORDER — DIPHENHYDRAMINE HYDROCHLORIDE 50 MG/ML
50 INJECTION, SOLUTION INTRAMUSCULAR; INTRAVENOUS AS NEEDED
Status: CANCELLED | OUTPATIENT
Start: 2025-06-02

## 2025-06-02 RX ORDER — PROCHLORPERAZINE EDISYLATE 5 MG/ML
5 INJECTION INTRAMUSCULAR; INTRAVENOUS EVERY 6 HOURS PRN
Status: CANCELLED
Start: 2025-06-02

## 2025-06-02 RX ORDER — PALONOSETRON 0.05 MG/ML
0.25 INJECTION, SOLUTION INTRAVENOUS ONCE
Status: COMPLETED | OUTPATIENT
Start: 2025-06-02 | End: 2025-06-02

## 2025-06-02 RX ORDER — SODIUM CHLORIDE 9 MG/ML
20 INJECTION, SOLUTION INTRAVENOUS ONCE
Status: CANCELLED | OUTPATIENT
Start: 2025-06-02

## 2025-06-02 RX ORDER — SODIUM CHLORIDE 9 MG/ML
20 INJECTION, SOLUTION INTRAVENOUS ONCE
Status: DISCONTINUED | OUTPATIENT
Start: 2025-06-02 | End: 2025-06-03 | Stop reason: HOSPADM

## 2025-06-02 RX ORDER — HYDROCORTISONE SODIUM SUCCINATE 100 MG/2ML
100 INJECTION INTRAMUSCULAR; INTRAVENOUS AS NEEDED
Status: CANCELLED | OUTPATIENT
Start: 2025-06-02

## 2025-06-02 RX ORDER — FAMOTIDINE 10 MG/ML
20 INJECTION, SOLUTION INTRAVENOUS AS NEEDED
Status: CANCELLED | OUTPATIENT
Start: 2025-06-02

## 2025-06-02 RX ORDER — PROCHLORPERAZINE EDISYLATE 5 MG/ML
5 INJECTION INTRAMUSCULAR; INTRAVENOUS EVERY 6 HOURS PRN
Status: DISCONTINUED | OUTPATIENT
Start: 2025-06-02 | End: 2025-06-03 | Stop reason: HOSPADM

## 2025-06-02 RX ORDER — ATROPINE SULFATE 0.4 MG/ML
0.25 INJECTION, SOLUTION INTRAMUSCULAR; INTRAVENOUS; SUBCUTANEOUS
Status: DISCONTINUED | OUTPATIENT
Start: 2025-06-02 | End: 2025-06-03 | Stop reason: HOSPADM

## 2025-06-02 RX ORDER — PALONOSETRON 0.05 MG/ML
0.25 INJECTION, SOLUTION INTRAVENOUS ONCE
Status: CANCELLED | OUTPATIENT
Start: 2025-06-02

## 2025-06-02 RX ORDER — ATROPINE SULFATE 1 MG/ML
0.25 INJECTION, SOLUTION INTRAMUSCULAR; INTRAVENOUS; SUBCUTANEOUS
Status: CANCELLED | OUTPATIENT
Start: 2025-06-02

## 2025-06-02 RX ADMIN — LEUCOVORIN CALCIUM 870 MG: 10 INJECTION INTRAMUSCULAR; INTRAVENOUS at 10:33

## 2025-06-02 RX ADMIN — IRINOTECAN HYDROCHLORIDE 390 MG: 20 INJECTION, SOLUTION INTRAVENOUS at 10:33

## 2025-06-02 RX ADMIN — SODIUM CHLORIDE 5230 MG: 9 INJECTION, SOLUTION INTRAVENOUS at 12:13

## 2025-06-02 RX ADMIN — ATROPINE SULFATE 0.25 MG: 0.4 INJECTION, SOLUTION INTRAVENOUS at 10:27

## 2025-06-02 RX ADMIN — PANITUMUMAB 570 MG: 400 SOLUTION INTRAVENOUS at 09:53

## 2025-06-02 RX ADMIN — PALONOSETRON 0.25 MG: 0.05 INJECTION, SOLUTION INTRAVENOUS at 09:22

## 2025-06-02 RX ADMIN — DEXAMETHASONE SODIUM PHOSPHATE 12 MG: 4 INJECTION, SOLUTION INTRA-ARTICULAR; INTRALESIONAL; INTRAMUSCULAR; INTRAVENOUS; SOFT TISSUE at 09:24

## 2025-06-02 NOTE — SIGNIFICANT NOTE
06/02/25 1044   Nurse Navigation   Current Status Active   Type of Visit Follow up visit   Location of Visit Outpatient infusion   Visit Diagnosis Rectal - malignant   Referral Source Health professional - outpatient  (patient here today discussing with him gas cards will be for patient's outside of [facility]. patient reports that he lives on border of [place] and [facility]. Patient upset concerning wife losing her job, Thursday will be her last day.)   Barriers to Care Financial   Practical Needs Food   Acuity Rating   Time spent with patient 1- 10 minutes   Multimodality treatment coordination and education 1- Uncomplicated guidance - brief call for appt assistance or brief follow up call   Caregiver support 1- Family/significant other support available   Distress score 1- 0-3   Coordination of care  - appts 2- Multiple appts   Appt compliance 1- Compliant   ECOG/Karnofsky Score 1- ECOG -Less than or equal to 1,  Karnofsky 90 or greater   PHQ 9 score  1- <10   Referrals to support services 1- None   Acuity score 10   Acuity level Medium acuity     Patient given food basket at this time.

## 2025-06-02 NOTE — PROGRESS NOTES
[facility]  Office number [phone]    Fax number [phone]     Follow up     Date: 25    Patient Name: Edward Powell  MRN: [id number]  : 1970    Referring Physician: Dr. Gautam    Chief Complaint: Rectal cancer with liver and lung metastases    Cancer Staging:  IV    History of Present Illness: Edward Powell is a pleasant 54 y.o. male who presents today for evaluation of rectal cancer.    Patient has a longstanding history of intermittent diarrhea since his cholecystectomy in 2019.  However he developed progressive diarrhea with associated loss of bowel control and urgency as well as weight loss and fatigue prompting additional workup.    CT of the abdomen pelvis 2023 showed an irregular circumferential wall thickening involving the rectum concerning for mass lesion.  There was associated mesorectal lymphadenopathy.  Masslike consolidation of left lower lobe.  CT of the chest showed a cavitary lesion in the left lower lobe up to 4.8 cm with an adjacent cavitary nodule up to 2 cm and a 5 mm nodule on the right minor fissure.      He underwent colonoscopy 2023 with findings of an infiltrative and ulcerated partially obstructing mass in the proximal rectum spanning 10 cm.  Rectal polyps.  Biopsy of the rectal mass showed invasive moderately differentiated adenocarcinoma.  Additional biopsies showed tubular adenomas.  MSI testing was intact/low probability of MSI high.    PDL1 negative    PET/CT 2023 showed hypermetabolic rectal wall thickening compatible with known rectal malignancy.  Multiple small ill-defined hypoechoic hyper metabolic liver lesions compatible with metastatic disease.  Mildly enlarged and mildly hypermetabolic pelvic sidewall and internal iliac lymph node chain adenopathy.  Hypermetabolic lung mass with adjacent nodule.     He underwent liver biopsy and lung biopsy 2024.  Results of both confirmed metastatic  colorectal cancer.    The patient has been experiencing substantial rectal pain.  He is taking oxycodone every 6 hours with partial relief.  He reports ongoing bowel movements.  No abdominal pain.  No vomiting.  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    Treatment history:  FOLFOX cycle 1-4  FOLFOX panitumumab cycle 5 onward  -Oxaliplatin terminated early for allergic reaction after 4/29/24    FOLFIRI/Panitumumab: 2/3/25 to present    Interval history:  Patient has been doing much better since decreasing his Cymbalta from 60-30.  Dizziness has resolved.  Bilateral lower extremity swelling has also improved since Friday.  Constipation has improved as well.  Fatigue has improved.  He is anxious about CT scan.  Will on his right hip has improved.  Continues on doxycycline.      Past Medical History:   Past Medical History:   Diagnosis Date    Arthritis     Cancer     rectal cancer - diagnosed 2023    Cholelithiasis 2019    Removed    COPD (chronic obstructive pulmonary disease)     Coronary artery disease 2009    Stent - no cardiologist currently    Elevated cholesterol     GERD (gastroesophageal reflux disease)     Hernia 2003    Lower hernia    Perforated ulcer 2019    Sleep apnea     history of; when weighed over 400lbs - no issues following bariatric surgery       Past Surgical History:   Past Surgical History:   Procedure Laterality Date    APPENDECTOMY  1983    Removed    BARIATRIC SURGERY  2011    Gastric bypass    BLADDER TUMOR/ULCER BLEEDER CAUTERIZATION      CARDIAC CATHETERIZATION  2009    stent placed    CHOLECYSTECTOMY  2019    Removed    COLONOSCOPY N/A 12/07/2023    Procedure: COLONOSCOPY WITH HOT SNARE POLYPECTOMY AND TATTOO;  Surgeon: Noman Gautam MD;   Location: [facility] ENDOSCOPY;  Service: Gastroenterology;  Laterality: N/A;    PORTACATH PLACEMENT N/A 1/5/2024    Procedure: INSERTION OF PORTACATH WITH ULTRSOUND AND FLUOROSCOPIC GUIDANCE;  Surgeon: Ida Black MD;  Location: [facility] OR;  Service: General;  Laterality: N/A;    JENNIFER-EN-Y         Family History: No family history on file.  Uncle had colon cancer    Social History:   Social History     Socioeconomic History    Marital status:    Tobacco Use    Smoking status: Every Day     Current packs/day: 1.00     Average packs/day: 0.9 packs/day for 60.7 years (53.2 ttl pk-yrs)     Types: Cigarettes     Start date: 9/6/1994    Smokeless tobacco: Never    Tobacco comments:     35 years      pt reports closer to 2 packs per day since cancer diagnosis   Vaping Use    Vaping status: Never Used   Substance and Sexual Activity    Alcohol use: Never    Drug use: Never    Sexual activity: Defer       Medications:     Current Outpatient Medications:     albuterol sulfate  (90 Base) MCG/ACT inhaler, Inhale 2 puffs Every 4 (Four) Hours As Needed for Wheezing., Disp: 18 g, Rfl: 3    buPROPion XL (WELLBUTRIN XL) 150 MG 24 hr tablet, Take 1 tablet by mouth Daily., Disp: 90 tablet, Rfl: 1    cetirizine (zyrTEC) 10 MG tablet, Take 1 tablet by mouth Daily., Disp: 30 tablet, Rfl: 2    clindamycin (Clindagel) 1 % gel, Apply 1 Application topically to the appropriate area as directed 2 (Two) Times a Day. Use first, Disp: 30 g, Rfl: 1    dexAMETHasone 0.5 MG/5ML solution, Take 10 mL by mouth 2 (Two) Times a Day. Swish for 2 minutes and spit 10 mL 4 x daily, Disp: 240 mL, Rfl: 5    Diclofenac Sodium (Voltaren) 1 % gel gel, Apply 4 g topically to the appropriate area as directed 2 (Two) Times a Day., Disp: 150 g, Rfl: 1    diphenoxylate-atropine (LOMOTIL) 2.5-0.025 MG per tablet, TAKE 1 TABLET BY MOUTH EVERY 6 HOURS AS NEEDED FOR DIARRHEA, Disp: 30 tablet, Rfl: 2    docusate sodium (COLACE) 100 MG capsule,  Take 1 capsule by mouth 2 (Two) Times a Day., Disp: , Rfl:     doxycycline (VIBRAMYICN) 100 MG tablet, Take 1 tablet by mouth 2 (Two) Times a Day. For 7 days, then 1 tablet daily indefinitely, Disp: 67 tablet, Rfl: 3    DULoxetine (CYMBALTA) 30 MG capsule, , Disp: , Rfl:     famotidine (PEPCID) 20 MG tablet, Take 1 tablet by mouth 2 (Two) Times a Day., Disp: 60 tablet, Rfl: 4    fluticasone (FLONASE) 50 MCG/ACT nasal spray, Administer 2 sprays into the nostril(s) as directed by provider Daily. Administer 2 sprays in each nostril for each dose., Disp: 16 g, Rfl: 4    hydrocortisone 2.5 % ointment, Apply 1 Application topically to the appropriate area as directed 2 (Two) Times a Day., Disp: 60 g, Rfl: 1    Hydrocortisone, Perianal, (ANUSOL-HC) 2.5 % rectal cream, Insert  into the rectum 2 (Two) Times a Day. Indications: Inflamed Hemorrhoids, Disp: 30 g, Rfl: 1    KETOPROFEN-LIDO-GABAPENTIN EX, APPLY 1-2 GRAMS TO AFFECTED AREAS 3-4 TIMES DAILY, Disp: , Rfl:     lidocaine-prilocaine (EMLA) 2.5-2.5 % cream, Apply 1 Application topically to the appropriate area as directed As Needed (45-60 minutes prior to port access.  Cover with saran/plastic wrap.)., Disp: 30 g, Rfl: 3    LORazepam (ATIVAN) 0.5 MG tablet, Take 1 tablet by mouth Take As Directed. 1 tabelt by mouth 30 minutes prior to MRI, take a second tablet at the time of the MRI if needed., Disp: 2 tablet, Rfl: 0    Magic Mouthwash Oral Suspension (diphenhydrAMINE HCl - aluminum & magnesium hydroxide-simethicone - lidocaine - nystatin), Swish and Spit 10 mL by mouth every 6 (Six) Hours For 7 Days., Disp: 300 mL, Rfl: 3    magnesium oxide (MAG-OX) 400 MG tablet, Take 1 tablet by mouth Daily., Disp: 30 tablet, Rfl: 5    mineral oil-hydrophilic petrolatum (AQUAPHOR) ointment, Apply 1 Application topically to the appropriate area as directed As Needed for Dry Skin., Disp: 198 g, Rfl: 0    montelukast (SINGULAIR) 10 MG tablet, Take 1 tablet by mouth Every Night., Disp: 90  tablet, Rfl: 3    Morphine (MS CONTIN) 30 MG 12 hr tablet, Take 1 tablet by mouth 2 (Two) Times a Day for 30 days., Disp: 60 tablet, Rfl: 0    Movantik 25 MG tablet, , Disp: , Rfl:     mupirocin (BACTROBAN) 2 % cream, , Disp: , Rfl:     naloxone (NARCAN) 4 MG/0.1ML nasal spray, 1 spray into the nostril(s) as directed by provider As Needed for Opioid Reversal., Disp: 1 each, Rfl: 0    nystatin susp + lidocaine viscous (MAGIC MOUTHWASH) oral suspension, 5-10 ml swish and spit or swallow QID prn, Disp: 240 mL, Rfl: 3    ondansetron (ZOFRAN) 8 MG tablet, Take 1 tablet by mouth 3 (Three) Times a Day As Needed for Nausea or Vomiting., Disp: 30 tablet, Rfl: 5    oxyCODONE (ROXICODONE) 15 MG immediate release tablet, Take 1 tablet by mouth 5 (Five) Times a Day As Needed for Moderate Pain or Severe Pain for up to 30 days., Disp: 150 tablet, Rfl: 0    pantoprazole (Protonix) 40 MG EC tablet, Take 1 tablet by mouth Daily. Indications: Gastroesophageal Reflux Disease, Disp: 90 tablet, Rfl: 2    potassium chloride (K-TAB) 20 MEQ tablet controlled-release ER tablet, Take 1 tablet by mouth Daily., Disp: 30 tablet, Rfl: 1    pregabalin (Lyrica) 300 MG capsule, Take 1 capsule by mouth 2 (Two) Times a Day for 30 days., Disp: 60 capsule, Rfl: 0    promethazine-dextromethorphan (PROMETHAZINE-DM) 6.25-15 MG/5ML syrup, Take 5 mL by mouth 3 times a day., Disp: 240 mL, Rfl: 0    tiotropium bromide-olodaterol (Stiolto Respimat) 2.5-2.5 MCG/ACT aerosol solution inhaler, INHALE 2 INHALATION(S) BY MOUTH DAILY, Disp: 4 g, Rfl: 5    traZODone (DESYREL) 150 MG tablet, Take 1 tablet by mouth Every Night., Disp: 30 tablet, Rfl: 2    triamcinolone (KENALOG) 0.025 % ointment, Apply 1 Application topically to the appropriate area as directed 2 (Two) Times a Day. Use second if topical treatment #1 ineffective, Disp: 80 g, Rfl: 0    Allergies:   Allergies   Allergen Reactions    Bactrim [Sulfamethoxazole-Trimethoprim] Hives     and blisters    Sulfa  "Antibiotics Hives     and blisters       Objective     Vital Signs:   Vitals:    06/02/25 0825   TempSrc: Infrared   Weight: 91.2 kg (201 lb)   Height: 177.8 cm (70\")          Body mass index is 28.84 kg/m².   There were no vitals filed for this visit.      ECOG Performance Status: 1 - Symptomatic but completely ambulatory    Physical Exam: General: No acute distress. Well appearing.  HEENT: Normocephalic, atraumatic. Sclera anicteric.   Neck: supple, no adenopathy.   Cardiovascular: regular rate and rhythm. No murmurs.   Respiratory: Normal rate. Clear to auscultation bilaterally.  Abdomen: Soft, nontender, non distended with normoactive bowel sounds.  Lymph: no cervical, supraclavicular or axillary adenopathy.  Neuro: Alert and oriented x 3. No focal deficits.   Ext: Asymmetric left > right 2+    Noted and reviewed as of 6/2/2025      Laboratory/Imaging Reviewed:   Hospital Outpatient Visit on 05/22/2025   Component Date Value Ref Range Status    Glucose 05/22/2025 72  65 - 99 mg/dL Final    BUN 05/22/2025 10  6 - 20 mg/dL Final    Creatinine 05/22/2025 0.81  0.76 - 1.27 mg/dL Final    Sodium 05/22/2025 138  136 - 145 mmol/L Final    Potassium 05/22/2025 3.5  3.5 - 5.2 mmol/L Final    Chloride 05/22/2025 108 (H)  98 - 107 mmol/L Final    CO2 05/22/2025 21.7 (L)  22.0 - 29.0 mmol/L Final    Calcium 05/22/2025 7.9 (L)  8.6 - 10.5 mg/dL Final    Total Protein 05/22/2025 5.5 (L)  6.0 - 8.5 g/dL Final    Albumin 05/22/2025 2.5 (L)  3.5 - 5.2 g/dL Final    ALT (SGPT) 05/22/2025 16  1 - 41 U/L Final    AST (SGOT) 05/22/2025 17  1 - 40 U/L Final    Alkaline Phosphatase 05/22/2025 126 (H)  39 - 117 U/L Final    Total Bilirubin 05/22/2025 0.2  0.0 - 1.2 mg/dL Final    Globulin 05/22/2025 3.0  gm/dL Final    A/G Ratio 05/22/2025 0.8  g/dL Final    BUN/Creatinine Ratio 05/22/2025 12.3  7.0 - 25.0 Final    Anion Gap 05/22/2025 8.3  5.0 - 15.0 mmol/L Final    eGFR 05/22/2025 104.1  >60.0 mL/min/1.73 Final    WBC 05/22/2025 9.10 "  3.40 - 10.80 10*3/mm3 Final    RBC 05/22/2025 4.51  4.14 - 5.80 10*6/mm3 Final    Hemoglobin 05/22/2025 12.2 (L)  13.0 - 17.7 g/dL Final    Hematocrit 05/22/2025 38.0  37.5 - 51.0 % Final    MCV 05/22/2025 84.3  79.0 - 97.0 fL Final    MCH 05/22/2025 27.1  26.6 - 33.0 pg Final    MCHC 05/22/2025 32.1  31.5 - 35.7 g/dL Final    RDW 05/22/2025 21.8 (H)  12.3 - 15.4 % Final    RDW-SD 05/22/2025 66.3 (H)  37.0 - 54.0 fl Final    MPV 05/22/2025 10.9  6.0 - 12.0 fL Final    Platelets 05/22/2025 257  140 - 450 10*3/mm3 Final    Neutrophil % 05/22/2025 73.0  42.7 - 76.0 % Final    Lymphocyte % 05/22/2025 17.1 (L)  19.6 - 45.3 % Final    Monocyte % 05/22/2025 8.1  5.0 - 12.0 % Final    Eosinophil % 05/22/2025 1.1  0.3 - 6.2 % Final    Basophil % 05/22/2025 0.3  0.0 - 1.5 % Final    Immature Grans % 05/22/2025 0.4  0.0 - 0.5 % Final    Neutrophils, Absolute 05/22/2025 6.63  1.70 - 7.00 10*3/mm3 Final    Lymphocytes, Absolute 05/22/2025 1.56  0.70 - 3.10 10*3/mm3 Final    Monocytes, Absolute 05/22/2025 0.74  0.10 - 0.90 10*3/mm3 Final    Eosinophils, Absolute 05/22/2025 0.10  0.00 - 0.40 10*3/mm3 Final    Basophils, Absolute 05/22/2025 0.03  0.00 - 0.20 10*3/mm3 Final    Immature Grans, Absolute 05/22/2025 0.04  0.00 - 0.05 10*3/mm3 Final    nRBC 05/22/2025 0.0  0.0 - 0.2 /100 WBC Final     Tempus xt: APC gene TP53; Negative ADRIANNA, BRAF, NRAS, TMB stable. PDL1 negative  CT Angiogram Abdomen Pelvis  Result Date: 5/28/2025  Narrative: ADDENDUM: At provider's request, CT of May 14, 2025 was compared to April 24, 2025. The partially cavitary posterior left lower lobe mass is stable at 43 mm. 6 mm lingular nodule is also stable from prior exam. Bilateral groin lymph nodes appear similar to prior. Rectosigmoid wall thickening appears improved compared to 4/24/2025 most consistent with posttreatment change. Area of possible cellulitis right posterior hip, mostly not included on the prior exam.  This report was signed and finalized on  5/28/2025 1:03 PM by Gabriella Rodriguez MD.      XR Knee 3 View Right  Result Date: 5/19/2025  Narrative: PROCEDURE: XR KNEE 3 VW RIGHT-  History: right knee pain; M25.561-Pain in right knee  COMPARISON: None.  FINDINGS:  A 3 view exam demonstrates no acute fracture or dislocation. The joint spaces are preserved. No soft tissue abnormality is seen.      Impression: No acute fracture.       Images were reviewed, interpreted, and dictated by Dr. Gabriella Rodriguez MD Transcribed by Krysta Aldana PA-C.   This report was signed and finalized on 5/19/2025 1:58 PM by Gabriella Rodriguez MD.      XR Chest 2 View  Result Date: 5/15/2025  Narrative: PROCEDURE: XR CHEST 2 VW-   HISTORY: B/L lower extremity swelling  COMPARISON: 3/30/2025.  FINDINGS:  The lungs are clear.   There is no evidence of effusion or other pleural disease.  The mediastinum has a normal appearance.  The cardiac silhouette is unremarkable. A right chest port is noted with tip in the SVC.      Impression: Unremarkable chest exam.    This report was signed and finalized on 5/15/2025 8:17 AM by Serjio Wright MD.      US Venous Doppler Lower Extremity Right (duplex)  Result Date: 5/14/2025  Narrative: FINAL REPORT TECHNIQUE: null CLINICAL HISTORY: R leg swelling, hx cancer COMPARISON: null FINDINGS: Venous duplex ultrasound right lower extremity Comparison: None Findings: The visualized deep veins are fully compressible with normal Doppler color flow and spectral tracings. No popliteal cyst. Mild leg subcutaneous edema.     Impression: IMPRESSION: 1. Negative for right lower extremity deep vein thrombosis. Authenticated and Electronically Signed by Quique Siegel MD on 05/14/2025 10:34:09 PM    US Venous Doppler Lower Extremity Left (duplex)  Result Date: 5/12/2025  Narrative: PROCEDURE: US VENOUS DOPPLER LOWER EXTREMITY LEFT (DUPLEX)-  HISTORY: leg swelling; I75-Jaopolvxx neoplasm of rectum; C78.7-Secondary malignant neoplasm of liver and intrahepatic bile duct   Multiple transverse and longitudinal scans were performed of the femoropopliteal deep venous system, with augmentation and compression maneuvers.  FINDINGS: Normal phasic flow was noted in the visualized deep venous system. No intraluminal increased echogenicity is noted to suggest thrombus. There is normal compression and augmentation of the venous structures. No abnormal venous collaterals are seen.      Impression: No evidence of deep venous thrombosis of the left lower extremity.   This report was signed and finalized on 5/12/2025 3:12 PM by Serjio Wright MD.        Procedures    Assessment / Plan      Assessment/Plan:     1.  Rectal cancer   2.  Liver metastases  3.  Lung metastasis  4.  Chemo monitoring  5.  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  -He was found to have biopsy-proven metastasis in the liver and lung.  His case was presented at multidisciplinary tumor board.  -Because of metastatic disease to both the lung and liver I do not think he will be downstage to resectable disease.  -Tempus results which are notable for an APC mutation, T p53 mutation, negative for KRAS, BRAF, NRAS, tumor mutation burden stable.  Notch 1 VUS.  -CBC adequate and CMP pending for treatment today with maintenance 5-FU and panitumumab 9/16/24  Chemotherapy orders and premedications signed9/16/24  -We reviewed his imaging which is consistent with excellent disease control in early July with normalization of CEA. He has had persistent rectal pain with escalating oxycodone requirements over the past couple of months, but no severe recent increase in pain, fever. WBC normal. 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 I reviewed his EUA notes. Increase doxy to BID. No surgical intervention.  -Consolidative radiotherapy to the rectal tumor is not something he wants to pursue initially but has now undergone  consultation.  Maintenance 5FU/panitumumab on hold since early December.  I reviewed his last 4 serial CT images which do so progressive and in his lung. Escalated to FOLFIRI panitumumab 2/2025 to present  -We discussed CT scan showed left lower lobe mass stable. lingular nodule is also stable from prior exam.  Bilateral groin lymph nodes appear stable to improved.  Rectosigmoid wall thickening appears improved compared to April most consistent with posttreatment changes.  Noted area of possible cellulitis right posterior hip that has improved since scan.  -Will proceed with treatment today.  -Follow-up in 2 weeks    6.  Cancer related pain  -Now following with palliative care and pain. Well controlled on lyrica, oxycodone and MS contin.     7.  Panitumumab induced rash  -Continue doxycycline and emollients  -Stop voltaren gel, start hydrocortisone 2.5 % to palms    8. GERD  PPI, well controlled    9. Access  -Port    10. Chemo diarrhea  -Hold laxatives on day of diarrhea  -Lomotil PRN.  We will add Imodium    11. Hypomagnesemia  -Mg 1.5    12. Hypokalemia  -continue potassium supplement    13. Seasonal allergies  - Continue flonase    14.  Dizziness with falls  A.  Seems somewhat improved since stopping Cymbalta.  He continues on 30 mg dosing.  B.  We discussed the dizziness could be the increased dose of Cymbalta versus blood pressure versus dehydration with his increase of diarrhea this week.  We will check CBC and CMP today.  We will also give 1 L normal saline.    Follow Up:   2 weeks with treat    Above noted and reviewed accurate as of 6 2/25    Total time of patient care including time prior to, face to face with patient, and following visit spent in reviewing records, lab results, imaging studies, discussion with patient, and documentation/charting was > 40 minutes             GEORGIA Grimes    Hematology and Oncology

## 2025-06-02 NOTE — TELEPHONE ENCOUNTER
Advised patient per request of GEORGIA Carballo to continue OTC Calcium.  Patient verbalized understanding.

## 2025-06-04 ENCOUNTER — HOSPITAL ENCOUNTER (OUTPATIENT)
Facility: HOSPITAL | Age: 55
Discharge: HOME | End: 2025-06-04
Admitting: INTERNAL MEDICINE
Payer: COMMERCIAL

## 2025-06-04 DIAGNOSIS — C20 RECTAL ADENOCARCINOMA: Primary | ICD-10-CM

## 2025-06-04 PROCEDURE — 96523 IRRIG DRUG DELIVERY DEVICE: CPT

## 2025-06-04 PROCEDURE — 25010000002 HEPARIN LOCK FLUSH PER 10 UNITS: Performed by: INTERNAL MEDICINE

## 2025-06-04 RX ORDER — HEPARIN SODIUM (PORCINE) LOCK FLUSH IV SOLN 100 UNIT/ML 100 UNIT/ML
500 SOLUTION INTRAVENOUS AS NEEDED
OUTPATIENT
Start: 2025-06-04

## 2025-06-04 RX ORDER — SODIUM CHLORIDE 0.9 % (FLUSH) 0.9 %
10 SYRINGE (ML) INJECTION AS NEEDED
OUTPATIENT
Start: 2025-06-04

## 2025-06-04 RX ORDER — HEPARIN SODIUM (PORCINE) LOCK FLUSH IV SOLN 100 UNIT/ML 100 UNIT/ML
500 SOLUTION INTRAVENOUS AS NEEDED
Status: DISCONTINUED | OUTPATIENT
Start: 2025-06-04 | End: 2025-06-05 | Stop reason: HOSPADM

## 2025-06-04 RX ORDER — SODIUM CHLORIDE 0.9 % (FLUSH) 0.9 %
10 SYRINGE (ML) INJECTION AS NEEDED
Status: DISCONTINUED | OUTPATIENT
Start: 2025-06-04 | End: 2025-06-05 | Stop reason: HOSPADM

## 2025-06-04 RX ADMIN — HEPARIN 500 UNITS: 100 SYRINGE at 14:52

## 2025-06-04 RX ADMIN — Medication 10 ML: at 14:52

## 2025-06-05 ENCOUNTER — TELEPHONE (OUTPATIENT)
Dept: PALLIATIVE CARE | Facility: CLINIC | Age: 55
End: 2025-06-05
Payer: COMMERCIAL

## 2025-06-05 DIAGNOSIS — G89.3 CANCER RELATED PAIN: ICD-10-CM

## 2025-06-05 DIAGNOSIS — G62.0 CHEMOTHERAPY-INDUCED NEUROPATHY: ICD-10-CM

## 2025-06-05 DIAGNOSIS — T45.1X5A CHEMOTHERAPY-INDUCED NEUROPATHY: ICD-10-CM

## 2025-06-05 NOTE — TELEPHONE ENCOUNTER
Caller: Edward Powell    Relationship: Self    Best call back number: [phone]     Requested Prescriptions:     DULoxetine (CYMBALTA) 30 MG capsule  Morphine (MS CONTIN) 30 MG 12 hr tablet   pregabalin (Lyrica) 300 MG capsule  oxyCODONE (ROXICODONE) 15 MG immediate release tablet     Pharmacy where request should be sent:      [facility] [id number] [place], [place] - [address] [place] [address] [phone] [facility] [phone]   [address] [facility] [address]  Phone: [phone] Fax: [phone]        Last office visit with prescribing clinician: 3/28/2025     Next office visit with prescribing clinician: 6/27/2025     Additional details provided by patient:     Does the patient have less than a 3 day supply:  [] Yes  [x] No    Would you like a call back once the refill request has been completed: [x] Yes [] No    If the office needs to give you a call back, can they leave a voicemail: [x] Yes [] No    Mariposa Nath Rep   06/05/25 15:28 EDT

## 2025-06-05 NOTE — TELEPHONE ENCOUNTER
RACIEL #: [id number]    Medication requested:     Morphine (MS CONTIN) 30 MG 12 hr tablet       Last fill date: 5/9/25    Medication requested: oxyCODONE (ROXICODONE) 15 MG immediate release tablet     Last fill date: 5/9/25      Medication requested: DULoxetine (CYMBALTA) 30 MG capsule     Last fill date: 5/19/25    Medication requested: pregabalin (Lyrica) 300 MG capsule     Last fill date: 5/9/25      Last appointment: 3/28/25    Next appointment: 6/27/25

## 2025-06-06 RX ORDER — DULOXETIN HYDROCHLORIDE 30 MG/1
30 CAPSULE, DELAYED RELEASE ORAL DAILY
Qty: 30 CAPSULE | Refills: 0 | Status: SHIPPED | OUTPATIENT
Start: 2025-06-18

## 2025-06-06 RX ORDER — OXYCODONE HYDROCHLORIDE 15 MG/1
15 TABLET ORAL
Qty: 150 TABLET | Refills: 0 | Status: SHIPPED | OUTPATIENT
Start: 2025-06-08 | End: 2025-07-08

## 2025-06-06 RX ORDER — MORPHINE SULFATE 30 MG/1
30 TABLET, FILM COATED, EXTENDED RELEASE ORAL 2 TIMES DAILY
Qty: 60 TABLET | Refills: 0 | Status: SHIPPED | OUTPATIENT
Start: 2025-06-08 | End: 2025-07-08

## 2025-06-06 RX ORDER — PREGABALIN 300 MG/1
300 CAPSULE ORAL 2 TIMES DAILY
Qty: 60 CAPSULE | Refills: 0 | Status: SHIPPED | OUTPATIENT
Start: 2025-06-08 | End: 2025-07-08

## 2025-06-09 ENCOUNTER — OFFICE VISIT (OUTPATIENT)
Age: 55
End: 2025-06-09
Payer: COMMERCIAL

## 2025-06-09 VITALS
HEIGHT: 70 IN | WEIGHT: 196 LBS | HEART RATE: 70 BPM | TEMPERATURE: 98.6 F | SYSTOLIC BLOOD PRESSURE: 111 MMHG | OXYGEN SATURATION: 99 % | DIASTOLIC BLOOD PRESSURE: 67 MMHG | BODY MASS INDEX: 28.06 KG/M2

## 2025-06-09 DIAGNOSIS — R60.9 EDEMA, UNSPECIFIED TYPE: Primary | ICD-10-CM

## 2025-06-09 PROCEDURE — 99213 OFFICE O/P EST LOW 20 MIN: CPT | Performed by: FAMILY MEDICINE

## 2025-06-09 RX ORDER — FUROSEMIDE 20 MG/1
20 TABLET ORAL DAILY
Qty: 30 TABLET | Refills: 3 | Status: SHIPPED | OUTPATIENT
Start: 2025-06-09

## 2025-06-09 RX ORDER — LANOLIN ALCOHOL/MO/W.PET/CERES
400 CREAM (GRAM) TOPICAL DAILY
COMMUNITY
Start: 2025-06-04

## 2025-06-09 NOTE — PROGRESS NOTES
Follow Up Office Visit      Date: 2025   Patient Name: Edward Powell  : 1970   MRN: [id number]     Chief Complaint:    Chief Complaint   Patient presents with    Foot Swelling     Bilateral feet and leg swelling       History of Present Illness: Edward Pwoell is a 54 y.o. male who is here today for foot swelling.  States that he has been having swelling in both of his feet for the last month to two months.  Oncology has also seen and aware.  Patient has had ultrasound of his lower extremities to rule out DVTs over the last month.    Subjective      Review of Systems:   Review of Systems   Constitutional:  Negative for appetite change and unexpected weight loss.   HENT:  Negative for trouble swallowing.    Eyes:  Negative for blurred vision and double vision.   Respiratory:  Negative for cough and shortness of breath.    Cardiovascular:  Negative for chest pain and leg swelling.   Gastrointestinal:  Negative for blood in stool.   Endocrine: Negative for cold intolerance, heat intolerance and polyuria.   Musculoskeletal:  Negative for joint swelling.   Skin:  Negative for color change and bruise.   Neurological:  Negative for numbness and memory problem.   Hematological:  Does not bruise/bleed easily.   Psychiatric/Behavioral:  Negative for suicidal ideas and depressed mood. The patient is not nervous/anxious.        I have reviewed the patients family history, social history, past medical history, past surgical history and have updated it as appropriate.     Medications:     Current Outpatient Medications:     albuterol sulfate  (90 Base) MCG/ACT inhaler, Inhale 2 puffs Every 4 (Four) Hours As Needed for Wheezing., Disp: 18 g, Rfl: 3    buPROPion XL (WELLBUTRIN XL) 150 MG 24 hr tablet, Take 1 tablet by mouth Daily., Disp: 90 tablet, Rfl: 1    cetirizine (zyrTEC) 10 MG tablet, Take 1 tablet by mouth Daily., Disp: 30 tablet, Rfl: 2    clindamycin (Clindagel) 1 % gel, Apply 1  Application topically to the appropriate area as directed 2 (Two) Times a Day. Use first, Disp: 30 g, Rfl: 1    Diclofenac Sodium (Voltaren) 1 % gel gel, Apply 4 g topically to the appropriate area as directed 2 (Two) Times a Day., Disp: 150 g, Rfl: 1    diphenoxylate-atropine (LOMOTIL) 2.5-0.025 MG per tablet, TAKE 1 TABLET BY MOUTH EVERY 6 HOURS AS NEEDED FOR DIARRHEA, Disp: 30 tablet, Rfl: 2    docusate sodium (COLACE) 100 MG capsule, Take 1 capsule by mouth 2 (Two) Times a Day., Disp: , Rfl:     doxycycline (VIBRAMYICN) 100 MG tablet, Take 1 tablet by mouth 2 (Two) Times a Day. For 7 days, then 1 tablet daily indefinitely, Disp: 67 tablet, Rfl: 3    [START ON 6/18/2025] DULoxetine (CYMBALTA) 30 MG capsule, Take 1 capsule by mouth Daily., Disp: 30 capsule, Rfl: 0    famotidine (PEPCID) 20 MG tablet, Take 1 tablet by mouth 2 (Two) Times a Day., Disp: 60 tablet, Rfl: 4    fluticasone (FLONASE) 50 MCG/ACT nasal spray, Administer 2 sprays into the nostril(s) as directed by provider Daily. Administer 2 sprays in each nostril for each dose., Disp: 16 g, Rfl: 4    hydrocortisone 2.5 % ointment, Apply 1 Application topically to the appropriate area as directed 2 (Two) Times a Day., Disp: 60 g, Rfl: 1    Hydrocortisone, Perianal, (ANUSOL-HC) 2.5 % rectal cream, Insert  into the rectum 2 (Two) Times a Day. Indications: Inflamed Hemorrhoids, Disp: 30 g, Rfl: 1    KETOPROFEN-LIDO-GABAPENTIN EX, APPLY 1-2 GRAMS TO AFFECTED AREAS 3-4 TIMES DAILY, Disp: , Rfl:     lidocaine-prilocaine (EMLA) 2.5-2.5 % cream, Apply 1 Application topically to the appropriate area as directed As Needed (45-60 minutes prior to port access.  Cover with saran/plastic wrap.)., Disp: 30 g, Rfl: 3    LORazepam (ATIVAN) 0.5 MG tablet, Take 1 tablet by mouth Take As Directed. 1 tabelt by mouth 30 minutes prior to MRI, take a second tablet at the time of the MRI if needed., Disp: 2 tablet, Rfl: 0    Magic Mouthwash Oral Suspension (diphenhydrAMINE HCl -  aluminum & magnesium hydroxide-simethicone - lidocaine - nystatin), Swish and Spit 10 mL by mouth every 6 (Six) Hours For 7 Days., Disp: 300 mL, Rfl: 3    magnesium oxide (MAG-OX) 400 MG tablet, Take 1 tablet by mouth Daily., Disp: 30 tablet, Rfl: 5    Magnesium Oxide -Mg Supplement 400 (240 Mg) MG tablet, 1 tablet Daily., Disp: , Rfl:     mineral oil-hydrophilic petrolatum (AQUAPHOR) ointment, Apply 1 Application topically to the appropriate area as directed As Needed for Dry Skin., Disp: 198 g, Rfl: 0    montelukast (SINGULAIR) 10 MG tablet, Take 1 tablet by mouth Every Night., Disp: 90 tablet, Rfl: 3    Morphine (MS CONTIN) 30 MG 12 hr tablet, Take 1 tablet by mouth 2 (Two) Times a Day for 30 days., Disp: 60 tablet, Rfl: 0    Movantik 25 MG tablet, , Disp: , Rfl:     mupirocin (BACTROBAN) 2 % cream, , Disp: , Rfl:     naloxone (NARCAN) 4 MG/0.1ML nasal spray, 1 spray into the nostril(s) as directed by provider As Needed for Opioid Reversal., Disp: 1 each, Rfl: 0    nystatin susp + lidocaine viscous (MAGIC MOUTHWASH) oral suspension, 5-10 ml swish and spit or swallow QID prn, Disp: 240 mL, Rfl: 3    ondansetron (ZOFRAN) 8 MG tablet, Take 1 tablet by mouth 3 (Three) Times a Day As Needed for Nausea or Vomiting., Disp: 30 tablet, Rfl: 5    oxyCODONE (ROXICODONE) 15 MG immediate release tablet, Take 1 tablet by mouth 5 (Five) Times a Day As Needed for Moderate Pain or Severe Pain for up to 30 days., Disp: 150 tablet, Rfl: 0    pantoprazole (Protonix) 40 MG EC tablet, Take 1 tablet by mouth Daily. Indications: Gastroesophageal Reflux Disease, Disp: 90 tablet, Rfl: 2    potassium chloride (K-TAB) 20 MEQ tablet controlled-release ER tablet, Take 1 tablet by mouth Daily., Disp: 30 tablet, Rfl: 1    pregabalin (Lyrica) 300 MG capsule, Take 1 capsule by mouth 2 (Two) Times a Day for 30 days., Disp: 60 capsule, Rfl: 0    promethazine-dextromethorphan (PROMETHAZINE-DM) 6.25-15 MG/5ML syrup, Take 5 mL by mouth 3 times a day.,  "Disp: 240 mL, Rfl: 0    tiotropium bromide-olodaterol (Stiolto Respimat) 2.5-2.5 MCG/ACT aerosol solution inhaler, INHALE 2 INHALATION(S) BY MOUTH DAILY, Disp: 4 g, Rfl: 5    traZODone (DESYREL) 150 MG tablet, Take 1 tablet by mouth Every Night., Disp: 30 tablet, Rfl: 2    triamcinolone (KENALOG) 0.025 % ointment, Apply 1 Application topically to the appropriate area as directed 2 (Two) Times a Day. Use second if topical treatment #1 ineffective, Disp: 80 g, Rfl: 0    furosemide (Lasix) 20 MG tablet, Take 1 tablet by mouth Daily., Disp: 30 tablet, Rfl: 3    Allergies:   Allergies   Allergen Reactions    Bactrim [Sulfamethoxazole-Trimethoprim] Hives     and blisters    Sulfa Antibiotics Hives     and blisters       Objective     Physical Exam: Please see above  Vital Signs:   Vitals:    06/09/25 1000   BP: 111/67   Pulse: 70   Temp: 98.6 °F (37 °C)   SpO2: 99%   Weight: 88.9 kg (196 lb)   Height: 177.8 cm (70\")     Body mass index is 28.12 kg/m².    Physical Exam  Vitals and nursing note reviewed.   Constitutional:       Appearance: Normal appearance.   HENT:      Head: Normocephalic and atraumatic.   Eyes:      General: Lids are normal.      Conjunctiva/sclera: Conjunctivae normal.   Cardiovascular:      Rate and Rhythm: Normal rate and regular rhythm.   Pulmonary:      Effort: Pulmonary effort is normal.      Breath sounds: Normal breath sounds and air entry.   Abdominal:      General: Abdomen is flat. Bowel sounds are normal.      Palpations: Abdomen is soft.   Musculoskeletal:      Cervical back: Full passive range of motion without pain and normal range of motion.   Neurological:      General: No focal deficit present.      Mental Status: He is alert and oriented to person, place, and time.   Psychiatric:         Attention and Perception: Attention normal.         Mood and Affect: Mood normal.         Behavior: Behavior normal. Behavior is cooperative.         Procedures    Results:   Labs:   No results found " "for: \"HGBA1C\", \"CMP\", \"CBCDIFFPANEL\", \"CREAT\", \"TSH\"     POCT Results (if applicable):   Results for orders placed or performed during the hospital encounter of 06/02/25   CEA    Collection Time: 06/02/25  8:16 AM    Specimen: Blood   Result Value Ref Range    CEA 11.80 ng/mL   Comprehensive Metabolic Panel    Collection Time: 06/02/25  8:16 AM    Specimen: Blood   Result Value Ref Range    Glucose 97 65 - 99 mg/dL    BUN 10.0 6.0 - 20.0 mg/dL    Creatinine 0.84 0.76 - 1.27 mg/dL    Sodium 139 136 - 145 mmol/L    Potassium 3.8 3.5 - 5.2 mmol/L    Chloride 107 98 - 107 mmol/L    CO2 24.9 22.0 - 29.0 mmol/L    Calcium 7.5 (L) 8.6 - 10.5 mg/dL    Total Protein 5.0 (L) 6.0 - 8.5 g/dL    Albumin 2.2 (L) 3.5 - 5.2 g/dL    ALT (SGPT) 20 1 - 41 U/L    AST (SGOT) 20 1 - 40 U/L    Alkaline Phosphatase 162 (H) 39 - 117 U/L    Total Bilirubin 0.3 0.0 - 1.2 mg/dL    Globulin 2.8 gm/dL    A/G Ratio 0.8 g/dL    BUN/Creatinine Ratio 11.9 7.0 - 25.0    Anion Gap 7.1 5.0 - 15.0 mmol/L    eGFR 103.0 >60.0 mL/min/1.73   Magnesium    Collection Time: 06/02/25  8:16 AM    Specimen: Blood   Result Value Ref Range    Magnesium 1.9 1.6 - 2.6 mg/dL   CBC Auto Differential    Collection Time: 06/02/25  8:16 AM    Specimen: Blood   Result Value Ref Range    WBC 9.88 3.40 - 10.80 10*3/mm3    RBC 4.22 4.14 - 5.80 10*6/mm3    Hemoglobin 11.8 (L) 13.0 - 17.7 g/dL    Hematocrit 36.0 (L) 37.5 - 51.0 %    MCV 85.3 79.0 - 97.0 fL    MCH 28.0 26.6 - 33.0 pg    MCHC 32.8 31.5 - 35.7 g/dL    RDW 21.5 (H) 12.3 - 15.4 %    RDW-SD 66.4 (H) 37.0 - 54.0 fl    MPV 11.7 6.0 - 12.0 fL    Platelets 214 140 - 450 10*3/mm3    Neutrophil % 71.9 42.7 - 76.0 %    Lymphocyte % 17.9 (L) 19.6 - 45.3 %    Monocyte % 8.6 5.0 - 12.0 %    Eosinophil % 0.8 0.3 - 6.2 %    Basophil % 0.4 0.0 - 1.5 %    Immature Grans % 0.4 0.0 - 0.5 %    Neutrophils, Absolute 7.10 (H) 1.70 - 7.00 10*3/mm3    Lymphocytes, Absolute 1.77 0.70 - 3.10 10*3/mm3    Monocytes, Absolute 0.85 0.10 - 0.90 " 10*3/mm3    Eosinophils, Absolute 0.08 0.00 - 0.40 10*3/mm3    Basophils, Absolute 0.04 0.00 - 0.20 10*3/mm3    Immature Grans, Absolute 0.04 0.00 - 0.05 10*3/mm3    nRBC 0.0 0.0 - 0.2 /100 WBC       Imaging:   No valid procedures specified.         Assessment / Plan      Assessment/Plan:   Diagnoses and all orders for this visit:    1. Edema, unspecified type (Primary)  -     furosemide (Lasix) 20 MG tablet; Take 1 tablet by mouth Daily.  Dispense: 30 tablet; Refill: 3  - Will consider referral for lymphedema clinic if Lasix does not help sufficiently.            Vaccine Counseling:      Follow Up:   Return in about 3 months (around 9/9/2025).        Westley Gonzales,   [facility]

## 2025-06-16 ENCOUNTER — APPOINTMENT (OUTPATIENT)
Facility: HOSPITAL | Age: 55
End: 2025-06-16
Payer: COMMERCIAL

## 2025-06-16 ENCOUNTER — OFFICE VISIT (OUTPATIENT)
Dept: ONCOLOGY | Facility: CLINIC | Age: 55
End: 2025-06-16
Payer: COMMERCIAL

## 2025-06-16 ENCOUNTER — HOSPITAL ENCOUNTER (OUTPATIENT)
Facility: HOSPITAL | Age: 55
Discharge: HOME | End: 2025-06-16
Admitting: INTERNAL MEDICINE
Payer: COMMERCIAL

## 2025-06-16 VITALS
SYSTOLIC BLOOD PRESSURE: 92 MMHG | HEIGHT: 70 IN | OXYGEN SATURATION: 99 % | WEIGHT: 196 LBS | TEMPERATURE: 97.3 F | DIASTOLIC BLOOD PRESSURE: 64 MMHG | HEART RATE: 54 BPM | BODY MASS INDEX: 28.06 KG/M2 | RESPIRATION RATE: 18 BRPM

## 2025-06-16 DIAGNOSIS — C78.7 RECTAL CANCER METASTASIZED TO LIVER: Primary | ICD-10-CM

## 2025-06-16 DIAGNOSIS — C20 RECTAL CANCER METASTASIZED TO LIVER: Primary | ICD-10-CM

## 2025-06-16 LAB
ALBUMIN SERPL-MCNC: 2.3 G/DL (ref 3.5–5.2)
ALBUMIN/GLOB SERPL: 0.8 G/DL
ALP SERPL-CCNC: 124 U/L (ref 39–117)
ALT SERPL W P-5'-P-CCNC: 23 U/L (ref 1–41)
ANION GAP SERPL CALCULATED.3IONS-SCNC: 8.3 MMOL/L (ref 5–15)
AST SERPL-CCNC: 19 U/L (ref 1–40)
BASOPHILS # BLD AUTO: 0.02 10*3/MM3 (ref 0–0.2)
BASOPHILS NFR BLD AUTO: 0.3 % (ref 0–1.5)
BILIRUB SERPL-MCNC: 0.3 MG/DL (ref 0–1.2)
BUN SERPL-MCNC: 10 MG/DL (ref 6–20)
BUN/CREAT SERPL: 12 (ref 7–25)
CALCIUM SPEC-SCNC: 7.6 MG/DL (ref 8.6–10.5)
CHLORIDE SERPL-SCNC: 104 MMOL/L (ref 98–107)
CO2 SERPL-SCNC: 24.7 MMOL/L (ref 22–29)
CREAT SERPL-MCNC: 0.83 MG/DL (ref 0.76–1.27)
DEPRECATED RDW RBC AUTO: 64.6 FL (ref 37–54)
EGFRCR SERPLBLD CKD-EPI 2021: 103.4 ML/MIN/1.73
EOSINOPHIL # BLD AUTO: 0.13 10*3/MM3 (ref 0–0.4)
EOSINOPHIL NFR BLD AUTO: 1.6 % (ref 0.3–6.2)
ERYTHROCYTE [DISTWIDTH] IN BLOOD BY AUTOMATED COUNT: 20.3 % (ref 12.3–15.4)
GLOBULIN UR ELPH-MCNC: 2.9 GM/DL
GLUCOSE SERPL-MCNC: 92 MG/DL (ref 65–99)
HCT VFR BLD AUTO: 35.4 % (ref 37.5–51)
HGB BLD-MCNC: 11.4 G/DL (ref 13–17.7)
IMM GRANULOCYTES # BLD AUTO: 0.02 10*3/MM3 (ref 0–0.05)
IMM GRANULOCYTES NFR BLD AUTO: 0.3 % (ref 0–0.5)
LYMPHOCYTES # BLD AUTO: 1.73 10*3/MM3 (ref 0.7–3.1)
LYMPHOCYTES NFR BLD AUTO: 21.7 % (ref 19.6–45.3)
MAGNESIUM SERPL-MCNC: 1.7 MG/DL (ref 1.6–2.6)
MCH RBC QN AUTO: 28 PG (ref 26.6–33)
MCHC RBC AUTO-ENTMCNC: 32.2 G/DL (ref 31.5–35.7)
MCV RBC AUTO: 87 FL (ref 79–97)
MONOCYTES # BLD AUTO: 0.64 10*3/MM3 (ref 0.1–0.9)
MONOCYTES NFR BLD AUTO: 8 % (ref 5–12)
NEUTROPHILS NFR BLD AUTO: 5.44 10*3/MM3 (ref 1.7–7)
NEUTROPHILS NFR BLD AUTO: 68.1 % (ref 42.7–76)
NRBC BLD AUTO-RTO: 0 /100 WBC (ref 0–0.2)
PLATELET # BLD AUTO: 218 10*3/MM3 (ref 140–450)
PMV BLD AUTO: 11.6 FL (ref 6–12)
POTASSIUM SERPL-SCNC: 3.2 MMOL/L (ref 3.5–5.2)
PROT SERPL-MCNC: 5.2 G/DL (ref 6–8.5)
RBC # BLD AUTO: 4.07 10*6/MM3 (ref 4.14–5.8)
SODIUM SERPL-SCNC: 137 MMOL/L (ref 136–145)
WBC NRBC COR # BLD AUTO: 7.98 10*3/MM3 (ref 3.4–10.8)

## 2025-06-16 PROCEDURE — 80053 COMPREHEN METABOLIC PANEL: CPT | Performed by: INTERNAL MEDICINE

## 2025-06-16 PROCEDURE — 83735 ASSAY OF MAGNESIUM: CPT | Performed by: INTERNAL MEDICINE

## 2025-06-16 PROCEDURE — 85025 COMPLETE CBC W/AUTO DIFF WBC: CPT | Performed by: INTERNAL MEDICINE

## 2025-06-16 PROCEDURE — 25010000002 PALONOSETRON PER 25 MCG: Performed by: INTERNAL MEDICINE

## 2025-06-16 PROCEDURE — 25010000003 DEXTROSE 5 % SOLUTION 500 ML FLEX CONT: Performed by: INTERNAL MEDICINE

## 2025-06-16 PROCEDURE — G0498 CHEMO EXTEND IV INFUS W/PUMP: HCPCS

## 2025-06-16 PROCEDURE — 96416 CHEMO PROLONG INFUSE W/PUMP: CPT

## 2025-06-16 PROCEDURE — 96413 CHEMO IV INFUSION 1 HR: CPT

## 2025-06-16 PROCEDURE — 96375 TX/PRO/DX INJ NEW DRUG ADDON: CPT

## 2025-06-16 PROCEDURE — 25010000002 LEUCOVORIN CALCIUM PER 50MG: Performed by: INTERNAL MEDICINE

## 2025-06-16 PROCEDURE — 25010000002 DEXAMETHASONE SODIUM PHOSPHATE 20 MG/5ML SOLUTION: Performed by: INTERNAL MEDICINE

## 2025-06-16 PROCEDURE — 25010000002 FLUOROURACIL PER 500 MG: Performed by: INTERNAL MEDICINE

## 2025-06-16 PROCEDURE — 25810000003 SODIUM CHLORIDE 0.9 % SOLUTION 250 ML FLEX CONT: Performed by: INTERNAL MEDICINE

## 2025-06-16 PROCEDURE — 25010000002 IRINOTECAN PER 20 MG: Performed by: INTERNAL MEDICINE

## 2025-06-16 PROCEDURE — 25010000002 PANITUMUMAB PER 10 MG: Performed by: INTERNAL MEDICINE

## 2025-06-16 PROCEDURE — 25010000002 PANITUMUMAB 400 MG/20ML SOLUTION 20 ML VIAL: Performed by: INTERNAL MEDICINE

## 2025-06-16 PROCEDURE — 96368 THER/DIAG CONCURRENT INF: CPT

## 2025-06-16 PROCEDURE — 25010000003 DEXTROSE 5 % SOLUTION 250 ML FLEX CONT: Performed by: INTERNAL MEDICINE

## 2025-06-16 PROCEDURE — 99214 OFFICE O/P EST MOD 30 MIN: CPT | Performed by: INTERNAL MEDICINE

## 2025-06-16 PROCEDURE — 96366 THER/PROPH/DIAG IV INF ADDON: CPT

## 2025-06-16 PROCEDURE — 96417 CHEMO IV INFUS EACH ADDL SEQ: CPT

## 2025-06-16 RX ORDER — DIPHENHYDRAMINE HYDROCHLORIDE 50 MG/ML
50 INJECTION, SOLUTION INTRAMUSCULAR; INTRAVENOUS AS NEEDED
Status: CANCELLED | OUTPATIENT
Start: 2025-06-16

## 2025-06-16 RX ORDER — PROCHLORPERAZINE EDISYLATE 5 MG/ML
5 INJECTION INTRAMUSCULAR; INTRAVENOUS EVERY 6 HOURS PRN
Status: CANCELLED
Start: 2025-06-16

## 2025-06-16 RX ORDER — SODIUM CHLORIDE 9 MG/ML
20 INJECTION, SOLUTION INTRAVENOUS ONCE
Status: CANCELLED | OUTPATIENT
Start: 2025-06-16

## 2025-06-16 RX ORDER — POTASSIUM CHLORIDE 1500 MG/1
40 TABLET, EXTENDED RELEASE ORAL DAILY
Qty: 60 TABLET | Refills: 3 | Status: SHIPPED | OUTPATIENT
Start: 2025-06-16

## 2025-06-16 RX ORDER — TRAZODONE HYDROCHLORIDE 150 MG/1
150 TABLET ORAL NIGHTLY
Qty: 30 TABLET | Refills: 3 | Status: SHIPPED | OUTPATIENT
Start: 2025-06-16

## 2025-06-16 RX ORDER — SODIUM CHLORIDE 9 MG/ML
20 INJECTION, SOLUTION INTRAVENOUS ONCE
Status: DISCONTINUED | OUTPATIENT
Start: 2025-06-16 | End: 2025-06-17 | Stop reason: HOSPADM

## 2025-06-16 RX ORDER — ATROPINE SULFATE 0.4 MG/ML
0.25 INJECTION, SOLUTION INTRAMUSCULAR; INTRAVENOUS; SUBCUTANEOUS
Status: DISCONTINUED | OUTPATIENT
Start: 2025-06-16 | End: 2025-06-17 | Stop reason: HOSPADM

## 2025-06-16 RX ORDER — ATROPINE SULFATE 1 MG/ML
0.25 INJECTION, SOLUTION INTRAMUSCULAR; INTRAVENOUS; SUBCUTANEOUS
Status: CANCELLED | OUTPATIENT
Start: 2025-06-16

## 2025-06-16 RX ORDER — PALONOSETRON 0.05 MG/ML
0.25 INJECTION, SOLUTION INTRAVENOUS ONCE
Status: COMPLETED | OUTPATIENT
Start: 2025-06-16 | End: 2025-06-16

## 2025-06-16 RX ORDER — PALONOSETRON 0.05 MG/ML
0.25 INJECTION, SOLUTION INTRAVENOUS ONCE
Status: CANCELLED | OUTPATIENT
Start: 2025-06-16

## 2025-06-16 RX ORDER — FAMOTIDINE 10 MG/ML
20 INJECTION, SOLUTION INTRAVENOUS AS NEEDED
Status: CANCELLED | OUTPATIENT
Start: 2025-06-16

## 2025-06-16 RX ORDER — HYDROCORTISONE SODIUM SUCCINATE 100 MG/2ML
100 INJECTION INTRAMUSCULAR; INTRAVENOUS AS NEEDED
Status: CANCELLED | OUTPATIENT
Start: 2025-06-16

## 2025-06-16 RX ADMIN — ATROPINE SULFATE 0.25 MG: 0.4 INJECTION, SOLUTION INTRAVENOUS at 11:44

## 2025-06-16 RX ADMIN — PANITUMUMAB 530 MG: 400 SOLUTION INTRAVENOUS at 10:53

## 2025-06-16 RX ADMIN — DEXTROSE MONOHYDRATE 375 MG: 50 INJECTION, SOLUTION INTRAVENOUS at 11:49

## 2025-06-16 RX ADMIN — DEXAMETHASONE SODIUM PHOSPHATE 12 MG: 4 INJECTION, SOLUTION INTRA-ARTICULAR; INTRALESIONAL; INTRAMUSCULAR; INTRAVENOUS; SOFT TISSUE at 10:03

## 2025-06-16 RX ADMIN — PALONOSETRON 0.25 MG: 0.05 INJECTION, SOLUTION INTRAVENOUS at 10:02

## 2025-06-16 RX ADMIN — LEUCOVORIN CALCIUM 830 MG: 10 INJECTION INTRAMUSCULAR; INTRAVENOUS at 11:49

## 2025-06-16 RX ADMIN — SODIUM CHLORIDE 4970 MG: 9 INJECTION, SOLUTION INTRAVENOUS at 13:21

## 2025-06-16 NOTE — PROGRESS NOTES
[facility]ology [facility]  Office number [phone]    Fax number [phone]     Follow up     Date: 25    Patient Name: Edward Powell  MRN: [id number]  : 1970    Referring Physician: Dr. Gautam    Chief Complaint: Rectal cancer with liver and lung metastases    Cancer Staging:  IV    History of Present Illness: Edward Powell is a pleasant 54 y.o. male who presents today for evaluation of rectal cancer.    Patient has a longstanding history of intermittent diarrhea since his cholecystectomy in 2019.  However he developed progressive diarrhea with associated loss of bowel control and urgency as well as weight loss and fatigue prompting additional workup.    CT of the abdomen pelvis 2023 showed an irregular circumferential wall thickening involving the rectum concerning for mass lesion.  There was associated mesorectal lymphadenopathy.  Masslike consolidation of left lower lobe.  CT of the chest showed a cavitary lesion in the left lower lobe up to 4.8 cm with an adjacent cavitary nodule up to 2 cm and a 5 mm nodule on the right minor fissure.      He underwent colonoscopy 2023 with findings of an infiltrative and ulcerated partially obstructing mass in the proximal rectum spanning 10 cm.  Rectal polyps.  Biopsy of the rectal mass showed invasive moderately differentiated adenocarcinoma.  Additional biopsies showed tubular adenomas.  MSI testing was intact/low probability of MSI high.    PDL1 negative    PET/CT 2023 showed hypermetabolic rectal wall thickening compatible with known rectal malignancy.  Multiple small ill-defined hypoechoic hyper metabolic liver lesions compatible with metastatic disease.  Mildly enlarged and mildly hypermetabolic pelvic sidewall and internal iliac lymph node chain adenopathy.  Hypermetabolic lung mass with adjacent nodule.     He underwent liver biopsy and lung biopsy 2024.  Results of both confirmed metastatic  colorectal cancer.    The patient has been experiencing substantial rectal pain.  He is taking oxycodone every 6 hours with partial relief.  He reports ongoing bowel movements.  No abdominal pain.  No vomiting.  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    Treatment history:  FOLFOX cycle 1-4  FOLFOX panitumumab cycle 5 onward  -Oxaliplatin terminated early for allergic reaction after 4/29/24    FOLFIRI/Panitumumab: 2/3/25 to present    Interval history:  Here for consideration of next cycle of chemo.   Hand foot syndrome is better. Using more moisturizers.   Fragile skin  Leg swelling better since PCP started him on lasix. Taking potassium  Stable fatigue      Past Medical History:   Past Medical History:   Diagnosis Date    Arthritis     Cancer     rectal cancer - diagnosed 2023    Cholelithiasis 2019    Removed    COPD (chronic obstructive pulmonary disease)     Coronary artery disease 2009    Stent - no cardiologist currently    Elevated cholesterol     GERD (gastroesophageal reflux disease)     Hernia 2003    Lower hernia    Perforated ulcer 2019    Sleep apnea     history of; when weighed over 400lbs - no issues following bariatric surgery       Past Surgical History:   Past Surgical History:   Procedure Laterality Date    APPENDECTOMY  1983    Removed    BARIATRIC SURGERY  2011    Gastric bypass    BLADDER TUMOR/ULCER BLEEDER CAUTERIZATION      CARDIAC CATHETERIZATION  2009    stent placed    CHOLECYSTECTOMY  2019    Removed    COLONOSCOPY N/A 12/07/2023    Procedure: COLONOSCOPY WITH HOT SNARE POLYPECTOMY AND TATTOO;  Surgeon: Noman Gautam MD;  Location: [facility] ENDOSCOPY;  Service: Gastroenterology;  Laterality: N/A;    PORTACATH PLACEMENT N/A 1/5/2024     Procedure: INSERTION OF PORTACATH WITH ULTRSOUND AND FLUOROSCOPIC GUIDANCE;  Surgeon: Ida Black MD;  Location: [facility];  Service: General;  Laterality: N/A;    JENNIFER-EN-Y         Family History: No family history on file.  Uncle had colon cancer    Social History:   Social History     Socioeconomic History    Marital status:    Tobacco Use    Smoking status: Every Day     Current packs/day: 1.00     Average packs/day: 0.9 packs/day for 60.8 years (53.3 ttl pk-yrs)     Types: Cigarettes     Start date: 9/6/1994    Smokeless tobacco: Never    Tobacco comments:     35 years      pt reports closer to 2 packs per day since cancer diagnosis   Vaping Use    Vaping status: Never Used   Substance and Sexual Activity    Alcohol use: Never    Drug use: Never    Sexual activity: Defer       Medications:     Current Outpatient Medications:     albuterol sulfate  (90 Base) MCG/ACT inhaler, Inhale 2 puffs Every 4 (Four) Hours As Needed for Wheezing., Disp: 18 g, Rfl: 3    buPROPion XL (WELLBUTRIN XL) 150 MG 24 hr tablet, Take 1 tablet by mouth Daily., Disp: 90 tablet, Rfl: 1    cetirizine (zyrTEC) 10 MG tablet, Take 1 tablet by mouth Daily., Disp: 30 tablet, Rfl: 2    clindamycin (Clindagel) 1 % gel, Apply 1 Application topically to the appropriate area as directed 2 (Two) Times a Day. Use first, Disp: 30 g, Rfl: 1    Diclofenac Sodium (Voltaren) 1 % gel gel, Apply 4 g topically to the appropriate area as directed 2 (Two) Times a Day., Disp: 150 g, Rfl: 1    diphenoxylate-atropine (LOMOTIL) 2.5-0.025 MG per tablet, TAKE 1 TABLET BY MOUTH EVERY 6 HOURS AS NEEDED FOR DIARRHEA, Disp: 30 tablet, Rfl: 2    docusate sodium (COLACE) 100 MG capsule, Take 1 capsule by mouth 2 (Two) Times a Day., Disp: , Rfl:     doxycycline (VIBRAMYICN) 100 MG tablet, Take 1 tablet by mouth 2 (Two) Times a Day. For 7 days, then 1 tablet daily indefinitely, Disp: 67 tablet, Rfl: 3    [START ON 6/18/2025] DULoxetine  (CYMBALTA) 30 MG capsule, Take 1 capsule by mouth Daily., Disp: 30 capsule, Rfl: 0    famotidine (PEPCID) 20 MG tablet, Take 1 tablet by mouth 2 (Two) Times a Day., Disp: 60 tablet, Rfl: 4    fluticasone (FLONASE) 50 MCG/ACT nasal spray, Administer 2 sprays into the nostril(s) as directed by provider Daily. Administer 2 sprays in each nostril for each dose., Disp: 16 g, Rfl: 4    furosemide (Lasix) 20 MG tablet, Take 1 tablet by mouth Daily., Disp: 30 tablet, Rfl: 3    hydrocortisone 2.5 % ointment, Apply 1 Application topically to the appropriate area as directed 2 (Two) Times a Day., Disp: 60 g, Rfl: 1    Hydrocortisone, Perianal, (ANUSOL-HC) 2.5 % rectal cream, Insert  into the rectum 2 (Two) Times a Day. Indications: Inflamed Hemorrhoids, Disp: 30 g, Rfl: 1    KETOPROFEN-LIDO-GABAPENTIN EX, APPLY 1-2 GRAMS TO AFFECTED AREAS 3-4 TIMES DAILY, Disp: , Rfl:     lidocaine-prilocaine (EMLA) 2.5-2.5 % cream, Apply 1 Application topically to the appropriate area as directed As Needed (45-60 minutes prior to port access.  Cover with saran/plastic wrap.)., Disp: 30 g, Rfl: 3    LORazepam (ATIVAN) 0.5 MG tablet, Take 1 tablet by mouth Take As Directed. 1 tabelt by mouth 30 minutes prior to MRI, take a second tablet at the time of the MRI if needed., Disp: 2 tablet, Rfl: 0    Magic Mouthwash Oral Suspension (diphenhydrAMINE HCl - aluminum & magnesium hydroxide-simethicone - lidocaine - nystatin), Swish and Spit 10 mL by mouth every 6 (Six) Hours For 7 Days., Disp: 300 mL, Rfl: 3    magnesium oxide (MAG-OX) 400 MG tablet, Take 1 tablet by mouth Daily., Disp: 30 tablet, Rfl: 5    Magnesium Oxide -Mg Supplement 400 (240 Mg) MG tablet, 1 tablet Daily., Disp: , Rfl:     mineral oil-hydrophilic petrolatum (AQUAPHOR) ointment, Apply 1 Application topically to the appropriate area as directed As Needed for Dry Skin., Disp: 198 g, Rfl: 0    montelukast (SINGULAIR) 10 MG tablet, Take 1 tablet by mouth Every Night., Disp: 90 tablet,  Rfl: 3    Morphine (MS CONTIN) 30 MG 12 hr tablet, Take 1 tablet by mouth 2 (Two) Times a Day for 30 days., Disp: 60 tablet, Rfl: 0    Movantik 25 MG tablet, , Disp: , Rfl:     mupirocin (BACTROBAN) 2 % cream, , Disp: , Rfl:     naloxone (NARCAN) 4 MG/0.1ML nasal spray, 1 spray into the nostril(s) as directed by provider As Needed for Opioid Reversal., Disp: 1 each, Rfl: 0    nystatin susp + lidocaine viscous (MAGIC MOUTHWASH) oral suspension, 5-10 ml swish and spit or swallow QID prn, Disp: 240 mL, Rfl: 3    ondansetron (ZOFRAN) 8 MG tablet, Take 1 tablet by mouth 3 (Three) Times a Day As Needed for Nausea or Vomiting., Disp: 30 tablet, Rfl: 5    oxyCODONE (ROXICODONE) 15 MG immediate release tablet, Take 1 tablet by mouth 5 (Five) Times a Day As Needed for Moderate Pain or Severe Pain for up to 30 days., Disp: 150 tablet, Rfl: 0    pantoprazole (Protonix) 40 MG EC tablet, Take 1 tablet by mouth Daily. Indications: Gastroesophageal Reflux Disease, Disp: 90 tablet, Rfl: 2    potassium chloride (K-TAB) 20 MEQ tablet controlled-release ER tablet, Take 1 tablet by mouth Daily., Disp: 30 tablet, Rfl: 1    pregabalin (Lyrica) 300 MG capsule, Take 1 capsule by mouth 2 (Two) Times a Day for 30 days., Disp: 60 capsule, Rfl: 0    promethazine-dextromethorphan (PROMETHAZINE-DM) 6.25-15 MG/5ML syrup, Take 5 mL by mouth 3 times a day., Disp: 240 mL, Rfl: 0    tiotropium bromide-olodaterol (Stiolto Respimat) 2.5-2.5 MCG/ACT aerosol solution inhaler, INHALE 2 INHALATION(S) BY MOUTH DAILY, Disp: 4 g, Rfl: 5    traZODone (DESYREL) 150 MG tablet, Take 1 tablet by mouth Every Night., Disp: 30 tablet, Rfl: 2    triamcinolone (KENALOG) 0.025 % ointment, Apply 1 Application topically to the appropriate area as directed 2 (Two) Times a Day. Use second if topical treatment #1 ineffective, Disp: 80 g, Rfl: 0    Allergies:   Allergies   Allergen Reactions    Bactrim [Sulfamethoxazole-Trimethoprim] Hives     and blisters    Sulfa Antibiotics  "Hives     and blisters       Objective     Vital Signs:   Vitals:    06/16/25 0842   BP: 92/64   Pulse: 54   Resp: 18   Temp: 97.3 °F (36.3 °C)   TempSrc: Infrared   SpO2: 99%   Weight: 88.9 kg (196 lb)   Height: 177.8 cm (70\")   PainSc: 4    PainLoc: Foot  Comment: feet and butt          Body mass index is 28.12 kg/m².   Pain Score    06/16/25 0842   PainSc: 4    PainLoc: Foot  Comment: feet and butt       ECOG Performance Status: 1 - Symptomatic but completely ambulatory    Physical Exam: General: No acute distress. Well appearing.  HEENT: Normocephalic, atraumatic. Sclera anicteric.   Neck: supple, no adenopathy.   Cardiovascular: regular rate and rhythm. No murmurs.   Respiratory: Normal rate. Clear to auscultation bilaterally.  Abdomen: Soft, nontender, non distended with normoactive bowel sounds.  Lymph: no cervical, supraclavicular or axillary adenopathy.  Neuro: Alert and oriented x 3. No focal deficits.   Ext: improved edema 1+ bilateral      Laboratory/Imaging Reviewed:   Hospital Outpatient Visit on 06/16/2025   Component Date Value Ref Range Status    WBC 06/16/2025 7.98  3.40 - 10.80 10*3/mm3 Final    RBC 06/16/2025 4.07 (L)  4.14 - 5.80 10*6/mm3 Final    Hemoglobin 06/16/2025 11.4 (L)  13.0 - 17.7 g/dL Final    Hematocrit 06/16/2025 35.4 (L)  37.5 - 51.0 % Final    MCV 06/16/2025 87.0  79.0 - 97.0 fL Final    MCH 06/16/2025 28.0  26.6 - 33.0 pg Final    MCHC 06/16/2025 32.2  31.5 - 35.7 g/dL Final    RDW 06/16/2025 20.3 (H)  12.3 - 15.4 % Final    RDW-SD 06/16/2025 64.6 (H)  37.0 - 54.0 fl Final    MPV 06/16/2025 11.6  6.0 - 12.0 fL Final    Platelets 06/16/2025 218  140 - 450 10*3/mm3 Final    Neutrophil % 06/16/2025 68.1  42.7 - 76.0 % Final    Lymphocyte % 06/16/2025 21.7  19.6 - 45.3 % Final    Monocyte % 06/16/2025 8.0  5.0 - 12.0 % Final    Eosinophil % 06/16/2025 1.6  0.3 - 6.2 % Final    Basophil % 06/16/2025 0.3  0.0 - 1.5 % Final    Immature Grans % 06/16/2025 0.3  0.0 - 0.5 % Final    " Neutrophils, Absolute 06/16/2025 5.44  1.70 - 7.00 10*3/mm3 Final    Lymphocytes, Absolute 06/16/2025 1.73  0.70 - 3.10 10*3/mm3 Final    Monocytes, Absolute 06/16/2025 0.64  0.10 - 0.90 10*3/mm3 Final    Eosinophils, Absolute 06/16/2025 0.13  0.00 - 0.40 10*3/mm3 Final    Basophils, Absolute 06/16/2025 0.02  0.00 - 0.20 10*3/mm3 Final    Immature Grans, Absolute 06/16/2025 0.02  0.00 - 0.05 10*3/mm3 Final    nRBC 06/16/2025 0.0  0.0 - 0.2 /100 WBC Final     Tempus xt: APC gene TP53; Negative ADRIANNA, BRAF, NRAS, TMB stable. PDL1 negative  XR Knee 3 View Right  Result Date: 5/19/2025  Narrative: PROCEDURE: XR KNEE 3 VW RIGHT-  History: right knee pain; M25.561-Pain in right knee  COMPARISON: None.  FINDINGS:  A 3 view exam demonstrates no acute fracture or dislocation. The joint spaces are preserved. No soft tissue abnormality is seen.      Impression: No acute fracture.       Images were reviewed, interpreted, and dictated by Dr. Gabriella Rodriguez MD Transcribed by Krysta Aldana PA-C.   This report was signed and finalized on 5/19/2025 1:58 PM by Gabriella Rodriguez MD.        Procedures    Assessment / Plan      Assessment/Plan:     1.  Rectal cancer   2.  Liver metastases  3.  Lung metastasis  4.  Chemo monitoring  5.  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  -He was found to have biopsy-proven metastasis in the liver and lung.  His case was presented at multidisciplinary tumor board.  -Because of metastatic disease to both the lung and liver I do not think he will be downstage to resectable disease.  -Tempus results which are notable for an APC mutation, T p53 mutation, negative for KRAS, BRAF, NRAS, tumor mutation burden stable.  Notch 1 VUS.  -CBC adequate and CMP pending for treatment today with maintenance 5-FU and panitumumab 9/16/24  Chemotherapy orders and premedications signed9/16/24  -We reviewed his imaging which is  consistent with excellent disease control in early July with normalization of CEA. He has had persistent rectal pain with escalating oxycodone requirements over the past couple of months, but no severe recent increase in pain, fever. WBC normal. 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 I reviewed his EUA notes. Increase doxy to BID. No surgical intervention.  -Consolidative radiotherapy to the rectal tumor is not something he wants to pursue initially but has now undergone consultation.  Maintenance 5FU/panitumumab on hold since early December.  I reviewed his last 4 serial CT images which do so progressive and in his lung. Escalated to FOLFIRI panitumumab 2/2025 to present  -Labs ok for treatment today. Orders sigend  -Schedule scans late July    6.  Cancer related pain  -Now following with palliative care and pain. Well controlled on lyrica, oxycodone and MS contin.     7.  Panitumumab induced rash  -Continue doxycycline and emollients  -Stop voltaren gel, start hydrocortisone 2.5 % to palms    8. GERD  PPI, well controlled    9. Access  -Port    10. Chemo diarrhea  -Hold laxatives on day of diarrhea  -Lomotil PRN    11. Hypomagnesemia  -Mg 1.5    12. Hypokalemia  -continue potassium supplement. Increase to 40 daily.    13. Hypocalcemia  -Add Ca/D    Follow Up:   2 weeks     Brenda Cronin MD  Hematology and Oncology

## 2025-06-18 ENCOUNTER — HOSPITAL ENCOUNTER (OUTPATIENT)
Facility: HOSPITAL | Age: 55
Discharge: HOME | End: 2025-06-18
Payer: COMMERCIAL

## 2025-06-18 DIAGNOSIS — C20 RECTAL CANCER METASTASIZED TO LIVER: ICD-10-CM

## 2025-06-18 DIAGNOSIS — C78.7 RECTAL CANCER METASTASIZED TO LIVER: ICD-10-CM

## 2025-06-18 DIAGNOSIS — C20 RECTAL ADENOCARCINOMA: Primary | ICD-10-CM

## 2025-06-18 PROCEDURE — 96523 IRRIG DRUG DELIVERY DEVICE: CPT

## 2025-06-18 PROCEDURE — 25010000002 HEPARIN LOCK FLUSH PER 10 UNITS: Performed by: INTERNAL MEDICINE

## 2025-06-18 RX ORDER — HEPARIN SODIUM (PORCINE) LOCK FLUSH IV SOLN 100 UNIT/ML 100 UNIT/ML
500 SOLUTION INTRAVENOUS AS NEEDED
OUTPATIENT
Start: 2025-06-18

## 2025-06-18 RX ORDER — SODIUM CHLORIDE 0.9 % (FLUSH) 0.9 %
10 SYRINGE (ML) INJECTION AS NEEDED
OUTPATIENT
Start: 2025-06-18

## 2025-06-18 RX ORDER — SODIUM CHLORIDE 0.9 % (FLUSH) 0.9 %
10 SYRINGE (ML) INJECTION AS NEEDED
Status: DISCONTINUED | OUTPATIENT
Start: 2025-06-18 | End: 2025-06-19 | Stop reason: HOSPADM

## 2025-06-18 RX ORDER — HEPARIN SODIUM (PORCINE) LOCK FLUSH IV SOLN 100 UNIT/ML 100 UNIT/ML
500 SOLUTION INTRAVENOUS AS NEEDED
Status: DISCONTINUED | OUTPATIENT
Start: 2025-06-18 | End: 2025-06-19 | Stop reason: HOSPADM

## 2025-06-18 RX ADMIN — HEPARIN 500 UNITS: 100 SYRINGE at 12:49

## 2025-06-18 RX ADMIN — Medication 10 ML: at 12:49

## 2025-06-30 ENCOUNTER — HOSPITAL ENCOUNTER (OUTPATIENT)
Facility: HOSPITAL | Age: 55
Discharge: HOME | End: 2025-06-30
Admitting: INTERNAL MEDICINE
Payer: COMMERCIAL

## 2025-06-30 ENCOUNTER — OFFICE VISIT (OUTPATIENT)
Dept: ONCOLOGY | Facility: CLINIC | Age: 55
End: 2025-06-30
Payer: COMMERCIAL

## 2025-06-30 VITALS
TEMPERATURE: 97.1 F | DIASTOLIC BLOOD PRESSURE: 65 MMHG | OXYGEN SATURATION: 96 % | SYSTOLIC BLOOD PRESSURE: 108 MMHG | WEIGHT: 191 LBS | RESPIRATION RATE: 16 BRPM | BODY MASS INDEX: 27.35 KG/M2 | HEIGHT: 70 IN | HEART RATE: 79 BPM

## 2025-06-30 DIAGNOSIS — T45.1X5A CHEMOTHERAPY-INDUCED NEUROPATHY: ICD-10-CM

## 2025-06-30 DIAGNOSIS — G62.0 CHEMOTHERAPY-INDUCED NEUROPATHY: ICD-10-CM

## 2025-06-30 DIAGNOSIS — C20 RECTAL CANCER METASTASIZED TO LIVER: Primary | ICD-10-CM

## 2025-06-30 DIAGNOSIS — C78.7 RECTAL CANCER METASTASIZED TO LIVER: Primary | ICD-10-CM

## 2025-06-30 LAB
ALBUMIN SERPL-MCNC: 2.6 G/DL (ref 3.5–5.2)
ALBUMIN/GLOB SERPL: 0.8 G/DL
ALP SERPL-CCNC: 127 U/L (ref 39–117)
ALT SERPL W P-5'-P-CCNC: 28 U/L (ref 1–41)
ANION GAP SERPL CALCULATED.3IONS-SCNC: 9.4 MMOL/L (ref 5–15)
AST SERPL-CCNC: 26 U/L (ref 1–40)
BASOPHILS # BLD AUTO: 0.04 10*3/MM3 (ref 0–0.2)
BASOPHILS NFR BLD AUTO: 0.4 % (ref 0–1.5)
BILIRUB SERPL-MCNC: 0.3 MG/DL (ref 0–1.2)
BUN SERPL-MCNC: 11 MG/DL (ref 6–20)
BUN/CREAT SERPL: 14.1 (ref 7–25)
CALCIUM SPEC-SCNC: 7.9 MG/DL (ref 8.6–10.5)
CEA SERPL-MCNC: 13.9 NG/ML
CHLORIDE SERPL-SCNC: 106 MMOL/L (ref 98–107)
CO2 SERPL-SCNC: 22.6 MMOL/L (ref 22–29)
CREAT SERPL-MCNC: 0.78 MG/DL (ref 0.76–1.27)
DEPRECATED RDW RBC AUTO: 62.6 FL (ref 37–54)
EGFRCR SERPLBLD CKD-EPI 2021: 105.3 ML/MIN/1.73
EOSINOPHIL # BLD AUTO: 0.15 10*3/MM3 (ref 0–0.4)
EOSINOPHIL NFR BLD AUTO: 1.3 % (ref 0.3–6.2)
ERYTHROCYTE [DISTWIDTH] IN BLOOD BY AUTOMATED COUNT: 19.9 % (ref 12.3–15.4)
GLOBULIN UR ELPH-MCNC: 3.1 GM/DL
GLUCOSE SERPL-MCNC: 80 MG/DL (ref 65–99)
HCT VFR BLD AUTO: 38.4 % (ref 37.5–51)
HGB BLD-MCNC: 12.5 G/DL (ref 13–17.7)
IMM GRANULOCYTES # BLD AUTO: 0.05 10*3/MM3 (ref 0–0.05)
IMM GRANULOCYTES NFR BLD AUTO: 0.4 % (ref 0–0.5)
LYMPHOCYTES # BLD AUTO: 2.22 10*3/MM3 (ref 0.7–3.1)
LYMPHOCYTES NFR BLD AUTO: 19.7 % (ref 19.6–45.3)
MAGNESIUM SERPL-MCNC: 1.9 MG/DL (ref 1.6–2.6)
MCH RBC QN AUTO: 28.3 PG (ref 26.6–33)
MCHC RBC AUTO-ENTMCNC: 32.6 G/DL (ref 31.5–35.7)
MCV RBC AUTO: 87.1 FL (ref 79–97)
MONOCYTES # BLD AUTO: 1.13 10*3/MM3 (ref 0.1–0.9)
MONOCYTES NFR BLD AUTO: 10 % (ref 5–12)
NEUTROPHILS NFR BLD AUTO: 68.2 % (ref 42.7–76)
NEUTROPHILS NFR BLD AUTO: 7.67 10*3/MM3 (ref 1.7–7)
NRBC BLD AUTO-RTO: 0 /100 WBC (ref 0–0.2)
PLATELET # BLD AUTO: 255 10*3/MM3 (ref 140–450)
PMV BLD AUTO: 11.2 FL (ref 6–12)
POTASSIUM SERPL-SCNC: 3.3 MMOL/L (ref 3.5–5.2)
PROT SERPL-MCNC: 5.7 G/DL (ref 6–8.5)
RBC # BLD AUTO: 4.41 10*6/MM3 (ref 4.14–5.8)
SODIUM SERPL-SCNC: 138 MMOL/L (ref 136–145)
WBC NRBC COR # BLD AUTO: 11.26 10*3/MM3 (ref 3.4–10.8)

## 2025-06-30 PROCEDURE — 25010000002 IRINOTECAN PER 20 MG: Performed by: INTERNAL MEDICINE

## 2025-06-30 PROCEDURE — 96417 CHEMO IV INFUS EACH ADDL SEQ: CPT

## 2025-06-30 PROCEDURE — 25010000002 DEXAMETHASONE SODIUM PHOSPHATE 20 MG/5ML SOLUTION: Performed by: INTERNAL MEDICINE

## 2025-06-30 PROCEDURE — 25010000002 LEUCOVORIN 200 MG RECONSTITUTED SOLUTION 1 EACH VIAL: Performed by: INTERNAL MEDICINE

## 2025-06-30 PROCEDURE — 25010000002 PANITUMUMAB PER 10 MG: Performed by: INTERNAL MEDICINE

## 2025-06-30 PROCEDURE — 82378 CARCINOEMBRYONIC ANTIGEN: CPT | Performed by: INTERNAL MEDICINE

## 2025-06-30 PROCEDURE — 25810000003 SODIUM CHLORIDE 0.9 % SOLUTION 250 ML FLEX CONT: Performed by: INTERNAL MEDICINE

## 2025-06-30 PROCEDURE — 83735 ASSAY OF MAGNESIUM: CPT | Performed by: INTERNAL MEDICINE

## 2025-06-30 PROCEDURE — 80053 COMPREHEN METABOLIC PANEL: CPT | Performed by: INTERNAL MEDICINE

## 2025-06-30 PROCEDURE — 25010000002 PANITUMUMAB 400 MG/20ML SOLUTION 20 ML VIAL: Performed by: INTERNAL MEDICINE

## 2025-06-30 PROCEDURE — 96368 THER/DIAG CONCURRENT INF: CPT

## 2025-06-30 PROCEDURE — 25010000002 FLUOROURACIL PER 500 MG: Performed by: INTERNAL MEDICINE

## 2025-06-30 PROCEDURE — 25010000002 PALONOSETRON 0.25 MG/5ML SOLUTION PREFILLED SYRINGE: Performed by: INTERNAL MEDICINE

## 2025-06-30 PROCEDURE — 96415 CHEMO IV INFUSION ADDL HR: CPT

## 2025-06-30 PROCEDURE — 25010000003 DEXTROSE 5 % SOLUTION 250 ML FLEX CONT: Performed by: INTERNAL MEDICINE

## 2025-06-30 PROCEDURE — 85025 COMPLETE CBC W/AUTO DIFF WBC: CPT | Performed by: INTERNAL MEDICINE

## 2025-06-30 PROCEDURE — 96413 CHEMO IV INFUSION 1 HR: CPT

## 2025-06-30 PROCEDURE — 25010000003 DEXTROSE 5 % SOLUTION 500 ML FLEX CONT: Performed by: INTERNAL MEDICINE

## 2025-06-30 PROCEDURE — 25010000002 LEUCOVORIN CALCIUM PER 50MG: Performed by: INTERNAL MEDICINE

## 2025-06-30 PROCEDURE — G0498 CHEMO EXTEND IV INFUS W/PUMP: HCPCS

## 2025-06-30 PROCEDURE — 96375 TX/PRO/DX INJ NEW DRUG ADDON: CPT

## 2025-06-30 RX ORDER — PALONOSETRON 0.05 MG/ML
0.25 INJECTION, SOLUTION INTRAVENOUS ONCE
Status: COMPLETED | OUTPATIENT
Start: 2025-06-30 | End: 2025-06-30

## 2025-06-30 RX ORDER — SODIUM CHLORIDE 9 MG/ML
20 INJECTION, SOLUTION INTRAVENOUS ONCE
Status: DISCONTINUED | OUTPATIENT
Start: 2025-06-30 | End: 2025-07-01 | Stop reason: HOSPADM

## 2025-06-30 RX ORDER — SODIUM CHLORIDE 9 MG/ML
20 INJECTION, SOLUTION INTRAVENOUS ONCE
Status: CANCELLED | OUTPATIENT
Start: 2025-06-30

## 2025-06-30 RX ORDER — PROCHLORPERAZINE EDISYLATE 5 MG/ML
5 INJECTION INTRAMUSCULAR; INTRAVENOUS EVERY 6 HOURS PRN
Status: CANCELLED
Start: 2025-06-30

## 2025-06-30 RX ORDER — PROCHLORPERAZINE EDISYLATE 5 MG/ML
5 INJECTION INTRAMUSCULAR; INTRAVENOUS EVERY 6 HOURS PRN
Status: DISCONTINUED | OUTPATIENT
Start: 2025-06-30 | End: 2025-07-01 | Stop reason: HOSPADM

## 2025-06-30 RX ORDER — HYDROCORTISONE SODIUM SUCCINATE 100 MG/2ML
100 INJECTION INTRAMUSCULAR; INTRAVENOUS AS NEEDED
Status: CANCELLED | OUTPATIENT
Start: 2025-06-30

## 2025-06-30 RX ORDER — ATROPINE SULFATE 1 MG/ML
0.25 INJECTION, SOLUTION INTRAMUSCULAR; INTRAVENOUS; SUBCUTANEOUS
Status: CANCELLED | OUTPATIENT
Start: 2025-06-30

## 2025-06-30 RX ORDER — PALONOSETRON 0.05 MG/ML
0.25 INJECTION, SOLUTION INTRAVENOUS ONCE
Status: CANCELLED | OUTPATIENT
Start: 2025-06-30

## 2025-06-30 RX ORDER — DIPHENHYDRAMINE HYDROCHLORIDE 50 MG/ML
50 INJECTION, SOLUTION INTRAMUSCULAR; INTRAVENOUS AS NEEDED
Status: CANCELLED | OUTPATIENT
Start: 2025-06-30

## 2025-06-30 RX ORDER — DULOXETIN HYDROCHLORIDE 20 MG/1
20 CAPSULE, DELAYED RELEASE ORAL DAILY
Qty: 30 CAPSULE | Refills: 0 | Status: SHIPPED | OUTPATIENT
Start: 2025-06-30

## 2025-06-30 RX ORDER — LIDOCAINE AND PRILOCAINE 25; 25 MG/G; MG/G
1 CREAM TOPICAL AS NEEDED
Qty: 30 G | Refills: 3 | Status: SHIPPED | OUTPATIENT
Start: 2025-06-30

## 2025-06-30 RX ORDER — POTASSIUM CHLORIDE 1500 MG/1
20 TABLET, EXTENDED RELEASE ORAL DAILY
Qty: 30 TABLET | OUTPATIENT
Start: 2025-06-30

## 2025-06-30 RX ORDER — FAMOTIDINE 10 MG/ML
20 INJECTION, SOLUTION INTRAVENOUS AS NEEDED
Status: CANCELLED | OUTPATIENT
Start: 2025-06-30

## 2025-06-30 RX ADMIN — PANITUMUMAB 530 MG: 400 SOLUTION INTRAVENOUS at 11:17

## 2025-06-30 RX ADMIN — SODIUM CHLORIDE 4970 MG: 9 INJECTION, SOLUTION INTRAVENOUS at 13:37

## 2025-06-30 RX ADMIN — LEUCOVORIN CALCIUM 830 MG: 500 INJECTION, POWDER, LYOPHILIZED, FOR SOLUTION INTRAMUSCULAR; INTRAVENOUS at 12:04

## 2025-06-30 RX ADMIN — PALONOSETRON 0.25 MG: 0.25 INJECTION, SOLUTION INTRAVENOUS at 11:04

## 2025-06-30 RX ADMIN — DEXAMETHASONE SODIUM PHOSPHATE 12 MG: 4 INJECTION, SOLUTION INTRA-ARTICULAR; INTRALESIONAL; INTRAMUSCULAR; INTRAVENOUS; SOFT TISSUE at 11:04

## 2025-06-30 RX ADMIN — IRINOTECAN HYDROCHLORIDE 375 MG: 20 INJECTION, SOLUTION INTRAVENOUS at 12:04

## 2025-06-30 RX ADMIN — ATROPINE SULFATE 0.25 MG: 0.4 INJECTION, SOLUTION INTRAVENOUS at 11:57

## 2025-06-30 NOTE — PROGRESS NOTES
Hematology and Oncology [place]  Office number [phone]    Fax number [phone]     Follow up     Date: 25    Patient Name: Edward Powell  MRN: [id number]  : 1970    Referring Physician: Dr. Gautam    Chief Complaint: Rectal cancer with liver and lung metastases    Cancer Staging:  IV    History of Present Illness: Edward Powell is a pleasant 54 y.o. male who presents today for evaluation of rectal cancer.    Patient has a longstanding history of intermittent diarrhea since his cholecystectomy in 2019.  However he developed progressive diarrhea with associated loss of bowel control and urgency as well as weight loss and fatigue prompting additional workup.    CT of the abdomen pelvis 2023 showed an irregular circumferential wall thickening involving the rectum concerning for mass lesion.  There was associated mesorectal lymphadenopathy.  Masslike consolidation of left lower lobe.  CT of the chest showed a cavitary lesion in the left lower lobe up to 4.8 cm with an adjacent cavitary nodule up to 2 cm and a 5 mm nodule on the right minor fissure.      He underwent colonoscopy 2023 with findings of an infiltrative and ulcerated partially obstructing mass in the proximal rectum spanning 10 cm.  Rectal polyps.  Biopsy of the rectal mass showed invasive moderately differentiated adenocarcinoma.  Additional biopsies showed tubular adenomas.  MSI testing was intact/low probability of MSI high.    PDL1 negative    PET/CT 2023 showed hypermetabolic rectal wall thickening compatible with known rectal malignancy.  Multiple small ill-defined hypoechoic hyper metabolic liver lesions compatible with metastatic disease.  Mildly enlarged and mildly hypermetabolic pelvic sidewall and internal iliac lymph node chain adenopathy.  Hypermetabolic lung mass with adjacent nodule.     He underwent liver biopsy and lung biopsy 2024.  Results of both confirmed metastatic  colorectal cancer.    The patient has been experiencing substantial rectal pain.  He is taking oxycodone every 6 hours with partial relief.  He reports ongoing bowel movements.  No abdominal pain.  No vomiting.  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    Treatment history:  FOLFOX cycle 1-4  FOLFOX panitumumab cycle 5 onward  -Oxaliplatin terminated early for allergic reaction after 4/29/24    FOLFIRI/Panitumumab: 2/3/25 to present    Interval history:  Here for consideration of next cycle of chemo.   Has episodes where he gets dizziness. He will sometimes stagger into a wall/bump into door casing on return. Decreasing cymbalta form 60 to 30 helped a little, but symptom hasn't resolved. Wants to wean off.  Fragile skin  Hand foot syndrome is stable.       Past Medical History:   Past Medical History:   Diagnosis Date    Arthritis     Cancer     rectal cancer - diagnosed 2023    Cholelithiasis 2019    Removed    COPD (chronic obstructive pulmonary disease)     Coronary artery disease 2009    Stent - no cardiologist currently    Elevated cholesterol     GERD (gastroesophageal reflux disease)     Hernia 2003    Lower hernia    Perforated ulcer 2019    Sleep apnea     history of; when weighed over 400lbs - no issues following bariatric surgery       Past Surgical History:   Past Surgical History:   Procedure Laterality Date    APPENDECTOMY  1983    Removed    BARIATRIC SURGERY  2011    Gastric bypass    BLADDER TUMOR/ULCER BLEEDER CAUTERIZATION      CARDIAC CATHETERIZATION  2009    stent placed    CHOLECYSTECTOMY  2019    Removed    COLONOSCOPY N/A 12/07/2023    Procedure: COLONOSCOPY WITH HOT SNARE POLYPECTOMY AND TATTOO;  Surgeon: Noman Gautam MD;  Location: [facility]  ENDOSCOPY;  Service: Gastroenterology;  Laterality: N/A;    PORTACATH PLACEMENT N/A 1/5/2024    Procedure: INSERTION OF PORTACATH WITH ULTRSOUND AND FLUOROSCOPIC GUIDANCE;  Surgeon: Ida Black MD;  Location: [facility];  Service: General;  Laterality: N/A;    JENNIFER-EN-Y         Family History: No family history on file.  Uncle had colon cancer    Social History:   Social History     Socioeconomic History    Marital status:    Tobacco Use    Smoking status: Every Day     Current packs/day: 1.00     Average packs/day: 0.9 packs/day for 60.8 years (53.3 ttl pk-yrs)     Types: Cigarettes     Start date: 9/6/1994    Smokeless tobacco: Never    Tobacco comments:     35 years      pt reports closer to 2 packs per day since cancer diagnosis   Vaping Use    Vaping status: Never Used   Substance and Sexual Activity    Alcohol use: Never    Drug use: Never    Sexual activity: Defer       Medications:     Current Outpatient Medications:     albuterol sulfate  (90 Base) MCG/ACT inhaler, Inhale 2 puffs Every 4 (Four) Hours As Needed for Wheezing., Disp: 18 g, Rfl: 3    buPROPion XL (WELLBUTRIN XL) 150 MG 24 hr tablet, Take 1 tablet by mouth Daily., Disp: 90 tablet, Rfl: 1    Calcium Carbonate-Vitamin D 500-5 MG-MCG tablet, Take 1 tablet by mouth 2 (Two) Times a Day., Disp: 60 tablet, Rfl: 3    cetirizine (zyrTEC) 10 MG tablet, Take 1 tablet by mouth Daily., Disp: 30 tablet, Rfl: 2    clindamycin (Clindagel) 1 % gel, Apply 1 Application topically to the appropriate area as directed 2 (Two) Times a Day. Use first, Disp: 30 g, Rfl: 1    Diclofenac Sodium (Voltaren) 1 % gel gel, Apply 4 g topically to the appropriate area as directed 2 (Two) Times a Day., Disp: 150 g, Rfl: 1    diphenoxylate-atropine (LOMOTIL) 2.5-0.025 MG per tablet, TAKE 1 TABLET BY MOUTH EVERY 6 HOURS AS NEEDED FOR DIARRHEA, Disp: 30 tablet, Rfl: 2    docusate sodium (COLACE) 100 MG capsule, Take 1 capsule by mouth 2 (Two) Times a  Day., Disp: , Rfl:     doxycycline (VIBRAMYICN) 100 MG tablet, Take 1 tablet by mouth 2 (Two) Times a Day. For 7 days, then 1 tablet daily indefinitely, Disp: 67 tablet, Rfl: 3    DULoxetine (CYMBALTA) 30 MG capsule, Take 1 capsule by mouth Daily., Disp: 30 capsule, Rfl: 0    famotidine (PEPCID) 20 MG tablet, Take 1 tablet by mouth 2 (Two) Times a Day., Disp: 60 tablet, Rfl: 4    fluticasone (FLONASE) 50 MCG/ACT nasal spray, Administer 2 sprays into the nostril(s) as directed by provider Daily. Administer 2 sprays in each nostril for each dose., Disp: 16 g, Rfl: 4    furosemide (Lasix) 20 MG tablet, Take 1 tablet by mouth Daily., Disp: 30 tablet, Rfl: 3    hydrocortisone 2.5 % ointment, Apply 1 Application topically to the appropriate area as directed 2 (Two) Times a Day., Disp: 60 g, Rfl: 1    Hydrocortisone, Perianal, (ANUSOL-HC) 2.5 % rectal cream, Insert  into the rectum 2 (Two) Times a Day. Indications: Inflamed Hemorrhoids, Disp: 30 g, Rfl: 1    KETOPROFEN-LIDO-GABAPENTIN EX, APPLY 1-2 GRAMS TO AFFECTED AREAS 3-4 TIMES DAILY, Disp: , Rfl:     lidocaine-prilocaine (EMLA) 2.5-2.5 % cream, Apply 1 Application topically to the appropriate area as directed As Needed (45-60 minutes prior to port access.  Cover with saran/plastic wrap.)., Disp: 30 g, Rfl: 3    LORazepam (ATIVAN) 0.5 MG tablet, Take 1 tablet by mouth Take As Directed. 1 tabelt by mouth 30 minutes prior to MRI, take a second tablet at the time of the MRI if needed., Disp: 2 tablet, Rfl: 0    Magic Mouthwash Oral Suspension (diphenhydrAMINE HCl - aluminum & magnesium hydroxide-simethicone - lidocaine - nystatin), Swish and Spit 10 mL by mouth every 6 (Six) Hours For 7 Days., Disp: 300 mL, Rfl: 3    magnesium oxide (MAG-OX) 400 MG tablet, Take 1 tablet by mouth Daily., Disp: 30 tablet, Rfl: 5    Magnesium Oxide -Mg Supplement 400 (240 Mg) MG tablet, 1 tablet Daily., Disp: , Rfl:     mineral oil-hydrophilic petrolatum (AQUAPHOR) ointment, Apply 1  Application topically to the appropriate area as directed As Needed for Dry Skin., Disp: 198 g, Rfl: 0    montelukast (SINGULAIR) 10 MG tablet, Take 1 tablet by mouth Every Night., Disp: 90 tablet, Rfl: 3    Morphine (MS CONTIN) 30 MG 12 hr tablet, Take 1 tablet by mouth 2 (Two) Times a Day for 30 days., Disp: 60 tablet, Rfl: 0    Movantik 25 MG tablet, , Disp: , Rfl:     mupirocin (BACTROBAN) 2 % cream, , Disp: , Rfl:     naloxone (NARCAN) 4 MG/0.1ML nasal spray, 1 spray into the nostril(s) as directed by provider As Needed for Opioid Reversal., Disp: 1 each, Rfl: 0    nystatin susp + lidocaine viscous (MAGIC MOUTHWASH) oral suspension, 5-10 ml swish and spit or swallow QID prn, Disp: 240 mL, Rfl: 3    ondansetron (ZOFRAN) 8 MG tablet, Take 1 tablet by mouth 3 (Three) Times a Day As Needed for Nausea or Vomiting., Disp: 30 tablet, Rfl: 5    oxyCODONE (ROXICODONE) 15 MG immediate release tablet, Take 1 tablet by mouth 5 (Five) Times a Day As Needed for Moderate Pain or Severe Pain for up to 30 days., Disp: 150 tablet, Rfl: 0    pantoprazole (Protonix) 40 MG EC tablet, Take 1 tablet by mouth Daily. Indications: Gastroesophageal Reflux Disease, Disp: 90 tablet, Rfl: 2    potassium chloride ER (K-TAB) 20 MEQ tablet controlled-release ER tablet, Take 2 tablets by mouth Daily., Disp: 60 tablet, Rfl: 3    pregabalin (Lyrica) 300 MG capsule, Take 1 capsule by mouth 2 (Two) Times a Day for 30 days., Disp: 60 capsule, Rfl: 0    promethazine-dextromethorphan (PROMETHAZINE-DM) 6.25-15 MG/5ML syrup, Take 5 mL by mouth 3 times a day., Disp: 240 mL, Rfl: 0    tiotropium bromide-olodaterol (Stiolto Respimat) 2.5-2.5 MCG/ACT aerosol solution inhaler, INHALE 2 INHALATION(S) BY MOUTH DAILY, Disp: 4 g, Rfl: 5    traZODone (DESYREL) 150 MG tablet, Take 1 tablet by mouth Every Night., Disp: 30 tablet, Rfl: 3    triamcinolone (KENALOG) 0.025 % ointment, Apply 1 Application topically to the appropriate area as directed 2 (Two) Times a  "Day. Use second if topical treatment #1 ineffective, Disp: 80 g, Rfl: 0    Allergies:   Allergies   Allergen Reactions    Bactrim [Sulfamethoxazole-Trimethoprim] Hives     and blisters    Sulfa Antibiotics Hives     and blisters       Objective     Vital Signs:   Vitals:    06/30/25 0901   BP: 108/65   Pulse: 79   Resp: 16   Temp: 97.1 °F (36.2 °C)   TempSrc: Infrared   SpO2: 96%   Weight: 86.6 kg (191 lb)   Height: 177.8 cm (70\")   PainSc: 0-No pain          Body mass index is 27.41 kg/m².   Pain Score    06/30/25 0901   PainSc: 0-No pain       ECOG Performance Status: 1 - Symptomatic but completely ambulatory    Physical Exam: General: No acute distress. Well appearing.  HEENT: Normocephalic, atraumatic. Sclera anicteric.   Neck: supple, no adenopathy.   Cardiovascular: regular rate and rhythm. No murmurs.   Respiratory: Normal rate. Clear to auscultation bilaterally.  Abdomen: Soft, nontender, non distended with normoactive bowel sounds.  Lymph: no cervical, supraclavicular or axillary adenopathy.  Neuro: Alert and oriented x 3. No focal deficits.   Ext: improved edema 1+ bilateral      Laboratory/Imaging Reviewed:   No visits with results within 2 Week(s) from this visit.   Latest known visit with results is:   Hospital Outpatient Visit on 06/16/2025   Component Date Value Ref Range Status    Glucose 06/16/2025 92  65 - 99 mg/dL Final    BUN 06/16/2025 10.0  6.0 - 20.0 mg/dL Final    Creatinine 06/16/2025 0.83  0.76 - 1.27 mg/dL Final    Sodium 06/16/2025 137  136 - 145 mmol/L Final    Potassium 06/16/2025 3.2 (L)  3.5 - 5.2 mmol/L Final    Chloride 06/16/2025 104  98 - 107 mmol/L Final    CO2 06/16/2025 24.7  22.0 - 29.0 mmol/L Final    Calcium 06/16/2025 7.6 (L)  8.6 - 10.5 mg/dL Final    Total Protein 06/16/2025 5.2 (L)  6.0 - 8.5 g/dL Final    Albumin 06/16/2025 2.3 (L)  3.5 - 5.2 g/dL Final    ALT (SGPT) 06/16/2025 23  1 - 41 U/L Final    AST (SGOT) 06/16/2025 19  1 - 40 U/L Final    Alkaline Phosphatase " 06/16/2025 124 (H)  39 - 117 U/L Final    Total Bilirubin 06/16/2025 0.3  0.0 - 1.2 mg/dL Final    Globulin 06/16/2025 2.9  gm/dL Final    A/G Ratio 06/16/2025 0.8  g/dL Final    BUN/Creatinine Ratio 06/16/2025 12.0  7.0 - 25.0 Final    Anion Gap 06/16/2025 8.3  5.0 - 15.0 mmol/L Final    eGFR 06/16/2025 103.4  >60.0 mL/min/1.73 Final    Magnesium 06/16/2025 1.7  1.6 - 2.6 mg/dL Final    WBC 06/16/2025 7.98  3.40 - 10.80 10*3/mm3 Final    RBC 06/16/2025 4.07 (L)  4.14 - 5.80 10*6/mm3 Final    Hemoglobin 06/16/2025 11.4 (L)  13.0 - 17.7 g/dL Final    Hematocrit 06/16/2025 35.4 (L)  37.5 - 51.0 % Final    MCV 06/16/2025 87.0  79.0 - 97.0 fL Final    MCH 06/16/2025 28.0  26.6 - 33.0 pg Final    MCHC 06/16/2025 32.2  31.5 - 35.7 g/dL Final    RDW 06/16/2025 20.3 (H)  12.3 - 15.4 % Final    RDW-SD 06/16/2025 64.6 (H)  37.0 - 54.0 fl Final    MPV 06/16/2025 11.6  6.0 - 12.0 fL Final    Platelets 06/16/2025 218  140 - 450 10*3/mm3 Final    Neutrophil % 06/16/2025 68.1  42.7 - 76.0 % Final    Lymphocyte % 06/16/2025 21.7  19.6 - 45.3 % Final    Monocyte % 06/16/2025 8.0  5.0 - 12.0 % Final    Eosinophil % 06/16/2025 1.6  0.3 - 6.2 % Final    Basophil % 06/16/2025 0.3  0.0 - 1.5 % Final    Immature Grans % 06/16/2025 0.3  0.0 - 0.5 % Final    Neutrophils, Absolute 06/16/2025 5.44  1.70 - 7.00 10*3/mm3 Final    Lymphocytes, Absolute 06/16/2025 1.73  0.70 - 3.10 10*3/mm3 Final    Monocytes, Absolute 06/16/2025 0.64  0.10 - 0.90 10*3/mm3 Final    Eosinophils, Absolute 06/16/2025 0.13  0.00 - 0.40 10*3/mm3 Final    Basophils, Absolute 06/16/2025 0.02  0.00 - 0.20 10*3/mm3 Final    Immature Grans, Absolute 06/16/2025 0.02  0.00 - 0.05 10*3/mm3 Final    nRBC 06/16/2025 0.0  0.0 - 0.2 /100 WBC Final     Tempus xt: APC gene TP53; Negative ADRIANNA, BRAF, NRAS, TMB stable. PDL1 negative  No results found.      Procedures    Assessment / Plan      Assessment/Plan:     1.  Rectal cancer   2.  Liver metastases  3.  Lung metastasis  4.  Chemo  monitoring  5.  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  -He was found to have biopsy-proven metastasis in the liver and lung.  His case was presented at multidisciplinary tumor board.  -Because of metastatic disease to both the lung and liver I do not think he will be downstage to resectable disease.  -Tempus results which are notable for an APC mutation, T p53 mutation, negative for KRAS, BRAF, NRAS, tumor mutation burden stable.  Notch 1 VUS.  -CBC adequate and CMP pending for treatment today with maintenance 5-FU and panitumumab 9/16/24  Chemotherapy orders and premedications signed9/16/24  -We reviewed his imaging which is consistent with excellent disease control in early July with normalization of CEA. He has had persistent rectal pain with escalating oxycodone requirements over the past couple of months, but no severe recent increase in pain, fever. WBC normal. 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 I reviewed his EUA notes. Increase doxy to BID. No surgical intervention.  -Consolidative radiotherapy to the rectal tumor is not something he wants to pursue initially but has now undergone consultation.  Maintenance 5FU/panitumumab on hold since early December.  I reviewed his last 4 serial CT images which do so progressive and in his lung. Escalated to FOLFIRI panitumumab 2/2025 to present  -Labs pending for treatment. Orders signed  -CT pending in July    6.  Cancer related pain  -Now following with palliative care and pain. Well controlled on lyrica, oxycodone and MS contin.     7.  Panitumumab induced rash  -Continue doxycycline and emollients  -Stop voltaren gel, start hydrocortisone 2.5 % to palms    8. GERD  PPI, well controlled    9. Access  -Port    10. Chemo diarrhea  -Hold laxatives on day of diarrhea  -Lomotil PRN    11. Hypomagnesemia  -Mg 1.5    12. Hypokalemia  -continue potassium  supplement. Increase to 40 daily.    13. Dizziness  -Wean off cymbalta. If persists, check brain MRI    Follow Up:   2 weeks     Brenda Cronin MD  Hematology and Oncology

## 2025-07-02 ENCOUNTER — HOSPITAL ENCOUNTER (OUTPATIENT)
Facility: HOSPITAL | Age: 55
Discharge: HOME | End: 2025-07-02
Admitting: INTERNAL MEDICINE
Payer: COMMERCIAL

## 2025-07-02 DIAGNOSIS — C20 RECTAL CANCER METASTASIZED TO LIVER: ICD-10-CM

## 2025-07-02 DIAGNOSIS — C20 RECTAL ADENOCARCINOMA: Primary | ICD-10-CM

## 2025-07-02 DIAGNOSIS — C78.7 RECTAL CANCER METASTASIZED TO LIVER: ICD-10-CM

## 2025-07-02 PROCEDURE — 96523 IRRIG DRUG DELIVERY DEVICE: CPT

## 2025-07-02 PROCEDURE — 25010000002 HEPARIN LOCK FLUSH PER 10 UNITS: Performed by: INTERNAL MEDICINE

## 2025-07-02 RX ORDER — SODIUM CHLORIDE 0.9 % (FLUSH) 0.9 %
10 SYRINGE (ML) INJECTION AS NEEDED
Status: DISCONTINUED | OUTPATIENT
Start: 2025-07-02 | End: 2025-07-03 | Stop reason: HOSPADM

## 2025-07-02 RX ORDER — HEPARIN SODIUM (PORCINE) LOCK FLUSH IV SOLN 100 UNIT/ML 100 UNIT/ML
500 SOLUTION INTRAVENOUS AS NEEDED
OUTPATIENT
Start: 2025-07-02

## 2025-07-02 RX ORDER — HEPARIN SODIUM (PORCINE) LOCK FLUSH IV SOLN 100 UNIT/ML 100 UNIT/ML
500 SOLUTION INTRAVENOUS AS NEEDED
Status: DISCONTINUED | OUTPATIENT
Start: 2025-07-02 | End: 2025-07-03 | Stop reason: HOSPADM

## 2025-07-02 RX ORDER — SODIUM CHLORIDE 0.9 % (FLUSH) 0.9 %
10 SYRINGE (ML) INJECTION AS NEEDED
OUTPATIENT
Start: 2025-07-02

## 2025-07-02 RX ADMIN — Medication 500 UNITS: at 13:34

## 2025-07-02 RX ADMIN — Medication 10 ML: at 13:34

## 2025-07-08 DIAGNOSIS — T45.1X5A CHEMOTHERAPY-INDUCED NEUROPATHY: ICD-10-CM

## 2025-07-08 DIAGNOSIS — G89.3 CANCER RELATED PAIN: ICD-10-CM

## 2025-07-08 DIAGNOSIS — G62.0 CHEMOTHERAPY-INDUCED NEUROPATHY: ICD-10-CM

## 2025-07-08 NOTE — TELEPHONE ENCOUNTER
Caller: Edward Powell    Relationship: Self    Best call back number:     [phone]       Requested Prescriptions:   Requested Prescriptions     Pending Prescriptions Disp Refills    pregabalin (Lyrica) 300 MG capsule 60 capsule 0     Sig: Take 1 capsule by mouth 2 (Two) Times a Day for 30 days.    Morphine (MS CONTIN) 30 MG 12 hr tablet 60 tablet 0     Sig: Take 1 tablet by mouth 2 (Two) Times a Day for 30 days.    oxyCODONE (ROXICODONE) 15 MG immediate release tablet 150 tablet 0     Sig: Take 1 tablet by mouth 5 (Five) Times a Day As Needed for Moderate Pain or Severe Pain for up to 30 days.        Pharmacy where request should be sent: [facility] [id number] - [place] [address] AT [facility] [phone] [facility] [phone] FX     Last office visit with prescribing clinician: 6/30/2025   Last telemedicine visit with prescribing clinician: Visit date not found   Next office visit with prescribing clinician: 7/28/2025     Additional details provided by patient:     Does the patient have less than a 3 day supply:  [x] Yes  [] No    Would you like a call back once the refill request has been completed: [] Yes [x] No    If the office needs to give you a call back, can they leave a voicemail: [] Yes [x] No

## 2025-07-09 NOTE — TELEPHONE ENCOUNTER
RACIEL #: [id number]    Medication requested: Morphine (MS CONTIN) 30 MG 12 hr tablet     Last fill date: 6/9/25    Medication requested: oxyCODONE (ROXICODONE) 15 MG immediate release tablet      Last fill date: 6/9/25    Medication requested: pregabalin (Lyrica) 300 MG capsule      Last fill date: 6/6/25      Last appointment: 3/28/25    Next appointment:

## 2025-07-10 RX ORDER — MORPHINE SULFATE 30 MG/1
30 TABLET, FILM COATED, EXTENDED RELEASE ORAL 2 TIMES DAILY
Qty: 60 TABLET | Refills: 0 | Status: SHIPPED | OUTPATIENT
Start: 2025-07-10 | End: 2025-08-09

## 2025-07-10 RX ORDER — PREGABALIN 300 MG/1
300 CAPSULE ORAL 2 TIMES DAILY
Qty: 60 CAPSULE | Refills: 0 | Status: SHIPPED | OUTPATIENT
Start: 2025-07-10 | End: 2025-08-09

## 2025-07-10 RX ORDER — OXYCODONE HYDROCHLORIDE 15 MG/1
15 TABLET ORAL
Qty: 150 TABLET | Refills: 0 | Status: SHIPPED | OUTPATIENT
Start: 2025-07-10 | End: 2025-08-09

## 2025-07-14 ENCOUNTER — OFFICE VISIT (OUTPATIENT)
Dept: ONCOLOGY | Facility: CLINIC | Age: 55
End: 2025-07-14
Payer: COMMERCIAL

## 2025-07-14 ENCOUNTER — HOSPITAL ENCOUNTER (OUTPATIENT)
Facility: HOSPITAL | Age: 55
Discharge: HOME | End: 2025-07-14
Admitting: NURSE PRACTITIONER
Payer: COMMERCIAL

## 2025-07-14 VITALS
OXYGEN SATURATION: 93 % | HEIGHT: 70 IN | RESPIRATION RATE: 16 BRPM | DIASTOLIC BLOOD PRESSURE: 60 MMHG | SYSTOLIC BLOOD PRESSURE: 101 MMHG | TEMPERATURE: 97.3 F | WEIGHT: 191 LBS | BODY MASS INDEX: 27.35 KG/M2 | HEART RATE: 98 BPM

## 2025-07-14 DIAGNOSIS — C20 RECTAL CANCER METASTASIZED TO LIVER: Primary | ICD-10-CM

## 2025-07-14 DIAGNOSIS — C78.7 RECTAL CANCER METASTASIZED TO LIVER: Primary | ICD-10-CM

## 2025-07-14 LAB
ALBUMIN SERPL-MCNC: 2.6 G/DL (ref 3.5–5.2)
ALBUMIN/GLOB SERPL: 0.9 G/DL
ALP SERPL-CCNC: 127 U/L (ref 39–117)
ALT SERPL W P-5'-P-CCNC: 42 U/L (ref 1–41)
ANION GAP SERPL CALCULATED.3IONS-SCNC: 9.6 MMOL/L (ref 5–15)
AST SERPL-CCNC: 33 U/L (ref 1–40)
BASOPHILS # BLD AUTO: 0.04 10*3/MM3 (ref 0–0.2)
BASOPHILS NFR BLD AUTO: 0.3 % (ref 0–1.5)
BILIRUB SERPL-MCNC: 0.3 MG/DL (ref 0–1.2)
BUN SERPL-MCNC: 11 MG/DL (ref 6–20)
BUN/CREAT SERPL: 13.3 (ref 7–25)
CALCIUM SPEC-SCNC: 7.7 MG/DL (ref 8.6–10.5)
CHLORIDE SERPL-SCNC: 104 MMOL/L (ref 98–107)
CO2 SERPL-SCNC: 23.4 MMOL/L (ref 22–29)
CREAT SERPL-MCNC: 0.83 MG/DL (ref 0.76–1.27)
DEPRECATED RDW RBC AUTO: 62.8 FL (ref 37–54)
EGFRCR SERPLBLD CKD-EPI 2021: 102.7 ML/MIN/1.73
EOSINOPHIL # BLD AUTO: 0.07 10*3/MM3 (ref 0–0.4)
EOSINOPHIL NFR BLD AUTO: 0.6 % (ref 0.3–6.2)
ERYTHROCYTE [DISTWIDTH] IN BLOOD BY AUTOMATED COUNT: 19.2 % (ref 12.3–15.4)
GLOBULIN UR ELPH-MCNC: 3 GM/DL
GLUCOSE SERPL-MCNC: 98 MG/DL (ref 65–99)
HCT VFR BLD AUTO: 41.1 % (ref 37.5–51)
HGB BLD-MCNC: 13 G/DL (ref 13–17.7)
IMM GRANULOCYTES # BLD AUTO: 0.05 10*3/MM3 (ref 0–0.05)
IMM GRANULOCYTES NFR BLD AUTO: 0.4 % (ref 0–0.5)
LYMPHOCYTES # BLD AUTO: 1.5 10*3/MM3 (ref 0.7–3.1)
LYMPHOCYTES NFR BLD AUTO: 11.8 % (ref 19.6–45.3)
MAGNESIUM SERPL-MCNC: 1.8 MG/DL (ref 1.6–2.6)
MCH RBC QN AUTO: 28.4 PG (ref 26.6–33)
MCHC RBC AUTO-ENTMCNC: 31.6 G/DL (ref 31.5–35.7)
MCV RBC AUTO: 89.7 FL (ref 79–97)
MONOCYTES # BLD AUTO: 0.88 10*3/MM3 (ref 0.1–0.9)
MONOCYTES NFR BLD AUTO: 6.9 % (ref 5–12)
NEUTROPHILS NFR BLD AUTO: 10.18 10*3/MM3 (ref 1.7–7)
NEUTROPHILS NFR BLD AUTO: 80 % (ref 42.7–76)
NRBC BLD AUTO-RTO: 0 /100 WBC (ref 0–0.2)
PLATELET # BLD AUTO: 342 10*3/MM3 (ref 140–450)
PMV BLD AUTO: 10 FL (ref 6–12)
POTASSIUM SERPL-SCNC: 3.6 MMOL/L (ref 3.5–5.2)
PROT SERPL-MCNC: 5.6 G/DL (ref 6–8.5)
RBC # BLD AUTO: 4.58 10*6/MM3 (ref 4.14–5.8)
SODIUM SERPL-SCNC: 137 MMOL/L (ref 136–145)
WBC NRBC COR # BLD AUTO: 12.72 10*3/MM3 (ref 3.4–10.8)

## 2025-07-14 PROCEDURE — 96375 TX/PRO/DX INJ NEW DRUG ADDON: CPT

## 2025-07-14 PROCEDURE — 25010000002 IRINOTECAN PER 20 MG: Performed by: NURSE PRACTITIONER

## 2025-07-14 PROCEDURE — 25010000002 PANITUMUMAB PER 10 MG: Performed by: NURSE PRACTITIONER

## 2025-07-14 PROCEDURE — 25010000002 PALONOSETRON 0.25 MG/5ML SOLUTION PREFILLED SYRINGE: Performed by: NURSE PRACTITIONER

## 2025-07-14 PROCEDURE — 83735 ASSAY OF MAGNESIUM: CPT | Performed by: NURSE PRACTITIONER

## 2025-07-14 PROCEDURE — 25010000002 LEUCOVORIN CALCIUM PER 50MG: Performed by: NURSE PRACTITIONER

## 2025-07-14 PROCEDURE — 25010000002 ALTEPLASE 2 MG RECONSTITUTED SOLUTION: Performed by: INTERNAL MEDICINE

## 2025-07-14 PROCEDURE — 25810000003 SODIUM CHLORIDE 0.9 % SOLUTION: Performed by: NURSE PRACTITIONER

## 2025-07-14 PROCEDURE — 25010000003 DEXTROSE 5 % SOLUTION 500 ML FLEX CONT: Performed by: NURSE PRACTITIONER

## 2025-07-14 PROCEDURE — 25010000002 PANITUMUMAB 400 MG/20ML SOLUTION 20 ML VIAL: Performed by: NURSE PRACTITIONER

## 2025-07-14 PROCEDURE — 96368 THER/DIAG CONCURRENT INF: CPT

## 2025-07-14 PROCEDURE — 96417 CHEMO IV INFUS EACH ADDL SEQ: CPT

## 2025-07-14 PROCEDURE — 96415 CHEMO IV INFUSION ADDL HR: CPT

## 2025-07-14 PROCEDURE — 25010000003 DEXTROSE 5 % SOLUTION 250 ML FLEX CONT: Performed by: NURSE PRACTITIONER

## 2025-07-14 PROCEDURE — 25010000002 FLUOROURACIL PER 500 MG: Performed by: NURSE PRACTITIONER

## 2025-07-14 PROCEDURE — 80053 COMPREHEN METABOLIC PANEL: CPT | Performed by: NURSE PRACTITIONER

## 2025-07-14 PROCEDURE — 99214 OFFICE O/P EST MOD 30 MIN: CPT | Performed by: NURSE PRACTITIONER

## 2025-07-14 PROCEDURE — G0498 CHEMO EXTEND IV INFUS W/PUMP: HCPCS

## 2025-07-14 PROCEDURE — 36593 DECLOT VASCULAR DEVICE: CPT

## 2025-07-14 PROCEDURE — 85025 COMPLETE CBC W/AUTO DIFF WBC: CPT | Performed by: NURSE PRACTITIONER

## 2025-07-14 PROCEDURE — 25810000003 SODIUM CHLORIDE 0.9 % SOLUTION 250 ML FLEX CONT: Performed by: NURSE PRACTITIONER

## 2025-07-14 PROCEDURE — 96413 CHEMO IV INFUSION 1 HR: CPT

## 2025-07-14 PROCEDURE — 25010000002 DEXAMETHASONE SODIUM PHOSPHATE 20 MG/5ML SOLUTION: Performed by: NURSE PRACTITIONER

## 2025-07-14 RX ORDER — SODIUM CHLORIDE 9 MG/ML
500 INJECTION, SOLUTION INTRAVENOUS CONTINUOUS
Status: CANCELLED
Start: 2025-07-14

## 2025-07-14 RX ORDER — WATER 10 ML/10ML
INJECTION INTRAMUSCULAR; INTRAVENOUS; SUBCUTANEOUS
Status: COMPLETED
Start: 2025-07-14 | End: 2025-07-14

## 2025-07-14 RX ORDER — PROCHLORPERAZINE EDISYLATE 5 MG/ML
5 INJECTION INTRAMUSCULAR; INTRAVENOUS EVERY 6 HOURS PRN
Status: DISCONTINUED | OUTPATIENT
Start: 2025-07-14 | End: 2025-07-15 | Stop reason: HOSPADM

## 2025-07-14 RX ORDER — PALONOSETRON 0.05 MG/ML
0.25 INJECTION, SOLUTION INTRAVENOUS ONCE
Status: CANCELLED | OUTPATIENT
Start: 2025-07-14

## 2025-07-14 RX ORDER — PALONOSETRON 0.05 MG/ML
0.25 INJECTION, SOLUTION INTRAVENOUS ONCE
Status: COMPLETED | OUTPATIENT
Start: 2025-07-14 | End: 2025-07-14

## 2025-07-14 RX ORDER — SODIUM CHLORIDE 9 MG/ML
500 INJECTION, SOLUTION INTRAVENOUS CONTINUOUS
Status: ACTIVE | OUTPATIENT
Start: 2025-07-14 | End: 2025-07-14

## 2025-07-14 RX ORDER — HYDROCORTISONE SODIUM SUCCINATE 100 MG/2ML
100 INJECTION INTRAMUSCULAR; INTRAVENOUS AS NEEDED
Status: DISCONTINUED | OUTPATIENT
Start: 2025-07-14 | End: 2025-07-15 | Stop reason: HOSPADM

## 2025-07-14 RX ORDER — FAMOTIDINE 10 MG/ML
20 INJECTION, SOLUTION INTRAVENOUS AS NEEDED
Status: DISCONTINUED | OUTPATIENT
Start: 2025-07-14 | End: 2025-07-15 | Stop reason: HOSPADM

## 2025-07-14 RX ORDER — ATROPINE SULFATE 1 MG/ML
0.25 INJECTION, SOLUTION INTRAMUSCULAR; INTRAVENOUS; SUBCUTANEOUS
Status: CANCELLED | OUTPATIENT
Start: 2025-07-14

## 2025-07-14 RX ORDER — DIPHENHYDRAMINE HYDROCHLORIDE 50 MG/ML
50 INJECTION, SOLUTION INTRAMUSCULAR; INTRAVENOUS AS NEEDED
Status: DISCONTINUED | OUTPATIENT
Start: 2025-07-14 | End: 2025-07-15 | Stop reason: HOSPADM

## 2025-07-14 RX ORDER — HYDROCORTISONE SODIUM SUCCINATE 100 MG/2ML
100 INJECTION INTRAMUSCULAR; INTRAVENOUS AS NEEDED
Status: CANCELLED | OUTPATIENT
Start: 2025-07-14

## 2025-07-14 RX ORDER — FAMOTIDINE 10 MG/ML
20 INJECTION, SOLUTION INTRAVENOUS AS NEEDED
Status: CANCELLED | OUTPATIENT
Start: 2025-07-14

## 2025-07-14 RX ORDER — SODIUM CHLORIDE 9 MG/ML
20 INJECTION, SOLUTION INTRAVENOUS ONCE
Status: DISCONTINUED | OUTPATIENT
Start: 2025-07-14 | End: 2025-07-15 | Stop reason: HOSPADM

## 2025-07-14 RX ORDER — DIPHENHYDRAMINE HYDROCHLORIDE 50 MG/ML
50 INJECTION, SOLUTION INTRAMUSCULAR; INTRAVENOUS AS NEEDED
Status: CANCELLED | OUTPATIENT
Start: 2025-07-14

## 2025-07-14 RX ORDER — ATROPINE SULFATE 0.4 MG/ML
0.25 INJECTION, SOLUTION INTRAMUSCULAR; INTRAVENOUS; SUBCUTANEOUS
Status: DISCONTINUED | OUTPATIENT
Start: 2025-07-14 | End: 2025-07-15 | Stop reason: HOSPADM

## 2025-07-14 RX ORDER — PROCHLORPERAZINE EDISYLATE 5 MG/ML
5 INJECTION INTRAMUSCULAR; INTRAVENOUS EVERY 6 HOURS PRN
Status: CANCELLED
Start: 2025-07-14

## 2025-07-14 RX ORDER — SODIUM CHLORIDE 9 MG/ML
20 INJECTION, SOLUTION INTRAVENOUS ONCE
Status: CANCELLED | OUTPATIENT
Start: 2025-07-14

## 2025-07-14 RX ADMIN — PANITUMUMAB 530 MG: 400 SOLUTION INTRAVENOUS at 10:58

## 2025-07-14 RX ADMIN — WATER 10 ML: 1 INJECTION INTRAMUSCULAR; INTRAVENOUS; SUBCUTANEOUS at 09:56

## 2025-07-14 RX ADMIN — FLUOROURACIL 4970 MG: 50 INJECTION, SOLUTION INTRAVENOUS at 13:35

## 2025-07-14 RX ADMIN — SODIUM CHLORIDE 500 ML: 900 INJECTION, SOLUTION INTRAVENOUS at 10:25

## 2025-07-14 RX ADMIN — DEXTROSE MONOHYDRATE 375 MG: 50 INJECTION, SOLUTION INTRAVENOUS at 11:54

## 2025-07-14 RX ADMIN — DEXAMETHASONE SODIUM PHOSPHATE 12 MG: 4 INJECTION, SOLUTION INTRA-ARTICULAR; INTRALESIONAL; INTRAMUSCULAR; INTRAVENOUS; SOFT TISSUE at 10:34

## 2025-07-14 RX ADMIN — ATROPINE SULFATE 0.25 MG: 0.4 INJECTION, SOLUTION INTRAVENOUS at 11:50

## 2025-07-14 RX ADMIN — ALTEPLASE: 2.2 INJECTION, POWDER, LYOPHILIZED, FOR SOLUTION INTRAVENOUS at 09:56

## 2025-07-14 RX ADMIN — LEUCOVORIN CALCIUM 830 MG: 10 INJECTION INTRAMUSCULAR; INTRAVENOUS at 11:54

## 2025-07-14 RX ADMIN — PALONOSETRON 0.25 MG: 0.25 INJECTION, SOLUTION INTRAVENOUS at 10:34

## 2025-07-14 NOTE — PROGRESS NOTES
Hematology and Oncology [place]  Office number [phone]    Fax number [phone]     Follow up     Date: 25    Patient Name: Edward Powell  MRN: [id number]  : 1970    Referring Physician: Dr. Gautam    Chief Complaint: Rectal cancer with liver and lung metastases    Cancer Staging:  IV    History of Present Illness: Edward Powell is a pleasant 55 y.o. male who presents today for evaluation of rectal cancer.    Patient has a longstanding history of intermittent diarrhea since his cholecystectomy in 2019.  However he developed progressive diarrhea with associated loss of bowel control and urgency as well as weight loss and fatigue prompting additional workup.    CT of the abdomen pelvis 2023 showed an irregular circumferential wall thickening involving the rectum concerning for mass lesion.  There was associated mesorectal lymphadenopathy.  Masslike consolidation of left lower lobe.  CT of the chest showed a cavitary lesion in the left lower lobe up to 4.8 cm with an adjacent cavitary nodule up to 2 cm and a 5 mm nodule on the right minor fissure.      He underwent colonoscopy 2023 with findings of an infiltrative and ulcerated partially obstructing mass in the proximal rectum spanning 10 cm.  Rectal polyps.  Biopsy of the rectal mass showed invasive moderately differentiated adenocarcinoma.  Additional biopsies showed tubular adenomas.  MSI testing was intact/low probability of MSI high.    PDL1 negative    PET/CT 2023 showed hypermetabolic rectal wall thickening compatible with known rectal malignancy.  Multiple small ill-defined hypoechoic hyper metabolic liver lesions compatible with metastatic disease.  Mildly enlarged and mildly hypermetabolic pelvic sidewall and internal iliac lymph node chain adenopathy.  Hypermetabolic lung mass with adjacent nodule.     He underwent liver biopsy and lung biopsy 2024.  Results of both confirmed metastatic  colorectal cancer.    The patient has been experiencing substantial rectal pain.  He is taking oxycodone every 6 hours with partial relief.  He reports ongoing bowel movements.  No abdominal pain.  No vomiting.  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    Treatment history:  FOLFOX cycle 1-4  FOLFOX panitumumab cycle 5 onward  -Oxaliplatin terminated early for allergic reaction after 4/29/24    FOLFIRI/Panitumumab: 2/3/25 to present    Interval history:  He is here for consideration of his next cycle of chemotherapy.  Overall been doing okay.  Episodes of dizziness helped somewhat when Cymbalta was decreased.  He continues to wean off.  Fragile skin  Hand-foot syndrome stable.      Past Medical History:   Past Medical History:   Diagnosis Date    Arthritis     Cancer     rectal cancer - diagnosed 2023    Cholelithiasis 2019    Removed    COPD (chronic obstructive pulmonary disease)     Coronary artery disease 2009    Stent - no cardiologist currently    Elevated cholesterol     GERD (gastroesophageal reflux disease)     Hernia 2003    Lower hernia    Perforated ulcer 2019    Sleep apnea     history of; when weighed over 400lbs - no issues following bariatric surgery       Past Surgical History:   Past Surgical History:   Procedure Laterality Date    APPENDECTOMY  1983    Removed    BARIATRIC SURGERY  2011    Gastric bypass    BLADDER TUMOR/ULCER BLEEDER CAUTERIZATION      CARDIAC CATHETERIZATION  2009    stent placed    CHOLECYSTECTOMY  2019    Removed    COLONOSCOPY N/A 12/07/2023    Procedure: COLONOSCOPY WITH HOT SNARE POLYPECTOMY AND TATTOO;  Surgeon: Noman Gautam MD;  Location: [facility] ENDOSCOPY;  Service: Gastroenterology;  Laterality: N/A;    PORTACATH PLACEMENT  N/A 1/5/2024    Procedure: INSERTION OF PORTACATH WITH ULTRSOUND AND FLUOROSCOPIC GUIDANCE;  Surgeon: Ida Black MD;  Location: [facility];  Service: General;  Laterality: N/A;    JENNIFER-EN-Y         Family History: No family history on file.  Uncle had colon cancer    Social History:   Social History     Socioeconomic History    Marital status:    Tobacco Use    Smoking status: Every Day     Current packs/day: 1.00     Average packs/day: 0.9 packs/day for 60.9 years (53.4 ttl pk-yrs)     Types: Cigarettes     Start date: 9/6/1994    Smokeless tobacco: Never    Tobacco comments:     35 years      pt reports closer to 2 packs per day since cancer diagnosis   Vaping Use    Vaping status: Never Used   Substance and Sexual Activity    Alcohol use: Never    Drug use: Never    Sexual activity: Defer       Medications:     Current Outpatient Medications:     albuterol sulfate  (90 Base) MCG/ACT inhaler, Inhale 2 puffs Every 4 (Four) Hours As Needed for Wheezing., Disp: 18 g, Rfl: 3    buPROPion XL (WELLBUTRIN XL) 150 MG 24 hr tablet, Take 1 tablet by mouth Daily., Disp: 90 tablet, Rfl: 1    Calcium Carbonate-Vitamin D 500-5 MG-MCG tablet, Take 1 tablet by mouth 2 (Two) Times a Day., Disp: 60 tablet, Rfl: 3    cetirizine (zyrTEC) 10 MG tablet, Take 1 tablet by mouth Daily., Disp: 30 tablet, Rfl: 2    clindamycin (Clindagel) 1 % gel, Apply 1 Application topically to the appropriate area as directed 2 (Two) Times a Day. Use first, Disp: 30 g, Rfl: 1    Diclofenac Sodium (Voltaren) 1 % gel gel, Apply 4 g topically to the appropriate area as directed 2 (Two) Times a Day., Disp: 150 g, Rfl: 1    diphenoxylate-atropine (LOMOTIL) 2.5-0.025 MG per tablet, TAKE 1 TABLET BY MOUTH EVERY 6 HOURS AS NEEDED FOR DIARRHEA, Disp: 30 tablet, Rfl: 2    docusate sodium (COLACE) 100 MG capsule, Take 1 capsule by mouth 2 (Two) Times a Day., Disp: , Rfl:     doxycycline (VIBRAMYICN) 100 MG tablet, Take 1 tablet by  mouth 2 (Two) Times a Day. For 7 days, then 1 tablet daily indefinitely, Disp: 67 tablet, Rfl: 3    DULoxetine (CYMBALTA) 20 MG capsule, Take 1 capsule by mouth Daily. (Patient taking differently: Take 1 capsule by mouth Daily. Pt weaning off medication.per pt.), Disp: 30 capsule, Rfl: 0    famotidine (PEPCID) 20 MG tablet, Take 1 tablet by mouth 2 (Two) Times a Day., Disp: 60 tablet, Rfl: 4    fluticasone (FLONASE) 50 MCG/ACT nasal spray, Administer 2 sprays into the nostril(s) as directed by provider Daily. Administer 2 sprays in each nostril for each dose., Disp: 16 g, Rfl: 4    furosemide (Lasix) 20 MG tablet, Take 1 tablet by mouth Daily., Disp: 30 tablet, Rfl: 3    hydrocortisone 2.5 % ointment, Apply 1 Application topically to the appropriate area as directed 2 (Two) Times a Day., Disp: 60 g, Rfl: 1    Hydrocortisone, Perianal, (ANUSOL-HC) 2.5 % rectal cream, Insert  into the rectum 2 (Two) Times a Day. Indications: Inflamed Hemorrhoids, Disp: 30 g, Rfl: 1    KETOPROFEN-LIDO-GABAPENTIN EX, APPLY 1-2 GRAMS TO AFFECTED AREAS 3-4 TIMES DAILY, Disp: , Rfl:     lidocaine-prilocaine (EMLA) 2.5-2.5 % cream, Apply 1 Application topically to the appropriate area as directed As Needed (45-60 minutes prior to port access.  Cover with saran/plastic wrap.)., Disp: 30 g, Rfl: 3    LORazepam (ATIVAN) 0.5 MG tablet, Take 1 tablet by mouth Take As Directed. 1 tabelt by mouth 30 minutes prior to MRI, take a second tablet at the time of the MRI if needed., Disp: 2 tablet, Rfl: 0    Magic Mouthwash Oral Suspension (diphenhydrAMINE HCl - aluminum & magnesium hydroxide-simethicone - lidocaine - nystatin), Swish and Spit 10 mL by mouth every 6 (Six) Hours For 7 Days., Disp: 300 mL, Rfl: 3    magnesium oxide (MAG-OX) 400 MG tablet, Take 1 tablet by mouth Daily., Disp: 30 tablet, Rfl: 5    Magnesium Oxide -Mg Supplement 400 (240 Mg) MG tablet, 1 tablet Daily., Disp: , Rfl:     mineral oil-hydrophilic petrolatum (AQUAPHOR) ointment,  Apply 1 Application topically to the appropriate area as directed As Needed for Dry Skin., Disp: 198 g, Rfl: 0    montelukast (SINGULAIR) 10 MG tablet, Take 1 tablet by mouth Every Night., Disp: 90 tablet, Rfl: 3    Morphine (MS CONTIN) 30 MG 12 hr tablet, Take 1 tablet by mouth 2 (Two) Times a Day for 30 days., Disp: 60 tablet, Rfl: 0    Movantik 25 MG tablet, , Disp: , Rfl:     mupirocin (BACTROBAN) 2 % cream, , Disp: , Rfl:     naloxone (NARCAN) 4 MG/0.1ML nasal spray, 1 spray into the nostril(s) as directed by provider As Needed for Opioid Reversal., Disp: 1 each, Rfl: 0    nystatin susp + lidocaine viscous (MAGIC MOUTHWASH) oral suspension, 5-10 ml swish and spit or swallow QID prn, Disp: 240 mL, Rfl: 3    ondansetron (ZOFRAN) 8 MG tablet, Take 1 tablet by mouth 3 (Three) Times a Day As Needed for Nausea or Vomiting., Disp: 30 tablet, Rfl: 5    oxyCODONE (ROXICODONE) 15 MG immediate release tablet, Take 1 tablet by mouth 5 (Five) Times a Day As Needed for Moderate Pain or Severe Pain for up to 30 days., Disp: 150 tablet, Rfl: 0    pantoprazole (Protonix) 40 MG EC tablet, Take 1 tablet by mouth Daily. Indications: Gastroesophageal Reflux Disease, Disp: 90 tablet, Rfl: 2    potassium chloride ER (K-TAB) 20 MEQ tablet controlled-release ER tablet, Take 2 tablets by mouth Daily., Disp: 60 tablet, Rfl: 3    pregabalin (Lyrica) 300 MG capsule, Take 1 capsule by mouth 2 (Two) Times a Day for 30 days., Disp: 60 capsule, Rfl: 0    promethazine-dextromethorphan (PROMETHAZINE-DM) 6.25-15 MG/5ML syrup, Take 5 mL by mouth 3 times a day., Disp: 240 mL, Rfl: 0    tiotropium bromide-olodaterol (Stiolto Respimat) 2.5-2.5 MCG/ACT aerosol solution inhaler, INHALE 2 INHALATION(S) BY MOUTH DAILY, Disp: 4 g, Rfl: 5    traZODone (DESYREL) 150 MG tablet, Take 1 tablet by mouth Every Night., Disp: 30 tablet, Rfl: 3    triamcinolone (KENALOG) 0.025 % ointment, Apply 1 Application topically to the appropriate area as directed 2 (Two)  "Times a Day. Use second if topical treatment #1 ineffective, Disp: 80 g, Rfl: 0  No current facility-administered medications for this visit.    Facility-Administered Medications Ordered in Other Visits:     Atropine Sulfate (PF) injection 0.25 mg, 0.25 mg, Intravenous, Q15 Min PRN, Hyacinth Razoica R, APRN, 0.25 mg at 07/14/25 1150    diphenhydrAMINE (BENADRYL) injection 50 mg, 50 mg, Intravenous, PRN, Tejinder Razossica R, APRN    famotidine (PEPCID) injection 20 mg, 20 mg, Intravenous, PRN, Tejinder Razossica R, APRN    fluorouracil (ADRUCIL) 4,970 mg in sodium chloride 0.9 % 230 mL chemo infusion - FOR HOME USE, 2,400 mg/m2 (Treatment Plan Recorded), Intravenous, Once, Tejinder Razossica R, APRN    Hydrocortisone Sod Suc (PF) (Solu-CORTEF) injection 100 mg, 100 mg, Intravenous, PRN, Tejinder Razossica R, APRN    irinotecan (CAMPTOSAR) 375 mg in dextrose (D5W) 5 % 500 mL chemo IVPB, 180 mg/m2 (Treatment Plan Recorded), Intravenous, Once, Law Jacy R, APRN, Last Rate: 379 mL/hr at 07/14/25 1154, 375 mg at 07/14/25 1154    leucovorin 830 mg in dextrose (D5W) 5 % 250 mL IVPB, 400 mg/m2 (Treatment Plan Recorded), Intravenous, Once, Tejinder Razossica R, APRN, Last Rate: 239 mL/hr at 07/14/25 1154, 830 mg at 07/14/25 1154    prochlorperazine (COMPAZINE) injection 5 mg, 5 mg, Intravenous, Q6H PRN, Tejinder Razossica R, APRN    sodium chloride 0.9 % infusion, 20 mL/hr, Intravenous, Once, Tejinder Razossica R, APRN    Allergies:   Allergies   Allergen Reactions    Bactrim [Sulfamethoxazole-Trimethoprim] Hives     and blisters    Sulfa Antibiotics Hives     and blisters       Objective     Vital Signs:   Vitals:    07/14/25 0835   BP: 101/60   Pulse: 98   Resp: 16   Temp: 97.3 °F (36.3 °C)   SpO2: 93%   Weight: 86.6 kg (191 lb)   Height: 177.8 cm (70\")   PainSc: 6    PainLoc: Foot  Comment: buttock            Body mass index is 27.41 kg/m².   Pain Score    07/14/25 0835   PainSc: 6    PainLoc: Foot  Comment: buttock         ECOG Performance Status: 1 - Symptomatic " but completely ambulatory    Physical Exam: General: No acute distress. Well appearing.  HEENT: Normocephalic, atraumatic. Sclera anicteric.   Neck: supple, no adenopathy.   Cardiovascular: regular rate and rhythm. No murmurs.   Respiratory: Normal rate. Clear to auscultation bilaterally.  Abdomen: Soft, nontender, non distended with normoactive bowel sounds.  Lymph: no cervical, supraclavicular or axillary adenopathy.  Neuro: Alert and oriented x 3. No focal deficits.   Ext: improved edema 1+ bilateral    Above reviewed and accurate as of 7/14/2025    Laboratory/Imaging Reviewed:   Hospital Outpatient Visit on 07/14/2025   Component Date Value Ref Range Status    Glucose 07/14/2025 98  65 - 99 mg/dL Final    BUN 07/14/2025 11.0  6.0 - 20.0 mg/dL Final    Creatinine 07/14/2025 0.83  0.76 - 1.27 mg/dL Final    Sodium 07/14/2025 137  136 - 145 mmol/L Final    Potassium 07/14/2025 3.6  3.5 - 5.2 mmol/L Final    Chloride 07/14/2025 104  98 - 107 mmol/L Final    CO2 07/14/2025 23.4  22.0 - 29.0 mmol/L Final    Calcium 07/14/2025 7.7 (L)  8.6 - 10.5 mg/dL Final    Total Protein 07/14/2025 5.6 (L)  6.0 - 8.5 g/dL Final    Albumin 07/14/2025 2.6 (L)  3.5 - 5.2 g/dL Final    ALT (SGPT) 07/14/2025 42 (H)  1 - 41 U/L Final    AST (SGOT) 07/14/2025 33  1 - 40 U/L Final    Alkaline Phosphatase 07/14/2025 127 (H)  39 - 117 U/L Final    Total Bilirubin 07/14/2025 0.3  0.0 - 1.2 mg/dL Final    Globulin 07/14/2025 3.0  gm/dL Final    A/G Ratio 07/14/2025 0.9  g/dL Final    BUN/Creatinine Ratio 07/14/2025 13.3  7.0 - 25.0 Final    Anion Gap 07/14/2025 9.6  5.0 - 15.0 mmol/L Final    eGFR 07/14/2025 102.7  >60.0 mL/min/1.73 Final    Magnesium 07/14/2025 1.8  1.6 - 2.6 mg/dL Final    WBC 07/14/2025 12.72 (H)  3.40 - 10.80 10*3/mm3 Final    RBC 07/14/2025 4.58  4.14 - 5.80 10*6/mm3 Final    Hemoglobin 07/14/2025 13.0  13.0 - 17.7 g/dL Final    Hematocrit 07/14/2025 41.1  37.5 - 51.0 % Final    MCV 07/14/2025 89.7  79.0 - 97.0 fL Final     MCH 07/14/2025 28.4  26.6 - 33.0 pg Final    MCHC 07/14/2025 31.6  31.5 - 35.7 g/dL Final    RDW 07/14/2025 19.2 (H)  12.3 - 15.4 % Final    RDW-SD 07/14/2025 62.8 (H)  37.0 - 54.0 fl Final    MPV 07/14/2025 10.0  6.0 - 12.0 fL Final    Platelets 07/14/2025 342  140 - 450 10*3/mm3 Final    Neutrophil % 07/14/2025 80.0 (H)  42.7 - 76.0 % Final    Lymphocyte % 07/14/2025 11.8 (L)  19.6 - 45.3 % Final    Monocyte % 07/14/2025 6.9  5.0 - 12.0 % Final    Eosinophil % 07/14/2025 0.6  0.3 - 6.2 % Final    Basophil % 07/14/2025 0.3  0.0 - 1.5 % Final    Immature Grans % 07/14/2025 0.4  0.0 - 0.5 % Final    Neutrophils, Absolute 07/14/2025 10.18 (H)  1.70 - 7.00 10*3/mm3 Final    Lymphocytes, Absolute 07/14/2025 1.50  0.70 - 3.10 10*3/mm3 Final    Monocytes, Absolute 07/14/2025 0.88  0.10 - 0.90 10*3/mm3 Final    Eosinophils, Absolute 07/14/2025 0.07  0.00 - 0.40 10*3/mm3 Final    Basophils, Absolute 07/14/2025 0.04  0.00 - 0.20 10*3/mm3 Final    Immature Grans, Absolute 07/14/2025 0.05  0.00 - 0.05 10*3/mm3 Final    nRBC 07/14/2025 0.0  0.0 - 0.2 /100 WBC Final     Tempus xt: APC gene TP53; Negative ADRIANNA, BRAF, NRAS, TMB stable. PDL1 negative  No results found.      Procedures    Assessment / Plan      Assessment/Plan:     1.  Rectal cancer   2.  Liver metastases  3.  Lung metastasis  4.  Chemo monitoring  5.  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  -He was found to have biopsy-proven metastasis in the liver and lung.  His case was presented at multidisciplinary tumor board.  -Because of metastatic disease to both the lung and liver I do not think he will be downstage to resectable disease.  -Tempus results which are notable for an APC mutation, T p53 mutation, negative for KRAS, BRAF, NRAS, tumor mutation burden stable.  Notch 1 VUS.  -CBC adequate and CMP pending for treatment today with maintenance 5-FU and panitumumab  9/16/24  Chemotherapy orders and premedications signed9/16/24  -We reviewed his imaging which is consistent with excellent disease control in early July with normalization of CEA. He has had persistent rectal pain with escalating oxycodone requirements over the past couple of months, but no severe recent increase in pain, fever. WBC normal. 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 I reviewed his EUA notes. Increase doxy to BID. No surgical intervention.  -Consolidative radiotherapy to the rectal tumor is not something he wants to pursue initially but has now undergone consultation.  Maintenance 5FU/panitumumab on hold since early December.  I reviewed his last 4 serial CT images which do so progressive and in his lung. Escalated to FOLFIRI panitumumab 2/2025 to present  - Labs pending for treatment.  Order signed.  -Scheduled for CT July 23, 2025    6.  Cancer related pain  -Now following with palliative care and pain. Well controlled on lyrica, oxycodone and MS contin.     7.  Panitumumab induced rash  -Continue doxycycline and emollients  -Stop voltaren gel, start hydrocortisone 2.5 % to palms    8. GERD  PPI, well controlled    9. Access  -Port    10. Chemo diarrhea  -Hold laxatives on day of diarrhea  -Lomotil PRN    11. Hypomagnesemia  -Mg 1.5    12. Hypokalemia  -continue potassium supplement. Increase to 40 daily.    13. Dizziness  - Improving.  Continue to wean off cymbalta. If persists, check brain MRI    Follow Up:   2 weeks     GEORGIA Grimes    Hematology and Oncology

## 2025-07-15 ENCOUNTER — TELEPHONE (OUTPATIENT)
Dept: NUTRITION | Facility: HOSPITAL | Age: 55
End: 2025-07-15
Payer: COMMERCIAL

## 2025-07-15 NOTE — PROGRESS NOTES
Outpatient Oncology Nutrition Follow-up      Patient Name: Edward Powell  YOB: 1970  MRN: [id number]      Follow-up Date:  07/15/25     Comments:      Spoke with patient for nutrition follow-up regarding oncology treatment. Patient reports his appetite fluctuates, and will drink Ensure when his appetite is low. Patient denies any weight loss at this time. He states he weighs between 190-200 lbs.  Patient denies any nutrition-related questions/concerns at this time. RD to follow-up in one month and available PRN.       Electronically signed by:   Ninfa Baker RD  07/15/25 14:14 EDT

## 2025-07-16 ENCOUNTER — HOSPITAL ENCOUNTER (OUTPATIENT)
Facility: HOSPITAL | Age: 55
Discharge: HOME | End: 2025-07-16
Payer: COMMERCIAL

## 2025-07-16 DIAGNOSIS — C78.7 RECTAL CANCER METASTASIZED TO LIVER: ICD-10-CM

## 2025-07-16 DIAGNOSIS — C20 RECTAL ADENOCARCINOMA: Primary | ICD-10-CM

## 2025-07-16 DIAGNOSIS — C20 RECTAL CANCER METASTASIZED TO LIVER: ICD-10-CM

## 2025-07-16 PROCEDURE — 96523 IRRIG DRUG DELIVERY DEVICE: CPT

## 2025-07-16 PROCEDURE — 25010000002 HEPARIN LOCK FLUSH PER 10 UNITS: Performed by: INTERNAL MEDICINE

## 2025-07-16 RX ORDER — SODIUM CHLORIDE 0.9 % (FLUSH) 0.9 %
10 SYRINGE (ML) INJECTION AS NEEDED
Status: DISCONTINUED | OUTPATIENT
Start: 2025-07-16 | End: 2025-07-17 | Stop reason: HOSPADM

## 2025-07-16 RX ORDER — SODIUM CHLORIDE 0.9 % (FLUSH) 0.9 %
10 SYRINGE (ML) INJECTION AS NEEDED
OUTPATIENT
Start: 2025-07-16

## 2025-07-16 RX ORDER — HEPARIN SODIUM (PORCINE) LOCK FLUSH IV SOLN 100 UNIT/ML 100 UNIT/ML
500 SOLUTION INTRAVENOUS AS NEEDED
OUTPATIENT
Start: 2025-07-16

## 2025-07-16 RX ORDER — HEPARIN SODIUM (PORCINE) LOCK FLUSH IV SOLN 100 UNIT/ML 100 UNIT/ML
500 SOLUTION INTRAVENOUS AS NEEDED
Status: DISCONTINUED | OUTPATIENT
Start: 2025-07-16 | End: 2025-07-17 | Stop reason: HOSPADM

## 2025-07-16 RX ADMIN — Medication 500 UNITS: at 14:36

## 2025-07-16 RX ADMIN — Medication 10 ML: at 14:36

## 2025-07-21 ENCOUNTER — HOSPITAL ENCOUNTER (EMERGENCY)
Facility: HOSPITAL | Age: 55
Discharge: LEFT BEFORE DISCHARGE | End: 2025-07-21
Attending: EMERGENCY MEDICINE | Admitting: EMERGENCY MEDICINE
Payer: MEDICAID

## 2025-07-21 ENCOUNTER — APPOINTMENT (OUTPATIENT)
Dept: CT IMAGING | Facility: HOSPITAL | Age: 55
End: 2025-07-21
Payer: MEDICAID

## 2025-07-21 ENCOUNTER — APPOINTMENT (OUTPATIENT)
Dept: GENERAL RADIOLOGY | Facility: HOSPITAL | Age: 55
End: 2025-07-21
Payer: MEDICAID

## 2025-07-21 ENCOUNTER — TELEPHONE (OUTPATIENT)
Dept: ONCOLOGY | Facility: CLINIC | Age: 55
End: 2025-07-21
Payer: MEDICAID

## 2025-07-21 VITALS
BODY MASS INDEX: 25.87 KG/M2 | WEIGHT: 191 LBS | OXYGEN SATURATION: 97 % | RESPIRATION RATE: 20 BRPM | TEMPERATURE: 97.5 F | HEART RATE: 89 BPM | HEIGHT: 72 IN | SYSTOLIC BLOOD PRESSURE: 117 MMHG | DIASTOLIC BLOOD PRESSURE: 58 MMHG

## 2025-07-21 DIAGNOSIS — J18.9 PNEUMONIA DUE TO INFECTIOUS ORGANISM, UNSPECIFIED LATERALITY, UNSPECIFIED PART OF LUNG: ICD-10-CM

## 2025-07-21 DIAGNOSIS — R25.1 TREMOR: ICD-10-CM

## 2025-07-21 DIAGNOSIS — E87.6 HYPOKALEMIA: ICD-10-CM

## 2025-07-21 DIAGNOSIS — R53.83 LETHARGY: Primary | ICD-10-CM

## 2025-07-21 LAB
ADV 40+41 DNA STL QL NAA+NON-PROBE: NOT DETECTED
ALBUMIN SERPL-MCNC: 2.4 G/DL (ref 3.5–5.2)
ALBUMIN/GLOB SERPL: 0.7 G/DL
ALP SERPL-CCNC: 117 U/L (ref 39–117)
ALT SERPL W P-5'-P-CCNC: 43 U/L (ref 1–41)
ANION GAP SERPL CALCULATED.3IONS-SCNC: 13.8 MMOL/L (ref 5–15)
AST SERPL-CCNC: 47 U/L (ref 1–40)
ASTRO TYP 1-8 RNA STL QL NAA+NON-PROBE: NOT DETECTED
BASOPHILS # BLD AUTO: 0.01 10*3/MM3 (ref 0–0.2)
BASOPHILS NFR BLD AUTO: 0.3 % (ref 0–1.5)
BILIRUB SERPL-MCNC: 0.5 MG/DL (ref 0–1.2)
BUN SERPL-MCNC: 8 MG/DL (ref 6–20)
BUN/CREAT SERPL: 13.1 (ref 7–25)
C CAYETANENSIS DNA STL QL NAA+NON-PROBE: NOT DETECTED
C COLI+JEJ+UPSA DNA STL QL NAA+NON-PROBE: NOT DETECTED
C DIFF GDH + TOXINS A+B STL QL IA.RAPID: NEGATIVE
C DIFF GDH + TOXINS A+B STL QL IA.RAPID: NEGATIVE
CALCIUM SPEC-SCNC: 8.3 MG/DL (ref 8.6–10.5)
CHLORIDE SERPL-SCNC: 97 MMOL/L (ref 98–107)
CO2 SERPL-SCNC: 23.2 MMOL/L (ref 22–29)
CREAT SERPL-MCNC: 0.61 MG/DL (ref 0.76–1.27)
CRYPTOSP DNA STL QL NAA+NON-PROBE: NOT DETECTED
D-LACTATE SERPL-SCNC: 1.7 MMOL/L (ref 0.5–2)
DEPRECATED RDW RBC AUTO: 54.5 FL (ref 37–54)
E HISTOLYT DNA STL QL NAA+NON-PROBE: NOT DETECTED
EAEC PAA PLAS AGGR+AATA ST NAA+NON-PRB: NOT DETECTED
EC STX1+STX2 GENES STL QL NAA+NON-PROBE: NOT DETECTED
EGFRCR SERPLBLD CKD-EPI 2021: 112.7 ML/MIN/1.73
EOSINOPHIL # BLD AUTO: 0.1 10*3/MM3 (ref 0–0.4)
EOSINOPHIL NFR BLD AUTO: 2.6 % (ref 0.3–6.2)
EPEC EAE GENE STL QL NAA+NON-PROBE: NOT DETECTED
ERYTHROCYTE [DISTWIDTH] IN BLOOD BY AUTOMATED COUNT: 17.2 % (ref 12.3–15.4)
ETEC LTA+ST1A+ST1B TOX ST NAA+NON-PROBE: NOT DETECTED
FLUAV RNA RESP QL NAA+PROBE: NOT DETECTED
FLUBV RNA NPH QL NAA+NON-PROBE: NOT DETECTED
G LAMBLIA DNA STL QL NAA+NON-PROBE: NOT DETECTED
GEN 5 1HR TROPONIN T REFLEX: 20 NG/L
GLOBULIN UR ELPH-MCNC: 3.4 GM/DL
GLUCOSE SERPL-MCNC: 77 MG/DL (ref 65–99)
HCT VFR BLD AUTO: 34.1 % (ref 37.5–51)
HGB BLD-MCNC: 11.5 G/DL (ref 13–17.7)
HOLD SPECIMEN: NORMAL
HOLD SPECIMEN: NORMAL
IMM GRANULOCYTES # BLD AUTO: 0.03 10*3/MM3 (ref 0–0.05)
IMM GRANULOCYTES NFR BLD AUTO: 0.8 % (ref 0–0.5)
LYMPHOCYTES # BLD AUTO: 0.83 10*3/MM3 (ref 0.7–3.1)
LYMPHOCYTES NFR BLD AUTO: 21.3 % (ref 19.6–45.3)
MAGNESIUM SERPL-MCNC: 1.9 MG/DL (ref 1.6–2.6)
MCH RBC QN AUTO: 29.1 PG (ref 26.6–33)
MCHC RBC AUTO-ENTMCNC: 33.7 G/DL (ref 31.5–35.7)
MCV RBC AUTO: 86.3 FL (ref 79–97)
MONOCYTES # BLD AUTO: 0.15 10*3/MM3 (ref 0.1–0.9)
MONOCYTES NFR BLD AUTO: 3.9 % (ref 5–12)
NEUTROPHILS NFR BLD AUTO: 2.77 10*3/MM3 (ref 1.7–7)
NEUTROPHILS NFR BLD AUTO: 71.1 % (ref 42.7–76)
NOROVIRUS GI+II RNA STL QL NAA+NON-PROBE: NOT DETECTED
NRBC BLD AUTO-RTO: 0 /100 WBC (ref 0–0.2)
P SHIGELLOIDES DNA STL QL NAA+NON-PROBE: NOT DETECTED
PLATELET # BLD AUTO: 168 10*3/MM3 (ref 140–450)
PMV BLD AUTO: 11.1 FL (ref 6–12)
POTASSIUM SERPL-SCNC: 2.8 MMOL/L (ref 3.5–5.2)
PROCALCITONIN SERPL-MCNC: 0.09 NG/ML (ref 0–0.25)
PROT SERPL-MCNC: 5.8 G/DL (ref 6–8.5)
RBC # BLD AUTO: 3.95 10*6/MM3 (ref 4.14–5.8)
RSV RNA RESP QL NAA+PROBE: NOT DETECTED
RVA RNA STL QL NAA+NON-PROBE: NOT DETECTED
S ENT+BONG DNA STL QL NAA+NON-PROBE: NOT DETECTED
SAPO I+II+IV+V RNA STL QL NAA+NON-PROBE: NOT DETECTED
SARS-COV-2 RNA RESP QL NAA+PROBE: NOT DETECTED
SHIGELLA SP+EIEC IPAH ST NAA+NON-PROBE: NOT DETECTED
SODIUM SERPL-SCNC: 134 MMOL/L (ref 136–145)
TROPONIN T % DELTA: -13
TROPONIN T NUMERIC DELTA: -3 NG/L
TROPONIN T SERPL HS-MCNC: 23 NG/L
V CHOL+PARA+VUL DNA STL QL NAA+NON-PROBE: NOT DETECTED
V CHOLERAE DNA STL QL NAA+NON-PROBE: NOT DETECTED
WBC NRBC COR # BLD AUTO: 3.89 10*3/MM3 (ref 3.4–10.8)
WHOLE BLOOD HOLD COAG: NORMAL
WHOLE BLOOD HOLD SPECIMEN: NORMAL
Y ENTEROCOL DNA STL QL NAA+NON-PROBE: NOT DETECTED

## 2025-07-21 PROCEDURE — 96366 THER/PROPH/DIAG IV INF ADDON: CPT

## 2025-07-21 PROCEDURE — 84484 ASSAY OF TROPONIN QUANT: CPT | Performed by: EMERGENCY MEDICINE

## 2025-07-21 PROCEDURE — 96365 THER/PROPH/DIAG IV INF INIT: CPT

## 2025-07-21 PROCEDURE — 25010000002 METOCLOPRAMIDE PER 10 MG: Performed by: EMERGENCY MEDICINE

## 2025-07-21 PROCEDURE — 25810000003 SODIUM CHLORIDE 0.9 % SOLUTION: Performed by: EMERGENCY MEDICINE

## 2025-07-21 PROCEDURE — 83735 ASSAY OF MAGNESIUM: CPT | Performed by: EMERGENCY MEDICINE

## 2025-07-21 PROCEDURE — 71045 X-RAY EXAM CHEST 1 VIEW: CPT

## 2025-07-21 PROCEDURE — 80053 COMPREHEN METABOLIC PANEL: CPT | Performed by: EMERGENCY MEDICINE

## 2025-07-21 PROCEDURE — 87449 NOS EACH ORGANISM AG IA: CPT | Performed by: EMERGENCY MEDICINE

## 2025-07-21 PROCEDURE — 84145 PROCALCITONIN (PCT): CPT | Performed by: EMERGENCY MEDICINE

## 2025-07-21 PROCEDURE — 85025 COMPLETE CBC W/AUTO DIFF WBC: CPT | Performed by: EMERGENCY MEDICINE

## 2025-07-21 PROCEDURE — 96375 TX/PRO/DX INJ NEW DRUG ADDON: CPT

## 2025-07-21 PROCEDURE — 96368 THER/DIAG CONCURRENT INF: CPT

## 2025-07-21 PROCEDURE — 87637 SARSCOV2&INF A&B&RSV AMP PRB: CPT | Performed by: EMERGENCY MEDICINE

## 2025-07-21 PROCEDURE — 83605 ASSAY OF LACTIC ACID: CPT | Performed by: EMERGENCY MEDICINE

## 2025-07-21 PROCEDURE — 87507 IADNA-DNA/RNA PROBE TQ 12-25: CPT | Performed by: EMERGENCY MEDICINE

## 2025-07-21 PROCEDURE — 25810000003 LACTATED RINGERS SOLUTION: Performed by: EMERGENCY MEDICINE

## 2025-07-21 PROCEDURE — 25010000002 HYDROMORPHONE 1 MG/ML SOLUTION: Performed by: EMERGENCY MEDICINE

## 2025-07-21 PROCEDURE — 25010000002 CEFTRIAXONE PER 250 MG: Performed by: EMERGENCY MEDICINE

## 2025-07-21 PROCEDURE — 99284 EMERGENCY DEPT VISIT MOD MDM: CPT | Performed by: EMERGENCY MEDICINE

## 2025-07-21 PROCEDURE — 25010000002 POTASSIUM CHLORIDE 10 MEQ/100ML SOLUTION: Performed by: EMERGENCY MEDICINE

## 2025-07-21 PROCEDURE — 93005 ELECTROCARDIOGRAM TRACING: CPT | Performed by: EMERGENCY MEDICINE

## 2025-07-21 PROCEDURE — 87324 CLOSTRIDIUM AG IA: CPT | Performed by: EMERGENCY MEDICINE

## 2025-07-21 RX ORDER — POTASSIUM CHLORIDE 7.45 MG/ML
10 INJECTION INTRAVENOUS
Status: COMPLETED | OUTPATIENT
Start: 2025-07-21 | End: 2025-07-21

## 2025-07-21 RX ORDER — METOCLOPRAMIDE HYDROCHLORIDE 5 MG/ML
10 INJECTION INTRAMUSCULAR; INTRAVENOUS ONCE
Status: COMPLETED | OUTPATIENT
Start: 2025-07-21 | End: 2025-07-21

## 2025-07-21 RX ORDER — SODIUM CHLORIDE 0.9 % (FLUSH) 0.9 %
10 SYRINGE (ML) INJECTION AS NEEDED
Status: DISCONTINUED | OUTPATIENT
Start: 2025-07-21 | End: 2025-07-21 | Stop reason: HOSPADM

## 2025-07-21 RX ORDER — HYDROMORPHONE HYDROCHLORIDE 2 MG/ML
0.5 INJECTION, SOLUTION INTRAMUSCULAR; INTRAVENOUS; SUBCUTANEOUS ONCE
Status: DISCONTINUED | OUTPATIENT
Start: 2025-07-21 | End: 2025-07-21

## 2025-07-21 RX ORDER — LOPERAMIDE HYDROCHLORIDE 2 MG/1
2 CAPSULE ORAL ONCE
Status: COMPLETED | OUTPATIENT
Start: 2025-07-21 | End: 2025-07-21

## 2025-07-21 RX ADMIN — POTASSIUM CHLORIDE 10 MEQ: 7.46 INJECTION, SOLUTION INTRAVENOUS at 15:54

## 2025-07-21 RX ADMIN — SODIUM CHLORIDE 500 ML: 9 INJECTION, SOLUTION INTRAVENOUS at 15:53

## 2025-07-21 RX ADMIN — LOPERAMIDE HYDROCHLORIDE 2 MG: 2 CAPSULE ORAL at 15:41

## 2025-07-21 RX ADMIN — POTASSIUM CHLORIDE 10 MEQ: 7.46 INJECTION, SOLUTION INTRAVENOUS at 14:02

## 2025-07-21 RX ADMIN — CEFTRIAXONE SODIUM 1000 MG: 1 INJECTION, POWDER, FOR SOLUTION INTRAMUSCULAR; INTRAVENOUS at 15:57

## 2025-07-21 RX ADMIN — SODIUM CHLORIDE, POTASSIUM CHLORIDE, SODIUM LACTATE AND CALCIUM CHLORIDE 1000 ML: 600; 310; 30; 20 INJECTION, SOLUTION INTRAVENOUS at 14:02

## 2025-07-21 RX ADMIN — METOCLOPRAMIDE 10 MG: 5 INJECTION, SOLUTION INTRAMUSCULAR; INTRAVENOUS at 15:42

## 2025-07-21 RX ADMIN — SODIUM CHLORIDE 1000 ML: 9 INJECTION, SOLUTION INTRAVENOUS at 11:47

## 2025-07-21 RX ADMIN — HYDROMORPHONE HYDROCHLORIDE 1 MG: 1 INJECTION, SOLUTION INTRAMUSCULAR; INTRAVENOUS; SUBCUTANEOUS at 15:46

## 2025-07-21 NOTE — ED PROVIDER NOTES
[facility] EMERGENCY DEPARTMENT  Emergency Department Encounter  Emergency Medicine Physician Note     Pt Name:Edward Powell  MRN: [id number]  Birthdate 1970  Date of evaluation: 7/21/2025  PCP:  Westley Gonzales DO  Note Started: 11:15 AM EDT      CHIEF COMPLAINT       Chief Complaint   Patient presents with    Dehydration       HISTORY OF PRESENT ILLNESS  (Location/Symptom, Timing/Onset, Context/Setting, Quality, Duration, Modifying Factors, Severity.)      Edward Powell is a 55 y.o. male who presents with severe dehydration, fatigue and chills.  Patient describes overall generally feeling ill.  Patient receives chemotherapy, last received chemotherapy middle of last week.  Patient is actively being treated for rectal cancer with metastatic disease to the liver and the lung.  Patient is had diarrhea since receiving chemotherapy middle of last week.  Described to be in approximately 4 days.  Patient wife has concerns for severe dehydration.  Patient has not taken any Imodium or Pepto-Bismol for his diarrhea.  Patient denies any bloody diarrhea or black diarrhea.    PAST MEDICAL / SURGICAL / SOCIAL / FAMILY HISTORY     Past Medical History:   Diagnosis Date    Arthritis     Cancer     rectal cancer - diagnosed 2023    Cholelithiasis 2019    Removed    COPD (chronic obstructive pulmonary disease)     Coronary artery disease 2009    Stent - no cardiologist currently    Elevated cholesterol     GERD (gastroesophageal reflux disease)     Hernia 2003    Lower hernia    Perforated ulcer 2019    Sleep apnea     history of; when weighed over 400lbs - no issues following bariatric surgery     No additional pertinent       Past Surgical History:   Procedure Laterality Date    APPENDECTOMY  1983    Removed    BARIATRIC SURGERY  2011    Gastric bypass    BLADDER TUMOR/ULCER BLEEDER CAUTERIZATION      CARDIAC CATHETERIZATION  2009    stent placed    CHOLECYSTECTOMY  2019    Removed     COLONOSCOPY N/A 12/07/2023    Procedure: COLONOSCOPY WITH HOT SNARE POLYPECTOMY AND TATTOO;  Surgeon: Nmoan Gautam MD;  Location: [facility] ENDOSCOPY;  Service: Gastroenterology;  Laterality: N/A;    PORTACATH PLACEMENT N/A 1/5/2024    Procedure: INSERTION OF PORTACATH WITH ULTRSOUND AND FLUOROSCOPIC GUIDANCE;  Surgeon: Ida Black MD;  Location: [facility] OR;  Service: General;  Laterality: N/A;    JENNIFER-EN-Y       No additional pertinent       Social History     Socioeconomic History    Marital status:    Tobacco Use    Smoking status: Every Day     Current packs/day: 1.00     Average packs/day: 0.9 packs/day for 60.9 years (53.4 ttl pk-yrs)     Types: Cigarettes     Start date: 9/6/1994    Smokeless tobacco: Never    Tobacco comments:     35 years      pt reports closer to 2 packs per day since cancer diagnosis   Vaping Use    Vaping status: Never Used   Substance and Sexual Activity    Alcohol use: Never    Drug use: Never    Sexual activity: Defer       History reviewed. No pertinent family history.    Allergies:  Bactrim [sulfamethoxazole-trimethoprim] and Sulfa antibiotics    Home Medications:  Prior to Admission medications    Medication Sig Start Date End Date Taking? Authorizing Provider   albuterol sulfate  (90 Base) MCG/ACT inhaler Inhale 2 puffs Every 4 (Four) Hours As Needed for Wheezing. 12/30/24   Brenda Cronin MD   buPROPion XL (WELLBUTRIN XL) 150 MG 24 hr tablet Take 1 tablet by mouth Daily. 5/12/25   Brenda Cronin MD   Calcium Carbonate-Vitamin D 500-5 MG-MCG tablet Take 1 tablet by mouth 2 (Two) Times a Day. 6/16/25   Brenda Cronin MD   cetirizine (zyrTEC) 10 MG tablet Take 1 tablet by mouth Daily. 1/22/24   Brenda Cronin MD   clindamycin (Clindagel) 1 % gel Apply 1 Application topically to the appropriate area as directed 2 (Two) Times a Day. Use first 3/18/24   Brenda Cronin MD   Diclofenac Sodium (Voltaren) 1 % gel gel Apply 4 g topically to the  appropriate area as directed 2 (Two) Times a Day. 3/17/25   Brenda Cronin MD   diphenoxylate-atropine (LOMOTIL) 2.5-0.025 MG per tablet TAKE 1 TABLET BY MOUTH EVERY 6 HOURS AS NEEDED FOR DIARRHEA 5/6/25   Brenda Cronin MD   docusate sodium (COLACE) 100 MG capsule Take 1 capsule by mouth 2 (Two) Times a Day.    ProviderArya MD   doxycycline (VIBRAMYICN) 100 MG tablet Take 1 tablet by mouth 2 (Two) Times a Day. For 7 days, then 1 tablet daily indefinitely 3/19/25   Brenda Cronin MD   DULoxetine (CYMBALTA) 20 MG capsule Take 1 capsule by mouth Daily.  Patient taking differently: Take 1 capsule by mouth Daily. Pt weaning off medication.per pt. 6/30/25   Brenda Cronin MD   famotidine (PEPCID) 20 MG tablet Take 1 tablet by mouth 2 (Two) Times a Day. 11/18/24   Brenda Cronin MD   fluticasone (FLONASE) 50 MCG/ACT nasal spray Administer 2 sprays into the nostril(s) as directed by provider Daily. Administer 2 sprays in each nostril for each dose. 5/12/25   Brenda Cronin MD   furosemide (Lasix) 20 MG tablet Take 1 tablet by mouth Daily. 6/9/25   Westley Gonzales,    hydrocortisone 2.5 % ointment Apply 1 Application topically to the appropriate area as directed 2 (Two) Times a Day. 3/31/25   Brenda Cronin MD   Hydrocortisone, Perianal, (ANUSOL-HC) 2.5 % rectal cream Insert  into the rectum 2 (Two) Times a Day. Indications: Inflamed Hemorrhoids 11/30/23   Noman Gautam MD   KETOPROFEN-LIDO-GABAPENTIN EX APPLY 1-2 GRAMS TO AFFECTED AREAS 3-4 TIMES DAILY 4/3/25   ProviderArya MD   lidocaine-prilocaine (EMLA) 2.5-2.5 % cream Apply 1 Application topically to the appropriate area as directed As Needed (45-60 minutes prior to port access.  Cover with saran/plastic wrap.). 6/30/25   Brenda Cronin MD   LORazepam (ATIVAN) 0.5 MG tablet Take 1 tablet by mouth Take As Directed. 1 tabelt by mouth 30 minutes prior to MRI, take a second tablet at the time of the MRI if needed. 1/23/24    Brenda Cronin MD   Magic Mouthwash Oral Suspension (diphenhydrAMINE HCl - aluminum & magnesium hydroxide-simethicone - lidocaine - nystatin) Swish and Spit 10 mL by mouth every 6 (Six) Hours For 7 Days. 4/4/25   Jacy Razo APRN   magnesium oxide (MAG-OX) 400 MG tablet Take 1 tablet by mouth Daily. 1/20/25   Brenda Cronin MD   Magnesium Oxide -Mg Supplement 400 (240 Mg) MG tablet 1 tablet Daily. 6/4/25   Arya Bradford MD   mineral oil-hydrophilic petrolatum (AQUAPHOR) ointment Apply 1 Application topically to the appropriate area as directed As Needed for Dry Skin. 3/30/25   Prince Helm MD   montelukast (SINGULAIR) 10 MG tablet Take 1 tablet by mouth Every Night. 3/3/25   Brenda Cronin MD   Morphine (MS CONTIN) 30 MG 12 hr tablet Take 1 tablet by mouth 2 (Two) Times a Day for 30 days. 7/10/25 8/9/25  Westley Gonzales,    Movantik 25 MG tablet  8/20/24   Arya Bradford MD   mupirocin (BACTROBAN) 2 % cream  3/18/24   Arya Bradford MD   naloxone (NARCAN) 4 MG/0.1ML nasal spray 1 spray into the nostril(s) as directed by provider As Needed for Opioid Reversal. 12/29/23   Brenda Cronin MD   nystatin susp + lidocaine viscous (MAGIC MOUTHWASH) oral suspension 5-10 ml swish and spit or swallow QID prn 8/19/24   Brenda Cronin MD   ondansetron (ZOFRAN) 8 MG tablet Take 1 tablet by mouth 3 (Three) Times a Day As Needed for Nausea or Vomiting. 3/3/25   Brenda Cronin MD   oxyCODONE (ROXICODONE) 15 MG immediate release tablet Take 1 tablet by mouth 5 (Five) Times a Day As Needed for Moderate Pain or Severe Pain for up to 30 days. 7/10/25 8/9/25  Westley Gonzales,    pantoprazole (Protonix) 40 MG EC tablet Take 1 tablet by mouth Daily. Indications: Gastroesophageal Reflux Disease 3/19/25   Brenda Cronin MD   potassium chloride ER (K-TAB) 20 MEQ tablet controlled-release ER tablet Take 2 tablets by mouth Daily. 6/16/25   Brenda Cronin MD   pregabalin (Lyrica) 300 MG  "capsule Take 1 capsule by mouth 2 (Two) Times a Day for 30 days. 7/10/25 8/9/25  Westley Gonzales,    promethazine-dextromethorphan (PROMETHAZINE-DM) 6.25-15 MG/5ML syrup Take 5 mL by mouth 3 times a day. 12/27/24   Linda Leslie PA-C   tiotropium bromide-olodaterol (Stiolto Respimat) 2.5-2.5 MCG/ACT aerosol solution inhaler INHALE 2 INHALATION(S) BY MOUTH DAILY 7/1/24   Ana Irizarry MD   traZODone (DESYREL) 150 MG tablet Take 1 tablet by mouth Every Night. 6/16/25   Brenda Cronin MD   triamcinolone (KENALOG) 0.025 % ointment Apply 1 Application topically to the appropriate area as directed 2 (Two) Times a Day. Use second if topical treatment #1 ineffective 3/18/24   Brenda Cronin MD         REVIEW OF SYSTEMS       Review of Systems   Constitutional:  Positive for fatigue. Negative for chills and fever.   Respiratory:  Negative for chest tightness and shortness of breath.    Cardiovascular:  Negative for chest pain.   Gastrointestinal:  Positive for abdominal pain, diarrhea, nausea and vomiting.   Genitourinary:  Negative for flank pain.   Neurological:  Negative for dizziness and light-headedness.       PHYSICAL EXAM      INITIAL VITALS:   /58   Pulse 89   Temp 97.5 °F (36.4 °C) (Axillary)   Resp 20   Ht 182.9 cm (72\")   Wt 86.6 kg (191 lb)   SpO2 97%   BMI 25.90 kg/m²     Physical Exam  Constitutional:       Appearance: Normal appearance. He is ill-appearing.   HENT:      Head: Normocephalic and atraumatic.   Eyes:      Extraocular Movements: Extraocular movements intact.   Cardiovascular:      Rate and Rhythm: Normal rate and regular rhythm.   Pulmonary:      Effort: Pulmonary effort is normal.      Breath sounds: Normal breath sounds.   Abdominal:      General: Abdomen is flat. There is distension.      Palpations: Abdomen is soft.      Tenderness: There is no abdominal tenderness.   Musculoskeletal:      Right lower leg: No edema.      Left lower leg: No edema.   Skin:     General: " Skin is warm and dry.   Neurological:      General: No focal deficit present.      Mental Status: He is alert and oriented to person, place, and time.   Psychiatric:         Mood and Affect: Mood is anxious.         Behavior: Behavior normal.           DDX/DIAGNOSTIC RESULTS / EMERGENCY DEPARTMENT COURSE / MDM     Differential Diagnosis included but not limited: Gastroenteritis, electrolyte abnormalities, dehydration, viral illness, chemotherapy induced complications, metastatic cancerous disease, pneumonia, other infectious process    Diagnoses Considered but Do Not Suspect: ACS, sepsis or severe infectious processes    Decision Rules/Scores utilized: N/A     Tests considered but not ordered and why:  N/A     MIPS: N/A     Code Status Discussion: Patient states that he is full code and wants all interventions performed as necessary.    Additional Patient Education Provided: None     Medical Decision Making    Medical Decision Making  This patient presents with nausea, vomiting & diarrhea. Differential diagnosis includes possible acute gastroenteritis. Abdominal exam without peritoneal signs.  Patient very ill-appearing, concerns for severe dehydration, other pathological processes going on.  Doubt invasive bacteria causing diarrhea such as C diff (no recent antibiotics), shiga toxin (non bloody). No recent travel. Patient is not immunocompromised.  Do have concerns for worsening rectal carcinoma, patient also noted to have new tremor, concern for metastatic disease to the brain, CT head and CT scan of the abdomen were ordered and plan to be obtained.  Patient given pain medications and multiple other medications for symptomatic management given IV fluids.  Patient became frustrated due to the amount of time being here in the emergency department, this was noted to be a very busy day.  Did attempt to perform extensive workup as I have concerns that patient has severe illness, do feel that may extend secondary  malignancy.  Patient with severe electrolyte abnormalities including hypokalemia, given potassium here in the emergency department.  Did heavily encourage admission to the patient.  Plan to admit patient to hospitalist group and patient declined admission, wanted to leave AGAINST MEDICAL ADVICE, did not want receive additional antibiotics for management of pneumonia, did not want a wait for CT scan.  Did offer additional pain medication including additional Dilaudid which patient did not want.  Patient wanted to go home.  Did inform patient that he is at risk of death or worsening illness by leaving, he was understandable of this, patient was on a sound mind, did have decision made capacity, and patient chose to leave AGAINST MEDICAL ADVICE.  Did attempt multiple different ways to try to encourage patient to stay here with offering pain management and other treatment modalities with change in patient's decision.  Patient instructed to return for any worsening abdominal pain, nausea vomiting, abdominal pain is painful with walking, signs of dehydration including dry mouth.  Patient discharged in critical condition AGAINST MEDICAL ADVICE.    Problems Addressed:  Hypokalemia: complicated acute illness or injury  Lethargy: complicated acute illness or injury  Pneumonia due to infectious organism, unspecified laterality, unspecified part of lung: complicated acute illness or injury  Tremor: complicated acute illness or injury    Amount and/or Complexity of Data Reviewed  External Data Reviewed: labs, radiology and notes.  Labs: ordered.  Radiology: ordered.  ECG/medicine tests: ordered.    Risk  Prescription drug management.        See ED COURSE for additional MDM statements    EKG    EKG Interpretation    Evaluated and interpreted by emergency department physician    Rhythm: Normal Sinus Rhythm  Rate: Normal  Axis: Brittney  Ectopy: none  Conduction: Normal  ST Segments: Normal  T Waves: nonspecific T wave flattening  multiple leads  Q Waves: aVR and V1    Clinical Impression: Normal Sinus Rhythm    Benjamín Chacko DO      All EKG's are interpreted by the Emergency Department Physician who either signs or Co-signs this chart in the absence of a cardiologist.    Additional Scores                   EMERGENCY DEPARTMENT COURSE:    ED Course as of 07/22/25 1118   Mon Jul 21, 2025   1333 First evaluated chest x-ray, signs of right lower lung infiltrate noted, concerns for inflammatory processes, interpretation [CR]      ED Course User Index  [CR] Benjamín Chacko DO       PROCEDURES:  None Performed   Procedures    DATA FOR LAB AND RADIOLOGY TESTS ORDERED BELOW ARE REVIEWED BY THE ED CLINICIAN:    RADIOLOGY: All x-rays, CT, MRI, and formal ultrasound images (except ED bedside ultrasound) are read by the radiologist, see reports below, unless otherwise noted in MDM or here.  Reports below are reviewed by myself.  XR Chest 1 View   Final Result   New right basilar airspace disease, possible pneumonia;   recommend follow-up..       Stable position right IJ port from prior.           This report was signed and finalized on 7/21/2025 12:16 PM by Gabriella Rodriguez MD.              LABS: Lab orders shown below, the results are reviewed by myself, and all abnormals are listed below.  Labs Reviewed   COMPREHENSIVE METABOLIC PANEL - Abnormal; Notable for the following components:       Result Value    Creatinine 0.61 (*)     Sodium 134 (*)     Potassium 2.8 (*)     Chloride 97 (*)     Calcium 8.3 (*)     Total Protein 5.8 (*)     Albumin 2.4 (*)     ALT (SGPT) 43 (*)     AST (SGOT) 47 (*)     All other components within normal limits    Narrative:     GFR Categories in Chronic Kidney Disease (CKD)              GFR Category          GFR (mL/min/1.73)    Interpretation  G1                    90 or greater        Normal or high (1)  G2                    60-89                Mild decrease (1)  G3a                   45-59                 Mild to moderate decrease  G3b                   30-44                Moderate to severe decrease  G4                    15-29                Severe decrease  G5                    14 or less           Kidney failure    (1)In the absence of evidence of kidney disease, neither GFR category G1 or G2 fulfill the criteria for CKD.    eGFR calculation 2021 CKD-EPI creatinine equation, which does not include race as a factor   TROPONIN - Abnormal; Notable for the following components:    HS Troponin T 23 (*)     All other components within normal limits    Narrative:     High Sensitive Troponin T Reference Range:  <14.0 ng/L- Negative Female for AMI  <22.0 ng/L- Negative Male for AMI  >=14 - Abnormal Female indicating possible myocardial injury.  >=22 - Abnormal Male indicating possible myocardial injury.   Clinicians would have to utilize clinical acumen, EKG, Troponin, and serial changes to determine if it is an Acute Myocardial Infarction or myocardial injury due to an underlying chronic condition.        CBC WITH AUTO DIFFERENTIAL - Abnormal; Notable for the following components:    RBC 3.95 (*)     Hemoglobin 11.5 (*)     Hematocrit 34.1 (*)     RDW 17.2 (*)     RDW-SD 54.5 (*)     Monocyte % 3.9 (*)     Immature Grans % 0.8 (*)     All other components within normal limits   GASTROINTESTINAL PANEL, PCR (PREFERRED) DOES NOT INCLUDE CDIFF - Normal   CLOSTRIDIOIDES DIFFICILE EIA - Normal    Narrative:     The result indicates the absence of toxigenic C.difficile from stool specimen.   COVID-19/FLUA&B/RSV, NP SWAB IN TRANSPORT MEDIA 1 HR TAT - Normal   MAGNESIUM - Normal   PROCALCITONIN - Normal    Narrative:     As a Marker for Sepsis (Non-Neonates):    1. <0.5 ng/mL represents a low risk of severe sepsis and/or septic shock.  2. >2 ng/mL represents a high risk of severe sepsis and/or septic shock.    As a Marker for Lower Respiratory Tract Infections that require antibiotic therapy:    PCT on Admission     "Antibiotic Therapy       6-12 Hrs later    >0.5                Strongly Recommended  >0.25 - <0.5        Recommended   0.1 - 0.25          Discouraged              Remeasure/reassess PCT  <0.1                Strongly Discouraged     Remeasure/reassess PCT    As 28 day mortality risk marker: \"Change in Procalcitonin Result\" (>80% or <=80%) if Day 0 (or Day 1) and Day 4 values are available. Refer to http://www.[facility]-pct-calculator.com    Change in PCT <=80%  A decrease of PCT levels below or equal to 80% defines a positive change in PCT test result representing a higher risk for 28-day all-cause mortality of patients diagnosed with severe sepsis for septic shock.    Change in PCT >80%  A decrease of PCT levels of more than 80% defines a negative change in PCT result representing a lower risk for 28-day all-cause mortality of patients diagnosed with severe sepsis or septic shock.      LACTIC ACID, PLASMA - Normal   CLOSTRIDIOIDES DIFFICILE TOXIN    Narrative:     The following orders were created for panel order Clostridioides difficile Toxin - Stool, Per Rectum.  Procedure                               Abnormality         Status                     ---------                               -----------         ------                     Clostridioides difficile...[[id number]]  Normal              Final result                 Please view results for these tests on the individual orders.   RAINBOW DRAW    Narrative:     The following orders were created for panel order [place] Draw.  Procedure                               Abnormality         Status                     ---------                               -----------         ------                     Green Top (Gel)[[id number]]                                  Final result               Lavender Top[[id number]]                                     Final result               Gold Top - SST[[id number]]                                   Final result               Light Blue " Top[[id number]]                                   Final result                 Please view results for these tests on the individual orders.   HIGH SENSITIVITIY TROPONIN T 1HR    Narrative:     High Sensitive Troponin T Reference Range:  <14.0 ng/L- Negative Female for AMI  <22.0 ng/L- Negative Male for AMI  >=14 - Abnormal Female indicating possible myocardial injury.  >=22 - Abnormal Male indicating possible myocardial injury.   Clinicians would have to utilize clinical acumen, EKG, Troponin, and serial changes to determine if it is an Acute Myocardial Infarction or myocardial injury due to an underlying chronic condition.        POCT GLUCOSE FINGERSTICK   CBC AND DIFFERENTIAL    Narrative:     The following orders were created for panel order CBC & Differential.  Procedure                               Abnormality         Status                     ---------                               -----------         ------                     CBC Auto Differential[[id number]]        Abnormal            Final result                 Please view results for these tests on the individual orders.   GREEN TOP   LAVENDER TOP   GOLD TOP - SST   LIGHT BLUE TOP       Vitals Reviewed:    Vitals:    07/21/25 1357 07/21/25 1400 07/21/25 1459 07/21/25 1600   BP: 100/62 103/58 109/55 117/58   BP Location:       Pulse: 87 91 86 89   Resp:       Temp:       TempSrc:       SpO2: 98%  97% 97%   Weight:       Height:           MEDICATIONS GIVEN TO PATIENT THIS ENCOUNTER:  Medications   sodium chloride 0.9 % bolus 1,000 mL (0 mL Intravenous Stopped 7/21/25 1325)   potassium chloride 10 mEq in 100 mL IVPB (0 mEq Intravenous Stopped 7/21/25 1718)   lactated ringers bolus 1,000 mL (0 mL Intravenous Stopped 7/21/25 1718)   loperamide (IMODIUM) capsule 2 mg (2 mg Oral Given 7/21/25 1541)   metoclopramide (REGLAN) injection 10 mg (10 mg Intravenous Given 7/21/25 1542)   HYDROmorphone (DILAUDID) injection 1 mg (1 mg Intravenous Given 7/21/25 1546)    cefTRIAXone (ROCEPHIN) 1,000 mg in sodium chloride 0.9 % 100 mL IVPB-VTB (0 mg Intravenous Stopped 7/21/25 1718)   sodium chloride 0.9 % bolus 500 mL (0 mL Intravenous Stopped 7/21/25 1718)       CONSULTS:  None    CRITICAL CARE:  There was significant risk of life threatening deterioration of patient's condition requiring my direct management. Critical care time 0 minutes, excluding any documented procedures.    FINAL IMPRESSION      1. Lethargy    2. Tremor    3. Pneumonia due to infectious organism, unspecified laterality, unspecified part of lung          DISPOSITION / PLAN     ED Disposition       ED Disposition   AMA    Condition   --    Comment   --               PATIENT REFERRED TO:  Westley Gonzales, DO  [address] [facility] [address] [phone]    In 3 days        DISCHARGE MEDICATIONS:     Medication List        START taking these medications      amoxicillin-clavulanate 875-125 MG per tablet  Commonly known as: AUGMENTIN  Take 1 tablet by mouth 2 (Two) Times a Day for 7 days.            CHANGE how you take these medications      DULoxetine 20 MG capsule  Commonly known as: CYMBALTA  Take 1 capsule by mouth Daily.  What changed: additional instructions            CONTINUE taking these medications      albuterol sulfate  (90 Base) MCG/ACT inhaler  Commonly known as: PROVENTIL HFA;VENTOLIN HFA;PROAIR HFA  Inhale 2 puffs Every 4 (Four) Hours As Needed for Wheezing.     buPROPion  MG 24 hr tablet  Commonly known as: WELLBUTRIN XL  Take 1 tablet by mouth Daily.     Calcium Carbonate-Vitamin D 500-5 MG-MCG tablet  Take 1 tablet by mouth 2 (Two) Times a Day.     cetirizine 10 MG tablet  Commonly known as: zyrTEC  Take 1 tablet by mouth Daily.     clindamycin 1 % gel  Commonly known as: Clindamycin 1% external gel  Apply 1 Application topically to the appropriate area as directed 2 (Two) Times a Day. Use first     Diclofenac Sodium 1 % gel gel  Commonly known as: Voltaren  Apply 4 g  topically to the appropriate area as directed 2 (Two) Times a Day.     diphenoxylate-atropine 2.5-0.025 MG per tablet  Commonly known as: LOMOTIL  TAKE 1 TABLET BY MOUTH EVERY 6 HOURS AS NEEDED FOR DIARRHEA     docusate sodium 100 MG capsule  Commonly known as: COLACE     doxycycline 100 MG tablet  Commonly known as: VIBRAMYICN  Take 1 tablet by mouth 2 (Two) Times a Day. For 7 days, then 1 tablet daily indefinitely     famotidine 20 MG tablet  Commonly known as: PEPCID  Take 1 tablet by mouth 2 (Two) Times a Day.     fluticasone 50 MCG/ACT nasal spray  Commonly known as: FLONASE  Administer 2 sprays into the nostril(s) as directed by provider Daily. Administer 2 sprays in each nostril for each dose.     furosemide 20 MG tablet  Commonly known as: Lasix  Take 1 tablet by mouth Daily.     Hydrocortisone (Perianal) 2.5 % rectal cream  Commonly known as: ANUSOL-HC  Insert  into the rectum 2 (Two) Times a Day. Indications: Inflamed Hemorrhoids     hydrocortisone 2.5 % ointment  Apply 1 Application topically to the appropriate area as directed 2 (Two) Times a Day.     KETOPROFEN-LIDO-GABAPENTIN EX     lidocaine-prilocaine 2.5-2.5 % cream  Commonly known as: EMLA  Apply 1 Application topically to the appropriate area as directed As Needed (45-60 minutes prior to port access.  Cover with saran/plastic wrap.).     LORazepam 0.5 MG tablet  Commonly known as: ATIVAN  Take 1 tablet by mouth Take As Directed. 1 tabelt by mouth 30 minutes prior to MRI, take a second tablet at the time of the MRI if needed.     Magic Mouthwash Oral Suspension (diphenhydrAMINE HCl - aluminum & magnesium hydroxide-simethicone - lidocaine - nystatin)  Swish and Spit 10 mL by mouth every 6 (Six) Hours For 7 Days.     Magnesium Oxide -Mg Supplement 400 (240 Mg) MG tablet     magnesium oxide 400 MG tablet  Commonly known as: MAG-OX  Take 1 tablet by mouth Daily.     mineral oil-hydrophilic petrolatum ointment  Apply 1 Application topically to the  appropriate area as directed As Needed for Dry Skin.     montelukast 10 MG tablet  Commonly known as: SINGULAIR  Take 1 tablet by mouth Every Night.     Morphine 30 MG 12 hr tablet  Commonly known as: MS CONTIN  Take 1 tablet by mouth 2 (Two) Times a Day for 30 days.     Movantik 25 MG tablet  Generic drug: Naloxegol Oxalate     mupirocin 2 % cream  Commonly known as: BACTROBAN     naloxone 4 MG/0.1ML nasal spray  Commonly known as: NARCAN  1 spray into the nostril(s) as directed by provider As Needed for Opioid Reversal.     nystatin susp + lidocaine viscous oral suspension  Commonly known as: MAGIC MOUTHWASH  5-10 ml swish and spit or swallow QID prn     ondansetron 8 MG tablet  Commonly known as: ZOFRAN  Take 1 tablet by mouth 3 (Three) Times a Day As Needed for Nausea or Vomiting.     oxyCODONE 15 MG immediate release tablet  Commonly known as: ROXICODONE  Take 1 tablet by mouth 5 (Five) Times a Day As Needed for Moderate Pain or Severe Pain for up to 30 days.     pantoprazole 40 MG EC tablet  Commonly known as: Protonix  Take 1 tablet by mouth Daily. Indications: Gastroesophageal Reflux Disease     potassium chloride ER 20 MEQ tablet controlled-release ER tablet  Commonly known as: K-TAB  Take 2 tablets by mouth Daily.     pregabalin 300 MG capsule  Commonly known as: Lyrica  Take 1 capsule by mouth 2 (Two) Times a Day for 30 days.     promethazine-dextromethorphan 6.25-15 MG/5ML syrup  Commonly known as: PROMETHAZINE-DM  Take 5 mL by mouth 3 times a day.     Stiolto Respimat 2.5-2.5 MCG/ACT aerosol solution inhaler  Generic drug: tiotropium bromide-olodaterol  INHALE 2 INHALATION(S) BY MOUTH DAILY     traZODone 150 MG tablet  Commonly known as: DESYREL  Take 1 tablet by mouth Every Night.     triamcinolone 0.025 % ointment  Commonly known as: KENALOG  Apply 1 Application topically to the appropriate area as directed 2 (Two) Times a Day. Use second if topical treatment #1 ineffective               Where to Get  Your Medications        These medications were sent to [facility] [id number] - [place], [place] - [address] AT [facility]. - [phone] [facility] [phone]   [address], [facility] [address]      Phone: [phone]   amoxicillin-clavulanate 875-125 MG per tablet         Electronically signed by Benjamín Chacko DO, 07/21/25, 11:15 AM EDT.    Emergency Medicine Physician  [facility]  (Please note that portions of thisnote were completed with a voice recognition program.  Efforts were made to edit the dictations but occasionally words are mis-transcribed.)       Benjamín Chacko DO  07/22/25 1123

## 2025-07-21 NOTE — TELEPHONE ENCOUNTER
"Patient called and stated he has had diarrhea for 3 days (2-4 episodes per day) and that he hasn't taken any anti-diarrheals.  He stated he is cold and shivering but temperature is 98.1.  Spouse stated it is 78 degrees in their home.  Patient stated, \"I got a lot of phlegm.\"  Patient stated that he has had frequent falls due to weakness in BLE. Patient denied having had any LOC or injury/striking his head.  Patient denied dizziness.  Patient's spouse stated has been very confused and that he is mostly only consuming carbonated beverages instead of water.  She stated he has only eaten Jello and that right after has diarrhea.  Dr. Cronin notified and per MD, patient's spouse advised that patient needs to go to the nearest ER for evaluation.  She verbalized understanding and stated he will take him to the ER for evaluation/treatment.  "

## 2025-07-21 NOTE — DISCHARGE INSTRUCTIONS
You have presented to the emergency department after severe illness with fatigue and tremor.  You have voiced your understanding of your condition and the possibility of death and are still choosing to leave without further testing.     You should not drive any vehicles or operate heavy machinery until you are seen by your physician due to new tremor.      You should return immediately to the Emergency Department for any blood in your stool, worsening fatigue, loss of consciousness, double vision, arm / leg numbness or weakness, or any other care or concern.

## 2025-07-21 NOTE — ED NOTES
Pt. Unable to provide urine sample ATT. Urinal at bedside. Pt. Aware to hit call light when urge to void occurs.

## 2025-07-21 NOTE — ED NOTES
"Pt. Hit called light and this RN went to bedside. Pt. States that his mother in law has been waiting on him in lobby and he does not have \"all day to wait.\" Pt. States he does not feel any better. This RN still has zithromax due for pt. But he states he wants to go home. MD notified and at bedside to explain risks of leaving against medical advice.   "

## 2025-07-23 ENCOUNTER — HOSPITAL ENCOUNTER (OUTPATIENT)
Dept: CT IMAGING | Facility: HOSPITAL | Age: 55
Discharge: HOME | End: 2025-07-23
Payer: MEDICAID

## 2025-07-23 DIAGNOSIS — C78.7 RECTAL CANCER METASTASIZED TO LIVER: ICD-10-CM

## 2025-07-23 DIAGNOSIS — C20 RECTAL CANCER METASTASIZED TO LIVER: ICD-10-CM

## 2025-07-23 PROCEDURE — 71260 CT THORAX DX C+: CPT

## 2025-07-23 PROCEDURE — 74177 CT ABD & PELVIS W/CONTRAST: CPT

## 2025-07-23 PROCEDURE — 25510000001 IOPAMIDOL 61 % SOLUTION: Performed by: INTERNAL MEDICINE

## 2025-07-23 RX ORDER — IOPAMIDOL 612 MG/ML
100 INJECTION, SOLUTION INTRAVASCULAR
Status: COMPLETED | OUTPATIENT
Start: 2025-07-23 | End: 2025-07-23

## 2025-07-23 RX ADMIN — IOPAMIDOL 100 ML: 612 INJECTION, SOLUTION INTRAVENOUS at 09:57

## 2025-07-25 ENCOUNTER — HOSPITAL ENCOUNTER (OUTPATIENT)
Dept: MRI IMAGING | Facility: HOSPITAL | Age: 55
Discharge: HOME | End: 2025-07-25
Payer: MEDICAID

## 2025-07-25 DIAGNOSIS — C78.7 RECTAL CANCER METASTASIZED TO LIVER: ICD-10-CM

## 2025-07-25 DIAGNOSIS — C20 RECTAL CANCER METASTASIZED TO LIVER: ICD-10-CM

## 2025-07-25 PROCEDURE — A9573 GADOPICLENOL 0.5 MMOL/ML SOLUTION: HCPCS | Performed by: INTERNAL MEDICINE

## 2025-07-25 PROCEDURE — 25510000001 GADOPICLENOL 0.5 MMOL/ML SOLUTION: Performed by: INTERNAL MEDICINE

## 2025-07-25 PROCEDURE — 70553 MRI BRAIN STEM W/O & W/DYE: CPT

## 2025-07-25 RX ADMIN — GADOPICLENOL 10 ML: 485.1 INJECTION INTRAVENOUS at 17:50

## 2025-07-26 ENCOUNTER — TELEPHONE (OUTPATIENT)
Dept: ONCOLOGY | Facility: CLINIC | Age: 55
End: 2025-07-26
Payer: MEDICAID

## 2025-07-26 ENCOUNTER — DOCUMENTATION (OUTPATIENT)
Dept: ONCOLOGY | Facility: CLINIC | Age: 55
End: 2025-07-26
Payer: MEDICAID

## 2025-07-26 DIAGNOSIS — G62.0 CHEMOTHERAPY-INDUCED NEUROPATHY: ICD-10-CM

## 2025-07-26 DIAGNOSIS — T45.1X5A CHEMOTHERAPY-INDUCED NEUROPATHY: ICD-10-CM

## 2025-07-26 NOTE — TELEPHONE ENCOUNTER
Called patient at 12:40 to discuss his critical MRI brain results. Notified patient that he has bilateral acute to subacute infarcts. Instructed patient to go to the ER immediately. Patient stated that it was his wife's birthday and he would go after. Informed patient that he needed to go to the ER ASAP to be evaluated by stroke and neurology.

## 2025-07-28 ENCOUNTER — OFFICE VISIT (OUTPATIENT)
Dept: ONCOLOGY | Facility: CLINIC | Age: 55
End: 2025-07-28
Payer: MEDICAID

## 2025-07-28 VITALS
OXYGEN SATURATION: 97 % | DIASTOLIC BLOOD PRESSURE: 57 MMHG | HEIGHT: 72 IN | SYSTOLIC BLOOD PRESSURE: 87 MMHG | BODY MASS INDEX: 25.06 KG/M2 | HEART RATE: 89 BPM | WEIGHT: 185 LBS | RESPIRATION RATE: 16 BRPM | TEMPERATURE: 97.8 F

## 2025-07-28 DIAGNOSIS — Z86.73 H/O: CVA (CEREBROVASCULAR ACCIDENT): Primary | ICD-10-CM

## 2025-07-28 PROCEDURE — 1125F AMNT PAIN NOTED PAIN PRSNT: CPT | Performed by: INTERNAL MEDICINE

## 2025-07-28 PROCEDURE — 99215 OFFICE O/P EST HI 40 MIN: CPT | Performed by: INTERNAL MEDICINE

## 2025-07-28 RX ORDER — ASPIRIN 81 MG/1
81 TABLET ORAL DAILY
Qty: 30 TABLET | Refills: 1 | Status: SHIPPED | OUTPATIENT
Start: 2025-07-28 | End: 2025-07-31 | Stop reason: HOSPADM

## 2025-07-28 RX ORDER — DULOXETIN HYDROCHLORIDE 20 MG/1
20 CAPSULE, DELAYED RELEASE ORAL DAILY
Qty: 30 CAPSULE | Refills: 0 | Status: SHIPPED | OUTPATIENT
Start: 2025-07-28

## 2025-07-28 NOTE — PROGRESS NOTES
[facility] [place]  Office number [phone]    Fax number [phone]     Follow up     Date: 25    Patient Name: Edward Powell  MRN: [id number]  : 1970    Referring Physician: Dr. Gautam    Chief Complaint: Rectal cancer with liver and lung metastases    Cancer Staging:  IV    History of Present Illness: Edward Powell is a pleasant 55 y.o. male who presents today for evaluation of rectal cancer.    Patient has a longstanding history of intermittent diarrhea since his cholecystectomy in 2019.  However he developed progressive diarrhea with associated loss of bowel control and urgency as well as weight loss and fatigue prompting additional workup.    CT of the abdomen pelvis 2023 showed an irregular circumferential wall thickening involving the rectum concerning for mass lesion.  There was associated mesorectal lymphadenopathy.  Masslike consolidation of left lower lobe.  CT of the chest showed a cavitary lesion in the left lower lobe up to 4.8 cm with an adjacent cavitary nodule up to 2 cm and a 5 mm nodule on the right minor fissure.      He underwent colonoscopy 2023 with findings of an infiltrative and ulcerated partially obstructing mass in the proximal rectum spanning 10 cm.  Rectal polyps.  Biopsy of the rectal mass showed invasive moderately differentiated adenocarcinoma.  Additional biopsies showed tubular adenomas.  MSI testing was intact/low probability of MSI high.    PDL1 negative    PET/CT 2023 showed hypermetabolic rectal wall thickening compatible with known rectal malignancy.  Multiple small ill-defined hypoechoic hyper metabolic liver lesions compatible with metastatic disease.  Mildly enlarged and mildly hypermetabolic pelvic sidewall and internal iliac lymph node chain adenopathy.  Hypermetabolic lung mass with adjacent nodule.     He underwent liver biopsy and lung biopsy 2024.  Results of both confirmed metastatic  "colorectal cancer.    The patient has been experiencing substantial rectal pain.  He is taking oxycodone every 6 hours with partial relief.  He reports ongoing bowel movements.  No abdominal pain.  No vomiting.  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    Treatment history:  FOLFOX cycle 1-4  FOLFOX panitumumab cycle 5 onward  -Oxaliplatin terminated early for allergic reaction after 4/29/24    FOLFIRI/Panitumumab: 2/3/25 to present    Interval history:  Here for consideration of next cycle of chemo. 1 week ago was taken to the ED with dehydration in context of diarrhea, fatigue and chills. He was diagnosed with multifocal pneumonia, recommended admission, got IV fluids and discharged AMA on antibiotics. He has been drinking better, reports diarrhea improved. Wife Alysia accompanies him and she notes he has been intermittently shaky/off balance. This weekend on call MD called him with concern of acute strokes on MRI. He did not go to the ER. He tells me he is not willing to go to the ER today. He tells me he will go to the ER \"soon\".   He wants to take a break from chemo. He is tearful today.     Past Medical History:   Past Medical History:   Diagnosis Date    Arthritis     Cancer     rectal cancer - diagnosed 2023    Cholelithiasis 2019    Removed    COPD (chronic obstructive pulmonary disease)     Coronary artery disease 2009    Stent - no cardiologist currently    Elevated cholesterol     GERD (gastroesophageal reflux disease)     Hernia 2003    Lower hernia    Perforated ulcer 2019    Sleep apnea     history of; when weighed over 400lbs - no issues following bariatric surgery       Past Surgical History:   Past Surgical History:   Procedure Laterality Date    APPENDECTOMY  " 1983    Removed    BARIATRIC SURGERY  2011    Gastric bypass    BLADDER TUMOR/ULCER BLEEDER CAUTERIZATION      CARDIAC CATHETERIZATION  2009    stent placed    CHOLECYSTECTOMY  2019    Removed    COLONOSCOPY N/A 12/07/2023    Procedure: COLONOSCOPY WITH HOT SNARE POLYPECTOMY AND TATTOO;  Surgeon: Noman Gautam MD;  Location: [facility] ENDOSCOPY;  Service: Gastroenterology;  Laterality: N/A;    PORTACATH PLACEMENT N/A 1/5/2024    Procedure: INSERTION OF PORTACATH WITH ULTRSOUND AND FLUOROSCOPIC GUIDANCE;  Surgeon: Ida Black MD;  Location: [facility] OR;  Service: General;  Laterality: N/A;    JENNIFER-EN-Y         Family History: No family history on file.  Uncle had colon cancer    Social History:   Social History     Socioeconomic History    Marital status:    Tobacco Use    Smoking status: Every Day     Current packs/day: 1.00     Average packs/day: 0.9 packs/day for 60.9 years (53.4 ttl pk-yrs)     Types: Cigarettes     Start date: 9/6/1994    Smokeless tobacco: Never    Tobacco comments:     35 years      pt reports closer to 2 packs per day since cancer diagnosis   Vaping Use    Vaping status: Never Used   Substance and Sexual Activity    Alcohol use: Never    Drug use: Never    Sexual activity: Defer       Medications:     Current Outpatient Medications:     albuterol sulfate  (90 Base) MCG/ACT inhaler, Inhale 2 puffs Every 4 (Four) Hours As Needed for Wheezing., Disp: 18 g, Rfl: 3    amoxicillin-clavulanate (AUGMENTIN) 875-125 MG per tablet, Take 1 tablet by mouth 2 (Two) Times a Day for 7 days., Disp: 14 tablet, Rfl: 0    buPROPion XL (WELLBUTRIN XL) 150 MG 24 hr tablet, Take 1 tablet by mouth Daily., Disp: 90 tablet, Rfl: 1    Calcium Carbonate-Vitamin D 500-5 MG-MCG tablet, Take 1 tablet by mouth 2 (Two) Times a Day., Disp: 60 tablet, Rfl: 3    cetirizine (zyrTEC) 10 MG tablet, Take 1 tablet by mouth Daily., Disp: 30 tablet, Rfl: 2    clindamycin (Clindagel) 1 % gel, Apply 1  Application topically to the appropriate area as directed 2 (Two) Times a Day. Use first, Disp: 30 g, Rfl: 1    Diclofenac Sodium (Voltaren) 1 % gel gel, Apply 4 g topically to the appropriate area as directed 2 (Two) Times a Day., Disp: 150 g, Rfl: 1    diphenoxylate-atropine (LOMOTIL) 2.5-0.025 MG per tablet, TAKE 1 TABLET BY MOUTH EVERY 6 HOURS AS NEEDED FOR DIARRHEA, Disp: 30 tablet, Rfl: 2    docusate sodium (COLACE) 100 MG capsule, Take 1 capsule by mouth 2 (Two) Times a Day., Disp: , Rfl:     doxycycline (VIBRAMYICN) 100 MG tablet, Take 1 tablet by mouth 2 (Two) Times a Day. For 7 days, then 1 tablet daily indefinitely, Disp: 67 tablet, Rfl: 3    DULoxetine (CYMBALTA) 20 MG capsule, Take 1 capsule by mouth Daily. (Patient taking differently: Take 1 capsule by mouth Daily. Pt weaning off medication.per pt.), Disp: 30 capsule, Rfl: 0    famotidine (PEPCID) 20 MG tablet, Take 1 tablet by mouth 2 (Two) Times a Day., Disp: 60 tablet, Rfl: 4    fluticasone (FLONASE) 50 MCG/ACT nasal spray, Administer 2 sprays into the nostril(s) as directed by provider Daily. Administer 2 sprays in each nostril for each dose., Disp: 16 g, Rfl: 4    furosemide (Lasix) 20 MG tablet, Take 1 tablet by mouth Daily., Disp: 30 tablet, Rfl: 3    hydrocortisone 2.5 % ointment, Apply 1 Application topically to the appropriate area as directed 2 (Two) Times a Day., Disp: 60 g, Rfl: 1    Hydrocortisone, Perianal, (ANUSOL-HC) 2.5 % rectal cream, Insert  into the rectum 2 (Two) Times a Day. Indications: Inflamed Hemorrhoids, Disp: 30 g, Rfl: 1    KETOPROFEN-LIDO-GABAPENTIN EX, APPLY 1-2 GRAMS TO AFFECTED AREAS 3-4 TIMES DAILY, Disp: , Rfl:     lidocaine-prilocaine (EMLA) 2.5-2.5 % cream, Apply 1 Application topically to the appropriate area as directed As Needed (45-60 minutes prior to port access.  Cover with saran/plastic wrap.)., Disp: 30 g, Rfl: 3    LORazepam (ATIVAN) 0.5 MG tablet, Take 1 tablet by mouth Take As Directed. 1 tabelt by mouth  30 minutes prior to MRI, take a second tablet at the time of the MRI if needed., Disp: 2 tablet, Rfl: 0    Magic Mouthwash Oral Suspension (diphenhydrAMINE HCl - aluminum & magnesium hydroxide-simethicone - lidocaine - nystatin), Swish and Spit 10 mL by mouth every 6 (Six) Hours For 7 Days., Disp: 300 mL, Rfl: 3    magnesium oxide (MAG-OX) 400 MG tablet, Take 1 tablet by mouth Daily., Disp: 30 tablet, Rfl: 5    Magnesium Oxide -Mg Supplement 400 (240 Mg) MG tablet, 1 tablet Daily., Disp: , Rfl:     mineral oil-hydrophilic petrolatum (AQUAPHOR) ointment, Apply 1 Application topically to the appropriate area as directed As Needed for Dry Skin., Disp: 198 g, Rfl: 0    montelukast (SINGULAIR) 10 MG tablet, Take 1 tablet by mouth Every Night., Disp: 90 tablet, Rfl: 3    Morphine (MS CONTIN) 30 MG 12 hr tablet, Take 1 tablet by mouth 2 (Two) Times a Day for 30 days., Disp: 60 tablet, Rfl: 0    Movantik 25 MG tablet, , Disp: , Rfl:     mupirocin (BACTROBAN) 2 % cream, , Disp: , Rfl:     naloxone (NARCAN) 4 MG/0.1ML nasal spray, 1 spray into the nostril(s) as directed by provider As Needed for Opioid Reversal., Disp: 1 each, Rfl: 0    nystatin susp + lidocaine viscous (MAGIC MOUTHWASH) oral suspension, 5-10 ml swish and spit or swallow QID prn, Disp: 240 mL, Rfl: 3    ondansetron (ZOFRAN) 8 MG tablet, Take 1 tablet by mouth 3 (Three) Times a Day As Needed for Nausea or Vomiting., Disp: 30 tablet, Rfl: 5    oxyCODONE (ROXICODONE) 15 MG immediate release tablet, Take 1 tablet by mouth 5 (Five) Times a Day As Needed for Moderate Pain or Severe Pain for up to 30 days., Disp: 150 tablet, Rfl: 0    pantoprazole (Protonix) 40 MG EC tablet, Take 1 tablet by mouth Daily. Indications: Gastroesophageal Reflux Disease, Disp: 90 tablet, Rfl: 2    potassium chloride ER (K-TAB) 20 MEQ tablet controlled-release ER tablet, Take 2 tablets by mouth Daily., Disp: 60 tablet, Rfl: 3    pregabalin (Lyrica) 300 MG capsule, Take 1 capsule by mouth  "2 (Two) Times a Day for 30 days., Disp: 60 capsule, Rfl: 0    promethazine-dextromethorphan (PROMETHAZINE-DM) 6.25-15 MG/5ML syrup, Take 5 mL by mouth 3 times a day., Disp: 240 mL, Rfl: 0    tiotropium bromide-olodaterol (Stiolto Respimat) 2.5-2.5 MCG/ACT aerosol solution inhaler, INHALE 2 INHALATION(S) BY MOUTH DAILY, Disp: 4 g, Rfl: 5    traZODone (DESYREL) 150 MG tablet, Take 1 tablet by mouth Every Night., Disp: 30 tablet, Rfl: 3    triamcinolone (KENALOG) 0.025 % ointment, Apply 1 Application topically to the appropriate area as directed 2 (Two) Times a Day. Use second if topical treatment #1 ineffective, Disp: 80 g, Rfl: 0    Allergies:   Allergies   Allergen Reactions    Bactrim [Sulfamethoxazole-Trimethoprim] Hives     and blisters    Sulfa Antibiotics Hives     and blisters       Objective     Vital Signs:   Vitals:    07/28/25 0852   BP: (!) 87/57   Pulse: 89   Resp: 16   Temp: 97.8 °F (36.6 °C)   SpO2: 97%   Weight: 83.9 kg (185 lb)   Height: 182.9 cm (72.01\")   PainSc: 5           Body mass index is 25.08 kg/m².   Pain Score    07/28/25 0852   PainSc: 5        ECOG Performance Status: 1 - Symptomatic but completely ambulatory    Physical Exam: General: No acute distress.   HEENT: Normocephalic, atraumatic. Sclera anicteric.   Neck: supple, no adenopathy.   Cardiovascular: regular rate and rhythm. No murmurs.   Respiratory: Normal rate. Clear to auscultation bilaterally.  Abdomen: Soft, nontender, non distended with normoactive bowel sounds.  Lymph: no cervical, supraclavicular or axillary adenopathy.  Neuro: Alert and oriented x 3. No focal deficits.   Ext: 2+ bilateral edema      Laboratory/Imaging Reviewed:   Admission on 07/21/2025, Discharged on 07/21/2025   Component Date Value Ref Range Status    Glucose 07/21/2025 77  65 - 99 mg/dL Final    BUN 07/21/2025 8.0  6.0 - 20.0 mg/dL Final    Creatinine 07/21/2025 0.61 (L)  0.76 - 1.27 mg/dL Final    Sodium 07/21/2025 134 (L)  136 - 145 mmol/L Final    " Potassium 07/21/2025 2.8 (L)  3.5 - 5.2 mmol/L Final    Chloride 07/21/2025 97 (L)  98 - 107 mmol/L Final    CO2 07/21/2025 23.2  22.0 - 29.0 mmol/L Final    Calcium 07/21/2025 8.3 (L)  8.6 - 10.5 mg/dL Final    Total Protein 07/21/2025 5.8 (L)  6.0 - 8.5 g/dL Final    Albumin 07/21/2025 2.4 (L)  3.5 - 5.2 g/dL Final    ALT (SGPT) 07/21/2025 43 (H)  1 - 41 U/L Final    AST (SGOT) 07/21/2025 47 (H)  1 - 40 U/L Final    Alkaline Phosphatase 07/21/2025 117  39 - 117 U/L Final    Total Bilirubin 07/21/2025 0.5  0.0 - 1.2 mg/dL Final    Globulin 07/21/2025 3.4  gm/dL Final    A/G Ratio 07/21/2025 0.7  g/dL Final    BUN/Creatinine Ratio 07/21/2025 13.1  7.0 - 25.0 Final    Anion Gap 07/21/2025 13.8  5.0 - 15.0 mmol/L Final    eGFR 07/21/2025 112.7  >60.0 mL/min/1.73 Final    HS Troponin T 07/21/2025 23 (H)  <22 ng/L Final    Magnesium 07/21/2025 1.9  1.6 - 2.6 mg/dL Final    Extra Tube 07/21/2025 Hold for add-ons.   Final    Auto resulted.    Extra Tube 07/21/2025 hold for add-on   Final    Auto resulted    Extra Tube 07/21/2025 Hold for add-ons.   Final    Auto resulted.    Extra Tube 07/21/2025 Hold for add-ons.   Final    Auto resulted    WBC 07/21/2025 3.89  3.40 - 10.80 10*3/mm3 Final    RBC 07/21/2025 3.95 (L)  4.14 - 5.80 10*6/mm3 Final    Hemoglobin 07/21/2025 11.5 (L)  13.0 - 17.7 g/dL Final    Hematocrit 07/21/2025 34.1 (L)  37.5 - 51.0 % Final    MCV 07/21/2025 86.3  79.0 - 97.0 fL Final    MCH 07/21/2025 29.1  26.6 - 33.0 pg Final    MCHC 07/21/2025 33.7  31.5 - 35.7 g/dL Final    RDW 07/21/2025 17.2 (H)  12.3 - 15.4 % Final    RDW-SD 07/21/2025 54.5 (H)  37.0 - 54.0 fl Final    MPV 07/21/2025 11.1  6.0 - 12.0 fL Final    Platelets 07/21/2025 168  140 - 450 10*3/mm3 Final    Neutrophil % 07/21/2025 71.1  42.7 - 76.0 % Final    Lymphocyte % 07/21/2025 21.3  19.6 - 45.3 % Final    Monocyte % 07/21/2025 3.9 (L)  5.0 - 12.0 % Final    Eosinophil % 07/21/2025 2.6  0.3 - 6.2 % Final    Basophil % 07/21/2025 0.3  0.0  - 1.5 % Final    Immature Grans % 07/21/2025 0.8 (H)  0.0 - 0.5 % Final    Neutrophils, Absolute 07/21/2025 2.77  1.70 - 7.00 10*3/mm3 Final    Lymphocytes, Absolute 07/21/2025 0.83  0.70 - 3.10 10*3/mm3 Final    Monocytes, Absolute 07/21/2025 0.15  0.10 - 0.90 10*3/mm3 Final    Eosinophils, Absolute 07/21/2025 0.10  0.00 - 0.40 10*3/mm3 Final    Basophils, Absolute 07/21/2025 0.01  0.00 - 0.20 10*3/mm3 Final    Immature Grans, Absolute 07/21/2025 0.03  0.00 - 0.05 10*3/mm3 Final    nRBC 07/21/2025 0.0  0.0 - 0.2 /100 WBC Final    Campylobacter 07/21/2025 Not Detected  Not Detected Final    Plesiomonas shigelloides 07/21/2025 Not Detected  Not Detected Final    Salmonella 07/21/2025 Not Detected  Not Detected Final    Vibrio 07/21/2025 Not Detected  Not Detected Final    Vibrio cholerae 07/21/2025 Not Detected  Not Detected Final    Yersinia enterocolitica 07/21/2025 Not Detected  Not Detected Final    Enteroaggregative E. coli (EAEC) 07/21/2025 Not Detected  Not Detected Final    Enteropathogenic E. coli (EPEC) 07/21/2025 Not Detected  Not Detected Final    Enterotoxigenic E. coli (ETEC) lt/* 07/21/2025 Not Detected  Not Detected Final    Shiga-like toxin-producing E. coli* 07/21/2025 Not Detected  Not Detected Final    Shigella/Enteroinvasive E. coli (E* 07/21/2025 Not Detected  Not Detected Final    Cryptosporidium 07/21/2025 Not Detected  Not Detected Final    Cyclospora cayetanensis 07/21/2025 Not Detected  Not Detected Final    Entamoeba histolytica 07/21/2025 Not Detected  Not Detected Final    Giardia lamblia 07/21/2025 Not Detected  Not Detected Final    Adenovirus F40/41 07/21/2025 Not Detected  Not Detected Final    Astrovirus 07/21/2025 Not Detected  Not Detected Final    Norovirus GI/GII 07/21/2025 Not Detected  Not Detected Final    Rotavirus A 07/21/2025 Not Detected  Not Detected Final    Sapovirus (I, II, IV or V) 07/21/2025 Not Detected  Not Detected Final    C Diff GDH Ag 07/21/2025 Negative   Negative Final    C.diff Toxin Ag 07/21/2025 Negative  Negative Final    HS Troponin T 07/21/2025 20  <22 ng/L Final    Troponin T Numeric Delta 07/21/2025 -3  ng/L Final    Troponin T % Delta 07/21/2025 -13  Abnormal if >/= 20% Final    Procalcitonin 07/21/2025 0.09  0.00 - 0.25 ng/mL Final    COVID19 07/21/2025 Not Detected  Not Detected - Ref. Range Final    Influenza A PCR 07/21/2025 Not Detected  Not Detected Final    Influenza B PCR 07/21/2025 Not Detected  Not Detected Final    RSV, PCR 07/21/2025 Not Detected  Not Detected Final    Lactate 07/21/2025 1.7  0.5 - 2.0 mmol/L Final     Tempus xt: APC gene TP53; Negative ADRIANNA, BRAF, NRAS, TMB stable. PDL1 negative  MRI Brain With & Without Contrast  Addendum Date: 7/26/2025  Addendum: The provider on-call Dr. César Hawk was notified at 12:40 p.m.  This report was signed and finalized on 7/26/2025 12:41 PM by Armen Little DO.      Result Date: 7/26/2025  Narrative: PROCEDURE: MRI BRAIN W WO CONTRAST-  HISTORY: dizziness, colon cnacer; J74-Loetfgfwn neoplasm of rectum; C78.7-Secondary malignant neoplasm of liver and intrahepatic bile duct  PROCEDURE: Multiplanar multisequence imaging of the brain was performed without the use of intravenous contrast.  COMPARISON: None.  FINDINGS: The midbrain, jayme, cerebellum and craniocervical junction are unremarkable. The sella and pituitary gland are within normal limits.  Mild cerebral atrophy and nonspecific white matter changes are present. There are a few small foci of restricted diffusion in the centrum semiovale bilaterally consistent with acute/subacute infarcts. There is no mass effect or midline shift. No abnormal enhancing lesions to suggest metastatic disease.  The visualized paranasal sinuses and mastoid air cells are clear. The orbits are symmetrical.        Impression: There are a few small foci of restricted diffusion in the centrum semiovale bilaterally consistent with acute/subacute infarcts. No abnormal  enhancing lesions to suggest metastatic disease.  Attempts are being made to reach the ordering provider on call.      This report was signed and finalized on 7/26/2025 12:37 PM by Armen Little DO.      CT Abdomen Pelvis With Contrast  CT Abdomen Pelvis With Contrast, CT Chest With Contrast Diagnostic  Result Date: 7/23/2025  Narrative: PROCEDURE: CT CHEST W CONTRAST DIAGNOSTIC-, CT ABDOMEN PELVIS W CONTRAST-  HISTORY: lung metastasis; C92-Qvyxpkecx neoplasm of rectum; C78.7-Secondary malignant neoplasm of liver and intrahepatic bile duct  COMPARISON: 4/24/2025.  PROCEDURE: Axial images were obtained from the thoracic inlet through the pubic symphysis following the administration of Isovue 300 and oral contrast.   FINDINGS:  CHEST: There is no evidence of mediastinal or axillary adenopathy. Heart size is normal. There are no pleural or pericardial effusions. There are groundglass opacities within the bilateral lungs with minimal involvement of the right upper lobe and mild involvement of the left upper lobe. There is more prominent involvement of the other 3 lobes. Findings likely represent pneumonia. There is a posterior medial left lower lobe cavitary mass measuring 38 mm in transverse diameter, decreased in size from previous measurement of 43 mm. There are changes of emphysema. Bone windows reveal no lytic or destructive lesions. There is evidence of old calcified granulomatous disease. There is mild wall thickening of the distal esophagus, possibly representing esophagitis. No bony destructive lesion. There is a stable right IJ chest port.  ABDOMEN: There is fatty infiltration of the liver. The gallbladder is surgically absent. The spleen is unremarkable. No adrenal mass is present.  The pancreas is normal. Within the superior pole of the right kidney posteriorly there is a hypodense renal lesion most consistent with a cyst. There is a second similar-appearing lesion within the inferior pole the right kidney.  These are stable from prior. The left kidney is unremarkable. No stones or hydronephrosis. The aorta is normal in caliber. There is no free fluid or adenopathy. The abdominal portions of the GI tract are unremarkable. No bony destructive lesions.  PELVIS: The appendix is not identified. No free fluid. The prostate is normal in size with calcification. The urinary bladder is mostly collapsed. There is wall thickening of the rectosigmoid colon asymmetrically with enhancement consistent with patient's known rectal cancer. This appears similar to the prior. There is mild filtration of the fat surrounding the rectum. There is a right external iliac lymph node measuring 14 mm, not significant changed in size from prior. No bony destructive lesion.       Impression: Interval decrease in size of left lower lobe cavitary mass.  New bilateral patchy airspace disease likely resenting pneumonia. Recommend follow-up to resolution.  Irregular wall thickening of the rectosigmoid colon stable to mildly worsened compared to prior consistent with patient's known rectal cancer.  Stable right external iliac lymph node.  Fatty liver.   This study was performed with techniques to keep radiation doses as low as reasonably achievable (ALARA). Individualized dose reduction techniques using automated exposure control or adjustment of mA and/or kV according to the patient size were employed.    Images were reviewed, interpreted, and dictated by Dr. Gabriella Rodriguez MD Transcribed by Kameron Tejeda PA-C.  This report was signed and finalized on 7/23/2025 2:14 PM by Gabriella Rodriguez MD.      XR Chest 1 View  Result Date: 7/21/2025  Narrative: PROCEDURE: XR CHEST 1 VW-  HISTORY: Weak/Dizzy/AMS triage protocol, received chemotherapy this week and has had diarrhea for 4 days, patient states that he is dehydrated.  COMPARISON: May 14, 2025..  FINDINGS: The heart is normal in size. There is bronchial wall thickening in the lung bases. There our new opacities  in the right lung base, possible pneumonia.. The mediastinum is unremarkable. There is no pneumothorax.  There are no acute osseous abnormalities. Right internal jugular port appears to be in stable position. Apical lordotic positioning noted.      Impression: New right basilar airspace disease, possible pneumonia; recommend follow-up..  Stable position right IJ port from prior.   This report was signed and finalized on 7/21/2025 12:16 PM by Gabriella Rodriguez MD.          Procedures    Assessment / Plan      Assessment/Plan:     1.  Rectal cancer   2.  Liver metastases  3.  Lung metastasis  4.  Chemo monitoring  5.  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  -He was found to have biopsy-proven metastasis in the liver and lung.  His case was presented at multidisciplinary tumor board.  -Because of metastatic disease to both the lung and liver I do not think he will be downstage to resectable disease.  -Tempus results which are notable for an APC mutation, T p53 mutation, negative for KRAS, BRAF, NRAS, tumor mutation burden stable.  Notch 1 VUS.  -CBC adequate and CMP pending for treatment today with maintenance 5-FU and panitumumab 9/16/24  Chemotherapy orders and premedications signed9/16/24  -We reviewed his imaging which is consistent with excellent disease control in early July with normalization of CEA. He has had persistent rectal pain with escalating oxycodone requirements over the past couple of months, but no severe recent increase in pain, fever. WBC normal. 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 I reviewed his EUA notes. Increase doxy to BID. No surgical intervention.  -Consolidative radiotherapy to the rectal tumor is not something he wants to pursue initially but has now undergone consultation.  Maintenance 5FU/panitumumab on hold since early December.  I reviewed his last 4 serial CT images which do so  "progressive and in his lung. Escalated to FOLFIRI panitumumab 2/2025 to present  -Hold treatment today for hypotension, acute strokes, feeling poorly. I am concerned with his leg swelling and low BP that he also needs cardiac workup He declines IVF today or assessment in the ED. He tells me he is planning to go to the ED \"soon,\" possibly tonight or tomorrow but not willing to go currently. I encouraged him to take an ASA in the interim. I did place an outpatient neurology referral but with his ongoing symptoms and recent acute CVA, I stressed this would not replace urgent workup.    6.  Cancer related pain  -Now following with palliative care and pain. Well controlled on lyrica, oxycodone and MS contin.     7.  Panitumumab induced rash  -Continue doxycycline and emollients  -Stop voltaren gel, start hydrocortisone 2.5 % to palms    Follow Up:   2 weeks     Brenda Cronin MD  Hematology and Oncology       Time spent on the day of service was 40 min inclusive of time before, during, and after office visit on record review, medically appropriate history and physical, counseling patient, ordering tests, documenting in the medical record.  "

## 2025-07-28 NOTE — TELEPHONE ENCOUNTER
Upcoming Appts  With Oncology (Brenda Cronin MD)  07/28/2025 at 8:45 AM    Last appt 7/14/25  Last refill 6/30/2025 30 tablets 0 refills

## 2025-07-29 ENCOUNTER — APPOINTMENT (OUTPATIENT)
Dept: CT IMAGING | Facility: HOSPITAL | Age: 55
End: 2025-07-29
Payer: MEDICAID

## 2025-07-29 ENCOUNTER — HOSPITAL ENCOUNTER (INPATIENT)
Facility: HOSPITAL | Age: 55
LOS: 2 days | Discharge: HOME | End: 2025-07-31
Attending: EMERGENCY MEDICINE | Admitting: INTERNAL MEDICINE
Payer: MEDICAID

## 2025-07-29 DIAGNOSIS — R41.841 COGNITIVE COMMUNICATION DEFICIT: ICD-10-CM

## 2025-07-29 DIAGNOSIS — I63.10 CEREBROVASCULAR ACCIDENT (CVA) DUE TO EMBOLISM OF PRECEREBRAL ARTERY: ICD-10-CM

## 2025-07-29 DIAGNOSIS — I49.9 CARDIAC ARRHYTHMIA, UNSPECIFIED: ICD-10-CM

## 2025-07-29 DIAGNOSIS — J18.9 PNEUMONIA OF BOTH LUNGS DUE TO INFECTIOUS ORGANISM, UNSPECIFIED PART OF LUNG: Primary | ICD-10-CM

## 2025-07-29 PROBLEM — A41.9 SEPSIS ASSOCIATED HYPOTENSION: Status: ACTIVE | Noted: 2025-07-29

## 2025-07-29 PROBLEM — I95.9 SEPSIS ASSOCIATED HYPOTENSION: Status: ACTIVE | Noted: 2025-07-29

## 2025-07-29 PROBLEM — T45.1X5A ANEMIA DUE TO CHEMOTHERAPY: Status: ACTIVE | Noted: 2025-07-29

## 2025-07-29 PROBLEM — D64.81 ANEMIA DUE TO CHEMOTHERAPY: Status: ACTIVE | Noted: 2025-07-29

## 2025-07-29 LAB
ALBUMIN SERPL-MCNC: 2.4 G/DL (ref 3.5–5.2)
ALBUMIN/GLOB SERPL: 0.8 G/DL
ALP SERPL-CCNC: 101 U/L (ref 39–117)
ALT SERPL W P-5'-P-CCNC: 29 U/L (ref 1–41)
ANION GAP SERPL CALCULATED.3IONS-SCNC: 9 MMOL/L (ref 5–15)
AST SERPL-CCNC: 16 U/L (ref 1–40)
BASOPHILS # BLD MANUAL: 0.04 10*3/MM3 (ref 0–0.2)
BASOPHILS NFR BLD MANUAL: 1 % (ref 0–1.5)
BILIRUB SERPL-MCNC: 0.2 MG/DL (ref 0–1.2)
BILIRUB UR QL STRIP: NEGATIVE
BUN SERPL-MCNC: 7.3 MG/DL (ref 6–20)
BUN/CREAT SERPL: 12.2 (ref 7–25)
CALCIUM SPEC-SCNC: 8 MG/DL (ref 8.6–10.5)
CHLORIDE SERPL-SCNC: 107 MMOL/L (ref 98–107)
CLARITY UR: CLEAR
CO2 SERPL-SCNC: 25 MMOL/L (ref 22–29)
COLOR UR: YELLOW
CREAT SERPL-MCNC: 0.6 MG/DL (ref 0.76–1.27)
D-LACTATE SERPL-SCNC: 0.6 MMOL/L (ref 0.5–2)
D-LACTATE SERPL-SCNC: 3.5 MMOL/L (ref 0.5–2)
D-LACTATE SERPL-SCNC: 4.6 MMOL/L (ref 0.5–2)
DEPRECATED RDW RBC AUTO: 64.4 FL (ref 37–54)
EGFRCR SERPLBLD CKD-EPI 2021: 113.3 ML/MIN/1.73
ELLIPTOCYTES BLD QL SMEAR: ABNORMAL
EOSINOPHIL # BLD MANUAL: 0.13 10*3/MM3 (ref 0–0.4)
EOSINOPHIL NFR BLD MANUAL: 3 % (ref 0.3–6.2)
ERYTHROCYTE [DISTWIDTH] IN BLOOD BY AUTOMATED COUNT: 18.7 % (ref 12.3–15.4)
GLOBULIN UR ELPH-MCNC: 3 GM/DL
GLUCOSE BLDC GLUCOMTR-MCNC: 80 MG/DL (ref 70–130)
GLUCOSE SERPL-MCNC: 89 MG/DL (ref 65–99)
GLUCOSE UR STRIP-MCNC: NEGATIVE MG/DL
HCT VFR BLD AUTO: 37.1 % (ref 37.5–51)
HGB BLD-MCNC: 11.3 G/DL (ref 13–17.7)
HGB UR QL STRIP.AUTO: NEGATIVE
KETONES UR QL STRIP: NEGATIVE
LEUKOCYTE ESTERASE UR QL STRIP.AUTO: NEGATIVE
LYMPHOCYTES # BLD MANUAL: 0.94 10*3/MM3 (ref 0.7–3.1)
LYMPHOCYTES NFR BLD MANUAL: 10 % (ref 5–12)
MAGNESIUM SERPL-MCNC: 1.9 MG/DL (ref 1.6–2.6)
MCH RBC QN AUTO: 28.6 PG (ref 26.6–33)
MCHC RBC AUTO-ENTMCNC: 30.5 G/DL (ref 31.5–35.7)
MCV RBC AUTO: 93.9 FL (ref 79–97)
MONOCYTES # BLD: 0.45 10*3/MM3 (ref 0.1–0.9)
NEUTROPHILS # BLD AUTO: 2.9 10*3/MM3 (ref 1.7–7)
NEUTROPHILS NFR BLD MANUAL: 56 % (ref 42.7–76)
NEUTS BAND NFR BLD MANUAL: 9 % (ref 0–5)
NITRITE UR QL STRIP: NEGATIVE
NT-PROBNP SERPL-MCNC: 224.1 PG/ML (ref 0–900)
PH UR STRIP.AUTO: 7.5 [PH] (ref 5–8)
PLAT MORPH BLD: NORMAL
PLATELET # BLD AUTO: 383 10*3/MM3 (ref 140–450)
PMV BLD AUTO: 10.3 FL (ref 6–12)
POTASSIUM SERPL-SCNC: 4.6 MMOL/L (ref 3.5–5.2)
PROCALCITONIN SERPL-MCNC: 0.04 NG/ML (ref 0–0.25)
PROT SERPL-MCNC: 5.4 G/DL (ref 6–8.5)
PROT UR QL STRIP: NEGATIVE
RBC # BLD AUTO: 3.95 10*6/MM3 (ref 4.14–5.8)
SODIUM SERPL-SCNC: 141 MMOL/L (ref 136–145)
SP GR UR STRIP: 1.01 (ref 1–1.03)
UROBILINOGEN UR QL STRIP: NORMAL
VARIANT LYMPHS NFR BLD MANUAL: 19 % (ref 19.6–45.3)
VARIANT LYMPHS NFR BLD MANUAL: 2 % (ref 0–5)
WBC MORPH BLD: NORMAL
WBC NRBC COR # BLD AUTO: 4.46 10*3/MM3 (ref 3.4–10.8)

## 2025-07-29 PROCEDURE — 94799 UNLISTED PULMONARY SVC/PX: CPT

## 2025-07-29 PROCEDURE — 25810000003 SODIUM CHLORIDE 0.9 % SOLUTION: Performed by: PHYSICIAN ASSISTANT

## 2025-07-29 PROCEDURE — 25010000002 CEFTRIAXONE PER 250 MG: Performed by: PHYSICIAN ASSISTANT

## 2025-07-29 PROCEDURE — 99285 EMERGENCY DEPT VISIT HI MDM: CPT

## 2025-07-29 PROCEDURE — 25810000003 SODIUM CHLORIDE 0.9 % SOLUTION: Performed by: PEDIATRICS

## 2025-07-29 PROCEDURE — 70496 CT ANGIOGRAPHY HEAD: CPT

## 2025-07-29 PROCEDURE — 81003 URINALYSIS AUTO W/O SCOPE: CPT | Performed by: PHYSICIAN ASSISTANT

## 2025-07-29 PROCEDURE — 70498 CT ANGIOGRAPHY NECK: CPT

## 2025-07-29 PROCEDURE — 80053 COMPREHEN METABOLIC PANEL: CPT | Performed by: PHYSICIAN ASSISTANT

## 2025-07-29 PROCEDURE — 83605 ASSAY OF LACTIC ACID: CPT | Performed by: PHYSICIAN ASSISTANT

## 2025-07-29 PROCEDURE — 82948 REAGENT STRIP/BLOOD GLUCOSE: CPT

## 2025-07-29 PROCEDURE — 99222 1ST HOSP IP/OBS MODERATE 55: CPT | Performed by: PEDIATRICS

## 2025-07-29 PROCEDURE — 94664 DEMO&/EVAL PT USE INHALER: CPT

## 2025-07-29 PROCEDURE — 84145 PROCALCITONIN (PCT): CPT | Performed by: PHYSICIAN ASSISTANT

## 2025-07-29 PROCEDURE — 70450 CT HEAD/BRAIN W/O DYE: CPT

## 2025-07-29 PROCEDURE — 85025 COMPLETE CBC W/AUTO DIFF WBC: CPT | Performed by: PHYSICIAN ASSISTANT

## 2025-07-29 PROCEDURE — 83880 ASSAY OF NATRIURETIC PEPTIDE: CPT | Performed by: PHYSICIAN ASSISTANT

## 2025-07-29 PROCEDURE — 99222 1ST HOSP IP/OBS MODERATE 55: CPT | Performed by: NURSE PRACTITIONER

## 2025-07-29 PROCEDURE — 83735 ASSAY OF MAGNESIUM: CPT | Performed by: PHYSICIAN ASSISTANT

## 2025-07-29 PROCEDURE — 25010000002 AZITHROMYCIN PER 500 MG: Performed by: PHYSICIAN ASSISTANT

## 2025-07-29 PROCEDURE — 25810000003 SODIUM CHLORIDE 0.9 % SOLUTION 250 ML FLEX CONT: Performed by: PHYSICIAN ASSISTANT

## 2025-07-29 PROCEDURE — 25510000001 IOPAMIDOL PER 1 ML: Performed by: PEDIATRICS

## 2025-07-29 PROCEDURE — 36415 COLL VENOUS BLD VENIPUNCTURE: CPT

## 2025-07-29 PROCEDURE — 87040 BLOOD CULTURE FOR BACTERIA: CPT | Performed by: PHYSICIAN ASSISTANT

## 2025-07-29 RX ORDER — SODIUM CHLORIDE 9 MG/ML
40 INJECTION, SOLUTION INTRAVENOUS AS NEEDED
Status: DISCONTINUED | OUTPATIENT
Start: 2025-07-29 | End: 2025-07-30

## 2025-07-29 RX ORDER — IOPAMIDOL 755 MG/ML
75 INJECTION, SOLUTION INTRAVASCULAR
Status: COMPLETED | OUTPATIENT
Start: 2025-07-29 | End: 2025-07-29

## 2025-07-29 RX ORDER — SODIUM CHLORIDE 0.9 % (FLUSH) 0.9 %
10 SYRINGE (ML) INJECTION AS NEEDED
Status: DISCONTINUED | OUTPATIENT
Start: 2025-07-29 | End: 2025-07-30

## 2025-07-29 RX ORDER — PREGABALIN 150 MG/1
300 CAPSULE ORAL 2 TIMES DAILY
Status: DISCONTINUED | OUTPATIENT
Start: 2025-07-29 | End: 2025-07-31 | Stop reason: HOSPADM

## 2025-07-29 RX ORDER — SODIUM CHLORIDE 9 MG/ML
40 INJECTION, SOLUTION INTRAVENOUS AS NEEDED
Status: DISCONTINUED | OUTPATIENT
Start: 2025-07-29 | End: 2025-07-31 | Stop reason: HOSPADM

## 2025-07-29 RX ORDER — SODIUM CHLORIDE 0.9 % (FLUSH) 0.9 %
10 SYRINGE (ML) INJECTION AS NEEDED
Status: DISCONTINUED | OUTPATIENT
Start: 2025-07-29 | End: 2025-07-31 | Stop reason: HOSPADM

## 2025-07-29 RX ORDER — ARFORMOTEROL TARTRATE 15 UG/2ML
15 SOLUTION RESPIRATORY (INHALATION)
Status: DISCONTINUED | OUTPATIENT
Start: 2025-07-29 | End: 2025-07-31 | Stop reason: HOSPADM

## 2025-07-29 RX ORDER — OXYCODONE HYDROCHLORIDE 15 MG/1
30 TABLET ORAL EVERY 12 HOURS
Refills: 0 | Status: DISCONTINUED | OUTPATIENT
Start: 2025-07-29 | End: 2025-07-31 | Stop reason: HOSPADM

## 2025-07-29 RX ORDER — AMOXICILLIN 250 MG
2 CAPSULE ORAL 2 TIMES DAILY PRN
Status: DISCONTINUED | OUTPATIENT
Start: 2025-07-29 | End: 2025-07-31 | Stop reason: HOSPADM

## 2025-07-29 RX ORDER — ACETAMINOPHEN 325 MG/1
650 TABLET ORAL EVERY 4 HOURS PRN
Status: DISCONTINUED | OUTPATIENT
Start: 2025-07-29 | End: 2025-07-31 | Stop reason: HOSPADM

## 2025-07-29 RX ORDER — MORPHINE SULFATE 30 MG/1
30 TABLET, FILM COATED, EXTENDED RELEASE ORAL 2 TIMES DAILY
Refills: 0 | Status: DISCONTINUED | OUTPATIENT
Start: 2025-07-29 | End: 2025-07-31 | Stop reason: HOSPADM

## 2025-07-29 RX ORDER — CETIRIZINE HYDROCHLORIDE 10 MG/1
10 TABLET ORAL DAILY
Status: DISCONTINUED | OUTPATIENT
Start: 2025-07-30 | End: 2025-07-31 | Stop reason: HOSPADM

## 2025-07-29 RX ORDER — FAMOTIDINE 20 MG/1
20 TABLET, FILM COATED ORAL 2 TIMES DAILY
Status: DISCONTINUED | OUTPATIENT
Start: 2025-07-29 | End: 2025-07-31 | Stop reason: HOSPADM

## 2025-07-29 RX ORDER — BISACODYL 10 MG
10 SUPPOSITORY, RECTAL RECTAL DAILY PRN
Status: DISCONTINUED | OUTPATIENT
Start: 2025-07-29 | End: 2025-07-31 | Stop reason: HOSPADM

## 2025-07-29 RX ORDER — ATORVASTATIN CALCIUM 20 MG/1
20 TABLET, FILM COATED ORAL NIGHTLY
Status: DISCONTINUED | OUTPATIENT
Start: 2025-07-29 | End: 2025-07-31 | Stop reason: HOSPADM

## 2025-07-29 RX ORDER — FUROSEMIDE 20 MG/1
20 TABLET ORAL DAILY
Status: DISCONTINUED | OUTPATIENT
Start: 2025-07-30 | End: 2025-07-31 | Stop reason: HOSPADM

## 2025-07-29 RX ORDER — ASPIRIN 325 MG
325 TABLET ORAL DAILY
Status: DISCONTINUED | OUTPATIENT
Start: 2025-07-29 | End: 2025-07-31 | Stop reason: HOSPADM

## 2025-07-29 RX ORDER — BUPROPION HYDROCHLORIDE 150 MG/1
150 TABLET ORAL DAILY
Status: DISCONTINUED | OUTPATIENT
Start: 2025-07-30 | End: 2025-07-31 | Stop reason: HOSPADM

## 2025-07-29 RX ORDER — SODIUM CHLORIDE 0.9 % (FLUSH) 0.9 %
10 SYRINGE (ML) INJECTION EVERY 12 HOURS SCHEDULED
Status: DISCONTINUED | OUTPATIENT
Start: 2025-07-29 | End: 2025-07-30

## 2025-07-29 RX ORDER — PANTOPRAZOLE SODIUM 40 MG/1
40 TABLET, DELAYED RELEASE ORAL DAILY
Status: DISCONTINUED | OUTPATIENT
Start: 2025-07-30 | End: 2025-07-31 | Stop reason: HOSPADM

## 2025-07-29 RX ORDER — NITROGLYCERIN 0.4 MG/1
0.4 TABLET SUBLINGUAL
Status: DISCONTINUED | OUTPATIENT
Start: 2025-07-29 | End: 2025-07-30

## 2025-07-29 RX ORDER — ASPIRIN 300 MG/1
300 SUPPOSITORY RECTAL DAILY
Status: DISCONTINUED | OUTPATIENT
Start: 2025-07-29 | End: 2025-07-31 | Stop reason: HOSPADM

## 2025-07-29 RX ORDER — SODIUM CHLORIDE 0.9 % (FLUSH) 0.9 %
10 SYRINGE (ML) INJECTION EVERY 12 HOURS SCHEDULED
Status: DISCONTINUED | OUTPATIENT
Start: 2025-07-29 | End: 2025-07-31 | Stop reason: HOSPADM

## 2025-07-29 RX ORDER — BISACODYL 5 MG/1
5 TABLET, DELAYED RELEASE ORAL DAILY PRN
Status: DISCONTINUED | OUTPATIENT
Start: 2025-07-29 | End: 2025-07-31 | Stop reason: HOSPADM

## 2025-07-29 RX ORDER — POLYETHYLENE GLYCOL 3350 17 G/17G
17 POWDER, FOR SOLUTION ORAL DAILY PRN
Status: DISCONTINUED | OUTPATIENT
Start: 2025-07-29 | End: 2025-07-31 | Stop reason: HOSPADM

## 2025-07-29 RX ORDER — MONTELUKAST SODIUM 10 MG/1
10 TABLET ORAL NIGHTLY
Status: DISCONTINUED | OUTPATIENT
Start: 2025-07-29 | End: 2025-07-31 | Stop reason: HOSPADM

## 2025-07-29 RX ADMIN — SODIUM CHLORIDE 1000 ML: 9 INJECTION, SOLUTION INTRAVENOUS at 17:09

## 2025-07-29 RX ADMIN — MORPHINE SULFATE 30 MG: 30 TABLET, FILM COATED, EXTENDED RELEASE ORAL at 21:53

## 2025-07-29 RX ADMIN — MONTELUKAST 10 MG: 10 TABLET, FILM COATED ORAL at 21:53

## 2025-07-29 RX ADMIN — PREGABALIN 300 MG: 150 CAPSULE ORAL at 21:53

## 2025-07-29 RX ADMIN — Medication 10 ML: at 20:03

## 2025-07-29 RX ADMIN — FAMOTIDINE 20 MG: 20 TABLET, FILM COATED ORAL at 21:53

## 2025-07-29 RX ADMIN — ASPIRIN 325 MG: 325 TABLET ORAL at 16:37

## 2025-07-29 RX ADMIN — TRAZODONE HYDROCHLORIDE 150 MG: 50 TABLET ORAL at 21:52

## 2025-07-29 RX ADMIN — ARFORMOTEROL TARTRATE 15 MCG: 15 SOLUTION RESPIRATORY (INHALATION) at 22:00

## 2025-07-29 RX ADMIN — Medication 5 MG: at 20:02

## 2025-07-29 RX ADMIN — SODIUM CHLORIDE 1000 ML: 9 INJECTION, SOLUTION INTRAVENOUS at 16:27

## 2025-07-29 RX ADMIN — Medication 10 ML: at 20:04

## 2025-07-29 RX ADMIN — CEFTRIAXONE SODIUM 1000 MG: 1 INJECTION, POWDER, FOR SOLUTION INTRAMUSCULAR; INTRAVENOUS at 17:10

## 2025-07-29 RX ADMIN — ATORVASTATIN CALCIUM 20 MG: 20 TABLET, FILM COATED ORAL at 20:02

## 2025-07-29 RX ADMIN — IOPAMIDOL 75 ML: 755 INJECTION, SOLUTION INTRAVENOUS at 18:13

## 2025-07-29 RX ADMIN — SODIUM CHLORIDE 1000 ML: 9 INJECTION, SOLUTION INTRAVENOUS at 20:01

## 2025-07-29 RX ADMIN — OXYCODONE HYDROCHLORIDE 30 MG: 15 TABLET ORAL at 21:53

## 2025-07-29 RX ADMIN — SODIUM CHLORIDE 500 MG: 9 INJECTION, SOLUTION INTRAVENOUS at 17:51

## 2025-07-29 NOTE — CONSULTS
Stroke Consult Note    Patient Name: Edward Powell   MRN: [id number]  Age: 55 y.o.  Sex: male  : 1970    Primary Care Physician: Westley Gonzales DO  Referring Physician: Javier ORDONEZ    TIME STROKE TEAM CALLED: 1330 EST     TIME PATIENT SEEN: 1355 EST    Handedness: Right  Race:     Chief Complaint/Reason for Consultation: Bilateral strokes    HPI: Edward Powell is a 55-year-old male with a PMH significant for stage IV rectal cancer (liver/lung mets, patient of Dr. Cronin on chemo), recent pneumonia, tobacco abuse, LAURA, COPD, perforated ulcer, anemia, neuropathy, and GERD who presents to [facility] ED secondary to recent MRI that revealed acute and subacute embolic appearing infarcts in each cerebral hemisphere.  He reports that he was contacted by his oncology office and instructed to come to the ED secondary to strokes on his MRI.  He waited until today to come as his wife's birthday was yesterday.  On arrival to the ED, stroke neurology was consulted for further evaluation.    BP 87/57.  NIH 3 for LUE sensory deficit and BLE drift.  Patient reports that he can typically walk without an assistive device.  He has been using a cane secondary to increased bilateral lower extremity edema.  He reports that his oncologist obtained an MRI as he reported he was forgetting things.  MRI results discussed at length.  He will be admitted for further evaluation.    Last Known Normal Date/Time: Unknown    Review of Systems   Constitutional:  Positive for fatigue. Negative for chills.   HENT:  Negative for congestion and trouble swallowing.    Eyes:  Negative for photophobia and visual disturbance.   Respiratory:  Positive for shortness of breath. Negative for cough.    Cardiovascular:  Positive for leg swelling.   Gastrointestinal:  Negative for nausea and vomiting.   Musculoskeletal:  Positive for arthralgias and gait problem.   Neurological:  Positive for weakness and numbness. Negative for dizziness,  tremors, facial asymmetry, speech difficulty and headaches.      Past Medical History:   Diagnosis Date    Arthritis     Cancer     rectal cancer - diagnosed 2023    Cholelithiasis 2019    Removed    COPD (chronic obstructive pulmonary disease)     Coronary artery disease 2009    Stent - no cardiologist currently    Elevated cholesterol     GERD (gastroesophageal reflux disease)     Hernia 2003    Lower hernia    Perforated ulcer 2019    Sleep apnea     history of; when weighed over 400lbs - no issues following bariatric surgery     Past Surgical History:   Procedure Laterality Date    APPENDECTOMY  1983    Removed    BARIATRIC SURGERY  2011    Gastric bypass    BLADDER TUMOR/ULCER BLEEDER CAUTERIZATION      CARDIAC CATHETERIZATION  2009    stent placed    CHOLECYSTECTOMY  2019    Removed    COLONOSCOPY N/A 12/07/2023    Procedure: COLONOSCOPY WITH HOT SNARE POLYPECTOMY AND TATTOO;  Surgeon: Noman Gautam MD;  Location: [facility] ENDOSCOPY;  Service: Gastroenterology;  Laterality: N/A;    PORTACATH PLACEMENT N/A 1/5/2024    Procedure: INSERTION OF PORTACATH WITH ULTRSOUND AND FLUOROSCOPIC GUIDANCE;  Surgeon: Ida Black MD;  Location: [facility] OR;  Service: General;  Laterality: N/A;    JENNIFER-EN-Y       No family history on file.  Social History     Socioeconomic History    Marital status:    Tobacco Use    Smoking status: Every Day     Current packs/day: 1.00     Average packs/day: 0.9 packs/day for 60.9 years (53.4 ttl pk-yrs)     Types: Cigarettes     Start date: 9/6/1994    Smokeless tobacco: Never    Tobacco comments:     35 years      pt reports closer to 2 packs per day since cancer diagnosis   Vaping Use    Vaping status: Never Used   Substance and Sexual Activity    Alcohol use: Never    Drug use: Never    Sexual activity: Defer     Allergies   Allergen Reactions    Bactrim [Sulfamethoxazole-Trimethoprim] Hives     and blisters    Sulfa Antibiotics Hives     and blisters     Prior to  Admission medications    Medication Sig Start Date End Date Taking? Authorizing Provider   albuterol sulfate  (90 Base) MCG/ACT inhaler Inhale 2 puffs Every 4 (Four) Hours As Needed for Wheezing. 12/30/24   Brenda Cronin MD   amoxicillin-clavulanate (AUGMENTIN) 875-125 MG per tablet Take 1 tablet by mouth 2 (Two) Times a Day for 7 days. 7/22/25 7/29/25  Benjamín Chacko,    aspirin 81 MG EC tablet Take 1 tablet by mouth Daily. 7/28/25   Brenda Cronin MD   buPROPion XL (WELLBUTRIN XL) 150 MG 24 hr tablet Take 1 tablet by mouth Daily. 5/12/25   Brenda Cronin MD   Calcium Carbonate-Vitamin D 500-5 MG-MCG tablet Take 1 tablet by mouth 2 (Two) Times a Day. 6/16/25   Brenda Cronin MD   cetirizine (zyrTEC) 10 MG tablet Take 1 tablet by mouth Daily. 1/22/24   Brenda Cronin MD   clindamycin (Clindagel) 1 % gel Apply 1 Application topically to the appropriate area as directed 2 (Two) Times a Day. Use first 3/18/24   Brenda Cronin MD   Diclofenac Sodium (Voltaren) 1 % gel gel Apply 4 g topically to the appropriate area as directed 2 (Two) Times a Day. 3/17/25   Brenda Cronin MD   diphenoxylate-atropine (LOMOTIL) 2.5-0.025 MG per tablet TAKE 1 TABLET BY MOUTH EVERY 6 HOURS AS NEEDED FOR DIARRHEA 5/6/25   Brenda Cronin MD   docusate sodium (COLACE) 100 MG capsule Take 1 capsule by mouth 2 (Two) Times a Day.    Provider, MD Arya   doxycycline (VIBRAMYICN) 100 MG tablet Take 1 tablet by mouth 2 (Two) Times a Day. For 7 days, then 1 tablet daily indefinitely 3/19/25   Brenda Cronin MD   DULoxetine (CYMBALTA) 20 MG capsule Take 1 capsule by mouth Daily. Pt weaning off medication.per pt. 7/28/25   Brenda Cronin MD   famotidine (PEPCID) 20 MG tablet Take 1 tablet by mouth 2 (Two) Times a Day. 11/18/24   Brenda Cronin MD   fluticasone (FLONASE) 50 MCG/ACT nasal spray Administer 2 sprays into the nostril(s) as directed by provider Daily. Administer 2 sprays in each nostril for  each dose. 5/12/25   Brenda Cronin MD   furosemide (Lasix) 20 MG tablet Take 1 tablet by mouth Daily. 6/9/25   Westley Gonzales,    hydrocortisone 2.5 % ointment Apply 1 Application topically to the appropriate area as directed 2 (Two) Times a Day. 3/31/25   Brenda Cronin MD   Hydrocortisone, Perianal, (ANUSOL-HC) 2.5 % rectal cream Insert  into the rectum 2 (Two) Times a Day. Indications: Inflamed Hemorrhoids 11/30/23   Noman Gautam MD   KETOPROFEN-LIDO-GABAPENTIN EX APPLY 1-2 GRAMS TO AFFECTED AREAS 3-4 TIMES DAILY 4/3/25   Arya Bradford MD   lidocaine-prilocaine (EMLA) 2.5-2.5 % cream Apply 1 Application topically to the appropriate area as directed As Needed (45-60 minutes prior to port access.  Cover with saran/plastic wrap.). 6/30/25   Brenda Cronin MD   LORazepam (ATIVAN) 0.5 MG tablet Take 1 tablet by mouth Take As Directed. 1 tabelt by mouth 30 minutes prior to MRI, take a second tablet at the time of the MRI if needed. 1/23/24   Brenda Cronin MD   Magic Mouthwash Oral Suspension (diphenhydrAMINE HCl - aluminum & magnesium hydroxide-simethicone - lidocaine - nystatin) Swish and Spit 10 mL by mouth every 6 (Six) Hours For 7 Days. 4/4/25   Jacy Razo APRN   magnesium oxide (MAG-OX) 400 MG tablet Take 1 tablet by mouth Daily. 1/20/25   Brenda Cronin MD   Magnesium Oxide -Mg Supplement 400 (240 Mg) MG tablet 1 tablet Daily. 6/4/25   Arya Bradford MD   mineral oil-hydrophilic petrolatum (AQUAPHOR) ointment Apply 1 Application topically to the appropriate area as directed As Needed for Dry Skin. 3/30/25   Prince Helm MD   montelukast (SINGULAIR) 10 MG tablet Take 1 tablet by mouth Every Night. 3/3/25   Brenda Cronin MD   Morphine (MS CONTIN) 30 MG 12 hr tablet Take 1 tablet by mouth 2 (Two) Times a Day for 30 days. 7/10/25 8/9/25  Westley Gonzales,    Movantik 25 MG tablet  8/20/24   Provider, MD Arya   mupirocin (BACTROBAN) 2 % cream  3/18/24    Provider, MD Arya   naloxone (NARCAN) 4 MG/0.1ML nasal spray 1 spray into the nostril(s) as directed by provider As Needed for Opioid Reversal. 12/29/23   Brenda Cronin MD   nystatin susp + lidocaine viscous (MAGIC MOUTHWASH) oral suspension 5-10 ml swish and spit or swallow QID prn 8/19/24   Brenda Cronin MD   ondansetron (ZOFRAN) 8 MG tablet Take 1 tablet by mouth 3 (Three) Times a Day As Needed for Nausea or Vomiting. 3/3/25   Brenda Cronin MD   oxyCODONE (ROXICODONE) 15 MG immediate release tablet Take 1 tablet by mouth 5 (Five) Times a Day As Needed for Moderate Pain or Severe Pain for up to 30 days. 7/10/25 8/9/25  Westley Gonzales DO   pantoprazole (Protonix) 40 MG EC tablet Take 1 tablet by mouth Daily. Indications: Gastroesophageal Reflux Disease 3/19/25   Brenda Cronin MD   potassium chloride ER (K-TAB) 20 MEQ tablet controlled-release ER tablet Take 2 tablets by mouth Daily. 6/16/25   Brenda Cronin MD   pregabalin (Lyrica) 300 MG capsule Take 1 capsule by mouth 2 (Two) Times a Day for 30 days. 7/10/25 8/9/25  Westley Gonzales DO   promethazine-dextromethorphan (PROMETHAZINE-DM) 6.25-15 MG/5ML syrup Take 5 mL by mouth 3 times a day. 12/27/24   Linda Leslie PA-C   tiotropium bromide-olodaterol (Stiolto Respimat) 2.5-2.5 MCG/ACT aerosol solution inhaler INHALE 2 INHALATION(S) BY MOUTH DAILY 7/1/24   Ana Irizarry MD   traZODone (DESYREL) 150 MG tablet Take 1 tablet by mouth Every Night. 6/16/25   Brenda Cronin MD   triamcinolone (KENALOG) 0.025 % ointment Apply 1 Application topically to the appropriate area as directed 2 (Two) Times a Day. Use second if topical treatment #1 ineffective 3/18/24   Brenda Cronin MD         Temp:  [98.2 °F (36.8 °C)] 98.2 °F (36.8 °C)  Heart Rate:  [98] 98  Resp:  [20] 20  BP: (87)/(60) 87/60  Neurological Exam  Mental Status  Alert. Oriented to person, place, time and situation. Oriented to person, place, and time. Speech is normal.  Language is fluent with no aphasia.    Cranial Nerves  CN II: Visual fields full to confrontation.  CN III, IV, VI: Extraocular movements intact bilaterally. Normal lids and orbits bilaterally. Pupils equal round and reactive to light bilaterally.  CN V: Facial sensation is normal.  CN VII: Full and symmetric facial movement.  CN IX, X: Palate elevates symmetrically  CN XI: Shoulder shrug strength is normal.  CN XII: Tongue midline without atrophy or fasciculations.    Motor   Strength is 5/5 in all four extremities except as noted.  BLE drift.    Sensory  Light touch abnormality: LUE sensory deficit.     Coordination  Right: Finger-to-nose normal.Left: Finger-to-nose normal.  Unable to assess BLE secondary to edema.    Gait    Not observed.      Physical Exam  Constitutional:       General: He is not in acute distress.  HENT:      Head: Normocephalic and atraumatic.   Eyes:      General: Lids are normal.      Extraocular Movements: Extraocular movements intact.      Pupils: Pupils are equal, round, and reactive to light.   Cardiovascular:      Rate and Rhythm: Normal rate and regular rhythm.   Pulmonary:      Effort: Pulmonary effort is normal. No respiratory distress.   Musculoskeletal:      Cervical back: Normal range of motion and neck supple.      Right lower leg: Edema present.      Left lower leg: Edema present.   Skin:     General: Skin is warm and dry.      Capillary Refill: Capillary refill takes less than 2 seconds.   Neurological:      Mental Status: He is alert and oriented to person, place, and time.      Sensory: Sensory deficit present.      Motor: Weakness present.   Psychiatric:         Mood and Affect: Mood normal.         Speech: Speech normal.         Behavior: Behavior normal.         Acute Stroke Data    Thrombolytic Inclusion / Exclusion Criteria    Time: 1400  Person Administering Scale: GEORGIA Holland      YES NO INCLUSION CRITERIA CLASS I   [] [x]   Suspected diagnosis of acute  ischemic stroke with measureable neurological deficit.  Low NIHSS with disabling stroke symptoms.   [] [x]   Onset of stroke symptoms < 3 hours before beginning treatment >/ 18 years old  Stroke symptom onset = time patient was last seen well or without symptoms (LKW)   [] [x]   Onset of symptoms between 3-4.5 hours: >/= 80 years old (safe Class IIa) with history of   both diabetes and prior CVA  (reasonable Class IIb) AND NIHSS </= 25  *If not eligible for IV Thrombolytic consider neuro intervention for LKW within 24 hours     YES NO EXCLUSION CRITERIA (CONTRAINDICATIONS) CLASS III EVIDENCE HARM   [] []   Blood pressure >185/110 medically refractory to IV medications   [] []   Active bleeding at a non-compressible site   [] []   Active intracranial hemorrhage (ICH)   [] []   Symptoms suggestive of subarachnoid hemorrhage (SAH)   [] []   GI bleed within 21 days   [] []   Ischemic stroke within 3 months   [] []   Severe head trauma within 3 months    [] []   Intracranial or intraspinal surgery within 3 months   [] []   Current GI malignancy   [] []   Intracranial neoplasm   [] []   Infective endocarditis   [] []   Aortic arch dissection   [] []   Active coagulopathy with  INR >1.7, platelets <100,000, PTT > 40 sec, PT > 15 sec   *For warfarin, administration can begin before blood tests resulted. Discontinue for above values.    [] []   Treatment dose* of LMWH (Lovenox) in last 24 hours  *prophylactic dosages are not a contraindication   [] []   Concurrent use of antiplatelet agents' glycoprotein inhibitors IIb/IIIa (Integrilin, etc.)   [] []   Thrombin or factor Xa inhibitors (Eliquis, Xarelto, Arixtra) taken in last 48 hours     YES NO CLASS II: AIS WITH THE FOLLOWING CONDITIONS -   TREATMENT RISKS SHOULD BE WEIGHED AGAINST POSSIBLE BENEFITS.    [] []   Major trauma in last 14 days, recent major surgery in last 14 days, intracranial arterial dissection, giant unruptured and unsecured intracranial aneurysm,  pericarditis     [] []   The risks, benefits, and alternatives have been discussed with the patient or family related to the administration of IV thrombolytic therapy for stroke symptoms.   [] []   I have discussed and reviewed the patient's case and imaging with the attending prior to IV thrombolytic therapy.   TIME  Time IV thrombolytic administered       Hospital Meds:  Scheduled- sodium chloride, 1,000 mL, Intravenous, Once      Infusions-     PRNs-     Functional Status Prior to Current Stroke/Malone Score: 1-2    NIH Stroke Scale  Time: 1400  Person Administering Scale: GEORGIA Holland  Interval: baseline  1a. Level of Consciousness: 0-->Alert, keenly responsive  1b. LOC Questions: 0-->Answers both questions correctly  1c. LOC Commands: 0-->Performs both tasks correctly  2. Best Gaze: 0-->Normal  3. Visual: 0-->No visual loss  4. Facial Palsy: 0-->Normal symmetrical movements  5a. Motor Arm, Left: 0-->No drift, limb holds 90 (or 45) degrees for full 10 secs  5b. Motor Arm, Right: 0-->No drift, limb holds 90 (or 45) degrees for full 10 secs  6a. Motor Leg, Left: 1-->Drift, leg falls by the end of the 5-sec period but does not hit bed  6b. Motor Leg, Right: 1-->Drift, leg falls by the end of the 5-sec period but does not hit bed  7. Limb Ataxia: 0-->Absent  8. Sensory: 1-->Mild-to-moderate sensory loss, patient feels pinprick is less sharp or is dull on the affected side, or there is a loss of superficial pain with pinprick, but patient is aware of being touched (LUE sensory deficit)  9. Best Language: 0-->No aphasia, normal  10. Dysarthria: 0-->Normal  11. Extinction and Inattention (formerly Neglect): 0-->No abnormality    Total (NIH Stroke Scale): 3        Results Reviewed:  I have personally reviewed current lab, radiology, and data    MRI Brain With & Without Contrast  Addendum Date: 7/26/2025  The provider on-call Dr. César Hawk was notified at 12:40 p.m.  This report was signed and finalized  on 7/26/2025 12:41 PM by Armen Little DO.      Result Date: 7/26/2025  There are a few small foci of restricted diffusion in the centrum semiovale bilaterally consistent with acute/subacute infarcts. No abnormal enhancing lesions to suggest metastatic disease.  Attempts are being made to reach the ordering provider on call.      This report was signed and finalized on 7/26/2025 12:37 PM by Armen Little DO.      CT Abdomen Pelvis With Contrast  Result Date: 7/23/2025  Interval decrease in size of left lower lobe cavitary mass.  New bilateral patchy airspace disease likely resenting pneumonia. Recommend follow-up to resolution.  Irregular wall thickening of the rectosigmoid colon stable to mildly worsened compared to prior consistent with patient's known rectal cancer.  Stable right external iliac lymph node.  Fatty liver.   This study was performed with techniques to keep radiation doses as low as reasonably achievable (ALARA). Individualized dose reduction techniques using automated exposure control or adjustment of mA and/or kV according to the patient size were employed.    Images were reviewed, interpreted, and dictated by Dr. Gabriella Rodriguez MD Transcribed by Kameron Tejeda PA-C.  This report was signed and finalized on 7/23/2025 2:14 PM by Gabriella Rodriguez MD.      CT Chest With Contrast Diagnostic  Result Date: 7/23/2025  Interval decrease in size of left lower lobe cavitary mass.  New bilateral patchy airspace disease likely resenting pneumonia. Recommend follow-up to resolution.  Irregular wall thickening of the rectosigmoid colon stable to mildly worsened compared to prior consistent with patient's known rectal cancer.  Stable right external iliac lymph node.  Fatty liver.   This study was performed with techniques to keep radiation doses as low as reasonably achievable (ALARA). Individualized dose reduction techniques using automated exposure control or adjustment of mA and/or kV according to the patient size were  employed.    Images were reviewed, interpreted, and dictated by Dr. Gabriella Rodriguez MD Transcribed by Kameron Tejeda PA-C.  This report was signed and finalized on 7/23/2025 2:14 PM by Gabriella Rodriguez MD.      XR Chest 1 View  Result Date: 7/21/2025  New right basilar airspace disease, possible pneumonia; recommend follow-up..  Stable position right IJ port from prior.   This report was signed and finalized on 7/21/2025 12:16 PM by Gabriella Rodriguez MD.       Assessment/Plan:    55-year-old male with a PMH significant for stage IV rectal cancer (liver/lung mets, patient of Dr. Cronin on chemo), recent pneumonia, tobacco abuse, LAURA, COPD, perforated ulcer, anemia, neuropathy, and GERD who presents to [facility] ED secondary to recent MRI that revealed acute and subacute embolic appearing infarcts in each cerebral hemisphere.  He reports that he was contacted by his oncology office and instructed to come to the ED secondary to strokes on his MRI. Stroke neurology was consulted for further evaluation.    Antiplatelet PTA: Aspirin 81mg   Anticoagulant PTA: none    #Bilateral acute/subacute small infarcts  #Stage IV rectal cancer with liver/lung mets  #Hypotension  - Possible watershed in the setting of hypotension versus hypercoagulable versus embolic in etiology  - Recent MRI w/wo 7/26/2025 with small areas of restricted diffusion in the centrum semiovale bilaterally consistent with acute and subacute infarcts.  No abnormal enhancing lesions suggestive of metastatic disease noted.  - CTA H/N pending  - TTE pending  - BLE duplex  - Aspirin 325 mg daily  - Start atorvastatin 20 mg nightly for now.  Recent labs on 7/21/2025 with elevated LFTs. Normal today.  Will reassess after lipid panel is reviewed.  - Lipid panel, hemoglobin A1c in a.m.  - Activity as tolerated/fall precautions  - N.p.o. unless bedside dysphagia is passed  - PT/OT/SLP  - Plan discussed with JOSÉ LUIS Ford, the patient, and his wife.  Stroke neurology will  continue to follow.  Please call with any questions or concerns.    GEORGIA Holland, AGACNP-BC  July 29, 2025  14:38 EDT

## 2025-07-29 NOTE — H&P
[facility] Medicine Services  HISTORY AND PHYSICAL    Patient Name: Edward Powell  : 1970  MRN: [id number]  Primary Care Physician: Westley Gonzales DO  Date of admission: 2025      Subjective   Subjective     Chief Complaint:  Acute stroke on CT scan    HPI:  Edward Powell is a 55 y.o. male with a history of metastatic rectal cancer, CAD, GERD, who presents after being notified by multiple healthcare providers that there were acute strokes found on his head CT.  Patient currently has been undergoing chemotherapy for his rectal adenocarcinoma.  He was seen at [facility] approximately 1 week ago due to severe weakness, diarrhea, and shortness of breath.  He was given IV fluids, some potassium and antibiotics.  He was diagnosed with pneumonia and was prescribed Augmentin and was sent home.  He was already scheduled to get a CT scan of his chest, abdomen, and pelvis as well as MRI brain for routine screening for his cancer to be done this week.  During these scans he was found to have some defects on the MRI which would suggest acute/subacute infarcts.  Follow-up with his oncologist Dr. Cronin, she recommended that he go to the ED but it was his wife's birthday that day and he really did not want to go to the ED at that time.  Clinically the shortness of breath was improving and otherwise he did not have any other symptoms of a stroke.  His wife states that the day after he was seen in the ED his mentation was off and he was confused but other than that did not have any other symptoms of stroke, and was back to his baseline the following day.  He has not had any unilateral weakness or numbness, no facial droop, no slurred speech, no word finding difficulties, or dizziness.  Patient states that his blood pressure at baseline has been running low since he started chemotherapy over a year ago.  His appetite has improved over the past few days as well.       In the ED, started on rocephin and azitho.  Stroke team was called as well.      Personal History     Past Medical History:   Diagnosis Date    Arthritis     Cancer     rectal cancer - diagnosed 2023    Cholelithiasis 2019    Removed    COPD (chronic obstructive pulmonary disease)     Coronary artery disease 2009    Stent - no cardiologist currently    Elevated cholesterol     GERD (gastroesophageal reflux disease)     Hernia 2003    Lower hernia    Perforated ulcer 2019    Sleep apnea     history of; when weighed over 400lbs - no issues following bariatric surgery         Oncology Problem List:  Rectal adenocarcinoma (01/04/2024; Status: Active)  Rectal cancer metastasized to liver (12/28/2023; Status: Active)    Oncology/Hematology History   Rectal cancer metastasized to liver   12/28/2023 Initial Diagnosis    Rectal cancer metastasized to liver     1/9/2024 - 2/21/2024 Chemotherapy    OP COLON [id number] OXALIplatin / Leucovorin / Fluorouracil     2/3/2025 -  Chemotherapy    OP COLORECTAL FOLFIRI + Panitumumab (Irinotecan / Leucovorin / Fluorouracil / Panitumumab)     5/22/2025 -  Chemotherapy    OP SUPPORTIVE HYDRATION + ANTIEMETICS     Rectal adenocarcinoma   1/4/2024 Initial Diagnosis    Rectal adenocarcinoma     1/11/2024 -  Chemotherapy    OP CENTRAL VENOUS ACCESS DEVICE Access, Care, and Maintenance (CVAD)     5/22/2025 -  Chemotherapy    OP SUPPORTIVE HYDRATION + ANTIEMETICS       Past Surgical History:   Procedure Laterality Date    APPENDECTOMY  1983    Removed    BARIATRIC SURGERY  2011    Gastric bypass    BLADDER TUMOR/ULCER BLEEDER CAUTERIZATION      CARDIAC CATHETERIZATION  2009    stent placed    CHOLECYSTECTOMY  2019    Removed    COLONOSCOPY N/A 12/07/2023    Procedure: COLONOSCOPY WITH HOT SNARE POLYPECTOMY AND TATTOO;  Surgeon: Noman Gautam MD;  Location: [facility] ENDOSCOPY;  Service: Gastroenterology;  Laterality: N/A;    PORTACATH PLACEMENT N/A 1/5/2024    Procedure: INSERTION OF  PORTACATH WITH ULTRSOUND AND FLUOROSCOPIC GUIDANCE;  Surgeon: Ida Black MD;  Location: [facility];  Service: General;  Laterality: N/A;    JENNIFER-EN-Y         Family History: family history is not on file.     Social History:  reports that he has been smoking cigarettes. He started smoking about 30 years ago. He has a 53.4 pack-year smoking history. He has never used smokeless tobacco. He reports that he does not drink alcohol and does not use drugs.  Social History     Social History Narrative    Not on file       Medications:  Available home medication information reviewed.  Calcium Carbonate-Vitamin D, DULoxetine, Diclofenac Sodium, Hydrocortisone (Perianal), Ketoprofen-Lido-Gabapentin, LORazepam, Magic Mouthwash Oral Suspension (diphenhydrAMINE HCl - aluminum & magnesium hydroxide-simethicone - lidocaine - nystatin), Magnesium Oxide -Mg Supplement, Morphine, Naloxegol Oxalate, albuterol sulfate HFA, aspirin, buPROPion XL, cetirizine, clindamycin, diphenoxylate-atropine, docusate sodium, doxycycline, famotidine, fluticasone, furosemide, hydrocortisone, lidocaine-prilocaine, magnesium oxide, mineral oil-hydrophilic petrolatum, montelukast, mupirocin, naloxone, nystatin susp + lidocaine viscous, ondansetron, oxyCODONE, pantoprazole, potassium chloride ER, pregabalin, promethazine-dextromethorphan, tiotropium bromide-olodaterol, traZODone, and triamcinolone    Allergies   Allergen Reactions    Bactrim [Sulfamethoxazole-Trimethoprim] Hives     and blisters    Sulfa Antibiotics Hives     and blisters       Objective   Objective     Vital Signs:   Temp:  [97.2 °F (36.2 °C)-98.2 °F (36.8 °C)] 97.2 °F (36.2 °C)  Heart Rate:  [74-98] 96  Resp:  [14-20] 16  BP: ()/(55-72) 104/72  Total (NIH Stroke Scale): 1    Physical Exam   Constitutional: No acute distress, awake, alert, eating dinner  HENT: NCAT, mucous membranes moist  Respiratory: Coarse BS bilaterally at bases, respiratory effort normal   Cardiovascular:  RRR, no murmurs, rubs, or gallops  Gastrointestinal: Positive bowel sounds, soft, nontender, nondistended  Musculoskeletal: 3+ bilateral ankle edema  Psychiatric: Appropriate affect, cooperative  Neurologic: Oriented x 3, strength symmetric in all extremities, Cranial Nerves grossly intact to confrontation, speech clear  Skin: No rashes      Result Review:  I have personally reviewed the results from the time of this admission to 7/30/2025 00:20 EDT and agree with these findings:  [x]  Laboratory list / accordion  []  Microbiology  [x]  Radiology  [x]  EKG/Telemetry   []  Cardiology/Vascular   []  Pathology  [x]  Old records  []  Other:        LAB RESULTS:      Lab 07/29/25 2005 07/29/25  1626 07/29/25  1350   WBC  --   --  4.46   HEMOGLOBIN  --   --  11.3*   HEMATOCRIT  --   --  37.1*   PLATELETS  --   --  383   NEUTROS ABS  --   --  2.90   EOS ABS  --   --  0.13   MCV  --   --  93.9   PROCALCITONIN  --   --  0.04   LACTATE 0.6 4.6* 3.5*         Lab 07/29/25  1350   SODIUM 141   POTASSIUM 4.6   CHLORIDE 107   CO2 25.0   ANION GAP 9.0   BUN 7.3   CREATININE 0.60*   EGFR 113.3   GLUCOSE 89   CALCIUM 8.0*   MAGNESIUM 1.9         Lab 07/29/25  1350   TOTAL PROTEIN 5.4*   ALBUMIN 2.4*   GLOBULIN 3.0   ALT (SGPT) 29   AST (SGOT) 16   BILIRUBIN 0.2   ALK PHOS 101         Lab 07/29/25  1350   PROBNP 224.1                 UA          10/7/2024    10:33 3/30/2025    17:52 7/29/2025    17:25   Urinalysis   Squamous Epithelial Cells, UA  None Seen     Specific Gravity, UA 1.017  1.020  1.010    Ketones, UA Negative  Negative  Negative    Blood, UA Negative  Negative  Negative    Leukocytes, UA Negative  Negative  Negative    Nitrite, UA Negative  Negative  Negative    RBC, UA  None Seen     WBC, UA  0-2     Bacteria, UA  None Seen         Microbiology Results (last 10 days)       Procedure Component Value - Date/Time    COVID-19, FLU A/B, RSV PCR 1 HR TAT - Swab, Nasopharynx [[id number]]  (Normal) Collected: 07/21/25 6165     Lab Status: Final result Specimen: Swab from Nasopharynx Updated: 07/21/25 1708     COVID19 Not Detected     Influenza A PCR Not Detected     Influenza B PCR Not Detected     RSV, PCR Not Detected    Gastrointestinal Panel, PCR - Stool, Per Rectum [[id number]]  (Normal) Collected: 07/21/25 1143    Lab Status: Final result Specimen: Stool from Per Rectum Updated: 07/21/25 1321     Campylobacter Not Detected     Plesiomonas shigelloides Not Detected     Salmonella Not Detected     Vibrio Not Detected     Vibrio cholerae Not Detected     Yersinia enterocolitica Not Detected     Enteroaggregative E. coli (EAEC) Not Detected     Enteropathogenic E. coli (EPEC) Not Detected     Enterotoxigenic E. coli (ETEC) lt/st Not Detected     Shiga-like toxin-producing E. coli (STEC) stx1/stx2 Not Detected     Shigella/Enteroinvasive E. coli (EIEC) Not Detected     Cryptosporidium Not Detected     Cyclospora cayetanensis Not Detected     Entamoeba histolytica Not Detected     Giardia lamblia Not Detected     Adenovirus F40/41 Not Detected     Astrovirus Not Detected     Norovirus GI/GII Not Detected     Rotavirus A Not Detected     Sapovirus (I, II, IV or V) Not Detected    Clostridioides difficile Toxin - Stool, Per Rectum [[id number]]  (Normal) Collected: 07/21/25 1143    Lab Status: Final result Specimen: Stool from Per Rectum Updated: 07/21/25 1419    Narrative:      The following orders were created for panel order Clostridioides difficile Toxin - Stool, Per Rectum.  Procedure                               Abnormality         Status                     ---------                               -----------         ------                     Clostridioides difficile...[[id number]]  Normal              Final result                 Please view results for these tests on the individual orders.    Clostridioides difficile EIA - Stool, Per Rectum [[id number]]  (Normal) Collected: 07/21/25 1143    Lab Status: Final result Specimen: Stool from  Per Rectum Updated: 07/21/25 1419     C Diff GDH Ag Negative     C.diff Toxin Ag Negative    Narrative:      The result indicates the absence of toxigenic C.difficile from stool specimen.            CT Angiogram Head w AI Analysis of LVO  Result Date: 7/29/2025  CT ANGIOGRAM HEAD W AI ANALYSIS OF LVO, CT ANGIOGRAM NECK Date of Exam: 7/29/2025 6:01 PM EDT Indication: Stroke, follow up bilateral strokes. Comparison: Brain MRI 7/25/2025. Technique: CTA of the head and neck was performed after the uneventful intravenous administration of 75 mL Isovue-370. Reconstructed coronal and sagittal images were also obtained. In addition, a 3-D volume rendered image was created for interpretation. Automated exposure control and iterative reconstruction methods were used. Findings: HEAD CTA: Anterior circulation: No proximal large vessel occlusion or major stenosis. Posterior circulation: No proximal large vessel occlusion or major stenosis. Major dural venous sinuses: No evidence of dural venous sinus thrombosis within the limitation of angiographic contrast imaging. NECK CTA: Conventional, three-vessel, aortic arch. Normal contrast enhancement in the common carotid arteries from their origins to the bifurcation. Irregularity and atherosclerotic calcification at the carotid bulbs. There is less than 50% narrowing of the right ICA by NASCET criteria at its origin. There is thousand 50% narrowing of the left ICA by NASCET criteria at its origin. Otherwise, normal contrast enhancement of the internal carotid arteries from their origins to the proximal intradural portions.Irregularity and atherosclerotic calcification of the petrous and cavernous carotid arteries. Normal enhancement in the vertebral arteries from their origins to their proximal intradural portions. The left vertebral artery is dominant. Patent subclavian arteries. Angiographic contrast timing precludes accurate assessement of jugular vein patency. SOFT TISSUE NECK:  No exophytic lesion seen within the aerodigestive tract. No pathologic appearing lymph nodes by imaging criteria. The visualized glands appear unremarkable. No acute or aggressive appearing osseous or soft tissue process.Degenerative changes of the imaged spine. Emphysema.     Impression: Impression: No proximal large vessel occlusion or severe stenosis of the major arteries of the head and neck. Emphysema. Emphysema on CT is an independent risk factor for lung cancer. Low dose lung cancer screening should be considered if patient qualifies based on smoking history or if not already enrolled in a screening program. Electronically Signed: Jayjay Perea MD  7/29/2025 6:45 PM EDT  Workstation ID: [id number]    CT Angiogram Neck  Result Date: 7/29/2025  CT ANGIOGRAM HEAD W AI ANALYSIS OF LVO, CT ANGIOGRAM NECK Date of Exam: 7/29/2025 6:01 PM EDT Indication: Stroke, follow up bilateral strokes. Comparison: Brain MRI 7/25/2025. Technique: CTA of the head and neck was performed after the uneventful intravenous administration of 75 mL Isovue-370. Reconstructed coronal and sagittal images were also obtained. In addition, a 3-D volume rendered image was created for interpretation. Automated exposure control and iterative reconstruction methods were used. Findings: HEAD CTA: Anterior circulation: No proximal large vessel occlusion or major stenosis. Posterior circulation: No proximal large vessel occlusion or major stenosis. Major dural venous sinuses: No evidence of dural venous sinus thrombosis within the limitation of angiographic contrast imaging. NECK CTA: Conventional, three-vessel, aortic arch. Normal contrast enhancement in the common carotid arteries from their origins to the bifurcation. Irregularity and atherosclerotic calcification at the carotid bulbs. There is less than 50% narrowing of the right ICA by NASCET criteria at its origin. There is thousand 50% narrowing of the left ICA by NASCET criteria at its  origin. Otherwise, normal contrast enhancement of the internal carotid arteries from their origins to the proximal intradural portions.Irregularity and atherosclerotic calcification of the petrous and cavernous carotid arteries. Normal enhancement in the vertebral arteries from their origins to their proximal intradural portions. The left vertebral artery is dominant. Patent subclavian arteries. Angiographic contrast timing precludes accurate assessement of jugular vein patency. SOFT TISSUE NECK: No exophytic lesion seen within the aerodigestive tract. No pathologic appearing lymph nodes by imaging criteria. The visualized glands appear unremarkable. No acute or aggressive appearing osseous or soft tissue process.Degenerative changes of the imaged spine. Emphysema.     Impression: Impression: No proximal large vessel occlusion or severe stenosis of the major arteries of the head and neck. Emphysema. Emphysema on CT is an independent risk factor for lung cancer. Low dose lung cancer screening should be considered if patient qualifies based on smoking history or if not already enrolled in a screening program. Electronically Signed: Jayjay Perea MD  7/29/2025 6:45 PM EDT  Workstation ID: [id number]    CT Head Without Contrast Stroke Protocol  Result Date: 7/29/2025  CT HEAD WO CONTRAST STROKE PROTOCOL Date of Exam: 7/29/2025 6:01 PM EDT Indication: recent stroke. Comparison: Imported/outside brain MRI dated 7/25/2025. Technique: Axial CT images were obtained of the head without contrast administration.  Reconstructed coronal images were also obtained. Automated exposure control and iterative construction methods were used. Findings: Scattered small infarcts within the right greater than left centrum semiovale are seen to better advantage on prior brain MRI. No acute intracranial hemorrhage. No extra-axial collection. Normal ventricular caliber. No extra-axial collection. Intraocular  structures are grossly  unremarkable. Paranasal sinuses and mastoid air cells are grossly clear. No acute or suspicious osseous lesion.     Impression: Impression: Scattered small infarcts within the right greater than left centrum semiovale are seen to better advantage on recent brain MRI. No acute intracranial hemorrhage. Electronically Signed: Min Nguyen MD  7/29/2025 6:44 PM EDT  Workstation ID: [id number]          Assessment & Plan   Assessment & Plan       Pneumonia    Rectal cancer metastasized to liver    Sepsis associated hypotension    Anemia due to chemotherapy    Edward Powell is a 55 y.o. M with hx of metastatic rectal adenocarcinoma presents with acute/subacute infarcts, lactic acidosis in setting of recent PNA,     Acute/subacute strokes  --Stroke following  --TTE in AM  --BLE duplex  --Cont ASA 325mg  --Start atorvastatin 20 mg nightly for now.  Recent labs on 7/21/2025 with elevated LFTs. Normal today.  Will reassess after lipid panel is reviewed.  - Lipid panel, hemoglobin A1c in a.m  --PT/OT/SLP    Hypotension  PNA  Lactic acidosis  -- Lactic acidosis has improved with IV fluids.  -- Patient received Rocephin and azithromycin in the ED  -- Has been on Augmentin for 7 days.  Blood pressure is baseline low which does not explain the lactic acidosis.  Symptomatically his shortness of breath has improved.  Blood cultures are pending.  Unclear if there is some other underlying infection that may be pretreated since he has been on antibiotics already.  Hypotension seems to be overall asymptomatic.  Will hold off on any further IV fluids as patient has significant lower extremity edema at this time.  -- Procal is low.  Has a bandemia.  --Will cont abx for now.    Pain- chronic  Rectal adenocarcinoma  -- Continue patient's chronic pain regiment which he uses  MS Contin twice daily and that has oxycodone which he takes scheduled instead of as needed which is how I have ordered in the computer.  Patient also uses Lyrica and  Robaxin as well.  --Patient has a port but was unable to be accessed properly in the ED.  Patient is requesting that we reattempt to access this in the morning.    GERD  History of perforated ulcer  --Continue Protonix and Pepcid          VTE Prophylaxis:  Mechanical VTE prophylaxis orders are present.          CODE STATUS:    Code Status and Medical Interventions: CPR (Attempt to Resuscitate); Full Support   Ordered at: 07/30/25 0011     Code Status (Patient has no pulse and is not breathing):    CPR (Attempt to Resuscitate)     Medical Interventions (Patient has pulse or is breathing):    Full Support     Level Of Support Discussed With:    Patient       Expected Discharge   Expected discharge date/ time has not been documented.     Valorie Villela MD  07/30/25

## 2025-07-29 NOTE — ED NOTES
Edward Powell    Nursing Report ED to Floor:  Mental status: A&Ox4  Ambulatory status: Non-Ambulatory in ED  Oxygen Therapy:  RA  Cardiac Rhythm: NSR  Admitted from: Home/ED  Safety Concerns:  Fall Risk  Precautions: None  Social Issues: None - Family at Bedside   ED Room #:  29    ED Nurse Phone Extension - [phone] or may call [phone].      HPI:   Chief Complaint   Patient presents with    Fatigue       Past Medical History:  Past Medical History:   Diagnosis Date    Arthritis     Cancer     rectal cancer - diagnosed 2023    Cholelithiasis 2019    Removed    COPD (chronic obstructive pulmonary disease)     Coronary artery disease 2009    Stent - no cardiologist currently    Elevated cholesterol     GERD (gastroesophageal reflux disease)     Hernia 2003    Lower hernia    Perforated ulcer 2019    Sleep apnea     history of; when weighed over 400lbs - no issues following bariatric surgery        Past Surgical History:  Past Surgical History:   Procedure Laterality Date    APPENDECTOMY  1983    Removed    BARIATRIC SURGERY  2011    Gastric bypass    BLADDER TUMOR/ULCER BLEEDER CAUTERIZATION      CARDIAC CATHETERIZATION  2009    stent placed    CHOLECYSTECTOMY  2019    Removed    COLONOSCOPY N/A 12/07/2023    Procedure: COLONOSCOPY WITH HOT SNARE POLYPECTOMY AND TATTOO;  Surgeon: Noman Gautam MD;  Location: [facility];  Service: Gastroenterology;  Laterality: N/A;    PORTACATH PLACEMENT N/A 1/5/2024    Procedure: INSERTION OF PORTACATH WITH ULTRSOUND AND FLUOROSCOPIC GUIDANCE;  Surgeon: Ida Black MD;  Location: [facility] OR;  Service: General;  Laterality: N/A;    JENNIFER-EN-Y          Admitting Doctor:   Valorie Villela MD    Consulting Provider(s):  Consults       No orders found from 6/30/2025 to 7/30/2025.             Admitting Diagnosis:   The primary encounter diagnosis was Pneumonia of both lungs due to infectious organism, unspecified part of lung. A diagnosis of Cerebrovascular  accident (CVA) due to embolism of precerebral artery was also pertinent to this visit.    Most Recent Vitals:   Vitals:    07/29/25 1517 07/29/25 1530 07/29/25 1600 07/29/25 1630   BP:  90/72 93/55 92/58   BP Location:       Patient Position:       Pulse: 75 82 74 75   Resp:       Temp:       TempSrc:       SpO2: 99% 99% 97% 96%   Weight:       Height:           Active LDAs/IV Access:   Lines, Drains & Airways       Active LDAs       Name Placement date Placement time Site Days    Peripheral IV 07/29/25 1626 20 G Right Antecubital 07/29/25  1626  Antecubital  less than 1    Single Lumen Implantable Port 01/05/24 Right Internal jugular 01/05/24  1014  Internal jugular  571                    Labs (abnormal labs have a star):   Labs Reviewed   CBC WITH AUTO DIFFERENTIAL - Abnormal; Notable for the following components:       Result Value    RBC 3.95 (*)     Hemoglobin 11.3 (*)     Hematocrit 37.1 (*)     MCHC 30.5 (*)     RDW 18.7 (*)     RDW-SD 64.4 (*)     All other components within normal limits    Narrative:     The previously reported component NRBC is no longer being reported. Previous result was 0.0 /100 WBC (Reference Range: 0.0-0.2 /100 WBC) on 7/29/2025 at 1420 EDT.   COMPREHENSIVE METABOLIC PANEL - Abnormal; Notable for the following components:    Creatinine 0.60 (*)     Calcium 8.0 (*)     Total Protein 5.4 (*)     Albumin 2.4 (*)     All other components within normal limits    Narrative:     GFR Categories in Chronic Kidney Disease (CKD)              GFR Category          GFR (mL/min/1.73)    Interpretation  G1                    90 or greater        Normal or high (1)  G2                    60-89                Mild decrease (1)  G3a                   45-59                Mild to moderate decrease  G3b                   30-44                Moderate to severe decrease  G4                    15-29                Severe decrease  G5                    14 or less           Kidney failure    (1)In the  "absence of evidence of kidney disease, neither GFR category G1 or G2 fulfill the criteria for CKD.    eGFR calculation 2021 CKD-EPI creatinine equation, which does not include race as a factor   LACTIC ACID, PLASMA - Abnormal; Notable for the following components:    Lactate 3.5 (*)     All other components within normal limits   MANUAL DIFFERENTIAL - Abnormal; Notable for the following components:    Lymphocyte % 19.0 (*)     Bands %  9.0 (*)     All other components within normal limits   MAGNESIUM - Normal   PROCALCITONIN - Normal    Narrative:     As a Marker for Sepsis (Non-Neonates):    1. <0.5 ng/mL represents a low risk of severe sepsis and/or septic shock.  2. >2 ng/mL represents a high risk of severe sepsis and/or septic shock.    As a Marker for Lower Respiratory Tract Infections that require antibiotic therapy:    PCT on Admission    Antibiotic Therapy       6-12 Hrs later    >0.5                Strongly Recommended  >0.25 - <0.5        Recommended   0.1 - 0.25          Discouraged              Remeasure/reassess PCT  <0.1                Strongly Discouraged     Remeasure/reassess PCT    As 28 day mortality risk marker: \"Change in Procalcitonin Result\" (>80% or <=80%) if Day 0 (or Day 1) and Day 4 values are available. Refer to http://www.[employer]-pct-calculator.com    Change in PCT <=80%  A decrease of PCT levels below or equal to 80% defines a positive change in PCT test result representing a higher risk for 28-day all-cause mortality of patients diagnosed with severe sepsis for septic shock.    Change in PCT >80%  A decrease of PCT levels of more than 80% defines a negative change in PCT result representing a lower risk for 28-day all-cause mortality of patients diagnosed with severe sepsis or septic shock.      BNP (IN-HOUSE) - Normal    Narrative:     This assay is used as an aid in the diagnosis of individuals suspected of having heart failure. It can be used as an aid in the diagnosis of acute " decompensated heart failure (ADHF) in patients presenting with signs and symptoms of ADHF to the emergency department (ED). In addition, NT-proBNP of <300 pg/mL indicates ADHF is not likely.    Age Range Result Interpretation  NT-proBNP Concentration (pg/mL:      <50             Positive            >450                   Gray                 300-450                    Negative             <300    50-75           Positive            >900                  Gray                300-900                  Negative            <300      >75             Positive            >1800                  Gray                300-1800                  Negative            <300   BLOOD CULTURE   BLOOD CULTURE   URINALYSIS WITHOUT MICROSCOPIC (NO CULTURE)   LACTIC ACID, REFLEX       Meds Given in ED:   Medications   sodium chloride 0.9 % bolus 1,000 mL (1,000 mL Intravenous New Bag 7/29/25 1627)   aspirin tablet 325 mg (325 mg Oral Given 7/29/25 1637)     Or   aspirin suppository 300 mg ( Rectal Not Given:  See Alt 7/29/25 1637)   cefTRIAXone (ROCEPHIN) 1,000 mg in sodium chloride 0.9 % 100 mL MBP (has no administration in time range)   azithromycin (ZITHROMAX) 500 mg in sodium chloride 0.9 % 250 mL IVPB-VTB (has no administration in time range)   sodium chloride 0.9 % flush 10 mL (has no administration in time range)           Last NIH score:  Interval: baseline  1a. Level of Consciousness: 0-->Alert, keenly responsive  1b. LOC Questions: 0-->Answers both questions correctly  1c. LOC Commands: 0-->Performs both tasks correctly  2. Best Gaze: 0-->Normal  3. Visual: 0-->No visual loss  4. Facial Palsy: 0-->Normal symmetrical movements  5a. Motor Arm, Left: 0-->No drift, limb holds 90 (or 45) degrees for full 10 secs  5b. Motor Arm, Right: 0-->No drift, limb holds 90 (or 45) degrees for full 10 secs  6a. Motor Leg, Left: 1-->Drift, leg falls by the end of the 5-sec period but does not hit bed  6b. Motor Leg, Right: 1-->Drift, leg falls by the  end of the 5-sec period but does not hit bed  7. Limb Ataxia: 0-->Absent  8. Sensory: 1-->Mild-to-moderate sensory loss, patient feels pinprick is less sharp or is dull on the affected side, or there is a loss of superficial pain with pinprick, but patient is aware of being touched (LUE sensory deficit)  9. Best Language: 0-->No aphasia, normal  10. Dysarthria: 0-->Normal  11. Extinction and Inattention (formerly Neglect): 0-->No abnormality    Total (NIH Stroke Scale): 3     Dysphagia screening results:  Patient Factors Component (Dysphagia:Stroke or Rule-out)  Best Eye Response: 4-->(E4) spontaneous (07/29/25 1628)  Best Motor Response: 6-->(M6) obeys commands (07/29/25 1628)  Best Verbal Response: 5-->(V5) oriented (07/29/25 1628)  Rubén Coma Scale Score: 15 (07/29/25 1628)  Is there Facial Asymmetry/Weakness?: No (07/29/25 1628)  Is there Tongue Asymmetry/Weakness?: No (07/29/25 1628)  Is there Palatal Asymmetry/Weakness?: No (07/29/25 1628)  Patient Assessment Result: Pass - Proceed to Water Test (07/29/25 1628)     Rubén Coma Scale:  No data recorded     CIWA:        Restraint Type:            Isolation Status:  No active isolations

## 2025-07-29 NOTE — ED PROVIDER NOTES
Subjective  History of Present Illness:    Chief Complaint: Concern for stroke, weakness  History of Present Illness: 55-year-old male presents to the emergency department because of a recent MRI that showed acute versus subacute stroke.  Patient has significant past medical history for rectal cancer with metastasis to lung and liver, he recently was in the emergency department at [facility] approximately a week ago and was diagnosed with pneumonia.  He was given IV antibiotics and fluids in the ED and went home with Augmentin.  He had scheduled CT scan of his chest abdomen pelvis and MRI of his brain with his oncology team.  He subsequently received a call after having those scans done and was told that he needs to go to the emergency department because he looks like he has had recent strokes.  He ended up not going to the emergency room at that time but followed up with his oncologist yesterday, she again recommended that he come to the ED to be evaluated.  He is now in the ED today to be evaluated because of the recent MRI results.  He also admits that he continues to have fatigue.  His wife is concerned about heart failure because he has extreme swelling in his legs and feet.  Onset: Gradual  Duration: Several weeks  Exacerbating / Alleviating factors: Weakness,  Associated symptoms: Weakness, falls      Nurses Notes reviewed and agree, including vitals, allergies, social history and prior medical history.     REVIEW OF SYSTEMS: All systems reviewed and not pertinent unless noted.    Review of Systems   Constitutional:  Positive for fatigue.   Neurological:  Positive for weakness.   All other systems reviewed and are negative.      Past Medical History:   Diagnosis Date    Arthritis     Cancer     rectal cancer - diagnosed 2023    Cholelithiasis 2019    Removed    COPD (chronic obstructive pulmonary disease)     Coronary artery disease 2009    Stent - no cardiologist currently    Elevated cholesterol      "GERD (gastroesophageal reflux disease)     Hernia 2003    Lower hernia    Perforated ulcer 2019    Sleep apnea     history of; when weighed over 400lbs - no issues following bariatric surgery       Allergies:    Bactrim [sulfamethoxazole-trimethoprim] and Sulfa antibiotics      Past Surgical History:   Procedure Laterality Date    APPENDECTOMY  1983    Removed    BARIATRIC SURGERY  2011    Gastric bypass    BLADDER TUMOR/ULCER BLEEDER CAUTERIZATION      CARDIAC CATHETERIZATION  2009    stent placed    CHOLECYSTECTOMY  2019    Removed    COLONOSCOPY N/A 12/07/2023    Procedure: COLONOSCOPY WITH HOT SNARE POLYPECTOMY AND TATTOO;  Surgeon: Noman Gautam MD;  Location: [facility] ENDOSCOPY;  Service: Gastroenterology;  Laterality: N/A;    PORTACATH PLACEMENT N/A 1/5/2024    Procedure: INSERTION OF PORTACATH WITH ULTRSOUND AND FLUOROSCOPIC GUIDANCE;  Surgeon: Ida Black MD;  Location: [facility] OR;  Service: General;  Laterality: N/A;    JENNIFER-EN-Y           Social History     Socioeconomic History    Marital status:    Tobacco Use    Smoking status: Every Day     Current packs/day: 1.00     Average packs/day: 0.9 packs/day for 60.9 years (53.4 ttl pk-yrs)     Types: Cigarettes     Start date: 9/6/1994    Smokeless tobacco: Never    Tobacco comments:     35 years      pt reports closer to 2 packs per day since cancer diagnosis   Vaping Use    Vaping status: Never Used   Substance and Sexual Activity    Alcohol use: Never    Drug use: Never    Sexual activity: Defer         History reviewed. No pertinent family history.    Objective  Physical Exam:  BP 98/58 (BP Location: Left arm)   Pulse 96   Temp 98 °F (36.7 °C) (Oral)   Resp 16   Ht 182.9 cm (72\")   Wt 83.9 kg (185 lb)   SpO2 94%   BMI 25.09 kg/m²      Physical Exam  Vitals and nursing note reviewed.   Constitutional:       Appearance: He is well-developed. He is ill-appearing.      Comments: Hypotensive   HENT:      Head: Normocephalic and " atraumatic.      Mouth/Throat:      Mouth: Mucous membranes are dry.   Eyes:      Extraocular Movements: Extraocular movements intact.   Cardiovascular:      Rate and Rhythm: Normal rate and regular rhythm.   Pulmonary:      Effort: Pulmonary effort is normal.      Breath sounds: Normal breath sounds.   Abdominal:      Palpations: Abdomen is soft.   Musculoskeletal:         General: Normal range of motion.      Cervical back: Normal range of motion.   Skin:     General: Skin is warm and dry.   Neurological:      Mental Status: He is oriented to person, place, and time.   Psychiatric:         Behavior: Behavior normal.         Thought Content: Thought content normal.         Judgment: Judgment normal.           Procedures    ED Course:    ED Course as of 07/29/25 2233 Tue Jul 29, 2025   1327 Discussed with Michelle neuro stroke team she recommended CTA head and neck. [CS]   1351 Review of previous  non ED visits, prior labs, prior imaging, available notes from prior evaluations or visits with specialists, medication list, allergies, past medical history, past surgical history  Recent office note from 7/28/2025 with hematology oncology, pertaining to recent CT scan and MRI, that was concerning for acute or subacute stroke [CS]   1440 Discussed the case once again with Michelle from the neurostroke team, she reviewed the MRI, he has acute and subacute strokes, will require admission, I am waiting on lab results, and will contact the hospitalist for admission [CS]   1540 Message sent hospitalists for admission [CS]   1602 Discussed the case with the on-call hospitalist, she wants to see if the patient's blood pressure responds to the fluid bolus before excepting.  There has been difficulty accessing the port, it seems to be dripping the fluids and slowly, we will try to obtain better access [CS]      ED Course User Index  [CS] Luke Herrera Jr., PA-C       Lab Results (last 24 hours)       Procedure Component  Value Units Date/Time    CBC Auto Differential [[id number]]  (Abnormal) Collected: 07/29/25 1350    Specimen: Blood Updated: 07/29/25 1517     WBC 4.46 10*3/mm3      RBC 3.95 10*6/mm3      Hemoglobin 11.3 g/dL      Hematocrit 37.1 %      MCV 93.9 fL      MCH 28.6 pg      MCHC 30.5 g/dL      RDW 18.7 %      RDW-SD 64.4 fl      MPV 10.3 fL      Platelets 383 10*3/mm3     Narrative:      The previously reported component NRBC is no longer being reported. Previous result was 0.0 /100 WBC (Reference Range: 0.0-0.2 /100 WBC) on 7/29/2025 at 1420 EDT.    Comprehensive Metabolic Panel [[id number]]  (Abnormal) Collected: 07/29/25 1350    Specimen: Blood Updated: 07/29/25 1443     Glucose 89 mg/dL      BUN 7.3 mg/dL      Creatinine 0.60 mg/dL      Sodium 141 mmol/L      Potassium 4.6 mmol/L      Chloride 107 mmol/L      CO2 25.0 mmol/L      Calcium 8.0 mg/dL      Total Protein 5.4 g/dL      Albumin 2.4 g/dL      ALT (SGPT) 29 U/L      AST (SGOT) 16 U/L      Alkaline Phosphatase 101 U/L      Total Bilirubin 0.2 mg/dL      Globulin 3.0 gm/dL      Comment: Calculated Result        A/G Ratio 0.8 g/dL      BUN/Creatinine Ratio 12.2     Anion Gap 9.0 mmol/L      eGFR 113.3 mL/min/1.73     Narrative:      GFR Categories in Chronic Kidney Disease (CKD)              GFR Category          GFR (mL/min/1.73)    Interpretation  G1                    90 or greater        Normal or high (1)  G2                    60-89                Mild decrease (1)  G3a                   45-59                Mild to moderate decrease  G3b                   30-44                Moderate to severe decrease  G4                    15-29                Severe decrease  G5                    14 or less           Kidney failure    (1)In the absence of evidence of kidney disease, neither GFR category G1 or G2 fulfill the criteria for CKD.    eGFR calculation 2021 CKD-EPI creatinine equation, which does not include race as a factor    Magnesium [[id number]]   "(Normal) Collected: 07/29/25 1350    Specimen: Blood Updated: 07/29/25 1443     Magnesium 1.9 mg/dL     Lactic Acid, Plasma [[id number]]  (Abnormal) Collected: 07/29/25 1350    Specimen: Blood Updated: 07/29/25 1446     Lactate 3.5 mmol/L      Comment: Falsely depressed results may occur on samples drawn from patients receiving N-Acetylcysteine (NAC) or Metamizole.       Procalcitonin [[id number]]  (Normal) Collected: 07/29/25 1350    Specimen: Blood Updated: 07/29/25 1441     Procalcitonin 0.04 ng/mL     Narrative:      As a Marker for Sepsis (Non-Neonates):    1. <0.5 ng/mL represents a low risk of severe sepsis and/or septic shock.  2. >2 ng/mL represents a high risk of severe sepsis and/or septic shock.    As a Marker for Lower Respiratory Tract Infections that require antibiotic therapy:    PCT on Admission    Antibiotic Therapy       6-12 Hrs later    >0.5                Strongly Recommended  >0.25 - <0.5        Recommended   0.1 - 0.25          Discouraged              Remeasure/reassess PCT  <0.1                Strongly Discouraged     Remeasure/reassess PCT    As 28 day mortality risk marker: \"Change in Procalcitonin Result\" (>80% or <=80%) if Day 0 (or Day 1) and Day 4 values are available. Refer to http://www.[employer]-pct-calculator.com    Change in PCT <=80%  A decrease of PCT levels below or equal to 80% defines a positive change in PCT test result representing a higher risk for 28-day all-cause mortality of patients diagnosed with severe sepsis for septic shock.    Change in PCT >80%  A decrease of PCT levels of more than 80% defines a negative change in PCT result representing a lower risk for 28-day all-cause mortality of patients diagnosed with severe sepsis or septic shock.       BNP [[id number]]  (Normal) Collected: 07/29/25 1350    Specimen: Blood Updated: 07/29/25 1443     proBNP 224.1 pg/mL     Narrative:      This assay is used as an aid in the diagnosis of individuals suspected of having heart " failure. It can be used as an aid in the diagnosis of acute decompensated heart failure (ADHF) in patients presenting with signs and symptoms of ADHF to the emergency department (ED). In addition, NT-proBNP of <300 pg/mL indicates ADHF is not likely.    Age Range Result Interpretation  NT-proBNP Concentration (pg/mL:      <50             Positive            >450                   Gray                 300-450                    Negative             <300    50-75           Positive            >900                  Gray                300-900                  Negative            <300      >75             Positive            >1800                  Gray                300-1800                  Negative            <300    Manual Differential [[id number]]  (Abnormal) Collected: 07/29/25 1350    Specimen: Blood Updated: 07/29/25 1517     Neutrophil % 56.0 %      Lymphocyte % 19.0 %      Monocyte % 10.0 %      Eosinophil % 3.0 %      Basophil % 1.0 %      Bands %  9.0 %      Atypical Lymphocyte % 2.0 %      Neutrophils Absolute 2.90 10*3/mm3      Lymphocytes Absolute 0.94 10*3/mm3      Monocytes Absolute 0.45 10*3/mm3      Eosinophils Absolute 0.13 10*3/mm3      Basophils Absolute 0.04 10*3/mm3      Elliptocytes Slight/1+     WBC Morphology Normal     Platelet Morphology Normal    STAT Lactic Acid, Reflex [[id number]]  (Abnormal) Collected: 07/29/25 1626    Specimen: Blood Updated: 07/29/25 1700     Lactate 4.6 mmol/L      Comment: Falsely depressed results may occur on samples drawn from patients receiving N-Acetylcysteine (NAC) or Metamizole.       Blood Culture - Blood, Arm, Right [[id number]] Collected: 07/29/25 1630    Specimen: Blood from Arm, Right Updated: 07/29/25 1635    Blood Culture - Blood, Hand, Right [[id number]] Collected: 07/29/25 1711    Specimen: Blood from Hand, Right Updated: 07/29/25 1735    Urinalysis without microscopic (no culture) - Urine, Clean Catch [[id number]]  (Normal) Collected: 07/29/25 1725     Specimen: Urine, Clean Catch Updated: 07/29/25 1748     Color, UA Yellow     Appearance, UA Clear     pH, UA 7.5     Specific Gravity, UA 1.010     Glucose, UA Negative     Ketones, UA Negative     Bilirubin, UA Negative     Blood, UA Negative     Protein, UA Negative     Leuk Esterase, UA Negative     Nitrite, UA Negative     Urobilinogen, UA 0.2 E.U./dL    POC Glucose Once [[id number]]  (Normal) Collected: 07/29/25 1900    Specimen: Blood Updated: 07/29/25 1901     Glucose 80 mg/dL     STAT Lactic Acid, Reflex [[id number]]  (Normal) Collected: 07/29/25 2005    Specimen: Blood Updated: 07/29/25 2046     Lactate 0.6 mmol/L      Comment: Falsely depressed results may occur on samples drawn from patients receiving N-Acetylcysteine (NAC) or Metamizole.                CT Angiogram Head w AI Analysis of LVO  Result Date: 7/29/2025  CT ANGIOGRAM HEAD W AI ANALYSIS OF LVO, CT ANGIOGRAM NECK Date of Exam: 7/29/2025 6:01 PM EDT Indication: Stroke, follow up bilateral strokes. Comparison: Brain MRI 7/25/2025. Technique: CTA of the head and neck was performed after the uneventful intravenous administration of 75 mL Isovue-370. Reconstructed coronal and sagittal images were also obtained. In addition, a 3-D volume rendered image was created for interpretation. Automated exposure control and iterative reconstruction methods were used. Findings: HEAD CTA: Anterior circulation: No proximal large vessel occlusion or major stenosis. Posterior circulation: No proximal large vessel occlusion or major stenosis. Major dural venous sinuses: No evidence of dural venous sinus thrombosis within the limitation of angiographic contrast imaging. NECK CTA: Conventional, three-vessel, aortic arch. Normal contrast enhancement in the common carotid arteries from their origins to the bifurcation. Irregularity and atherosclerotic calcification at the carotid bulbs. There is less than 50% narrowing of the right ICA by NASCET criteria at its origin.  There is thousand 50% narrowing of the left ICA by NASCET criteria at its origin. Otherwise, normal contrast enhancement of the internal carotid arteries from their origins to the proximal intradural portions.Irregularity and atherosclerotic calcification of the petrous and cavernous carotid arteries. Normal enhancement in the vertebral arteries from their origins to their proximal intradural portions. The left vertebral artery is dominant. Patent subclavian arteries. Angiographic contrast timing precludes accurate assessement of jugular vein patency. SOFT TISSUE NECK: No exophytic lesion seen within the aerodigestive tract. No pathologic appearing lymph nodes by imaging criteria. The visualized glands appear unremarkable. No acute or aggressive appearing osseous or soft tissue process.Degenerative changes of the imaged spine. Emphysema.     Impression: Impression: No proximal large vessel occlusion or severe stenosis of the major arteries of the head and neck. Emphysema. Emphysema on CT is an independent risk factor for lung cancer. Low dose lung cancer screening should be considered if patient qualifies based on smoking history or if not already enrolled in a screening program. Electronically Signed: Jayjay Perea MD  7/29/2025 6:45 PM EDT  Workstation ID: [id number]    CT Angiogram Neck  Result Date: 7/29/2025  CT ANGIOGRAM HEAD W AI ANALYSIS OF LVO, CT ANGIOGRAM NECK Date of Exam: 7/29/2025 6:01 PM EDT Indication: Stroke, follow up bilateral strokes. Comparison: Brain MRI 7/25/2025. Technique: CTA of the head and neck was performed after the uneventful intravenous administration of 75 mL Isovue-370. Reconstructed coronal and sagittal images were also obtained. In addition, a 3-D volume rendered image was created for interpretation. Automated exposure control and iterative reconstruction methods were used. Findings: HEAD CTA: Anterior circulation: No proximal large vessel occlusion or major stenosis. Posterior  circulation: No proximal large vessel occlusion or major stenosis. Major dural venous sinuses: No evidence of dural venous sinus thrombosis within the limitation of angiographic contrast imaging. NECK CTA: Conventional, three-vessel, aortic arch. Normal contrast enhancement in the common carotid arteries from their origins to the bifurcation. Irregularity and atherosclerotic calcification at the carotid bulbs. There is less than 50% narrowing of the right ICA by NASCET criteria at its origin. There is thousand 50% narrowing of the left ICA by NASCET criteria at its origin. Otherwise, normal contrast enhancement of the internal carotid arteries from their origins to the proximal intradural portions.Irregularity and atherosclerotic calcification of the petrous and cavernous carotid arteries. Normal enhancement in the vertebral arteries from their origins to their proximal intradural portions. The left vertebral artery is dominant. Patent subclavian arteries. Angiographic contrast timing precludes accurate assessement of jugular vein patency. SOFT TISSUE NECK: No exophytic lesion seen within the aerodigestive tract. No pathologic appearing lymph nodes by imaging criteria. The visualized glands appear unremarkable. No acute or aggressive appearing osseous or soft tissue process.Degenerative changes of the imaged spine. Emphysema.     Impression: Impression: No proximal large vessel occlusion or severe stenosis of the major arteries of the head and neck. Emphysema. Emphysema on CT is an independent risk factor for lung cancer. Low dose lung cancer screening should be considered if patient qualifies based on smoking history or if not already enrolled in a screening program. Electronically Signed: Jayjay Perea MD  7/29/2025 6:45 PM EDT  Workstation ID: [id number]    CT Head Without Contrast Stroke Protocol  Result Date: 7/29/2025  CT HEAD WO CONTRAST STROKE PROTOCOL Date of Exam: 7/29/2025 6:01 PM EDT Indication: recent  stroke. Comparison: Imported/outside brain MRI dated 7/25/2025. Technique: Axial CT images were obtained of the head without contrast administration.  Reconstructed coronal images were also obtained. Automated exposure control and iterative construction methods were used. Findings: Scattered small infarcts within the right greater than left centrum semiovale are seen to better advantage on prior brain MRI. No acute intracranial hemorrhage. No extra-axial collection. Normal ventricular caliber. No extra-axial collection. Intraocular  structures are grossly unremarkable. Paranasal sinuses and mastoid air cells are grossly clear. No acute or suspicious osseous lesion.     Impression: Impression: Scattered small infarcts within the right greater than left centrum semiovale are seen to better advantage on recent brain MRI. No acute intracranial hemorrhage. Electronically Signed: Min Nguyen MD  7/29/2025 6:44 PM EDT  Workstation ID: [id number]                                                                    Medical Decision Making  Patient Presentation 55-year-old male presented to the emergency department at the recommendation of his oncologist due to an MRI that showed subacute versus acute infarct    DDX subacute, acute infarct, intracranial bleed, dehydration, electrolyte abnormality, sepsis, bacteremia, pneumonia, urinary tract infection    Data Review/ Non ED Records /Analysis/Ordering unique tests  Review of previous  non ED visits, prior labs, prior imaging, available notes from prior evaluations or visits with specialists, medication list, allergies, past medical history, past surgical history        Independent Review Studies  I Personally reviewed all laboratory studies performed in the emergency department     Intervention/Re-evaluation intervention included fluids and antibiotics    Independent Clinician consultation with neurostroke team    Risk Stratification tools/clinical decision rules weakness,  intermittent falls, decreased appetite, significant history for rectal cancer with metastasis to the lung and liver, with a recent MRI that showed subacute versus acute infarct.  It was recommended that he come to the ED, which the patient admittedly did delay, but come to emergency department, he had had preceding symptoms including weakness and falls.  Including his other significant comorbidities.  Neurology did evaluate his MRI that have been done recently and agreed with subacute versus acute infarct, patient also was hypotensive and had previously been treated for pneumonia at an outside hospital earlier, I was concerned that this had not cleared up and that he may have ongoing lingering symptoms of pneumonia.  The recent CT scan that was also performed after his ED visit showed bilateral infiltrates, based on his presentation blood cultures were drawn.  Initiate antibiotics.  The patient's blood pressure did improve with fluid bolus, and he was able to be safely admitted.    Shared Decision Making discussed all findings with patient and family they were agreeable    Disposition patient admitted for further care    Problems Addressed:  Cerebrovascular accident (CVA) due to embolism of precerebral artery: complicated acute illness or injury  Pneumonia of both lungs due to infectious organism, unspecified part of lung: complicated acute illness or injury    Amount and/or Complexity of Data Reviewed  External Data Reviewed: labs, radiology and notes.  Labs: ordered. Decision-making details documented in ED Course.    Risk  Decision regarding hospitalization.          Final diagnoses:   Pneumonia of both lungs due to infectious organism, unspecified part of lung   Cerebrovascular accident (CVA) due to embolism of precerebral artery           Disposition admission       Luke Herrera Jr., LALY  07/29/25 0445

## 2025-07-30 ENCOUNTER — APPOINTMENT (OUTPATIENT)
Dept: CARDIOLOGY | Facility: HOSPITAL | Age: 55
End: 2025-07-30
Payer: MEDICAID

## 2025-07-30 ENCOUNTER — APPOINTMENT (OUTPATIENT)
Dept: CT IMAGING | Facility: HOSPITAL | Age: 55
End: 2025-07-30
Payer: MEDICAID

## 2025-07-30 LAB
ANION GAP SERPL CALCULATED.3IONS-SCNC: 6 MMOL/L (ref 5–15)
B PARAPERT DNA SPEC QL NAA+PROBE: NOT DETECTED
B PERT DNA SPEC QL NAA+PROBE: NOT DETECTED
BASOPHILS # BLD AUTO: 0.02 10*3/MM3 (ref 0–0.2)
BASOPHILS NFR BLD AUTO: 0.6 % (ref 0–1.5)
BH CV LOWER VASCULAR LEFT COMMON FEMORAL AUGMENT: NORMAL
BH CV LOWER VASCULAR LEFT COMMON FEMORAL COMPRESS: NORMAL
BH CV LOWER VASCULAR LEFT COMMON FEMORAL PHASIC: NORMAL
BH CV LOWER VASCULAR LEFT COMMON FEMORAL SPONT: NORMAL
BH CV LOWER VASCULAR LEFT DISTAL FEMORAL AUGMENT: NORMAL
BH CV LOWER VASCULAR LEFT DISTAL FEMORAL COMPRESS: NORMAL
BH CV LOWER VASCULAR LEFT DISTAL FEMORAL PHASIC: NORMAL
BH CV LOWER VASCULAR LEFT DISTAL FEMORAL SPONT: NORMAL
BH CV LOWER VASCULAR LEFT GASTRONEMIUS COMPRESS: NORMAL
BH CV LOWER VASCULAR LEFT GREATER SAPH AK COMPRESS: NORMAL
BH CV LOWER VASCULAR LEFT GREATER SAPH BK COMPRESS: NORMAL
BH CV LOWER VASCULAR LEFT LESSER SAPH COMPRESS: NORMAL
BH CV LOWER VASCULAR LEFT MID FEMORAL AUGMENT: NORMAL
BH CV LOWER VASCULAR LEFT MID FEMORAL COMPRESS: NORMAL
BH CV LOWER VASCULAR LEFT MID FEMORAL PHASIC: NORMAL
BH CV LOWER VASCULAR LEFT MID FEMORAL SPONT: NORMAL
BH CV LOWER VASCULAR LEFT PERONEAL COMPRESS: NORMAL
BH CV LOWER VASCULAR LEFT POPLITEAL AUGMENT: NORMAL
BH CV LOWER VASCULAR LEFT POPLITEAL COMPRESS: NORMAL
BH CV LOWER VASCULAR LEFT POPLITEAL PHASIC: NORMAL
BH CV LOWER VASCULAR LEFT POPLITEAL SPONT: NORMAL
BH CV LOWER VASCULAR LEFT POSTERIOR TIBIAL COMPRESS: NORMAL
BH CV LOWER VASCULAR LEFT PROFUNDA FEMORAL PHASIC: NORMAL
BH CV LOWER VASCULAR LEFT PROFUNDA FEMORAL SPONT: NORMAL
BH CV LOWER VASCULAR LEFT PROXIMAL FEMORAL AUGMENT: NORMAL
BH CV LOWER VASCULAR LEFT PROXIMAL FEMORAL COMPRESS: NORMAL
BH CV LOWER VASCULAR LEFT PROXIMAL FEMORAL PHASIC: NORMAL
BH CV LOWER VASCULAR LEFT PROXIMAL FEMORAL SPONT: NORMAL
BH CV LOWER VASCULAR LEFT SAPHENOFEMORAL JUNCTION AUGMENT: NORMAL
BH CV LOWER VASCULAR LEFT SAPHENOFEMORAL JUNCTION COMPRESS: NORMAL
BH CV LOWER VASCULAR LEFT SAPHENOFEMORAL JUNCTION PHASIC: NORMAL
BH CV LOWER VASCULAR LEFT SAPHENOFEMORAL JUNCTION SPONT: NORMAL
BH CV LOWER VASCULAR RIGHT COMMON FEMORAL AUGMENT: NORMAL
BH CV LOWER VASCULAR RIGHT COMMON FEMORAL COMPRESS: NORMAL
BH CV LOWER VASCULAR RIGHT COMMON FEMORAL PHASIC: NORMAL
BH CV LOWER VASCULAR RIGHT COMMON FEMORAL SPONT: NORMAL
BH CV LOWER VASCULAR RIGHT DISTAL FEMORAL AUGMENT: NORMAL
BH CV LOWER VASCULAR RIGHT DISTAL FEMORAL COMPRESS: NORMAL
BH CV LOWER VASCULAR RIGHT DISTAL FEMORAL PHASIC: NORMAL
BH CV LOWER VASCULAR RIGHT DISTAL FEMORAL SPONT: NORMAL
BH CV LOWER VASCULAR RIGHT GASTRONEMIUS COMPRESS: NORMAL
BH CV LOWER VASCULAR RIGHT GREATER SAPH AK COMPRESS: NORMAL
BH CV LOWER VASCULAR RIGHT GREATER SAPH BK COMPRESS: NORMAL
BH CV LOWER VASCULAR RIGHT LESSER SAPH COMPRESS: NORMAL
BH CV LOWER VASCULAR RIGHT MID FEMORAL AUGMENT: NORMAL
BH CV LOWER VASCULAR RIGHT MID FEMORAL COMPRESS: NORMAL
BH CV LOWER VASCULAR RIGHT MID FEMORAL PHASIC: NORMAL
BH CV LOWER VASCULAR RIGHT MID FEMORAL SPONT: NORMAL
BH CV LOWER VASCULAR RIGHT PERONEAL COMPRESS: NORMAL
BH CV LOWER VASCULAR RIGHT POPLITEAL AUGMENT: NORMAL
BH CV LOWER VASCULAR RIGHT POPLITEAL COMPRESS: NORMAL
BH CV LOWER VASCULAR RIGHT POPLITEAL PHASIC: NORMAL
BH CV LOWER VASCULAR RIGHT POPLITEAL SPONT: NORMAL
BH CV LOWER VASCULAR RIGHT POSTERIOR TIBIAL COMPRESS: NORMAL
BH CV LOWER VASCULAR RIGHT PROFUNDA FEMORAL PHASIC: NORMAL
BH CV LOWER VASCULAR RIGHT PROFUNDA FEMORAL SPONT: NORMAL
BH CV LOWER VASCULAR RIGHT PROXIMAL FEMORAL AUGMENT: NORMAL
BH CV LOWER VASCULAR RIGHT PROXIMAL FEMORAL COMPRESS: NORMAL
BH CV LOWER VASCULAR RIGHT PROXIMAL FEMORAL PHASIC: NORMAL
BH CV LOWER VASCULAR RIGHT PROXIMAL FEMORAL SPONT: NORMAL
BH CV LOWER VASCULAR RIGHT SAPHENOFEMORAL JUNCTION AUGMENT: NORMAL
BH CV LOWER VASCULAR RIGHT SAPHENOFEMORAL JUNCTION COMPRESS: NORMAL
BH CV LOWER VASCULAR RIGHT SAPHENOFEMORAL JUNCTION PHASIC: NORMAL
BH CV LOWER VASCULAR RIGHT SAPHENOFEMORAL JUNCTION SPONT: NORMAL
BUN SERPL-MCNC: 5.9 MG/DL (ref 6–20)
BUN/CREAT SERPL: 10.2 (ref 7–25)
C PNEUM DNA NPH QL NAA+NON-PROBE: NOT DETECTED
CALCIUM SPEC-SCNC: 7.6 MG/DL (ref 8.6–10.5)
CHLORIDE SERPL-SCNC: 112 MMOL/L (ref 98–107)
CHOLEST SERPL-MCNC: 53 MG/DL (ref 0–200)
CO2 SERPL-SCNC: 23 MMOL/L (ref 22–29)
CORTIS SERPL-MCNC: 8.92 MCG/DL
CREAT SERPL-MCNC: 0.58 MG/DL (ref 0.76–1.27)
DEPRECATED RDW RBC AUTO: 65.3 FL (ref 37–54)
EGFRCR SERPLBLD CKD-EPI 2021: 114.5 ML/MIN/1.73
EOSINOPHIL # BLD AUTO: 0.12 10*3/MM3 (ref 0–0.4)
EOSINOPHIL NFR BLD AUTO: 3.4 % (ref 0.3–6.2)
ERYTHROCYTE [DISTWIDTH] IN BLOOD BY AUTOMATED COUNT: 18.9 % (ref 12.3–15.4)
FLUAV SUBTYP SPEC NAA+PROBE: NOT DETECTED
FLUBV RNA NPH QL NAA+NON-PROBE: NOT DETECTED
GLUCOSE BLDC GLUCOMTR-MCNC: 103 MG/DL (ref 70–130)
GLUCOSE BLDC GLUCOMTR-MCNC: 221 MG/DL (ref 70–130)
GLUCOSE BLDC GLUCOMTR-MCNC: 64 MG/DL (ref 70–130)
GLUCOSE BLDC GLUCOMTR-MCNC: 70 MG/DL (ref 70–130)
GLUCOSE BLDC GLUCOMTR-MCNC: 70 MG/DL (ref 70–130)
GLUCOSE BLDC GLUCOMTR-MCNC: 75 MG/DL (ref 70–130)
GLUCOSE BLDC GLUCOMTR-MCNC: 98 MG/DL (ref 70–130)
GLUCOSE BLDC GLUCOMTR-MCNC: 99 MG/DL (ref 70–130)
GLUCOSE SERPL-MCNC: 73 MG/DL (ref 65–99)
HADV DNA SPEC NAA+PROBE: NOT DETECTED
HBA1C MFR BLD: 4.91 % (ref 4.8–5.6)
HCOV 229E RNA SPEC QL NAA+PROBE: NOT DETECTED
HCOV HKU1 RNA SPEC QL NAA+PROBE: NOT DETECTED
HCOV NL63 RNA SPEC QL NAA+PROBE: NOT DETECTED
HCOV OC43 RNA SPEC QL NAA+PROBE: NOT DETECTED
HCT VFR BLD AUTO: 29.7 % (ref 37.5–51)
HDLC SERPL-MCNC: 26 MG/DL (ref 40–60)
HGB BLD-MCNC: 9.1 G/DL (ref 13–17.7)
HMPV RNA NPH QL NAA+NON-PROBE: NOT DETECTED
HPIV1 RNA ISLT QL NAA+PROBE: NOT DETECTED
HPIV2 RNA SPEC QL NAA+PROBE: NOT DETECTED
HPIV3 RNA NPH QL NAA+PROBE: NOT DETECTED
HPIV4 P GENE NPH QL NAA+PROBE: NOT DETECTED
IMM GRANULOCYTES # BLD AUTO: 0.04 10*3/MM3 (ref 0–0.05)
IMM GRANULOCYTES NFR BLD AUTO: 1.1 % (ref 0–0.5)
LDLC SERPL CALC-MCNC: 13 MG/DL (ref 0–100)
LDLC/HDLC SERPL: 0.64 {RATIO}
LYMPHOCYTES # BLD AUTO: 1.35 10*3/MM3 (ref 0.7–3.1)
LYMPHOCYTES NFR BLD AUTO: 38.2 % (ref 19.6–45.3)
M PNEUMO IGG SER IA-ACNC: NOT DETECTED
MAGNESIUM SERPL-MCNC: 1.9 MG/DL (ref 1.6–2.6)
MCH RBC QN AUTO: 29 PG (ref 26.6–33)
MCHC RBC AUTO-ENTMCNC: 30.6 G/DL (ref 31.5–35.7)
MCV RBC AUTO: 94.6 FL (ref 79–97)
MONOCYTES # BLD AUTO: 0.72 10*3/MM3 (ref 0.1–0.9)
MONOCYTES NFR BLD AUTO: 20.4 % (ref 5–12)
NEUTROPHILS NFR BLD AUTO: 1.28 10*3/MM3 (ref 1.7–7)
NEUTROPHILS NFR BLD AUTO: 36.3 % (ref 42.7–76)
NRBC BLD AUTO-RTO: 0 /100 WBC (ref 0–0.2)
PHOSPHATE SERPL-MCNC: 3.2 MG/DL (ref 2.5–4.5)
PLAT MORPH BLD: NORMAL
PLATELET # BLD AUTO: 319 10*3/MM3 (ref 140–450)
PMV BLD AUTO: 10.8 FL (ref 6–12)
POTASSIUM SERPL-SCNC: 3.9 MMOL/L (ref 3.5–5.2)
RBC # BLD AUTO: 3.14 10*6/MM3 (ref 4.14–5.8)
RBC MORPH BLD: NORMAL
RHINOVIRUS RNA SPEC NAA+PROBE: NOT DETECTED
RSV RNA NPH QL NAA+NON-PROBE: NOT DETECTED
SARS-COV-2 RNA RESP QL NAA+PROBE: NOT DETECTED
SODIUM SERPL-SCNC: 141 MMOL/L (ref 136–145)
TRIGL SERPL-MCNC: 52 MG/DL (ref 0–150)
TSH SERPL DL<=0.05 MIU/L-ACNC: 2.53 UIU/ML (ref 0.27–4.2)
VLDLC SERPL-MCNC: 14 MG/DL (ref 5–40)
WBC MORPH BLD: NORMAL
WBC NRBC COR # BLD AUTO: 3.53 10*3/MM3 (ref 3.4–10.8)

## 2025-07-30 PROCEDURE — 0202U NFCT DS 22 TRGT SARS-COV-2: CPT | Performed by: STUDENT IN AN ORGANIZED HEALTH CARE EDUCATION/TRAINING PROGRAM

## 2025-07-30 PROCEDURE — 97116 GAIT TRAINING THERAPY: CPT

## 2025-07-30 PROCEDURE — 99232 SBSQ HOSP IP/OBS MODERATE 35: CPT | Performed by: STUDENT IN AN ORGANIZED HEALTH CARE EDUCATION/TRAINING PROGRAM

## 2025-07-30 PROCEDURE — 71250 CT THORAX DX C-: CPT

## 2025-07-30 PROCEDURE — 25010000002 AZITHROMYCIN PER 500 MG: Performed by: PEDIATRICS

## 2025-07-30 PROCEDURE — 83036 HEMOGLOBIN GLYCOSYLATED A1C: CPT | Performed by: PEDIATRICS

## 2025-07-30 PROCEDURE — 84100 ASSAY OF PHOSPHORUS: CPT | Performed by: PEDIATRICS

## 2025-07-30 PROCEDURE — 83735 ASSAY OF MAGNESIUM: CPT | Performed by: PEDIATRICS

## 2025-07-30 PROCEDURE — 25810000003 SODIUM CHLORIDE 0.9 % SOLUTION 250 ML FLEX CONT: Performed by: PEDIATRICS

## 2025-07-30 PROCEDURE — 63710000001 REVEFENACIN 175 MCG/3ML SOLUTION: Performed by: PEDIATRICS

## 2025-07-30 PROCEDURE — 99254 IP/OBS CNSLTJ NEW/EST MOD 60: CPT | Performed by: INTERNAL MEDICINE

## 2025-07-30 PROCEDURE — 93970 EXTREMITY STUDY: CPT

## 2025-07-30 PROCEDURE — 94799 UNLISTED PULMONARY SVC/PX: CPT

## 2025-07-30 PROCEDURE — 82948 REAGENT STRIP/BLOOD GLUCOSE: CPT

## 2025-07-30 PROCEDURE — 84443 ASSAY THYROID STIM HORMONE: CPT | Performed by: STUDENT IN AN ORGANIZED HEALTH CARE EDUCATION/TRAINING PROGRAM

## 2025-07-30 PROCEDURE — 92523 SPEECH SOUND LANG COMPREHEN: CPT

## 2025-07-30 PROCEDURE — 80061 LIPID PANEL: CPT | Performed by: PEDIATRICS

## 2025-07-30 PROCEDURE — 99233 SBSQ HOSP IP/OBS HIGH 50: CPT | Performed by: PSYCHIATRY & NEUROLOGY

## 2025-07-30 PROCEDURE — 25010000002 CEFTRIAXONE PER 250 MG: Performed by: PEDIATRICS

## 2025-07-30 PROCEDURE — 85025 COMPLETE CBC W/AUTO DIFF WBC: CPT | Performed by: PEDIATRICS

## 2025-07-30 PROCEDURE — 82533 TOTAL CORTISOL: CPT | Performed by: STUDENT IN AN ORGANIZED HEALTH CARE EDUCATION/TRAINING PROGRAM

## 2025-07-30 PROCEDURE — 97161 PT EVAL LOW COMPLEX 20 MIN: CPT

## 2025-07-30 PROCEDURE — P9047 ALBUMIN (HUMAN), 25%, 50ML: HCPCS | Performed by: NURSE PRACTITIONER

## 2025-07-30 PROCEDURE — 97165 OT EVAL LOW COMPLEX 30 MIN: CPT

## 2025-07-30 PROCEDURE — 94761 N-INVAS EAR/PLS OXIMETRY MLT: CPT

## 2025-07-30 PROCEDURE — 85007 BL SMEAR W/DIFF WBC COUNT: CPT | Performed by: PEDIATRICS

## 2025-07-30 PROCEDURE — 94664 DEMO&/EVAL PT USE INHALER: CPT

## 2025-07-30 PROCEDURE — 93970 EXTREMITY STUDY: CPT | Performed by: INTERNAL MEDICINE

## 2025-07-30 PROCEDURE — 80048 BASIC METABOLIC PNL TOTAL CA: CPT | Performed by: PEDIATRICS

## 2025-07-30 PROCEDURE — 25010000002 ALBUMIN HUMAN 25% PER 50 ML: Performed by: NURSE PRACTITIONER

## 2025-07-30 RX ORDER — MIDODRINE HYDROCHLORIDE 5 MG/1
5 TABLET ORAL ONCE
Status: COMPLETED | OUTPATIENT
Start: 2025-07-30 | End: 2025-07-30

## 2025-07-30 RX ORDER — DEXTROSE MONOHYDRATE 25 G/50ML
25 INJECTION, SOLUTION INTRAVENOUS
Status: DISCONTINUED | OUTPATIENT
Start: 2025-07-30 | End: 2025-07-31 | Stop reason: HOSPADM

## 2025-07-30 RX ORDER — MIDODRINE HYDROCHLORIDE 5 MG/1
5 TABLET ORAL
Status: DISCONTINUED | OUTPATIENT
Start: 2025-07-30 | End: 2025-07-31 | Stop reason: HOSPADM

## 2025-07-30 RX ORDER — NICOTINE POLACRILEX 4 MG
15 LOZENGE BUCCAL
Status: DISCONTINUED | OUTPATIENT
Start: 2025-07-30 | End: 2025-07-31 | Stop reason: HOSPADM

## 2025-07-30 RX ORDER — SODIUM CHLORIDE 0.9 % (FLUSH) 0.9 %
10 SYRINGE (ML) INJECTION AS NEEDED
Status: DISCONTINUED | OUTPATIENT
Start: 2025-07-30 | End: 2025-07-31 | Stop reason: HOSPADM

## 2025-07-30 RX ORDER — IBUPROFEN 600 MG/1
1 TABLET ORAL
Status: DISCONTINUED | OUTPATIENT
Start: 2025-07-30 | End: 2025-07-31 | Stop reason: HOSPADM

## 2025-07-30 RX ORDER — ALBUMIN (HUMAN) 12.5 G/50ML
12.5 SOLUTION INTRAVENOUS ONCE
Status: COMPLETED | OUTPATIENT
Start: 2025-07-30 | End: 2025-07-30

## 2025-07-30 RX ORDER — HEPARIN SODIUM (PORCINE) LOCK FLUSH IV SOLN 100 UNIT/ML 100 UNIT/ML
5 SOLUTION INTRAVENOUS AS NEEDED
Status: DISCONTINUED | OUTPATIENT
Start: 2025-07-30 | End: 2025-07-31 | Stop reason: HOSPADM

## 2025-07-30 RX ADMIN — TRAZODONE HYDROCHLORIDE 150 MG: 50 TABLET ORAL at 21:52

## 2025-07-30 RX ADMIN — MORPHINE SULFATE 30 MG: 30 TABLET, FILM COATED, EXTENDED RELEASE ORAL at 08:30

## 2025-07-30 RX ADMIN — ALBUMIN (HUMAN) 12.5 G: 0.25 INJECTION, SOLUTION INTRAVENOUS at 01:27

## 2025-07-30 RX ADMIN — ARFORMOTEROL TARTRATE 15 MCG: 15 SOLUTION RESPIRATORY (INHALATION) at 06:52

## 2025-07-30 RX ADMIN — MONTELUKAST 10 MG: 10 TABLET, FILM COATED ORAL at 21:52

## 2025-07-30 RX ADMIN — REVEFENACIN 175 MCG: 175 SOLUTION RESPIRATORY (INHALATION) at 06:52

## 2025-07-30 RX ADMIN — MIDODRINE HYDROCHLORIDE 5 MG: 5 TABLET ORAL at 17:15

## 2025-07-30 RX ADMIN — Medication 5 MG: at 21:51

## 2025-07-30 RX ADMIN — ARFORMOTEROL TARTRATE 15 MCG: 15 SOLUTION RESPIRATORY (INHALATION) at 20:34

## 2025-07-30 RX ADMIN — MIDODRINE HYDROCHLORIDE 5 MG: 5 TABLET ORAL at 08:29

## 2025-07-30 RX ADMIN — MORPHINE SULFATE 30 MG: 30 TABLET, FILM COATED, EXTENDED RELEASE ORAL at 21:52

## 2025-07-30 RX ADMIN — MAGNESIUM OXIDE TAB 400 MG (241.3 MG ELEMENTAL MG) 400 MG: 400 (241.3 MG) TAB at 08:30

## 2025-07-30 RX ADMIN — OXYCODONE HYDROCHLORIDE 30 MG: 15 TABLET ORAL at 21:52

## 2025-07-30 RX ADMIN — OXYCODONE HYDROCHLORIDE 30 MG: 15 TABLET ORAL at 09:59

## 2025-07-30 RX ADMIN — MIDODRINE HYDROCHLORIDE 5 MG: 5 TABLET ORAL at 03:17

## 2025-07-30 RX ADMIN — PREGABALIN 300 MG: 150 CAPSULE ORAL at 08:29

## 2025-07-30 RX ADMIN — ASPIRIN 325 MG: 325 TABLET ORAL at 08:30

## 2025-07-30 RX ADMIN — CETIRIZINE HYDROCHLORIDE 10 MG: 10 TABLET, FILM COATED ORAL at 08:29

## 2025-07-30 RX ADMIN — ALBUMIN (HUMAN) 12.5 G: 0.25 INJECTION, SOLUTION INTRAVENOUS at 03:15

## 2025-07-30 RX ADMIN — CEFTRIAXONE SODIUM 1000 MG: 1 INJECTION, POWDER, FOR SOLUTION INTRAMUSCULAR; INTRAVENOUS at 16:33

## 2025-07-30 RX ADMIN — FAMOTIDINE 20 MG: 20 TABLET, FILM COATED ORAL at 21:52

## 2025-07-30 RX ADMIN — PANTOPRAZOLE SODIUM 40 MG: 40 TABLET, DELAYED RELEASE ORAL at 08:30

## 2025-07-30 RX ADMIN — AZITHROMYCIN 500 MG: 500 INJECTION, POWDER, LYOPHILIZED, FOR SOLUTION INTRAVENOUS at 17:14

## 2025-07-30 RX ADMIN — FAMOTIDINE 20 MG: 20 TABLET, FILM COATED ORAL at 08:30

## 2025-07-30 RX ADMIN — MIDODRINE HYDROCHLORIDE 5 MG: 5 TABLET ORAL at 11:05

## 2025-07-30 RX ADMIN — BUPROPION HYDROCHLORIDE 150 MG: 150 TABLET, EXTENDED RELEASE ORAL at 08:29

## 2025-07-30 RX ADMIN — ATORVASTATIN CALCIUM 20 MG: 20 TABLET, FILM COATED ORAL at 21:52

## 2025-07-30 RX ADMIN — Medication 10 ML: at 21:54

## 2025-07-30 RX ADMIN — PREGABALIN 300 MG: 150 CAPSULE ORAL at 21:52

## 2025-07-30 NOTE — CASE MANAGEMENT/SOCIAL WORK
Discharge Planning Assessment  [facility]     Patient Name: Edward Powell  MRN: [id number]  Today's Date: 7/30/2025    Admit Date: 7/29/2025    Plan: Home   Discharge Needs Assessment       Row Name 07/30/25 1348       Living Environment    People in Home spouse    Current Living Arrangements home    Potentially Unsafe Housing Conditions unable to assess    In the past 12 months has the electric, gas, oil, or water company threatened to shut off services in your home? No    Primary Care Provided by self    Provides Primary Care For no one    Family Caregiver if Needed spouse    Family Caregiver Names Alysia    Quality of Family Relationships unable to assess       Resource/Environmental Concerns    Resource/Environmental Concerns none    Transportation Concerns none       Transportation Needs    In the past 12 months, has lack of transportation kept you from medical appointments or from getting medications? no    In the past 12 months, has lack of transportation kept you from meetings, work, or from getting things needed for daily living? No       Food Insecurity    Within the past 12 months, you worried that your food would run out before you got the money to buy more. Never true    Within the past 12 months, the food you bought just didn't last and you didn't have money to get more. Never true       Transition Planning    Patient/Family Anticipates Transition to home    Patient/Family Anticipated Services at Transition none    Transportation Anticipated family or friend will provide       Discharge Needs Assessment    Equipment Currently Used at Home cane, straight    Concerns to be Addressed no discharge needs identified;denies needs/concerns at this time    Do you want help finding or keeping work or a job? Patient declined    Do you want help with school or training? For example, starting or completing job training or getting a high school diploma, GED or equivalent Patient declined                    Discharge Plan       Row Name 07/30/25 1349       Plan    Plan Home    Patient/Family in Agreement with Plan yes    Plan Comments Spoke with patient at the bedside to initiate discharge planning.  Confirmed patient and spouse live in [facility]; PCP is Westley Gonzales; insurance is [employer].  Patient states he is independent with ADLs and mobility; he does have a cane at home.  Patient denies use of home health.  Discharge goal is home and family will transport.  Case Management following.    Final Discharge Disposition Code 01 - home or self-care                  Continued Care and Services - Admitted Since 7/29/2025    No active coordination exists.          Demographic Summary       Row Name 07/30/25 1347       General Information    Referral Source admission list    Reason for Consult discharge planning    Preferred Language English       Contact Information    Permission Granted to Share Info With                    Functional Status       Row Name 07/30/25 1347       Functional Status    Usual Activity Tolerance good       Physical Activity    On average, how many days per week do you engage in moderate to strenuous exercise (like a brisk walk)? Pt Declined    On average, how many minutes do you engage in exercise at this level? Pt Declined       Functional Status, IADL    Medications independent    Meal Preparation independent    Housekeeping independent    Laundry independent    Shopping independent    If for any reason you need help with day-to-day activities such as bathing, preparing meals, shopping, managing finances, etc., do you get the help you need? I get all the help I need                   Psychosocial    No documentation.                  Abuse/Neglect    No documentation.                  Legal    No documentation.                  Substance Abuse    No documentation.                  Patient Forms    No documentation.                     Rachel Fish RN

## 2025-07-30 NOTE — PLAN OF CARE
Goal Outcome Evaluation:  Plan of Care Reviewed With: (P) patient           Outcome Evaluation: (P) Pt presents w/ BLE weakness, decreased sensation, and impaired gait impacting pt ability to return to baseline functional mobility. Pt appropriate for skilled PT services at this time to address deficits. PT rec to home w/ assist w/ OP PT services when medically appropriate for D/C.    Anticipated Discharge Disposition (PT): (P) home with assist, home with outpatient therapy services

## 2025-07-30 NOTE — PLAN OF CARE
Goal Outcome Evaluation:                   Anticipated Discharge Disposition (SLP): home with assist    SLP Diagnosis: mild, cognitive-linguistic disorder, aphasia (07/30/25 9785)

## 2025-07-30 NOTE — PROGRESS NOTES
[facility] Medicine Services  PROGRESS NOTE    Patient Name: Edward Powell  : 1970  MRN: [id number]    Date of Admission: 2025  Primary Care Physician: Westley Gonzales DO    Subjective   Subjective     CC:  New stroke, PNA    HPI:  Repeatedly hypotensive overnight, now improved.  Has cough with productive sputum.  Has generalized weakness.  Has chronic bilateral foot tingling.    Objective   Objective     Vital Signs:   Temp:  [97.1 °F (36.2 °C)-98.2 °F (36.8 °C)] 97.2 °F (36.2 °C)  Heart Rate:  [53-98] 57  Resp:  [14-20] 15  BP: ()/(48-72) 100/58     Physical Exam:  Constitutional: No acute distress, awake, alert  HENT: NCAT, mucous membranes moist  Respiratory: Coarse throughout, respiratory effort normal   Cardiovascular: RRR  Gastrointestinal: Positive bowel sounds, soft, nontender, nondistended  Musculoskeletal: No bilateral ankle edema  Psychiatric: Appropriate affect, cooperative  Neurologic: Alert, oriented, symmetric facies, moves all extremities, speech clear  Skin: No rashes, right upper chest port in place    Results Reviewed:  LAB RESULTS:      Lab 25  0632 25  1350   WBC 3.53  --   --  4.46   HEMOGLOBIN 9.1*  --   --  11.3*   HEMATOCRIT 29.7*  --   --  37.1*   PLATELETS 319  --   --  383   NEUTROS ABS  --   --   --  2.90   EOS ABS  --   --   --  0.13   MCV 94.6  --   --  93.9   PROCALCITONIN  --   --   --  0.04   LACTATE  --  0.6 4.6* 3.5*         Lab 25  0632 25  1350   SODIUM  --  141   POTASSIUM  --  4.6   CHLORIDE  --  107   CO2  --  25.0   ANION GAP  --  9.0   BUN  --  7.3   CREATININE  --  0.60*   EGFR  --  113.3   GLUCOSE  --  89   CALCIUM  --  8.0*   MAGNESIUM  --  1.9   HEMOGLOBIN A1C 4.91  --          Lab 25  1350   TOTAL PROTEIN 5.4*   ALBUMIN 2.4*   GLOBULIN 3.0   ALT (SGPT) 29   AST (SGOT) 16   BILIRUBIN 0.2   ALK PHOS 101         Lab 25  1350   PROBNP 224.1                  Brief Urine Lab Results  (Last result in the past 365 days)        Color   Clarity   Blood   Leuk Est   Nitrite   Protein   CREAT   Urine HCG        07/29/25 1725 Yellow   Clear   Negative   Negative   Negative   Negative                   Microbiology Results Abnormal       None            CT Angiogram Head w AI Analysis of LVO  Result Date: 7/29/2025  CT ANGIOGRAM HEAD W AI ANALYSIS OF LVO, CT ANGIOGRAM NECK Date of Exam: 7/29/2025 6:01 PM EDT Indication: Stroke, follow up bilateral strokes. Comparison: Brain MRI 7/25/2025. Technique: CTA of the head and neck was performed after the uneventful intravenous administration of 75 mL Isovue-370. Reconstructed coronal and sagittal images were also obtained. In addition, a 3-D volume rendered image was created for interpretation. Automated exposure control and iterative reconstruction methods were used. Findings: HEAD CTA: Anterior circulation: No proximal large vessel occlusion or major stenosis. Posterior circulation: No proximal large vessel occlusion or major stenosis. Major dural venous sinuses: No evidence of dural venous sinus thrombosis within the limitation of angiographic contrast imaging. NECK CTA: Conventional, three-vessel, aortic arch. Normal contrast enhancement in the common carotid arteries from their origins to the bifurcation. Irregularity and atherosclerotic calcification at the carotid bulbs. There is less than 50% narrowing of the right ICA by NASCET criteria at its origin. There is thousand 50% narrowing of the left ICA by NASCET criteria at its origin. Otherwise, normal contrast enhancement of the internal carotid arteries from their origins to the proximal intradural portions.Irregularity and atherosclerotic calcification of the petrous and cavernous carotid arteries. Normal enhancement in the vertebral arteries from their origins to their proximal intradural portions. The left vertebral artery is dominant. Patent subclavian arteries.  Angiographic contrast timing precludes accurate assessement of jugular vein patency. SOFT TISSUE NECK: No exophytic lesion seen within the aerodigestive tract. No pathologic appearing lymph nodes by imaging criteria. The visualized glands appear unremarkable. No acute or aggressive appearing osseous or soft tissue process.Degenerative changes of the imaged spine. Emphysema.     Impression: Impression: No proximal large vessel occlusion or severe stenosis of the major arteries of the head and neck. Emphysema. Emphysema on CT is an independent risk factor for lung cancer. Low dose lung cancer screening should be considered if patient qualifies based on smoking history or if not already enrolled in a screening program. Electronically Signed: Jayjay Perea MD  7/29/2025 6:45 PM EDT  Workstation ID: [id number]    CT Angiogram Neck  Result Date: 7/29/2025  CT ANGIOGRAM HEAD W AI ANALYSIS OF LVO, CT ANGIOGRAM NECK Date of Exam: 7/29/2025 6:01 PM EDT Indication: Stroke, follow up bilateral strokes. Comparison: Brain MRI 7/25/2025. Technique: CTA of the head and neck was performed after the uneventful intravenous administration of 75 mL Isovue-370. Reconstructed coronal and sagittal images were also obtained. In addition, a 3-D volume rendered image was created for interpretation. Automated exposure control and iterative reconstruction methods were used. Findings: HEAD CTA: Anterior circulation: No proximal large vessel occlusion or major stenosis. Posterior circulation: No proximal large vessel occlusion or major stenosis. Major dural venous sinuses: No evidence of dural venous sinus thrombosis within the limitation of angiographic contrast imaging. NECK CTA: Conventional, three-vessel, aortic arch. Normal contrast enhancement in the common carotid arteries from their origins to the bifurcation. Irregularity and atherosclerotic calcification at the carotid bulbs. There is less than 50% narrowing of the right ICA by NASCET  criteria at its origin. There is thousand 50% narrowing of the left ICA by NASCET criteria at its origin. Otherwise, normal contrast enhancement of the internal carotid arteries from their origins to the proximal intradural portions.Irregularity and atherosclerotic calcification of the petrous and cavernous carotid arteries. Normal enhancement in the vertebral arteries from their origins to their proximal intradural portions. The left vertebral artery is dominant. Patent subclavian arteries. Angiographic contrast timing precludes accurate assessement of jugular vein patency. SOFT TISSUE NECK: No exophytic lesion seen within the aerodigestive tract. No pathologic appearing lymph nodes by imaging criteria. The visualized glands appear unremarkable. No acute or aggressive appearing osseous or soft tissue process.Degenerative changes of the imaged spine. Emphysema.     Impression: Impression: No proximal large vessel occlusion or severe stenosis of the major arteries of the head and neck. Emphysema. Emphysema on CT is an independent risk factor for lung cancer. Low dose lung cancer screening should be considered if patient qualifies based on smoking history or if not already enrolled in a screening program. Electronically Signed: Jayjay Perea MD  7/29/2025 6:45 PM EDT  Workstation ID: [id number]    CT Head Without Contrast Stroke Protocol  Result Date: 7/29/2025  CT HEAD WO CONTRAST STROKE PROTOCOL Date of Exam: 7/29/2025 6:01 PM EDT Indication: recent stroke. Comparison: Imported/outside brain MRI dated 7/25/2025. Technique: Axial CT images were obtained of the head without contrast administration.  Reconstructed coronal images were also obtained. Automated exposure control and iterative construction methods were used. Findings: Scattered small infarcts within the right greater than left centrum semiovale are seen to better advantage on prior brain MRI. No acute intracranial hemorrhage. No extra-axial collection.  Normal ventricular caliber. No extra-axial collection. Intraocular  structures are grossly unremarkable. Paranasal sinuses and mastoid air cells are grossly clear. No acute or suspicious osseous lesion.     Impression: Impression: Scattered small infarcts within the right greater than left centrum semiovale are seen to better advantage on recent brain MRI. No acute intracranial hemorrhage. Electronically Signed: Min Nguyen MD  7/29/2025 6:44 PM EDT  Workstation ID: [id number]          Current medications:  Scheduled Meds:arformoterol, 15 mcg, Nebulization, BID - RT   And  revefenacin, 175 mcg, Nebulization, Daily - RT  aspirin, 325 mg, Oral, Daily   Or  aspirin, 300 mg, Rectal, Daily  atorvastatin, 20 mg, Oral, Nightly  azithromycin, 500 mg, Intravenous, Q24H  buPROPion XL, 150 mg, Oral, Daily  cefTRIAXone, 1,000 mg, Intravenous, Q24H  cetirizine, 10 mg, Oral, Daily  famotidine, 20 mg, Oral, BID  [Held by provider] furosemide, 20 mg, Oral, Daily  magnesium oxide, 400 mg, Oral, Daily  midodrine, 5 mg, Oral, TID AC  montelukast, 10 mg, Oral, Nightly  Morphine, 30 mg, Oral, BID  oxyCODONE, 30 mg, Oral, Q12H  pantoprazole, 40 mg, Oral, Daily  [Held by provider] pregabalin, 300 mg, Oral, BID  sodium chloride, 10 mL, Intravenous, Q12H  sodium chloride, 10 mL, Intravenous, Q12H  traZODone, 150 mg, Oral, Nightly      Continuous Infusions:   PRN Meds:.  acetaminophen    senna-docusate sodium **AND** polyethylene glycol **AND** bisacodyl **AND** bisacodyl    Calcium Replacement - Follow Nurse / BPA Driven Protocol    Magnesium Standard Dose Replacement - Follow Nurse / BPA Driven Protocol    melatonin    Phosphorus Replacement - Follow Nurse / BPA Driven Protocol    Potassium Replacement - Follow Nurse / BPA Driven Protocol    sodium chloride    Access VAD **AND** sodium chloride    sodium chloride    sodium chloride    sodium chloride    Assessment & Plan   Assessment & Plan     Active Hospital Problems    Diagnosis  POA     **Pneumonia [J18.9]  Yes    Sepsis associated hypotension [A41.9, I95.9]  Yes    Anemia due to chemotherapy [D64.81, T45.1X5A]  Yes    Rectal cancer metastasized to liver [C20, C78.7]  Yes      Resolved Hospital Problems   No resolved problems to display.        Brief Hospital Course to date:  Edward Powell is a 55 y.o. male with a history of metastatic rectal cancer, CAD, GERD, COPD, who presented for evaluation of multiple strokes seen on outpatient imaging.  Patient has been undergoing chemotherapy for his rectal adenocarcinoma.  He was seen at [facility] approximately 1 week PTA due to severe weakness, diarrhea, and shortness of breath.  He was given IV fluids, some potassium, and antibiotics.  He was diagnosed with pneumonia and was prescribed Augmentin and was sent home.  He was already scheduled to get a CT scan of his chest, abdomen, and pelvis as well as MRI brain for routine screening for his cancer to be done this week.  During these scans, he was found to have some defects on the MRI which would suggest acute/subacute infarcts.  At follow-up with his oncologist Dr. Cronin, she recommended that he go to the ED but it was his wife's birthday that day and he really did not want to go to the ED at that time.  Clinically the shortness of breath was improving and otherwise he did not have any other symptoms of a stroke.     This patient's problems and plans were partially entered by my partner and updated as appropriate by me 07/30/25.    Acute/subacute strokes  -Stroke following  -TTE pending  -BLE duplex normal  -Cont ASA 325mg  -Atorvastatin  -Lipid panel w LDL 13, A1C 4.9%  -PT/OT/SLP     PNA  Lactic acidosis  -Lactic acidosis has resolved with IV fluids; s/p 3L IVF + albumin x2  -Patient received Rocephin and azithromycin in the ED  -Had been on Augmentin for 7 days.  -Procal is low.  Has a bandemia. UA negative. RPP negative  -Continue CTX+Azith for now  - CT chest  pending    Hypotension  -Over the past several months outpatient, the patient's BP has been:  ------7/28: 87/57  ------7/14: 101/60  ------6/30: 108/65  ------6/16: 92/64  ------Prior to that, BP had been higher in the SBP 120s-130s  -TSH 2.5, AM cortisol 8.9  -Midodrine start  -Hold lasix     Pain- chronic  Rectal adenocarcinoma  -Continue patient's chronic pain regiment which he uses MS Contin twice daily, oxycodone 30mg BID  -Continue Lyrica  -Patient has a port but was unable to be accessed properly in the ED.  Given concern for infection, would keep the port deaccessed     GERD  History of perforated ulcer  --Continue Protonix and Pepcid    Expected Discharge Location and Transportation: home  Expected Discharge tmw?  Expected discharge date/ time has not been documented.     VTE Prophylaxis:  Mechanical VTE prophylaxis orders are present.         AM-PAC 6 Clicks Score (PT): 21 (07/29/25 2000)    CODE STATUS:   Code Status and Medical Interventions: CPR (Attempt to Resuscitate); Full Support   Ordered at: 07/30/25 0011     Code Status (Patient has no pulse and is not breathing):    CPR (Attempt to Resuscitate)     Medical Interventions (Patient has pulse or is breathing):    Full Support     Level Of Support Discussed With:    Patient       Vielka Jay MD  07/30/25

## 2025-07-30 NOTE — CONSULTS
HEMATOLOGY/ONCOLOGY INPATIENT CONSULTATION      REFERRING PHYSICIAN: Vielka Jay MD    PRIMARY CARE PROVIDER: Westley Gonzales DO    REASON FOR CONSULTATION: Rectal cancer      HISTORY OF PRESENT ILLNESS:  54 yo male with metastatic rectal cancer with most recent palliative chemotherapy on 25. He has been clinically declining in the last few weeks with more diarrhea, weakness and fatigue. This weekend he was found to have acute CVA on MRI performed for dizziness. He declined admission at the time as well as at time of office visit on Monday. He is now admitted for workup of the same.     He shares that his cousin recently  from metastatic cancer.   He is desirous of a therapy holiday for a month or two.   He is bothered by the IV/lab sticks and requests use of his port while inpatient.     Hospitalist, neuro are following and stroke workup ongoing.   Records indicate there was some concern for infection with elevated lactate and hypotension. Blood cultures pending.      Allergies   Allergen Reactions    Bactrim [Sulfamethoxazole-Trimethoprim] Hives     and blisters    Sulfa Antibiotics Hives     and blisters       Past Medical History:   Diagnosis Date    Arthritis     Cancer     rectal cancer - diagnosed     Cholelithiasis 2019    Removed    COPD (chronic obstructive pulmonary disease)     Coronary artery disease 2009    Stent - no cardiologist currently    Elevated cholesterol     GERD (gastroesophageal reflux disease)     Hernia     Lower hernia    Perforated ulcer 2019    Sleep apnea     history of; when weighed over 400lbs - no issues following bariatric surgery         Current Facility-Administered Medications:     acetaminophen (TYLENOL) tablet 650 mg, 650 mg, Oral, Q4H PRN, Valorie Villela MD    arformoterol (BROVANA) nebulizer solution 15 mcg, 15 mcg, Nebulization, BID - RT, 15 mcg at 25 0652 **AND** revefenacin (YUPELRI) nebulizer solution 175 mcg, 175 mcg, Nebulization,  Daily - RT, Valorie Villela MD, 175 mcg at 07/30/25 0652    aspirin tablet 325 mg, 325 mg, Oral, Daily, 325 mg at 07/30/25 0830 **OR** aspirin suppository 300 mg, 300 mg, Rectal, Daily, Valorie Villela MD    atorvastatin (LIPITOR) tablet 20 mg, 20 mg, Oral, Nightly, Valorie Villela MD, 20 mg at 07/29/25 2002    azithromycin (ZITHROMAX) 500 mg in sodium chloride 0.9 % 250 mL IVPB-VTB, 500 mg, Intravenous, Q24H, Valorie Villela MD, 500 mg at 07/30/25 1714    sennosides-docusate (PERICOLACE) 8.6-50 MG per tablet 2 tablet, 2 tablet, Oral, BID PRN **AND** polyethylene glycol (MIRALAX) packet 17 g, 17 g, Oral, Daily PRN **AND** bisacodyl (DULCOLAX) EC tablet 5 mg, 5 mg, Oral, Daily PRN **AND** bisacodyl (DULCOLAX) suppository 10 mg, 10 mg, Rectal, Daily PRN, Valorie Villela MD    buPROPion XL (WELLBUTRIN XL) 24 hr tablet 150 mg, 150 mg, Oral, Daily, Valorie Villela MD, 150 mg at 07/30/25 0829    Calcium Replacement - Follow Nurse / BPA Driven Protocol, , Not Applicable, PRN, Valorie Villela MD    cefTRIAXone (ROCEPHIN) 1,000 mg in sodium chloride 0.9 % 100 mL MBP, 1,000 mg, Intravenous, Q24H, Valorie Villela MD, Last Rate: 200 mL/hr at 07/30/25 1633, 1,000 mg at 07/30/25 1633    cetirizine (zyrTEC) tablet 10 mg, 10 mg, Oral, Daily, Valorie Villela MD, 10 mg at 07/30/25 0829    dextrose (D50W) (25 g/50 mL) IV injection 25 g, 25 g, Intravenous, Q15 Min PRN, Vielka Jay MD    dextrose (GLUTOSE) oral gel 15 g, 15 g, Oral, Q15 Min PRN, Vielka Jay MD    famotidine (PEPCID) tablet 20 mg, 20 mg, Oral, BID, Valorie Villela MD, 20 mg at 07/30/25 0830    [Held by provider] furosemide (LASIX) tablet 20 mg, 20 mg, Oral, Daily, Valorie Villela MD    glucagon (GLUCAGEN) injection 1 mg, 1 mg, Intramuscular, Q15 Min PRN, Vielka Jay MD    heparin injection 500 Units, 5 mL, Intravenous, PRN, Vielka Jay MD    magnesium oxide (MAG-OX) tablet 400  mg, 400 mg, Oral, Daily, Valorie Villela MD, 400 mg at 07/30/25 0830    Magnesium Standard Dose Replacement - Follow Nurse / BPA Driven Protocol, , Not Applicable, PRN, Valorie Villela MD    melatonin tablet 5 mg, 5 mg, Oral, Nightly PRN, Valorie Villela MD, 5 mg at 07/29/25 2002    midodrine (PROAMATINE) tablet 5 mg, 5 mg, Oral, TID AC, Vielka Jay MD, 5 mg at 07/30/25 1715    montelukast (SINGULAIR) tablet 10 mg, 10 mg, Oral, Nightly, Valorie Villela MD, 10 mg at 07/29/25 2153    Morphine (MS CONTIN) 12 hr tablet 30 mg, 30 mg, Oral, BID, Valorie Villela MD, 30 mg at 07/30/25 0830    oxyCODONE (ROXICODONE) immediate release tablet 30 mg, 30 mg, Oral, Q12H, Valorie Villela MD, 30 mg at 07/30/25 0959    pantoprazole (PROTONIX) EC tablet 40 mg, 40 mg, Oral, Daily, Valorie Vilella MD, 40 mg at 07/30/25 0830    Phosphorus Replacement - Follow Nurse / BPA Driven Protocol, , Not Applicable, PRN, Valorie Villela MD    Potassium Replacement - Follow Nurse / BPA Driven Protocol, , Not Applicable, PRN, Valorie Villela MD    pregabalin (LYRICA) capsule 300 mg, 300 mg, Oral, BID, Vielka Jay MD, 300 mg at 07/30/25 0829    sodium chloride 0.9 % flush 10 mL, 10 mL, Intravenous, Q12H, Valorie Villela MD, 10 mL at 07/29/25 2004    sodium chloride 0.9 % flush 10 mL, 10 mL, Intravenous, PRN, Valorie Villela MD, 10 mL at 07/29/25 2003    sodium chloride 0.9 % flush 10 mL, 10 mL, Intravenous, PRN, Vielka Jay MD    sodium chloride 0.9 % infusion 40 mL, 40 mL, Intravenous, PRN, Valorie Villela MD    traZODone (DESYREL) tablet 150 mg, 150 mg, Oral, Nightly, Valorie Villela MD, 150 mg at 07/29/25 2935    Past Surgical History:   Procedure Laterality Date    APPENDECTOMY  1983    Removed    BARIATRIC SURGERY  2011    Gastric bypass    BLADDER TUMOR/ULCER BLEEDER CAUTERIZATION      CARDIAC CATHETERIZATION  2009    stent placed     CHOLECYSTECTOMY  2019    Removed    COLONOSCOPY N/A 12/07/2023    Procedure: COLONOSCOPY WITH HOT SNARE POLYPECTOMY AND TATTOO;  Surgeon: Noman Gautam MD;  Location: [facility] ENDOSCOPY;  Service: Gastroenterology;  Laterality: N/A;    PORTACATH PLACEMENT N/A 1/5/2024    Procedure: INSERTION OF PORTACATH WITH ULTRSOUND AND FLUOROSCOPIC GUIDANCE;  Surgeon: Ida Black MD;  Location: [facility] OR;  Service: General;  Laterality: N/A;    JENNIFER-EN-Y         Social History     Socioeconomic History    Marital status:    Tobacco Use    Smoking status: Every Day     Current packs/day: 1.00     Average packs/day: 0.9 packs/day for 60.9 years (53.4 ttl pk-yrs)     Types: Cigarettes     Start date: 9/6/1994    Smokeless tobacco: Never    Tobacco comments:     35 years      pt reports closer to 2 packs per day since cancer diagnosis   Vaping Use    Vaping status: Never Used   Substance and Sexual Activity    Alcohol use: Never    Drug use: Never    Sexual activity: Defer       History reviewed. No pertinent family history.    Oncology/Hematology History   Rectal cancer metastasized to liver   12/28/2023 Initial Diagnosis    Rectal cancer metastasized to liver     1/9/2024 - 2/21/2024 Chemotherapy    OP COLON mFOLFOX6 OXALIplatin / Leucovorin / Fluorouracil     3/4/2024 - 1/21/2025 Biopsy    OP COLON mFOLFOX6 + Panitumumab (Panitumumab / OXALIplatin / Leucovorin / Fluorouracil)  Plan Provider: Brenda Cronin MD  Treatment goal: Control  Line of treatment: [No plan line of treatment]     2/3/2025 -  Chemotherapy    OP COLORECTAL FOLFIRI + Panitumumab (Irinotecan / Leucovorin / Fluorouracil / Panitumumab)     5/22/2025 -  Chemotherapy    OP SUPPORTIVE HYDRATION + ANTIEMETICS     Rectal adenocarcinoma   1/4/2024 Initial Diagnosis    Rectal adenocarcinoma     1/11/2024 -  Chemotherapy    OP CENTRAL VENOUS ACCESS DEVICE Access, Care, and Maintenance (CVAD)     5/22/2025 -  Chemotherapy    OP SUPPORTIVE HYDRATION +  "ANTIEMETICS           REVIEW OF SYSTEMS:  A 14 point review of systems was performed and is negative except as noted below.    Review of Systems - Oncology      Objective     Vitals:    07/30/25 0725 07/30/25 0940 07/30/25 1106 07/30/25 1510   BP: 95/73  106/59 95/57   BP Location: Left arm  Left arm Left arm   Patient Position: Lying  Sitting Sitting   Pulse: 69  69 65   Resp: 16  17 18   Temp: 96.4 °F (35.8 °C)  98.2 °F (36.8 °C) 98.2 °F (36.8 °C)   TempSrc: Axillary  Oral Oral   SpO2: 98%  99%    Weight:  83.9 kg (184 lb 15.5 oz)     Height:  182.9 cm (72.01\")                     Temp:  [96.4 °F (35.8 °C)-98.2 °F (36.8 °C)] 98.2 °F (36.8 °C)     Performance Status: 2    Physical Exam    General: well appearing male in no acute distress  HEENT: sclerae anicteric, oropharynx clear  Lymphatics: no cervical, supraclavicular, or axillary adenopathy  Cardiovascular: regular rate and rhythm, no murmurs  Lungs: clear to auscultation bilaterally  Abdomen: soft, nontender, nondistended.  No palpable organomegaly  Extremities: no lower extremity edema  Skin: no rashes, lesions, bruising, or petechiae      LABS:    Lab Results   Component Value Date    HGB 9.1 (L) 07/30/2025    HCT 29.7 (L) 07/30/2025    MCV 94.6 07/30/2025     07/30/2025    WBC 3.53 07/30/2025    NEUTROABS 1.28 (L) 07/30/2025    LYMPHSABS 1.35 07/30/2025    MONOSABS 0.72 07/30/2025    EOSABS 0.12 07/30/2025    BASOSABS 0.02 07/30/2025     Lab Results   Component Value Date    GLUCOSE 73 07/30/2025    BUN 5.9 (L) 07/30/2025    CREATININE 0.58 (L) 07/30/2025     07/30/2025    K 3.9 07/30/2025     (H) 07/30/2025    CO2 23.0 07/30/2025    CALCIUM 7.6 (L) 07/30/2025    PROTEINTOT 5.4 (L) 07/29/2025    ALBUMIN 2.4 (L) 07/29/2025    BILITOT 0.2 07/29/2025    ALKPHOS 101 07/29/2025    AST 16 07/29/2025    ALT 29 07/29/2025         IMAGING    Duplex Venous Lower Extremity - Bilateral CAR  Result Date: 7/30/2025    Normal bilateral lower extremity " venous duplex scan.     CT Angiogram Head w AI Analysis of LVO  Result Date: 7/29/2025  CT ANGIOGRAM HEAD W AI ANALYSIS OF LVO, CT ANGIOGRAM NECK Date of Exam: 7/29/2025 6:01 PM EDT Indication: Stroke, follow up bilateral strokes. Comparison: Brain MRI 7/25/2025. Technique: CTA of the head and neck was performed after the uneventful intravenous administration of 75 mL Isovue-370. Reconstructed coronal and sagittal images were also obtained. In addition, a 3-D volume rendered image was created for interpretation. Automated exposure control and iterative reconstruction methods were used. Findings: HEAD CTA: Anterior circulation: No proximal large vessel occlusion or major stenosis. Posterior circulation: No proximal large vessel occlusion or major stenosis. Major dural venous sinuses: No evidence of dural venous sinus thrombosis within the limitation of angiographic contrast imaging. NECK CTA: Conventional, three-vessel, aortic arch. Normal contrast enhancement in the common carotid arteries from their origins to the bifurcation. Irregularity and atherosclerotic calcification at the carotid bulbs. There is less than 50% narrowing of the right ICA by NASCET criteria at its origin. There is thousand 50% narrowing of the left ICA by NASCET criteria at its origin. Otherwise, normal contrast enhancement of the internal carotid arteries from their origins to the proximal intradural portions.Irregularity and atherosclerotic calcification of the petrous and cavernous carotid arteries. Normal enhancement in the vertebral arteries from their origins to their proximal intradural portions. The left vertebral artery is dominant. Patent subclavian arteries. Angiographic contrast timing precludes accurate assessement of jugular vein patency. SOFT TISSUE NECK: No exophytic lesion seen within the aerodigestive tract. No pathologic appearing lymph nodes by imaging criteria. The visualized glands appear unremarkable. No acute or  aggressive appearing osseous or soft tissue process.Degenerative changes of the imaged spine. Emphysema.     Impression: No proximal large vessel occlusion or severe stenosis of the major arteries of the head and neck. Emphysema. Emphysema on CT is an independent risk factor for lung cancer. Low dose lung cancer screening should be considered if patient qualifies based on smoking history or if not already enrolled in a screening program. Electronically Signed: Jayjay Perea MD  7/29/2025 6:45 PM EDT  Workstation ID: [id number]    CT Angiogram Neck  Result Date: 7/29/2025  CT ANGIOGRAM HEAD W AI ANALYSIS OF LVO, CT ANGIOGRAM NECK Date of Exam: 7/29/2025 6:01 PM EDT Indication: Stroke, follow up bilateral strokes. Comparison: Brain MRI 7/25/2025. Technique: CTA of the head and neck was performed after the uneventful intravenous administration of 75 mL Isovue-370. Reconstructed coronal and sagittal images were also obtained. In addition, a 3-D volume rendered image was created for interpretation. Automated exposure control and iterative reconstruction methods were used. Findings: HEAD CTA: Anterior circulation: No proximal large vessel occlusion or major stenosis. Posterior circulation: No proximal large vessel occlusion or major stenosis. Major dural venous sinuses: No evidence of dural venous sinus thrombosis within the limitation of angiographic contrast imaging. NECK CTA: Conventional, three-vessel, aortic arch. Normal contrast enhancement in the common carotid arteries from their origins to the bifurcation. Irregularity and atherosclerotic calcification at the carotid bulbs. There is less than 50% narrowing of the right ICA by NASCET criteria at its origin. There is thousand 50% narrowing of the left ICA by NASCET criteria at its origin. Otherwise, normal contrast enhancement of the internal carotid arteries from their origins to the proximal intradural portions.Irregularity and atherosclerotic calcification of  the petrous and cavernous carotid arteries. Normal enhancement in the vertebral arteries from their origins to their proximal intradural portions. The left vertebral artery is dominant. Patent subclavian arteries. Angiographic contrast timing precludes accurate assessement of jugular vein patency. SOFT TISSUE NECK: No exophytic lesion seen within the aerodigestive tract. No pathologic appearing lymph nodes by imaging criteria. The visualized glands appear unremarkable. No acute or aggressive appearing osseous or soft tissue process.Degenerative changes of the imaged spine. Emphysema.     Impression: No proximal large vessel occlusion or severe stenosis of the major arteries of the head and neck. Emphysema. Emphysema on CT is an independent risk factor for lung cancer. Low dose lung cancer screening should be considered if patient qualifies based on smoking history or if not already enrolled in a screening program. Electronically Signed: Jayjay Perea MD  7/29/2025 6:45 PM EDT  Workstation ID: [id number]    CT Head Without Contrast Stroke Protocol  Result Date: 7/29/2025  CT HEAD WO CONTRAST STROKE PROTOCOL Date of Exam: 7/29/2025 6:01 PM EDT Indication: recent stroke. Comparison: Imported/outside brain MRI dated 7/25/2025. Technique: Axial CT images were obtained of the head without contrast administration.  Reconstructed coronal images were also obtained. Automated exposure control and iterative construction methods were used. Findings: Scattered small infarcts within the right greater than left centrum semiovale are seen to better advantage on prior brain MRI. No acute intracranial hemorrhage. No extra-axial collection. Normal ventricular caliber. No extra-axial collection. Intraocular  structures are grossly unremarkable. Paranasal sinuses and mastoid air cells are grossly clear. No acute or suspicious osseous lesion.     Impression: Scattered small infarcts within the right greater than left centrum semiovale  are seen to better advantage on recent brain MRI. No acute intracranial hemorrhage. Electronically Signed: Min Nguyen MD  7/29/2025 6:44 PM EDT  Workstation ID: [id number]    MRI Brain With & Without Contrast  Addendum Date: 7/26/2025  The provider on-call Dr. César Hawk was notified at 12:40 p.m.  This report was signed and finalized on 7/26/2025 12:41 PM by Armen Little DO.      Result Date: 7/26/2025  PROCEDURE: MRI BRAIN W WO CONTRAST-  HISTORY: dizziness, colon cnacer; N72-Mceowxrqz neoplasm of rectum; C78.7-Secondary malignant neoplasm of liver and intrahepatic bile duct  PROCEDURE: Multiplanar multisequence imaging of the brain was performed without the use of intravenous contrast.  COMPARISON: None.  FINDINGS: The midbrain, jayme, cerebellum and craniocervical junction are unremarkable. The sella and pituitary gland are within normal limits.  Mild cerebral atrophy and nonspecific white matter changes are present. There are a few small foci of restricted diffusion in the centrum semiovale bilaterally consistent with acute/subacute infarcts. There is no mass effect or midline shift. No abnormal enhancing lesions to suggest metastatic disease.  The visualized paranasal sinuses and mastoid air cells are clear. The orbits are symmetrical.        There are a few small foci of restricted diffusion in the centrum semiovale bilaterally consistent with acute/subacute infarcts. No abnormal enhancing lesions to suggest metastatic disease.  Attempts are being made to reach the ordering provider on call.      This report was signed and finalized on 7/26/2025 12:37 PM by Armen Little DO.      CT Abdomen Pelvis With Contrast  Result Date: 7/23/2025  PROCEDURE: CT CHEST W CONTRAST DIAGNOSTIC-, CT ABDOMEN PELVIS W CONTRAST-  HISTORY: lung metastasis; R18-Qhskmhvaq neoplasm of rectum; C78.7-Secondary malignant neoplasm of liver and intrahepatic bile duct  COMPARISON: 4/24/2025.  PROCEDURE: Axial images were obtained from  the thoracic inlet through the pubic symphysis following the administration of Isovue 300 and oral contrast.   FINDINGS:  CHEST: There is no evidence of mediastinal or axillary adenopathy. Heart size is normal. There are no pleural or pericardial effusions. There are groundglass opacities within the bilateral lungs with minimal involvement of the right upper lobe and mild involvement of the left upper lobe. There is more prominent involvement of the other 3 lobes. Findings likely represent pneumonia. There is a posterior medial left lower lobe cavitary mass measuring 38 mm in transverse diameter, decreased in size from previous measurement of 43 mm. There are changes of emphysema. Bone windows reveal no lytic or destructive lesions. There is evidence of old calcified granulomatous disease. There is mild wall thickening of the distal esophagus, possibly representing esophagitis. No bony destructive lesion. There is a stable right IJ chest port.  ABDOMEN: There is fatty infiltration of the liver. The gallbladder is surgically absent. The spleen is unremarkable. No adrenal mass is present.  The pancreas is normal. Within the superior pole of the right kidney posteriorly there is a hypodense renal lesion most consistent with a cyst. There is a second similar-appearing lesion within the inferior pole the right kidney. These are stable from prior. The left kidney is unremarkable. No stones or hydronephrosis. The aorta is normal in caliber. There is no free fluid or adenopathy. The abdominal portions of the GI tract are unremarkable. No bony destructive lesions.  PELVIS: The appendix is not identified. No free fluid. The prostate is normal in size with calcification. The urinary bladder is mostly collapsed. There is wall thickening of the rectosigmoid colon asymmetrically with enhancement consistent with patient's known rectal cancer. This appears similar to the prior. There is mild filtration of the fat surrounding the  rectum. There is a right external iliac lymph node measuring 14 mm, not significant changed in size from prior. No bony destructive lesion.       Interval decrease in size of left lower lobe cavitary mass.  New bilateral patchy airspace disease likely resenting pneumonia. Recommend follow-up to resolution.  Irregular wall thickening of the rectosigmoid colon stable to mildly worsened compared to prior consistent with patient's known rectal cancer.  Stable right external iliac lymph node.  Fatty liver.   This study was performed with techniques to keep radiation doses as low as reasonably achievable (ALARA). Individualized dose reduction techniques using automated exposure control or adjustment of mA and/or kV according to the patient size were employed.    Images were reviewed, interpreted, and dictated by Dr. Gabriella Rodriguez MD Transcribed by Kameron Tejeda PA-C.  This report was signed and finalized on 7/23/2025 2:14 PM by Gabriella Rodriguez MD.      CT Chest With Contrast Diagnostic  Result Date: 7/23/2025  PROCEDURE: CT CHEST W CONTRAST DIAGNOSTIC-, CT ABDOMEN PELVIS W CONTRAST-  HISTORY: lung metastasis; Y96-Rantgpvwa neoplasm of rectum; C78.7-Secondary malignant neoplasm of liver and intrahepatic bile duct  COMPARISON: 4/24/2025.  PROCEDURE: Axial images were obtained from the thoracic inlet through the pubic symphysis following the administration of Isovue 300 and oral contrast.   FINDINGS:  CHEST: There is no evidence of mediastinal or axillary adenopathy. Heart size is normal. There are no pleural or pericardial effusions. There are groundglass opacities within the bilateral lungs with minimal involvement of the right upper lobe and mild involvement of the left upper lobe. There is more prominent involvement of the other 3 lobes. Findings likely represent pneumonia. There is a posterior medial left lower lobe cavitary mass measuring 38 mm in transverse diameter, decreased in size from previous measurement of 43 mm.  There are changes of emphysema. Bone windows reveal no lytic or destructive lesions. There is evidence of old calcified granulomatous disease. There is mild wall thickening of the distal esophagus, possibly representing esophagitis. No bony destructive lesion. There is a stable right IJ chest port.  ABDOMEN: There is fatty infiltration of the liver. The gallbladder is surgically absent. The spleen is unremarkable. No adrenal mass is present.  The pancreas is normal. Within the superior pole of the right kidney posteriorly there is a hypodense renal lesion most consistent with a cyst. There is a second similar-appearing lesion within the inferior pole the right kidney. These are stable from prior. The left kidney is unremarkable. No stones or hydronephrosis. The aorta is normal in caliber. There is no free fluid or adenopathy. The abdominal portions of the GI tract are unremarkable. No bony destructive lesions.  PELVIS: The appendix is not identified. No free fluid. The prostate is normal in size with calcification. The urinary bladder is mostly collapsed. There is wall thickening of the rectosigmoid colon asymmetrically with enhancement consistent with patient's known rectal cancer. This appears similar to the prior. There is mild filtration of the fat surrounding the rectum. There is a right external iliac lymph node measuring 14 mm, not significant changed in size from prior. No bony destructive lesion.       Interval decrease in size of left lower lobe cavitary mass.  New bilateral patchy airspace disease likely resenting pneumonia. Recommend follow-up to resolution.  Irregular wall thickening of the rectosigmoid colon stable to mildly worsened compared to prior consistent with patient's known rectal cancer.  Stable right external iliac lymph node.  Fatty liver.   This study was performed with techniques to keep radiation doses as low as reasonably achievable (ALARA). Individualized dose reduction techniques  using automated exposure control or adjustment of mA and/or kV according to the patient size were employed.    Images were reviewed, interpreted, and dictated by Dr. Gabriella Rodriguez MD Transcribed by Kameron Tejeda PA-C.  This report was signed and finalized on 7/23/2025 2:14 PM by Gabriella Rodriguez MD.      XR Chest 1 View  Result Date: 7/21/2025  PROCEDURE: XR CHEST 1 VW-  HISTORY: Weak/Dizzy/AMS triage protocol, received chemotherapy this week and has had diarrhea for 4 days, patient states that he is dehydrated.  COMPARISON: May 14, 2025..  FINDINGS: The heart is normal in size. There is bronchial wall thickening in the lung bases. There our new opacities in the right lung base, possible pneumonia.. The mediastinum is unremarkable. There is no pneumothorax.  There are no acute osseous abnormalities. Right internal jugular port appears to be in stable position. Apical lordotic positioning noted.      New right basilar airspace disease, possible pneumonia; recommend follow-up..  Stable position right IJ port from prior.   This report was signed and finalized on 7/21/2025 12:16 PM by Gabriella Rodriguez MD.        ASSESSMENT/PLAN:  Recent strokes  -Appreciate hospitalist and neurology care. Workup ongoing.     2. Hypotension  3. Elevated lactate  -Infectious workup is pending.   -He is not neutropenic    4. Access  -From my standpoint ok for port use when infection ruled out. Blood cultures NGTD.    5. Metastatic rectal cancer  -s/p chemotherapy 7/14/25.   -Pt is desirous of therapy holiday  -Follow up as an outpatient to readdress goals of care after resolution of acute issues.     Brenda Cronin MD    7/30/2025

## 2025-07-30 NOTE — PLAN OF CARE
Problem: Adult Inpatient Plan of Care  Goal: Plan of Care Review  Outcome: Progressing  Goal: Patient-Specific Goal (Individualized)  Outcome: Progressing  Goal: Absence of Hospital-Acquired Illness or Injury  Outcome: Progressing  Intervention: Identify and Manage Fall Risk  Recent Flowsheet Documentation  Taken 7/30/2025 0400 by Nan Florentino RN  Safety Promotion/Fall Prevention:   activity supervised   assistive device/personal items within reach   clutter free environment maintained   fall prevention program maintained   lighting adjusted   nonskid shoes/slippers when out of bed   room organization consistent   safety round/check completed  Taken 7/30/2025 0200 by Nan Florentino RN  Safety Promotion/Fall Prevention:   activity supervised   assistive device/personal items within reach   clutter free environment maintained   fall prevention program maintained   lighting adjusted   nonskid shoes/slippers when out of bed   room organization consistent   safety round/check completed  Taken 7/30/2025 0000 by Nan Florentino RN  Safety Promotion/Fall Prevention:   activity supervised   assistive device/personal items within reach   clutter free environment maintained   fall prevention program maintained   lighting adjusted   nonskid shoes/slippers when out of bed   room organization consistent   safety round/check completed  Taken 7/29/2025 2200 by Nan Florentino RN  Safety Promotion/Fall Prevention:   activity supervised   assistive device/personal items within reach   clutter free environment maintained   fall prevention program maintained   lighting adjusted   nonskid shoes/slippers when out of bed   room organization consistent   safety round/check completed  Taken 7/29/2025 2000 by Nan Florentino RN  Safety Promotion/Fall Prevention:   activity supervised   assistive device/personal items within reach   clutter free environment maintained   fall prevention program maintained    lighting adjusted   nonskid shoes/slippers when out of bed   room organization consistent   safety round/check completed  Intervention: Prevent Skin Injury  Recent Flowsheet Documentation  Taken 7/30/2025 0400 by Nan Florentino RN  Body Position: position changed independently  Skin Protection: incontinence pads utilized  Taken 7/30/2025 0200 by Nan Florentino RN  Body Position: position changed independently  Skin Protection: incontinence pads utilized  Taken 7/30/2025 0000 by Nan Florentino RN  Body Position: position changed independently  Skin Protection: incontinence pads utilized  Taken 7/29/2025 2200 by Nan Florentino RN  Body Position: position changed independently  Skin Protection: incontinence pads utilized  Taken 7/29/2025 2000 by Nan Florentino RN  Body Position: position changed independently  Skin Protection: incontinence pads utilized  Intervention: Prevent and Manage VTE (Venous Thromboembolism) Risk  Recent Flowsheet Documentation  Taken 7/29/2025 2000 by Nan Florentino RN  VTE Prevention/Management:   bilateral   SCDs (sequential compression devices) off  Intervention: Prevent Infection  Recent Flowsheet Documentation  Taken 7/30/2025 0400 by Nan Florentino RN  Infection Prevention:   environmental surveillance performed   equipment surfaces disinfected   hand hygiene promoted  Taken 7/30/2025 0200 by Nan Florentino RN  Infection Prevention:   environmental surveillance performed   equipment surfaces disinfected   hand hygiene promoted  Taken 7/30/2025 0000 by Nan Florentino RN  Infection Prevention:   environmental surveillance performed   equipment surfaces disinfected   hand hygiene promoted  Taken 7/29/2025 2200 by Nan Florentino RN  Infection Prevention:   environmental surveillance performed   equipment surfaces disinfected   hand hygiene promoted  Taken 7/29/2025 2000 by Nan Florentino RN  Infection Prevention:    environmental surveillance performed   equipment surfaces disinfected   hand hygiene promoted   personal protective equipment utilized  Goal: Optimal Comfort and Wellbeing  Outcome: Progressing  Intervention: Provide Person-Centered Care  Recent Flowsheet Documentation  Taken 7/29/2025 2000 by Nan Florentino RN  Trust Relationship/Rapport: care explained  Goal: Readiness for Transition of Care  Outcome: Progressing     Problem: Comorbidity Management  Goal: Blood Pressure in Desired Range  Outcome: Progressing  Intervention: Maintain Blood Pressure Management  Recent Flowsheet Documentation  Taken 7/30/2025 0400 by Nan Florentino RN  Medication Review/Management: medications reviewed  Taken 7/30/2025 0200 by Nan Florentino RN  Medication Review/Management: medications reviewed  Taken 7/30/2025 0000 by Nan Florentino RN  Medication Review/Management: medications reviewed  Taken 7/29/2025 2200 by Nan Florentino RN  Medication Review/Management: medications reviewed  Taken 7/29/2025 2000 by Nan Florentino RN  Medication Review/Management: medications reviewed   Goal Outcome Evaluation:

## 2025-07-30 NOTE — PROGRESS NOTES
Neurology Note    Patient:  Edward Powell    YOB: 1970    REFERRING PHYSICIAN:  Dr. Jain    CHIEF COMPLAINT:    AMS, Abnormal MRI    HISTORY OF PRESENT ILLNESS:   The patient denies any stroke symptoms, feels back to baseline. He reports that last week his wife was concerned that he was acting strange for about one day, was going outside in his underpants, appeared confused.    Past Medical History:  Past Medical History:   Diagnosis Date    Arthritis     Cancer     rectal cancer - diagnosed 2023    Cholelithiasis 2019    Removed    COPD (chronic obstructive pulmonary disease)     Coronary artery disease 2009    Stent - no cardiologist currently    Elevated cholesterol     GERD (gastroesophageal reflux disease)     Hernia 2003    Lower hernia    Perforated ulcer 2019    Sleep apnea     history of; when weighed over 400lbs - no issues following bariatric surgery       Past Surgical History:  Past Surgical History:   Procedure Laterality Date    APPENDECTOMY  1983    Removed    BARIATRIC SURGERY  2011    Gastric bypass    BLADDER TUMOR/ULCER BLEEDER CAUTERIZATION      CARDIAC CATHETERIZATION  2009    stent placed    CHOLECYSTECTOMY  2019    Removed    COLONOSCOPY N/A 12/07/2023    Procedure: COLONOSCOPY WITH HOT SNARE POLYPECTOMY AND TATTOO;  Surgeon: Noman Gautam MD;  Location: [facility] ENDOSCOPY;  Service: Gastroenterology;  Laterality: N/A;    PORTACATH PLACEMENT N/A 1/5/2024    Procedure: INSERTION OF PORTACATH WITH ULTRSOUND AND FLUOROSCOPIC GUIDANCE;  Surgeon: Ida Black MD;  Location: [facility] OR;  Service: General;  Laterality: N/A;    JENNIFER-EN-Y         Social History:   Social History     Socioeconomic History    Marital status:    Tobacco Use    Smoking status: Every Day     Current packs/day: 1.00     Average packs/day: 0.9 packs/day for 60.9 years (53.4 ttl pk-yrs)     Types: Cigarettes     Start date: 9/6/1994    Smokeless tobacco: Never    Tobacco comments:      35 years      pt reports closer to 2 packs per day since cancer diagnosis   Vaping Use    Vaping status: Never Used   Substance and Sexual Activity    Alcohol use: Never    Drug use: Never    Sexual activity: Defer        Family History:   History reviewed. No pertinent family history.    Medications Prior to Admission:    Prior to Admission medications    Medication Sig Start Date End Date Taking? Authorizing Provider   albuterol sulfate  (90 Base) MCG/ACT inhaler Inhale 2 puffs Every 4 (Four) Hours As Needed for Wheezing. 12/30/24  Yes Brenda Cronin MD   amoxicillin-clavulanate (AUGMENTIN) 875-125 MG per tablet Take 1 tablet by mouth 2 (Two) Times a Day for 7 days. 7/22/25 7/29/25 Yes Benjamín Chacko DO   aspirin 81 MG EC tablet Take 1 tablet by mouth Daily. 7/28/25  Yes Brenda Cronin MD   buPROPion XL (WELLBUTRIN XL) 150 MG 24 hr tablet Take 1 tablet by mouth Daily. 5/12/25  Yes Brenda Cronin MD   Calcium Carbonate-Vitamin D 500-5 MG-MCG tablet Take 1 tablet by mouth 2 (Two) Times a Day. 6/16/25  Yes Brenda Cronin MD   cetirizine (zyrTEC) 10 MG tablet Take 1 tablet by mouth Daily. 1/22/24  Yes Brenda Cronin MD   clindamycin (Clindagel) 1 % gel Apply 1 Application topically to the appropriate area as directed 2 (Two) Times a Day. Use first 3/18/24  Yes Brenda Cronin MD   Diclofenac Sodium (Voltaren) 1 % gel gel Apply 4 g topically to the appropriate area as directed 2 (Two) Times a Day. 3/17/25  Yes Brenda Cronin MD   diphenoxylate-atropine (LOMOTIL) 2.5-0.025 MG per tablet TAKE 1 TABLET BY MOUTH EVERY 6 HOURS AS NEEDED FOR DIARRHEA 5/6/25  Yes Brenda Cronin MD   docusate sodium (COLACE) 100 MG capsule Take 1 capsule by mouth 2 (Two) Times a Day.   Yes Arya Bradford MD   doxycycline (VIBRAMYICN) 100 MG tablet Take 1 tablet by mouth 2 (Two) Times a Day. For 7 days, then 1 tablet daily indefinitely 3/19/25  Yes Brenda Cronin MD   DULoxetine (CYMBALTA) 20 MG  capsule Take 1 capsule by mouth Daily. Pt weaning off medication.per pt. 7/28/25  Yes Brenda Cronin MD   famotidine (PEPCID) 20 MG tablet Take 1 tablet by mouth 2 (Two) Times a Day. 11/18/24  Yes Brenda Cronin MD   fluticasone (FLONASE) 50 MCG/ACT nasal spray Administer 2 sprays into the nostril(s) as directed by provider Daily. Administer 2 sprays in each nostril for each dose. 5/12/25  Yes Brenda Cronin MD   furosemide (Lasix) 20 MG tablet Take 1 tablet by mouth Daily. 6/9/25  Yes Westley Gonzales DO   hydrocortisone 2.5 % ointment Apply 1 Application topically to the appropriate area as directed 2 (Two) Times a Day. 3/31/25  Yes Brenda Cronin MD   Hydrocortisone, Perianal, (ANUSOL-HC) 2.5 % rectal cream Insert  into the rectum 2 (Two) Times a Day. Indications: Inflamed Hemorrhoids 11/30/23  Yes Noman Gautam MD   KETOPROFEN-LIDO-GABAPENTIN EX APPLY 1-2 GRAMS TO AFFECTED AREAS 3-4 TIMES DAILY 4/3/25  Yes Arya Bradford MD   lidocaine-prilocaine (EMLA) 2.5-2.5 % cream Apply 1 Application topically to the appropriate area as directed As Needed (45-60 minutes prior to port access.  Cover with saran/plastic wrap.). 6/30/25  Yes Brenda Cronin MD   LORazepam (ATIVAN) 0.5 MG tablet Take 1 tablet by mouth Take As Directed. 1 tabelt by mouth 30 minutes prior to MRI, take a second tablet at the time of the MRI if needed. 1/23/24  Yes Brenda Cronin MD   Magic Mouthwash Oral Suspension (diphenhydrAMINE HCl - aluminum & magnesium hydroxide-simethicone - lidocaine - nystatin) Swish and Spit 10 mL by mouth every 6 (Six) Hours For 7 Days. 4/4/25  Yes Jacy Razo APRN   magnesium oxide (MAG-OX) 400 MG tablet Take 1 tablet by mouth Daily. 1/20/25  Yes Brenda Cronin MD   Magnesium Oxide -Mg Supplement 400 (240 Mg) MG tablet 1 tablet Daily. 6/4/25  Yes Provider, MD Arya   mineral oil-hydrophilic petrolatum (AQUAPHOR) ointment Apply 1 Application topically to the appropriate area as  directed As Needed for Dry Skin. 3/30/25  Yes Prince Helm MD   montelukast (SINGULAIR) 10 MG tablet Take 1 tablet by mouth Every Night. 3/3/25  Yes Brenda Cronin MD   Morphine (MS CONTIN) 30 MG 12 hr tablet Take 1 tablet by mouth 2 (Two) Times a Day for 30 days. 7/10/25 8/9/25 Yes Westley Gonzales,    Movantik 25 MG tablet  8/20/24  Yes ProviderArya MD   mupirocin (BACTROBAN) 2 % cream  3/18/24  Yes Provider, MD Arya   naloxone (NARCAN) 4 MG/0.1ML nasal spray 1 spray into the nostril(s) as directed by provider As Needed for Opioid Reversal. 12/29/23  Yes Brenda Cronin MD   nystatin susp + lidocaine viscous (MAGIC MOUTHWASH) oral suspension 5-10 ml swish and spit or swallow QID prn 8/19/24  Yes Brenda Cronin MD   ondansetron (ZOFRAN) 8 MG tablet Take 1 tablet by mouth 3 (Three) Times a Day As Needed for Nausea or Vomiting. 3/3/25  Yes Brenda Cronin MD   oxyCODONE (ROXICODONE) 15 MG immediate release tablet Take 1 tablet by mouth 5 (Five) Times a Day As Needed for Moderate Pain or Severe Pain for up to 30 days. 7/10/25 8/9/25 Yes Westley Gonzales DO   pantoprazole (Protonix) 40 MG EC tablet Take 1 tablet by mouth Daily. Indications: Gastroesophageal Reflux Disease 3/19/25  Yes Brenda Cronin MD   potassium chloride ER (K-TAB) 20 MEQ tablet controlled-release ER tablet Take 2 tablets by mouth Daily. 6/16/25  Yes Brenda Cronin MD   pregabalin (Lyrica) 300 MG capsule Take 1 capsule by mouth 2 (Two) Times a Day for 30 days. 7/10/25 8/9/25 Yes Westley Gonzales DO   promethazine-dextromethorphan (PROMETHAZINE-DM) 6.25-15 MG/5ML syrup Take 5 mL by mouth 3 times a day. 12/27/24  Yes Linda Leslie PA-C   tiotropium bromide-olodaterol (Stiolto Respimat) 2.5-2.5 MCG/ACT aerosol solution inhaler INHALE 2 INHALATION(S) BY MOUTH DAILY 7/1/24  Yes Ana Irizarry MD   traZODone (DESYREL) 150 MG tablet Take 1 tablet by mouth Every Night. 6/16/25  Yes Brenda Cronin MD    triamcinolone (KENALOG) 0.025 % ointment Apply 1 Application topically to the appropriate area as directed 2 (Two) Times a Day. Use second if topical treatment #1 ineffective 3/18/24  Yes Brenda Cronin MD       Allergies:  Bactrim [sulfamethoxazole-trimethoprim] and Sulfa antibiotics      Review of system  Review of Systems   HENT:  Negative for trouble swallowing.    Musculoskeletal:  Negative for gait problem.   Neurological:  Negative for speech difficulty, weakness and numbness.   Psychiatric/Behavioral:  Negative for confusion.    All other systems reviewed and are negative.      Vitals:    07/30/25 1510   BP: 95/57   Pulse: 65   Resp: 18   Temp: 98.2 °F (36.8 °C)   SpO2:        Physical exam  Physical Exam  HENT:      Head: Normocephalic and atraumatic.   Cardiovascular:      Rate and Rhythm: Normal rate and regular rhythm.   Pulmonary:      Effort: Pulmonary effort is normal.   Neurological:      General: No focal deficit present.      Mental Status: He is alert and oriented to person, place, and time.      Cranial Nerves: Cranial nerves 2-12 are intact.      Sensory: Sensation is intact.      Motor: Motor function is intact.      Coordination: Coordination is intact.      Deep Tendon Reflexes: Babinski sign absent on the right side. Babinski sign absent on the left side.      Comments: Speech is clear, VFF, no facial droop, no limb drift.           Lab Results   Component Value Date    WBC 3.53 07/30/2025    HGB 9.1 (L) 07/30/2025    HCT 29.7 (L) 07/30/2025    MCV 94.6 07/30/2025     07/30/2025     Lab Results   Component Value Date    GLUCOSE 73 07/30/2025    BUN 5.9 (L) 07/30/2025    CREATININE 0.58 (L) 07/30/2025    EGFRIFNONA 109 09/24/2019    EGFRIFAFRI 113 09/17/2019    BCR 10.2 07/30/2025    CO2 23.0 07/30/2025    CALCIUM 7.6 (L) 07/30/2025    ALBUMIN 2.4 (L) 07/29/2025    AST 16 07/29/2025    ALT 29 07/29/2025         Radiological Studies:   Duplex Venous Lower Extremity - Bilateral  CAR  Result Date: 7/30/2025    Normal bilateral lower extremity venous duplex scan.     CT Angiogram Head w AI Analysis of LVO  Result Date: 7/29/2025  CT ANGIOGRAM HEAD W AI ANALYSIS OF LVO, CT ANGIOGRAM NECK Date of Exam: 7/29/2025 6:01 PM EDT Indication: Stroke, follow up bilateral strokes. Comparison: Brain MRI 7/25/2025. Technique: CTA of the head and neck was performed after the uneventful intravenous administration of 75 mL Isovue-370. Reconstructed coronal and sagittal images were also obtained. In addition, a 3-D volume rendered image was created for interpretation. Automated exposure control and iterative reconstruction methods were used. Findings: HEAD CTA: Anterior circulation: No proximal large vessel occlusion or major stenosis. Posterior circulation: No proximal large vessel occlusion or major stenosis. Major dural venous sinuses: No evidence of dural venous sinus thrombosis within the limitation of angiographic contrast imaging. NECK CTA: Conventional, three-vessel, aortic arch. Normal contrast enhancement in the common carotid arteries from their origins to the bifurcation. Irregularity and atherosclerotic calcification at the carotid bulbs. There is less than 50% narrowing of the right ICA by NASCET criteria at its origin. There is thousand 50% narrowing of the left ICA by NASCET criteria at its origin. Otherwise, normal contrast enhancement of the internal carotid arteries from their origins to the proximal intradural portions.Irregularity and atherosclerotic calcification of the petrous and cavernous carotid arteries. Normal enhancement in the vertebral arteries from their origins to their proximal intradural portions. The left vertebral artery is dominant. Patent subclavian arteries. Angiographic contrast timing precludes accurate assessement of jugular vein patency. SOFT TISSUE NECK: No exophytic lesion seen within the aerodigestive tract. No pathologic appearing lymph nodes by imaging  criteria. The visualized glands appear unremarkable. No acute or aggressive appearing osseous or soft tissue process.Degenerative changes of the imaged spine. Emphysema.     Impression: No proximal large vessel occlusion or severe stenosis of the major arteries of the head and neck. Emphysema. Emphysema on CT is an independent risk factor for lung cancer. Low dose lung cancer screening should be considered if patient qualifies based on smoking history or if not already enrolled in a screening program. Electronically Signed: Jayjay Perea MD  7/29/2025 6:45 PM EDT  Workstation ID: [id number]    CT Angiogram Neck  Result Date: 7/29/2025  CT ANGIOGRAM HEAD W AI ANALYSIS OF LVO, CT ANGIOGRAM NECK Date of Exam: 7/29/2025 6:01 PM EDT Indication: Stroke, follow up bilateral strokes. Comparison: Brain MRI 7/25/2025. Technique: CTA of the head and neck was performed after the uneventful intravenous administration of 75 mL Isovue-370. Reconstructed coronal and sagittal images were also obtained. In addition, a 3-D volume rendered image was created for interpretation. Automated exposure control and iterative reconstruction methods were used. Findings: HEAD CTA: Anterior circulation: No proximal large vessel occlusion or major stenosis. Posterior circulation: No proximal large vessel occlusion or major stenosis. Major dural venous sinuses: No evidence of dural venous sinus thrombosis within the limitation of angiographic contrast imaging. NECK CTA: Conventional, three-vessel, aortic arch. Normal contrast enhancement in the common carotid arteries from their origins to the bifurcation. Irregularity and atherosclerotic calcification at the carotid bulbs. There is less than 50% narrowing of the right ICA by NASCET criteria at its origin. There is thousand 50% narrowing of the left ICA by NASCET criteria at its origin. Otherwise, normal contrast enhancement of the internal carotid arteries from their origins to the proximal  intradural portions.Irregularity and atherosclerotic calcification of the petrous and cavernous carotid arteries. Normal enhancement in the vertebral arteries from their origins to their proximal intradural portions. The left vertebral artery is dominant. Patent subclavian arteries. Angiographic contrast timing precludes accurate assessement of jugular vein patency. SOFT TISSUE NECK: No exophytic lesion seen within the aerodigestive tract. No pathologic appearing lymph nodes by imaging criteria. The visualized glands appear unremarkable. No acute or aggressive appearing osseous or soft tissue process.Degenerative changes of the imaged spine. Emphysema.     Impression: No proximal large vessel occlusion or severe stenosis of the major arteries of the head and neck. Emphysema. Emphysema on CT is an independent risk factor for lung cancer. Low dose lung cancer screening should be considered if patient qualifies based on smoking history or if not already enrolled in a screening program. Electronically Signed: Jayjay Perea MD  7/29/2025 6:45 PM EDT  Workstation ID: [id number]    CT Head Without Contrast Stroke Protocol  Result Date: 7/29/2025  CT HEAD WO CONTRAST STROKE PROTOCOL Date of Exam: 7/29/2025 6:01 PM EDT Indication: recent stroke. Comparison: Imported/outside brain MRI dated 7/25/2025. Technique: Axial CT images were obtained of the head without contrast administration.  Reconstructed coronal images were also obtained. Automated exposure control and iterative construction methods were used. Findings: Scattered small infarcts within the right greater than left centrum semiovale are seen to better advantage on prior brain MRI. No acute intracranial hemorrhage. No extra-axial collection. Normal ventricular caliber. No extra-axial collection. Intraocular  structures are grossly unremarkable. Paranasal sinuses and mastoid air cells are grossly clear. No acute or suspicious osseous lesion.     Impression: Scattered  small infarcts within the right greater than left centrum semiovale are seen to better advantage on recent brain MRI. No acute intracranial hemorrhage. Electronically Signed: Min Nguyen MD  7/29/2025 6:44 PM EDT  Workstation ID: [id number]    MRI Brain With & Without Contrast  Addendum Date: 7/26/2025  The provider on-call Dr. César Hawk was notified at 12:40 p.m.  This report was signed and finalized on 7/26/2025 12:41 PM by Armen Little DO.      Result Date: 7/26/2025  PROCEDURE: MRI BRAIN W WO CONTRAST-  HISTORY: dizziness, colon cnacer; J31-Kzktgvmzx neoplasm of rectum; C78.7-Secondary malignant neoplasm of liver and intrahepatic bile duct  PROCEDURE: Multiplanar multisequence imaging of the brain was performed without the use of intravenous contrast.  COMPARISON: None.  FINDINGS: The midbrain, jayme, cerebellum and craniocervical junction are unremarkable. The sella and pituitary gland are within normal limits.  Mild cerebral atrophy and nonspecific white matter changes are present. There are a few small foci of restricted diffusion in the centrum semiovale bilaterally consistent with acute/subacute infarcts. There is no mass effect or midline shift. No abnormal enhancing lesions to suggest metastatic disease.  The visualized paranasal sinuses and mastoid air cells are clear. The orbits are symmetrical.        There are a few small foci of restricted diffusion in the centrum semiovale bilaterally consistent with acute/subacute infarcts. No abnormal enhancing lesions to suggest metastatic disease.  Attempts are being made to reach the ordering provider on call.      This report was signed and finalized on 7/26/2025 12:37 PM by Armen Little DO.      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  PROCEDURE: Axial images were obtained from the thoracic inlet through the pubic symphysis following the administration of Isovue 300 and oral contrast.   FINDINGS:  CHEST: There is no evidence of mediastinal or axillary adenopathy. Heart size is normal. There are no pleural or pericardial effusions. There are groundglass opacities within the bilateral lungs with minimal involvement of the right upper lobe and mild involvement of the left upper lobe. There is more prominent involvement of the other 3 lobes. Findings likely represent pneumonia. There is a posterior medial left lower lobe cavitary mass measuring 38 mm in transverse diameter, decreased in size from previous measurement of 43 mm. There are changes of emphysema. Bone windows reveal no lytic or destructive lesions. There is evidence of old calcified granulomatous disease. There is mild wall thickening of the distal esophagus, possibly representing esophagitis. No bony destructive lesion. There is a stable right IJ chest port.  ABDOMEN: There is fatty infiltration of the liver. The gallbladder is surgically absent. The spleen is unremarkable. No adrenal mass is present.  The pancreas is normal. Within the superior pole of the right kidney posteriorly there is a hypodense renal lesion most consistent with a cyst. There is a second similar-appearing lesion within the inferior pole the right kidney. These are stable from prior. The left kidney is unremarkable. No stones or hydronephrosis. The aorta is normal in caliber. There is no free fluid or adenopathy. The abdominal portions of the GI tract are unremarkable. No bony destructive lesions.  PELVIS: The appendix is not identified. No free fluid. The prostate is normal in size with calcification. The urinary bladder is mostly collapsed. There is wall thickening of the rectosigmoid colon asymmetrically with enhancement consistent with patient's known rectal cancer. This appears similar  to the prior. There is mild filtration of the fat surrounding the rectum. There is a right external iliac lymph node measuring 14 mm, not significant changed in size from prior. No bony destructive lesion.       Interval decrease in size of left lower lobe cavitary mass.  New bilateral patchy airspace disease likely resenting pneumonia. Recommend follow-up to resolution.  Irregular wall thickening of the rectosigmoid colon stable to mildly worsened compared to prior consistent with patient's known rectal cancer.  Stable right external iliac lymph node.  Fatty liver.   This study was performed with techniques to keep radiation doses as low as reasonably achievable (ALARA). Individualized dose reduction techniques using automated exposure control or adjustment of mA and/or kV according to the patient size were employed.    Images were reviewed, interpreted, and dictated by Dr. Gabriella Rodriguez MD Transcribed by Kameron Tejeda PA-C.  This report was signed and finalized on 7/23/2025 2:14 PM by Gabriella Rodriguez MD.      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  PROCEDURE: Axial images were obtained from the thoracic inlet through the pubic symphysis following the administration of Isovue 300 and oral contrast.   FINDINGS:  CHEST: There is no evidence of mediastinal or axillary adenopathy. Heart size is normal. There are no pleural or pericardial effusions. There are groundglass opacities within the bilateral lungs with minimal involvement of the right upper lobe and mild involvement of the left upper lobe. There is more prominent involvement of the other 3 lobes. Findings likely represent pneumonia. There is a posterior medial left lower lobe cavitary mass measuring 38 mm in transverse  diameter, decreased in size from previous measurement of 43 mm. There are changes of emphysema. Bone windows reveal no lytic or destructive lesions. There is evidence of old calcified granulomatous disease. There is mild wall thickening of the distal esophagus, possibly representing esophagitis. No bony destructive lesion. There is a stable right IJ chest port.  ABDOMEN: There is fatty infiltration of the liver. The gallbladder is surgically absent. The spleen is unremarkable. No adrenal mass is present.  The pancreas is normal. Within the superior pole of the right kidney posteriorly there is a hypodense renal lesion most consistent with a cyst. There is a second similar-appearing lesion within the inferior pole the right kidney. These are stable from prior. The left kidney is unremarkable. No stones or hydronephrosis. The aorta is normal in caliber. There is no free fluid or adenopathy. The abdominal portions of the GI tract are unremarkable. No bony destructive lesions.  PELVIS: The appendix is not identified. No free fluid. The prostate is normal in size with calcification. The urinary bladder is mostly collapsed. There is wall thickening of the rectosigmoid colon asymmetrically with enhancement consistent with patient's known rectal cancer. This appears similar to the prior. There is mild filtration of the fat surrounding the rectum. There is a right external iliac lymph node measuring 14 mm, not significant changed in size from prior. No bony destructive lesion.       Interval decrease in size of left lower lobe cavitary mass.  New bilateral patchy airspace disease likely resenting pneumonia. Recommend follow-up to resolution.  Irregular wall thickening of the rectosigmoid colon stable to mildly worsened compared to prior consistent with patient's known rectal cancer.  Stable right external iliac lymph node.  Fatty liver.   This study was performed with techniques to keep radiation doses as low as reasonably  achievable (ALARA). Individualized dose reduction techniques using automated exposure control or adjustment of mA and/or kV according to the patient size were employed.    Images were reviewed, interpreted, and dictated by Dr. Gabriella Rodriguez MD Transcribed by Kameron Tejeda PA-C.  This report was signed and finalized on 7/23/2025 2:14 PM by Gabriella Rodriguez MD.      XR Chest 1 View  Result Date: 7/21/2025  PROCEDURE: XR CHEST 1 VW-  HISTORY: Weak/Dizzy/AMS triage protocol, received chemotherapy this week and has had diarrhea for 4 days, patient states that he is dehydrated.  COMPARISON: May 14, 2025..  FINDINGS: The heart is normal in size. There is bronchial wall thickening in the lung bases. There our new opacities in the right lung base, possible pneumonia.. The mediastinum is unremarkable. There is no pneumothorax.  There are no acute osseous abnormalities. Right internal jugular port appears to be in stable position. Apical lordotic positioning noted.      New right basilar airspace disease, possible pneumonia; recommend follow-up..  Stable position right IJ port from prior.   This report was signed and finalized on 7/21/2025 12:16 PM by Gabriella Rodriguez MD.          During this visit the following were done:  Labs Reviewed [x]    Labs Ordered []    Radiology Reports Reviewed [x]    Radiology Ordered []    EKG, echo, and/or stress test reviewed [x]    EEG results reviewed  []    EEG reviewed and interpreted per myself   []    Discussed case with neurointerventionalist or neuroradiologist []    Referring Provider Records Reviewed []    ER Records Reviewed []    Hospital Records Reviewed []    History Obtained From Family []    Radiological images view and Interpreted per myself [x]    Case Discussed with referring provider []     Decision to obtain and request outside records  []        Assessment and Plan     Bilateral acute/subacute embolic strokes, likely from last week ago when he had a sudden AMS. Suspect a cardiac  source e.g. PAF, nonbacterial/marantic endocarditis 22 malignancy. CTA H/Nm is unremarkable.   - Observation on telemetry.   - TTE.   - ASA and statin.   - Outpatient Holter/ZIO.   - Consider AC, discuss choice with oncology given ongoing chemotx.   - Outpatient stroke clinic F/U.              Electronically signed by Derrick Vences MD on 7/30/2025 at 18:37 EDT

## 2025-07-30 NOTE — PLAN OF CARE
Goal Outcome Evaluation:  Plan of Care Reviewed With: patient        Progress: no change (Initial Eval)  Outcome Evaluation: Patient presenting below his functional baseline d/t generalized weakness, deconditioning and weakness. Pt requiring some assist w/ LB dressing and HH distances of mobility. Deficits warrant skilled OT services. Pt cleared to be ad adriano in the room for toileting needs. Pt educated on safety. Recommend home w/ assist when medically appropriate for d/c.    Anticipated Discharge Disposition (OT): home with assist

## 2025-07-30 NOTE — THERAPY EVALUATION
Patient Name: Edward Powell  : 1970    MRN: [id number]                              Today's Date: 2025       Admit Date: 2025    Visit Dx:     ICD-10-CM ICD-9-CM   1. Pneumonia of both lungs due to infectious organism, unspecified part of lung  J18.9 483.8   2. Cerebrovascular accident (CVA) due to embolism of precerebral artery  I63.10 434.11     Patient Active Problem List   Diagnosis    Abnormal CT of the abdomen    Normocytic anemia    Change in bowel habits    Chronic diarrhea    Rectal cancer metastasized to liver    Rectal adenocarcinoma    Venofibrosis    Kidney stones    Neuropathic pain    Pneumonia    Sepsis associated hypotension    Anemia due to chemotherapy     Past Medical History:   Diagnosis Date    Arthritis     Cancer     rectal cancer - diagnosed     Cholelithiasis     Removed    COPD (chronic obstructive pulmonary disease)     Coronary artery disease 2009    Stent - no cardiologist currently    Elevated cholesterol     GERD (gastroesophageal reflux disease)     Hernia     Lower hernia    Perforated ulcer     Sleep apnea     history of; when weighed over 400lbs - no issues following bariatric surgery     Past Surgical History:   Procedure Laterality Date    APPENDECTOMY      Removed    BARIATRIC SURGERY      Gastric bypass    BLADDER TUMOR/ULCER BLEEDER CAUTERIZATION      CARDIAC CATHETERIZATION  2009    stent placed    CHOLECYSTECTOMY  2019    Removed    COLONOSCOPY N/A 2023    Procedure: COLONOSCOPY WITH HOT SNARE POLYPECTOMY AND TATTOO;  Surgeon: Noman Gautam MD;  Location: [facility] ENDOSCOPY;  Service: Gastroenterology;  Laterality: N/A;    PORTACATH PLACEMENT N/A 2024    Procedure: INSERTION OF PORTACATH WITH ULTRSOUND AND FLUOROSCOPIC GUIDANCE;  Surgeon: Ida Black MD;  Location: [facility] OR;  Service: General;  Laterality: N/A;    JENNIFER-EN-Y        General Information       Row Name 25 1025          OT Time and  Intention    Document Type evaluation  -MR     Mode of Treatment occupational therapy  -MR       Row Name 07/30/25 1025          General Information    Patient Profile Reviewed yes  -MR     Prior Level of Function independent:;all household mobility;community mobility;gait;transfer;bed mobility;ADL's;using stairs  PTA pt was I w/ ADLs. Utilizing a SPC for mobility. Reports 2 recent falls. Has a stand tub. Spouse assists with ADLs as needed.  -MR     Existing Precautions/Restrictions fall;other (see comments)  BLE edema/swelling; R sided chest port  -MR     Barriers to Rehab medically complex;previous functional deficit  -MR       Row Name 07/30/25 1025          Living Environment    Current Living Arrangements home  -MR     People in Home spouse  -MR       Row Name 07/30/25 1025          Home Main Entrance    Number of Stairs, Main Entrance four  -MR     Stair Railings, Main Entrance none  -MR       Row Name 07/30/25 1025          Stairs Within Home, Primary    Number of Stairs, Within Home, Primary none  -MR       Row Name 07/30/25 1025          Cognition    Orientation Status (Cognition) oriented x 4  -MR       Row Name 07/30/25 1025          Safety Issues/Impairments Affecting Functional Mobility    Safety Issues Affecting Function (Mobility) insight into deficits/self-awareness;safety precaution awareness  -MR     Impairments Affecting Function (Mobility) balance;sensation/sensory awareness;strength  -MR               User Key  (r) = Recorded By, (t) = Taken By, (c) = Cosigned By      Initials Name Provider Type    MR Lori Lees OT Occupational Therapist                     Mobility/ADL's       Row Name 07/30/25 1029          Bed Mobility    Bed Mobility supine-sit  -MR     Supine-Sit [place] (Bed Mobility) standby assist  -MR     Assistive Device (Bed Mobility) head of bed elevated;bed rails  -MR       Row Name 07/30/25 1029          Transfers    Transfers sit-stand transfer  -MR       Row Name  07/30/25 1029          Sit-Stand Transfer    Sit-Stand [place] (Transfers) contact guard  -MR     Assistive Device (Sit-Stand Transfers) walker, front-wheeled  -MR       Row Name 07/30/25 1029          Functional Mobility    Functional Mobility- Ind. Level contact guard assist  -MR     Functional Mobility- Device walker, front-wheeled  -MR     Functional Mobility-Distance (Feet) --  HH distances  -MR       Row Name 07/30/25 1029          Activities of Daily Living    BADL Assessment/Intervention lower body dressing;upper body dressing  -MR       Row Name 07/30/25 1029          Lower Body Dressing Assessment/Training    [place] Level (Lower Body Dressing) don;shoes/slippers;set up  -MR     Position (Lower Body Dressing) edge of bed sitting  -MR       Row Name 07/30/25 1029          Upper Body Dressing Assessment/Training    [place] Level (Upper Body Dressing) don;other (see comments)  adjusting hospital gown  -MR     Position (Upper Body Dressing) edge of bed sitting  -MR               User Key  (r) = Recorded By, (t) = Taken By, (c) = Cosigned By      Initials Name Provider Type    MR Lori Lees OT Occupational Therapist                   Obj/Interventions       Row Name 07/30/25 1030          Sensory Assessment (Somatosensory)    Sensory Subjective Reports numbness;tingling  -     Sensory Assessment Pt reporting some numbness and tingling that started with the chemo  -MR       Row Name 07/30/25 1030          Vision Assessment/Intervention    Visual Impairment/Limitations WFL;corrective lenses full-time  -MR       Row Name 07/30/25 1030          Range of Motion Comprehensive    General Range of Motion bilateral upper extremity ROM WFL  -MR       Row Name 07/30/25 1030          Strength Comprehensive (MMT)    Comment, General Manual Muscle Testing (MMT) Assessment BUE grossly 4/5 in all functional planes  -MR       Row Name 07/30/25 1030          Balance    Balance Assessment sitting static  balance;sitting dynamic balance;standing static balance;standing dynamic balance  -MR     Static Sitting Balance standby assist  -MR     Dynamic Sitting Balance contact guard  -MR     Position, Sitting Balance unsupported;sitting edge of bed;sitting in chair  -MR     Static Standing Balance contact guard  -MR     Dynamic Standing Balance contact guard  -MR     Position/Device Used, Standing Balance supported;walker, front-wheeled  -MR     Balance Interventions sitting;standing;sit to stand;supported;static;dynamic;minimal challenge;occupation based/functional task  -MR               User Key  (r) = Recorded By, (t) = Taken By, (c) = Cosigned By      Initials Name Provider Type    MR Lori Lees, OT Occupational Therapist                   Goals/Plan       Row Name 07/30/25 1035          Bed Mobility Goal 1 (OT)    Activity/Assistive Device (Bed Mobility Goal 1, OT) sit to supine;supine to sit  -MR     [place] Level/Cues Needed (Bed Mobility Goal 1, OT) standby assist  -MR     Time Frame (Bed Mobility Goal 1, OT) short term goal (STG);3 days  -MR     Progress/Outcomes (Bed Mobility Goal 1, OT) new goal  -MR       Row Name 07/30/25 1035          Transfer Goal 1 (OT)    Activity/Assistive Device (Transfer Goal 1, OT) sit-to-stand/stand-to-sit;toilet  -MR     [place] Level/Cues Needed (Transfer Goal 1, OT) contact guard required  -MR     Time Frame (Transfer Goal 1, OT) long term goal (LTG);5 days  -MR     Progress/Outcome (Transfer Goal 1, OT) new goal  -MR       Row Name 07/30/25 1035          Dressing Goal 1 (OT)    Activity/Device (Dressing Goal 1, OT) lower body dressing  -MR     [place]/Cues Needed (Dressing Goal 1, OT) supervision required  -MR     Time Frame (Dressing Goal 1, OT) long term goal (LTG);5 days  -MR     Progress/Outcome (Dressing Goal 1, OT) new goal  -MR       Row Name 07/30/25 1035          Therapy Assessment/Plan (OT)    Planned Therapy Interventions (OT) activity tolerance  training;adaptive equipment training;BADL retraining;functional balance retraining;transfer/mobility retraining;strengthening exercise;ROM/therapeutic exercise;passive ROM/stretching;IADL retraining;patient/caregiver education/training;occupation/activity based interventions  -MR               User Key  (r) = Recorded By, (t) = Taken By, (c) = Cosigned By      Initials Name Provider Type    MR Lori Lees, OT Occupational Therapist                   Clinical Impression       Row Name 07/30/25 1031          Pain Assessment    Pretreatment Pain Rating 4/10  -MR     Posttreatment Pain Rating 4/10  -MR     Pain Location extremity  -MR     Pain Side/Orientation generalized;bilateral;lower  -MR       Row Name 07/30/25 1031          Plan of Care Review    Plan of Care Reviewed With patient  -MR     Progress no change  Initial Eval  -MR     Outcome Evaluation Patient presenting below his functional baseline d/t generalized weakness, deconditioning and weakness. Pt requiring some assist w/ LB dressing and HH distances of mobility. Deficits warrant skilled OT services. Recommend home w/ assist when medically appropriate for d/c.  -MR       Row Name 07/30/25 1031          Therapy Assessment/Plan (OT)    Patient/Family Therapy Goal Statement (OT) Return to PLOF  -MR     Rehab Potential (OT) good  -MR     Criteria for Skilled Therapeutic Interventions Met (OT) yes;meets criteria;skilled treatment is necessary  -MR     Therapy Frequency (OT) daily  -MR     Predicted Duration of Therapy Intervention (OT) 5 days  -MR       Row Name 07/30/25 1031          Therapy Plan Review/Discharge Plan (OT)    Anticipated Discharge Disposition (OT) home with assist  -MR       Row Name 07/30/25 1031          Vital Signs    Pre Systolic BP Rehab 102  -MR     Pre Treatment Diastolic BP 59  -MR     Intra Systolic BP Rehab 110  -MR     Intra Treatment Diastolic BP 93  -MR     Post Systolic BP Rehab 113  -MR     Post Treatment Diastolic BP 62  -MR      Pretreatment Heart Rate (beats/min) 70  -MR     Posttreatment Heart Rate (beats/min) 75  -MR     Pre SpO2 (%) 98  -MR     O2 Delivery Pre Treatment room air  -MR     O2 Delivery Intra Treatment room air  -MR     Post SpO2 (%) 98  -MR     O2 Delivery Post Treatment room air  -MR     Pre Patient Position Supine  -MR     Intra Patient Position Standing  -MR     Post Patient Position Sitting  -MR       Row Name 07/30/25 1031          Positioning and Restraints    Pre-Treatment Position in bed  -MR     Post Treatment Position chair  -MR     In Chair notified nsg;reclined;sitting;encouraged to call for assist;call light within reach;exit alarm on;waffle cushion;legs elevated  -MR               User Key  (r) = Recorded By, (t) = Taken By, (c) = Cosigned By      Initials Name Provider Type    Lori Hill, OT Occupational Therapist                   Outcome Measures       Row Name 07/30/25 1036          How much help from another is currently needed...    Putting on and taking off regular lower body clothing? 3  -MR     Bathing (including washing, rinsing, and drying) 3  -MR     Toileting (which includes using toilet bed pan or urinal) 3  -MR     Putting on and taking off regular upper body clothing 3  -MR     Taking care of personal grooming (such as brushing teeth) 3  -MR     Eating meals 4  -MR     AM-PAC 6 Clicks Score (OT) 19  -MR       Row Name 07/30/25 0900          How much help from another person do you currently need...    Turning from your back to your side while in flat bed without using bedrails? 4  -EW     Moving from lying on back to sitting on the side of a flat bed without bedrails? 4  -EW     Moving to and from a bed to a chair (including a wheelchair)? 4  -EW     Standing up from a chair using your arms (e.g., wheelchair, bedside chair)? 3  -EW     Climbing 3-5 steps with a railing? 3  -EW     To walk in hospital room? 3  -EW     AM-PAC 6 Clicks Score (PT) 21  -EW       Row Name 07/30/25 1036           Modified [place] Scale    Pre-Stroke Modified [place] Scale 6 - Unable to determine (UTD) from the medical record documentation  -MR     Modified [place] Scale 1 - No significant disability despite symptoms.  Able to carry out all usual duties and activities.  -MR       Row Name 07/30/25 1036          Functional Assessment    Outcome Measure Options AM-PAC 6 Clicks Daily Activity (OT);Modified [place]  -MR               User Key  (r) = Recorded By, (t) = Taken By, (c) = Cosigned By      Initials Name Provider Type    MR Lori Lees, LETICIA Occupational Therapist    Michelle Lopez RN Registered Nurse                    Occupational Therapy Education       Title: PT OT SLP Therapies (In Progress)       Topic: Occupational Therapy (In Progress)       Point: ADL training (Done)       Learning Progress Summary            Patient Acceptance, E, VU by MR at 7/30/2025 1037                      Point: Precautions (Done)       Learning Progress Summary            Patient Acceptance, E, VU by MR at 7/30/2025 1037                      Point: Body mechanics (Done)       Learning Progress Summary            Patient Acceptance, E, VU by MR at 7/30/2025 1037                                      User Key       Initials Effective Dates Name Provider Type Discipline    MR 09/22/22 -  Lori Lees OT Occupational Therapist OT                  OT Recommendation and Plan  Planned Therapy Interventions (OT): activity tolerance training, adaptive equipment training, BADL retraining, functional balance retraining, transfer/mobility retraining, strengthening exercise, ROM/therapeutic exercise, passive ROM/stretching, IADL retraining, patient/caregiver education/training, occupation/activity based interventions  Therapy Frequency (OT): daily  Plan of Care Review  Plan of Care Reviewed With: patient  Progress: no change (Initial Eval)  Outcome Evaluation: Patient presenting below his functional baseline d/t generalized weakness,  deconditioning and weakness. Pt requiring some assist w/ LB dressing and HH distances of mobility. Deficits warrant skilled OT services. Recommend home w/ assist when medically appropriate for d/c.     Time Calculation:   Evaluation Complexity (OT)  Review Occupational Profile/Medical/Therapy History Complexity: brief/low complexity  Assessment, Occupational Performance/Identification of Deficit Complexity: 1-3 performance deficits  Clinical Decision Making Complexity (OT): problem focused assessment/low complexity  Overall Complexity of Evaluation (OT): low complexity     Time Calculation- OT       Row Name 07/30/25 1037             Time Calculation- OT    OT Start Time 0925  -MR      OT Received On 07/30/25  -MR      OT Goal Re-Cert Due Date 08/09/25  -MR         Untimed Charges    OT Eval/Re-eval Minutes 61  -MR         Total Minutes    Untimed Charges Total Minutes 61  -MR       Total Minutes 61  -MR                User Key  (r) = Recorded By, (t) = Taken By, (c) = Cosigned By      Initials Name Provider Type    MR Lori Lees OT Occupational Therapist                  Therapy Charges for Today       Code Description Service Date Service Provider Modifiers Qty    [id number] -OT EVAL LOW COMPLEXITY 5 7/30/2025 Lori Lees OT  1                 Lori Lees OT  7/30/2025

## 2025-07-30 NOTE — THERAPY EVALUATION
Acute Care - Speech Language Pathology   Initial Evaluation  [facility]    Cognitive-Communication Evaluation       Patient Name: Edward Powell  : 1970  MRN: [id number]  Today's Date: 2025               Admit Date: 2025     Visit Dx:    ICD-10-CM ICD-9-CM   1. Pneumonia of both lungs due to infectious organism, unspecified part of lung  J18.9 483.8   2. Cerebrovascular accident (CVA) due to embolism of precerebral artery  I63.10 434.11   3. Cognitive communication deficit  R41.841 799.52     Patient Active Problem List   Diagnosis    Abnormal CT of the abdomen    Normocytic anemia    Change in bowel habits    Chronic diarrhea    Rectal cancer metastasized to liver    Rectal adenocarcinoma    Venofibrosis    Kidney stones    Neuropathic pain    Pneumonia    Sepsis associated hypotension    Anemia due to chemotherapy     Past Medical History:   Diagnosis Date    Arthritis     Cancer     rectal cancer - diagnosed     Cholelithiasis     Removed    COPD (chronic obstructive pulmonary disease)     Coronary artery disease 2009    Stent - no cardiologist currently    Elevated cholesterol     GERD (gastroesophageal reflux disease)     Hernia     Lower hernia    Perforated ulcer     Sleep apnea     history of; when weighed over 400lbs - no issues following bariatric surgery     Past Surgical History:   Procedure Laterality Date    APPENDECTOMY      Removed    BARIATRIC SURGERY      Gastric bypass    BLADDER TUMOR/ULCER BLEEDER CAUTERIZATION      CARDIAC CATHETERIZATION  2009    stent placed    CHOLECYSTECTOMY  2019    Removed    COLONOSCOPY N/A 2023    Procedure: COLONOSCOPY WITH HOT SNARE POLYPECTOMY AND TATTOO;  Surgeon: Noman Gautam MD;  Location: [facility] ENDOSCOPY;  Service: Gastroenterology;  Laterality: N/A;    PORTACATH PLACEMENT N/A 2024    Procedure: INSERTION OF PORTACATH WITH ULTRSOUND AND FLUOROSCOPIC GUIDANCE;  Surgeon: Ida Black MD;   "Location: [facility] OR;  Service: General;  Laterality: N/A;    JENNIFER-[id number]         SLP Recommendation and Plan  SLP Diagnosis: mild, cognitive-linguistic disorder, aphasia (07/30/25 1520)              [facility] [place] for Skilled Therapy Interventions Met: yes (07/30/25 1520)  Anticipated Discharge Disposition (SLP): home with assist (07/30/25 1520)  Demonstrates Need for Referral to Another Service: speech therapy, other (see comments) (clinical swallow eval prn) (07/30/25 1520)     Therapy Frequency (SLP SLC): 5 days per week (07/30/25 1520)  Predicted Duration Therapy Intervention (Days): 1 week (07/30/25 1520)                                    SLP EVALUATION (Last 72 Hours)       SLP SLC Evaluation       Row Name 07/30/25 1520                   Communication Assessment/Intervention    Document Type evaluation  -CH        Subjective Information no complaints  -CH        Patient Observations alert;cooperative;agree to therapy  -CH        Patient/Family/Caregiver Comments/Observations none present  -CH        Patient Effort good  -CH        Comment Noted RLL pna, pt passed RN dysphagia screen and reported no difficulty swallowing to SLP. RN has noted no difficulty with meals or meds. Reviewed s/s of aspiration with patient to be aware of. RN to order SLP for dysphagia eval if any concerns.  -CH        Symptoms Noted During/After Treatment none  -CH           General Information    Patient Profile Reviewed yes  -CH        Pertinent History Of Current Problem admitted with AMS, falls, R basilar pna, metastatic rectal cancer, currently receiving chemo. CT: scattered sm infarcts within the R>L centrum semiovale.  -CH        Precautions/Limitations, Vision WFL;for purposes of eval  -CH        Precautions/Limitations, Hearing WFL;for purposes of eval  -CH        Prior Level of Function-Communication cognitive-linguistic impairment;other (see comments)  pt reported having \"chemo brain\"  -CH        Plans/Goals Discussed with " patient;agreed upon  -        Barriers to Rehab medically complex  -        Patient's Goals for Discharge return to all previous roles/activities  -           Pain    Additional Documentation Pain Scale: FACES Pre/Post-Treatment (Group)  -           Pain Scale: FACES Pre/Post-Treatment    Pain: FACES Scale, Pretreatment 0-->no hurt  -        Posttreatment Pain Rating 0-->no hurt  -           Comprehension Assessment/Intervention    Comprehension Assessment/Intervention Auditory Comprehension;Reading Comprehension  -           Auditory Comprehension Assessment/Intervention    Auditory Comprehension (Communication) WFL  -        Answers Questions (Communication) yes/no;wh questions;personal;simple;concrete;abstract;WFL  -        Able to Follow Commands (Communication) 3-step;WFL  -        Narrative Discourse conversational level;mild impairment  -        Successful Auditory Strategies (Communication) repetition  -           Reading Comprehension Assessment/Intervention    Reading Comprehension (Communication) WFL  -        Functional Reading Tasks WFL  -           Expression Assessment/Intervention    Expression Assessment/Intervention verbal expression  -           Verbal Expression Assessment/Intervention    Verbal Expression mild impairment  -        Repetition sentences;WNL  -        Phrase Completion unpredictable;mild impairment  -        Responsive Naming complex;mild impairment  -CH        Confrontational Naming low frequency;WNL  -        Spontaneous/Functional Words complex;mild impairment  -CH        Sentence Formulation complex;mild impairment  -        Conversational Discourse/Fluency mild impairment  -           Motor Speech Assessment/Intervention    Motor Speech Function WFL  -           Cursory Voice Assessment/Intervention    Quality and Resonance (Voice) hoarse  -        Voice, Comment pt reported baseline  -           Cognitive Assessment  Intervention- SLP    Cognitive Function (Cognition) mild impairment  -CH        Orientation Status (Cognition) awareness of basic personal information;person;place;time;situation;WFL  -CH        Memory (Cognitive) functional;immediate;long-term;WFL;short-term;delayed;mental manipulation;mild impairment  -CH        Attention (Cognitive) selective;sustained;WFL  -CH        Problem Solving (Cognitive) simple;temporal;mild impairment  -CH        Pragmatics (Communication) WFL  -           SLP Evaluation Clinical Impressions    SLP Diagnosis mild;cognitive-linguistic disorder;aphasia  -        Rehab Potential/Prognosis good  -        SLC Criteria for Skilled Therapy Interventions Met yes  -CH        Functional Impact functional impact in social situations;difficulty in expressing complex messages;difficulty completing home management task  -           Recommendations    Therapy Frequency (SLP SLC) 5 days per week  -CH        Predicted Duration Therapy Intervention (Days) 1 week  -        Anticipated Discharge Disposition (SLP) home with assist  -        Demonstrates Need for Referral to Another Service speech therapy;other (see comments)  clinical swallow eval prn  -                  User Key  (r) = Recorded By, (t) = Taken By, (c) = Cosigned By      Initials Name Effective Dates    CH Gloria Garcia, MS CCC-SLP 01/20/25 -                        EDUCATION  The patient has been educated in the following areas:     Cognitive Impairment Communication Impairment.           SLP GOALS       Row Name 07/30/25 3900             Patient will demonstrate functional language skills for return to discharge environment     Ritchie with minimal cues  -      Time frame 1 week  -         Patient will demonstrate functional cognitive-linguistic skills for return to discharge environment    [place]itchie with minimal cues  -      Time frame 1 week  -         SLP Diagnostic Treatment     Patient will participate  in further assessment in the following areas graphic expression;cognitive-linguistic;clarification of baseline cognitive communication status  -      Time Frame (Diagnostic) 1 week  -CH         Word Retrieval Skills Goal 1 (SLP)    Improve Word Retrieval Skills By Goal 1 (SLP) completing a divergent task;completing a convergent task;supplying an antonym;70%;with minimal cues (75-90%)  -CH      Time Frame (Word Retrieval Goal 1, SLP) 1 week  -CH         Connected Speech to Express Thoughts Goal 1 (SLP)    Improve Narrative Discourse to Express Thoughts By Goal 1 (SLP) explaining a proverb or idiom;90%;with minimal cues (75-90%)  -CH      Time Frame (Connected Speech Goal 1, SLP) 1 week  -CH         Memory Skills Goal 1 (SLP)    Improve Memory Skills Through Goal 1 (SLP) recalling related word lists immediately;recalling related word lists with an imposed delay;80%;with minimal cues (75-90%)  -      Time Frame (Memory Skills Goal 1, SLP) 1 week  -CH         Organizational Skills Goal 1 (SLP)    Improve Thought Organization Through Goal 1 (SLP) completing mental manipulation task;80%;with minimal cues (75-90%)  -      Time Frame (Thought Organization Skills Goal 1, SLP) 1 week  -                User Key  (r) = Recorded By, (t) = Taken By, (c) = Cosigned By      Initials Name Provider Type    Gloria Barnes MS CCC-SLP Speech and Language Pathologist                              Time Calculation:      Time Calculation- SLP       Row Name 07/30/25 1728             Time Calculation- SLP    SLP Start Time 1520  -CH      SLP Received On 07/30/25  -         Untimed Charges    SLP Eval/Re-eval  ST Eval Speech and Production w/ Language - [id number]  -      [id number] Eval Speech and Production w/ Language Minutes 68  -CH         Total Minutes    Untimed Charges Total Minutes 68  -CH       Total Minutes 68  -CH                User Key  (r) = Recorded By, (t) = Taken By, (c) = Cosigned By      Initials Name Provider  Type    CH Gloria Garcia, MS CCC-SLP Speech and Language Pathologist                    Therapy Charges for Today       Code Description Service Date Service Provider Modifiers Qty    [id number]  ST EVAL SPEECH AND PROD W LANG  5 7/30/2025 Gloria Garcia MS CCC-SLP GN 1                       Gloria Garcia MS CCC-SLP  7/30/2025

## 2025-07-30 NOTE — CONSULTS
Chart review for diabetes educator consult.    At the time of this review patient A1c is 4.91 , they have no noted history of diabetes and no home medications noted for treatment of diabetes. At this time we do not feel the patient would benefit from diabetes education. Thank you for this consult, should patient needs change please re consult us.

## 2025-07-30 NOTE — THERAPY EVALUATION
Patient Name: Edward Powell  : 1970    MRN: [id number]                              Today's Date: 2025       Admit Date: 2025    Visit Dx:     ICD-10-CM ICD-9-CM   1. Pneumonia of both lungs due to infectious organism, unspecified part of lung  J18.9 483.8   2. Cerebrovascular accident (CVA) due to embolism of precerebral artery  I63.10 434.11     Patient Active Problem List   Diagnosis    Abnormal CT of the abdomen    Normocytic anemia    Change in bowel habits    Chronic diarrhea    Rectal cancer metastasized to liver    Rectal adenocarcinoma    Venofibrosis    Kidney stones    Neuropathic pain    Pneumonia    Sepsis associated hypotension    Anemia due to chemotherapy     Past Medical History:   Diagnosis Date    Arthritis     Cancer     rectal cancer - diagnosed     Cholelithiasis     Removed    COPD (chronic obstructive pulmonary disease)     Coronary artery disease 2009    Stent - no cardiologist currently    Elevated cholesterol     GERD (gastroesophageal reflux disease)     Hernia     Lower hernia    Perforated ulcer     Sleep apnea     history of; when weighed over 400lbs - no issues following bariatric surgery     Past Surgical History:   Procedure Laterality Date    APPENDECTOMY      Removed    BARIATRIC SURGERY      Gastric bypass    BLADDER TUMOR/ULCER BLEEDER CAUTERIZATION      CARDIAC CATHETERIZATION  2009    stent placed    CHOLECYSTECTOMY  2019    Removed    COLONOSCOPY N/A 2023    Procedure: COLONOSCOPY WITH HOT SNARE POLYPECTOMY AND TATTOO;  Surgeon: Noman Gautam MD;  Location: [facility] ENDOSCOPY;  Service: Gastroenterology;  Laterality: N/A;    PORTACATH PLACEMENT N/A 2024    Procedure: INSERTION OF PORTACATH WITH ULTRSOUND AND FLUOROSCOPIC GUIDANCE;  Surgeon: Ida Black MD;  Location: [facility] OR;  Service: General;  Laterality: N/A;    JENNIFER-EN-Y        General Information       Row Name 25 1035          Physical Therapy  Time and Intention    Document Type evaluation (P)   -ST     Mode of Treatment co-treatment;physical therapy (P)   -ST       Row Name 07/30/25 1035          General Information    Patient Profile Reviewed yes (P)   -ST     Prior Level of Function independent:;all household mobility;community mobility;gait;transfer;bed mobility (P)   Pt utilizes a straight cane regularly for household and community distances. H/x of 2 recent falls per pt report.  -ST     Existing Precautions/Restrictions fall;other (see comments) (P)   BLE edema/swelling; R sided chest port  -ST     Barriers to Rehab medically complex;previous functional deficit (P)   -ST       Row Name 07/30/25 1035          Living Environment    Current Living Arrangements home (P)   -ST     People in Home spouse (P)   Wife can provide assist when needed.  -ST       Row Name 07/30/25 1035          Home Main Entrance    Number of Stairs, Main Entrance four (P)   4 seperate steps to enter house. Looking into adding handrails at this time for safety.  -ST     Stair Railings, Main Entrance none (P)   -ST       Row Name 07/30/25 1035          Stairs Within Home, Primary    Number of Stairs, Within Home, Primary none (P)   -ST       Row Name 07/30/25 1035          Cognition    Orientation Status (Cognition) oriented x 4 (P)   -ST       Row Name 07/30/25 1035          Safety Issues/Impairments Affecting Functional Mobility    Safety Issues Affecting Function (Mobility) insight into deficits/self-awareness;safety precaution awareness;positioning of assistive device (P)   -ST     Impairments Affecting Function (Mobility) balance;sensation/sensory awareness;strength (P)   -ST     Comment, Safety Issues/Impairments (Mobility) Cues provided to step into FWW during ambulation and for safe hand placement when rising to std. (P)   -ST               User Key  (r) = Recorded By, (t) = Taken By, (c) = Cosigned By      Initials Name Provider Type    ST Erma Ridley, PT Student  PT Student                   Mobility       Row Name 07/30/25 1040          Bed Mobility    Bed Mobility supine-sit;scooting/bridging (P)   -ST     Scooting/Bridging [place] (Bed Mobility) standby assist (P)   -ST     Supine-Sit [place] (Bed Mobility) standby assist (P)   -ST     Assistive Device (Bed Mobility) bed rails;head of bed elevated (P)   -ST     Comment, (Bed Mobility) Completed well w/ out increased time or effort. Verbal cues provided to bring feet to floor. (P)   -ST       Row Name 07/30/25 1040          Transfers    Comment, (Transfers) Completed well w/ few verbal cues to complete transitons. (P)   -ST       Row Name 07/30/25 1040          Sit-Stand Transfer    Sit-Stand [place] (Transfers) contact guard (P)   -ST     Assistive Device (Sit-Stand Transfers) walker, front-wheeled (P)   -ST     Comment, (Sit-Stand Transfer) 1x STS from EOB w/ CGA and FWW. Good use of BUE to push off bed and rise to std. (P)   -ST       Row Name 07/30/25 1040          Gait/Stairs (Locomotion)    [place] Level (Gait) contact guard;verbal cues (P)   -ST     Assistive Device (Gait) walker, front-wheeled (P)   -ST     Patient was able to Ambulate yes (P)   -ST     Distance in Feet (Gait) 200 (P)   -ST     Deviations/Abnormal Patterns (Gait) bilateral deviations (P)   -ST     Bilateral Gait Deviations forward flexed posture;heel strike decreased (P)   -ST     Comment, (Gait/Stairs) Pt ambulated a total of ~200 feet w/ CGA and FWW. Decreased B foot clearnace from floor observed today. Pt required repeated cues to increase foot clearance, take larger steps, and return to upright posture due to increased trunk flexion. Pt continually returned to forward flexed posture and regular gait pattern during ambulation today. (P)   -ST               User Key  (r) = Recorded By, (t) = Taken By, (c) = Cosigned By      Initials Name Provider Type    Erma Mcdonnell, PT Student PT Student                    Obj/Interventions       Row Name 07/30/25 1046          Range of Motion Comprehensive    General Range of Motion bilateral lower extremity ROM WFL (P)   -       Row Name 07/30/25 1046          Strength Comprehensive (MMT)    Comment, General Manual Muscle Testing (MMT) Assessment BLE MMT grossly 3+/5 except for knee extension 4/5 (P)   -ST       Row Name 07/30/25 1046          Motor Skills    Motor Skills coordination (P)   -ST     Coordination heel to shin;dysdiadochokinesia;WFL (P)   -       Row Name 07/30/25 1046          Balance    Balance Assessment sitting static balance;sitting dynamic balance;standing static balance;standing dynamic balance (P)   -ST     Static Sitting Balance standby assist (P)   -ST     Dynamic Sitting Balance contact guard (P)   -ST     Position, Sitting Balance unsupported;sitting edge of bed (P)   -ST     Static Standing Balance contact guard (P)   -ST     Dynamic Standing Balance contact guard (P)   -ST     Position/Device Used, Standing Balance supported;walker, rolling (P)   -ST     Balance Interventions sitting;standing;sit to stand;supported;dynamic;static (P)   -ST     Comment, Balance No LOB / knee buckling today. Increased forward head posture and protracted shoulders during ambulation and FWW use despite cues to encourage upright posture. (P)   -       Row Name 07/30/25 1046          Sensory Assessment (Somatosensory)    Left LE Sensory Assessment general sensation;intact;light touch localization;light touch awareness;impaired (P)   -ST     Right LE Sensory Assessment general sensation;intact;light touch localization;light touch awareness;impaired (P)   -ST     Sensory Subjective Reports numbness;tingling (P)   -ST     Sensory Assessment Numbness and tingling on plantar aspect of foot w/ initiation of chemo treatment. (P)   -ST               User Key  (r) = Recorded By, (t) = Taken By, (c) = Cosigned By      Initials Name Provider Type    Erma Mcdonnell, PT Student  PT Student                   Goals/Plan       Row Name 07/30/25 1054          Bed Mobility Goal 1 (PT)    Activity/Assistive Device (Bed Mobility Goal 1, PT) bed mobility activities, all (P)   -ST     [place] Level/Cues Needed (Bed Mobility Goal 1, PT) independent (P)   -ST     Time Frame (Bed Mobility Goal 1, PT) 3 days;short term goal (STG) (P)   -       Row Name 07/30/25 1054          Transfer Goal 1 (PT)    Activity/Assistive Device (Transfer Goal 1, PT) sit-to-stand/stand-to-sit (P)   -ST     [place] Level/Cues Needed (Transfer Goal 1, PT) independent (P)   -ST     Time Frame (Transfer Goal 1, PT) long term goal (LTG);1 week (P)   -[facility] Name 07/30/25 1054          Gait Training Goal 1 (PT)    Activity/Assistive Device (Gait Training Goal 1, PT) gait (walking locomotion);walker, rolling (P)   -ST     Distance (Gait Training Goal 1, PT) 300 feet (P)   -ST     Time Frame (Gait Training Goal 1, PT) long term goal (LTG);1 week (P)   -[facility] Name 07/30/25 1054          Balance Goal 1 (PT)    Activity/Assistive Device (Balance Goal) standing dynamic balance;walker, rolling (P)   -ST     [place] Level/Cues Needed (Balance Goal 1, PT) independent (P)   -ST     Time Frame (Balance Goal 1, PT) long-term goal (LTG);1 week (P)   -[facility] Name 07/30/25 1054          Therapy Assessment/Plan (PT)    Planned Therapy Interventions (PT) balance training;bed mobility training;gait training;home exercise program;motor coordination training;neuromuscular re-education;patient/family education;postural re-education;ROM (range of motion);stair training;strengthening;stretching;transfer training (P)   -ST               User Key  (r) = Recorded By, (t) = Taken By, (c) = Cosigned By      Initials Name Provider Type    Erma Mcdonnell, PT Student PT Student                   Clinical Impression       Row Name 07/30/25 1050          Pain    Pretreatment Pain Rating 4/10 (P)   -ST     Posttreatment  Pain Rating 4/10 (P)   -ST     Pain Location extremity (P)   -ST     Pain Side/Orientation generalized;bilateral;lower (P)   -ST     Pain Management Interventions activity modification encouraged (P)   -ST     Response to Pain Interventions activity participation with tolerable pain (P)   -ST       Row Name 07/30/25 1050          Plan of Care Review    Plan of Care Reviewed With patient (P)   -ST     Outcome Evaluation Pt presents w/ BLE weakness, decreased sensation, and impaired gait impacting pt ability to return to baseline functional mobility. Pt appropriate for skilled PT services at this time to address deficits. PT rec to home w/ assist w/ OP PT services when medically appropriate for D/C. (P)   -ST       Row Name 07/30/25 1050          Therapy Assessment/Plan (PT)    Patient/Family Therapy Goals Statement (PT) Return home (P)   -ST     Rehab Potential (PT) good (P)   -ST     Criteria for Skilled Interventions Met (PT) yes;meets criteria;skilled treatment is necessary (P)   -ST     Therapy Frequency (PT) daily (P)   -ST     Predicted Duration of Therapy Intervention (PT) 1 week (P)   -ST       Row Name 07/30/25 1050          Vital Signs    Pre Systolic BP Rehab 102 (P)   -ST     Pre Treatment Diastolic BP 59 (P)   -ST     Intra Systolic BP Rehab 110 (P)   -ST     Intra Treatment Diastolic BP 93 (P)   -ST     Post Systolic BP Rehab 113 (P)   -ST     Post Treatment Diastolic BP 62 (P)   -ST     Pretreatment Heart Rate (beats/min) 70 (P)   -ST     Posttreatment Heart Rate (beats/min) 75 (P)   -ST     Pre SpO2 (%) 98 (P)   -ST     O2 Delivery Pre Treatment room air (P)   -ST     Post SpO2 (%) 98 (P)   -ST     O2 Delivery Post Treatment room air (P)   -ST     Pre Patient Position Supine (P)   -ST     Intra Patient Position Standing (P)   -ST     Post Patient Position Sitting (P)   -ST       Row Name 07/30/25 1050          Positioning and Restraints    Pre-Treatment Position in bed (P)   -ST     Post Treatment  Position chair (P)   -ST     In Chair notified nsg;reclined;call light within reach;encouraged to call for assist;exit alarm on;waffle cushion;legs elevated (P)   -ST               User Key  (r) = Recorded By, (t) = Taken By, (c) = Cosigned By      Initials Name Provider Type     Erma Ridley, PT Student PT Student                   Outcome Measures       Row Name 07/30/25 1056 07/30/25 0900       How much help from another person do you currently need...    Turning from your back to your side while in flat bed without using bedrails? 4 (P)   -ST 4  -EW    Moving from lying on back to sitting on the side of a flat bed without bedrails? 4 (P)   -ST 4  -EW    Moving to and from a bed to a chair (including a wheelchair)? 4 (P)   -ST 4  -EW    Standing up from a chair using your arms (e.g., wheelchair, bedside chair)? 3 (P)   -ST 3  -EW    Climbing 3-5 steps with a railing? 3 (P)   -ST 3  -EW    To walk in hospital room? 3 (P)   -ST 3  -EW    AM-PAC 6 Clicks Score (PT) 21 (P)   -ST 21  -EW      Row Name 07/30/25 1056 07/30/25 1036       Modified [place] Scale    Pre-Stroke Modified [place] Scale 0 - No Symptoms at all. (P)   -ST 6 - Unable to determine (UTD) from the medical record documentation  -MR    Modified [place] Scale 2 - Slight disability.  Unable to carry out all previous activities but able to look after own affairs without assistance. (P)   -ST 1 - No significant disability despite symptoms.  Able to carry out all usual duties and activities.  -MR      Row Name 07/30/25 1036          Functional Assessment    Outcome Measure Options AM-PAC 6 Clicks Daily Activity (OT);Modified [place]  -MR               User Key  (r) = Recorded By, (t) = Taken By, (c) = Cosigned By      Initials Name Provider Type    Lori Hill, OT Occupational Therapist    Erma Mcdnonell, PT Student PT Student    Michelle Lopez RN Registered Nurse                                 Physical Therapy Education        Title: PT OT SLP Therapies (In Progress)       Topic: Physical Therapy (Done)       Point: Mobility training (Done)       Learning Progress Summary            Patient IRVIN Abrams, VU by  at 7/30/2025 0933                      Point: Home exercise program (Done)       Learning Progress Summary            Patient IRVIN Abrams, VU by  at 7/30/2025 0933                      Point: Body mechanics (Done)       Learning Progress Summary            Patient IRVIN Abrams, VU by  at 7/30/2025 0933                      Point: Precautions (Done)       Learning Progress Summary            Patient IRVIN Abrams, VU by  at 7/30/2025 0933                                      User Key       Initials Effective Dates Name Provider Type Discipline     10/04/24 -  Erma Ridley, PT Student PT Student PT                  PT Recommendation and Plan  Planned Therapy Interventions (PT): (P) balance training, bed mobility training, gait training, home exercise program, motor coordination training, neuromuscular re-education, patient/family education, postural re-education, ROM (range of motion), stair training, strengthening, stretching, transfer training  Outcome Evaluation: (P) Pt presents w/ BLE weakness, decreased sensation, and impaired gait impacting pt ability to return to baseline functional mobility. Pt appropriate for skilled PT services at this time to address deficits. PT rec to home w/ assist w/ OP PT services when medically appropriate for D/C.     Time Calculation:   PT Evaluation Complexity  History, PT Evaluation Complexity: (P) 3 or more personal factors and/or comorbidities  Examination of Body Systems (PT Eval Complexity): (P) total of 4 or more elements  Clinical Presentation (PT Evaluation Complexity): (P) stable  Clinical Decision Making (PT Evaluation Complexity): (P) low complexity  Overall Complexity (PT Evaluation Complexity): (P) low complexity     PT Charges       Row Name 07/30/25 1813             Time Calculation     Start Time 0933 (P)   -ST      PT Received On 07/30/25 (P)   -ST      PT Goal Re-Cert Due Date 08/09/25 (P)   -ST         Timed Charges    00303 - Gait Training Minutes  8 (P)   -ST         Untimed Charges    PT Eval/Re-eval Minutes 49 (P)   -ST         Total Minutes    Timed Charges Total Minutes 8 (P)   -ST      Untimed Charges Total Minutes 49 (P)   -ST       Total Minutes 57 (P)   -ST                User Key  (r) = Recorded By, (t) = Taken By, (c) = Cosigned By      Initials Name Provider Type     Erma Ridley, PT Student PT Student                  Therapy Charges for Today       Code Description Service Date Service Provider Modifiers Qty    86525497921 HC PT EVAL LOW COMPLEXITY 4 7/30/2025 Erma Ridlye, PT Student GP 1    [id number] HC GAIT TRAINING EA 15 MIN 7/30/2025 Erma Ridley, PT Student GP 1            PT G-Codes  Outcome Measure Options: AM-PAC 6 Clicks Daily Activity (OT), Modified [place]  AM-PAC 6 Clicks Score (PT): (P) 21  AM-PAC 6 Clicks Score (OT): 19  Modified [place] Scale: (P) 2 - Slight disability.  Unable to carry out all previous activities but able to look after own affairs without assistance.  PT Discharge Summary  Anticipated Discharge Disposition (PT): (P) home with assist, home with outpatient therapy services    Erma Ridley PT Student  7/30/2025

## 2025-07-30 NOTE — SIGNIFICANT NOTE
Called on this patient by RN assigned to him during the overnight between 7/29 - 7/30.  RN reports the patient is persistently hypotensive, specifically getting repeated systolic readings in the 80s.  Patient states he has never been told about any prior low blood pressures by his PCP/other physicians.  He is asymptomatic, and states that he actually feels better after being admitted and receiving treatment here tonight.  RN has been communicating with APC assigned to this before, patient has received IV albumin, and midodrine.  Further IV fluids were not considered, as the patient had began to exhibit some lower extremity edema.  He has received 3 L of IV fluids total tonight.  I requested the RN take a manual blood pressure, which she did in the left upper extremity, she reports a reading of 100/58.  Also, patient's MAP's on the monitor, despite several systolic readings in the 80s, show MAPs in the 60s.  The patient states he feels better and wishes to attempt to sleep.  Will defer requesting ICU transfer at this time, as the manual pressure most likely represents the true reading, plus patient is asymptomatic and actually feels improved since his admission here earlier tonight.  Further updates to follow as warranted.    Electronically signed by Hamzah Jett III, DO, 07/30/25, 4:34 AM EDT.

## 2025-07-31 ENCOUNTER — HOSPITAL ENCOUNTER (INPATIENT)
Dept: CARDIOLOGY | Facility: HOSPITAL | Age: 55
Discharge: HOME | End: 2025-07-31
Payer: MEDICAID

## 2025-07-31 ENCOUNTER — READMISSION MANAGEMENT (OUTPATIENT)
Dept: CALL CENTER | Facility: HOSPITAL | Age: 55
End: 2025-07-31
Payer: MEDICAID

## 2025-07-31 ENCOUNTER — APPOINTMENT (OUTPATIENT)
Dept: CARDIOLOGY | Facility: HOSPITAL | Age: 55
End: 2025-07-31
Payer: MEDICAID

## 2025-07-31 VITALS
SYSTOLIC BLOOD PRESSURE: 99 MMHG | HEART RATE: 69 BPM | OXYGEN SATURATION: 95 % | TEMPERATURE: 98 F | HEIGHT: 72 IN | RESPIRATION RATE: 17 BRPM | BODY MASS INDEX: 25.05 KG/M2 | DIASTOLIC BLOOD PRESSURE: 51 MMHG | WEIGHT: 184.97 LBS

## 2025-07-31 PROBLEM — I95.89 CHRONIC HYPOTENSION: Status: ACTIVE | Noted: 2025-07-31

## 2025-07-31 PROBLEM — I63.9 STROKE: Status: ACTIVE | Noted: 2025-07-31

## 2025-07-31 LAB
ALBUMIN SERPL-MCNC: 2.4 G/DL (ref 3.5–5.2)
ALBUMIN/GLOB SERPL: 0.9 G/DL
ALP SERPL-CCNC: 78 U/L (ref 39–117)
ALT SERPL W P-5'-P-CCNC: 26 U/L (ref 1–41)
ANION GAP SERPL CALCULATED.3IONS-SCNC: 5 MMOL/L (ref 5–15)
AORTIC DIMENSIONLESS INDEX: 0.62 (DI)
AST SERPL-CCNC: 15 U/L (ref 1–40)
AV MEAN PRESS GRAD SYS DOP V1V2: 4 MMHG
AV VMAX SYS DOP: 131 CM/SEC
BASOPHILS # BLD AUTO: 0.04 10*3/MM3 (ref 0–0.2)
BASOPHILS NFR BLD AUTO: 0.8 % (ref 0–1.5)
BH CV ECHO MEAS - AO MAX PG: 6.9 MMHG
BH CV ECHO MEAS - AO ROOT DIAM: 2.4 CM
BH CV ECHO MEAS - AO V2 VTI: 27.8 CM
BH CV ECHO MEAS - AVA(I,D): 1.96 CM2
BH CV ECHO MEAS - EDV(CUBED): 148.9 ML
BH CV ECHO MEAS - EDV(MOD-SP2): 147 ML
BH CV ECHO MEAS - EDV(MOD-SP4): 135 ML
BH CV ECHO MEAS - EF(MOD-SP2): 66.6 %
BH CV ECHO MEAS - EF(MOD-SP4): 56.9 %
BH CV ECHO MEAS - ESV(CUBED): 50.7 ML
BH CV ECHO MEAS - ESV(MOD-SP2): 49.1 ML
BH CV ECHO MEAS - ESV(MOD-SP4): 58.2 ML
BH CV ECHO MEAS - FS: 30.2 %
BH CV ECHO MEAS - IVS/LVPW: 1 CM
BH CV ECHO MEAS - IVSD: 0.9 CM
BH CV ECHO MEAS - LA DIMENSION: 3.6 CM
BH CV ECHO MEAS - LAT PEAK E' VEL: 14 CM/SEC
BH CV ECHO MEAS - LV DIASTOLIC VOL/BSA (35-75): 65.7 CM2
BH CV ECHO MEAS - LV MASS(C)D: 174.5 GRAMS
BH CV ECHO MEAS - LV MAX PG: 3.5 MMHG
BH CV ECHO MEAS - LV MEAN PG: 2 MMHG
BH CV ECHO MEAS - LV SYSTOLIC VOL/BSA (12-30): 28.3 CM2
BH CV ECHO MEAS - LV V1 MAX: 93.3 CM/SEC
BH CV ECHO MEAS - LV V1 VTI: 17.3 CM
BH CV ECHO MEAS - LVIDD: 5.3 CM
BH CV ECHO MEAS - LVIDS: 3.7 CM
BH CV ECHO MEAS - LVOT AREA: 3.1 CM2
BH CV ECHO MEAS - LVOT DIAM: 2 CM
BH CV ECHO MEAS - LVPWD: 0.9 CM
BH CV ECHO MEAS - MED PEAK E' VEL: 10.1 CM/SEC
BH CV ECHO MEAS - MV A MAX VEL: 51.4 CM/SEC
BH CV ECHO MEAS - MV DEC SLOPE: 968 CM/SEC2
BH CV ECHO MEAS - MV DEC TIME: 0.18 SEC
BH CV ECHO MEAS - MV E MAX VEL: 80.7 CM/SEC
BH CV ECHO MEAS - MV E/A: 1.57
BH CV ECHO MEAS - MV MAX PG: 7.4 MMHG
BH CV ECHO MEAS - MV MEAN PG: 3 MMHG
BH CV ECHO MEAS - MV P1/2T: 37.2 MSEC
BH CV ECHO MEAS - MV V2 VTI: 28.3 CM
BH CV ECHO MEAS - MVA(P1/2T): 5.9 CM2
BH CV ECHO MEAS - MVA(VTI): 1.92 CM2
BH CV ECHO MEAS - SV(LVOT): 54.3 ML
BH CV ECHO MEAS - SV(MOD-SP2): 97.9 ML
BH CV ECHO MEAS - SV(MOD-SP4): 76.8 ML
BH CV ECHO MEAS - SVI(LVOT): 26.4 ML/M2
BH CV ECHO MEAS - SVI(MOD-SP2): 47.6 ML/M2
BH CV ECHO MEAS - SVI(MOD-SP4): 37.4 ML/M2
BH CV ECHO MEAS - TAPSE (>1.6): 3.1 CM
BH CV ECHO MEASUREMENTS AVERAGE E/E' RATIO: 6.7
BH CV XLRA - RV BASE: 3.4 CM
BH CV XLRA - RV LENGTH: 7.9 CM
BH CV XLRA - RV MID: 2.9 CM
BH CV XLRA - TDI S': 19 CM/SEC
BILIRUB SERPL-MCNC: <0.2 MG/DL (ref 0–1.2)
BUN SERPL-MCNC: 4.4 MG/DL (ref 6–20)
BUN/CREAT SERPL: 6.9 (ref 7–25)
CALCIUM SPEC-SCNC: 8.1 MG/DL (ref 8.6–10.5)
CHLORIDE SERPL-SCNC: 112 MMOL/L (ref 98–107)
CO2 SERPL-SCNC: 25 MMOL/L (ref 22–29)
CORTIS SERPL-MCNC: 11.2 MCG/DL
CORTIS SERPL-MCNC: 14.7 MCG/DL
CORTIS SERPL-MCNC: 21.4 MCG/DL
CORTIS SERPL-MCNC: 31.3 MCG/DL
CREAT SERPL-MCNC: 0.64 MG/DL (ref 0.76–1.27)
DEPRECATED RDW RBC AUTO: 64.3 FL (ref 37–54)
EGFRCR SERPLBLD CKD-EPI 2021: 111.1 ML/MIN/1.73
EOSINOPHIL # BLD AUTO: 0.17 10*3/MM3 (ref 0–0.4)
EOSINOPHIL NFR BLD AUTO: 3.3 % (ref 0.3–6.2)
ERYTHROCYTE [DISTWIDTH] IN BLOOD BY AUTOMATED COUNT: 18.8 % (ref 12.3–15.4)
GLOBULIN UR ELPH-MCNC: 2.6 GM/DL
GLUCOSE BLDC GLUCOMTR-MCNC: 116 MG/DL (ref 70–130)
GLUCOSE BLDC GLUCOMTR-MCNC: 97 MG/DL (ref 70–130)
GLUCOSE SERPL-MCNC: 66 MG/DL (ref 65–99)
HCT VFR BLD AUTO: 32.6 % (ref 37.5–51)
HGB BLD-MCNC: 10 G/DL (ref 13–17.7)
IMM GRANULOCYTES # BLD AUTO: 0.08 10*3/MM3 (ref 0–0.05)
IMM GRANULOCYTES NFR BLD AUTO: 1.6 % (ref 0–0.5)
LEFT ATRIUM VOLUME INDEX: 22.8 ML/M2
LV EF BIPLANE MOD: 60.6 %
LYMPHOCYTES # BLD AUTO: 1.62 10*3/MM3 (ref 0.7–3.1)
LYMPHOCYTES NFR BLD AUTO: 31.5 % (ref 19.6–45.3)
MCH RBC QN AUTO: 28.8 PG (ref 26.6–33)
MCHC RBC AUTO-ENTMCNC: 30.7 G/DL (ref 31.5–35.7)
MCV RBC AUTO: 93.9 FL (ref 79–97)
MONOCYTES # BLD AUTO: 0.84 10*3/MM3 (ref 0.1–0.9)
MONOCYTES NFR BLD AUTO: 16.3 % (ref 5–12)
NEUTROPHILS NFR BLD AUTO: 2.39 10*3/MM3 (ref 1.7–7)
NEUTROPHILS NFR BLD AUTO: 46.5 % (ref 42.7–76)
NRBC BLD AUTO-RTO: 0 /100 WBC (ref 0–0.2)
PLATELET # BLD AUTO: 356 10*3/MM3 (ref 140–450)
PMV BLD AUTO: 10.1 FL (ref 6–12)
POTASSIUM SERPL-SCNC: 5 MMOL/L (ref 3.5–5.2)
PROT SERPL-MCNC: 5 G/DL (ref 6–8.5)
RBC # BLD AUTO: 3.47 10*6/MM3 (ref 4.14–5.8)
SODIUM SERPL-SCNC: 142 MMOL/L (ref 136–145)
WBC NRBC COR # BLD AUTO: 5.14 10*3/MM3 (ref 3.4–10.8)

## 2025-07-31 PROCEDURE — 82024 ASSAY OF ACTH: CPT | Performed by: INTERNAL MEDICINE

## 2025-07-31 PROCEDURE — 80053 COMPREHEN METABOLIC PANEL: CPT | Performed by: STUDENT IN AN ORGANIZED HEALTH CARE EDUCATION/TRAINING PROGRAM

## 2025-07-31 PROCEDURE — 99239 HOSP IP/OBS DSCHRG MGMT >30: CPT | Performed by: INTERNAL MEDICINE

## 2025-07-31 PROCEDURE — 25010000002 CEFTRIAXONE PER 250 MG: Performed by: INTERNAL MEDICINE

## 2025-07-31 PROCEDURE — 93306 TTE W/DOPPLER COMPLETE: CPT | Performed by: INTERNAL MEDICINE

## 2025-07-31 PROCEDURE — 82533 TOTAL CORTISOL: CPT | Performed by: INTERNAL MEDICINE

## 2025-07-31 PROCEDURE — 82948 REAGENT STRIP/BLOOD GLUCOSE: CPT

## 2025-07-31 PROCEDURE — 85025 COMPLETE CBC W/AUTO DIFF WBC: CPT | Performed by: STUDENT IN AN ORGANIZED HEALTH CARE EDUCATION/TRAINING PROGRAM

## 2025-07-31 PROCEDURE — 25010000002 COSYNTROPIN PER 0.25 MG: Performed by: INTERNAL MEDICINE

## 2025-07-31 PROCEDURE — 93306 TTE W/DOPPLER COMPLETE: CPT

## 2025-07-31 PROCEDURE — 92507 TX SP LANG VOICE COMM INDIV: CPT

## 2025-07-31 RX ORDER — ATORVASTATIN CALCIUM 20 MG/1
20 TABLET, FILM COATED ORAL NIGHTLY
Qty: 90 TABLET | Refills: 1 | Status: SHIPPED | OUTPATIENT
Start: 2025-07-31

## 2025-07-31 RX ORDER — MIDODRINE HYDROCHLORIDE 5 MG/1
5 TABLET ORAL 2 TIMES DAILY
Qty: 60 TABLET | Refills: 0 | Status: SHIPPED | OUTPATIENT
Start: 2025-07-31

## 2025-07-31 RX ORDER — AZITHROMYCIN 250 MG/1
500 TABLET, FILM COATED ORAL
Status: COMPLETED | OUTPATIENT
Start: 2025-07-31 | End: 2025-07-31

## 2025-07-31 RX ORDER — COSYNTROPIN 0.25 MG/ML
0.25 INJECTION, POWDER, FOR SOLUTION INTRAMUSCULAR; INTRAVENOUS ONCE
Status: COMPLETED | OUTPATIENT
Start: 2025-07-31 | End: 2025-07-31

## 2025-07-31 RX ADMIN — FAMOTIDINE 20 MG: 20 TABLET, FILM COATED ORAL at 08:05

## 2025-07-31 RX ADMIN — CETIRIZINE HYDROCHLORIDE 10 MG: 10 TABLET, FILM COATED ORAL at 08:05

## 2025-07-31 RX ADMIN — COSYNTROPIN 0.25 MG: 0.25 INJECTION, POWDER, LYOPHILIZED, FOR SOLUTION INTRAMUSCULAR; INTRAVENOUS at 10:21

## 2025-07-31 RX ADMIN — ASPIRIN 325 MG: 325 TABLET ORAL at 08:04

## 2025-07-31 RX ADMIN — CEFTRIAXONE SODIUM 1000 MG: 1 INJECTION, POWDER, FOR SOLUTION INTRAMUSCULAR; INTRAVENOUS at 14:12

## 2025-07-31 RX ADMIN — OXYCODONE HYDROCHLORIDE 30 MG: 15 TABLET ORAL at 10:21

## 2025-07-31 RX ADMIN — MORPHINE SULFATE 30 MG: 30 TABLET, FILM COATED, EXTENDED RELEASE ORAL at 08:04

## 2025-07-31 RX ADMIN — PREGABALIN 300 MG: 150 CAPSULE ORAL at 08:04

## 2025-07-31 RX ADMIN — AZITHROMYCIN DIHYDRATE 500 MG: 250 TABLET ORAL at 11:24

## 2025-07-31 RX ADMIN — PANTOPRAZOLE SODIUM 40 MG: 40 TABLET, DELAYED RELEASE ORAL at 08:04

## 2025-07-31 RX ADMIN — MIDODRINE HYDROCHLORIDE 5 MG: 5 TABLET ORAL at 08:04

## 2025-07-31 RX ADMIN — BUPROPION HYDROCHLORIDE 150 MG: 150 TABLET, EXTENDED RELEASE ORAL at 08:05

## 2025-07-31 RX ADMIN — Medication 10 ML: at 08:05

## 2025-07-31 RX ADMIN — MAGNESIUM OXIDE TAB 400 MG (241.3 MG ELEMENTAL MG) 400 MG: 400 (241.3 MG) TAB at 08:04

## 2025-07-31 RX ADMIN — MIDODRINE HYDROCHLORIDE 5 MG: 5 TABLET ORAL at 11:24

## 2025-07-31 NOTE — PLAN OF CARE
Problem: Adult Inpatient Plan of Care  Goal: Plan of Care Review  Outcome: Progressing  Goal: Patient-Specific Goal (Individualized)  Outcome: Progressing  Goal: Absence of Hospital-Acquired Illness or Injury  Outcome: Progressing  Intervention: Identify and Manage Fall Risk  Recent Flowsheet Documentation  Taken 7/31/2025 0615 by Nan Florentino RN  Safety Promotion/Fall Prevention:   activity supervised   assistive device/personal items within reach   clutter free environment maintained   fall prevention program maintained   lighting adjusted   nonskid shoes/slippers when out of bed   room organization consistent   safety round/check completed  Taken 7/31/2025 0444 by Nan Florentino RN  Safety Promotion/Fall Prevention:   activity supervised   assistive device/personal items within reach   clutter free environment maintained   fall prevention program maintained   lighting adjusted   nonskid shoes/slippers when out of bed   room organization consistent   safety round/check completed  Taken 7/31/2025 0200 by Nan Florentino RN  Safety Promotion/Fall Prevention:   activity supervised   assistive device/personal items within reach   clutter free environment maintained   fall prevention program maintained   lighting adjusted   nonskid shoes/slippers when out of bed   room organization consistent   safety round/check completed  Taken 7/31/2025 0000 by Nan Florentino RN  Safety Promotion/Fall Prevention:   activity supervised   assistive device/personal items within reach   clutter free environment maintained   fall prevention program maintained   lighting adjusted   nonskid shoes/slippers when out of bed   room organization consistent   safety round/check completed  Taken 7/30/2025 2200 by Nan Florentino RN  Safety Promotion/Fall Prevention:   activity supervised   assistive device/personal items within reach   clutter free environment maintained   fall prevention program maintained    lighting adjusted   nonskid shoes/slippers when out of bed   room organization consistent   safety round/check completed  Taken 7/30/2025 2000 by Nan Florentino RN  Safety Promotion/Fall Prevention:   activity supervised   assistive device/personal items within reach   clutter free environment maintained   fall prevention program maintained   lighting adjusted   nonskid shoes/slippers when out of bed   room organization consistent   safety round/check completed  Intervention: Prevent Skin Injury  Recent Flowsheet Documentation  Taken 7/31/2025 0615 by Nan Florentino RN  Body Position: position changed independently  Skin Protection: incontinence pads utilized  Taken 7/31/2025 0444 by Nan Florentino RN  Body Position: position changed independently  Skin Protection: incontinence pads utilized  Taken 7/31/2025 0200 by Nan Florentino RN  Body Position: position changed independently  Skin Protection: incontinence pads utilized  Taken 7/31/2025 0000 by Nan Florentino RN  Body Position: position changed independently  Skin Protection: incontinence pads utilized  Taken 7/30/2025 2200 by Nan Florentino RN  Body Position: position changed independently  Skin Protection: incontinence pads utilized  Taken 7/30/2025 2000 by Nan Florentino RN  Body Position: position changed independently  Skin Protection: incontinence pads utilized  Intervention: Prevent and Manage VTE (Venous Thromboembolism) Risk  Recent Flowsheet Documentation  Taken 7/30/2025 2000 by Nan Florentino RN  VTE Prevention/Management:   bilateral   SCDs (sequential compression devices) off  Intervention: Prevent Infection  Recent Flowsheet Documentation  Taken 7/31/2025 0615 by Nan Florentino RN  Infection Prevention:   environmental surveillance performed   equipment surfaces disinfected   hand hygiene promoted  Taken 7/31/2025 0444 by Nan Florentino RN  Infection Prevention:   environmental  surveillance performed   equipment surfaces disinfected   hand hygiene promoted  Taken 7/31/2025 0200 by Nan Florentino RN  Infection Prevention:   environmental surveillance performed   equipment surfaces disinfected   hand hygiene promoted  Taken 7/31/2025 0000 by Nan Florentino RN  Infection Prevention:   environmental surveillance performed   equipment surfaces disinfected   hand hygiene promoted  Taken 7/30/2025 2200 by Nan Florentino RN  Infection Prevention:   environmental surveillance performed   equipment surfaces disinfected   hand hygiene promoted  Taken 7/30/2025 2000 by Nan Florentino RN  Infection Prevention:   environmental surveillance performed   equipment surfaces disinfected   hand hygiene promoted   personal protective equipment utilized  Goal: Optimal Comfort and Wellbeing  Outcome: Progressing  Intervention: Monitor Pain and Promote Comfort  Recent Flowsheet Documentation  Taken 7/30/2025 2152 by Nan Florentino RN  Pain Management Interventions: pain medication given  Intervention: Provide Person-Centered Care  Recent Flowsheet Documentation  Taken 7/30/2025 2000 by Nan Florentino RN  Trust Relationship/Rapport: care explained  Goal: Readiness for Transition of Care  Outcome: Progressing     Problem: Comorbidity Management  Goal: Blood Pressure in Desired Range  Outcome: Progressing  Intervention: Maintain Blood Pressure Management  Recent Flowsheet Documentation  Taken 7/31/2025 0615 by Nan Florentino RN  Medication Review/Management: medications reviewed  Taken 7/31/2025 0444 by Nan Florentino RN  Medication Review/Management: medications reviewed  Taken 7/31/2025 0200 by Nan Florentino RN  Medication Review/Management: medications reviewed  Taken 7/31/2025 0000 by Nan Florentino RN  Medication Review/Management: medications reviewed  Taken 7/30/2025 2200 by Nan Florentino RN  Medication Review/Management:  medications reviewed  Taken 7/30/2025 2000 by Nan Florentino RN  Medication Review/Management: medications reviewed     Problem: Fall Injury Risk  Goal: Absence of Fall and Fall-Related Injury  Outcome: Progressing  Intervention: Identify and Manage Contributors  Recent Flowsheet Documentation  Taken 7/31/2025 0615 by Nan Florentino RN  Medication Review/Management: medications reviewed  Taken 7/31/2025 0444 by Nan Florentino RN  Medication Review/Management: medications reviewed  Taken 7/31/2025 0200 by Nan Florentino RN  Medication Review/Management: medications reviewed  Taken 7/31/2025 0000 by Nan Florentino RN  Medication Review/Management: medications reviewed  Self-Care Promotion:   independence encouraged   BADL personal objects within reach  Taken 7/30/2025 2200 by Nan Florentino RN  Medication Review/Management: medications reviewed  Taken 7/30/2025 2000 by Nan Florentino RN  Medication Review/Management: medications reviewed  Intervention: Promote Injury-Free Environment  Recent Flowsheet Documentation  Taken 7/31/2025 0615 by Nan Florentino RN  Safety Promotion/Fall Prevention:   activity supervised   assistive device/personal items within reach   clutter free environment maintained   fall prevention program maintained   lighting adjusted   nonskid shoes/slippers when out of bed   room organization consistent   safety round/check completed  Taken 7/31/2025 0444 by Nan Florentino RN  Safety Promotion/Fall Prevention:   activity supervised   assistive device/personal items within reach   clutter free environment maintained   fall prevention program maintained   lighting adjusted   nonskid shoes/slippers when out of bed   room organization consistent   safety round/check completed  Taken 7/31/2025 0200 by Nan Florentino RN  Safety Promotion/Fall Prevention:   activity supervised   assistive device/personal items within reach   clutter  free environment maintained   fall prevention program maintained   lighting adjusted   nonskid shoes/slippers when out of bed   room organization consistent   safety round/check completed  Taken 7/31/2025 0000 by Nan Florentino RN  Safety Promotion/Fall Prevention:   activity supervised   assistive device/personal items within reach   clutter free environment maintained   fall prevention program maintained   lighting adjusted   nonskid shoes/slippers when out of bed   room organization consistent   safety round/check completed  Taken 7/30/2025 2200 by Nan Florentino RN  Safety Promotion/Fall Prevention:   activity supervised   assistive device/personal items within reach   clutter free environment maintained   fall prevention program maintained   lighting adjusted   nonskid shoes/slippers when out of bed   room organization consistent   safety round/check completed  Taken 7/30/2025 2000 by Nan Florentino, RN  Safety Promotion/Fall Prevention:   activity supervised   assistive device/personal items within reach   clutter free environment maintained   fall prevention program maintained   lighting adjusted   nonskid shoes/slippers when out of bed   room organization consistent   safety round/check completed   Goal Outcome Evaluation:

## 2025-07-31 NOTE — CASE MANAGEMENT/SOCIAL WORK
Continued Stay Note   Shaila     Patient Name: Edward Powell  MRN: [id number]  Today's Date: 7/31/2025    Admit Date: 7/29/2025    Plan: Home w/Outpatient PT   Discharge Plan       Row Name 07/31/25 1111       Plan    Plan Home w/Outpatient PT    Patient/Family in Agreement with Plan yes    Plan Comments Spoke with patient in room.  Therapy recommends outpatient PT at discharge.  Patient agreeable.  Referral placed in [facility] for  [facility].  They will call patient to schedule after discharge.  No other needs noted at this time.  CM will continue to follow.    Final Discharge Disposition Code 01 - home or self-care                   Discharge Codes    No documentation.                 Expected Discharge Date and Time       Expected Discharge Date Expected Discharge Time    Jul 31, 2025               Donna Anna RN

## 2025-07-31 NOTE — OUTREACH NOTE
Prep Survey      Flowsheet Row Responses   [facility] facility patient discharged from? [facility]   Is LACE score < 7 ? No   Eligibility [facility]   Date of Admission 07/29/25   Date of Discharge 07/31/25   Discharge Disposition Home or Self Care   Discharge diagnosis Stroke   Does the patient have one of the following disease processes/diagnoses(primary or secondary)? Stroke   Does the patient have Home health ordered? No   Is there a DME ordered? No   Comments regarding appointments outpt PT   Prep survey completed? Yes            JOMAR BUENROSTRO - Registered Nurse

## 2025-07-31 NOTE — PLAN OF CARE
Goal Outcome Evaluation:  Plan of Care Reviewed With: patient        Progress: no change       Anticipated Discharge Disposition (SLP): home with home health, home with assist             Treatment Assessment (SLP): moderate, cognitive-linguistic disorder (07/31/25 1330)  Treatment Assessment Comments (SLP): Affecting attention, executive function, short-term memory/word retrieval, and problem-solving. Pt denied acute deficits. No family present. ? Potential lack of deficit awareness. (07/31/25 1330)  Plan for Continued Treatment (SLP): continue treatment per plan of care (07/31/25 1330)

## 2025-07-31 NOTE — DISCHARGE SUMMARY
[facility] Medicine Services  DISCHARGE SUMMARY    Patient Name: Edward Powell  : 1970  MRN: [id number]    Date of Admission: 2025  3:01 PM  Date of Discharge:  2025  Primary Care Physician: Westley Gonzales DO    Consults       No orders found from 2025 to 2025.            Hospital Course     Presenting Problem: stroke on outpatient MRI    Active Hospital Problems    Diagnosis  POA    **Stroke [I63.9]  Yes    Chronic hypotension [I95.89]  Yes    Pneumonia [J18.9]  Yes    Anemia due to chemotherapy [D64.81, T45.1X5A]  Yes    Rectal cancer metastasized to liver [C20, C78.7]  Yes      Resolved Hospital Problems   No resolved problems to display.          Hospital Course:  Edward Powell is a 55 y.o. male w metastatic rectal cancer undergoing chemotherapy, COPD, recently diagnosed pneumonia who was referred here after OP staging MRI revealed acute/subacute infarcts.      Evaluated by neurology-stroke team while here given OP MRI findings. D/w Dr Vences on  who suspected embolic strokes given locations and recommended eliquis 5 mg BID if OK by oncology team (d/w Dr Cronin who is OK with this plan). ECHO without thrombus. Holter at discharge with OP stroke team follow-up. Evaluated by therapy services who recommend home with assistance     Pneumonia diagnosed as OP before admission was improved on repeat imaging - rocephin + azithromycin given here x3 doses to complete course (was on OP augmentin). Hypotension + lactic acidosis on admission that resolved w fluid resuscitation. Hold lasix. BP trending down over last 2 months in setting of cancer and cancer treatment. Midodrine started after cortisol testing reassuring.     Patient's oncologist saw while inpatient - plan for temporary treatment holiday with OP follow-up to further disscuss plan regarding metastatic rectal cancer.    Found appropriate for discharge  and patient feeling quite well.        Discharge Follow Up Recommendations for outpatient labs/diagnostics:   PCP 1 week for BP re-check   Dr Cronin OP f/u as appropriate for further treatment discussions   Stroke clinic 1 month, Holter at [facility] per their team    Day of Discharge     HPI:   Feels quite well  We review all information to now  He feels much more himself    Vital Signs:   Temp:  [97.5 °F (36.4 °C)-98.2 °F (36.8 °C)] 98 °F (36.7 °C)  Heart Rate:  [59-78] 69  Resp:  [16-18] 17  BP: ()/(51-66) 99/51      Physical Exam:  Constitutional: No acute distress, awake, alert male sitting up in bed. Well appearing  Respiratory: Clear to auscultation bilaterally, respiratory effort normal   Cardiovascular: RRR, no murmurs, rubs, or gallops  Gastrointestinal: Soft, nontender, nondistended  Musculoskeletal: Muscle tone within normal limits, no joint effusions appreciated  Psychiatric: Appropriate affect, cooperative  Neurologic: Alert and oriented, facial movements symmetric and spontaneous movement of all 4 extremities grossly equal bilaterally, speech clear  Skin: No rashes    Pertinent  and/or Most Recent Results     LAB RESULTS:      Lab 07/31/25 0821 07/30/25 0632 07/29/25 2005 07/29/25  1626 07/29/25  1350   WBC 5.14 3.53  --   --  4.46   HEMOGLOBIN 10.0* 9.1*  --   --  11.3*   HEMATOCRIT 32.6* 29.7*  --   --  37.1*   PLATELETS 356 319  --   --  383   NEUTROS ABS 2.39 1.28*  --   --  2.90   IMMATURE GRANS (ABS) 0.08* 0.04  --   --   --    LYMPHS ABS 1.62 1.35  --   --   --    MONOS ABS 0.84 0.72  --   --   --    EOS ABS 0.17 0.12  --   --  0.13   MCV 93.9 94.6  --   --  93.9   PROCALCITONIN  --   --   --   --  0.04   LACTATE  --   --  0.6 4.6* 3.5*         Lab 07/31/25 0821 07/30/25 0632 07/29/25  1350   SODIUM 142 141 141   POTASSIUM 5.0 3.9 4.6   CHLORIDE 112* 112* 107   CO2 25.0 23.0 25.0   ANION GAP 5.0 6.0 9.0   BUN 4.4* 5.9* 7.3   CREATININE 0.64* 0.58* 0.60*   EGFR 111.1 114.5 113.3   GLUCOSE 66 73 89   CALCIUM 8.1* 7.6* 8.0*    MAGNESIUM  --  1.9 1.9   PHOSPHORUS  --  3.2  --    HEMOGLOBIN A1C  --  4.91  --    TSH  --  2.530  --          Lab 07/31/25  0821 07/29/25  1350   TOTAL PROTEIN 5.0* 5.4*   ALBUMIN 2.4* 2.4*   GLOBULIN 2.6 3.0   ALT (SGPT) 26 29   AST (SGOT) 15 16   BILIRUBIN <0.2 0.2   ALK PHOS 78 101         Lab 07/29/25  1350   PROBNP 224.1         Lab 07/30/25  0632   CHOLESTEROL 53   LDL CHOL 13   HDL CHOL 26*   TRIGLYCERIDES 52             Brief Urine Lab Results  (Last result in the past 365 days)        Color   Clarity   Blood   Leuk Est   Nitrite   Protein   CREAT   Urine HCG        07/29/25 1725 Yellow   Clear   Negative   Negative   Negative   Negative                 Microbiology Results (last 10 days)       Procedure Component Value - Date/Time    Respiratory Panel PCR w/COVID-19(SARS-CoV-2) JAYDE/JENNIFFER/NICOLE/PAD/COR/BRIDGET In-House, NP Swab in UTM/VTM, 2 HR TAT - Swab, Nasopharynx [[id number]]  (Normal) Collected: 07/30/25 0752    Lab Status: Final result Specimen: Swab from Nasopharynx Updated: 07/30/25 0848     ADENOVIRUS, PCR Not Detected     Coronavirus 229E Not Detected     Coronavirus HKU1 Not Detected     Coronavirus NL63 Not Detected     Coronavirus OC43 Not Detected     COVID19 Not Detected     Human Metapneumovirus Not Detected     Human Rhinovirus/Enterovirus Not Detected     Influenza A PCR Not Detected     Influenza B PCR Not Detected     Parainfluenza Virus 1 Not Detected     Parainfluenza Virus 2 Not Detected     Parainfluenza Virus 3 Not Detected     Parainfluenza Virus 4 Not Detected     RSV, PCR Not Detected     Bordetella pertussis pcr Not Detected     Bordetella parapertussis PCR Not Detected     Chlamydophila pneumoniae PCR Not Detected     Mycoplasma pneumo by PCR Not Detected    Narrative:      In the setting of a positive respiratory panel with a viral infection PLUS a negative procalcitonin without other underlying concern for bacterial infection, consider observing off antibiotics or discontinuation  of antibiotics and continue supportive care. If the respiratory panel is positive for atypical bacterial infection (Bordetella pertussis, Chlamydophila pneumoniae, or Mycoplasma pneumoniae), consider antibiotic de-escalation to target atypical bacterial infection.    Blood Culture - Blood, Hand, Right [[id number]]  (Normal) Collected: 07/29/25 1711    Lab Status: Preliminary result Specimen: Blood from Hand, Right Updated: 07/30/25 1745     Blood Culture No growth at 24 hours    Narrative:      Less than seven (7) mL's of blood was collected.  Insufficient quantity may yield false negative results.    Blood Culture - Blood, Arm, Right [[id number]]  (Normal) Collected: 07/29/25 1630    Lab Status: Preliminary result Specimen: Blood from Arm, Right Updated: 07/30/25 1645     Blood Culture No growth at 24 hours    Narrative:      Less than seven (7) mL's of blood was collected.  Insufficient quantity may yield false negative results.    COVID-19, FLU A/B, RSV PCR 1 HR TAT - Swab, Nasopharynx [[id number]]  (Normal) Collected: 07/21/25 1530    Lab Status: Final result Specimen: Swab from Nasopharynx Updated: 07/21/25 1708     COVID19 Not Detected     Influenza A PCR Not Detected     Influenza B PCR Not Detected     RSV, PCR Not Detected            CT Chest Without Contrast Diagnostic  Result Date: 7/30/2025  CT CHEST WO CONTRAST DIAGNOSTIC Date of Exam: 7/30/2025 3:34 PM EDT Indication: productive cough, immunosuppressed. Comparison: Chest CT 7/23/2025. Technique: Axial CT images were obtained of the chest without contrast administration.  Reconstructed coronal and sagittal images were also obtained. Automated exposure control and iterative construction methods were used. Findings: Central airways are patent. Mild centrilobular and paraseptal emphysema. Scattered patchy groundglass and nodular opacities affecting the dependent portions of the lungs bilaterally, slightly decreased compared to prior CT with some  redistribution. Cavitary mass in the left lower lobe measures 4.2 x 3.2 cm, unchanged compared to most recent exam (series 3 image 64). Trace left pleural fluid/thickening. No right pleural effusion. No pneumothorax. Right chest port tip terminates in the upper superior vena cava near the brachiocephalic confluence. Ascending thoracic aorta measures 4.5 cm, unchanged. Coronary artery calcifications. No pericardial effusion. No pathologically enlarged lymph nodes. Calcified mediastinal and hilar lymph nodes consistent with healed granulomatous disease. No acute chest wall abnormality. Thoracic spondylosis. No acute or suspicious osseous lesion. Within the upper abdomen, there appears to be postsurgical changes of gastric bypass. There is dilatation of the gastric pouch and fluid distention of the esophagus.     Impression: Slightly improved multifocal pneumonia. Unchanged cavitary mass in the left lower lobe measuring over 4 cm. Unchanged ascending thoracic aortic aneurysm measuring 4.5 cm. Visualized portions of the upper abdomen demonstrate changes of gastric bypass with distention of the gastric pouch and upstream esophagus, possibly related to obstruction. Recommend correlation with exam/symptoms and consider dedicated CT abdomen/pelvis  for further evaluation. Electronically Signed: Min Nguyen MD  7/30/2025 9:45 PM EDT  Workstation ID: [id number]    Duplex Venous Lower Extremity - Bilateral CAR  Result Date: 7/30/2025    Normal bilateral lower extremity venous duplex scan.     CT Angiogram Head w AI Analysis of LVO  Result Date: 7/29/2025  CT ANGIOGRAM HEAD W AI ANALYSIS OF LVO, CT ANGIOGRAM NECK Date of Exam: 7/29/2025 6:01 PM EDT Indication: Stroke, follow up bilateral strokes. Comparison: Brain MRI 7/25/2025. Technique: CTA of the head and neck was performed after the uneventful intravenous administration of 75 mL Isovue-370. Reconstructed coronal and sagittal images were also obtained. In addition, a 3-D  volume rendered image was created for interpretation. Automated exposure control and iterative reconstruction methods were used. Findings: HEAD CTA: Anterior circulation: No proximal large vessel occlusion or major stenosis. Posterior circulation: No proximal large vessel occlusion or major stenosis. Major dural venous sinuses: No evidence of dural venous sinus thrombosis within the limitation of angiographic contrast imaging. NECK CTA: Conventional, three-vessel, aortic arch. Normal contrast enhancement in the common carotid arteries from their origins to the bifurcation. Irregularity and atherosclerotic calcification at the carotid bulbs. There is less than 50% narrowing of the right ICA by NASCET criteria at its origin. There is thousand 50% narrowing of the left ICA by NASCET criteria at its origin. Otherwise, normal contrast enhancement of the internal carotid arteries from their origins to the proximal intradural portions.Irregularity and atherosclerotic calcification of the petrous and cavernous carotid arteries. Normal enhancement in the vertebral arteries from their origins to their proximal intradural portions. The left vertebral artery is dominant. Patent subclavian arteries. Angiographic contrast timing precludes accurate assessement of jugular vein patency. SOFT TISSUE NECK: No exophytic lesion seen within the aerodigestive tract. No pathologic appearing lymph nodes by imaging criteria. The visualized glands appear unremarkable. No acute or aggressive appearing osseous or soft tissue process.Degenerative changes of the imaged spine. Emphysema.     Impression: No proximal large vessel occlusion or severe stenosis of the major arteries of the head and neck. Emphysema. Emphysema on CT is an independent risk factor for lung cancer. Low dose lung cancer screening should be considered if patient qualifies based on smoking history or if not already enrolled in a screening program. Electronically Signed: Jayjay  ZEYNEP Perea MD  7/29/2025 6:45 PM EDT  Workstation ID: [id number]    CT Angiogram Neck  Result Date: 7/29/2025  CT ANGIOGRAM HEAD W AI ANALYSIS OF LVO, CT ANGIOGRAM NECK Date of Exam: 7/29/2025 6:01 PM EDT Indication: Stroke, follow up bilateral strokes. Comparison: Brain MRI 7/25/2025. Technique: CTA of the head and neck was performed after the uneventful intravenous administration of 75 mL Isovue-370. Reconstructed coronal and sagittal images were also obtained. In addition, a 3-D volume rendered image was created for interpretation. Automated exposure control and iterative reconstruction methods were used. Findings: HEAD CTA: Anterior circulation: No proximal large vessel occlusion or major stenosis. Posterior circulation: No proximal large vessel occlusion or major stenosis. Major dural venous sinuses: No evidence of dural venous sinus thrombosis within the limitation of angiographic contrast imaging. NECK CTA: Conventional, three-vessel, aortic arch. Normal contrast enhancement in the common carotid arteries from their origins to the bifurcation. Irregularity and atherosclerotic calcification at the carotid bulbs. There is less than 50% narrowing of the right ICA by NASCET criteria at its origin. There is thousand 50% narrowing of the left ICA by NASCET criteria at its origin. Otherwise, normal contrast enhancement of the internal carotid arteries from their origins to the proximal intradural portions.Irregularity and atherosclerotic calcification of the petrous and cavernous carotid arteries. Normal enhancement in the vertebral arteries from their origins to their proximal intradural portions. The left vertebral artery is dominant. Patent subclavian arteries. Angiographic contrast timing precludes accurate assessement of jugular vein patency. SOFT TISSUE NECK: No exophytic lesion seen within the aerodigestive tract. No pathologic appearing lymph nodes by imaging criteria. The visualized glands appear unremarkable.  No acute or aggressive appearing osseous or soft tissue process.Degenerative changes of the imaged spine. Emphysema.     Impression: No proximal large vessel occlusion or severe stenosis of the major arteries of the head and neck. Emphysema. Emphysema on CT is an independent risk factor for lung cancer. Low dose lung cancer screening should be considered if patient qualifies based on smoking history or if not already enrolled in a screening program. Electronically Signed: Jayjay Perea MD  7/29/2025 6:45 PM EDT  Workstation ID: [id number]    CT Head Without Contrast Stroke Protocol  Result Date: 7/29/2025  CT HEAD WO CONTRAST STROKE PROTOCOL Date of Exam: 7/29/2025 6:01 PM EDT Indication: recent stroke. Comparison: Imported/outside brain MRI dated 7/25/2025. Technique: Axial CT images were obtained of the head without contrast administration.  Reconstructed coronal images were also obtained. Automated exposure control and iterative construction methods were used. Findings: Scattered small infarcts within the right greater than left centrum semiovale are seen to better advantage on prior brain MRI. No acute intracranial hemorrhage. No extra-axial collection. Normal ventricular caliber. No extra-axial collection. Intraocular  structures are grossly unremarkable. Paranasal sinuses and mastoid air cells are grossly clear. No acute or suspicious osseous lesion.     Impression: Scattered small infarcts within the right greater than left centrum semiovale are seen to better advantage on recent brain MRI. No acute intracranial hemorrhage. Electronically Signed: Min Nguyen MD  7/29/2025 6:44 PM EDT  Workstation ID: [id number]    MRI Brain With & Without Contrast  Addendum Date: 7/26/2025  The provider on-call Dr. César Hawk was notified at 12:40 p.m.  This report was signed and finalized on 7/26/2025 12:41 PM by Armen Little DO.      Result Date: 7/26/2025  PROCEDURE: MRI BRAIN W WO CONTRAST-  HISTORY: dizziness, colon  cnacer; D48-Lcpdjvghq neoplasm of rectum; C78.7-Secondary malignant neoplasm of liver and intrahepatic bile duct  PROCEDURE: Multiplanar multisequence imaging of the brain was performed without the use of intravenous contrast.  COMPARISON: None.  FINDINGS: The midbrain, jayme, cerebellum and craniocervical junction are unremarkable. The sella and pituitary gland are within normal limits.  Mild cerebral atrophy and nonspecific white matter changes are present. There are a few small foci of restricted diffusion in the centrum semiovale bilaterally consistent with acute/subacute infarcts. There is no mass effect or midline shift. No abnormal enhancing lesions to suggest metastatic disease.  The visualized paranasal sinuses and mastoid air cells are clear. The orbits are symmetrical.        There are a few small foci of restricted diffusion in the centrum semiovale bilaterally consistent with acute/subacute infarcts. No abnormal enhancing lesions to suggest metastatic disease.  Attempts are being made to reach the ordering provider on call.      This report was signed and finalized on 7/26/2025 12:37 PM by Armen Little DO.      CT Abdomen Pelvis With Contrast  Result Date: 7/23/2025  PROCEDURE: CT CHEST W CONTRAST DIAGNOSTIC-, CT ABDOMEN PELVIS W CONTRAST-  HISTORY: lung metastasis; R62-Nzagsqdbi neoplasm of rectum; C78.7-Secondary malignant neoplasm of liver and intrahepatic bile duct  COMPARISON: 4/24/2025.  PROCEDURE: Axial images were obtained from the thoracic inlet through the pubic symphysis following the administration of Isovue 300 and oral contrast.   FINDINGS:  CHEST: There is no evidence of mediastinal or axillary adenopathy. Heart size is normal. There are no pleural or pericardial effusions. There are groundglass opacities within the bilateral lungs with minimal involvement of the right upper lobe and mild involvement of the left upper lobe. There is more prominent involvement of the other 3 lobes. Findings  likely represent pneumonia. There is a posterior medial left lower lobe cavitary mass measuring 38 mm in transverse diameter, decreased in size from previous measurement of 43 mm. There are changes of emphysema. Bone windows reveal no lytic or destructive lesions. There is evidence of old calcified granulomatous disease. There is mild wall thickening of the distal esophagus, possibly representing esophagitis. No bony destructive lesion. There is a stable right IJ chest port.  ABDOMEN: There is fatty infiltration of the liver. The gallbladder is surgically absent. The spleen is unremarkable. No adrenal mass is present.  The pancreas is normal. Within the superior pole of the right kidney posteriorly there is a hypodense renal lesion most consistent with a cyst. There is a second similar-appearing lesion within the inferior pole the right kidney. These are stable from prior. The left kidney is unremarkable. No stones or hydronephrosis. The aorta is normal in caliber. There is no free fluid or adenopathy. The abdominal portions of the GI tract are unremarkable. No bony destructive lesions.  PELVIS: The appendix is not identified. No free fluid. The prostate is normal in size with calcification. The urinary bladder is mostly collapsed. There is wall thickening of the rectosigmoid colon asymmetrically with enhancement consistent with patient's known rectal cancer. This appears similar to the prior. There is mild filtration of the fat surrounding the rectum. There is a right external iliac lymph node measuring 14 mm, not significant changed in size from prior. No bony destructive lesion.       Interval decrease in size of left lower lobe cavitary mass.  New bilateral patchy airspace disease likely resenting pneumonia. Recommend follow-up to resolution.  Irregular wall thickening of the rectosigmoid colon stable to mildly worsened compared to prior consistent with patient's known rectal cancer.  Stable right external iliac  lymph node.  Fatty liver.   This study was performed with techniques to keep radiation doses as low as reasonably achievable (ALARA). Individualized dose reduction techniques using automated exposure control or adjustment of mA and/or kV according to the patient size were employed.    Images were reviewed, interpreted, and dictated by Dr. Gabriella Rodriguez MD Transcribed by Kameron Tejeda PA-C.  This report was signed and finalized on 7/23/2025 2:14 PM by Gabriella Rodriguez MD.      CT Chest With Contrast Diagnostic  Result Date: 7/23/2025  PROCEDURE: CT CHEST W CONTRAST DIAGNOSTIC-, CT ABDOMEN PELVIS W CONTRAST-  HISTORY: lung metastasis; A14-Ssaoiabqd neoplasm of rectum; C78.7-Secondary malignant neoplasm of liver and intrahepatic bile duct  COMPARISON: 4/24/2025.  PROCEDURE: Axial images were obtained from the thoracic inlet through the pubic symphysis following the administration of Isovue 300 and oral contrast.   FINDINGS:  CHEST: There is no evidence of mediastinal or axillary adenopathy. Heart size is normal. There are no pleural or pericardial effusions. There are groundglass opacities within the bilateral lungs with minimal involvement of the right upper lobe and mild involvement of the left upper lobe. There is more prominent involvement of the other 3 lobes. Findings likely represent pneumonia. There is a posterior medial left lower lobe cavitary mass measuring 38 mm in transverse diameter, decreased in size from previous measurement of 43 mm. There are changes of emphysema. Bone windows reveal no lytic or destructive lesions. There is evidence of old calcified granulomatous disease. There is mild wall thickening of the distal esophagus, possibly representing esophagitis. No bony destructive lesion. There is a stable right IJ chest port.  ABDOMEN: There is fatty infiltration of the liver. The gallbladder is surgically absent. The spleen is unremarkable. No adrenal mass is present.  The pancreas is normal. Within the  superior pole of the right kidney posteriorly there is a hypodense renal lesion most consistent with a cyst. There is a second similar-appearing lesion within the inferior pole the right kidney. These are stable from prior. The left kidney is unremarkable. No stones or hydronephrosis. The aorta is normal in caliber. There is no free fluid or adenopathy. The abdominal portions of the GI tract are unremarkable. No bony destructive lesions.  PELVIS: The appendix is not identified. No free fluid. The prostate is normal in size with calcification. The urinary bladder is mostly collapsed. There is wall thickening of the rectosigmoid colon asymmetrically with enhancement consistent with patient's known rectal cancer. This appears similar to the prior. There is mild filtration of the fat surrounding the rectum. There is a right external iliac lymph node measuring 14 mm, not significant changed in size from prior. No bony destructive lesion.       Interval decrease in size of left lower lobe cavitary mass.  New bilateral patchy airspace disease likely resenting pneumonia. Recommend follow-up to resolution.  Irregular wall thickening of the rectosigmoid colon stable to mildly worsened compared to prior consistent with patient's known rectal cancer.  Stable right external iliac lymph node.  Fatty liver.   This study was performed with techniques to keep radiation doses as low as reasonably achievable (ALARA). Individualized dose reduction techniques using automated exposure control or adjustment of mA and/or kV according to the patient size were employed.    Images were reviewed, interpreted, and dictated by Dr. Gabriella Rodriguez MD Transcribed by Kameron Tejeda PA-C.  This report was signed and finalized on 7/23/2025 2:14 PM by Gabriella Rodriguez MD.      XR Chest 1 View  Result Date: 7/21/2025  PROCEDURE: XR CHEST 1 VW-  HISTORY: Weak/Dizzy/AMS triage protocol, received chemotherapy this week and has had diarrhea for 4 days, patient  states that he is dehydrated.  COMPARISON: May 14, 2025..  FINDINGS: The heart is normal in size. There is bronchial wall thickening in the lung bases. There our new opacities in the right lung base, possible pneumonia.. The mediastinum is unremarkable. There is no pneumothorax.  There are no acute osseous abnormalities. Right internal jugular port appears to be in stable position. Apical lordotic positioning noted.      New right basilar airspace disease, possible pneumonia; recommend follow-up..  Stable position right IJ port from prior.   This report was signed and finalized on 7/21/2025 12:16 PM by Gabriella Rodriguez MD.        Results for orders placed during the hospital encounter of 07/29/25    Duplex Venous Lower Extremity - Bilateral CAR 07/30/2025 12:16 PM    Interpretation Summary    Normal bilateral lower extremity venous duplex scan.      Results for orders placed during the hospital encounter of 07/29/25    Duplex Venous Lower Extremity - Bilateral CAR 07/30/2025 12:16 PM    Interpretation Summary    Normal bilateral lower extremity venous duplex scan.      Results for orders placed during the hospital encounter of 07/29/25    Adult Transthoracic Echo Complete W/ Cont if Necessary Per Protocol 07/31/2025 12:54 PM    Interpretation Summary    Left ventricular systolic function is normal. Calculated left ventricular EF = 60.6% Left ventricular ejection fraction appears to be 56 - 60%. Left ventricular diastolic function was normal.    Normal right ventricular size and function.    Mild aortic valve regurgitation is present.      Plan for Follow-up of Pending Labs/Results:   Pending Labs       Order Current Status    ACTH In process    Blood Culture - Blood, Arm, Right Preliminary result    Blood Culture - Blood, Hand, Right Preliminary result          Discharge Details        Discharge Medications        PAUSE taking these medications        Instructions Start Date   furosemide 20 MG tablet  Wait to take this  until your doctor or other care provider tells you to start again.  Commonly known as: Lasix   20 mg, Oral, Daily             New Medications        Instructions Start Date   apixaban 5 MG tablet tablet  Commonly known as: ELIQUIS   5 mg, Oral, 2 Times Daily      atorvastatin 20 MG tablet  Commonly known as: LIPITOR   20 mg, Oral, Nightly      midodrine 5 MG tablet  Commonly known as: PROAMATINE   5 mg, Oral, 2 Times Daily             Continue These Medications        Instructions Start Date   albuterol sulfate  (90 Base) MCG/ACT inhaler  Commonly known as: PROVENTIL HFA;VENTOLIN HFA;PROAIR HFA   2 puffs, Inhalation, Every 4 Hours PRN      buPROPion  MG 24 hr tablet  Commonly known as: WELLBUTRIN XL   150 mg, Oral, Daily      Calcium Carbonate-Vitamin D 500-5 MG-MCG tablet   1 tablet, Oral, 2 Times Daily      cetirizine 10 MG tablet  Commonly known as: zyrTEC   10 mg, Oral, Daily      Diclofenac Sodium 1 % gel gel  Commonly known as: Voltaren   4 g, Topical, 2 Times Daily      diphenoxylate-atropine 2.5-0.025 MG per tablet  Commonly known as: LOMOTIL   TAKE 1 TABLET BY MOUTH EVERY 6 HOURS AS NEEDED FOR DIARRHEA      docusate sodium 100 MG capsule  Commonly known as: COLACE   100 mg, 2 Times Daily      doxycycline 100 MG tablet  Commonly known as: VIBRAMYICN   100 mg, Oral, 2 Times Daily, For 7 days, then 1 tablet daily indefinitely      DULoxetine 20 MG capsule  Commonly known as: CYMBALTA   20 mg, Oral, Daily, Pt weaning off medication.per pt.      famotidine 20 MG tablet  Commonly known as: PEPCID   20 mg, Oral, 2 Times Daily      fluticasone 50 MCG/ACT nasal spray  Commonly known as: FLONASE   2 sprays, Nasal, Daily, Administer 2 sprays in each nostril for each dose.      Hydrocortisone (Perianal) 2.5 % rectal cream  Commonly known as: ANUSOL-HC   Rectal, 2 Times Daily      KETOPROFEN-LIDO-GABAPENTIN EX   APPLY 1-2 GRAMS TO AFFECTED AREAS 3-4 TIMES DAILY      lidocaine-prilocaine 2.5-2.5 %  cream  Commonly known as: EMLA   1 Application, Topical, As Needed      LORazepam 0.5 MG tablet  Commonly known as: ATIVAN   0.5 mg, Oral, Take As Directed, 1 tabelt by mouth 30 minutes prior to MRI, take a second tablet at the time of the MRI if needed.      Magic Mouthwash Oral Suspension (diphenhydrAMINE HCl - aluminum & magnesium hydroxide-simethicone - lidocaine - nystatin)   Swish and Spit 10 mL by mouth every 6 (Six) Hours For 7 Days.      magnesium oxide 400 MG tablet  Commonly known as: MAG-OX   400 mg, Oral, Daily      mineral oil-hydrophilic petrolatum ointment   1 Application, Topical, As Needed      montelukast 10 MG tablet  Commonly known as: SINGULAIR   10 mg, Oral, Nightly      Morphine 30 MG 12 hr tablet  Commonly known as: MS CONTIN   30 mg, Oral, 2 Times Daily      naloxone 4 MG/0.1ML nasal spray  Commonly known as: NARCAN   1 spray, Nasal, As Needed      nystatin susp + lidocaine viscous oral suspension  Commonly known as: MAGIC MOUTHWASH   5-10 ml swish and spit or swallow QID prn      ondansetron 8 MG tablet  Commonly known as: ZOFRAN   8 mg, Oral, 3 Times Daily PRN      oxyCODONE 15 MG immediate release tablet  Commonly known as: ROXICODONE   15 mg, Oral, 5 Times Daily PRN      pantoprazole 40 MG EC tablet  Commonly known as: Protonix   40 mg, Oral, Daily      pregabalin 300 MG capsule  Commonly known as: Lyrica   300 mg, Oral, 2 Times Daily      promethazine-dextromethorphan 6.25-15 MG/5ML syrup  Commonly known as: PROMETHAZINE-DM   5 mL, Oral, 3 times daily      Stiolto Respimat 2.5-2.5 MCG/ACT aerosol solution inhaler  Generic drug: tiotropium bromide-olodaterol   INHALE 2 INHALATION(S) BY MOUTH DAILY      traZODone 150 MG tablet  Commonly known as: DESYREL   150 mg, Oral, Nightly      triamcinolone 0.025 % ointment  Commonly known as: KENALOG   1 Application, Topical, 2 Times Daily, Use second if topical treatment #1 ineffective             Stop These Medications       amoxicillin-clavulanate 875-125 MG per tablet  Commonly known as: AUGMENTIN     aspirin 81 MG EC tablet     potassium chloride ER 20 MEQ tablet controlled-release ER tablet  Commonly known as: K-TAB              Allergies   Allergen Reactions    Bactrim [Sulfamethoxazole-Trimethoprim] Hives     and blisters    Sulfa Antibiotics Hives     and blisters         Discharge Disposition:  Home or Self Care    Diet:  Hospital:  Diet Order   Procedures    Diet: Regular/House; Fluid Consistency: Thin (IDDSI 0)            Activity:      Restrictions or Other Recommendations:         CODE STATUS:    Code Status and Medical Interventions: CPR (Attempt to Resuscitate); Full Support   Ordered at: 07/30/25 0011     Code Status (Patient has no pulse and is not breathing):    CPR (Attempt to Resuscitate)     Medical Interventions (Patient has pulse or is breathing):    Full Support     Level Of Support Discussed With:    Patient       Future Appointments   Date Time Provider Department Center   8/7/2025 11:15 AM Westley Gonzales DO [facility]   8/11/2025  8:00 AM Brenda Cronin MD [facility]   8/11/2025  9:00 AM TREATMENT RM 6 [facility]   8/13/2025  2:30 PM TREATMENT RM 11 [facility]   8/22/2025 10:30 AM Linda Leslie PA-C [facility] None   9/12/2025  1:00 PM Westley Gonzales DO [facility]       Additional Instructions for the Follow-ups that You Need to Schedule       Ambulatory Referral to Physical Therapy for Evaluation & Treatment   As directed      Specialty needed: Evaluate and treat   Follow-up needed: Yes        Discharge Follow-up with PCP   As directed       Currently Documented PCP:    Westley Gonzales DO    PCP Phone Number:    [phone]     Follow Up Details: 1 week with BP log        Discharge Follow-up with Specified Provider: Dr Cronin 2-4 weeks   As directed      To: Dr Cronin 2-4 weeks        Discharge Follow-up with Specified Provider:  stroke clinic 1 month   As directed      To: stroke clinic 1 month                      Faye Jain MD  07/31/25      Time Spent on Discharge:  I spent  35  minutes on this discharge activity which included: face-to-face encounter with the patient, reviewing the data in the system, coordination of the care with the nursing staff as well as consultants, documentation, and entering orders.

## 2025-07-31 NOTE — THERAPY TREATMENT NOTE
Acute Care - Speech Language Pathology Treatment Note  [facility]     Patient Name: Edward Powell  : 1970  MRN: [id number]  Today's Date: 2025               Admit Date: 2025     Visit Dx:    ICD-10-CM ICD-9-CM   1. Pneumonia of both lungs due to infectious organism, unspecified part of lung  J18.9 483.8   2. Cerebrovascular accident (CVA) due to embolism of precerebral artery  I63.10 434.11   3. Cognitive communication deficit  R41.841 799.52   4. Cardiac arrhythmia, unspecified  I49.9 427.9     Patient Active Problem List   Diagnosis    Abnormal CT of the abdomen    Normocytic anemia    Change in bowel habits    Chronic diarrhea    Rectal cancer metastasized to liver    Rectal adenocarcinoma    Venofibrosis    Kidney stones    Neuropathic pain    Pneumonia    Anemia due to chemotherapy    Stroke    Chronic hypotension     Past Medical History:   Diagnosis Date    Arthritis     Cancer     rectal cancer - diagnosed     Cholelithiasis     Removed    COPD (chronic obstructive pulmonary disease)     Coronary artery disease 2009    Stent - no cardiologist currently    Elevated cholesterol     GERD (gastroesophageal reflux disease)     Hernia 2003    Lower hernia    Perforated ulcer 2019    Sleep apnea     history of; when weighed over 400lbs - no issues following bariatric surgery    Stroke 2025     Past Surgical History:   Procedure Laterality Date    APPENDECTOMY      Removed    BARIATRIC SURGERY      Gastric bypass    BLADDER TUMOR/ULCER BLEEDER CAUTERIZATION      CARDIAC CATHETERIZATION  2009    stent placed    CHOLECYSTECTOMY      Removed    COLONOSCOPY N/A 2023    Procedure: COLONOSCOPY WITH HOT SNARE POLYPECTOMY AND TATTOO;  Surgeon: Noman Gautam MD;  Location: [facility] ENDOSCOPY;  Service: Gastroenterology;  Laterality: N/A;    PORTACATH PLACEMENT N/A 2024    Procedure: INSERTION OF PORTACATH WITH ULTRSOUND AND FLUOROSCOPIC GUIDANCE;  Surgeon:  Ida Black MD;  Location: [facility] [place];  Service: General;  Laterality: N/A;    JENNIFER-EN-Y         SLP Recommendation and Plan      Anticipated Discharge Disposition (SLP): home with home health, home with assist (07/31/25 1330)         Daily Summary of Progress (SLP): progress toward functional goals as expected (07/31/25 1330)     Patient/Family Concerns, Anticipated Discharge Disposition (SLP): Safety concerns re: ability to drive and manage finances/medications/cooking completely independently based on cognitive deficits noted today. Baseline unknown--discussed concerns w/ RN and . (07/31/25 1330)     Treatment Assessment (SLP): moderate, cognitive-linguistic disorder (07/31/25 1330)  Treatment Assessment Comments (SLP): Affecting attention, executive function, short-term memory/word retrieval, and problem-solving. Pt denied acute deficits. No family present. ? Potential lack of deficit awareness. (07/31/25 1330)  Plan for Continued Treatment (SLP): continue treatment per plan of care (07/31/25 1330)  Progress: no change (07/31/25 1508)      SLP EVALUATION (Last 72 Hours)       SLP SLC Evaluation       Row Name 07/31/25 1330 07/30/25 1520                Communication Assessment/Intervention    Document Type therapy note (daily note)  -AC evaluation  -CH       Subjective Information no complaints  -AC no complaints  -CH       Patient Observations alert;cooperative  -AC alert;cooperative;agree to therapy  -CH       Patient/Family/Caregiver Comments/Observations No family present.  -AC none present  -CH       Patient Effort adequate  -AC good  -CH       Comment Pt/RN deny swallowing concerns/need for SLP consult for swallow eval.  -AC Noted RLL pna, pt passed RN dysphagia screen and reported no difficulty swallowing to SLP. RN has noted no difficulty with meals or meds. Reviewed s/s of aspiration with patient to be aware of. RN to order SLP for dysphagia eval if any concerns.  -CH       Symptoms Noted  "During/After Treatment -- none  -          General Information    Patient Profile Reviewed yes  -AC yes  -       Pertinent History Of Current Problem -- admitted with AMS, falls, R basilar pna, metastatic rectal cancer, currently receiving chemo. CT: scattered sm infarcts within the R>L centrum semiovale.  -       Precautions/Limitations, Vision -- WFL;for purposes of eval  -CH       Precautions/Limitations, Hearing -- WFL;for purposes of eval  -       Prior Level of Function-Communication -- cognitive-linguistic impairment;other (see comments)  pt reported having \"chemo brain\"  -       Plans/Goals Discussed with -- patient;agreed upon  -       Barriers to Rehab -- medically complex  -       Patient's Goals for Discharge -- return to all previous roles/activities  -          Pain    Additional Documentation -- Pain Scale: FACES Pre/Post-Treatment (Group)  -          Pain Scale: FACES Pre/Post-Treatment    Pain: FACES Scale, Pretreatment 0-->no hurt  -AC 0-->no hurt  -       Posttreatment Pain Rating 0-->no hurt  -AC 0-->no hurt  -          Comprehension Assessment/Intervention    Comprehension Assessment/Intervention -- Auditory Comprehension;Reading Comprehension  -          Auditory Comprehension Assessment/Intervention    Auditory Comprehension (Communication) -- L  -       Answers Questions (Communication) -- yes/no;wh questions;personal;simple;concrete;abstract;L  -       Able to Follow Commands (Communication) -- 3-step;[facility]  -       Narrative Discourse -- conversational level;mild impairment  -       Successful Auditory Strategies (Communication) -- repetition  -          Reading Comprehension Assessment/Intervention    Reading Comprehension (Communication) -- L  -       Functional Reading Tasks -- WFL  -          Expression Assessment/Intervention    Expression Assessment/Intervention -- verbal expression  -          Verbal Expression Assessment/Intervention    " Verbal Expression -- mild impairment  -       Repetition -- sentences;WNL  -       Phrase Completion -- unpredictable;mild impairment  -CH       Responsive Naming -- complex;mild impairment  -CH       Confrontational Naming -- low frequency;WNL  -CH       Spontaneous/Functional Words -- complex;mild impairment  -CH       Sentence Formulation -- complex;mild impairment  -CH       Conversational Discourse/Fluency -- mild impairment  -          Motor Speech Assessment/Intervention    Motor Speech Function -- WFL  -          Cursory Voice Assessment/Intervention    Quality and Resonance (Voice) -- hoarse  -       Voice, Comment -- pt reported baseline  -          Cognitive Assessment Intervention- SLP    Cognitive Function (Cognition) -- mild impairment  -       Orientation Status (Cognition) -- awareness of basic personal information;person;place;time;situation;[facility]  -       Memory (Cognitive) -- functional;immediate;long-term;WFL;short-term;delayed;mental manipulation;mild impairment  -       Attention (Cognitive) -- selective;sustained;L  -       Problem Solving (Cognitive) -- simple;temporal;mild impairment  -       Executive Function (Cognition) moderate impairment;deficit awareness;judgement  -AC --       Pragmatics (Communication) -- [facility]  -       Right Hemisphere Function moderate impairment;deficit awareness;safety awareness;pragmatics  -AC --          SLP Evaluation Clinical Impressions    SLP Diagnosis -- mild;cognitive-linguistic disorder;aphasia  -       Rehab Potential/Prognosis -- good  -[facility] Criteria for Skilled Therapy Interventions Met -- yes  -       Functional Impact -- functional impact in social situations;difficulty in expressing complex messages;difficulty completing home management task  -          SLP Treatment Clinical Impressions    Treatment Assessment (SLP) moderate;cognitive-linguistic disorder  -AC --       Treatment Assessment Comments (SLP)  "Affecting attention, executive function, short-term memory/word retrieval, and problem-solving. Pt denied acute deficits. No family present. ? Potential lack of deficit awareness.  -AC --       Daily Summary of Progress (SLP) progress toward functional goals as expected  - --       Barriers to Overall Progress (SLP) Baseline deficits  reported baseline \"chemo brain\"  - --       Plan for Continued Treatment (SLP) continue treatment per plan of care  - --       Care Plan Review evaluation/treatment results reviewed;care plan/treatment goals reviewed;current/potential barriers reviewed;risks/benefits reviewed;patient/other agree to care plan  - --          Recommendations    Therapy Frequency (SLP SLC) -- 5 days per week  -       Predicted Duration Therapy Intervention (Days) -- 1 week  -       Anticipated Discharge Disposition (SLP) home with home health;home with assist  - home with assist  -       Patient/Family Concerns, Anticipated Discharge Disposition (SLP) Safety concerns re: ability to drive and manage finances/medications/cooking completely independently based on cognitive deficits noted today. Baseline unknown--discussed concerns w/ RN and .  - --       Demonstrates Need for Referral to Another Service -- speech therapy;other (see comments)  clinical swallow eval prn  -CH                 User Key  (r) = Recorded By, (t) = Taken By, (c) = Cosigned By      Initials Name Effective Dates    AC Claire Du, MS CCC-SLP 02/03/23 -      Gloria Garcia MS CCC-SLP 01/20/25 -                        EDUCATION  The patient has been educated in the following areas:     Cognitive Impairment.           SLP GOALS       Row Name 07/31/25 1330 07/30/25 1520          Patient will demonstrate functional language skills for return to discharge environment     [place] -- with minimal cues  -     Time frame -- 1 week  -        Patient will demonstrate functional cognitive-linguistic " skills for return to discharge environment    [place] with minimal cues  -AC with minimal cues  -CH     Time frame 1 week  -AC 1 week  -CH     Progress/Outcomes continuing progress toward goal  -AC --        SLP Diagnostic Treatment     Patient will participate in further assessment in the following areas graphic expression;cognitive-linguistic;clarification of baseline cognitive communication status  -AC graphic expression;cognitive-linguistic;clarification of baseline cognitive communication status  -CH     Time Frame (Diagnostic) 1 week  -AC 1 week  -CH     Progress/Outcomes (Additional Goal 1, SLP) goal partially met  -AC --     Comment (Diagnostic) Identified cognitive-linguistic disorder and goals added. No family present to provide info re: baseline status.  -AC --        Word Retrieval Skills Goal 1 (SLP)    Improve Word Retrieval Skills By Goal 1 (SLP) completing a divergent task;completing a convergent task;supplying an antonym;70%;with minimal cues (75-90%)  -AC completing a divergent task;completing a convergent task;supplying an antonym;70%;with minimal cues (75-90%)  -     Time Frame (Word Retrieval Goal 1, SLP) 1 week  -AC 1 week  -CH     Progress/Outcomes (Word Retrieval Goal 1, SLP) goal ongoing  -AC --        Connected Speech to Express Thoughts Goal 1 (SLP)    Improve Narrative Discourse to Express Thoughts By Goal 1 (SLP) explaining a proverb or idiom;90%;with minimal cues (75-90%)  -AC explaining a proverb or idiom;90%;with minimal cues (75-90%)  -     Time Frame (Connected Speech Goal 1, SLP) 1 week  -AC 1 week  -CH     Progress/Outcomes (Connected Speech Goal 1, SLP) goal ongoing  -AC --        Attention Goal 1 (SLP)    Improve Attention by Goal 1 (SLP) complete sustained attention task;80%;with minimal cues (75-90%)  -AC --     Time Frame (Attention Goal 1, SLP) 1 week  -AC --     Progress/Outcomes (Attention Goal 1, SLP) new goal  -AC --        Memory Skills Goal 1 (SLP)    Improve  Memory Skills Through Goal 1 (SLP) recalling related word lists immediately;recalling related word lists with an imposed delay;80%;with minimal cues (75-90%)  -AC recalling related word lists immediately;recalling related word lists with an imposed delay;80%;with minimal cues (75-90%)  -     Time Frame (Memory Skills Goal 1, SLP) 1 week  -AC 1 week  -CH     Progress/Outcomes (Memory Skills Goal 1, SLP) goal ongoing  -AC --        Organizational Skills Goal 1 (SLP)    Improve Thought Organization Through Goal 1 (SLP) completing mental manipulation task;80%;with minimal cues (75-90%)  -AC completing mental manipulation task;80%;with minimal cues (75-90%)  -     Time Frame (Thought Organization Skills Goal 1, SLP) 1 week  -AC 1 week  -CH     Progress/Outcomes (Thought Organization Skills Goal 1, SLP) goal ongoing  -AC --        Pragmatic Skills Goal 1 (SLP)    Improve Pragmatic Skills Goal 1 (SLP) maintain topic;demonstrate turn-taking;80%;with minimal cues (75-90%)  -AC --     Time Frame (Pragmatic Skills Goal 1, SLP) 1 week  -AC --     Progress/Outcomes (Pragmatic Skills Goal 1, SLP) new goal  -AC --        Executive Functional Skills Goal 1 (SLP)    Improve Executive Function Skills Goal 1 (SLP) demonstrate awareness of deficit;identify anticipated needs;home management activity;80%;with minimal cues (75-90%)  -AC --     Time Frame (Executive Function Skills Goal 1, SLP) 1 week  -AC --     Progress/Outcomes (Executive Function Skills Goal 1, SLP) new goal  -AC --               User Key  (r) = Recorded By, (t) = Taken By, (c) = Cosigned By      Initials Name Provider Type    AC Claire Du, MS CCC-SLP Speech and Language Pathologist    Gloria Barnes MS CCC-SLP Speech and Language Pathologist                              Time Calculation:      Time Calculation- SLP       Row Name 07/31/25 0443             Time Calculation- SLP    SLP Start Time 1330  -      SLP Received On 07/31/25  -          Untimed Charges    90319-VR Treatment/ST Modification Prosth Aug Alter  55  -AC         Total Minutes    Untimed Charges Total Minutes 55  -AC       Total Minutes 55  -AC                User Key  (r) = Recorded By, (t) = Taken By, (c) = Cosigned By      Initials Name Provider Type    Claire Tamayo MS CCC-SLP Speech and Language Pathologist                    Therapy Charges for Today       Code Description Service Date Service Provider Modifiers Qty    [id number]  ST TREATMENT SPEECH 4 7/31/2025 Claire Du MS CCC-SLP GN 1                       Claire Du MS CCC-SLP  7/31/2025

## 2025-08-01 ENCOUNTER — TRANSITIONAL CARE MANAGEMENT TELEPHONE ENCOUNTER (OUTPATIENT)
Dept: CALL CENTER | Facility: HOSPITAL | Age: 55
End: 2025-08-01
Payer: MEDICAID

## 2025-08-01 LAB — ACTH PLAS-MCNC: 17.7 PG/ML (ref 7.2–63.3)

## 2025-08-03 LAB
BACTERIA SPEC AEROBE CULT: NORMAL
BACTERIA SPEC AEROBE CULT: NORMAL

## 2025-08-07 ENCOUNTER — OFFICE VISIT (OUTPATIENT)
Age: 55
End: 2025-08-07
Payer: MEDICAID

## 2025-08-07 VITALS
OXYGEN SATURATION: 99 % | DIASTOLIC BLOOD PRESSURE: 73 MMHG | HEART RATE: 79 BPM | WEIGHT: 196 LBS | BODY MASS INDEX: 26.55 KG/M2 | TEMPERATURE: 97.8 F | HEIGHT: 72 IN | SYSTOLIC BLOOD PRESSURE: 119 MMHG

## 2025-08-07 DIAGNOSIS — R26.89 BALANCE DISORDER: ICD-10-CM

## 2025-08-07 DIAGNOSIS — I63.9 CEREBROVASCULAR ACCIDENT (CVA), UNSPECIFIED MECHANISM: Primary | ICD-10-CM

## 2025-08-07 DIAGNOSIS — C78.7 RECTAL CANCER METASTASIZED TO LIVER: ICD-10-CM

## 2025-08-07 DIAGNOSIS — C20 RECTAL CANCER METASTASIZED TO LIVER: ICD-10-CM

## 2025-08-11 ENCOUNTER — OFFICE VISIT (OUTPATIENT)
Dept: ONCOLOGY | Facility: CLINIC | Age: 55
End: 2025-08-11
Payer: MEDICAID

## 2025-08-11 ENCOUNTER — HOSPITAL ENCOUNTER (OUTPATIENT)
Facility: HOSPITAL | Age: 55
Discharge: HOME | End: 2025-08-11
Admitting: INTERNAL MEDICINE
Payer: MEDICAID

## 2025-08-11 VITALS
WEIGHT: 196 LBS | OXYGEN SATURATION: 93 % | HEIGHT: 72 IN | RESPIRATION RATE: 18 BRPM | BODY MASS INDEX: 26.55 KG/M2 | TEMPERATURE: 98 F | SYSTOLIC BLOOD PRESSURE: 111 MMHG | DIASTOLIC BLOOD PRESSURE: 67 MMHG | HEART RATE: 107 BPM

## 2025-08-11 DIAGNOSIS — C20 RECTAL ADENOCARCINOMA: Primary | ICD-10-CM

## 2025-08-11 DIAGNOSIS — C20 RECTAL CANCER METASTASIZED TO LIVER: ICD-10-CM

## 2025-08-11 DIAGNOSIS — C78.7 RECTAL CANCER METASTASIZED TO LIVER: ICD-10-CM

## 2025-08-11 LAB
ALBUMIN SERPL-MCNC: 2.4 G/DL (ref 3.5–5.2)
ALBUMIN/GLOB SERPL: 0.9 G/DL
ALP SERPL-CCNC: 98 U/L (ref 39–117)
ALT SERPL W P-5'-P-CCNC: 22 U/L (ref 1–41)
ANION GAP SERPL CALCULATED.3IONS-SCNC: 8.5 MMOL/L (ref 5–15)
ANISOCYTOSIS BLD QL: NORMAL
AST SERPL-CCNC: 28 U/L (ref 1–40)
BASOPHILS # BLD AUTO: 0.05 10*3/MM3 (ref 0–0.2)
BASOPHILS NFR BLD AUTO: 0.3 % (ref 0–1.5)
BILIRUB SERPL-MCNC: 0.4 MG/DL (ref 0–1.2)
BUN SERPL-MCNC: 12 MG/DL (ref 6–20)
BUN/CREAT SERPL: 17.9 (ref 7–25)
CALCIUM SPEC-SCNC: 8 MG/DL (ref 8.6–10.5)
CEA SERPL-MCNC: 9.08 NG/ML
CHLORIDE SERPL-SCNC: 105 MMOL/L (ref 98–107)
CO2 SERPL-SCNC: 22.5 MMOL/L (ref 22–29)
CREAT SERPL-MCNC: 0.67 MG/DL (ref 0.76–1.27)
DEPRECATED RDW RBC AUTO: 66.1 FL (ref 37–54)
EGFRCR SERPLBLD CKD-EPI 2021: 109.6 ML/MIN/1.73
ELLIPTOCYTES BLD QL SMEAR: NORMAL
EOSINOPHIL # BLD AUTO: 0.04 10*3/MM3 (ref 0–0.4)
EOSINOPHIL NFR BLD AUTO: 0.2 % (ref 0.3–6.2)
ERYTHROCYTE [DISTWIDTH] IN BLOOD BY AUTOMATED COUNT: 19.6 % (ref 12.3–15.4)
FERRITIN SERPL-MCNC: 81.74 NG/ML (ref 30–400)
GLOBULIN UR ELPH-MCNC: 2.8 GM/DL
GLUCOSE SERPL-MCNC: 88 MG/DL (ref 65–99)
HCT VFR BLD AUTO: 35.8 % (ref 37.5–51)
HGB BLD-MCNC: 11.2 G/DL (ref 13–17.7)
IMM GRANULOCYTES # BLD AUTO: 0.11 10*3/MM3 (ref 0–0.05)
IMM GRANULOCYTES NFR BLD AUTO: 0.6 % (ref 0–0.5)
LYMPHOCYTES # BLD AUTO: 1.17 10*3/MM3 (ref 0.7–3.1)
LYMPHOCYTES NFR BLD AUTO: 6.4 % (ref 19.6–45.3)
MCH RBC QN AUTO: 29.2 PG (ref 26.6–33)
MCHC RBC AUTO-ENTMCNC: 31.3 G/DL (ref 31.5–35.7)
MCV RBC AUTO: 93.5 FL (ref 79–97)
MONOCYTES # BLD AUTO: 1.03 10*3/MM3 (ref 0.1–0.9)
MONOCYTES NFR BLD AUTO: 5.6 % (ref 5–12)
NEUTROPHILS NFR BLD AUTO: 15.99 10*3/MM3 (ref 1.7–7)
NEUTROPHILS NFR BLD AUTO: 86.9 % (ref 42.7–76)
NRBC BLD AUTO-RTO: 0 /100 WBC (ref 0–0.2)
PLATELET # BLD AUTO: 196 10*3/MM3 (ref 140–450)
PMV BLD AUTO: 12 FL (ref 6–12)
POTASSIUM SERPL-SCNC: 4 MMOL/L (ref 3.5–5.2)
PROT SERPL-MCNC: 5.2 G/DL (ref 6–8.5)
RBC # BLD AUTO: 3.83 10*6/MM3 (ref 4.14–5.8)
SMALL PLATELETS BLD QL SMEAR: ADEQUATE
SODIUM SERPL-SCNC: 136 MMOL/L (ref 136–145)
WBC MORPH BLD: NORMAL
WBC NRBC COR # BLD AUTO: 18.39 10*3/MM3 (ref 3.4–10.8)

## 2025-08-11 PROCEDURE — 36591 DRAW BLOOD OFF VENOUS DEVICE: CPT

## 2025-08-11 PROCEDURE — 1125F AMNT PAIN NOTED PAIN PRSNT: CPT | Performed by: INTERNAL MEDICINE

## 2025-08-11 PROCEDURE — 99214 OFFICE O/P EST MOD 30 MIN: CPT | Performed by: INTERNAL MEDICINE

## 2025-08-11 PROCEDURE — 82378 CARCINOEMBRYONIC ANTIGEN: CPT | Performed by: INTERNAL MEDICINE

## 2025-08-11 PROCEDURE — 85025 COMPLETE CBC W/AUTO DIFF WBC: CPT | Performed by: INTERNAL MEDICINE

## 2025-08-11 PROCEDURE — 85007 BL SMEAR W/DIFF WBC COUNT: CPT | Performed by: INTERNAL MEDICINE

## 2025-08-11 PROCEDURE — 25010000002 HEPARIN LOCK FLUSH PER 10 UNITS: Performed by: INTERNAL MEDICINE

## 2025-08-11 PROCEDURE — 80053 COMPREHEN METABOLIC PANEL: CPT | Performed by: INTERNAL MEDICINE

## 2025-08-11 PROCEDURE — 82728 ASSAY OF FERRITIN: CPT | Performed by: INTERNAL MEDICINE

## 2025-08-11 RX ORDER — HEPARIN SODIUM (PORCINE) LOCK FLUSH IV SOLN 100 UNIT/ML 100 UNIT/ML
500 SOLUTION INTRAVENOUS AS NEEDED
Status: DISCONTINUED | OUTPATIENT
Start: 2025-08-11 | End: 2025-08-12 | Stop reason: HOSPADM

## 2025-08-11 RX ORDER — SODIUM CHLORIDE 0.9 % (FLUSH) 0.9 %
10 SYRINGE (ML) INJECTION AS NEEDED
OUTPATIENT
Start: 2025-08-11

## 2025-08-11 RX ORDER — SODIUM CHLORIDE 0.9 % (FLUSH) 0.9 %
10 SYRINGE (ML) INJECTION AS NEEDED
Status: DISCONTINUED | OUTPATIENT
Start: 2025-08-11 | End: 2025-08-12 | Stop reason: HOSPADM

## 2025-08-11 RX ORDER — HEPARIN SODIUM (PORCINE) LOCK FLUSH IV SOLN 100 UNIT/ML 100 UNIT/ML
500 SOLUTION INTRAVENOUS AS NEEDED
OUTPATIENT
Start: 2025-08-11

## 2025-08-11 RX ADMIN — Medication 10 ML: at 08:50

## 2025-08-11 RX ADMIN — Medication 500 UNITS: at 08:50

## 2025-08-12 DIAGNOSIS — G89.3 CANCER RELATED PAIN: Primary | ICD-10-CM

## 2025-08-12 DIAGNOSIS — T45.1X5A CHEMOTHERAPY-INDUCED NEUROPATHY: ICD-10-CM

## 2025-08-12 DIAGNOSIS — G62.0 CHEMOTHERAPY-INDUCED NEUROPATHY: ICD-10-CM

## 2025-08-12 RX ORDER — PREGABALIN 300 MG/1
300 CAPSULE ORAL 2 TIMES DAILY
Qty: 60 CAPSULE | Refills: 0 | Status: SHIPPED | OUTPATIENT
Start: 2025-08-12 | End: 2025-09-11

## 2025-08-13 RX ORDER — OXYCODONE HYDROCHLORIDE 15 MG/1
TABLET ORAL
Qty: 150 TABLET | Refills: 0 | Status: SHIPPED | OUTPATIENT
Start: 2025-08-13

## 2025-08-13 RX ORDER — MORPHINE SULFATE 30 MG/1
30 TABLET, FILM COATED, EXTENDED RELEASE ORAL 2 TIMES DAILY
Qty: 60 TABLET | Refills: 0 | Status: SHIPPED | OUTPATIENT
Start: 2025-08-13 | End: 2025-09-12

## 2025-08-14 ENCOUNTER — TELEPHONE (OUTPATIENT)
Dept: PALLIATIVE CARE | Facility: CLINIC | Age: 55
End: 2025-08-14
Payer: MEDICAID

## 2025-08-22 ENCOUNTER — OFFICE VISIT (OUTPATIENT)
Dept: PALLIATIVE CARE | Facility: CLINIC | Age: 55
End: 2025-08-22
Payer: MEDICAID

## 2025-08-22 VITALS
SYSTOLIC BLOOD PRESSURE: 108 MMHG | DIASTOLIC BLOOD PRESSURE: 71 MMHG | BODY MASS INDEX: 26.85 KG/M2 | OXYGEN SATURATION: 96 % | RESPIRATION RATE: 16 BRPM | HEART RATE: 89 BPM | TEMPERATURE: 97.5 F | WEIGHT: 198 LBS

## 2025-08-22 DIAGNOSIS — C20 RECTAL ADENOCARCINOMA: Primary | ICD-10-CM

## 2025-08-22 DIAGNOSIS — G62.0 CHEMOTHERAPY-INDUCED NEUROPATHY: ICD-10-CM

## 2025-08-22 DIAGNOSIS — R26.81 UNSTEADY GAIT: ICD-10-CM

## 2025-08-22 DIAGNOSIS — T45.1X5A CHEMOTHERAPY-INDUCED NEUROPATHY: ICD-10-CM

## 2025-08-22 DIAGNOSIS — R60.0 PERIPHERAL EDEMA: ICD-10-CM

## 2025-08-22 DIAGNOSIS — G89.3 CANCER RELATED PAIN: ICD-10-CM

## 2025-08-22 RX ORDER — PREGABALIN 300 MG/1
300 CAPSULE ORAL 2 TIMES DAILY
Qty: 60 CAPSULE | Refills: 0 | Status: SHIPPED | OUTPATIENT
Start: 2025-09-11 | End: 2025-10-11

## 2025-08-22 RX ORDER — OXYCODONE HYDROCHLORIDE 15 MG/1
TABLET ORAL
Qty: 150 TABLET | Refills: 0 | Status: SHIPPED | OUTPATIENT
Start: 2025-09-13

## 2025-08-22 RX ORDER — MORPHINE SULFATE 30 MG/1
30 TABLET, FILM COATED, EXTENDED RELEASE ORAL 2 TIMES DAILY
Qty: 60 TABLET | Refills: 0 | Status: SHIPPED | OUTPATIENT
Start: 2025-09-12 | End: 2025-10-12

## 2025-08-25 ENCOUNTER — DOCUMENTATION (OUTPATIENT)
Dept: OTHER | Facility: HOSPITAL | Age: 55
End: 2025-08-25
Payer: MEDICAID

## 2025-08-25 ENCOUNTER — OFFICE VISIT (OUTPATIENT)
Dept: ONCOLOGY | Facility: CLINIC | Age: 55
End: 2025-08-25
Payer: MEDICAID

## 2025-08-25 ENCOUNTER — SPECIALTY PHARMACY (OUTPATIENT)
Dept: ONCOLOGY | Facility: HOSPITAL | Age: 55
End: 2025-08-25
Payer: MEDICAID

## 2025-08-25 ENCOUNTER — HOSPITAL ENCOUNTER (OUTPATIENT)
Facility: HOSPITAL | Age: 55
End: 2025-08-25
Payer: MEDICAID

## 2025-08-25 ENCOUNTER — HOSPITAL ENCOUNTER (OUTPATIENT)
Facility: HOSPITAL | Age: 55
Discharge: HOME | End: 2025-08-25
Admitting: INTERNAL MEDICINE
Payer: MEDICAID

## 2025-08-25 VITALS
RESPIRATION RATE: 16 BRPM | WEIGHT: 197 LBS | TEMPERATURE: 98 F | BODY MASS INDEX: 26.68 KG/M2 | SYSTOLIC BLOOD PRESSURE: 113 MMHG | DIASTOLIC BLOOD PRESSURE: 67 MMHG | HEART RATE: 76 BPM | HEIGHT: 72 IN | OXYGEN SATURATION: 97 %

## 2025-08-25 DIAGNOSIS — R35.0 URINARY FREQUENCY: ICD-10-CM

## 2025-08-25 DIAGNOSIS — C20 RECTAL CANCER METASTASIZED TO LIVER: ICD-10-CM

## 2025-08-25 DIAGNOSIS — C20 RECTAL ADENOCARCINOMA: Primary | ICD-10-CM

## 2025-08-25 DIAGNOSIS — R60.9 EDEMA, UNSPECIFIED TYPE: ICD-10-CM

## 2025-08-25 DIAGNOSIS — C20 RECTAL ADENOCARCINOMA: ICD-10-CM

## 2025-08-25 DIAGNOSIS — C20 RECTAL CANCER METASTASIZED TO LIVER: Primary | ICD-10-CM

## 2025-08-25 DIAGNOSIS — C78.7 RECTAL CANCER METASTASIZED TO LIVER: ICD-10-CM

## 2025-08-25 DIAGNOSIS — C78.7 RECTAL CANCER METASTASIZED TO LIVER: Primary | ICD-10-CM

## 2025-08-25 DIAGNOSIS — R35.0 URINARY FREQUENCY: Primary | ICD-10-CM

## 2025-08-25 LAB
ALBUMIN SERPL-MCNC: 2 G/DL (ref 3.5–5.2)
ALBUMIN/GLOB SERPL: 0.8 G/DL
ALP SERPL-CCNC: 105 U/L (ref 39–117)
ALT SERPL W P-5'-P-CCNC: 14 U/L (ref 1–41)
ANION GAP SERPL CALCULATED.3IONS-SCNC: 9.1 MMOL/L (ref 5–15)
AST SERPL-CCNC: 19 U/L (ref 1–40)
BACTERIA UR QL AUTO: ABNORMAL /HPF
BASOPHILS # BLD AUTO: 0.03 10*3/MM3 (ref 0–0.2)
BASOPHILS NFR BLD AUTO: 0.3 % (ref 0–1.5)
BILIRUB SERPL-MCNC: 0.4 MG/DL (ref 0–1.2)
BILIRUB UR QL STRIP: NEGATIVE
BUN SERPL-MCNC: 11 MG/DL (ref 6–20)
BUN/CREAT SERPL: 15.3 (ref 7–25)
CALCIUM SPEC-SCNC: 7.6 MG/DL (ref 8.6–10.5)
CEA SERPL-MCNC: 13.4 NG/ML
CHLORIDE SERPL-SCNC: 107 MMOL/L (ref 98–107)
CLARITY UR: ABNORMAL
CO2 SERPL-SCNC: 22.9 MMOL/L (ref 22–29)
COLOR UR: YELLOW
CREAT SERPL-MCNC: 0.72 MG/DL (ref 0.76–1.27)
DEPRECATED RDW RBC AUTO: 62 FL (ref 37–54)
EGFRCR SERPLBLD CKD-EPI 2021: 107.2 ML/MIN/1.73
EOSINOPHIL # BLD AUTO: 0.12 10*3/MM3 (ref 0–0.4)
EOSINOPHIL NFR BLD AUTO: 1.2 % (ref 0.3–6.2)
ERYTHROCYTE [DISTWIDTH] IN BLOOD BY AUTOMATED COUNT: 18.2 % (ref 12.3–15.4)
GLOBULIN UR ELPH-MCNC: 2.6 GM/DL
GLUCOSE SERPL-MCNC: 96 MG/DL (ref 65–99)
GLUCOSE UR STRIP-MCNC: NEGATIVE MG/DL
HCT VFR BLD AUTO: 32.3 % (ref 37.5–51)
HGB BLD-MCNC: 10.3 G/DL (ref 13–17.7)
HGB UR QL STRIP.AUTO: NEGATIVE
HYALINE CASTS UR QL AUTO: ABNORMAL /LPF
IMM GRANULOCYTES # BLD AUTO: 0.04 10*3/MM3 (ref 0–0.05)
IMM GRANULOCYTES NFR BLD AUTO: 0.4 % (ref 0–0.5)
KETONES UR QL STRIP: NEGATIVE
LEUKOCYTE ESTERASE UR QL STRIP.AUTO: ABNORMAL
LYMPHOCYTES # BLD AUTO: 1.68 10*3/MM3 (ref 0.7–3.1)
LYMPHOCYTES NFR BLD AUTO: 16.7 % (ref 19.6–45.3)
MAGNESIUM SERPL-MCNC: 1.8 MG/DL (ref 1.6–2.6)
MCH RBC QN AUTO: 29.6 PG (ref 26.6–33)
MCHC RBC AUTO-ENTMCNC: 31.9 G/DL (ref 31.5–35.7)
MCV RBC AUTO: 92.8 FL (ref 79–97)
MONOCYTES # BLD AUTO: 0.85 10*3/MM3 (ref 0.1–0.9)
MONOCYTES NFR BLD AUTO: 8.5 % (ref 5–12)
NEUTROPHILS NFR BLD AUTO: 7.32 10*3/MM3 (ref 1.7–7)
NEUTROPHILS NFR BLD AUTO: 72.9 % (ref 42.7–76)
NITRITE UR QL STRIP: NEGATIVE
NRBC BLD AUTO-RTO: 0 /100 WBC (ref 0–0.2)
PH UR STRIP.AUTO: 6.5 [PH] (ref 5–8)
PLATELET # BLD AUTO: 164 10*3/MM3 (ref 140–450)
PMV BLD AUTO: 11.6 FL (ref 6–12)
POTASSIUM SERPL-SCNC: 3.4 MMOL/L (ref 3.5–5.2)
PROT SERPL-MCNC: 4.6 G/DL (ref 6–8.5)
PROT UR QL STRIP: NEGATIVE
RBC # BLD AUTO: 3.48 10*6/MM3 (ref 4.14–5.8)
RBC # UR STRIP: ABNORMAL /HPF
REF LAB TEST METHOD: ABNORMAL
SODIUM SERPL-SCNC: 139 MMOL/L (ref 136–145)
SP GR UR STRIP: 1.02 (ref 1–1.03)
SQUAMOUS #/AREA URNS HPF: ABNORMAL /HPF
UROBILINOGEN UR QL STRIP: ABNORMAL
WBC # UR STRIP: ABNORMAL /HPF
WBC NRBC COR # BLD AUTO: 10.04 10*3/MM3 (ref 3.4–10.8)

## 2025-08-25 PROCEDURE — 1125F AMNT PAIN NOTED PAIN PRSNT: CPT | Performed by: NURSE PRACTITIONER

## 2025-08-25 PROCEDURE — 99215 OFFICE O/P EST HI 40 MIN: CPT | Performed by: NURSE PRACTITIONER

## 2025-08-25 PROCEDURE — 82378 CARCINOEMBRYONIC ANTIGEN: CPT | Performed by: INTERNAL MEDICINE

## 2025-08-25 PROCEDURE — 80053 COMPREHEN METABOLIC PANEL: CPT | Performed by: INTERNAL MEDICINE

## 2025-08-25 PROCEDURE — 36591 DRAW BLOOD OFF VENOUS DEVICE: CPT

## 2025-08-25 PROCEDURE — 25010000002 HEPARIN LOCK FLUSH PER 10 UNITS: Performed by: INTERNAL MEDICINE

## 2025-08-25 PROCEDURE — 81001 URINALYSIS AUTO W/SCOPE: CPT | Performed by: NURSE PRACTITIONER

## 2025-08-25 PROCEDURE — 85025 COMPLETE CBC W/AUTO DIFF WBC: CPT | Performed by: INTERNAL MEDICINE

## 2025-08-25 PROCEDURE — 83735 ASSAY OF MAGNESIUM: CPT | Performed by: INTERNAL MEDICINE

## 2025-08-25 RX ORDER — FUROSEMIDE 20 MG/1
20 TABLET ORAL DAILY
Qty: 4 TABLET | Refills: 1 | Status: SHIPPED | OUTPATIENT
Start: 2025-08-25

## 2025-08-25 RX ORDER — SODIUM CHLORIDE 0.9 % (FLUSH) 0.9 %
10 SYRINGE (ML) INJECTION AS NEEDED
OUTPATIENT
Start: 2025-08-25

## 2025-08-25 RX ORDER — SODIUM CHLORIDE 0.9 % (FLUSH) 0.9 %
10 SYRINGE (ML) INJECTION AS NEEDED
Status: DISCONTINUED | OUTPATIENT
Start: 2025-08-25 | End: 2025-08-26 | Stop reason: HOSPADM

## 2025-08-25 RX ORDER — HEPARIN SODIUM (PORCINE) LOCK FLUSH IV SOLN 100 UNIT/ML 100 UNIT/ML
500 SOLUTION INTRAVENOUS AS NEEDED
OUTPATIENT
Start: 2025-08-25

## 2025-08-25 RX ORDER — HEPARIN SODIUM (PORCINE) LOCK FLUSH IV SOLN 100 UNIT/ML 100 UNIT/ML
500 SOLUTION INTRAVENOUS AS NEEDED
Status: DISCONTINUED | OUTPATIENT
Start: 2025-08-25 | End: 2025-08-26 | Stop reason: HOSPADM

## 2025-08-25 RX ORDER — CAPECITABINE 500 MG/1
825 TABLET, FILM COATED ORAL 2 TIMES DAILY
Qty: 180 TABLET | Refills: 0 | Status: SHIPPED | OUTPATIENT
Start: 2025-08-25 | End: 2025-08-26 | Stop reason: SDUPTHER

## 2025-08-25 RX ADMIN — HEPARIN 500 UNITS: 100 SYRINGE at 09:57

## 2025-08-25 RX ADMIN — Medication 10 ML: at 09:57

## 2025-08-26 ENCOUNTER — SPECIALTY PHARMACY (OUTPATIENT)
Facility: HOSPITAL | Age: 55
End: 2025-08-26
Payer: MEDICAID

## 2025-08-26 ENCOUNTER — HOSPITAL ENCOUNTER (OUTPATIENT)
Facility: HOSPITAL | Age: 55
Discharge: HOME | End: 2025-08-26
Payer: MEDICAID

## 2025-08-26 ENCOUNTER — TELEPHONE (OUTPATIENT)
Dept: CARDIOLOGY | Facility: HOSPITAL | Age: 55
End: 2025-08-26
Payer: MEDICAID

## 2025-08-26 RX ORDER — CAPECITABINE 500 MG/1
1500 TABLET, FILM COATED ORAL 2 TIMES DAILY
Qty: 72 TABLET | Refills: 0 | Status: SHIPPED | OUTPATIENT
Start: 2025-08-26

## 2025-08-29 ENCOUNTER — TELEPHONE (OUTPATIENT)
Dept: NEUROLOGY | Facility: CLINIC | Age: 55
End: 2025-08-29
Payer: MEDICAID

## 2025-08-29 ENCOUNTER — TELEPHONE (OUTPATIENT)
Age: 55
End: 2025-08-29
Payer: MEDICAID

## (undated) DEVICE — HYPODERMIC SAFETY NEEDLE: Brand: MONOJECT

## (undated) DEVICE — Device

## (undated) DEVICE — HYBRID TUBING/CAP SET FOR OLYMPUS® SCOPES: Brand: ERBE

## (undated) DEVICE — ENDOSCOPY PORT CONNECTOR FOR OLYMPUS® SCOPES: Brand: ERBE

## (undated) DEVICE — GLV SURG ULTRATOUCH BIOGEL/COAT PF LF SZ6 STRL

## (undated) DEVICE — CLAVICLE STRAP: Brand: DEROYAL

## (undated) DEVICE — NDL HYPO ECLPS SFTY 25G 1 1/2IN

## (undated) DEVICE — SOL NACL 0.9PCT 1000ML

## (undated) DEVICE — DECANTER BAG 9": Brand: MEDLINE INDUSTRIES, INC.

## (undated) DEVICE — SYR LUERLOK 5CC

## (undated) DEVICE — QUICK CATCH IN-LINE SUCTION POLYP TRAP IS USED FOR SUCTION RETRIEVAL OF ENDOSCOPICALLY REMOVED POLYPS.

## (undated) DEVICE — NDL SCLEROTHERAPY INTERJECT 25G 4 240CM

## (undated) DEVICE — CVR PROB ULTRASND CIVFLEX GEN/PURP TELESCP/FOLD 5.5X58IN LF

## (undated) DEVICE — RICH MAJOR PROCEDURE: Brand: MEDLINE INDUSTRIES, INC.

## (undated) DEVICE — VLV SXN AIR/H2O ORCAPOD3 1P/U STRL

## (undated) DEVICE — DRSNG SURESITE WNDW 4X4.5

## (undated) DEVICE — UNDYED BRAIDED (POLYGLACTIN 910), SYNTHETIC ABSORBABLE SUTURE: Brand: COATED VICRYL

## (undated) DEVICE — SUT VIC 3/0 SH 27IN J416H

## (undated) DEVICE — SYR LUERLOK 20CC BX/50

## (undated) DEVICE — SUT PROLN 2/0 CT2 30IN 8411H

## (undated) DEVICE — SYR LL TP 10ML STRL

## (undated) DEVICE — STRIP,CLOSURE,WOUND,MEDI-STRIP,1/2X4: Brand: MEDLINE

## (undated) DEVICE — SINGLE-USE POLYPECTOMY SNARE: Brand: CAPTIVATOR II

## (undated) DEVICE — Device: Brand: SPOT EX ENDOSCOPIC TATTOO

## (undated) DEVICE — GLV SURG SENSICARE GREEN W/ALOE PF LF 6 STRL

## (undated) DEVICE — SPNG GZ STRL 2S 4X4 12PLY

## (undated) DEVICE — ADHS LIQ MASTISOL 2/3ML

## (undated) DEVICE — LUBE JELLY PK/2.75GM STRL BX/144